# Patient Record
Sex: MALE | Race: WHITE | NOT HISPANIC OR LATINO | Employment: OTHER | URBAN - METROPOLITAN AREA
[De-identification: names, ages, dates, MRNs, and addresses within clinical notes are randomized per-mention and may not be internally consistent; named-entity substitution may affect disease eponyms.]

---

## 2017-01-03 ENCOUNTER — LAB VISIT (OUTPATIENT)
Dept: LAB | Facility: HOSPITAL | Age: 72
End: 2017-01-03
Attending: INTERNAL MEDICINE
Payer: MEDICARE

## 2017-01-03 DIAGNOSIS — N18.4 CRD (CHRONIC RENAL DISEASE), STAGE IV: ICD-10-CM

## 2017-01-03 DIAGNOSIS — N18.4 KIDNEY DISEASE, CHRONIC, STAGE IV (GFR 15-29 ML/MIN): ICD-10-CM

## 2017-01-03 LAB
FERRITIN SERPL-MCNC: 127 NG/ML
HCT VFR BLD AUTO: 28.1 %
HGB BLD-MCNC: 8.9 G/DL
IRON SERPL-MCNC: 35 UG/DL
SATURATED IRON: 19 %
TOTAL IRON BINDING CAPACITY: 188 UG/DL
TRANSFERRIN SERPL-MCNC: 127 MG/DL

## 2017-01-03 PROCEDURE — 85018 HEMOGLOBIN: CPT | Mod: PO

## 2017-01-03 PROCEDURE — 82728 ASSAY OF FERRITIN: CPT

## 2017-01-03 PROCEDURE — 36415 COLL VENOUS BLD VENIPUNCTURE: CPT | Mod: PO

## 2017-01-03 PROCEDURE — 83540 ASSAY OF IRON: CPT

## 2017-01-03 PROCEDURE — 85014 HEMATOCRIT: CPT | Mod: PO

## 2017-01-05 ENCOUNTER — INFUSION (OUTPATIENT)
Dept: INFECTIOUS DISEASES | Facility: HOSPITAL | Age: 72
End: 2017-01-05
Attending: INTERNAL MEDICINE
Payer: MEDICARE

## 2017-01-05 ENCOUNTER — CLINICAL SUPPORT (OUTPATIENT)
Dept: TRANSPLANT | Facility: CLINIC | Age: 72
End: 2017-01-05
Payer: MEDICARE

## 2017-01-05 VITALS — HEART RATE: 58 BPM | DIASTOLIC BLOOD PRESSURE: 62 MMHG | SYSTOLIC BLOOD PRESSURE: 159 MMHG

## 2017-01-05 DIAGNOSIS — N18.4 CRD (CHRONIC RENAL DISEASE), STAGE IV: ICD-10-CM

## 2017-01-05 DIAGNOSIS — N18.4 KIDNEY DISEASE, CHRONIC, STAGE IV (GFR 15-29 ML/MIN): Primary | ICD-10-CM

## 2017-01-05 PROCEDURE — 63600175 PHARM REV CODE 636 W HCPCS: Performed by: INTERNAL MEDICINE

## 2017-01-05 PROCEDURE — 96372 THER/PROPH/DIAG INJ SC/IM: CPT

## 2017-01-05 RX ADMIN — DARBEPOETIN ALFA 150 MCG: 150 INJECTION, SOLUTION INTRAVENOUS; SUBCUTANEOUS at 11:01

## 2017-01-05 NOTE — PROGRESS NOTES
HGB 8.9; HCT 28.1  DISCUSSED HGB LEVEL WITH PT.  DOSE NOT INCREASED PER TREATMENT PROTOCOL FORM DROD-188 BC PT DOSE WAS INCREASED 2 WEEKS AGO.  ARANESP 150MCG ADMINISTERED SUBQ.  NEXT APPT IN 2 WEEKS D/T HGB<9.  PT VERBALIZED UNDERSTANDING AND LEFT IN NAD.

## 2017-01-05 NOTE — PROGRESS NOTES
PRE EDUCATION TEACHING NOTE    Philip Mathis was seen in clinic today.  Handbook on pre-liver transplant information (see outline below) was given to the patient and time was allowed for questions.  Patient's wife, Veronika accompanied him.  Informed consent signed and written information given on selection criteria.    LIVER TRANSPLANT WORK-UP EDUCATION   I. UNDERSTANDING THE TRANSPLANT PROCESS     A. Transplant team      B. MELD score      C. Balancing urgency and outcome     D. Liver Transplant Options         1.  Donor         2. Living Donor--rationale, benefits     E. Transplant Work-up         1. Medical         2. Psychological and Social--lifetime commitment, life changes, personal plan/ goal         3. Financial--fundraising     F.  Completed work-up and Next Steps    G. Wait Time         1.  Can be listed at more than one center         2.  Can transfer wait time     H. The Call       I. Possible donor options         1. DCD         2. Hep B Core and Hep C Positive         3. Increased Risk     J.  Liver Transplant Surgery         1. Length         2. Transfusions, cell saver         3. Surgical risks         4. What to expect after sugery--Central lines, drains, Rodriguez catheter, incision, endotracheal              tube, NG tube, length of stay in ICU/ TSU  II.  HOW TO BEST CARE FOR YOURSELF (Take Five To Thrive)  III. UNDERSTANDING LIFE AFTER TRANSPLANT  A. Medicines after transplant      1. Immunosuppression--infection and rejection  B. Labs   IV. ADULT LIVER SURVIVAL RATES

## 2017-01-06 DIAGNOSIS — Z76.82 ORGAN TRANSPLANT CANDIDATE: Primary | ICD-10-CM

## 2017-01-07 ENCOUNTER — LAB VISIT (OUTPATIENT)
Dept: LAB | Facility: HOSPITAL | Age: 72
End: 2017-01-07
Payer: MEDICARE

## 2017-01-07 DIAGNOSIS — Z94.4 STATUS POST LIVER TRANSPLANT: ICD-10-CM

## 2017-01-07 DIAGNOSIS — Z94.4 STATUS POST LIVER TRANSPLANTATION: ICD-10-CM

## 2017-01-07 LAB
ALBUMIN SERPL BCP-MCNC: 3.8 G/DL
ALP SERPL-CCNC: 53 U/L
ALT SERPL W/O P-5'-P-CCNC: 9 U/L
ANION GAP SERPL CALC-SCNC: 12 MMOL/L
AST SERPL-CCNC: 21 U/L
BILIRUB SERPL-MCNC: 0.4 MG/DL
BUN SERPL-MCNC: 71 MG/DL
CALCIUM SERPL-MCNC: 8.5 MG/DL
CHLORIDE SERPL-SCNC: 109 MMOL/L
CO2 SERPL-SCNC: 21 MMOL/L
CREAT SERPL-MCNC: 3.9 MG/DL
EST. GFR  (AFRICAN AMERICAN): 16.7 ML/MIN/1.73 M^2
EST. GFR  (NON AFRICAN AMERICAN): 14.4 ML/MIN/1.73 M^2
GLUCOSE SERPL-MCNC: 110 MG/DL
POTASSIUM SERPL-SCNC: 5.2 MMOL/L
PROT SERPL-MCNC: 7.7 G/DL
SODIUM SERPL-SCNC: 142 MMOL/L

## 2017-01-07 PROCEDURE — 36415 COLL VENOUS BLD VENIPUNCTURE: CPT | Mod: PO

## 2017-01-07 PROCEDURE — 80053 COMPREHEN METABOLIC PANEL: CPT

## 2017-01-08 RX ORDER — PANTOPRAZOLE SODIUM 40 MG/1
TABLET, DELAYED RELEASE ORAL
Qty: 90 TABLET | Refills: 3 | Status: SHIPPED | OUTPATIENT
Start: 2017-01-08 | End: 2018-01-01 | Stop reason: SDUPTHER

## 2017-01-09 ENCOUNTER — OFFICE VISIT (OUTPATIENT)
Dept: FAMILY MEDICINE | Facility: CLINIC | Age: 72
End: 2017-01-09
Payer: MEDICARE

## 2017-01-09 VITALS
DIASTOLIC BLOOD PRESSURE: 60 MMHG | WEIGHT: 205.25 LBS | SYSTOLIC BLOOD PRESSURE: 158 MMHG | OXYGEN SATURATION: 95 % | BODY MASS INDEX: 26.34 KG/M2 | HEART RATE: 72 BPM | HEIGHT: 74 IN | TEMPERATURE: 98 F

## 2017-01-09 DIAGNOSIS — N18.4 CRD (CHRONIC RENAL DISEASE), STAGE IV: ICD-10-CM

## 2017-01-09 DIAGNOSIS — R11.0 NAUSEA: ICD-10-CM

## 2017-01-09 DIAGNOSIS — I70.0 AORTIC ATHEROSCLEROSIS: ICD-10-CM

## 2017-01-09 DIAGNOSIS — E11.22 TYPE 2 DIABETES MELLITUS WITH STAGE 4 CHRONIC KIDNEY DISEASE, UNSPECIFIED LONG TERM INSULIN USE STATUS: Chronic | ICD-10-CM

## 2017-01-09 DIAGNOSIS — N18.4 TYPE 2 DIABETES MELLITUS WITH STAGE 4 CHRONIC KIDNEY DISEASE, UNSPECIFIED LONG TERM INSULIN USE STATUS: Chronic | ICD-10-CM

## 2017-01-09 DIAGNOSIS — L29.9 ITCHING: ICD-10-CM

## 2017-01-09 DIAGNOSIS — Z79.4 LONG-TERM INSULIN USE: ICD-10-CM

## 2017-01-09 DIAGNOSIS — L50.9 URTICARIA: Primary | ICD-10-CM

## 2017-01-09 DIAGNOSIS — K74.60 HEPATIC CIRRHOSIS, UNSPECIFIED HEPATIC CIRRHOSIS TYPE: ICD-10-CM

## 2017-01-09 DIAGNOSIS — Z86.19 HISTORY OF HEPATITIS C: ICD-10-CM

## 2017-01-09 PROCEDURE — 3044F HG A1C LEVEL LT 7.0%: CPT | Mod: S$GLB,,, | Performed by: NURSE PRACTITIONER

## 2017-01-09 PROCEDURE — 99499 UNLISTED E&M SERVICE: CPT | Mod: S$GLB,,, | Performed by: NURSE PRACTITIONER

## 2017-01-09 PROCEDURE — 2022F DILAT RTA XM EVC RTNOPTHY: CPT | Mod: S$GLB,,, | Performed by: NURSE PRACTITIONER

## 2017-01-09 PROCEDURE — 3066F NEPHROPATHY DOC TX: CPT | Mod: S$GLB,,, | Performed by: NURSE PRACTITIONER

## 2017-01-09 PROCEDURE — 1157F ADVNC CARE PLAN IN RCRD: CPT | Mod: S$GLB,,, | Performed by: NURSE PRACTITIONER

## 2017-01-09 PROCEDURE — 99999 PR PBB SHADOW E&M-EST. PATIENT-LVL III: CPT | Mod: PBBFAC,,, | Performed by: NURSE PRACTITIONER

## 2017-01-09 PROCEDURE — 3078F DIAST BP <80 MM HG: CPT | Mod: S$GLB,,, | Performed by: NURSE PRACTITIONER

## 2017-01-09 PROCEDURE — 3077F SYST BP >= 140 MM HG: CPT | Mod: S$GLB,,, | Performed by: NURSE PRACTITIONER

## 2017-01-09 PROCEDURE — 1159F MED LIST DOCD IN RCRD: CPT | Mod: S$GLB,,, | Performed by: NURSE PRACTITIONER

## 2017-01-09 PROCEDURE — 1160F RVW MEDS BY RX/DR IN RCRD: CPT | Mod: S$GLB,,, | Performed by: NURSE PRACTITIONER

## 2017-01-09 PROCEDURE — 99214 OFFICE O/P EST MOD 30 MIN: CPT | Mod: S$GLB,,, | Performed by: NURSE PRACTITIONER

## 2017-01-09 PROCEDURE — 1126F AMNT PAIN NOTED NONE PRSNT: CPT | Mod: S$GLB,,, | Performed by: NURSE PRACTITIONER

## 2017-01-09 RX ORDER — PERMETHRIN 50 MG/G
CREAM TOPICAL
Refills: 2 | COMMUNITY
Start: 2017-01-06 | End: 2017-01-09

## 2017-01-09 RX ORDER — HYDROXYZINE HYDROCHLORIDE 10 MG/1
TABLET, FILM COATED ORAL
Refills: 2 | COMMUNITY
Start: 2017-01-06 | End: 2017-01-09

## 2017-01-09 RX ORDER — ONDANSETRON 4 MG/1
4 TABLET, ORALLY DISINTEGRATING ORAL EVERY 12 HOURS PRN
Qty: 30 TABLET | Refills: 0 | Status: SHIPPED | OUTPATIENT
Start: 2017-01-09 | End: 2017-01-27 | Stop reason: SDUPTHER

## 2017-01-09 NOTE — MR AVS SNAPSHOT
[facility] [place]  [address]  [place] [address]  Phone: [phone]  Fax: [phone]                  Philip Mathis III   2017 7:00 AM   Office Visit    Description:  Male : 1945   Provider:  Donnie Owens NP   Department:  [facility] Medicine           Reason for Visit     itching on back     Nausea           Diagnoses this Visit        Comments    Urticaria    -  Primary     Itching         Type 2 diabetes mellitus with stage 4 chronic kidney disease, unspecified long term insulin use status         CRD (chronic renal disease), stage IV         History of hepatitis C         Hepatic cirrhosis, unspecified hepatic cirrhosis type         Long-term insulin use         Aortic atherosclerosis         Nausea                To Do List           Future Appointments        Provider Department Dept Phone    2017 4:00 PM MD [facility] - Liver Transplant [phone]    2017 8:00 AM LAB, [facility] [phone]    2017 8:00 AM LAB, [place] [phone]    2017 8:15 AM Faye Larson DPM [facility] - Podiatry [phone]    3/1/2017 11:00 AM VASCULAR, LAB [facility] [phone]      Goals (5 Years of Data)     None      Follow-Up and Disposition     Return in about 3 months (around 2017).       These Medications        Disp Refills Start End    ondansetron (ZOFRAN-ODT) 4 MG TbDL 30 tablet 0 2017     Take 1 tablet (4 mg total) by mouth every 12 (twelve) hours as needed. - Oral    [facility] [id number]  [address] AT [facility] [place] Ph #: [phone]         [facility]     [facility] Care Line -  Assistance  Registered nurses in the [facility] provide clinical advisement, health education, appointment booking, and other advisory services.  Call for this free service at  [phone].             Medications           Message regarding Medications     Verify the changes and/or additions to your medication regime listed below are the same as discussed with your clinician today.  If any of these changes or additions are incorrect, please notify your healthcare provider.        START taking these NEW medications        Refills    ondansetron (ZOFRAN-ODT) 4 MG TbDL 0    Sig: Take 1 tablet (4 mg total) by mouth every 12 (twelve) hours as needed.    Class: Normal    Route: Oral      STOP taking these medications     triamcinolone acetonide 0.1% (KENALOG) 0.1 % ointment     permethrin (ELIMITE) 5 % cream APPLY FROM NECK DOWN AT NIGHT AND REPEAT IN 1 WEEK    hydrOXYzine HCl (ATARAX) 25 MG tablet Take 1 tablet (25 mg total) by mouth 3 (three) times daily as needed for Itching.    hydrOXYzine HCl (ATARAX) 10 MG Tab TK 1 T PO  BID           Verify that the below list of medications is an accurate representation of the medications you are currently taking.  If none reported, the list may be blank. If incorrect, please contact your healthcare provider. Carry this list with you in case of emergency.           Current Medications     allopurinol (ZYLOPRIM) 100 MG tablet TAKE 1 TABLET EVERY DAY    blood sugar diagnostic (ACCU-CHEK JOANN PLUS TEST STRP) Strp 1 strip by Misc.(Non-Drug; Combo Route) route 4 (four) times daily.    blood-glucose meter (ACCU-CHEK JOANN PLUS METER) Misc Use as directed    cetirizine (ZYRTEC) 5 MG tablet Take 1 tablet (5 mg total) by mouth 2 (two) times daily.    ciclopirox (PENLAC) 8 % Soln Apply to affected nails daily x 6-12 mos.  Remove weekly with rubbing alcohol    clonazePAM (KLONOPIN) 0.5 MG tablet Take 1 tablet (0.5 mg total) by mouth every evening.    cloNIDine (CATAPRES) 0.1 MG tablet Take 1 tablet (0.1 mg total) by mouth 3 (three) times daily.    doxazosin (CARDURA) 8 MG Tab Take 1 tablet (8 mg total) by mouth once daily.    ferrous gluconate (FERGON) 324 MG  "tablet pt taking tid    glucagon (human recombinant) inj 1mg/mL kit Inject 1 mL (1 mg total) into the muscle as needed.    hydrALAZINE (APRESOLINE) 100 MG tablet Take 1 tablet (100 mg total) by mouth 3 (three) times daily.    insulin aspart (NOVOLOG) 100 unit/mL InPn pen Inject 6 units with meals plus scale 180-230 +1, 231-280 +2, 281-330 +3, 331-380 +4, >380 +5. Max daily dose 33 units. 90 day supply    insulin glargine (LANTUS SOLOSTAR) 100 unit/mL (3 mL) InPn pen Inject 10 Units into the skin every evening.    insulin needles, disposable, (NOVOFINE 32) 32 x 1/4 " Ndle 1 each by Misc.(Non-Drug; Combo Route) route 3 (three) times daily.    lactulose (CHRONULAC) 20 gram/30 mL Soln Take 30 mLs (20 g total) by mouth 3 (three) times daily.    lancets (ACCU-CHEK SOFTCLIX LANCETS) Misc 1 lancet by Misc.(Non-Drug; Combo Route) route 4 (four) times daily.    LANTUS SOLOSTAR 100 unit/mL (3 mL) InPn pen INJECT 10 UNITS UNDER THE SKIN EVERY EVENING    multivitamin (THERAGRAN) per tablet Take 1 tablet by mouth Daily.    nystatin (MYCOSTATIN) 500,000 unit Tab     oxycodone (OXY-IR) 5 mg Cap Take 1 capsule (5 mg total) by mouth every 4 (four) hours as needed.    oxycodone (ROXICODONE) 5 MG immediate release tablet TK 1 T PO  Q 4 H PRN    oxycodone-acetaminophen (PERCOCET) 5-325 mg per tablet Take 1 tablet by mouth every 4 (four) hours as needed for Pain.    pantoprazole (PROTONIX) 40 MG tablet TAKE 1 TABLET ONE TIME DAILY    rifaximin (XIFAXAN) 550 mg Tab Take 1 tablet (550 mg total) by mouth 2 (two) times daily.    senna (SENNA CONCENTRATE) 8.6 mg tablet Take 1 tablet by mouth once daily.    tacrolimus (PROGRAF) 0.5 MG Cap Take 1 capsule (0.5 mg total) by mouth once daily.    torsemide (DEMADEX) 20 MG Tab Take 2 tablets (40 mg total) by mouth once daily.    urea (CARMOL) 40 % Crea Apply topically once daily.    carvedilol (COREG) 6.25 MG tablet Take 1 tablet (6.25 mg total) by mouth 2 (two) times daily.    ondansetron " "(ZOFRAN-ODT) 4 MG TbDL Take 1 tablet (4 mg total) by mouth every 12 (twelve) hours as needed.           Clinical Reference Information           Vital Signs - Last Recorded  Most recent update: 1/9/2017  7:15 AM by Julisa Fritz MA    BP Pulse Temp Ht Wt SpO2    (!) 158/60 (BP Location: Right arm, Patient Position: Sitting, BP Method: Manual) 72 98 °F (36.7 °C) 6' 2" (1.88 m) 93.1 kg (205 lb 4 oz) 95%    BMI                26.35 kg/m2          Blood Pressure          Most Recent Value    BP  (!)  158/60      Allergies as of 1/9/2017     Corticosteroids (Glucocorticoids)    Hydrocortisone    Neuromuscular Blockers, Steroidal    Ribavirin      Immunizations Administered on Date of Encounter - 1/9/2017     None      Orders Placed During Today's Visit      Normal Orders This Visit    Ambulatory referral to Allergy     Future Labs/Procedures Expected by Expires    Hemoglobin A1c  1/9/2017 1/9/2018    Lipid panel  1/9/2017 4/9/2017      Maintenance Dialysis History     Patient has no recorded history of maintenance dialysis.      Transplant Information        Txp Date Organ Coordinator Care Team     Liver Fabian Colvin RN Referring Physician:  Danay Nath MD          Txp Date Organ Coordinator Care Team     Kidney Fabian Colvin RN Referring Physician:  Luciano Hercules MD   Current Nephrologist:  Luciano Hercules MD          Txp Date Organ Coordinator Care Team    6/10/2001 Liver Gaby Palacios, CHRISSIE Surgeon:  BRITTANY Pizarro MD   Assisting Surgeon:  Cain Bryant MD   Current Hepatologist:  Danay Nath MD         "

## 2017-01-09 NOTE — PROGRESS NOTES
This dictation has been generated using Dragon Dictation some phonetic errors may occur.     Philip was seen today for itching on back and nausea.    Diagnoses and all orders for this visit:    Urticaria  -     Ambulatory referral to Allergy    Itching  -     Ambulatory referral to Allergy    Type 2 diabetes mellitus with stage 4 chronic kidney disease, unspecified long term insulin use status  -     Hemoglobin A1c; Future  -     Lipid panel; Future    CRD (chronic renal disease), stage IV    History of hepatitis C    Hepatic cirrhosis, unspecified hepatic cirrhosis type    Long-term insulin use    Aortic atherosclerosis    Nausea    Other orders  -     ondansetron (ZOFRAN-ODT) 4 MG TbDL; Take 1 tablet (4 mg total) by mouth every 12 (twelve) hours as needed.      Itching and rash.  Has not responded to multiple steroids, Elimite, and antihistamines.  Patient has seen dermatology.  Will get ALLERGIES input.  Diabetes with chronic renal disease.  Update labs as above.  Continue current therapies and insulin.  History of hep C, hepatic cirrhosis continues to follow-up with transplant.  Aortic atherosclerosis stable and controlled continue to monitor.  Control risk factors.  Nausea.  Discussed intermittent use of Zofran as above.  Some appetite changes and possible early satiety.  I've asked him to stop and take breaks and attempt to eat as patient has stated that sometimes he gets busy and just skips meals.  I would like for him to discuss this with transplant at follow-up this week.  We may consider an EGD if symptoms persist.     Return in about 3 months (around 4/9/2017).      ________________________________________________________________  ________________________________________________________________        Chief Complaint   Patient presents with    itching on back    Nausea     every morning     History of present illness  This 71 y.o. presents today for complaint of itching on his back and nausea in the  morning.  Patient complains of itching on his back.  He has received multiple doses of various steroids from myself and dermatology.  Dermatology also tried Elimite without improvement.  He has used Atarax at various doses but notes somnolence at 25 mg.  He states that it made him sleepy but didn't really stop the itching.  He also tried Zyrtec without improvement.  At this point it is time to get the input of ALLERGY specialist.  Also considering may be due to variable ammonia levels which have had minimal elevations in the past.  We discussed that this can cause some itching and also uria levels may contribute.  No current headache symptoms or confusion.  Patient notes nausea.  Symptom is worse in the morning.  He describes a routine of getting up early and waiting a while before eating.  He has some nausea when he gets up every morning.  If he waits a while to eat he is able to eat a little bit later.  He denies any vomiting.  Patient denies any abdominal pain.  No stool changes.  Patient also requests handicap paperwork.  He does have a permanent ID which  in October of this last year.  Given his back pain and chronic medical issues it makes ambulating distances difficult for him.  He does indicate he has to stop and rest.  Review of systems  No fever or chills.  No malaise or body aches.    Patient denies headache.  No confusion or thought changes.  No abdominal pain.  Nausea without vomiting.  No diarrhea.  Patient notes constipation.  Patient denies chest pain or shortness of breath.  Does get short of breath with ambulation long distance.  Patient notes back and leg pain.  Symptoms are exacerbated by walking long distance.  No polyuria or polydipsia.  Patient denies dysuria.    Past medical and social history reviewed.    Past Medical History   Diagnosis Date    Anemia     Back pain     Bishop's esophagus     BPH (benign prostatic hypertrophy)     Chronic kidney disease     Cirrhosis      Diabetes mellitus     Diabetes mellitus, type 2     Elevated PSA     Heart failure     Hepatitis Hepatitis C    Hepatitis C     Hyperlipidemia     Hypertension     Hypertension     Liver transplanted     Peripheral neuropathy     Sleep apnea     Transfusion reaction      Hep C from prev. blood transfusion    Transplanted liver     Trouble in sleeping     Type II or unspecified type diabetes mellitus with renal manifestations, uncontrolled     Type II or unspecified type diabetes mellitus with renal manifestations, uncontrolled        Past Surgical History   Procedure Laterality Date    Liver transplant  2001    Broken nose      Femur surgery      Eye surgery       cataracts    Skin graft       graft from right thigh , applied to the left back and left arm.    Fracture surgery       right femur fracture        Family History   Problem Relation Age of Onset    Cancer Mother      had cancer 40 years ago     Coronary artery disease Father     Hypertension Father     Diabetes Father     Heart disease Father     Colon cancer Neg Hx        Social History     Social History    Marital status:      Spouse name: N/A    Number of children: N/A    Years of education: N/A     Social History Main Topics    Smoking status: Former Smoker     Quit date: 7/30/1998    Smokeless tobacco: Never Used      Comment: pt reports quitting in 1998    Alcohol use No      Comment: pt reports hx of alcholism and reports quit in 1998    Drug use: Yes     Special: Marijuana      Comment: pt reports smoking THC apprxly twice/week    Sexual activity: Yes     Partners: Female     Other Topics Concern    None     Social History Narrative       Current Outpatient Prescriptions   Medication Sig Dispense Refill    allopurinol (ZYLOPRIM) 100 MG tablet TAKE 1 TABLET EVERY DAY 90 tablet 3    blood sugar diagnostic (ACCU-CHEK JOANN PLUS TEST STRP) Strp 1 strip by Misc.(Non-Drug; Combo Route) route 4 (four) times  "daily. 360 each 3    blood-glucose meter (ACCU-CHEK JOANN PLUS METER) Misc Use as directed 1 each 0    cetirizine (ZYRTEC) 5 MG tablet Take 1 tablet (5 mg total) by mouth 2 (two) times daily. 60 tablet 3    ciclopirox (PENLAC) 8 % Soln Apply to affected nails daily x 6-12 mos.  Remove weekly with rubbing alcohol 1 Bottle 5    clonazePAM (KLONOPIN) 0.5 MG tablet Take 1 tablet (0.5 mg total) by mouth every evening. (Patient taking differently: Take 0.5 mg by mouth daily as needed. ) 90 tablet 1    cloNIDine (CATAPRES) 0.1 MG tablet Take 1 tablet (0.1 mg total) by mouth 3 (three) times daily. 270 tablet 3    doxazosin (CARDURA) 8 MG Tab Take 1 tablet (8 mg total) by mouth once daily. 90 tablet 4    ferrous gluconate (FERGON) 324 MG tablet pt taking tid 180 tablet 0    glucagon (human recombinant) inj 1mg/mL kit Inject 1 mL (1 mg total) into the muscle as needed. 1 kit 2    hydrALAZINE (APRESOLINE) 100 MG tablet Take 1 tablet (100 mg total) by mouth 3 (three) times daily. 270 tablet 3    insulin aspart (NOVOLOG) 100 unit/mL InPn pen Inject 6 units with meals plus scale 180-230 +1, 231-280 +2, 281-330 +3, 331-380 +4, >380 +5. Max daily dose 33 units. 90 day supply 3 Box 3    insulin glargine (LANTUS SOLOSTAR) 100 unit/mL (3 mL) InPn pen Inject 10 Units into the skin every evening. 2 Box 6    insulin needles, disposable, (NOVOFINE 32) 32 x 1/4 " Ndle 1 each by Misc.(Non-Drug; Combo Route) route 3 (three) times daily. 100 each 5    lactulose (CHRONULAC) 20 gram/30 mL Soln Take 30 mLs (20 g total) by mouth 3 (three) times daily. 2700 mL 5    lancets (ACCU-CHEK SOFTCLIX LANCETS) Misc 1 lancet by Misc.(Non-Drug; Combo Route) route 4 (four) times daily. 360 each 3    LANTUS SOLOSTAR 100 unit/mL (3 mL) InPn pen INJECT 10 UNITS UNDER THE SKIN EVERY EVENING 30 mL 3    multivitamin (THERAGRAN) per tablet Take 1 tablet by mouth Daily.      nystatin (MYCOSTATIN) 500,000 unit Tab   1    oxycodone (OXY-IR) 5 mg Cap " Take 1 capsule (5 mg total) by mouth every 4 (four) hours as needed. 35 capsule 0    oxycodone (ROXICODONE) 5 MG immediate release tablet TK 1 T PO  Q 4 H PRN  0    oxycodone-acetaminophen (PERCOCET) 5-325 mg per tablet Take 1 tablet by mouth every 4 (four) hours as needed for Pain. 35 tablet 0    pantoprazole (PROTONIX) 40 MG tablet TAKE 1 TABLET ONE TIME DAILY 90 tablet 3    rifaximin (XIFAXAN) 550 mg Tab Take 1 tablet (550 mg total) by mouth 2 (two) times daily. 60 tablet 11    senna (SENNA CONCENTRATE) 8.6 mg tablet Take 1 tablet by mouth once daily.      tacrolimus (PROGRAF) 0.5 MG Cap Take 1 capsule (0.5 mg total) by mouth once daily. 30 capsule 11    torsemide (DEMADEX) 20 MG Tab Take 2 tablets (40 mg total) by mouth once daily. 180 tablet 3    urea (CARMOL) 40 % Crea Apply topically once daily. 85 g 5    carvedilol (COREG) 6.25 MG tablet Take 1 tablet (6.25 mg total) by mouth 2 (two) times daily. 180 tablet 3    ondansetron (ZOFRAN-ODT) 4 MG TbDL Take 1 tablet (4 mg total) by mouth every 12 (twelve) hours as needed. 30 tablet 0     No current facility-administered medications for this visit.        Review of patient's allergies indicates:   Allergen Reactions    Corticosteroids (glucocorticoids) Other (See Comments)     Leg swelling    Hydrocortisone      Leg swelling    Neuromuscular blockers, steroidal      Leg swelling    Ribavirin      Intolerance, arthralgias       Physical examination  Vitals Reviewed  Gen. Well-dressed well-nourished no apparent distress  Skin warm dry and intact.  No rashes noted.  Neck is supple without adenopathy   Chest.  Respirations are even unlabored.  Lungs are clear to auscultation.  Cardiac regular rate and rhythm.  No chest wall adenopathy noted.  Abdomen is soft and not distended.  Bowel sounds are present.  No tenderness during palpation of the abdomen.  No CVA tenderness to percussion.    Neuro. Awake alert oriented x4.  Normal judgment and cognition  noted.  Extremities no clubbing cyanosis or edema noted.     Call or return to clinic prn if these symptoms worsen or fail to improve as anticipated.

## 2017-01-10 ENCOUNTER — LAB VISIT (OUTPATIENT)
Dept: LAB | Facility: HOSPITAL | Age: 72
End: 2017-01-10
Attending: NURSE PRACTITIONER
Payer: MEDICARE

## 2017-01-10 DIAGNOSIS — E11.22 TYPE 2 DIABETES MELLITUS WITH STAGE 4 CHRONIC KIDNEY DISEASE, UNSPECIFIED LONG TERM INSULIN USE STATUS: Chronic | ICD-10-CM

## 2017-01-10 DIAGNOSIS — N18.4 TYPE 2 DIABETES MELLITUS WITH STAGE 4 CHRONIC KIDNEY DISEASE, UNSPECIFIED LONG TERM INSULIN USE STATUS: Chronic | ICD-10-CM

## 2017-01-10 LAB
CHOLEST/HDLC SERPL: 4.5 {RATIO}
HDL/CHOLESTEROL RATIO: 22.4 %
HDLC SERPL-MCNC: 125 MG/DL
HDLC SERPL-MCNC: 28 MG/DL
LDLC SERPL CALC-MCNC: 86 MG/DL
NONHDLC SERPL-MCNC: 97 MG/DL
TRIGL SERPL-MCNC: 55 MG/DL

## 2017-01-10 PROCEDURE — 80061 LIPID PANEL: CPT

## 2017-01-10 PROCEDURE — 36415 COLL VENOUS BLD VENIPUNCTURE: CPT | Mod: PO

## 2017-01-10 PROCEDURE — 83036 HEMOGLOBIN GLYCOSYLATED A1C: CPT

## 2017-01-11 ENCOUNTER — TELEPHONE (OUTPATIENT)
Dept: FAMILY MEDICINE | Facility: CLINIC | Age: 72
End: 2017-01-11

## 2017-01-11 LAB
ESTIMATED AVG GLUCOSE: 111 MG/DL
HBA1C MFR BLD HPLC: 5.5 %

## 2017-01-11 NOTE — TELEPHONE ENCOUNTER
Spoke to patient's wife and scheduled him to see Marni Sheffield on 2/3 at 8am. Mailed appointment reminder.

## 2017-01-12 NOTE — TELEPHONE ENCOUNTER
Call pt..   Labs look great! Sugar controlled. Cholesterol is controlled.   Is the nausea med helping?  Is he stopping and eating during the day?

## 2017-01-13 ENCOUNTER — LAB VISIT (OUTPATIENT)
Dept: LAB | Facility: HOSPITAL | Age: 72
End: 2017-01-13
Attending: INTERNAL MEDICINE
Payer: MEDICARE

## 2017-01-13 DIAGNOSIS — N18.4 CRD (CHRONIC RENAL DISEASE), STAGE IV: ICD-10-CM

## 2017-01-13 DIAGNOSIS — N18.4 KIDNEY DISEASE, CHRONIC, STAGE IV (GFR 15-29 ML/MIN): ICD-10-CM

## 2017-01-13 LAB
HCT VFR BLD AUTO: 30 %
HGB BLD-MCNC: 9.4 G/DL
IRON SERPL-MCNC: 27 UG/DL
SATURATED IRON: 15 %
TOTAL IRON BINDING CAPACITY: 182 UG/DL
TRANSFERRIN SERPL-MCNC: 123 MG/DL

## 2017-01-13 PROCEDURE — 85014 HEMATOCRIT: CPT | Mod: PO

## 2017-01-13 PROCEDURE — 83540 ASSAY OF IRON: CPT

## 2017-01-13 PROCEDURE — 85018 HEMOGLOBIN: CPT | Mod: PO

## 2017-01-13 PROCEDURE — 36415 COLL VENOUS BLD VENIPUNCTURE: CPT | Mod: PO

## 2017-01-16 ENCOUNTER — INFUSION (OUTPATIENT)
Dept: INFECTIOUS DISEASES | Facility: HOSPITAL | Age: 72
End: 2017-01-16
Attending: INTERNAL MEDICINE
Payer: MEDICARE

## 2017-01-16 ENCOUNTER — OFFICE VISIT (OUTPATIENT)
Dept: TRANSPLANT | Facility: CLINIC | Age: 72
End: 2017-01-16
Payer: MEDICARE

## 2017-01-16 VITALS
BODY MASS INDEX: 26.49 KG/M2 | HEIGHT: 74 IN | HEART RATE: 66 BPM | WEIGHT: 206.38 LBS | RESPIRATION RATE: 16 BRPM | SYSTOLIC BLOOD PRESSURE: 143 MMHG | DIASTOLIC BLOOD PRESSURE: 65 MMHG | OXYGEN SATURATION: 96 % | TEMPERATURE: 98 F

## 2017-01-16 VITALS
BODY MASS INDEX: 26.31 KG/M2 | DIASTOLIC BLOOD PRESSURE: 59 MMHG | WEIGHT: 205 LBS | HEIGHT: 74 IN | SYSTOLIC BLOOD PRESSURE: 155 MMHG

## 2017-01-16 DIAGNOSIS — N18.4 KIDNEY DISEASE, CHRONIC, STAGE IV (GFR 15-29 ML/MIN): Primary | ICD-10-CM

## 2017-01-16 DIAGNOSIS — N18.4 CRD (CHRONIC RENAL DISEASE), STAGE IV: ICD-10-CM

## 2017-01-16 DIAGNOSIS — Z94.4 LIVER TRANSPLANTED: Primary | ICD-10-CM

## 2017-01-16 PROCEDURE — 1159F MED LIST DOCD IN RCRD: CPT | Mod: S$GLB,,, | Performed by: INTERNAL MEDICINE

## 2017-01-16 PROCEDURE — 99213 OFFICE O/P EST LOW 20 MIN: CPT | Mod: S$GLB,,, | Performed by: INTERNAL MEDICINE

## 2017-01-16 PROCEDURE — 63600175 PHARM REV CODE 636 W HCPCS: Performed by: INTERNAL MEDICINE

## 2017-01-16 PROCEDURE — 1157F ADVNC CARE PLAN IN RCRD: CPT | Mod: S$GLB,,, | Performed by: INTERNAL MEDICINE

## 2017-01-16 PROCEDURE — 1126F AMNT PAIN NOTED NONE PRSNT: CPT | Mod: S$GLB,,, | Performed by: INTERNAL MEDICINE

## 2017-01-16 PROCEDURE — 3077F SYST BP >= 140 MM HG: CPT | Mod: S$GLB,,, | Performed by: INTERNAL MEDICINE

## 2017-01-16 PROCEDURE — 99499 UNLISTED E&M SERVICE: CPT | Mod: S$GLB,,, | Performed by: INTERNAL MEDICINE

## 2017-01-16 PROCEDURE — 3078F DIAST BP <80 MM HG: CPT | Mod: S$GLB,,, | Performed by: INTERNAL MEDICINE

## 2017-01-16 PROCEDURE — 99999 PR PBB SHADOW E&M-EST. PATIENT-LVL III: CPT | Mod: PBBFAC,,, | Performed by: INTERNAL MEDICINE

## 2017-01-16 PROCEDURE — 96372 THER/PROPH/DIAG INJ SC/IM: CPT

## 2017-01-16 PROCEDURE — 1160F RVW MEDS BY RX/DR IN RCRD: CPT | Mod: S$GLB,,, | Performed by: INTERNAL MEDICINE

## 2017-01-16 RX ADMIN — DARBEPOETIN ALFA 150 MCG: 150 INJECTION, SOLUTION INTRAVENOUS; SUBCUTANEOUS at 02:01

## 2017-01-16 NOTE — PATIENT INSTRUCTIONS
Plan:  1.  Get HCV RNA quant in mid feb 2017 (wk 24 after completing Zepatier).   1.  Continue current dose of prograf 0.5 mg daily, trough has been 1.8-1.9, monitor prograf level.    2.  Gets Aranesp every other week with good response, Hgb up to 9.4.      3.  Labs q 3 months.   4.  Return in 3 months

## 2017-01-16 NOTE — LETTER
January 16, 2017        Luciano Hercules  [address]  [address]  [facility] [address]  Phone: [phone]  Fax: [phone]             [facility]  [address]  [facility] [address]  Phone: [phone]   Patient: Philip Mathis III   MR Number: [id number]   YOB: 1945   Date of Visit: 1/16/2017       Dear Dr. Luciano Hercules    Thank you for referring Philip Mathis to me for evaluation. Attached you will find relevant portions of my assessment and plan of care.    If you have questions, please do not hesitate to call me. I look forward to following Philip Mathis along with you.    Sincerely,    Danay Nath MD    Enclosure    If you would like to receive this communication electronically, please contact [email or web address] or [phone] to request [employer] access.    [employer] is a tool which provides read-only access to select patient information with whom you have a relationship. Its easy to use and provides real time access to review your patients record including encounter summaries, notes, results, and demographic information.    If you feel you have received this communication in error or would no longer like to receive these types of communications, please e-mail [email or web address]

## 2017-01-16 NOTE — PROGRESS NOTES
HGB 9.4; HCT 30.1    DISCUSSED HGB LEVEL WITH PT.  DOSE NOT INCREASED PER TREATMENT PROTOCOL FORM DROD-188 BC PT DOSE WAS INCREASED on 12/20/16. ARANESP 150MCG ADMINISTERED SUBQ.  NEXT APPT IN 4 weeks.  PT VERBALIZED UNDERSTANDING AND LEFT IN NAD. Dr Austin notified.    Gfr 14.4/stage 5
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
## 2017-01-24 DIAGNOSIS — R93.89 ABNORMAL CT SCAN: Primary | ICD-10-CM

## 2017-01-26 ENCOUNTER — LAB VISIT (OUTPATIENT)
Dept: LAB | Facility: HOSPITAL | Age: 72
End: 2017-01-26
Attending: INTERNAL MEDICINE
Payer: MEDICARE

## 2017-01-26 DIAGNOSIS — L30.9 ACUTE DERMATITIS: Primary | ICD-10-CM

## 2017-01-26 DIAGNOSIS — L29.9 UNSPECIFIED PRURITIC DISORDER: ICD-10-CM

## 2017-01-26 LAB
ERYTHROCYTE [SEDIMENTATION RATE] IN BLOOD BY WESTERGREN METHOD: 18 MM/HR
TSH SERPL DL<=0.005 MIU/L-ACNC: 2.26 UIU/ML

## 2017-01-26 PROCEDURE — 36415 COLL VENOUS BLD VENIPUNCTURE: CPT | Mod: PO

## 2017-01-26 PROCEDURE — 84165 PROTEIN E-PHORESIS SERUM: CPT | Mod: 26,,, | Performed by: PATHOLOGY

## 2017-01-26 PROCEDURE — 84165 PROTEIN E-PHORESIS SERUM: CPT

## 2017-01-26 PROCEDURE — 85651 RBC SED RATE NONAUTOMATED: CPT

## 2017-01-26 PROCEDURE — 84443 ASSAY THYROID STIM HORMONE: CPT

## 2017-01-27 LAB
ALBUMIN SERPL ELPH-MCNC: 3.97 G/DL
ALPHA1 GLOB SERPL ELPH-MCNC: 0.36 G/DL
ALPHA2 GLOB SERPL ELPH-MCNC: 0.75 G/DL
B-GLOBULIN SERPL ELPH-MCNC: 0.53 G/DL
GAMMA GLOB SERPL ELPH-MCNC: 1.49 G/DL
PROT SERPL-MCNC: 7.1 G/DL

## 2017-01-27 RX ORDER — ONDANSETRON 4 MG/1
TABLET, ORALLY DISINTEGRATING ORAL
Qty: 30 TABLET | Refills: 0 | Status: SHIPPED | OUTPATIENT
Start: 2017-01-27 | End: 2017-04-13 | Stop reason: SDUPTHER

## 2017-01-30 LAB — PATHOLOGIST INTERPRETATION SPE: NORMAL

## 2017-02-03 ENCOUNTER — TELEPHONE (OUTPATIENT)
Dept: ALLERGY | Facility: CLINIC | Age: 72
End: 2017-02-03

## 2017-02-03 ENCOUNTER — OFFICE VISIT (OUTPATIENT)
Dept: ALLERGY | Facility: CLINIC | Age: 72
End: 2017-02-03
Payer: MEDICARE

## 2017-02-03 VITALS
SYSTOLIC BLOOD PRESSURE: 146 MMHG | HEIGHT: 73 IN | DIASTOLIC BLOOD PRESSURE: 60 MMHG | BODY MASS INDEX: 26.65 KG/M2 | WEIGHT: 201.06 LBS

## 2017-02-03 DIAGNOSIS — N18.4 CRD (CHRONIC RENAL DISEASE), STAGE IV: Chronic | ICD-10-CM

## 2017-02-03 DIAGNOSIS — K74.60 CIRRHOSIS OF LIVER WITHOUT ASCITES, UNSPECIFIED HEPATIC CIRRHOSIS TYPE: Chronic | ICD-10-CM

## 2017-02-03 DIAGNOSIS — D47.2 MGUS (MONOCLONAL GAMMOPATHY OF UNKNOWN SIGNIFICANCE): Chronic | ICD-10-CM

## 2017-02-03 DIAGNOSIS — L50.8 OTHER SPECIFIED URTICARIA: Primary | Chronic | ICD-10-CM

## 2017-02-03 DIAGNOSIS — Z86.19 HISTORY OF HEPATITIS C: ICD-10-CM

## 2017-02-03 DIAGNOSIS — L29.8 OTHER SPECIFIED PRURITIC CONDITIONS: Chronic | ICD-10-CM

## 2017-02-03 PROCEDURE — 99204 OFFICE O/P NEW MOD 45 MIN: CPT | Mod: S$GLB,,, | Performed by: ALLERGY & IMMUNOLOGY

## 2017-02-03 PROCEDURE — 1159F MED LIST DOCD IN RCRD: CPT | Mod: S$GLB,,, | Performed by: ALLERGY & IMMUNOLOGY

## 2017-02-03 PROCEDURE — 3077F SYST BP >= 140 MM HG: CPT | Mod: S$GLB,,, | Performed by: ALLERGY & IMMUNOLOGY

## 2017-02-03 PROCEDURE — 99999 PR PBB SHADOW E&M-EST. PATIENT-LVL II: CPT | Mod: PBBFAC,,, | Performed by: ALLERGY & IMMUNOLOGY

## 2017-02-03 PROCEDURE — 1157F ADVNC CARE PLAN IN RCRD: CPT | Mod: S$GLB,,, | Performed by: ALLERGY & IMMUNOLOGY

## 2017-02-03 PROCEDURE — 3078F DIAST BP <80 MM HG: CPT | Mod: S$GLB,,, | Performed by: ALLERGY & IMMUNOLOGY

## 2017-02-03 PROCEDURE — 1160F RVW MEDS BY RX/DR IN RCRD: CPT | Mod: S$GLB,,, | Performed by: ALLERGY & IMMUNOLOGY

## 2017-02-03 PROCEDURE — 99499 UNLISTED E&M SERVICE: CPT | Mod: S$PBB,TXP,, | Performed by: ALLERGY & IMMUNOLOGY

## 2017-02-03 RX ORDER — DOXEPIN HYDROCHLORIDE 25 MG/1
CAPSULE ORAL
Refills: 2 | COMMUNITY
Start: 2017-01-26 | End: 2017-02-03 | Stop reason: SINTOL

## 2017-02-03 RX ORDER — MONTELUKAST SODIUM 10 MG/1
10 TABLET ORAL NIGHTLY
Qty: 30 TABLET | Refills: 6 | Status: SHIPPED | OUTPATIENT
Start: 2017-02-03 | End: 2017-06-27 | Stop reason: SDUPTHER

## 2017-02-03 RX ORDER — CETIRIZINE HYDROCHLORIDE 10 MG/1
10 TABLET ORAL 2 TIMES DAILY
Qty: 60 TABLET | Refills: 0
Start: 2017-02-03 | End: 2017-06-14

## 2017-02-03 NOTE — TELEPHONE ENCOUNTER
----- Message from Serene Lin sent at 2/3/2017  9:15 AM CST -----  Contact:  office/[phone]  Dr. Santana's office is calling in regards to spoke with the nurse and was asked to get some results for pt,  stated to tell  the she can look up pt's reports in Epic.              Thank you

## 2017-02-03 NOTE — LETTER
February 3, 2017      Donnie Owens, NP  [address]  [place] [address]           [facility]  [address] [place] [place] [address]  Phone: [phone]          Patient: Philip Mathis III   MR Number: [id number]   YOB: 1945   Date of Visit: 2/3/2017       Dear Donnie Owens:    Thank you for referring Philip Mathis to me for evaluation. Attached you will find relevant portions of my assessment and plan of care.    If you have questions, please do not hesitate to call me. I look forward to following Philip Mathis along with you.    Sincerely,    Marni Sheffield MD    Enclosure  CC:  No Recipients    If you would like to receive this communication electronically, please contact [email or web address] or [phone] to request more information on [employer] Link access.    For providers and/or their staff who would like to refer a patient to [facility], please contact us through our one-stop-shop provider referral line, [facility] , at [phone].    If you feel you have received this communication in error or would no longer like to receive these types of communications, please e-mail [email or web address]

## 2017-02-03 NOTE — PATIENT INSTRUCTIONS
1.  Patient is to start Zyrtec 10 mg twice a day.  If his symptoms persist he is then to increase it to 2 tablets twice a day.  If he continues to have symptoms despite this increased dose, he may increase the Zyrtec to 30 mg twice a day.  2.  Patient is also to start Singulair 1 tablet every 24 hours.  3.  More laboratory studies have been ordered to evaluate the etiology of his symptoms.  4.  The patient is to call me if his symptoms persist despite the above medications.  At that time I would consider adding Periactin 1 tablet 2-3 times a day.   5.  Patient is to return in possibly 6 weeks for follow-up.

## 2017-02-03 NOTE — LETTER
February 3, 2017        Donnie Owens, NP  [address]  [place] [address]             [facility]  [address] [facility] [place] [address]  Phone: [phone]   Patient: Philip Mathis III   MR Number: [id number]   YOB: 1945   Date of Visit: 2/3/2017       Dear Donnie Owens:    I saw your patient today for a follow-up visit with respect to the following conditions:    1. Chronic urticaria  CBC auto differential    Comprehensive metabolic panel    THYROID PEROXIDASE ANTIBODY    C1 ESTERASE INHIBITOR PANEL    CH50 COMPLEMENT (TOTAL)    IGE    CU (Chronic Urticaria) Index Panel    ROLO   2. Severe pruritus  CBC auto differential    Comprehensive metabolic panel    THYROID PEROXIDASE ANTIBODY    C1 ESTERASE INHIBITOR PANEL    CH50 COMPLEMENT (TOTAL)    IGE    CU (Chronic Urticaria) Index Panel    ROLO   3. MGUS (monoclonal gammopathy of unknown significance)     4. Cirrhosis of liver without ascites, unspecified hepatic cirrhosis type     5. CRD (chronic renal disease), stage IV     6. History of hepatitis C         I have made the following changes in medications:    Patient Instructions   1.  Patient is to start Zyrtec 10 mg twice a day.  If his symptoms persist he is then to increase it to 2 tablets twice a day.  If he continues to have symptoms despite this increased dose, he may increase the Zyrtec to 30 mg twice a day.  2.  Patient is also to start Singulair 1 tablet every 24 hours.  3.  More laboratory studies have been ordered to evaluate the etiology of his symptoms.  4.  The patient is to call me if his symptoms persist despite the above medications.  At that time I would consider adding Periactin 1 tablet 2-3 times a day.   5.  Patient is to return in possibly 6 weeks for follow-up.      I have requested a follow-up visit in 6 weeks to assess this patient's progress.    I hope you find this information helpful in the care of your patient. If you have questions about  the above, please do not hesitate to contact me.    Sincerely,    Marni Sheffield MD

## 2017-02-03 NOTE — TELEPHONE ENCOUNTER
Call to patient to let him know his lab orders were ready.  Spoke with his wife.  She stated he will come today to have them drawn.

## 2017-02-03 NOTE — PROGRESS NOTES
2 weeks night she had her first baby my daughter's is Referring physician: Donnie Owens    Primary Care Physician: Donnie Owens, NP    Chief Complaint: Consult (itchy skin)    Patient is new to me: Yes  Patient is accompanied: No    Subjective:         HPI    Philip Mathis III is a 71 y.o. male here for evaluation related to chronic urticaria and severe pruritus for the past 2 months.  Patient has experienced daily symptoms despite trying several medications.  Doxepin 25 mg caused severe fatigue; he refuses to take it again.  He has tried Benadryl without benefit.  Patient denies starting any new medicines in the past 6 months.  He saw Dr. Anthony Santana (dermatology) for these symptoms.  Lab ordered by Dr. Santana included a serum protein electrophoresis which demonstrated paraprotein in the gamma region (this is been there for at least 2 years) but negative for monoclonal antibody, a sedimentation rate which was slightly elevated at 18, and a TSH which was normal.    Identified triggers: none    Associated conditions:  Sun sensitivity or rash associated with sun exposure: no  Rashes other than hives: no  Trouble swallowing: no  Trouble talking: no  Trouble breathing: no  Swollen or painful joints: no  Mouth or nasal ulcers: no  Changes in hair or nails: no  Unexplained weight loss: no  Temperature or pressure hives:no  Fatigue or malaise:no    Patient developed hepatitis C infection in 1998 following a blood transfusion.  He received a liver transplant in 2001.  He was finally cured of his hepatitis C approximately 8 months ago.  However he currently needs a second liver transplant as well as a renal transplant related to the sequela of his hepatitis C infection.    12/7/2016 visit Dr. Kapadia: Continued evaluation of paraprotein  Mr. Mathis is a 71-year-old man whom I saw about one month ago for followup of a monoclonal gammopathy of undetermined significance. 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 The current quantitation of this is 0.54 g/dL. In 2003, it was 0.65 g/dL. He has a very slight increase in the kappa/lambda ratio at 3.86. Both his kappa and lambda levels were elevated because of his renal insufficiency. A 24-hour urine protein had no paraproteinsdetected on electrophoresis. A tiny amount of kappa was noted with   immunofixation.    Review of Systems:  Constitutional: Negative for changes in appetite, unintentional weight loss, fever, chills and fatigue.   HENT: Negative for facial pain, nose bleeds, nasal congestion, postnasal drip, throat clearing, sinus pressure, voice change and congestion. Negative for ear discharge, ear pain, facial swelling, sore throat and trouble swallowing.  Eyes: Negative for occular discharge, redness, itching and visual disturbance.  Respiratory: Negative for chest tightness, shortness of breath, wheezing, dyspnea on exertion, sputum production and cough.   Cardiovascular: Negative for chest pain, palpatations and leg swelling.  Gastrointestinal: Negative for abdominal distension, abdominal pain, constipation, diarrhea, nausea,and vomiting.   Genitourinary: Negative for difficulty urinating.   Musculoskeletal: Negative for arthralgias, gait problems, joint swelling, myalgias and back pain.   Neurological: Negative for dizziness, syncope, weakness, light-headedness, and headaches.   Hematological: Negative for adenopathy, does not bruise or bleed easily.  Psychiatric/Behavioral: Negative for agitation, anxiety, behavioral problems, confusion, and insomnia.  Skin: Positive for hives and severe itching.     PMH:  Reviewed with patient and current for this visit    Family History:  Reviewed with patient and current for this visit    Social history:non-smoker    Environmental History:  Reviewed with patient and current for this visit      Objective:        Physical Exam  General:patient is well developed and well nourished, in no acute  distress.  Mental Status:  Alert, oriented and cooperative  Head and Face: normocephalic  Eyes:   Pupils: ERRLA: Scleral conjunctiva: clear; Cornea: clear; Palprebal conjunctiva: normal: Eyelid Skin: normal  Ears:Tympanic membrane Left:normal light reflex; Right: normal light reflex; External canals normal  Nose:  Nares: patent; Mucosa :pink and moist pink; Nasal septum: midline;Turbinates are of normal size bilaterally and do not occlude the nasal airway.  Mouth/Pharynx: tonsils not visualized; posterior pharyngeal wall normal; tongue normal; teeth normal; voice quality normal.  Neck:  Cervical lymph nodes: small, non-tender, freely moveable both anterior and posterior cervical chain; Trachea: midline; Masses: none  Lungs: Air movement is good; respiratory effort is good; no respiratory distress; breath sounds are vesicular in all lung fields; no wheezing; normal expiratory time; no cough.  Heart: regular rate and rhythm with mild respiratory variation; A1 and P2 are normal; no murmurs or gallops.  Abdomen:exam not done  Extremities: no cyanosis, clubbing or edema  Skin:no rashes or lesions present; skin hydrated and supple.         Assessment:       1. Chronic urticaria  CBC auto differential    Comprehensive metabolic panel    THYROID PEROXIDASE ANTIBODY    C1 ESTERASE INHIBITOR PANEL    CH50 COMPLEMENT (TOTAL)    IGE    CU (Chronic Urticaria) Index Panel    ROLO   2. Severe pruritus  CBC auto differential    Comprehensive metabolic panel    THYROID PEROXIDASE ANTIBODY    C1 ESTERASE INHIBITOR PANEL    CH50 COMPLEMENT (TOTAL)    IGE    CU (Chronic Urticaria) Index Panel    ROLO   3. MGUS (monoclonal gammopathy of unknown significance)     4. Cirrhosis of liver without ascites, unspecified hepatic cirrhosis type     5. CRD (chronic renal disease), stage IV     6. History of hepatitis C             Plan:         Return for re-visit: Return in about 6 weeks (around 3/17/2017).    Laboratory: Yes  Including an ROLO,  Complement CH50,  RF, Hepatitis panel, total IgE,  and CU index panel (includes CU index, TSH, anti-peroxidase antibodies, and anti-thyrogloblin antibodies)    Outside medical records requested: No        Patient Instructions   1.  Patient is to start Zyrtec 10 mg twice a day.  If his symptoms persist he is then to increase it to 2 tablets twice a day.  If he continues to have symptoms despite this increased dose, he may increase the Zyrtec to 30 mg twice a day.  2.  Patient is also to start Singulair 1 tablet every 24 hours.  3.  More laboratory studies have been ordered to evaluate the etiology of his symptoms.  4.  The patient is to call me if his symptoms persist despite the above medications.  At that time I would consider adding Periactin 1 tablet 2-3 times a day.   5.  Patient is to return in possibly 6 weeks for follow-up.      50 minutes was spent face-to-face with this patient.  50% of this time was spent on patient education which included reviewing the pathophysiology of urticaria and angioedema as well as the various etiologies this condition.  The role of laboratory studies in identifying the etiology was reviewed. The various treatment strategies and options reviewed. The patient medications were reviewed including the importance of achieving a symptom-free state on these medications.  The role of Xolair was also reviewed. Written handout given. Questions answered.    Letter and copy of this visit sent to referring physician/PCP:          Marni Sheffield MD

## 2017-02-04 ENCOUNTER — LAB VISIT (OUTPATIENT)
Dept: LAB | Facility: HOSPITAL | Age: 72
End: 2017-02-04
Attending: INTERNAL MEDICINE
Payer: MEDICARE

## 2017-02-04 DIAGNOSIS — B18.2 HEP C W/O COMA, CHRONIC: ICD-10-CM

## 2017-02-04 PROCEDURE — 36415 COLL VENOUS BLD VENIPUNCTURE: CPT | Mod: PO

## 2017-02-04 PROCEDURE — 87522 HEPATITIS C REVRS TRNSCRPJ: CPT

## 2017-02-06 ENCOUNTER — EPISODE CHANGES (OUTPATIENT)
Dept: HEPATOLOGY | Facility: CLINIC | Age: 72
End: 2017-02-06

## 2017-02-08 LAB
HCV LOG: <1.08 LOG (10) IU/ML
HCV RNA QUANT PCR: <12 IU/ML
HCV, QUALITATIVE: NOT DETECTED IU/ML

## 2017-02-13 ENCOUNTER — TELEPHONE (OUTPATIENT)
Dept: NEPHROLOGY | Facility: CLINIC | Age: 72
End: 2017-02-13

## 2017-02-15 ENCOUNTER — INFUSION (OUTPATIENT)
Dept: INFECTIOUS DISEASES | Facility: HOSPITAL | Age: 72
End: 2017-02-15
Attending: INTERNAL MEDICINE
Payer: MEDICARE

## 2017-02-15 VITALS
WEIGHT: 201 LBS | SYSTOLIC BLOOD PRESSURE: 140 MMHG | BODY MASS INDEX: 26.64 KG/M2 | DIASTOLIC BLOOD PRESSURE: 57 MMHG | HEIGHT: 73 IN

## 2017-02-15 DIAGNOSIS — N18.5 ANEMIA IN STAGE 5 CHRONIC KIDNEY DISEASE: ICD-10-CM

## 2017-02-15 DIAGNOSIS — D63.1 ANEMIA IN STAGE 5 CHRONIC KIDNEY DISEASE: ICD-10-CM

## 2017-02-15 DIAGNOSIS — N18.9 ANEMIA IN CHRONIC KIDNEY DISEASE(285.21): Primary | ICD-10-CM

## 2017-02-15 DIAGNOSIS — N18.4 CRD (CHRONIC RENAL DISEASE), STAGE IV: ICD-10-CM

## 2017-02-15 DIAGNOSIS — D63.1 ANEMIA IN CHRONIC KIDNEY DISEASE(285.21): Primary | ICD-10-CM

## 2017-02-15 DIAGNOSIS — N18.4 KIDNEY DISEASE, CHRONIC, STAGE IV (GFR 15-29 ML/MIN): ICD-10-CM

## 2017-02-15 PROCEDURE — 96372 THER/PROPH/DIAG INJ SC/IM: CPT

## 2017-02-15 PROCEDURE — 63600175 PHARM REV CODE 636 W HCPCS: Performed by: INTERNAL MEDICINE

## 2017-02-15 RX ADMIN — DARBEPOETIN ALFA 200 MCG: 100 INJECTION, SOLUTION INTRAVENOUS; SUBCUTANEOUS at 08:02

## 2017-02-15 NOTE — PROGRESS NOTES
Hgb 9.0;Hct 28.5    Discussed Hgb with Patient. Dose increased  PER PROTOCOL FORM DROD-188. from Aranesp 150 mcg to Aranesp 200 mcg.  Aranesp 200 mcg given and 4 week appt. Set. .  Dr Nelson notified.and Dr Nath notified    Gfr 14.4/Stage 4

## 2017-02-20 ENCOUNTER — LAB VISIT (OUTPATIENT)
Dept: LAB | Facility: HOSPITAL | Age: 72
End: 2017-02-20
Attending: INTERNAL MEDICINE
Payer: MEDICARE

## 2017-02-20 ENCOUNTER — TELEPHONE (OUTPATIENT)
Dept: TRANSPLANT | Facility: CLINIC | Age: 72
End: 2017-02-20

## 2017-02-20 DIAGNOSIS — Z94.4 LIVER REPLACED BY TRANSPLANT: ICD-10-CM

## 2017-02-20 LAB
ALBUMIN SERPL BCP-MCNC: 3.6 G/DL
ALP SERPL-CCNC: 49 U/L
ALT SERPL W/O P-5'-P-CCNC: 10 U/L
ANION GAP SERPL CALC-SCNC: 13 MMOL/L
AST SERPL-CCNC: 18 U/L
BASOPHILS # BLD AUTO: 0.04 K/UL
BASOPHILS NFR BLD: 0.7 %
BILIRUB SERPL-MCNC: 0.5 MG/DL
BUN SERPL-MCNC: 83 MG/DL
CALCIUM SERPL-MCNC: 8.5 MG/DL
CHLORIDE SERPL-SCNC: 111 MMOL/L
CO2 SERPL-SCNC: 20 MMOL/L
CREAT SERPL-MCNC: 4 MG/DL
DIFFERENTIAL METHOD: ABNORMAL
EOSINOPHIL # BLD AUTO: 0.3 K/UL
EOSINOPHIL NFR BLD: 4.9 %
ERYTHROCYTE [DISTWIDTH] IN BLOOD BY AUTOMATED COUNT: 16.7 %
EST. GFR  (AFRICAN AMERICAN): 16.2 ML/MIN/1.73 M^2
EST. GFR  (NON AFRICAN AMERICAN): 14 ML/MIN/1.73 M^2
GLUCOSE SERPL-MCNC: 42 MG/DL
HCT VFR BLD AUTO: 30.3 %
HGB BLD-MCNC: 9.5 G/DL
INR PPP: 1.2
LYMPHOCYTES # BLD AUTO: 1.2 K/UL
LYMPHOCYTES NFR BLD: 20.2 %
MCH RBC QN AUTO: 27.8 PG
MCHC RBC AUTO-ENTMCNC: 31.4 %
MCV RBC AUTO: 89 FL
MONOCYTES # BLD AUTO: 0.6 K/UL
MONOCYTES NFR BLD: 9.5 %
NEUTROPHILS # BLD AUTO: 3.8 K/UL
NEUTROPHILS NFR BLD: 64.2 %
PLATELET # BLD AUTO: 137 K/UL
PMV BLD AUTO: 11.4 FL
POTASSIUM SERPL-SCNC: 4.6 MMOL/L
PROT SERPL-MCNC: 7.4 G/DL
PROTHROMBIN TIME: 12.5 SEC
RBC # BLD AUTO: 3.42 M/UL
SODIUM SERPL-SCNC: 144 MMOL/L
WBC # BLD AUTO: 5.89 K/UL

## 2017-02-20 PROCEDURE — 80197 ASSAY OF TACROLIMUS: CPT

## 2017-02-20 PROCEDURE — 85610 PROTHROMBIN TIME: CPT

## 2017-02-20 PROCEDURE — 36415 COLL VENOUS BLD VENIPUNCTURE: CPT | Mod: PO

## 2017-02-20 PROCEDURE — 80053 COMPREHEN METABOLIC PANEL: CPT

## 2017-02-20 PROCEDURE — 85025 COMPLETE CBC W/AUTO DIFF WBC: CPT

## 2017-02-20 NOTE — TELEPHONE ENCOUNTER
Received critical lab value call stating pt's blood sugar was 42 on this am's labs. Spoke to patient and wife who states he feels well. Asked pt to retest blood sugar while on the phone and current blood sugar=136. Pt states it was due to fasting labs this am. Informed pt to notify endocrine if experiences any lows. He verbalizes understanding.

## 2017-02-21 LAB — TACROLIMUS BLD-MCNC: 2.1 NG/ML

## 2017-02-22 ENCOUNTER — OFFICE VISIT (OUTPATIENT)
Dept: PODIATRY | Facility: CLINIC | Age: 72
End: 2017-02-22
Payer: MEDICARE

## 2017-02-22 ENCOUNTER — TELEPHONE (OUTPATIENT)
Dept: TRANSPLANT | Facility: CLINIC | Age: 72
End: 2017-02-22

## 2017-02-22 VITALS
HEIGHT: 73 IN | SYSTOLIC BLOOD PRESSURE: 150 MMHG | BODY MASS INDEX: 26.64 KG/M2 | DIASTOLIC BLOOD PRESSURE: 64 MMHG | WEIGHT: 201 LBS

## 2017-02-22 DIAGNOSIS — B35.1 ONYCHOMYCOSIS DUE TO DERMATOPHYTE: ICD-10-CM

## 2017-02-22 DIAGNOSIS — L84 CORN OR CALLUS: ICD-10-CM

## 2017-02-22 DIAGNOSIS — M21.6X2 ROCKER BOTTOM FOOT, ACQUIRED, LEFT: ICD-10-CM

## 2017-02-22 DIAGNOSIS — M20.41 HAMMER TOES OF BOTH FEET: ICD-10-CM

## 2017-02-22 DIAGNOSIS — M20.42 HAMMER TOES OF BOTH FEET: ICD-10-CM

## 2017-02-22 DIAGNOSIS — M20.30 HALLUX MALLEUS, UNSPECIFIED LATERALITY: ICD-10-CM

## 2017-02-22 DIAGNOSIS — E11.49 TYPE II DIABETES MELLITUS WITH NEUROLOGICAL MANIFESTATIONS: Primary | ICD-10-CM

## 2017-02-22 PROCEDURE — 99999 PR PBB SHADOW E&M-EST. PATIENT-LVL II: CPT | Mod: PBBFAC,,, | Performed by: PODIATRIST

## 2017-02-22 PROCEDURE — 3066F NEPHROPATHY DOC TX: CPT | Mod: S$GLB,,, | Performed by: PODIATRIST

## 2017-02-22 PROCEDURE — 1157F ADVNC CARE PLAN IN RCRD: CPT | Mod: S$GLB,,, | Performed by: PODIATRIST

## 2017-02-22 PROCEDURE — 1126F AMNT PAIN NOTED NONE PRSNT: CPT | Mod: S$GLB,,, | Performed by: PODIATRIST

## 2017-02-22 PROCEDURE — 1160F RVW MEDS BY RX/DR IN RCRD: CPT | Mod: S$GLB,,, | Performed by: PODIATRIST

## 2017-02-22 PROCEDURE — 99499 UNLISTED E&M SERVICE: CPT | Mod: S$PBB,,, | Performed by: PODIATRIST

## 2017-02-22 PROCEDURE — 3044F HG A1C LEVEL LT 7.0%: CPT | Mod: S$GLB,,, | Performed by: PODIATRIST

## 2017-02-22 PROCEDURE — 3077F SYST BP >= 140 MM HG: CPT | Mod: S$GLB,,, | Performed by: PODIATRIST

## 2017-02-22 PROCEDURE — 11055 PARING/CUTG B9 HYPRKER LES 1: CPT | Mod: Q9,S$GLB,, | Performed by: PODIATRIST

## 2017-02-22 PROCEDURE — 11721 DEBRIDE NAIL 6 OR MORE: CPT | Mod: 59,Q9,S$GLB, | Performed by: PODIATRIST

## 2017-02-22 PROCEDURE — 3078F DIAST BP <80 MM HG: CPT | Mod: S$GLB,,, | Performed by: PODIATRIST

## 2017-02-22 PROCEDURE — 1159F MED LIST DOCD IN RCRD: CPT | Mod: S$GLB,,, | Performed by: PODIATRIST

## 2017-02-22 PROCEDURE — 2022F DILAT RTA XM EVC RTNOPTHY: CPT | Mod: S$GLB,,, | Performed by: PODIATRIST

## 2017-02-22 PROCEDURE — 99213 OFFICE O/P EST LOW 20 MIN: CPT | Mod: 25,S$GLB,, | Performed by: PODIATRIST

## 2017-02-22 NOTE — MR AVS SNAPSHOT
[facility]  [address] [facility]  [place] [address]  Phone: [phone]                  Philip Mathis III   2017 8:15 AM   Office Visit    Description:  Male : 1945   Provider:  Faye Larson DPM   Department:  [facility]           Reason for Visit     Diabetic Foot Exam     Diabetes Mellitus     Nail Care                To Do List           Future Appointments        Provider Department Dept Phone    3/1/2017 11:00 AM VASCULAR, LAB [facility] [phone]    3/1/2017 11:30 AM Silvio William MD [facility] V[facility] Surgery [phone]    3/14/2017 7:00 AM LAB, [facility] [phone]    3/15/2017 8:30 AM EPO, INJECTION [facility] [phone]    2017 8:30 AM Faye Larson DPM [facility] [phone]      Goals (5 Years of Data)     None      [facility]     [facility] -  Assistance  Registered nurses in the [facility] provide clinical advisement, health education, appointment booking, and other advisory services.  Call for this free service at [phone].             Medications           Message regarding Medications     Verify the changes and/or additions to your medication regime listed below are the same as discussed with your clinician today.  If any of these changes or additions are incorrect, please notify your healthcare provider.             Verify that the below list of medications is an accurate representation of the medications you are currently taking.  If none reported, the list may be blank. If incorrect, please contact your healthcare provider. Carry this list with you in case of emergency.           Current Medications     allopurinol (ZYLOPRIM) 100 MG tablet TAKE 1 TABLET EVERY DAY    blood sugar diagnostic (ACCU-CHEK JOANN PLUS TEST STRP) Strp 1 strip by Misc.(Non-Drug; Combo Route) route 4 (four) times daily.    blood-glucose meter  "(ACCU-CHEK JOANN PLUS METER) Misc Use as directed    cetirizine (ZYRTEC) 10 MG tablet Take 1 tablet (10 mg total) by mouth 2 (two) times daily. If needed may increase to 3 tablets twice daily to control itching    ciclopirox (PENLAC) 8 % Soln Apply to affected nails daily x 6-12 mos.  Remove weekly with rubbing alcohol    clonazePAM (KLONOPIN) 0.5 MG tablet Take 1 tablet (0.5 mg total) by mouth every evening.    cloNIDine (CATAPRES) 0.1 MG tablet Take 1 tablet (0.1 mg total) by mouth 3 (three) times daily.    doxazosin (CARDURA) 8 MG Tab Take 1 tablet (8 mg total) by mouth once daily.    ferrous gluconate (FERGON) 324 MG tablet pt taking tid    glucagon (human recombinant) inj 1mg/mL kit Inject 1 mL (1 mg total) into the muscle as needed.    hydrALAZINE (APRESOLINE) 100 MG tablet Take 1 tablet (100 mg total) by mouth 3 (three) times daily.    insulin aspart (NOVOLOG) 100 unit/mL InPn pen Inject 6 units with meals plus scale 180-230 +1, 231-280 +2, 281-330 +3, 331-380 +4, >380 +5. Max daily dose 33 units. 90 day supply    insulin glargine (LANTUS SOLOSTAR) 100 unit/mL (3 mL) InPn pen Inject 10 Units into the skin every evening.    insulin needles, disposable, (NOVOFINE 32) 32 x 1/4 " Ndle 1 each by Misc.(Non-Drug; Combo Route) route 3 (three) times daily.    lactulose (CHRONULAC) 20 gram/30 mL Soln Take 30 mLs (20 g total) by mouth 3 (three) times daily.    lancets (ACCU-CHEK SOFTCLIX LANCETS) Misc 1 lancet by Misc.(Non-Drug; Combo Route) route 4 (four) times daily.    LANTUS SOLOSTAR 100 unit/mL (3 mL) InPn pen INJECT 10 UNITS UNDER THE SKIN EVERY EVENING    montelukast (SINGULAIR) 10 mg tablet Take 1 tablet (10 mg total) by mouth every evening.    multivitamin (THERAGRAN) per tablet Take 1 tablet by mouth Daily.    nystatin (MYCOSTATIN) 500,000 unit Tab     ondansetron (ZOFRAN-ODT) 4 MG TbDL DISSOLVE 1 TABLET(4 MG) ON THE TONGUE EVERY 12 HOURS AS NEEDED    oxycodone (OXY-IR) 5 mg Cap Take 1 capsule (5 mg total) by " "mouth every 4 (four) hours as needed.    oxycodone (ROXICODONE) 5 MG immediate release tablet TK 1 T PO  Q 4 H PRN    oxycodone-acetaminophen (PERCOCET) 5-325 mg per tablet Take 1 tablet by mouth every 4 (four) hours as needed for Pain.    pantoprazole (PROTONIX) 40 MG tablet TAKE 1 TABLET ONE TIME DAILY    rifaximin (XIFAXAN) 550 mg Tab Take 1 tablet (550 mg total) by mouth 2 (two) times daily.    senna (SENNA CONCENTRATE) 8.6 mg tablet Take 1 tablet by mouth once daily.    tacrolimus (PROGRAF) 0.5 MG Cap Take 1 capsule (0.5 mg total) by mouth once daily.    torsemide (DEMADEX) 20 MG Tab Take 2 tablets (40 mg total) by mouth once daily.    urea (CARMOL) 40 % Crea Apply topically once daily.    carvedilol (COREG) 6.25 MG tablet Take 1 tablet (6.25 mg total) by mouth 2 (two) times daily.           Clinical Reference Information           Your Vitals Were     BP Height Weight BMI       150/64 6' 1" (1.854 m) 91.2 kg (201 lb) 26.52 kg/m2       Blood Pressure          Most Recent Value    BP  (!)  150/64      Allergies as of 2/22/2017     Corticosteroids (Glucocorticoids)    Hydrocortisone    Neuromuscular Blockers, Steroidal    Ribavirin      Immunizations Administered on Date of Encounter - 2/22/2017     None      Maintenance Dialysis History     Patient has no recorded history of maintenance dialysis.      Transplant Information        Txp Date Organ Coordinator Care Team     Liver Fabian Colvin RN Referring Physician:  Danay Nath MD          Txp Date Organ Coordinator Care Team     Kidney Fabian Colvin RN Referring Physician:  Luciano Hercules MD   Current Nephrologist:  Luciano Hercules MD          Txp Date Organ Coordinator Care Team    6/10/2001 Liver Gaby Palacios RN Surgeon:  BRITTANY Pizarro MD   Assisting Surgeon:  Cain Bryant MD   Current Hepatologist:  Danya Nath MD         Language Assistance Services     ATTENTION: Language assistance services are available, free of " charge. Please call [phone].      ATENCIÓN: Si habla español, tiene a adler disposición servicios gratuitos de asistencia lingüística. Llame al [phone].     CHÚ Ý: N?u b?n nói Ti?ng Vi?t, có các d?ch v? h? tr? ngôn ng? mi?n phí dành cho b?n. G?i s? [phone].         [facility] complies with applicable Federal civil rights laws and does not discriminate on the basis of race, color, national origin, age, disability, or sex.

## 2017-02-22 NOTE — PROGRESS NOTES
Subjective:      Patient ID: Philip Mathis III is a 71 y.o. male.    Chief Complaint: Diabetic Foot Exam (bilateral swelling  Pcp Dr. Owens 01/09/2017); Diabetes Mellitus; and Nail Care    Philip is a 71 y.o. male who presents to the clinic upon referral from Dr. Zamzam purvis. provider found  for evaluation and treatment of diabetic feet. Philip has a past medical history of Anemia; Back pain; Bishop's esophagus; BPH (benign prostatic hypertrophy); Chronic kidney disease; Cirrhosis; Diabetes mellitus; Diabetes mellitus, type 2; Elevated PSA; Heart failure; Hepatitis (Hepatitis C); Hepatitis C; Hyperlipidemia; Hypertension; Hypertension; Liver transplanted; Peripheral neuropathy; Sleep apnea; Transfusion reaction; Transplanted liver; Trouble in sleeping; Type II or unspecified type diabetes mellitus with renal manifestations, uncontrolled; and Type II or unspecified type diabetes mellitus with renal manifestations, uncontrolled. Patient relates no major problem with feet. Only complaints today consist of long thick toenails that are difficult for him to trim. Also relates multiple areas of dry, hard skin/callus. Used to see Dr. Chavez in the past. Has flat feet with traumatic Charcot of the left midfoot-mild. No other issues. Has been using Urea to the dry, dark skin with significant improvement. He is happy with the outcome thus far.     PCP: Donnie Owens NP    Date Last Seen by PCP:   Chief Complaint   Patient presents with    Diabetic Foot Exam     bilateral swelling  Pcp Dr. Owens 01/09/2017    Diabetes Mellitus    Nail Care         Current shoe gear: Extra depth shoes with custome accommodative insoles    Hemoglobin A1C   Date Value Ref Range Status   01/10/2017 5.5 4.5 - 6.2 % Final     Comment:     According to ADA guidelines, hemoglobin A1C <7.0% represents  optimal control in non-pregnant diabetic patients.  Different  metrics may apply to specific populations.   Standards of Medical Care in Diabetes -  2016.  For the purpose of screening for the presence of diabetes:  <5.7%     Consistent with the absence of diabetes  5.7-6.4%  Consistent with increasing risk for diabetes   (prediabetes)  >or=6.5%  Consistent with diabetes  Currently no consensus exists for use of hemoglobin A1C  for diagnosis of diabetes for children.     06/22/2016 5.5 4.5 - 6.2 % Final   06/22/2016 5.5 4.5 - 6.2 % Final           Review of Systems   Constitution: Negative for chills and fever.   Cardiovascular: Positive for leg swelling. Negative for chest pain and claudication.   Respiratory: Negative for cough and shortness of breath.    Skin: Positive for dry skin and nail changes.   Musculoskeletal: Positive for arthritis and stiffness.   Gastrointestinal: Negative for nausea and vomiting.   Neurological: Positive for paresthesias. Negative for numbness.   Psychiatric/Behavioral: Negative for altered mental status.           Objective:      Physical Exam   Constitutional: He is oriented to person, place, and time. He appears well-developed and well-nourished.   HENT:   Head: Normocephalic.   Cardiovascular: Intact distal pulses.    Pulses:       Dorsalis pedis pulses are 2+ on the right side, and 2+ on the left side.        Posterior tibial pulses are 1+ on the right side, and 1+ on the left side.   Mild edema noted b/l LEs with varicosities  present. The skin of both feet and ankles is thin, shiny, atrophic and cool to touch.    Pulmonary/Chest: No respiratory distress.   Musculoskeletal:   Gastrocnemius equinus noted to b/l ankles with decreased DF noted on exam. MMT 5/5 in DF/PF/Inv/Ev resistance with no reproduction of pain in any direction. Passive range of motion of ankle and pedal joints is painless. Adequate pedal joint ROM.   Prominent midfoot left plantar aspect at TN joint with palpable bone. Rocker bottom foot type left. Semi-reducible hammertoe contractures noted to toes 2-4 b/l-asymptomatic. HAV, mild, non trackbound noted b/l  with mild medial bony prominence at 1st met head--asymptomatic.      Neurological: He is alert and oriented to person, place, and time. He has normal strength. A sensory deficit is present.   Light touch, proprioception, and sharp/dull sensation are all intact bilaterally. Protective threshold with the Petroleum-Wienstein monofilament is decreased bilaterally. Vibratory sensation diminished b/l distal foot.    Skin: Skin is warm, dry and intact. No erythema.   No open lesions, lacerations or wounds noted. Nails are thickened, elongated, discolored yellow/brown with subungual debris and brittleness to R 1-5 and L 1-5. Interdigital spaces clean, dry and intact b/l. Pedal hair absent. Skin of forefoot is cool to touch with proximal warmth.   Moderate hyperkeratosis noted to plantar medial midfoot at bony prominence, left.   Mild hyperpigmentation to skin of both ankles consistent with hemosiderin deposits.    Psychiatric: He has a normal mood and affect. His behavior is normal. Judgment and thought content normal.   Vitals reviewed.          Assessment:       Encounter Diagnoses   Name Primary?    Type II diabetes mellitus with neurological manifestations Yes    Onychomycosis due to dermatophyte     Corn or callus     Hallux malleus, unspecified laterality     Hammer toes of both feet     Rocker bottom foot, acquired, left          Plan:       Philip was seen today for diabetic foot exam, diabetes mellitus and nail care.    Diagnoses and all orders for this visit:    Type II diabetes mellitus with neurological manifestations    Onychomycosis due to dermatophyte    Corn or callus    Hallux malleus, unspecified laterality    Hammer toes of both feet    Rocker bottom foot, acquired, left    I counseled the patient on his conditions, their implications and medical management.        - Shoe inspection. Diabetic Foot Education. Patient reminded of the importance of good nutrition and blood sugar control to help prevent  podiatric complications of diabetes. Patient instructed on proper foot hygeine. We discussed wearing proper shoe gear, daily foot inspections, never walking without protective shoe gear, never putting sharp instruments to feet, routine podiatric nail visits every 2-3 months.      - With patient's permission, nails were aggressively reduced and debrided x 10 to their soft tissue attachment mechanically and with electric , removing all offending nail and debris. Patient relates relief following the procedure. He will continue to monitor the areas daily, inspect his feet, wear protective shoe gear when ambulatory, moisturizer to maintain skin integrity and follow in this office in approximately 2-3 months, sooner p.r.n.    - The affected area was cleansed with an alcohol prep pad. Next, utilizing a No.15 scalpel, the hyperkeratotic tissues were trimmed from plantar medial midfoot left, down to appropriate level of skin. Care was taken to remove any nucleated core from the center of the lesion. No pinpoint bleeding was encountered. The patient tolerated relief following this procedure. (1 lesion total).     Long discussion with patient regarding appropriate, supportive and comfortable shoes. Recommended DM shoes with adequate arch supports to alleviate abnormal pressure and improve stability of foot while walking. Avoid flat shoes and barefoot walking as these will exacerbate or worsen symptoms. Continue with Diabetic shoes with custom inserts. New Rx provided.     Discussed continuing the use of Tarun's vaporub on affected toenails for up to 1 year for improvement in appearance and fungal infection.     Discussed regular and routine moisturizer to skin of both feet to help improve dry skin. Advised to apply twice daily until resolution of symptoms. Avoid between toes. Continue Urea, can switch to OTC as needed.     F/u 9 weeks, sooner PRN

## 2017-02-22 NOTE — TELEPHONE ENCOUNTER
----- Message from Danay Nath MD sent at 2/22/2017  3:12 AM CST -----  Prograf level is okay.  Continue current dose

## 2017-02-26 ENCOUNTER — DOCUMENTATION ONLY (OUTPATIENT)
Dept: TRANSPLANT | Facility: CLINIC | Age: 72
End: 2017-02-26

## 2017-02-26 ENCOUNTER — TELEPHONE (OUTPATIENT)
Dept: TRANSPLANT | Facility: CLINIC | Age: 72
End: 2017-02-26

## 2017-02-27 ENCOUNTER — DOCUMENTATION ONLY (OUTPATIENT)
Dept: TRANSPLANT | Facility: CLINIC | Age: 72
End: 2017-02-27

## 2017-02-27 NOTE — PROGRESS NOTES
HCV RNA is negative on February 4, 2017, he has achieved SVR 24.  Hepatitis C cured is confirmed.  Please inform patient.

## 2017-02-27 NOTE — TELEPHONE ENCOUNTER
HCV RNA is negative on February 4, 2017.  Patient has achieved SVR 24.  Hepatitis C is cured.  Please inform patient.  And please closed the hep C episode.    Thank you

## 2017-03-01 ENCOUNTER — HOSPITAL ENCOUNTER (OUTPATIENT)
Dept: VASCULAR SURGERY | Facility: CLINIC | Age: 72
Discharge: HOME | End: 2017-03-01
Attending: SURGERY
Payer: MEDICARE

## 2017-03-01 ENCOUNTER — OFFICE VISIT (OUTPATIENT)
Dept: VASCULAR SURGERY | Facility: CLINIC | Age: 72
End: 2017-03-01
Payer: MEDICARE

## 2017-03-01 VITALS
BODY MASS INDEX: 25.83 KG/M2 | HEART RATE: 73 BPM | HEIGHT: 74 IN | SYSTOLIC BLOOD PRESSURE: 168 MMHG | TEMPERATURE: 98 F | DIASTOLIC BLOOD PRESSURE: 68 MMHG | WEIGHT: 201.31 LBS

## 2017-03-01 DIAGNOSIS — N18.6 ESRD (END STAGE RENAL DISEASE): ICD-10-CM

## 2017-03-01 DIAGNOSIS — Z99.2 DEPENDENCE ON HEMODIALYSIS: ICD-10-CM

## 2017-03-01 DIAGNOSIS — N18.4 CRD (CHRONIC RENAL DISEASE), STAGE IV: Primary | ICD-10-CM

## 2017-03-01 PROCEDURE — 99999 PR PBB SHADOW E&M-EST. PATIENT-LVL V: CPT | Mod: PBBFAC,,, | Performed by: SURGERY

## 2017-03-01 PROCEDURE — 1160F RVW MEDS BY RX/DR IN RCRD: CPT | Mod: S$GLB,,, | Performed by: SURGERY

## 2017-03-01 PROCEDURE — 1157F ADVNC CARE PLAN IN RCRD: CPT | Mod: S$GLB,,, | Performed by: SURGERY

## 2017-03-01 PROCEDURE — 1159F MED LIST DOCD IN RCRD: CPT | Mod: S$GLB,,, | Performed by: SURGERY

## 2017-03-01 PROCEDURE — 93990 DOPPLER FLOW TESTING: CPT | Mod: S$GLB,,, | Performed by: SURGERY

## 2017-03-01 PROCEDURE — 1126F AMNT PAIN NOTED NONE PRSNT: CPT | Mod: S$GLB,,, | Performed by: SURGERY

## 2017-03-01 PROCEDURE — 99214 OFFICE O/P EST MOD 30 MIN: CPT | Mod: S$GLB,,, | Performed by: SURGERY

## 2017-03-01 NOTE — PROGRESS NOTES
Philip Mathis III  03/01/2017    HPI:  Patient is a 71 y.o. male with a h/o stage IV CKD, DM I, HTN, s/p liver transplant/EtOH cirrhosis - maintained on Prograf, h/o diastolic heart failure, sleep apnea (not compliant w CPAP as he feels claustrophobic).  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 Pt. Is not yet on dialysis.   He has been followed in the past by Dr Hercules; now by Dr Amezcua.    R-hand dominant    No MI/stroke  Tobacco use: Quit in late 1990s      Renal is Dr Marielos Amezcua    Retired : [employer]    Past Medical History:   Diagnosis Date    Anemia     Back pain     Bishop's esophagus     BPH (benign prostatic hypertrophy)     Chronic kidney disease     Cirrhosis     Diabetes mellitus     Diabetes mellitus, type 2     Elevated PSA     Heart failure     Hepatitis Hepatitis C    Hepatitis C     Hyperlipidemia     Hypertension     Hypertension     Liver transplanted     Peripheral neuropathy     Sleep apnea     Transfusion reaction     Hep C from prev. blood transfusion    Transplanted liver     Trouble in sleeping     Type II or unspecified type diabetes mellitus with renal manifestations, uncontrolled     Type II or unspecified type diabetes mellitus with renal manifestations, uncontrolled      Past Surgical History:   Procedure Laterality Date    broken nose      EYE SURGERY      cataracts    FEMUR SURGERY      FRACTURE SURGERY      right femur fracture     LIVER TRANSPLANT  2001    SKIN GRAFT      graft from right thigh , applied to the left back and left arm.     Family History   Problem Relation Age of Onset    Cancer Mother      had cancer 40 years ago     Coronary artery disease Father     Hypertension Father     Diabetes Father     Heart disease Father     Colon cancer Neg Hx      Social History     Social History    Marital status:      Spouse name: N/A    Number of children: N/A    Years of education: N/A      Occupational History    Not on file.     Social History Main Topics    Smoking status: Former Smoker     Quit date: 7/30/1998    Smokeless tobacco: Never Used      Comment: pt reports quitting in 1998    Alcohol use No      Comment: pt reports hx of alcholism and reports quit in 1998    Drug use: Yes     Special: Marijuana      Comment: pt reports smoking THC apprxly twice/week    Sexual activity: Yes     Partners: Female     Other Topics Concern    Not on file     Social History Narrative     Current Outpatient Prescriptions on File Prior to Visit   Medication Sig    allopurinol (ZYLOPRIM) 100 MG tablet TAKE 1 TABLET EVERY DAY    blood sugar diagnostic (ACCU-CHEK JOANN PLUS TEST STRP) Strp 1 strip by Misc.(Non-Drug; Combo Route) route 4 (four) times daily.    blood-glucose meter (ACCU-CHEK JOANN PLUS METER) Misc Use as directed    cetirizine (ZYRTEC) 10 MG tablet Take 1 tablet (10 mg total) by mouth 2 (two) times daily. If needed may increase to 3 tablets twice daily to control itching    ciclopirox (PENLAC) 8 % Soln Apply to affected nails daily x 6-12 mos.  Remove weekly with rubbing alcohol    clonazePAM (KLONOPIN) 0.5 MG tablet Take 1 tablet (0.5 mg total) by mouth every evening. (Patient taking differently: Take 0.5 mg by mouth daily as needed. )    cloNIDine (CATAPRES) 0.1 MG tablet Take 1 tablet (0.1 mg total) by mouth 3 (three) times daily.    doxazosin (CARDURA) 8 MG Tab Take 1 tablet (8 mg total) by mouth once daily.    ferrous gluconate (FERGON) 324 MG tablet pt taking tid    glucagon (human recombinant) inj 1mg/mL kit Inject 1 mL (1 mg total) into the muscle as needed.    hydrALAZINE (APRESOLINE) 100 MG tablet Take 1 tablet (100 mg total) by mouth 3 (three) times daily.    insulin aspart (NOVOLOG) 100 unit/mL InPn pen Inject 6 units with meals plus scale 180-230 +1, 231-280 +2, 281-330 +3, 331-380 +4, >380 +5. Max daily dose 33 units. 90 day supply    insulin glargine (LANTUS  "SOLOSTAR) 100 unit/mL (3 mL) InPn pen Inject 10 Units into the skin every evening.    insulin needles, disposable, (NOVOFINE 32) 32 x 1/4 " Ndle 1 each by Misc.(Non-Drug; Combo Route) route 3 (three) times daily.    lactulose (CHRONULAC) 20 gram/30 mL Soln Take 30 mLs (20 g total) by mouth 3 (three) times daily.    lancets (ACCU-CHEK SOFTCLIX LANCETS) Misc 1 lancet by Misc.(Non-Drug; Combo Route) route 4 (four) times daily.    LANTUS SOLOSTAR 100 unit/mL (3 mL) InPn pen INJECT 10 UNITS UNDER THE SKIN EVERY EVENING    montelukast (SINGULAIR) 10 mg tablet Take 1 tablet (10 mg total) by mouth every evening.    multivitamin (THERAGRAN) per tablet Take 1 tablet by mouth Daily.    nystatin (MYCOSTATIN) 500,000 unit Tab     ondansetron (ZOFRAN-ODT) 4 MG TbDL DISSOLVE 1 TABLET(4 MG) ON THE TONGUE EVERY 12 HOURS AS NEEDED    oxycodone (OXY-IR) 5 mg Cap Take 1 capsule (5 mg total) by mouth every 4 (four) hours as needed.    oxycodone (ROXICODONE) 5 MG immediate release tablet TK 1 T PO  Q 4 H PRN    oxycodone-acetaminophen (PERCOCET) 5-325 mg per tablet Take 1 tablet by mouth every 4 (four) hours as needed for Pain.    pantoprazole (PROTONIX) 40 MG tablet TAKE 1 TABLET ONE TIME DAILY    rifaximin (XIFAXAN) 550 mg Tab Take 1 tablet (550 mg total) by mouth 2 (two) times daily.    senna (SENNA CONCENTRATE) 8.6 mg tablet Take 1 tablet by mouth once daily.    tacrolimus (PROGRAF) 0.5 MG Cap Take 1 capsule (0.5 mg total) by mouth once daily.    torsemide (DEMADEX) 20 MG Tab Take 2 tablets (40 mg total) by mouth once daily.    urea (CARMOL) 40 % Crea Apply topically once daily.    carvedilol (COREG) 6.25 MG tablet Take 1 tablet (6.25 mg total) by mouth 2 (two) times daily.     No current facility-administered medications on file prior to visit.        REVIEW OF SYSTEMS:  General: negative; ENT: negative; Allergy and Immunology: negative; Hematological and Lymphatic: Negative; Endocrine: negative; Respiratory: no " cough, shortness of breath, or wheezing; Cardiovascular: no chest pain or dyspnea on exertion; Gastrointestinal: no abdominal pain/back, change in bowel habits, or bloody stools; Genito-Urinary: no dysuria, trouble voiding, or hematuria; Musculoskeletal: negative  Neurological: no TIA or stroke symptoms; Psychiatric: no nervousness, anxiety or depression.    PHYSICAL EXAM:       Vitals:    03/01/17 1201   BP: (!) 168/68   Pulse: 73   Temp: 98.2 °F (36.8 °C)            General appearance:  Alert, well-appearing, and in no distress.  Oriented to person, place, and time   Neurological: Normal speech, no focal findings noted; CN II - XII grossly intact           Musculoskeletal: Digits/nail without cyanosis/clubbing.  Normal muscle strength/tone.                 Neck: Supple, no significant adenopathy; thyroid is not enlarged                  No carotid bruit can be auscultated                Chest:  Clear to auscultation, no wheezes, rales or rhonchi, symmetric air entry     No use of accessory muscles             Cardiac: Normal rate and regular rhythm, S1 and S2 normal; PMI non-displaced          Abdomen: Soft, nontender, nondistended, no masses or organomegaly     No rebound tenderness noted; bowel sounds normal     Pulsatile aortic mass is not palpable.     No groin adenopathy      Extremities:   2+ Bilateral  radial pulse     + thrill     Left hand warm, good cap refill         LAB RESULTS:  Lab Results   Component Value Date    K 4.6 02/20/2017    K 5.2 (H) 01/07/2017    K 4.7 11/22/2016    CREATININE 4.0 (H) 02/20/2017    CREATININE 3.8 (H) 01/07/2017    CREATININE 3.9 (H) 01/07/2017     Lab Results   Component Value Date    WBC 5.89 02/20/2017    WBC 4.51 11/22/2016    WBC 4.39 11/01/2016    HCT 30.3 (L) 02/20/2017    HCT 28.5 (L) 02/13/2017    HCT 30.0 (L) 01/13/2017     (L) 02/20/2017     (L) 11/22/2016    PLT 95 (L) 11/01/2016     Lab Results   Component Value Date    HGBA1C 5.5 01/10/2017     HGBA1C 5.5 06/22/2016    HGBA1C 5.5 06/22/2016     IMAGING:  3/1/17: Left arm Brachiobrachial AVF  Flow volume: 947 ml/min  Diameter Prox outflow vein: 5.2mm   Middle outflow vein: 4.3mm   Distal outflow vein: 9.0 mm  Depth: 8.7 mm    Vein mapping reviewed - on chart     CT abd 2015: no AAA    IMP/PLAN:  71 y.o. male with h/o stage IV CKD, DM I, HTN, s/p liver transplant/EtOH cirrhosis - maintained on Prograf, h/o diastolic heart failure, sleep apnea (not compliant w CPAP as he feels claustrophobic) - in need of AV access.    Discussed plan for Left brachiobrachial AVF transposition.  Pt. States that he is a  and this is a very busy time of year so he would not be able to restrict use of the left arm following this procedure.  He would rather wait until June 2017 to have the procedure.  He has been informed to return sooner if he has a decline in kidney function and may need dialysis before June.     F/u June with Quantitative AVF US and plan transposition of left AVF brachibrachial.      MONA Jerome, APRN, FNP-BC  Nurse Practitioner  Vascular and Endovascular Surgery    Next, needs transposition of 1st stage L brachial AVF; he would like to wait till ~June 2017 to re-assess    Silvio William MD FACS  Vascular/Endovascular Surgery

## 2017-03-06 RX ORDER — CARVEDILOL 6.25 MG/1
6.25 TABLET ORAL 2 TIMES DAILY
Qty: 180 TABLET | Refills: 3 | Status: SHIPPED | OUTPATIENT
Start: 2017-03-06 | End: 2017-04-03 | Stop reason: SDUPTHER

## 2017-03-06 RX ORDER — CARVEDILOL 6.25 MG/1
TABLET ORAL
Qty: 180 TABLET | Refills: 3 | OUTPATIENT
Start: 2017-03-06

## 2017-03-06 RX ORDER — HYDRALAZINE HYDROCHLORIDE 100 MG/1
TABLET, FILM COATED ORAL
Qty: 270 TABLET | Refills: 11 | OUTPATIENT
Start: 2017-03-06

## 2017-03-06 RX ORDER — HYDRALAZINE HYDROCHLORIDE 100 MG/1
100 TABLET, FILM COATED ORAL 3 TIMES DAILY
Qty: 270 TABLET | Refills: 3 | Status: SHIPPED | OUTPATIENT
Start: 2017-03-06 | End: 2017-04-03 | Stop reason: SDUPTHER

## 2017-03-14 ENCOUNTER — LAB VISIT (OUTPATIENT)
Dept: LAB | Facility: HOSPITAL | Age: 72
End: 2017-03-14
Attending: INTERNAL MEDICINE
Payer: MEDICARE

## 2017-03-14 DIAGNOSIS — N18.5 ANEMIA IN STAGE 5 CHRONIC KIDNEY DISEASE: ICD-10-CM

## 2017-03-14 DIAGNOSIS — D63.1 ANEMIA IN STAGE 5 CHRONIC KIDNEY DISEASE: ICD-10-CM

## 2017-03-14 DIAGNOSIS — D63.1 ANEMIA IN CHRONIC KIDNEY DISEASE(285.21): ICD-10-CM

## 2017-03-14 DIAGNOSIS — N18.9 ANEMIA IN CHRONIC KIDNEY DISEASE(285.21): ICD-10-CM

## 2017-03-14 LAB
HCT VFR BLD AUTO: 26.4 %
HGB BLD-MCNC: 8.3 G/DL
IRON SERPL-MCNC: 45 UG/DL
SATURATED IRON: 26 %
TOTAL IRON BINDING CAPACITY: 175 UG/DL
TRANSFERRIN SERPL-MCNC: 118 MG/DL

## 2017-03-14 PROCEDURE — 36415 COLL VENOUS BLD VENIPUNCTURE: CPT | Mod: PO

## 2017-03-14 PROCEDURE — 85014 HEMATOCRIT: CPT | Mod: PO

## 2017-03-14 PROCEDURE — 84466 ASSAY OF TRANSFERRIN: CPT

## 2017-03-14 PROCEDURE — 83540 ASSAY OF IRON: CPT

## 2017-03-14 PROCEDURE — 85018 HEMOGLOBIN: CPT | Mod: PO

## 2017-03-16 ENCOUNTER — INFUSION (OUTPATIENT)
Dept: INFECTIOUS DISEASES | Facility: HOSPITAL | Age: 72
End: 2017-03-16
Attending: INTERNAL MEDICINE
Payer: MEDICARE

## 2017-03-16 VITALS
DIASTOLIC BLOOD PRESSURE: 58 MMHG | SYSTOLIC BLOOD PRESSURE: 149 MMHG | WEIGHT: 201 LBS | BODY MASS INDEX: 25.8 KG/M2 | HEIGHT: 74 IN

## 2017-03-16 DIAGNOSIS — N18.9 ANEMIA IN CHRONIC KIDNEY DISEASE(285.21): Primary | ICD-10-CM

## 2017-03-16 DIAGNOSIS — D63.1 ANEMIA IN CHRONIC KIDNEY DISEASE(285.21): Primary | ICD-10-CM

## 2017-03-16 DIAGNOSIS — N18.4 KIDNEY DISEASE, CHRONIC, STAGE IV (GFR 15-29 ML/MIN): ICD-10-CM

## 2017-03-16 DIAGNOSIS — N18.4 CRD (CHRONIC RENAL DISEASE), STAGE IV: ICD-10-CM

## 2017-03-16 DIAGNOSIS — N18.5 ANEMIA IN STAGE 5 CHRONIC KIDNEY DISEASE: ICD-10-CM

## 2017-03-16 DIAGNOSIS — D63.1 ANEMIA IN STAGE 5 CHRONIC KIDNEY DISEASE: ICD-10-CM

## 2017-03-16 PROCEDURE — 96372 THER/PROPH/DIAG INJ SC/IM: CPT

## 2017-03-16 PROCEDURE — 63600175 PHARM REV CODE 636 W HCPCS: Performed by: INTERNAL MEDICINE

## 2017-03-16 RX ADMIN — DARBEPOETIN ALFA 300 MCG: 300 INJECTION, SOLUTION INTRAVENOUS; SUBCUTANEOUS at 09:03

## 2017-03-16 NOTE — PROGRESS NOTES
Hgb 8.3;Hct 26.4    Discussed Hgb with Patient. Dose increased  PER PROTOCOL FORM DROD-188. from Aranesp 200 mcg to Aranesp 300 mcg.  Aranesp 300 mcg given and 2 week appt. Set due to Hgb being under 9 instead of 4 weeks. .  Dr Nelson notified.and Dr Nath notified.    Gfr 14./Stage 5

## 2017-03-17 ENCOUNTER — TELEPHONE (OUTPATIENT)
Dept: TRANSPLANT | Facility: CLINIC | Age: 72
End: 2017-03-17

## 2017-03-17 NOTE — TELEPHONE ENCOUNTER
Called patient to see if he had any signs of bleeding because his H and H was lower.  Had to leave voicemail, advised patient if light headed, black tarry stool, fatigue, or sweaty to go to the ER, if not to call Gaby on Monday with an update.

## 2017-03-17 NOTE — TELEPHONE ENCOUNTER
----- Message from Danay Nath MD sent at 3/15/2017  5:04 AM CDT -----  hgb lower, check if any symptoms of bleeding.

## 2017-03-20 DIAGNOSIS — Z94.4 STATUS POST LIVER TRANSPLANT: ICD-10-CM

## 2017-03-21 RX ORDER — TACROLIMUS 0.5 MG/1
0.5 CAPSULE ORAL DAILY
Qty: 30 CAPSULE | Refills: 11 | Status: SHIPPED | OUTPATIENT
Start: 2017-03-21 | End: 2018-02-15 | Stop reason: SDUPTHER

## 2017-03-22 ENCOUNTER — OFFICE VISIT (OUTPATIENT)
Dept: FAMILY MEDICINE | Facility: CLINIC | Age: 72
End: 2017-03-22
Payer: MEDICARE

## 2017-03-22 ENCOUNTER — LAB VISIT (OUTPATIENT)
Dept: LAB | Facility: HOSPITAL | Age: 72
End: 2017-03-22
Attending: NURSE PRACTITIONER
Payer: MEDICARE

## 2017-03-22 VITALS
HEIGHT: 74 IN | TEMPERATURE: 99 F | SYSTOLIC BLOOD PRESSURE: 140 MMHG | BODY MASS INDEX: 26.08 KG/M2 | OXYGEN SATURATION: 95 % | HEART RATE: 65 BPM | WEIGHT: 203.25 LBS | DIASTOLIC BLOOD PRESSURE: 60 MMHG

## 2017-03-22 DIAGNOSIS — R53.83 FATIGUE, UNSPECIFIED TYPE: Primary | ICD-10-CM

## 2017-03-22 DIAGNOSIS — E53.8 B12 DEFICIENCY: ICD-10-CM

## 2017-03-22 LAB — VIT B12 SERPL-MCNC: 569 PG/ML

## 2017-03-22 PROCEDURE — 99999 PR PBB SHADOW E&M-EST. PATIENT-LVL V: CPT | Mod: PBBFAC,,, | Performed by: NURSE PRACTITIONER

## 2017-03-22 PROCEDURE — 36415 COLL VENOUS BLD VENIPUNCTURE: CPT | Mod: PO

## 2017-03-22 PROCEDURE — 1159F MED LIST DOCD IN RCRD: CPT | Mod: S$GLB,,, | Performed by: NURSE PRACTITIONER

## 2017-03-22 PROCEDURE — 96372 THER/PROPH/DIAG INJ SC/IM: CPT | Mod: S$GLB,,, | Performed by: NURSE PRACTITIONER

## 2017-03-22 PROCEDURE — 99214 OFFICE O/P EST MOD 30 MIN: CPT | Mod: 25,S$GLB,, | Performed by: NURSE PRACTITIONER

## 2017-03-22 PROCEDURE — 82607 VITAMIN B-12: CPT

## 2017-03-22 PROCEDURE — 1160F RVW MEDS BY RX/DR IN RCRD: CPT | Mod: S$GLB,,, | Performed by: NURSE PRACTITIONER

## 2017-03-22 PROCEDURE — 1157F ADVNC CARE PLAN IN RCRD: CPT | Mod: S$GLB,,, | Performed by: NURSE PRACTITIONER

## 2017-03-22 RX ORDER — LACTULOSE 10 G/15ML
10 SOLUTION ORAL; RECTAL DAILY
Refills: 5 | COMMUNITY
Start: 2017-02-21 | End: 2017-05-22 | Stop reason: SDUPTHER

## 2017-03-22 RX ORDER — CYANOCOBALAMIN 1000 UG/ML
1000 INJECTION, SOLUTION INTRAMUSCULAR; SUBCUTANEOUS
Status: COMPLETED | OUTPATIENT
Start: 2017-03-22 | End: 2017-03-22

## 2017-03-22 RX ADMIN — CYANOCOBALAMIN 1000 MCG: 1000 INJECTION, SOLUTION INTRAMUSCULAR; SUBCUTANEOUS at 10:03

## 2017-03-22 NOTE — PROGRESS NOTES
This dictation has been generated using Dragon Dictation some phonetic errors may occur.     Philip was seen today for fatigue.    Diagnoses and all orders for this visit:    Fatigue, unspecified type    B12 deficiency  -     Vitamin B12; Future  -     cyanocobalamin injection 1,000 mcg; Inject 1 mL (1,000 mcg total) into the muscle one time.     fatigue multifactorial.  He follows with transplant, nephrology, and he marked.  Currently on Procrit for anemia.  Also consider renal disease and iron deficiencies.  Also considered hypothyroidism however normal TSH January 25.  B12 injection on lab as above.  Busy time of the year for him.  Discussed small meals throughout the day.  May need more iron supplementation throughout the day.     No Follow-up on file.      ________________________________________________________________  ________________________________________________________________        Chief Complaint   Patient presents with    Fatigue     one of his shots increased     History of present illness  This 71 y.o. presents today for complaint of fatigue.  Patient has a history of multiple chronic medical disease which could be contributing.  He does have known anemia.  He has had iron deficiency.  Currently on Procrit prior H&H noted with recent decrease.  Patient does have heart failure and history of hepatitis C.  He is status post transplant liver.  This is a busy time of the year for him with his plants.  He frequently works 12 hours a day.  Patient notes good sleep.  Review of systems  No fever or chills  No stool changes.  No black stools(No melena).  No blood noted.  No mucus.  Denies shortness of breath.  No dyspnea on exertion.  No unintended weight change    Past medical and social history reviewed.  Reviewed notes from specialty nephrology, transplant, and he hematology oncology.    Past Medical History:   Diagnosis Date    Anemia     Back pain     Bishop's esophagus     BPH (benign prostatic  hypertrophy)     Chronic kidney disease     Cirrhosis     Diabetes mellitus     Diabetes mellitus, type 2     Elevated PSA     Heart failure     Hepatitis Hepatitis C    Hepatitis C     Hyperlipidemia     Hypertension     Hypertension     Liver transplanted     Peripheral neuropathy     Sleep apnea     Transfusion reaction     Hep C from prev. blood transfusion    Transplanted liver     Trouble in sleeping     Type II or unspecified type diabetes mellitus with renal manifestations, uncontrolled     Type II or unspecified type diabetes mellitus with renal manifestations, uncontrolled        Past Surgical History:   Procedure Laterality Date    broken nose      EYE SURGERY      cataracts    FEMUR SURGERY      FRACTURE SURGERY      right femur fracture     LIVER TRANSPLANT  2001    SKIN GRAFT      graft from right thigh , applied to the left back and left arm.       Family History   Problem Relation Age of Onset    Cancer Mother      had cancer 40 years ago     Coronary artery disease Father     Hypertension Father     Diabetes Father     Heart disease Father     Colon cancer Neg Hx        Social History     Social History    Marital status:      Spouse name: N/A    Number of children: N/A    Years of education: N/A     Social History Main Topics    Smoking status: Former Smoker     Quit date: 7/30/1998    Smokeless tobacco: Never Used      Comment: pt reports quitting in 1998    Alcohol use No      Comment: pt reports hx of alcholism and reports quit in 1998    Drug use: Yes     Special: Marijuana      Comment: pt reports smoking THC apprxly twice/week    Sexual activity: Yes     Partners: Female     Other Topics Concern    None     Social History Narrative       Current Outpatient Prescriptions   Medication Sig Dispense Refill    allopurinol (ZYLOPRIM) 100 MG tablet TAKE 1 TABLET EVERY DAY 90 tablet 3    blood sugar diagnostic (ACCU-CHEK JOANN PLUS TEST STRP) Strp 1  "strip by Misc.(Non-Drug; Combo Route) route 4 (four) times daily. 360 each 3    blood-glucose meter (ACCU-CHEK JOANN PLUS METER) Misc Use as directed 1 each 0    carvedilol (COREG) 6.25 MG tablet Take 1 tablet (6.25 mg total) by mouth 2 (two) times daily. 180 tablet 3    cetirizine (ZYRTEC) 10 MG tablet Take 1 tablet (10 mg total) by mouth 2 (two) times daily. If needed may increase to 3 tablets twice daily to control itching 60 tablet 0    ciclopirox (PENLAC) 8 % Soln Apply to affected nails daily x 6-12 mos.  Remove weekly with rubbing alcohol 1 Bottle 5    clonazePAM (KLONOPIN) 0.5 MG tablet Take 1 tablet (0.5 mg total) by mouth every evening. (Patient taking differently: Take 0.5 mg by mouth daily as needed. ) 90 tablet 1    cloNIDine (CATAPRES) 0.1 MG tablet Take 1 tablet (0.1 mg total) by mouth 3 (three) times daily. 270 tablet 3    doxazosin (CARDURA) 8 MG Tab Take 1 tablet (8 mg total) by mouth once daily. 90 tablet 4    ferrous gluconate (FERGON) 324 MG tablet pt taking tid 180 tablet 0    GENERLAC 10 gram/15 mL solution Use as directed 10 g in the mouth or throat once daily.  5    glucagon (human recombinant) inj 1mg/mL kit Inject 1 mL (1 mg total) into the muscle as needed. 1 kit 2    hydrALAZINE (APRESOLINE) 100 MG tablet Take 1 tablet (100 mg total) by mouth 3 (three) times daily. 270 tablet 3    insulin aspart (NOVOLOG) 100 unit/mL InPn pen Inject 6 units with meals plus scale 180-230 +1, 231-280 +2, 281-330 +3, 331-380 +4, >380 +5. Max daily dose 33 units. 90 day supply 3 Box 3    insulin glargine (LANTUS SOLOSTAR) 100 unit/mL (3 mL) InPn pen Inject 10 Units into the skin every evening. 2 Box 6    insulin needles, disposable, (NOVOFINE 32) 32 x 1/4 " Ndle 1 each by Misc.(Non-Drug; Combo Route) route 3 (three) times daily. 100 each 5    lactulose (CHRONULAC) 20 gram/30 mL Soln Take 30 mLs (20 g total) by mouth 3 (three) times daily. 2700 mL 5    lancets (ACCU-CHEK SOFTCLIX LANCETS) Misc " 1 lancet by Misc.(Non-Drug; Combo Route) route 4 (four) times daily. 360 each 3    LANTUS SOLOSTAR 100 unit/mL (3 mL) InPn pen INJECT 10 UNITS UNDER THE SKIN EVERY EVENING 30 mL 3    montelukast (SINGULAIR) 10 mg tablet Take 1 tablet (10 mg total) by mouth every evening. 30 tablet 6    multivitamin (THERAGRAN) per tablet Take 1 tablet by mouth Daily.      nystatin (MYCOSTATIN) 500,000 unit Tab   1    ondansetron (ZOFRAN-ODT) 4 MG TbDL DISSOLVE 1 TABLET(4 MG) ON THE TONGUE EVERY 12 HOURS AS NEEDED 30 tablet 0    oxycodone (OXY-IR) 5 mg Cap Take 1 capsule (5 mg total) by mouth every 4 (four) hours as needed. 35 capsule 0    oxycodone (ROXICODONE) 5 MG immediate release tablet TK 1 T PO  Q 4 H PRN  0    oxycodone-acetaminophen (PERCOCET) 5-325 mg per tablet Take 1 tablet by mouth every 4 (four) hours as needed for Pain. 35 tablet 0    pantoprazole (PROTONIX) 40 MG tablet TAKE 1 TABLET ONE TIME DAILY 90 tablet 3    rifaximin (XIFAXAN) 550 mg Tab Take 1 tablet (550 mg total) by mouth 2 (two) times daily. 60 tablet 11    senna (SENNA CONCENTRATE) 8.6 mg tablet Take 1 tablet by mouth once daily.      tacrolimus (PROGRAF) 0.5 MG Cap Take 1 capsule (0.5 mg total) by mouth once daily. 30 capsule 11    torsemide (DEMADEX) 20 MG Tab Take 2 tablets (40 mg total) by mouth once daily. 180 tablet 3    urea (CARMOL) 40 % Crea Apply topically once daily. 85 g 5     No current facility-administered medications for this visit.        Review of patient's allergies indicates:   Allergen Reactions    Corticosteroids (glucocorticoids) Other (See Comments)     Leg swelling    Hydrocortisone      Leg swelling    Neuromuscular blockers, steroidal      Leg swelling    Ribavirin      Intolerance, arthralgias       Physical examination  Vitals Reviewed  Gen. Well-dressed well-nourished no apparent distress  Skin warm dry and intact.  No rashes noted.  HEENT.  TM intact bilateral with normal light reflex.  No mastoid tenderness  during percussion.  Nares patent bilateral.  Pharynx is unremarkable.  No maxillary or frontal sinus tenderness when percussed.    Neck is supple without adenopathy  Chest.  Respirations are even unlabored.  Lungs are clear to auscultation.  Cardiac regular rate and rhythm.  No chest wall adenopathy noted.  Neuro. Awake alert oriented x4.  Normal judgment and cognition noted.  Extremities no clubbing cyanosis or edema noted.     Call or return to clinic prn if these symptoms worsen or fail to improve as anticipated.

## 2017-03-23 ENCOUNTER — TELEPHONE (OUTPATIENT)
Dept: FAMILY MEDICINE | Facility: CLINIC | Age: 72
End: 2017-03-23

## 2017-03-29 ENCOUNTER — TELEPHONE (OUTPATIENT)
Dept: TRANSPLANT | Facility: CLINIC | Age: 72
End: 2017-03-29

## 2017-03-29 NOTE — TELEPHONE ENCOUNTER
Received critical lab value of 42 glucose, spoke to pt who states he was fasting this am and since his blood sugar is normal at 115. Will notify Dr. Nath.

## 2017-03-30 ENCOUNTER — TELEPHONE (OUTPATIENT)
Dept: ENDOCRINOLOGY | Facility: CLINIC | Age: 72
End: 2017-03-30

## 2017-03-30 NOTE — TELEPHONE ENCOUNTER
----- Message from MILTON Salomon, JAMIE sent at 3/30/2017  3:33 PM CDT -----  Administer 8 units on days of fasting vs 10 units for long acting insulin,  Thanks.  Noted low bg on fasting labs.

## 2017-03-30 NOTE — TELEPHONE ENCOUNTER
Spoke with the pt wife and relay message regarding changes of regimen and pt verbally understand.

## 2017-04-03 DIAGNOSIS — N18.4 CKD (CHRONIC KIDNEY DISEASE), STAGE IV: Primary | ICD-10-CM

## 2017-04-03 RX ORDER — HYDRALAZINE HYDROCHLORIDE 100 MG/1
100 TABLET, FILM COATED ORAL 3 TIMES DAILY
Qty: 270 TABLET | Refills: 3 | Status: SHIPPED | OUTPATIENT
Start: 2017-04-03 | End: 2018-01-01 | Stop reason: SDUPTHER

## 2017-04-03 RX ORDER — CARVEDILOL 6.25 MG/1
6.25 TABLET ORAL 2 TIMES DAILY
Qty: 180 TABLET | Refills: 3 | Status: SHIPPED | OUTPATIENT
Start: 2017-04-03 | End: 2018-01-01

## 2017-04-04 ENCOUNTER — INFUSION (OUTPATIENT)
Dept: INFECTIOUS DISEASES | Facility: HOSPITAL | Age: 72
End: 2017-04-04
Attending: INTERNAL MEDICINE
Payer: MEDICARE

## 2017-04-04 VITALS
WEIGHT: 201 LBS | DIASTOLIC BLOOD PRESSURE: 56 MMHG | HEIGHT: 74 IN | SYSTOLIC BLOOD PRESSURE: 136 MMHG | BODY MASS INDEX: 25.8 KG/M2

## 2017-04-04 DIAGNOSIS — D63.1 ANEMIA IN CHRONIC KIDNEY DISEASE(285.21): ICD-10-CM

## 2017-04-04 DIAGNOSIS — N18.5 ANEMIA IN STAGE 5 CHRONIC KIDNEY DISEASE: Primary | ICD-10-CM

## 2017-04-04 DIAGNOSIS — N18.9 ANEMIA IN CHRONIC KIDNEY DISEASE(285.21): ICD-10-CM

## 2017-04-04 DIAGNOSIS — N18.4 KIDNEY DISEASE, CHRONIC, STAGE IV (GFR 15-29 ML/MIN): ICD-10-CM

## 2017-04-04 DIAGNOSIS — N18.4 CRD (CHRONIC RENAL DISEASE), STAGE IV: ICD-10-CM

## 2017-04-04 DIAGNOSIS — D63.1 ANEMIA IN STAGE 5 CHRONIC KIDNEY DISEASE: Primary | ICD-10-CM

## 2017-04-04 PROCEDURE — 96372 THER/PROPH/DIAG INJ SC/IM: CPT

## 2017-04-04 PROCEDURE — 63600175 PHARM REV CODE 636 W HCPCS: Performed by: INTERNAL MEDICINE

## 2017-04-04 RX ADMIN — DARBEPOETIN ALFA 300 MCG: 300 INJECTION, SOLUTION INTRAVENOUS; SUBCUTANEOUS at 08:04

## 2017-04-04 NOTE — PROGRESS NOTES
Hgb 9.0;Hct 29.3    Discussed Hgb with Patient. Dose didn't increase  PER PROTOCOL FORM DROD-188. Due to being increased on 3/16/17.  Aranesp 300 mcg given and 4 week appt. Set. .  Dr Amezcua notified    Gfr 14.9/Stage 5

## 2017-04-13 RX ORDER — ONDANSETRON 4 MG/1
TABLET, ORALLY DISINTEGRATING ORAL
Qty: 30 TABLET | Refills: 1 | Status: SHIPPED | OUTPATIENT
Start: 2017-04-13 | End: 2017-07-22 | Stop reason: SDUPTHER

## 2017-04-17 ENCOUNTER — OFFICE VISIT (OUTPATIENT)
Dept: FAMILY MEDICINE | Facility: CLINIC | Age: 72
End: 2017-04-17
Payer: MEDICARE

## 2017-04-17 ENCOUNTER — HOSPITAL ENCOUNTER (OUTPATIENT)
Dept: RADIOLOGY | Facility: HOSPITAL | Age: 72
Discharge: HOME | End: 2017-04-17
Attending: NURSE PRACTITIONER
Payer: MEDICARE

## 2017-04-17 VITALS
HEART RATE: 75 BPM | SYSTOLIC BLOOD PRESSURE: 140 MMHG | BODY MASS INDEX: 27.25 KG/M2 | OXYGEN SATURATION: 95 % | TEMPERATURE: 99 F | WEIGHT: 212.31 LBS | HEIGHT: 74 IN | DIASTOLIC BLOOD PRESSURE: 60 MMHG

## 2017-04-17 DIAGNOSIS — R60.0 LOCALIZED EDEMA: ICD-10-CM

## 2017-04-17 DIAGNOSIS — T14.90XA TRAUMA: Primary | ICD-10-CM

## 2017-04-17 DIAGNOSIS — M25.562 ACUTE PAIN OF LEFT KNEE: ICD-10-CM

## 2017-04-17 DIAGNOSIS — R07.81 RIB PAIN ON RIGHT SIDE: ICD-10-CM

## 2017-04-17 DIAGNOSIS — T14.90XA TRAUMA: ICD-10-CM

## 2017-04-17 PROCEDURE — 73560 X-RAY EXAM OF KNEE 1 OR 2: CPT | Mod: 26,LT,NTX, | Performed by: RADIOLOGY

## 2017-04-17 PROCEDURE — 99214 OFFICE O/P EST MOD 30 MIN: CPT | Mod: S$GLB,,, | Performed by: NURSE PRACTITIONER

## 2017-04-17 PROCEDURE — 71100 X-RAY EXAM RIBS UNI 2 VIEWS: CPT | Mod: 26,RT,NTX, | Performed by: RADIOLOGY

## 2017-04-17 PROCEDURE — 71100 X-RAY EXAM RIBS UNI 2 VIEWS: CPT | Mod: TC,PO,RT,TXP

## 2017-04-17 PROCEDURE — 1159F MED LIST DOCD IN RCRD: CPT | Mod: S$GLB,,, | Performed by: NURSE PRACTITIONER

## 2017-04-17 PROCEDURE — 99999 PR PBB SHADOW E&M-EST. PATIENT-LVL V: CPT | Mod: PBBFAC,,, | Performed by: NURSE PRACTITIONER

## 2017-04-17 PROCEDURE — 73560 X-RAY EXAM OF KNEE 1 OR 2: CPT | Mod: TC,PO,LT,TXP

## 2017-04-17 PROCEDURE — 1160F RVW MEDS BY RX/DR IN RCRD: CPT | Mod: S$GLB,,, | Performed by: NURSE PRACTITIONER

## 2017-04-17 PROCEDURE — 1157F ADVNC CARE PLAN IN RCRD: CPT | Mod: S$GLB,,, | Performed by: NURSE PRACTITIONER

## 2017-04-17 RX ORDER — OXYCODONE AND ACETAMINOPHEN 5; 325 MG/1; MG/1
1 TABLET ORAL EVERY 4 HOURS PRN
Qty: 35 TABLET | Refills: 0 | Status: SHIPPED | OUTPATIENT
Start: 2017-04-17 | End: 2017-05-17

## 2017-04-17 NOTE — MR AVS SNAPSHOT
[facility]  [address] [place] [address]  Phone: [phone]  Fax: [phone]                  Philip Mathis III   2017 3:00 PM   Office Visit    Description:  Male : 1945   Provider:  Donnie Owens NP   Department:  [facility]           Reason for Visit     Fall           Diagnoses this Visit        Comments    Trauma    -  Primary     Acute pain of left knee         Rib pain on right side                To Do List           Future Appointments        Provider Department Dept Phone    2017 7:00 AM LAB, [place] [facility] [phone]    2017 8:30 AM EPO, INJECTION [facility] [phone]    5/10/2017 7:00 AM LAB, [place] [facility] [phone]    5/10/2017 7:15 AM LAB, [place] [facility] [phone]    2017 7:30 AM Viviane Carter, APRN, FNP [facility] - Endocrinology [phone]      Goals (5 Years of Data)     None      Follow-Up and Disposition     Return if symptoms worsen or fail to improve.      [facility]     [facility] Nurse Care Line -  Assistance  Unless otherwise directed by your provider, please contact [facility], our nurse care line that is available for  assistance.     Registered nurses in the [facility] provide: appointment scheduling, clinical advisement, health education, and other advisory services.  Call: [phone] (toll free)               Medications           Message regarding Medications     Verify the changes and/or additions to your medication regime listed below are the same as discussed with your clinician today.  If any of these changes or additions are incorrect, please notify your healthcare provider.             Verify that the below list of medications is an accurate representation of the medications you are currently taking.  If none reported, the list may be blank. If incorrect,  "please contact your healthcare provider. Carry this list with you in case of emergency.           Current Medications     allopurinol (ZYLOPRIM) 100 MG tablet TAKE 1 TABLET EVERY DAY    blood sugar diagnostic (ACCU-CHEK JOANN PLUS TEST STRP) Strp 1 strip by Misc.(Non-Drug; Combo Route) route 4 (four) times daily.    blood-glucose meter (ACCU-CHEK JOANN PLUS METER) Misc Use as directed    carvedilol (COREG) 6.25 MG tablet Take 1 tablet (6.25 mg total) by mouth 2 (two) times daily.    cetirizine (ZYRTEC) 10 MG tablet Take 1 tablet (10 mg total) by mouth 2 (two) times daily. If needed may increase to 3 tablets twice daily to control itching    ciclopirox (PENLAC) 8 % Soln Apply to affected nails daily x 6-12 mos.  Remove weekly with rubbing alcohol    clonazePAM (KLONOPIN) 0.5 MG tablet Take 1 tablet (0.5 mg total) by mouth every evening.    cloNIDine (CATAPRES) 0.1 MG tablet Take 1 tablet (0.1 mg total) by mouth 3 (three) times daily.    doxazosin (CARDURA) 8 MG Tab Take 1 tablet (8 mg total) by mouth once daily.    ferrous gluconate (FERGON) 324 MG tablet pt taking tid    GENERLAC 10 gram/15 mL solution Use as directed 10 g in the mouth or throat once daily.    glucagon (human recombinant) inj 1mg/mL kit Inject 1 mL (1 mg total) into the muscle as needed.    hydrALAZINE (APRESOLINE) 100 MG tablet Take 1 tablet (100 mg total) by mouth 3 (three) times daily.    insulin aspart (NOVOLOG) 100 unit/mL InPn pen Inject 6 units with meals plus scale 180-230 +1, 231-280 +2, 281-330 +3, 331-380 +4, >380 +5. Max daily dose 33 units. 90 day supply    insulin glargine (LANTUS SOLOSTAR) 100 unit/mL (3 mL) InPn pen Inject 10 Units into the skin every evening.    insulin needles, disposable, (NOVOFINE 32) 32 x 1/4 " Ndle 1 each by Misc.(Non-Drug; Combo Route) route 3 (three) times daily.    lactulose (CHRONULAC) 20 gram/30 mL Soln Take 30 mLs (20 g total) by mouth 3 (three) times daily.    lancets (ACCU-CHEK SOFTCLIX LANCETS) Misc 1 " "lancet by Misc.(Non-Drug; Combo Route) route 4 (four) times daily.    LANTUS SOLOSTAR 100 unit/mL (3 mL) InPn pen INJECT 10 UNITS UNDER THE SKIN EVERY EVENING    montelukast (SINGULAIR) 10 mg tablet Take 1 tablet (10 mg total) by mouth every evening.    multivitamin (THERAGRAN) per tablet Take 1 tablet by mouth Daily.    nystatin (MYCOSTATIN) 500,000 unit Tab     ondansetron (ZOFRAN-ODT) 4 MG TbDL DISSOLVE 1 TABLET(4 MG) ON THE TONGUE EVERY 12 HOURS AS NEEDED    oxycodone (OXY-IR) 5 mg Cap Take 1 capsule (5 mg total) by mouth every 4 (four) hours as needed.    oxycodone (ROXICODONE) 5 MG immediate release tablet TK 1 T PO  Q 4 H PRN    oxycodone-acetaminophen (PERCOCET) 5-325 mg per tablet Take 1 tablet by mouth every 4 (four) hours as needed for Pain.    pantoprazole (PROTONIX) 40 MG tablet TAKE 1 TABLET ONE TIME DAILY    rifaximin (XIFAXAN) 550 mg Tab Take 1 tablet (550 mg total) by mouth 2 (two) times daily.    senna (SENNA CONCENTRATE) 8.6 mg tablet Take 1 tablet by mouth once daily.    tacrolimus (PROGRAF) 0.5 MG Cap Take 1 capsule (0.5 mg total) by mouth once daily.    torsemide (DEMADEX) 20 MG Tab Take 2 tablets (40 mg total) by mouth once daily.    urea (CARMOL) 40 % Crea Apply topically once daily.           Clinical Reference Information           Your Vitals Were     BP Pulse Temp Height Weight SpO2    140/60 (BP Location: Right arm, Patient Position: Sitting, BP Method: Manual) 75 98.7 °F (37.1 °C) (Oral) 6' 2" (1.88 m) 96.3 kg (212 lb 4.9 oz) 95%    BMI                27.26 kg/m2          Blood Pressure          Most Recent Value    BP  (!)  140/60      Allergies as of 4/17/2017     Corticosteroids (Glucocorticoids)    Hydrocortisone    Neuromuscular Blockers, Steroidal    Ribavirin      Immunizations Administered on Date of Encounter - 4/17/2017     None      Orders Placed During Today's Visit     Future Labs/Procedures Expected by Expires    X-Ray Knee 1 or 2 View Left  4/17/2017 4/17/2018    X-Ray " Ribs 2 View Right  4/17/2017 4/17/2018      Maintenance Dialysis History     Patient has no recorded history of maintenance dialysis.      Transplant Information        Txp Date Organ Coordinator Care Team     Liver Fabian Colvin RN Referring Physician:  Danay Nath MD          Txp Date Organ Coordinator Care Team     Kidney Fabian Colvin RN Referring Physician:  Luciano Hercules MD   Current Nephrologist:  Luciano Hercules MD          Txp Date Organ Coordinator Care Team    6/10/2001 Liver Gaby Palacios RN Surgeon:  BRITTANY Pizarro MD   Assisting Surgeon:  Cain Bryant MD   Current Hepatologist:  Danay Nath MD         Language Assistance Services     ATTENTION: Language assistance services are available, free of charge. Please call [phone].      ATENCIÓN: Si habla español, tiene a adler disposición servicios gratuitos de asistencia lingüística. Llame al [phone].     CHÚ Ý: N?u b?n nói Ti?ng Vi?t, có các d?ch v? h? tr? ngôn ng? mi?n phí dành cho b?n. G?i s? [phone].         [facility] complies with applicable Federal civil rights laws and does not discriminate on the basis of race, color, national origin, age, disability, or sex.

## 2017-04-17 NOTE — PROGRESS NOTES
This dictation has been generated using Dragon Dictation some phonetic errors may occur.     Philip was seen today for fall.    Diagnoses and all orders for this visit:    Trauma  -     X-Ray Knee 1 or 2 View Left; Future  -     X-Ray Ribs 2 View Right; Future  -     US Lower Extremity Veins Left; Future    Acute pain of left knee  -     X-Ray Knee 1 or 2 View Left; Future    Rib pain on right side  -     X-Ray Ribs 2 View Right; Future    Localized edema   -     US Lower Extremity Veins Left; Future    Other orders  -     oxycodone-acetaminophen (PERCOCET) 5-325 mg per tablet; Take 1 tablet by mouth every 4 (four) hours as needed for Pain.      Trauma.  Trip and fall.  Check x-ray as above and rule out knee fracture and rib fracture.  Percocet as above.  Duration of pain may last 6 weeks with contused ribs.  I will review images when available.  Check ultrasound rule out DVT given history of trauma.  D-dimer would be of no value due to liver disease.    Return if symptoms worsen or fail to improve.      ________________________________________________________________  ________________________________________________________________        Chief Complaint   Patient presents with    Fall     flank pain, knee pain     History of present illness  This 71 y.o. presents today for complaint of trip and fall leading to right side pain and left knee pain.  Patient indicates trip and fall while going up steps on Saturday.  He indicates that he missed a step.  He struck his right knee left knee right flank and right side of his face.  Patient notes ongoing left knee pain and right flank pain.  Pain is exacerbated by movement.  He had difficulty sleeping last night due to pain.  Review of systems  No chest pain or shortness of breath      Past Medical History:   Diagnosis Date    Anemia     Back pain     Bishop's esophagus     BPH (benign prostatic hypertrophy)     Chronic kidney disease     Cirrhosis     Diabetes  mellitus     Diabetes mellitus, type 2     Elevated PSA     Heart failure     Hepatitis Hepatitis C    Hepatitis C     Hyperlipidemia     Hypertension     Hypertension     Liver transplanted     Peripheral neuropathy     Sleep apnea     Transfusion reaction     Hep C from prev. blood transfusion    Transplanted liver     Trouble in sleeping     Type II or unspecified type diabetes mellitus with renal manifestations, uncontrolled     Type II or unspecified type diabetes mellitus with renal manifestations, uncontrolled        Past Surgical History:   Procedure Laterality Date    broken nose      EYE SURGERY      cataracts    FEMUR SURGERY      FRACTURE SURGERY      right femur fracture     LIVER TRANSPLANT  2001    SKIN GRAFT      graft from right thigh , applied to the left back and left arm.       Family History   Problem Relation Age of Onset    Cancer Mother      had cancer 40 years ago     Coronary artery disease Father     Hypertension Father     Diabetes Father     Heart disease Father     Colon cancer Neg Hx        Social History     Social History    Marital status:      Spouse name: N/A    Number of children: N/A    Years of education: N/A     Social History Main Topics    Smoking status: Former Smoker     Quit date: 7/30/1998    Smokeless tobacco: Never Used      Comment: pt reports quitting in 1998    Alcohol use No      Comment: pt reports hx of alcholism and reports quit in 1998    Drug use: Yes     Special: Marijuana      Comment: pt reports smoking THC apprxly twice/week    Sexual activity: Yes     Partners: Female     Other Topics Concern    None     Social History Narrative       Current Outpatient Prescriptions   Medication Sig Dispense Refill    allopurinol (ZYLOPRIM) 100 MG tablet TAKE 1 TABLET EVERY DAY 90 tablet 3    blood sugar diagnostic (ACCU-CHEK JOANN PLUS TEST STRP) Strp 1 strip by Misc.(Non-Drug; Combo Route) route 4 (four) times daily. 360  "each 3    blood-glucose meter (ACCU-CHEK JOANN PLUS METER) Misc Use as directed 1 each 0    carvedilol (COREG) 6.25 MG tablet Take 1 tablet (6.25 mg total) by mouth 2 (two) times daily. 180 tablet 3    cetirizine (ZYRTEC) 10 MG tablet Take 1 tablet (10 mg total) by mouth 2 (two) times daily. If needed may increase to 3 tablets twice daily to control itching 60 tablet 0    ciclopirox (PENLAC) 8 % Soln Apply to affected nails daily x 6-12 mos.  Remove weekly with rubbing alcohol 1 Bottle 5    clonazePAM (KLONOPIN) 0.5 MG tablet Take 1 tablet (0.5 mg total) by mouth every evening. (Patient taking differently: Take 0.5 mg by mouth daily as needed. ) 90 tablet 1    cloNIDine (CATAPRES) 0.1 MG tablet Take 1 tablet (0.1 mg total) by mouth 3 (three) times daily. 270 tablet 3    doxazosin (CARDURA) 8 MG Tab Take 1 tablet (8 mg total) by mouth once daily. 90 tablet 4    ferrous gluconate (FERGON) 324 MG tablet pt taking tid 180 tablet 0    GENERLAC 10 gram/15 mL solution Use as directed 10 g in the mouth or throat once daily.  5    glucagon (human recombinant) inj 1mg/mL kit Inject 1 mL (1 mg total) into the muscle as needed. 1 kit 2    hydrALAZINE (APRESOLINE) 100 MG tablet Take 1 tablet (100 mg total) by mouth 3 (three) times daily. 270 tablet 3    insulin aspart (NOVOLOG) 100 unit/mL InPn pen Inject 6 units with meals plus scale 180-230 +1, 231-280 +2, 281-330 +3, 331-380 +4, >380 +5. Max daily dose 33 units. 90 day supply 3 Box 3    insulin glargine (LANTUS SOLOSTAR) 100 unit/mL (3 mL) InPn pen Inject 10 Units into the skin every evening. 2 Box 6    insulin needles, disposable, (NOVOFINE 32) 32 x 1/4 " Ndle 1 each by Misc.(Non-Drug; Combo Route) route 3 (three) times daily. 100 each 5    lactulose (CHRONULAC) 20 gram/30 mL Soln Take 30 mLs (20 g total) by mouth 3 (three) times daily. 2700 mL 5    lancets (ACCU-CHEK SOFTCLIX LANCETS) Misc 1 lancet by Misc.(Non-Drug; Combo Route) route 4 (four) times daily. " 360 each 3    LANTUS SOLOSTAR 100 unit/mL (3 mL) InPn pen INJECT 10 UNITS UNDER THE SKIN EVERY EVENING 30 mL 3    montelukast (SINGULAIR) 10 mg tablet Take 1 tablet (10 mg total) by mouth every evening. 30 tablet 6    multivitamin (THERAGRAN) per tablet Take 1 tablet by mouth Daily.      nystatin (MYCOSTATIN) 500,000 unit Tab   1    ondansetron (ZOFRAN-ODT) 4 MG TbDL DISSOLVE 1 TABLET(4 MG) ON THE TONGUE EVERY 12 HOURS AS NEEDED 30 tablet 1    oxycodone (OXY-IR) 5 mg Cap Take 1 capsule (5 mg total) by mouth every 4 (four) hours as needed. 35 capsule 0    oxycodone-acetaminophen (PERCOCET) 5-325 mg per tablet Take 1 tablet by mouth every 4 (four) hours as needed for Pain. 35 tablet 0    pantoprazole (PROTONIX) 40 MG tablet TAKE 1 TABLET ONE TIME DAILY 90 tablet 3    rifaximin (XIFAXAN) 550 mg Tab Take 1 tablet (550 mg total) by mouth 2 (two) times daily. 60 tablet 11    senna (SENNA CONCENTRATE) 8.6 mg tablet Take 1 tablet by mouth once daily.      tacrolimus (PROGRAF) 0.5 MG Cap Take 1 capsule (0.5 mg total) by mouth once daily. 30 capsule 11    torsemide (DEMADEX) 20 MG Tab Take 2 tablets (40 mg total) by mouth once daily. 180 tablet 3    urea (CARMOL) 40 % Crea Apply topically once daily. 85 g 5     No current facility-administered medications for this visit.        Review of patient's allergies indicates:   Allergen Reactions    Corticosteroids (glucocorticoids) Other (See Comments)     Leg swelling    Hydrocortisone      Leg swelling    Neuromuscular blockers, steroidal      Leg swelling    Ribavirin      Intolerance, arthralgias       Physical examination  Vitals Reviewed  Gen. Well-dressed well-nourished no apparent distress  Skin warm dry and intact.  No rashes noted.  Neck is supple without adenopathy  Chest.  Respirations are even unlabored.  Lungs are clear to auscultation.  Cardiac regular rate and rhythm.  No chest wall adenopathy noted.  Point Tenderness right ribs over the liver.  No  bruising noted.  Various abrasions noted.  Neuro. Awake alert oriented x4.  Normal judgment and cognition noted.  Extremities no clubbing or cyanosis noted.  Edema noted bilateral lower extremities but worse on the left.  Edema does extend to just above the left knee.  Left knee bruised.  Effusion noted.  No localized warmth noted.  Patient walking without assistance.    Call or return to clinic prn if these symptoms worsen or fail to improve as anticipated.

## 2017-04-17 NOTE — PROGRESS NOTES
Best possible views, pt mobility was not very good, so pt could not get knee into position for a lat knee. pt would not change into a gown for images. KAYLI

## 2017-04-18 ENCOUNTER — TELEPHONE (OUTPATIENT)
Dept: FAMILY MEDICINE | Facility: CLINIC | Age: 72
End: 2017-04-18

## 2017-04-18 NOTE — TELEPHONE ENCOUNTER
Call patient  cracked ribs noted on x-ray.  No change in therapies.  Can take up to 6 weeks for the pain resolved.

## 2017-04-24 ENCOUNTER — HOSPITAL ENCOUNTER (OUTPATIENT)
Dept: RADIOLOGY | Facility: HOSPITAL | Age: 72
Discharge: HOME | DRG: 291 | End: 2017-04-24
Attending: NURSE PRACTITIONER
Payer: MEDICARE

## 2017-04-24 DIAGNOSIS — T14.90XA TRAUMA: ICD-10-CM

## 2017-04-24 DIAGNOSIS — R60.0 LOCALIZED EDEMA: ICD-10-CM

## 2017-04-24 PROCEDURE — 93971 EXTREMITY STUDY: CPT | Mod: 26,NTX,, | Performed by: RADIOLOGY

## 2017-04-25 ENCOUNTER — TELEPHONE (OUTPATIENT)
Dept: FAMILY MEDICINE | Facility: CLINIC | Age: 72
End: 2017-04-25

## 2017-04-26 ENCOUNTER — HOSPITAL ENCOUNTER (INPATIENT)
Facility: HOSPITAL | Age: 72
LOS: 3 days | Discharge: HOME | DRG: 291 | End: 2017-04-29
Attending: EMERGENCY MEDICINE | Admitting: HOSPITALIST
Payer: MEDICARE

## 2017-04-26 ENCOUNTER — OFFICE VISIT (OUTPATIENT)
Dept: FAMILY MEDICINE | Facility: CLINIC | Age: 72
DRG: 291 | End: 2017-04-26
Payer: MEDICARE

## 2017-04-26 VITALS
SYSTOLIC BLOOD PRESSURE: 140 MMHG | DIASTOLIC BLOOD PRESSURE: 60 MMHG | OXYGEN SATURATION: 92 % | HEIGHT: 74 IN | HEART RATE: 66 BPM | TEMPERATURE: 99 F | BODY MASS INDEX: 27.64 KG/M2 | WEIGHT: 215.38 LBS

## 2017-04-26 DIAGNOSIS — I50.33 ACUTE ON CHRONIC DIASTOLIC HEART FAILURE: Primary | ICD-10-CM

## 2017-04-26 DIAGNOSIS — K21.9 GASTROESOPHAGEAL REFLUX DISEASE, ESOPHAGITIS PRESENCE NOT SPECIFIED: Chronic | ICD-10-CM

## 2017-04-26 DIAGNOSIS — M79.10 MYALGIA: ICD-10-CM

## 2017-04-26 DIAGNOSIS — I50.9 ACUTE DECOMPENSATED HEART FAILURE: ICD-10-CM

## 2017-04-26 DIAGNOSIS — T22.349A: ICD-10-CM

## 2017-04-26 DIAGNOSIS — G47.33 OSA (OBSTRUCTIVE SLEEP APNEA): Chronic | ICD-10-CM

## 2017-04-26 DIAGNOSIS — N18.4 KIDNEY DISEASE, CHRONIC, STAGE IV (GFR 15-29 ML/MIN): ICD-10-CM

## 2017-04-26 DIAGNOSIS — R74.8 ELEVATED CPK: ICD-10-CM

## 2017-04-26 DIAGNOSIS — E44.0 MODERATE PROTEIN MALNUTRITION: ICD-10-CM

## 2017-04-26 DIAGNOSIS — I50.32 CHRONIC DIASTOLIC HEART FAILURE: Primary | Chronic | ICD-10-CM

## 2017-04-26 DIAGNOSIS — R18.8 OTHER ASCITES: ICD-10-CM

## 2017-04-26 DIAGNOSIS — R09.02 HYPOXEMIA: ICD-10-CM

## 2017-04-26 DIAGNOSIS — Z99.2 DEPENDENCE ON HEMODIALYSIS: ICD-10-CM

## 2017-04-26 DIAGNOSIS — Z86.19 HISTORY OF HEPATITIS C: Chronic | ICD-10-CM

## 2017-04-26 DIAGNOSIS — E87.5 HYPERKALEMIA: ICD-10-CM

## 2017-04-26 DIAGNOSIS — N18.4 CKD (CHRONIC KIDNEY DISEASE), STAGE IV: Chronic | ICD-10-CM

## 2017-04-26 DIAGNOSIS — N18.4 CRD (CHRONIC RENAL DISEASE), STAGE IV: ICD-10-CM

## 2017-04-26 DIAGNOSIS — I10 ESSENTIAL HYPERTENSION: ICD-10-CM

## 2017-04-26 DIAGNOSIS — N18.4 CONTROLLED TYPE 2 DIABETES MELLITUS WITH STAGE 4 CHRONIC KIDNEY DISEASE, UNSPECIFIED LONG TERM INSULIN USE STATUS: Chronic | ICD-10-CM

## 2017-04-26 DIAGNOSIS — L29.8 OTHER SPECIFIED PRURITIC CONDITIONS: Chronic | ICD-10-CM

## 2017-04-26 DIAGNOSIS — Z86.2 HISTORY OF HEMOLYTIC ANEMIA: ICD-10-CM

## 2017-04-26 DIAGNOSIS — E88.09 HYPOALBUMINEMIA: ICD-10-CM

## 2017-04-26 DIAGNOSIS — R60.1 ANASARCA: ICD-10-CM

## 2017-04-26 DIAGNOSIS — Z76.82 KIDNEY TRANSPLANT CANDIDATE: ICD-10-CM

## 2017-04-26 DIAGNOSIS — N25.81 SECONDARY HYPERPARATHYROIDISM: ICD-10-CM

## 2017-04-26 DIAGNOSIS — Z87.19 HISTORY OF BARRETT'S ESOPHAGUS: Chronic | ICD-10-CM

## 2017-04-26 DIAGNOSIS — D84.9 IMMUNOSUPPRESSED STATUS: Chronic | ICD-10-CM

## 2017-04-26 DIAGNOSIS — D69.6 THROMBOCYTOPENIA: ICD-10-CM

## 2017-04-26 DIAGNOSIS — F12.10 CANNABIS ABUSE: Chronic | ICD-10-CM

## 2017-04-26 DIAGNOSIS — E83.39 HYPERPHOSPHATEMIA: ICD-10-CM

## 2017-04-26 DIAGNOSIS — Z01.818 PREOP EXAMINATION: ICD-10-CM

## 2017-04-26 DIAGNOSIS — N18.6 ESRD (END STAGE RENAL DISEASE): ICD-10-CM

## 2017-04-26 DIAGNOSIS — Z94.4 LIVER TRANSPLANTED: Chronic | ICD-10-CM

## 2017-04-26 DIAGNOSIS — E11.610 CHARCOT'S JOINT OF FOOT DUE TO DIABETES: ICD-10-CM

## 2017-04-26 DIAGNOSIS — I10 ESSENTIAL HYPERTENSION: Chronic | ICD-10-CM

## 2017-04-26 DIAGNOSIS — N18.4 TYPE 2 DIABETES MELLITUS WITH STAGE 4 CHRONIC KIDNEY DISEASE, UNSPECIFIED LONG TERM INSULIN USE STATUS: Chronic | ICD-10-CM

## 2017-04-26 DIAGNOSIS — N18.9 ANEMIA OF RENAL DISEASE: Chronic | ICD-10-CM

## 2017-04-26 DIAGNOSIS — I70.0 AORTIC ATHEROSCLEROSIS: ICD-10-CM

## 2017-04-26 DIAGNOSIS — R09.02 HYPOXIA: ICD-10-CM

## 2017-04-26 DIAGNOSIS — Z79.4 LONG-TERM INSULIN USE: ICD-10-CM

## 2017-04-26 DIAGNOSIS — E11.22 TYPE 2 DIABETES MELLITUS WITH STAGE 4 CHRONIC KIDNEY DISEASE, UNSPECIFIED LONG TERM INSULIN USE STATUS: Chronic | ICD-10-CM

## 2017-04-26 DIAGNOSIS — E11.22 CONTROLLED TYPE 2 DIABETES MELLITUS WITH STAGE 4 CHRONIC KIDNEY DISEASE, UNSPECIFIED LONG TERM INSULIN USE STATUS: Chronic | ICD-10-CM

## 2017-04-26 DIAGNOSIS — L50.8 OTHER SPECIFIED URTICARIA: Chronic | ICD-10-CM

## 2017-04-26 DIAGNOSIS — D47.2 MGUS (MONOCLONAL GAMMOPATHY OF UNKNOWN SIGNIFICANCE): ICD-10-CM

## 2017-04-26 DIAGNOSIS — K59.09 CHRONIC CONSTIPATION: ICD-10-CM

## 2017-04-26 DIAGNOSIS — E11.42 DIABETIC POLYNEUROPATHY ASSOCIATED WITH TYPE 2 DIABETES MELLITUS: Chronic | ICD-10-CM

## 2017-04-26 DIAGNOSIS — D63.1 ANEMIA OF RENAL DISEASE: Chronic | ICD-10-CM

## 2017-04-26 DIAGNOSIS — D63.8 ANEMIA OF CHRONIC DISEASE: ICD-10-CM

## 2017-04-26 PROBLEM — M1A.9XX0 CHRONIC GOUT: Chronic | Status: ACTIVE | Noted: 2017-04-26

## 2017-04-26 LAB
ALBUMIN SERPL BCP-MCNC: 3.6 G/DL
ALP SERPL-CCNC: 52 U/L
ALT SERPL W/O P-5'-P-CCNC: 13 U/L
ANION GAP SERPL CALC-SCNC: 11 MMOL/L
AST SERPL-CCNC: 25 U/L
BASOPHILS # BLD AUTO: 0.1 K/UL
BASOPHILS NFR BLD: 1.8 %
BILIRUB SERPL-MCNC: 0.5 MG/DL
BNP SERPL-MCNC: 1982 PG/ML
BUN SERPL-MCNC: 88 MG/DL
CALCIUM SERPL-MCNC: 8.1 MG/DL
CHLORIDE SERPL-SCNC: 109 MMOL/L
CO2 SERPL-SCNC: 22 MMOL/L
CREAT SERPL-MCNC: 4 MG/DL
DIFFERENTIAL METHOD: ABNORMAL
EOSINOPHIL # BLD AUTO: 0.4 K/UL
EOSINOPHIL NFR BLD: 6.2 %
ERYTHROCYTE [DISTWIDTH] IN BLOOD BY AUTOMATED COUNT: 17.4 %
EST. GFR  (AFRICAN AMERICAN): 16 ML/MIN/1.73 M^2
EST. GFR  (NON AFRICAN AMERICAN): 14 ML/MIN/1.73 M^2
GLUCOSE SERPL-MCNC: 144 MG/DL
HCT VFR BLD AUTO: 27 %
HGB BLD-MCNC: 8.4 G/DL
INR PPP: 1.3
LYMPHOCYTES # BLD AUTO: 1.1 K/UL
LYMPHOCYTES NFR BLD: 18.9 %
MCH RBC QN AUTO: 27 PG
MCHC RBC AUTO-ENTMCNC: 31.1 %
MCV RBC AUTO: 87 FL
MONOCYTES # BLD AUTO: 0.7 K/UL
MONOCYTES NFR BLD: 11.6 %
NEUTROPHILS # BLD AUTO: 3.4 K/UL
NEUTROPHILS NFR BLD: 61.3 %
PLATELET # BLD AUTO: 111 K/UL
PMV BLD AUTO: 11.4 FL
POTASSIUM SERPL-SCNC: 5.3 MMOL/L
PROT SERPL-MCNC: 7.2 G/DL
PROTHROMBIN TIME: 13.4 SEC
RBC # BLD AUTO: 3.11 M/UL
SODIUM SERPL-SCNC: 142 MMOL/L
TROPONIN I SERPL DL<=0.01 NG/ML-MCNC: 0.03 NG/ML
WBC # BLD AUTO: 5.61 K/UL

## 2017-04-26 PROCEDURE — 63600175 PHARM REV CODE 636 W HCPCS: Mod: NTX | Performed by: EMERGENCY MEDICINE

## 2017-04-26 PROCEDURE — 93005 ELECTROCARDIOGRAM TRACING: CPT | Mod: NTX

## 2017-04-26 PROCEDURE — 99215 OFFICE O/P EST HI 40 MIN: CPT | Mod: S$GLB,,, | Performed by: NURSE PRACTITIONER

## 2017-04-26 PROCEDURE — 99999 PR PBB SHADOW E&M-EST. PATIENT-LVL V: CPT | Mod: PBBFAC,,, | Performed by: NURSE PRACTITIONER

## 2017-04-26 PROCEDURE — 12000002 HC ACUTE/MED SURGE SEMI-PRIVATE ROOM: Mod: NTX

## 2017-04-26 PROCEDURE — 3066F NEPHROPATHY DOC TX: CPT | Mod: S$GLB,,, | Performed by: NURSE PRACTITIONER

## 2017-04-26 PROCEDURE — 84484 ASSAY OF TROPONIN QUANT: CPT | Mod: 91,NTX

## 2017-04-26 PROCEDURE — 83036 HEMOGLOBIN GLYCOSYLATED A1C: CPT | Mod: NTX

## 2017-04-26 PROCEDURE — 99499 UNLISTED E&M SERVICE: CPT | Mod: S$GLB,,, | Performed by: NURSE PRACTITIONER

## 2017-04-26 PROCEDURE — 1160F RVW MEDS BY RX/DR IN RCRD: CPT | Mod: S$GLB,,, | Performed by: NURSE PRACTITIONER

## 2017-04-26 PROCEDURE — 36415 COLL VENOUS BLD VENIPUNCTURE: CPT | Mod: NTX

## 2017-04-26 PROCEDURE — 85025 COMPLETE CBC W/AUTO DIFF WBC: CPT | Mod: NTX

## 2017-04-26 PROCEDURE — 99285 EMERGENCY DEPT VISIT HI MDM: CPT | Mod: NTX,25

## 2017-04-26 PROCEDURE — 3044F HG A1C LEVEL LT 7.0%: CPT | Mod: S$GLB,,, | Performed by: NURSE PRACTITIONER

## 2017-04-26 PROCEDURE — 85610 PROTHROMBIN TIME: CPT | Mod: NTX

## 2017-04-26 PROCEDURE — 80053 COMPREHEN METABOLIC PANEL: CPT | Mod: NTX

## 2017-04-26 PROCEDURE — 1159F MED LIST DOCD IN RCRD: CPT | Mod: S$GLB,,, | Performed by: NURSE PRACTITIONER

## 2017-04-26 PROCEDURE — 83880 ASSAY OF NATRIURETIC PEPTIDE: CPT | Mod: NTX

## 2017-04-26 PROCEDURE — 25000003 PHARM REV CODE 250: Mod: NTX | Performed by: EMERGENCY MEDICINE

## 2017-04-26 PROCEDURE — 96374 THER/PROPH/DIAG INJ IV PUSH: CPT | Mod: NTX

## 2017-04-26 PROCEDURE — 84484 ASSAY OF TROPONIN QUANT: CPT | Mod: NTX

## 2017-04-26 RX ORDER — DEXTROSE MONOHYDRATE 25 G/50ML
12.5 INJECTION, SOLUTION INTRAVENOUS
Status: DISCONTINUED | OUTPATIENT
Start: 2017-04-26 | End: 2017-04-29 | Stop reason: HOSPADM

## 2017-04-26 RX ORDER — ALLOPURINOL 100 MG/1
100 TABLET ORAL DAILY
Status: DISCONTINUED | OUTPATIENT
Start: 2017-04-27 | End: 2017-04-29 | Stop reason: HOSPADM

## 2017-04-26 RX ORDER — DOXAZOSIN 1 MG/1
8 TABLET ORAL DAILY
Status: DISCONTINUED | OUTPATIENT
Start: 2017-04-27 | End: 2017-04-29 | Stop reason: HOSPADM

## 2017-04-26 RX ORDER — ENOXAPARIN SODIUM 100 MG/ML
30 INJECTION SUBCUTANEOUS EVERY 24 HOURS
Status: DISCONTINUED | OUTPATIENT
Start: 2017-04-26 | End: 2017-04-26

## 2017-04-26 RX ORDER — PANTOPRAZOLE SODIUM 40 MG/1
40 TABLET, DELAYED RELEASE ORAL DAILY
Status: DISCONTINUED | OUTPATIENT
Start: 2017-04-27 | End: 2017-04-29 | Stop reason: HOSPADM

## 2017-04-26 RX ORDER — ACETAMINOPHEN 500 MG
500 TABLET ORAL EVERY 6 HOURS PRN
Status: DISCONTINUED | OUTPATIENT
Start: 2017-04-26 | End: 2017-04-29 | Stop reason: HOSPADM

## 2017-04-26 RX ORDER — GLUCAGON 1 MG
1 KIT INJECTION
Status: DISCONTINUED | OUTPATIENT
Start: 2017-04-26 | End: 2017-04-29 | Stop reason: HOSPADM

## 2017-04-26 RX ORDER — IBUPROFEN 200 MG
16 TABLET ORAL
Status: DISCONTINUED | OUTPATIENT
Start: 2017-04-26 | End: 2017-04-29 | Stop reason: HOSPADM

## 2017-04-26 RX ORDER — HYDRALAZINE HYDROCHLORIDE 25 MG/1
100 TABLET, FILM COATED ORAL 3 TIMES DAILY
Status: DISCONTINUED | OUTPATIENT
Start: 2017-04-26 | End: 2017-04-29 | Stop reason: HOSPADM

## 2017-04-26 RX ORDER — FUROSEMIDE 10 MG/ML
20 INJECTION INTRAMUSCULAR; INTRAVENOUS 2 TIMES DAILY
Status: DISCONTINUED | OUTPATIENT
Start: 2017-04-26 | End: 2017-04-27

## 2017-04-26 RX ORDER — MONTELUKAST SODIUM 10 MG/1
10 TABLET ORAL NIGHTLY
Status: DISCONTINUED | OUTPATIENT
Start: 2017-04-26 | End: 2017-04-26

## 2017-04-26 RX ORDER — OXYCODONE HYDROCHLORIDE 5 MG/1
5 TABLET ORAL EVERY 4 HOURS PRN
Status: DISCONTINUED | OUTPATIENT
Start: 2017-04-26 | End: 2017-04-26

## 2017-04-26 RX ORDER — LABETALOL HYDROCHLORIDE 5 MG/ML
10 INJECTION, SOLUTION INTRAVENOUS EVERY 6 HOURS PRN
Status: DISCONTINUED | OUTPATIENT
Start: 2017-04-26 | End: 2017-04-27

## 2017-04-26 RX ORDER — DEXTROSE MONOHYDRATE 25 G/50ML
25 INJECTION, SOLUTION INTRAVENOUS
Status: DISCONTINUED | OUTPATIENT
Start: 2017-04-26 | End: 2017-04-29 | Stop reason: HOSPADM

## 2017-04-26 RX ORDER — RAMELTEON 8 MG/1
8 TABLET ORAL NIGHTLY PRN
Status: DISCONTINUED | OUTPATIENT
Start: 2017-04-26 | End: 2017-04-29 | Stop reason: HOSPADM

## 2017-04-26 RX ORDER — HEPARIN SODIUM 5000 [USP'U]/ML
5000 INJECTION, SOLUTION INTRAVENOUS; SUBCUTANEOUS EVERY 12 HOURS
Status: DISCONTINUED | OUTPATIENT
Start: 2017-04-27 | End: 2017-04-29 | Stop reason: HOSPADM

## 2017-04-26 RX ORDER — ONDANSETRON 2 MG/ML
8 INJECTION INTRAMUSCULAR; INTRAVENOUS EVERY 8 HOURS PRN
Status: DISCONTINUED | OUTPATIENT
Start: 2017-04-26 | End: 2017-04-29 | Stop reason: HOSPADM

## 2017-04-26 RX ORDER — TACROLIMUS 0.5 MG/1
0.5 CAPSULE ORAL EVERY MORNING
Status: DISCONTINUED | OUTPATIENT
Start: 2017-04-27 | End: 2017-04-29 | Stop reason: HOSPADM

## 2017-04-26 RX ORDER — INSULIN ASPART 100 [IU]/ML
3 INJECTION, SOLUTION INTRAVENOUS; SUBCUTANEOUS
Status: DISCONTINUED | OUTPATIENT
Start: 2017-04-27 | End: 2017-04-29 | Stop reason: HOSPADM

## 2017-04-26 RX ORDER — IBUPROFEN 200 MG
24 TABLET ORAL
Status: DISCONTINUED | OUTPATIENT
Start: 2017-04-26 | End: 2017-04-29 | Stop reason: HOSPADM

## 2017-04-26 RX ORDER — CARVEDILOL 6.25 MG/1
6.25 TABLET ORAL 2 TIMES DAILY
Status: DISCONTINUED | OUTPATIENT
Start: 2017-04-26 | End: 2017-04-29 | Stop reason: HOSPADM

## 2017-04-26 RX ORDER — CLONIDINE HYDROCHLORIDE 0.1 MG/1
0.1 TABLET ORAL 3 TIMES DAILY
Status: DISCONTINUED | OUTPATIENT
Start: 2017-04-26 | End: 2017-04-29 | Stop reason: HOSPADM

## 2017-04-26 RX ORDER — TORSEMIDE 10 MG/1
40 TABLET ORAL DAILY
Status: DISCONTINUED | OUTPATIENT
Start: 2017-04-27 | End: 2017-04-26

## 2017-04-26 RX ORDER — METOCLOPRAMIDE HYDROCHLORIDE 5 MG/ML
5 INJECTION INTRAMUSCULAR; INTRAVENOUS EVERY 6 HOURS PRN
Status: DISCONTINUED | OUTPATIENT
Start: 2017-04-26 | End: 2017-04-29 | Stop reason: HOSPADM

## 2017-04-26 RX ADMIN — ENOXAPARIN SODIUM 30 MG: 100 INJECTION SUBCUTANEOUS at 09:04

## 2017-04-26 RX ADMIN — CLONIDINE HYDROCHLORIDE 0.1 MG: 0.1 TABLET ORAL at 09:04

## 2017-04-26 RX ADMIN — MONTELUKAST SODIUM 10 MG: 10 TABLET, FILM COATED ORAL at 09:04

## 2017-04-26 RX ADMIN — HYDRALAZINE HYDROCHLORIDE 100 MG: 25 TABLET ORAL at 09:04

## 2017-04-26 RX ADMIN — FUROSEMIDE 20 MG: 10 INJECTION, SOLUTION INTRAMUSCULAR; INTRAVENOUS at 06:04

## 2017-04-26 RX ADMIN — CARVEDILOL 6.25 MG: 6.25 TABLET, FILM COATED ORAL at 09:04

## 2017-04-26 NOTE — ED TRIAGE NOTES
Pt seen by primary doctor today for increased leg swelling. Instructed to report to ED for IV lasix. Pt reports history of CHF, kidney failure and liver disease. Denies chest pain. Denies fever.

## 2017-04-26 NOTE — MR AVS SNAPSHOT
[facility]  [address]  [place] [address]  Phone: [phone]  Fax: [phone]                  Philip Mathis III   2017 3:00 PM   Office Visit    Description:  Male : 1945   Provider:  Donnie Owens NP   Department:  [facility]           Reason for Visit     Leg Swelling           Diagnoses this Visit        Comments    Chronic diastolic heart failure    -  Primary     Type 2 diabetes mellitus with stage 4 chronic kidney disease, unspecified long term insulin use status         Essential hypertension         Kidney transplant candidate         Anasarca         Other ascites                To Do List           Future Appointments        Provider Department Dept Phone    2017 7:00 AM LAB, [facility] [phone]    2017 8:30 AM EPO, INJECTION [facility] [phone]    5/10/2017 7:00 AM LAB, [place] [phone]    5/10/2017 7:15 AM LAB, [place] [facility] [phone]    2017 7:30 AM MILTON Salomon, FNP [facility] - Endocrinology [phone]      Goals (5 Years of Data)     None      Follow-Up and Disposition     Return if symptoms worsen or fail to improve.      [facility]     [facility] Care Line - 24/ Assistance  Unless otherwise directed by your provider, please contact [facility], our nurse care line that is available for 24/ assistance.     Registered nurses in the [facility] provide: appointment scheduling, clinical advisement, health education, and other advisory services.  Call: [phone] (toll free)               Medications           Message regarding Medications     Verify the changes and/or additions to your medication regime listed below are the same as discussed with your clinician today.  If any of these changes or additions are incorrect, please notify your healthcare provider.    "          Verify that the below list of medications is an accurate representation of the medications you are currently taking.  If none reported, the list may be blank. If incorrect, please contact your healthcare provider. Carry this list with you in case of emergency.           Current Medications     allopurinol (ZYLOPRIM) 100 MG tablet TAKE 1 TABLET EVERY DAY    blood sugar diagnostic (ACCU-CHEK JOANN PLUS TEST STRP) Strp 1 strip by Misc.(Non-Drug; Combo Route) route 4 (four) times daily.    blood-glucose meter (ACCU-CHEK JOANN PLUS METER) Misc Use as directed    carvedilol (COREG) 6.25 MG tablet Take 1 tablet (6.25 mg total) by mouth 2 (two) times daily.    cetirizine (ZYRTEC) 10 MG tablet Take 1 tablet (10 mg total) by mouth 2 (two) times daily. If needed may increase to 3 tablets twice daily to control itching    ciclopirox (PENLAC) 8 % Soln Apply to affected nails daily x 6-12 mos.  Remove weekly with rubbing alcohol    clonazePAM (KLONOPIN) 0.5 MG tablet Take 1 tablet (0.5 mg total) by mouth every evening.    cloNIDine (CATAPRES) 0.1 MG tablet Take 1 tablet (0.1 mg total) by mouth 3 (three) times daily.    doxazosin (CARDURA) 8 MG Tab Take 1 tablet (8 mg total) by mouth once daily.    ferrous gluconate (FERGON) 324 MG tablet pt taking tid    GENERLAC 10 gram/15 mL solution Use as directed 10 g in the mouth or throat once daily.    glucagon (human recombinant) inj 1mg/mL kit Inject 1 mL (1 mg total) into the muscle as needed.    hydrALAZINE (APRESOLINE) 100 MG tablet Take 1 tablet (100 mg total) by mouth 3 (three) times daily.    insulin aspart (NOVOLOG) 100 unit/mL InPn pen Inject 6 units with meals plus scale 180-230 +1, 231-280 +2, 281-330 +3, 331-380 +4, >380 +5. Max daily dose 33 units. 90 day supply    insulin glargine (LANTUS SOLOSTAR) 100 unit/mL (3 mL) InPn pen Inject 10 Units into the skin every evening.    insulin needles, disposable, (NOVOFINE 32) 32 x 1/4 " Ndle 1 each by Misc.(Non-Drug; Combo " "Route) route 3 (three) times daily.    lactulose (CHRONULAC) 20 gram/30 mL Soln Take 30 mLs (20 g total) by mouth 3 (three) times daily.    lancets (ACCU-CHEK SOFTCLIX LANCETS) Misc 1 lancet by Misc.(Non-Drug; Combo Route) route 4 (four) times daily.    LANTUS SOLOSTAR 100 unit/mL (3 mL) InPn pen INJECT 10 UNITS UNDER THE SKIN EVERY EVENING    montelukast (SINGULAIR) 10 mg tablet Take 1 tablet (10 mg total) by mouth every evening.    multivitamin (THERAGRAN) per tablet Take 1 tablet by mouth Daily.    nystatin (MYCOSTATIN) 500,000 unit Tab     ondansetron (ZOFRAN-ODT) 4 MG TbDL DISSOLVE 1 TABLET(4 MG) ON THE TONGUE EVERY 12 HOURS AS NEEDED    oxycodone (OXY-IR) 5 mg Cap Take 1 capsule (5 mg total) by mouth every 4 (four) hours as needed.    oxycodone-acetaminophen (PERCOCET) 5-325 mg per tablet Take 1 tablet by mouth every 4 (four) hours as needed for Pain.    pantoprazole (PROTONIX) 40 MG tablet TAKE 1 TABLET ONE TIME DAILY    rifaximin (XIFAXAN) 550 mg Tab Take 1 tablet (550 mg total) by mouth 2 (two) times daily.    senna (SENNA CONCENTRATE) 8.6 mg tablet Take 1 tablet by mouth once daily.    tacrolimus (PROGRAF) 0.5 MG Cap Take 1 capsule (0.5 mg total) by mouth once daily.    torsemide (DEMADEX) 20 MG Tab Take 2 tablets (40 mg total) by mouth once daily.    urea (CARMOL) 40 % Crea Apply topically once daily.           Clinical Reference Information           Your Vitals Were     BP Pulse Temp Height Weight SpO2    140/60 (BP Location: Right arm, Patient Position: Sitting, BP Method: Manual) 66 99.1 °F (37.3 °C) (Oral) 6' 2" (1.88 m) 97.7 kg (215 lb 6.2 oz) 92%    BMI                27.65 kg/m2          Blood Pressure          Most Recent Value    BP  (!)  140/60      Allergies as of 4/26/2017     Corticosteroids (Glucocorticoids)    Hydrocortisone    Neuromuscular Blockers, Steroidal    Ribavirin      Immunizations Administered on Date of Encounter - 4/26/2017     None      Maintenance Dialysis History     " Patient has no recorded history of maintenance dialysis.      Transplant Information        Txp Date Organ Coordinator Care Team     Liver Fabian Colvin RN Referring Physician:  Danay Nath MD          Txp Date Organ Coordinator Care Team     Kidney Fabian Colvin RN Referring Physician:  Luciano Hercules MD   Current Nephrologist:  Luciano Hercules MD          Txp Date Organ Coordinator Care Team    6/10/2001 Liver Gaby Palacios, CHRISSIE Surgeon:  BRITTANY Pizarro MD   Assisting Surgeon:  Cain Bryant MD   Current Hepatologist:  Danay Nath MD         Instructions    Go to ER for evaluation and IV lasix.        Language Assistance Services     ATTENTION: Language assistance services are available, free of charge. Please call [phone].      ATENCIÓN: Si habla español, tiene a adler disposición servicios gratuitos de asistencia lingüística. Llame al [phone].     CHÚ Ý: N?u b?n nói Ti?ng Vi?t, có các d?ch v? h? tr? ngôn ng? mi?n phí dành cho b?n. G?i s? [phone].         [facility] complies with applicable Federal civil rights laws and does not discriminate on the basis of race, color, national origin, age, disability, or sex.

## 2017-04-26 NOTE — IP AVS SNAPSHOT
[place] [address] [place] [address]  Phone: [phone]           Patient Discharge Instructions   Our goal is to set you up for success. This packet includes information on your condition, medications, and your home care.  It will help you care for yourself to prevent having to return to the hospital.     Please ask your nurse if you have any questions.      There are many details to remember when preparing to leave the hospital. Here is what you will need to do:    1. Take your medicine. If you are prescribed medications, review your Medication List on the following pages. You may have new medications to  at the pharmacy and others that you'll need to stop taking. Review the instructions for how and when to take your medications. Talk with your doctor or nurses if you are unsure of what to do.     2. Go to your follow-up appointments. Specific follow-up information is listed in the following pages. Your may be contacted by a nurse or clinical provider about future appointments. Be sure we have all of the phone numbers to reach you. Please contact your provider's office if you are unable to make an appointment.     3. Watch for warning signs. Your doctor or nurse will give you detailed warning signs to watch for and when to call for assistance. These instructions may also include educational information about your condition. If you experience any of warning signs to your health, call your doctor.           [facility]  Unless otherwise directed by your provider, please   contact [facility], our nurse care line   that is available for 24/7 assistance.     [phone] (toll-free)     Registered nurses in the [facility]   provide: appointment scheduling, clinical advisement, health education, and other advisory services.                  ** Verify the list of medication(s) below is accurate and up to date. Carry this with you in case of emergency.  If your medications have changed, please notify your healthcare provider.             Medication List      START taking these medications        Additional Info                      hydrOXYzine HCl 25 MG tablet   Commonly known as:  ATARAX   Quantity:  60 tablet   Refills:  0   Dose:  50 mg    Last time this was given:  25 mg on 4/28/2017  1:55 PM   Instructions:  Take 2 tablets (50 mg total) by mouth 3 (three) times daily as needed for Itching.     Begin Date    AM    Noon    PM    Bedtime         CHANGE how you take these medications        Additional Info                      clonazePAM 0.5 MG tablet   Commonly known as:  KLONOPIN   Quantity:  90 tablet   Refills:  1   Dose:  0.5 mg   What changed:    - when to take this  - reasons to take this    Instructions:  Take 1 tablet (0.5 mg total) by mouth every evening.     Begin Date    AM    Noon    PM    Bedtime         CONTINUE taking these medications        Additional Info                      allopurinol 100 MG tablet   Commonly known as:  ZYLOPRIM   Quantity:  90 tablet   Refills:  3    Last time this was given:  100 mg on 4/29/2017  8:57 AM   Instructions:  TAKE 1 TABLET EVERY DAY     Begin Date    AM    Noon    PM    Bedtime       blood sugar diagnostic Strp   Commonly known as:  ACCU-CHEK JOANN PLUS TEST STRP   Quantity:  360 each   Refills:  3   Dose:  1 strip    Instructions:  1 strip by Misc.(Non-Drug; Combo Route) route 4 (four) times daily.     Begin Date    AM    Noon    PM    Bedtime       blood-glucose meter Misc   Commonly known as:  ACCU-CHEK JOANN PLUS METER   Quantity:  1 each   Refills:  0    Instructions:  Use as directed     Begin Date    AM    Noon    PM    Bedtime       carvedilol 6.25 MG tablet   Commonly known as:  COREG   Quantity:  180 tablet   Refills:  3   Dose:  6.25 mg    Last time this was given:  6.25 mg on 4/29/2017  8:52 AM   Instructions:  Take 1 tablet (6.25 mg total) by mouth 2 (two) times daily.     Begin Date    AM    Noon     PM    Bedtime       cetirizine 10 MG tablet   Commonly known as:  ZYRTEC   Quantity:  60 tablet   Refills:  0   Dose:  10 mg    Instructions:  Take 1 tablet (10 mg total) by mouth 2 (two) times daily. If needed may increase to 3 tablets twice daily to control itching     Begin Date    AM    Noon    PM    Bedtime       ciclopirox 8 % Soln   Commonly known as:  PENLAC   Quantity:  1 Bottle   Refills:  5    Instructions:  Apply to affected nails daily x 6-12 mos.  Remove weekly with rubbing alcohol     Begin Date    AM    Noon    PM    Bedtime       cloNIDine 0.1 MG tablet   Commonly known as:  CATAPRES   Quantity:  270 tablet   Refills:  3   Dose:  0.1 mg   Comments:  **Patient requests 90 days supply**    Last time this was given:  0.1 mg on 4/29/2017  5:30 AM   Instructions:  Take 1 tablet (0.1 mg total) by mouth 3 (three) times daily.     Begin Date    AM    Noon    PM    Bedtime       doxazosin 8 MG Tab   Commonly known as:  CARDURA   Quantity:  90 tablet   Refills:  4   Dose:  8 mg    Last time this was given:  8 mg on 4/29/2017  8:53 AM   Instructions:  Take 1 tablet (8 mg total) by mouth once daily.     Begin Date    AM    Noon    PM    Bedtime       ferrous gluconate 324 MG tablet   Commonly known as:  FERGON   Quantity:  180 tablet   Refills:  0    Instructions:  pt taking tid     Begin Date    AM    Noon    PM    Bedtime       glucagon (human recombinant) 1 mg Kit   Commonly known as:  GLUCAGON EMERGENCY KIT (HUMAN)   Quantity:  1 kit   Refills:  2   Dose:  1 mg    Instructions:  Inject 1 mL (1 mg total) into the muscle as needed.     Begin Date    AM    Noon    PM    Bedtime       hydrALAZINE 100 MG tablet   Commonly known as:  APRESOLINE   Quantity:  270 tablet   Refills:  3   Dose:  100 mg   Comments:  **Patient requests 90 days supply**    Last time this was given:  100 mg on 4/29/2017  5:30 AM   Instructions:  Take 1 tablet (100 mg total) by mouth 3 (three) times daily.     Begin Date    AM    Noon     PM    Bedtime       insulin aspart 100 unit/mL Inpn pen   Commonly known as:  NovoLOG   Quantity:  3 Box   Refills:  3    Last time this was given:  3 Units on 4/28/2017  5:45 PM   Instructions:  Inject 6 units with meals plus scale 180-230 +1, 231-280 +2, 281-330 +3, 331-380 +4, >380 +5. Max daily dose 33 units. 90 day supply     Begin Date    AM    Noon    PM    Bedtime       * insulin glargine 100 unit/mL (3 mL) Inpn pen   Commonly known as:  LANTUS SOLOSTAR   Quantity:  2 Box   Refills:  6   Dose:  10 Units    Instructions:  Inject 10 Units into the skin every evening.     Begin Date    AM    Noon    PM    Bedtime       * LANTUS SOLOSTAR 100 unit/mL (3 mL) Inpn pen   Quantity:  30 mL   Refills:  3   Generic drug:  insulin glargine    Instructions:  INJECT 10 UNITS UNDER THE SKIN EVERY EVENING     Begin Date    AM    Noon    PM    Bedtime       * lactulose 20 gram/30 mL Soln   Commonly known as:  CHRONULAC   Quantity:  2700 mL   Refills:  5   Dose:  20 g    Instructions:  Take 30 mLs (20 g total) by mouth 3 (three) times daily.     Begin Date    AM    Noon    PM    Bedtime       * GENERLAC 10 gram/15 mL solution   Refills:  5   Dose:  10 g   Generic drug:  lactulose    Instructions:  Use as directed 10 g in the mouth or throat once daily.     Begin Date    AM    Noon    PM    Bedtime       lancets Misc   Commonly known as:  ACCU-CHEK SOFTCLIX LANCETS   Quantity:  360 each   Refills:  3   Dose:  1 lancet    Instructions:  1 lancet by Misc.(Non-Drug; Combo Route) route 4 (four) times daily.     Begin Date    AM    Noon    PM    Bedtime       montelukast 10 mg tablet   Commonly known as:  SINGULAIR   Quantity:  30 tablet   Refills:  6   Dose:  10 mg    Last time this was given:  10 mg on 4/26/2017  9:24 PM   Instructions:  Take 1 tablet (10 mg total) by mouth every evening.     Begin Date    AM    Noon    PM    Bedtime       multivitamin per tablet   Commonly known as:  THERAGRAN   Refills:  0   Dose:  1 tablet  "   Instructions:  Take 1 tablet by mouth Daily.     Begin Date    AM    Noon    PM    Bedtime       nystatin 500,000 unit Tab   Commonly known as:  MYCOSTATIN   Refills:  1      Begin Date    AM    Noon    PM    Bedtime       ondansetron 4 MG Tbdl   Commonly known as:  ZOFRAN-ODT   Quantity:  30 tablet   Refills:  1    Instructions:  DISSOLVE 1 TABLET(4 MG) ON THE TONGUE EVERY 12 HOURS AS NEEDED     Begin Date    AM    Noon    PM    Bedtime       oxycodone 5 mg Cap   Commonly known as:  OXY-IR   Quantity:  35 capsule   Refills:  0   Dose:  5 mg    Instructions:  Take 1 capsule (5 mg total) by mouth every 4 (four) hours as needed.     Begin Date    AM    Noon    PM    Bedtime       oxycodone-acetaminophen 5-325 mg per tablet   Commonly known as:  PERCOCET   Quantity:  35 tablet   Refills:  0   Dose:  1 tablet    Instructions:  Take 1 tablet by mouth every 4 (four) hours as needed for Pain.     Begin Date    AM    Noon    PM    Bedtime       pantoprazole 40 MG tablet   Commonly known as:  PROTONIX   Quantity:  90 tablet   Refills:  3    Last time this was given:  40 mg on 4/29/2017  8:52 AM   Instructions:  TAKE 1 TABLET ONE TIME DAILY     Begin Date    AM    Noon    PM    Bedtime       pen needle, diabetic 32 gauge x 1/4" Ndle   Commonly known as:  NOVOFINE 32   Quantity:  100 each   Refills:  5   Dose:  1 each    Instructions:  1 each by Misc.(Non-Drug; Combo Route) route 3 (three) times daily.     Begin Date    AM    Noon    PM    Bedtime       rifAXIMin 550 mg Tab   Commonly known as:  XIFAXAN   Quantity:  60 tablet   Refills:  11   Dose:  550 mg    Last time this was given:  550 mg on 4/29/2017  8:53 AM   Instructions:  Take 1 tablet (550 mg total) by mouth 2 (two) times daily.     Begin Date    AM    Noon    PM    Bedtime       senna 8.6 mg tablet   Commonly known as:  SENNA CONCENTRATE   Refills:  0   Dose:  1 tablet    Instructions:  Take 1 tablet by mouth once daily.     Begin Date    AM    Noon    PM    " Bedtime       tacrolimus 0.5 MG Cap   Commonly known as:  PROGRAF   Quantity:  30 capsule   Refills:  11   Dose:  0.5 mg    Last time this was given:  0.5 mg on 4/29/2017  8:53 AM   Instructions:  Take 1 capsule (0.5 mg total) by mouth once daily.     Begin Date    AM    Noon    PM    Bedtime       torsemide 20 MG Tab   Commonly known as:  DEMADEX   Quantity:  180 tablet   Refills:  3   Dose:  40 mg   Comments:  **Patient requests 90 days supply**    Instructions:  Take 2 tablets (40 mg total) by mouth once daily.     Begin Date    AM    Noon    PM    Bedtime       urea 40 % Crea   Commonly known as:  CARMOL   Quantity:  85 g   Refills:  5    Instructions:  Apply topically once daily.     Begin Date    AM    Noon    PM    Bedtime       * Notice:  This list has 4 medication(s) that are the same as other medications prescribed for you. Read the directions carefully, and ask your doctor or other care provider to review them with you.         Where to Get Your Medications      These medications were sent to [facility] [address] [place] [address] AT [address] [place] [address]    Hours:  24-hours Phone:  [phone]     hydrOXYzine HCl 25 MG tablet                  Please bring to all follow up appointments:    1. A copy of your discharge instructions.  2. All medicines you are currently taking in their original bottles.  3. Identification and insurance card.    Please arrive 15 minutes ahead of scheduled appointment time.    Please call 24 hours in advance if you must reschedule your appointment and/or time.        Your Scheduled Appointments     May 01, 2017  7:00 AM CDT   Non-Fasting Lab with LAB, [place]   [facility]    [address] [facility]  [place] [address] [phone]            May 02, 2017  8:30 AM CDT   Injection with EPO, INJECTION   [facility] ([facility])    [address]  [facility] [address]   [phone]            May 05, 2017 10:40 AM CDT   Established Patient Visit with Donnie Owens NP   [facility] ([facility])    [address] [place] [address] [phone]            May 10, 2017  7:00 AM CDT   Non-Fasting Lab with LAB, [facility] ([facility])    [place] [address] [phone]            May 10, 2017  7:15 AM CDT   Fasting Lab with LAB, [facility] ([facility])    [place] [address] [phone]              Follow-up Information     Follow up with Donnie Owens NP On 5/5/2017.    Specialty:  Internal Medicine    Why:  out patient services:10:40 a.m. follow up from the hospital    Contact information:    [place] [address]  [phone]        Referrals     Future Orders    Ambulatory referral to Outpatient Case Management     Questions:    Does the patient have a chronic or uncontrolled disease process?:  Yes    Does the patient have a new diagnosis of a catastrophic or life altering illness/treatment?:  No    Does the patient have any psycho-social issues that may affect their ability to adhere to treatment plan?:  No    Does patient have any behaviors or circumstances that may impede ability to adhere to treatment plan?:  Yes    Is patient at risk for admission/readmission?:  Yes        Discharge Instructions     Future Orders    Activity as tolerated     Diet general     Comments:    1800 and Cardiac diet.    Questions:    Total calories:      Fat restriction, if any:      Protein restriction, if any:      Na restriction, if any:  2gNa    Fluid restriction:      Additional restrictions:          Primary Diagnosis     Your primary diagnosis was:  Heart Failure      Admission Information     Date & Time Provider Department CSN    4/26/2017  4:51 PM Keyla Denis MD [facility] [id number]      Care Providers     Provider Role Specialty Primary office phone    Keyla Denis MD Attending Provider Hospitalist [phone]      Important Medicare Message          Most Recent Value    Important Message from Medicare Regarding Discharge Appeal Rights  Given to patient/caregiver, Explained to patient/caregiver, Signed/date by patient/caregiver yes 04/29/2017 0907      Your Vitals Were     BP Pulse Temp Resp Height Weight    168/72 (BP Location: Right arm, Patient Position: Lying, BP Method: Automatic) 65 98.6 °F (37 °C) (Oral) 18 6' 2" (1.88 m) 91.6 kg (201 lb 15.1 oz)    SpO2 BMI             100% 25.93 kg/m2         Recent Lab Values        4/2/2015 8/26/2015 10/16/2015 12/9/2015 2/11/2016 6/22/2016 1/10/2017 4/26/2017      3:43 AM  8:06 AM 10:00 AM  9:28 AM  7:55 AM  7:30 AM  7:20 AM 11:36 PM    A1C 5.8 6.4 (H) 5.7 5.7 5.9 5.5 5.5 5.6          5.5      Comment for A1C at  7:20 AM on 1/10/2017:  According to ADA guidelines, hemoglobin A1C <7.0% represents  optimal control in non-pregnant diabetic patients.  Different  metrics may apply to specific populations.   Standards of Medical Care in Diabetes - 2016.  For the purpose of screening for the presence of diabetes:  <5.7%     Consistent with the absence of diabetes  5.7-6.4%  Consistent with increasing risk for diabetes   (prediabetes)  >or=6.5%  Consistent with diabetes  Currently no consensus exists for use of hemoglobin A1C  for diagnosis of diabetes for children.      Comment for A1C at 11:36 PM on 4/26/2017:  According to ADA guidelines, hemoglobin A1C <7.0% represents  optimal control in non-pregnant diabetic patients.  Different  metrics may apply to specific populations.   Standards of Medical Care in Diabetes - 2016.  For the purpose of screening for the presence of diabetes:  <5.7%     Consistent with the absence of diabetes  5.7-6.4%  Consistent with increasing risk for diabetes   (prediabetes)  >or=6.5%  Consistent with " diabetes  Currently no consensus exists for use of hemoglobin A1C  for diagnosis of diabetes for children.        Allergies as of 4/29/2017        Reactions    Corticosteroids (Glucocorticoids) Other (See Comments)    Leg swelling    Hydrocortisone     Leg swelling    Neuromuscular Blockers, Steroidal     Leg swelling    Ribavirin     Intolerance, arthralgias      Advance Directives     An advance directive is a document which, in the event you are no longer able to make decisions for yourself, tells your healthcare team what kind of treatment you do or do not want to receive, or who you would like to make those decisions for you.  If you do not currently have an advance directive, [facility] encourages you to create one.  For more information call:  [phone] ([phone]), [phone] ([phone]),  or log on to [email or web address].        Smoking Cessation     If you would like to quit smoking:   You may be eligible for free services if you are a [place] resident and started smoking cigarettes before September 1, 1988.  Call the Smoking Cessation Trust ([facility]) toll free at [phone] or [phone].   Call -877-QUIT-NOW if you do not meet the above criteria.   Contact us via email: [email or web address]   View our website for more information: [email or web address]        Language Assistance Services     ATTENTION: Language assistance services are available, free of charge. Please call [phone].      ATENCIÓN: Si habla español, tiene a adler disposición servicios gratuitos de asistencia lingüística. Llame al [phone].     CHÚ Ý: N?u b?n nói Ti?ng Vi?t, có các d?ch v? h? tr? ngôn ng? mi?n phí dành cho b?n. G?i s? [phone].        Heart Failure Education       Heart Failure: Being Active  You have a condition called heart failure. Being active doesnt mean that you have to wear yourself out. Even a little movement each day helps to strengthen your heart. If you cant get  out to exercise, you can do simple stretching and strengthening exercises at home. These are good ways to keep you well-conditioned and prevent you and your heart from becoming excessively weak.    Ideas to get you started  · Add a little movement to things you do now. Walk to mail letters. Park your car at the far end of the parking lot and walk to the store. Walk up a flight of stairs instead of taking the elevator.  · Choose activities you enjoy. You might walk, swim, or ride an exercise bike. Things like gardening and washing the car count, too. Other possibilities include: washing dishes, walking the dog, walking around the mall, and doing aerobic activities with friends.  · Join a group exercise program at a [facility] or [facility], a senior center, or a community center. Or look into a hospital cardiac rehabilitation program. Ask your doctor if you qualify.  Tips to keep you going  · Get up and get dressed each day. Go to a coffee shop and read a newspaper or go somewhere that you'll be in the presence of other active people. Youll feel more like being active.  · Make a plan. Choose one or more activities that you enjoy and that you can easily do. Then plan to do at least one each day. You might write your plan on a calendar.  · Go with a friend or a group if you like company. This can help you feel supported and stay motivated, too.  · Plan social events that you enjoy. This will keep you mentally engaged as well as physically motivated to do things you find pleasure in.  For your safety  · Talk with your healthcare provider before starting an exercise program.  · Exercise indoors when its too hot or too cold outside, or when the air quality is poor. Try walking at a shopping mall.  · Wear socks and sturdy shoes to maintain your balance and prevent falls.  · Start slowly. Do a few minutes several times a day at first. Increase your time and speed little by little.  · Stop and rest whenever you feel tired or get short  of breath.  · Dont push yourself on days when you dont feel well.  Date Last Reviewed: 3/20/2016  © 0523-4099 [employer]. [address] [place] [address], [place] [address]. All rights reserved. This information is not intended as a substitute for professional medical care. Always follow your healthcare professional's instructions.              Heart Failure: Evaluating Your Heart  You have a condition called heart failure. To evaluate your condition, your doctor will examine you, ask questions, and do some tests. Along with looking for signs of heart failure, the doctor looks for any other health problems that may have led to heart failure. The results of your evaluation will help your doctor form a treatment plan.  Health history and physical exam  Your visit will start with a health history. Tell the doctor about any symptoms youve noticed and about all medicines you take. Then youll have a physical exam. This includes listening to your heartbeat and breathing. Youll also be checked for swelling (edema) in your legs and neck. When you have fluid buildup or fluid in the lungs, it may be called congestive heart failure.  Diagnosing heart failure     During an echocardiogram, sound waves bounce off the heart. These are converted into a picture on the screen.   The following may be done to help your doctor form a diagnosis:  · X-rays show the size and shape of your heart. These pictures can also show fluid in your lungs.  · An electrocardiogram (ECG or EKG) shows the pattern of your heartbeat. Small pads (electrodes) are placed on your chest, arms, and legs. Wires connect the pads to the ECG machine, which records your hearts electrical signals. This can give the doctor information about heart function.  · An echocardiogram uses ultrasound waves to show the structure and movement of your heart muscle. This shows how well the heart pumps. It also shows the thickness of the heart walls, and if the heart is  enlarged. It is one of the most useful, non-invasive tests as it provides information about the heart's general function. This helps your doctor make treatment decisions.  · Lab tests evaluate small amounts of blood or urine for signs of problems. A BNP lab test can help diagnose and evaluate heart failure. BNP stands for B-type natriuretic peptide. The ventricles secrete more BNP when heart failure worsens. Lab tests can also provide information about metabolic dysfunction or heart dysfunction.  Your treatment plan  Based on the results of your evaluation and tests, your doctor will develop a treatment plan. This plan is designed to relieve some of your heart failure symptoms and help make you more comfortable. Your treatment plan may include:  · Medicine to help your heart work better and improve your quality of life  · Changes in what you eat and drink to help prevent fluid from backing up in your body  · Daily monitoring of your weight and heart failure symptoms to see how well your treatment plan is working  · Exercise to help you stay healthy  · Help with quitting smoking  · Emotional and psychological support to help adjust to the changes  · Referrals to other specialists to make sure you are being treated comprehensively  Date Last Reviewed: 3/21/2016  © 6598-7130 [employer]. [address] [place] [address]. All rights reserved. This information is not intended as a substitute for professional medical care. Always follow your healthcare professional's instructions.              Heart Failure: Making Changes to Your Diet  You have a condition called heart failure. When you have heart failure, excess fluid is more likely to build up in your body because your heart isn't working well. This makes the heart work harder to pump blood. Fluid buildup causes symptoms such as shortness of breath and swelling (edema). This is often referred to as congestive heart failure or CHF. Controlling the  amount of salt (sodium) you eat may help stop fluid from building up. Your doctor may also tell you to reduce the amount of fluid you drink.  Reading food labels    Your healthcare provider will tell you how much sodium you can eat each day. Read food labels to keep track. Keep in mind that certain foods are high in salt. These include canned, frozen, and processed foods. Check the amount of sodium in each serving. Watch out for high-sodium ingredients. These include MSG (monosodium glutamate), baking soda, and sodium phosphate.   Eating less salt  Give yourself time to get used to eating less salt. It may take a little while. Here are some tips to help:  · Take the saltshaker off the table. Replace it with salt-free herb mixes and spices.  · Eat fresh or plain frozen vegetables. These have much less salt than canned vegetables.  · Choose low-sodium snacks like sodium-free pretzels, crackers, or air-popped popcorn.  · Dont add salt to your food when youre cooking. Instead, season your foods with pepper, lemon, garlic, or onion.  · When you eat out, ask that your food be cooked without added salt.  · Avoid eating fried foods as these often have a great deal of salt.  If youre told to limit fluids  You may need to limit how much fluid you have to help prevent swelling. This includes anything that is liquid at room temperature, such as ice cream and soup. If your doctor tells you to limit fluid, try these tips:  · Measure drinks in a measuring cup before you drink them. This will help you meet daily goals.  · Chill drinks to make them more refreshing.  · Suck on frozen lemon wedges to quench thirst.  · Only drink when youre thirsty.  · Chew sugarless gum or suck on hard candy to keep your mouth moist.  · Weigh yourself daily to know if your body's fluid content is rising.  My sodium goal  Your healthcare provider may give you a sodium goal to meet each day. This includes sodium found in food as well as salt that  you add. My goal is to eat no more than ___________ mg of sodium per day.     When to call your doctor  Call your doctor right away if you have any symptoms of worsening heart failure. These can include:  · Sudden weight gain  · Increased swelling of your legs or ankles  · Trouble breathing when youre resting or at night  · Increase in the number of pillows you have to sleep on  · Chest pain, pressure, discomfort, or pain in the jaw, neck, or back   Date Last Reviewed: 3/21/2016  © 4153-0581 [employer]. [address] [place] [id number]. All rights reserved. This information is not intended as a substitute for professional medical care. Always follow your healthcare professional's instructions.              Heart Failure: Medicines to Help Your Heart    You have a condition called heart failure (also known as congestive heart failure, or CHF). Your doctor will likely prescribe medicines for heart failure and any underlying health problems you have. Most heart failure patients take one or more types of medicinen. Your healthcare provider will work to find the combination of medicines that works best for you.  Heart failure medicines  Here are the most common heart failure medicines:  · ACE inhibitors lower blood pressure and decrease strain on the heart. This makes it easier for the heart to pump. Angiotensin receptor blockers have similar effects. These are prescribed for some patients instead of ACE inhibitors.  · Beta-blockers relieve stress on the heart. They also improve symptoms. They may also improve the heart's pumping action over time.  · Diuretics (also called water pills) help rid your body of excess water. This can help rid your body of swelling (edema). Having less fluid to pump means your heart doesnt have to work as hard. Some diuretics make your body lose a mineral called potassium. Your doctor will tell you if you need to take supplements or eat more foods high in  potassium.  · Digoxin helps your heart pump with more strength. This helps your heart pump more blood with each beat. So, more oxygen-rich blood travels to the rest of the body.  · Aldosterone antagonists help alter hormones and decrease strain on the heart.  · Hydralazine and nitrates are two separate medicines used together to treat heart failure. They may come in one combination pill. They lower blood pressure and decrease how hard the heart has to pump.  Medicines for related conditions  Controlling other heart problems helps keep heart failure under control, too. Depending on other heart problems you have, medicines may be prescribed to:  · Lower blood pressure (antihypertensives).  · Lower cholesterol levels (statins).  · Prevent blood clots (anticoagulants or aspirin).  · Keep the heartbeat steady (antiarrhythmics).  Date Last Reviewed: 3/5/2016  © 0756-4499 [employer]. [address] [place] [address]. All rights reserved. This information is not intended as a substitute for professional medical care. Always follow your healthcare professional's instructions.              Heart Failure: Procedures That May Help    The heart is a muscle that pumps oxygen-rich blood to all parts of the body. When you have heart failure, the heart is not able to pump as well as it should. Blood and fluid may back up into the lungs (congestive heart failure), and some parts of the body dont get enough oxygen-rich blood to work normally. These problems lead to the symptoms of heart failure.     Certain procedures may help the heart pump better in some cases of heart failure. Some procedures are done to treat health problems that may have caused the heart failure such as coronary artery disease or heart rhythm problems. For more serious heart failure, other options are available.  Treating artery and valve problems  If you have coronary artery disease or valve disease, procedures may be done to improve  blood flow. This helps the heart pump better, which can improve heart failure symptoms. First, your doctor may do a cardiac catheterization to help detect clogged blood vessels or valve damage. During this procedure, a  thin tube (catheter) in inserted into a blood vessel and guided to the heart. There a dye is injected and a special type of X-ray (angiogram) is taken of the blood vessels. Procedures to open a blocked artery or fix damaged valves can also be done using catheterization.  · Angioplasty uses a balloon-tipped instrument at the end of the catheter. The balloon is inflated to widen the narrowed artery. In many cases, a stent is expanded to further support the narrowed artery. A stent is a metal mesh tube.  · Valve surgery repairs or replacement of faulty valves can also be done during catheterization so blood can flow properly through the chambers of the heart.  Bypass surgery is another option to help treat blocked arteries. It uses a healthy blood vessel from elsewhere in the body. The healthy blood vessel is attached above and below the blocked area so that blood can flow around the blocked artery.  Treating heart rhythm problems  A device may be placed in the chest to help a weak heart maintain a healthy, heartbeat so the heart can pump more effectively:  · Pacemaker. A pacemaker is an implanted device that regulates your heartbeat electronically. It monitors your heart's rhythm and generates a painless electric impulse that helps the heart beat in a regular rhythm. A pacemaker is programmed to meet your specific heart rhythm needs.  · Biventricular pacing/cardiac resynchronization therapy. A type of pacemaker that paces both pumping chambers of the heart at the same time to coordinate contractions and to improve the heart's function. Some people with heart failure are candidates for this therapy.  · Implantable cardioverter defibrillator. A device similar to a pacemaker that senses when the heart is  beating too fast and delivers an electrical shock to convert the fast rhythm to a normal rhythm. This can be a life saving device.  In severe cases  In more serious cases of heart failure when other treatments no longer work, other options may include:  · Ventricular assist devices (VADs). These are mechanical devices used to take over the pumping function for one or both of the heart's ventricles, or pumping chambers. A VAD may be necessary when heart failure progresses to the point that medicines and other treatments no longer help. In some cases, a VAD may be used as a bridge to transplant.  · Heart transplant. This is replacing the diseased heart with a healthy one from a donor. This is an option for a few people who are very sick. A heart transplant is very serious and not an option for all patients. Your doctor can tell you more.  Date Last Reviewed: 3/20/2016  © 3238-3285 [employer]. [address] [place] [address]. All rights reserved. This information is not intended as a substitute for professional medical care. Always follow your healthcare professional's instructions.              Heart Failure: Tracking Your Weight  You have a condition called heart failure. When you have heart failure, a sudden weight gain or a steady rise in weight is a warning sign that your body is retaining too much water and salt. This could mean your heart failure is getting worse. If left untreated, it can cause problems for your lungs and result in shortness of breath. Weighing yourself each day is the best way to know if youre retaining water. If your weight goes up quickly, call your doctor. You will be given instructions on how to get rid of the excess water. You will likely need medicines and to avoid salt. This will help your heart work better.  Call your doctor if you gain more than 2 pounds in 1 day, more than 5 pounds in 1 week, or whatever weight gain you were told to report by your doctor.  This is often a sign of worsening heart failure and needs to be evaluated and treated. Your doctor will tell you what to do next.   Tips for weighing yourself    · Weigh yourself at the same time each morning, wearing the same clothes. Weigh yourself after urinating and before eating.  · Use the same scale each day. Make sure the numbers are easy to read. Put the scale on a flat, hard surface -- not on a rug or carpet.  · Do not stop weighing yourself. If you forget one day, weigh again the next morning.  How to use your weight chart  · Keep your weight chart near the scale. Write your weight on the chart as soon as you get off the scale.  · Fill in the month and the start date on the chart. Then write down your weight each day. Your chart will look like this:    · If you miss a day, leave the space blank. Weigh yourself the next day and write your weight in the next space.  · Take your weight chart with you when you go to see your doctor.  Date Last Reviewed: 3/20/2016  © 1115-2144 [employer]. [address] [place] [address]. All rights reserved. This information is not intended as a substitute for professional medical care. Always follow your healthcare professional's instructions.              Heart Failure: Warning Signs of a Flare-Up  You have a condition called heart failure. Once you have heart failure, flare-ups can happen. Below are signs that can mean your heart failure is getting worse. If you notice any of these warning signs, call your healthcare provider.  Swelling    · Your feet, ankles, or lower legs get puffier.  · You notice skin changes on your lower legs.  · Your shoes feel too tight.  · Your clothes are tighter in the waist.  · You have trouble getting rings on or off your fingers.  Shortness of breath  · You have to breathe harder even when youre doing your normal activities or when youre resting.  · You are short of breath walking up stairs or even short  distances.  · You wake up at night short of breath or coughing.  · You need to use more pillows or sit up to sleep.  · You wake up tired or restless.  Other warning signs  · You feel weaker, dizzy, or more tired.  · You have chest pain or changes in your heartbeat.  · You have a cough that wont go away.  · You cant remember things or dont feel like eating.  Tracking your weight  Gaining weight is often the first warning sign that heart failure is getting worse. Gaining even a few pounds can be a sign that your body is retaining excess water and salt. Weighing yourself each day in the morning after you urinate and before you eat, is the best way to know if you're retaining water. Get a scale that is easy to read and make sure you wear the same clothes and use the same scale every time you weigh. Your healthcare provider will show you how to track your weight. Call your doctor if you gain more than 2 pounds in 1 day, 5 pounds in 1 week, or whatever weight gain you were told to report by your doctor. This is often a sign of worsening heart failure and needs to be evaluated and treated before it compromises your breathing. Your doctor will tell you what to do next.    Date Last Reviewed: 3/15/2016  © 2991-4449 [employer]. [address] [place] [address], [place] [address]. All rights reserved. This information is not intended as a substitute for professional medical care. Always follow your healthcare professional's instructions.              Chronic Kindey Disease Education             Diabetes Discharge Instructions                                    [facility] complies with applicable Federal civil rights laws and does not discriminate on the basis of race, color, national origin, age, disability, or sex.

## 2017-04-26 NOTE — ED PROVIDER NOTES
"Encounter Date: 4/26/2017    SCRIBE #1 NOTE: I, Anthony Pascal, am scribing for, and in the presence of, RAHEEM Clifton MD. Other sections scribed: HPI, ROS.       History     Chief Complaint   Patient presents with    Leg Swelling     "He has all this swelling in his legs and he can hardly walk; his stomach is really swollen too"     Review of patient's allergies indicates:   Allergen Reactions    Corticosteroids (glucocorticoids) Other (See Comments)     Leg swelling    Hydrocortisone      Leg swelling    Neuromuscular blockers, steroidal      Leg swelling    Ribavirin      Intolerance, arthralgias     HPI Comments: CC: Leg Swelling  HPI: This 71 y.o. male with DM II, HTN, HLD, cirrhosis, Hep C, CKD and Hx of liver transplant, femur surgery, tobacco use, alcohol abuse presents to the ED from clinic for evaluation of worsening of his chronic leg swelling and increased GRUBER over the past few days. Pt states that he always urinates frequently, but denies any recent abnormal appearance to his urine. Pt reports he had US of legs done Monday, and no clot was discovered. Wife notes pt has been drink a lot of Coca[employer]Cola recently. Pt denies fever, cough, chest pain, abdominal distention.       The history is provided by the patient and the spouse.     Past Medical History:   Diagnosis Date    Anemia     Back pain     Bishop's esophagus     BPH (benign prostatic hypertrophy)     Chronic kidney disease     Cirrhosis     Diabetes mellitus     Diabetes mellitus, type 2     Elevated PSA     Heart failure     Hepatitis Hepatitis C    Hepatitis C     Hyperlipidemia     Hypertension     Hypertension     Liver transplanted     Peripheral neuropathy     Sleep apnea     Transfusion reaction     Hep C from prev. blood transfusion    Transplanted liver     Trouble in sleeping     Type II or unspecified type diabetes mellitus with renal manifestations, uncontrolled     Type II or unspecified type diabetes " mellitus with renal manifestations, uncontrolled      Past Surgical History:   Procedure Laterality Date    broken nose      EYE SURGERY      cataracts    FEMUR SURGERY      FRACTURE SURGERY      right femur fracture     LIVER TRANSPLANT  2001    SKIN GRAFT      graft from right thigh , applied to the left back and left arm.     Family History   Problem Relation Age of Onset    Cancer Mother      had cancer 40 years ago     Coronary artery disease Father     Hypertension Father     Diabetes Father     Heart disease Father     Colon cancer Neg Hx      Social History   Substance Use Topics    Smoking status: Former Smoker     Quit date: 7/30/1998    Smokeless tobacco: Never Used      Comment: pt reports quitting in 1998    Alcohol use No      Comment: pt reports hx of alcholism and reports quit in 1998     Review of Systems   Constitutional: Positive for activity change and unexpected weight change. Negative for chills and fever.   HENT: Negative for sore throat.    Eyes: Negative for pain and visual disturbance.   Respiratory: Positive for shortness of breath (GRUBER). Negative for cough.    Cardiovascular: Positive for leg swelling (bilateral). Negative for chest pain.   Gastrointestinal: Negative for abdominal distention, abdominal pain, nausea and vomiting.   Genitourinary: Positive for frequency. Negative for dysuria and flank pain.   Musculoskeletal: Negative for myalgias.   Skin: Negative for rash and wound.   Allergic/Immunologic: Positive for immunocompromised state (s/p liver transplant).   Neurological: Negative for syncope and headaches.       Physical Exam   Initial Vitals   BP Pulse Resp Temp SpO2   04/26/17 1630 04/26/17 1630 04/26/17 1630 04/26/17 1630 04/26/17 1630   193/84 69 20 98.2 °F (36.8 °C) 90 %     Physical Exam    Vitals reviewed.  Constitutional: He appears well-developed and well-nourished.  Non-toxic appearance. He does not have a sickly appearance. He does not appear ill.    HENT:   Head: Normocephalic and atraumatic.   Eyes: EOM are normal. Pupils are equal, round, and reactive to light.   Neck: Normal range of motion. Neck supple.   Cardiovascular: Normal rate, regular rhythm, normal heart sounds and intact distal pulses.   Pulmonary/Chest: No tachypnea. No respiratory distress. He has wheezes. He has no rhonchi. He has no rales.   Abdominal: Soft. Bowel sounds are normal.   Musculoskeletal: Normal range of motion.   Neurological: He is alert and oriented to person, place, and time.   Skin: Skin is warm and dry.   Psychiatric: He has a normal mood and affect.         ED Course   Procedures  Labs Reviewed   CBC W/ AUTO DIFFERENTIAL - Abnormal; Notable for the following:        Result Value    RBC 3.11 (*)     Hemoglobin 8.4 (*)     Hematocrit 27.0 (*)     MCHC 31.1 (*)     RDW 17.4 (*)     Platelets 111 (*)     All other components within normal limits   COMPREHENSIVE METABOLIC PANEL - Abnormal; Notable for the following:     Potassium 5.3 (*)     CO2 22 (*)     Glucose 144 (*)     BUN, Bld 88 (*)     Creatinine 4.0 (*)     Calcium 8.1 (*)     Alkaline Phosphatase 52 (*)     eGFR if  16 (*)     eGFR if non  14 (*)     All other components within normal limits   TROPONIN I - Abnormal; Notable for the following:     Troponin I 0.032 (*)     All other components within normal limits   B-TYPE NATRIURETIC PEPTIDE - Abnormal; Notable for the following:     BNP 1982 (*)     All other components within normal limits   PROTIME-INR - Abnormal; Notable for the following:     Prothrombin Time 13.4 (*)     INR 1.3 (*)     All other components within normal limits     EKG Readings: (Independently Interpreted)   Initial Reading: No STEMI. Rhythm: Normal Sinus Rhythm. Ectopy: No Ectopy. Conduction: Normal. ST Segments: Normal ST Segments. T Waves: Normal. Clinical Impression: Normal Sinus Rhythm       X-Rays:   Independently Interpreted Readings:   Chest X-Ray:  Increased vascular markings consistent with CHF are present. Right pleural effusion present.     Medical Decision Making:   History:   I obtained history from: someone other than patient and primary care / consultant.       <> Summary of History: Patient being sent to the emergency department for volume overload, hypoxia, and failed outpatient therapy  Old Medical Records: I decided to obtain old medical records.  Initial Assessment:   Acute myocardial infarction, congestive heart failure, pneumonia, pleural effusion, pneumothorax.      Medical decision-making:    The patient received a medical screening exam. If performed, the EKG was independently evaluated by me and is pending final cardiology evaluation.  If performed, all radiographic studies were independently evaluated by me and are pending final radiology evaluation. If labs were ordered, they were reviewed. Vital signs are independently assessed by me.  If performed, the pulse oximetry was independently evaluated by me.  I decided to obtain the patient's past medical record.  If available, I reviewed the patient's past medical record, including most recent labs and radiology reports.    Patient appears to have acute decompensated heart failure.  He has weight gain.  He has pitting lower extremity edema.  He has pulmonary edema on his chest x-ray with a right-sided pleural effusion.  This is most likely volume overloaded status.  There is also likely some hepatorenal issues going on as well.  The patient has CKD, with a creatinine today of 4.  This is around his baseline.  Patient is requiring supplemental oxygen therapy.  In order to avoid hypoxia.  He is requiring admission to the hospital and diuresis at this time.  I discussed the case findings with the hospitalist who agrees with placing the patient.  Inpatient.  He is requested twice a day Lasix therapy for diuresis.  Supplemental oxygen therapy.  No fever or leukocytosis.  Potassium 5.3.  Troponin  elevated.  Will trend.  BNP elevated.  EKG nonischemic without ST segment elevation.  Patient's anemia is chronic.  Most likely related to his CKD.    The results and physical exam findings were reviewed with the patient. Pt agrees with assessment, disposition and treatment plan and has no further questions or complaints at this time.    RAHEEM Clifton M.D. 7:18 PM 4/26/2017           Scribe Attestation:   Scribe #1: I performed the above scribed service and the documentation accurately describes the services I performed. I attest to the accuracy of the note.    Attending Attestation:           Physician Attestation for Scribe:  Physician Attestation Statement for Scribe #1: I, RAHEEM Clifton MD, reviewed documentation, as scribed by Anthony Pascal in my presence, and it is both accurate and complete.                 ED Course     Clinical Impression:   The primary encounter diagnosis was Acute decompensated heart failure. A diagnosis of Hypoxia was also pertinent to this visit.          Alejandro Clifton MD  04/26/17 1918

## 2017-04-26 NOTE — PROGRESS NOTES
This dictation has been generated using Dragon Dictation some phonetic errors may occur.     Philip was seen today for leg swelling.    Diagnoses and all orders for this visit:    Chronic diastolic heart failure    Type 2 diabetes mellitus with stage 4 chronic kidney disease, unspecified long term insulin use status    Essential hypertension    Kidney transplant candidate    Anasarca    Other ascites    Hypoxemia    Acute on chronic diastolic heart failure, anasarca, ascites, and beginnings of hypoxemia.  Refer to emergency department for stat labs and gentle IV Lasix.  I will call emergency department and discuss case. Spoke to Dr. Clifton.     Return if symptoms worsen or fail to improve.  ________________________________________________________________  ________________________________________________________________    Chief Complaint   Patient presents with    Leg Swelling     History of present illness  This 71 y.o. presents today for complaint of swelling more than 9 days.  Patient is failing outpatient therapy.  He has a history of chronic diastolic heart failure, chronic renal insufficiency stage V, and edema.  I saw him recently for similar complaints.  Lower extremity edema is significantly worsened.  There was a history of trauma and ultrasound was done to rule out DVT.  No DVT was noted.  Renal function remains stable but of course severely depressed in stage V.    Review of systems  Denies fever or chills.  Patient notes malaise.  He does note fatigue.    Patient denies shortness of breath or dyspnea on exertion.  He does note lower extremity pain.  No chest pain.  Decreased appetite noted.  Abdominal swelling noted.    Past medical and social history reviewed.      Past Medical History:   Diagnosis Date    Anemia     Back pain     Bishop's esophagus     BPH (benign prostatic hypertrophy)     Chronic kidney disease     Cirrhosis     Diabetes mellitus     Diabetes mellitus, type 2      Elevated PSA     Heart failure     Hepatitis Hepatitis C    Hepatitis C     Hyperlipidemia     Hypertension     Hypertension     Liver transplanted     Peripheral neuropathy     Sleep apnea     Transfusion reaction     Hep C from prev. blood transfusion    Transplanted liver     Trouble in sleeping     Type II or unspecified type diabetes mellitus with renal manifestations, uncontrolled     Type II or unspecified type diabetes mellitus with renal manifestations, uncontrolled        Past Surgical History:   Procedure Laterality Date    broken nose      EYE SURGERY      cataracts    FEMUR SURGERY      FRACTURE SURGERY      right femur fracture     LIVER TRANSPLANT  2001    SKIN GRAFT      graft from right thigh , applied to the left back and left arm.       Family History   Problem Relation Age of Onset    Cancer Mother      had cancer 40 years ago     Coronary artery disease Father     Hypertension Father     Diabetes Father     Heart disease Father     Colon cancer Neg Hx        Social History     Social History    Marital status:      Spouse name: N/A    Number of children: N/A    Years of education: N/A     Social History Main Topics    Smoking status: Former Smoker     Quit date: 7/30/1998    Smokeless tobacco: Never Used      Comment: pt reports quitting in 1998    Alcohol use No      Comment: pt reports hx of alcholism and reports quit in 1998    Drug use: Yes     Special: Marijuana      Comment: pt reports smoking THC apprxly twice/week    Sexual activity: Yes     Partners: Female     Other Topics Concern    None     Social History Narrative       Current Outpatient Prescriptions   Medication Sig Dispense Refill    allopurinol (ZYLOPRIM) 100 MG tablet TAKE 1 TABLET EVERY DAY 90 tablet 3    blood sugar diagnostic (ACCU-CHEK JOANN PLUS TEST STRP) Strp 1 strip by Misc.(Non-Drug; Combo Route) route 4 (four) times daily. 360 each 3    blood-glucose meter (ACCU-CHEK JOANN  "PLUS METER) Misc Use as directed 1 each 0    carvedilol (COREG) 6.25 MG tablet Take 1 tablet (6.25 mg total) by mouth 2 (two) times daily. 180 tablet 3    cetirizine (ZYRTEC) 10 MG tablet Take 1 tablet (10 mg total) by mouth 2 (two) times daily. If needed may increase to 3 tablets twice daily to control itching 60 tablet 0    ciclopirox (PENLAC) 8 % Soln Apply to affected nails daily x 6-12 mos.  Remove weekly with rubbing alcohol 1 Bottle 5    clonazePAM (KLONOPIN) 0.5 MG tablet Take 1 tablet (0.5 mg total) by mouth every evening. (Patient taking differently: Take 0.5 mg by mouth daily as needed. ) 90 tablet 1    cloNIDine (CATAPRES) 0.1 MG tablet Take 1 tablet (0.1 mg total) by mouth 3 (three) times daily. 270 tablet 3    doxazosin (CARDURA) 8 MG Tab Take 1 tablet (8 mg total) by mouth once daily. 90 tablet 4    ferrous gluconate (FERGON) 324 MG tablet pt taking tid 180 tablet 0    GENERLAC 10 gram/15 mL solution Use as directed 10 g in the mouth or throat once daily.  5    glucagon (human recombinant) inj 1mg/mL kit Inject 1 mL (1 mg total) into the muscle as needed. 1 kit 2    hydrALAZINE (APRESOLINE) 100 MG tablet Take 1 tablet (100 mg total) by mouth 3 (three) times daily. 270 tablet 3    insulin aspart (NOVOLOG) 100 unit/mL InPn pen Inject 6 units with meals plus scale 180-230 +1, 231-280 +2, 281-330 +3, 331-380 +4, >380 +5. Max daily dose 33 units. 90 day supply 3 Box 3    insulin glargine (LANTUS SOLOSTAR) 100 unit/mL (3 mL) InPn pen Inject 10 Units into the skin every evening. 2 Box 6    insulin needles, disposable, (NOVOFINE 32) 32 x 1/4 " Ndle 1 each by Misc.(Non-Drug; Combo Route) route 3 (three) times daily. 100 each 5    lactulose (CHRONULAC) 20 gram/30 mL Soln Take 30 mLs (20 g total) by mouth 3 (three) times daily. 2700 mL 5    lancets (ACCU-CHEK SOFTCLIX LANCETS) Misc 1 lancet by Misc.(Non-Drug; Combo Route) route 4 (four) times daily. 360 each 3    LANTUS SOLOSTAR 100 unit/mL (3 " mL) InPn pen INJECT 10 UNITS UNDER THE SKIN EVERY EVENING 30 mL 3    montelukast (SINGULAIR) 10 mg tablet Take 1 tablet (10 mg total) by mouth every evening. 30 tablet 6    multivitamin (THERAGRAN) per tablet Take 1 tablet by mouth Daily.      nystatin (MYCOSTATIN) 500,000 unit Tab   1    ondansetron (ZOFRAN-ODT) 4 MG TbDL DISSOLVE 1 TABLET(4 MG) ON THE TONGUE EVERY 12 HOURS AS NEEDED 30 tablet 1    oxycodone (OXY-IR) 5 mg Cap Take 1 capsule (5 mg total) by mouth every 4 (four) hours as needed. 35 capsule 0    oxycodone-acetaminophen (PERCOCET) 5-325 mg per tablet Take 1 tablet by mouth every 4 (four) hours as needed for Pain. 35 tablet 0    pantoprazole (PROTONIX) 40 MG tablet TAKE 1 TABLET ONE TIME DAILY 90 tablet 3    rifaximin (XIFAXAN) 550 mg Tab Take 1 tablet (550 mg total) by mouth 2 (two) times daily. 60 tablet 11    senna (SENNA CONCENTRATE) 8.6 mg tablet Take 1 tablet by mouth once daily.      tacrolimus (PROGRAF) 0.5 MG Cap Take 1 capsule (0.5 mg total) by mouth once daily. 30 capsule 11    torsemide (DEMADEX) 20 MG Tab Take 2 tablets (40 mg total) by mouth once daily. 180 tablet 3    urea (CARMOL) 40 % Crea Apply topically once daily. 85 g 5     No current facility-administered medications for this visit.        Review of patient's allergies indicates:   Allergen Reactions    Corticosteroids (glucocorticoids) Other (See Comments)     Leg swelling    Hydrocortisone      Leg swelling    Neuromuscular blockers, steroidal      Leg swelling    Ribavirin      Intolerance, arthralgias       Physical examination  Vitals Reviewed  Gen. Well-dressed well-nourished   Skin warm dry and intact.  No rashes noted.  Neck is supple without adenopathy  Chest.  Respirations are even unlabored.  Lungs are clear to auscultation.  Cardiac regular rate and rhythm.  No chest wall adenopathy noted.  Abdomen is distended due to suspected edema.  Bowel sounds are present.      Neuro. Awake alert oriented x4.   Normal judgment and cognition noted.  Extremities  clubbing or cyanosis noted.  Marked edema noted bilateral lower extremities extending to the knees.      Call or return to clinic prn if these symptoms worsen or fail to improve as anticipated.

## 2017-04-27 ENCOUNTER — OUTPATIENT CASE MANAGEMENT (OUTPATIENT)
Dept: ADMINISTRATIVE | Facility: OTHER | Age: 72
End: 2017-04-27

## 2017-04-27 LAB
ALBUMIN SERPL BCP-MCNC: 3.6 G/DL
ALP SERPL-CCNC: 59 U/L
ALT SERPL W/O P-5'-P-CCNC: 14 U/L
ANION GAP SERPL CALC-SCNC: 11 MMOL/L
AST SERPL-CCNC: 30 U/L
BASOPHILS # BLD AUTO: 0.09 K/UL
BASOPHILS NFR BLD: 1.5 %
BILIRUB SERPL-MCNC: 0.5 MG/DL
BUN SERPL-MCNC: 90 MG/DL
CALCIUM SERPL-MCNC: 8.1 MG/DL
CHLORIDE SERPL-SCNC: 108 MMOL/L
CO2 SERPL-SCNC: 24 MMOL/L
CREAT SERPL-MCNC: 3.9 MG/DL
DIFFERENTIAL METHOD: ABNORMAL
EOSINOPHIL # BLD AUTO: 0.4 K/UL
EOSINOPHIL NFR BLD: 6.3 %
ERYTHROCYTE [DISTWIDTH] IN BLOOD BY AUTOMATED COUNT: 17.4 %
EST. GFR  (AFRICAN AMERICAN): 17 ML/MIN/1.73 M^2
EST. GFR  (NON AFRICAN AMERICAN): 14 ML/MIN/1.73 M^2
ESTIMATED AVG GLUCOSE: 114 MG/DL
GLUCOSE SERPL-MCNC: 130 MG/DL
HBA1C MFR BLD HPLC: 5.6 %
HCT VFR BLD AUTO: 27.2 %
HGB BLD-MCNC: 8.5 G/DL
LYMPHOCYTES # BLD AUTO: 0.9 K/UL
LYMPHOCYTES NFR BLD: 14.5 %
MAGNESIUM SERPL-MCNC: 2.2 MG/DL
MCH RBC QN AUTO: 26.9 PG
MCHC RBC AUTO-ENTMCNC: 31.3 %
MCV RBC AUTO: 86 FL
MONOCYTES # BLD AUTO: 0.6 K/UL
MONOCYTES NFR BLD: 10.1 %
NEUTROPHILS # BLD AUTO: 4.1 K/UL
NEUTROPHILS NFR BLD: 66.9 %
PHOSPHATE SERPL-MCNC: 5.1 MG/DL
PLATELET # BLD AUTO: 118 K/UL
PMV BLD AUTO: 11.7 FL
POCT GLUCOSE: 117 MG/DL (ref 70–110)
POCT GLUCOSE: 138 MG/DL (ref 70–110)
POCT GLUCOSE: 141 MG/DL (ref 70–110)
POCT GLUCOSE: 142 MG/DL (ref 70–110)
POTASSIUM SERPL-SCNC: 5.1 MMOL/L
PROT SERPL-MCNC: 7.1 G/DL
RBC # BLD AUTO: 3.16 M/UL
SODIUM SERPL-SCNC: 143 MMOL/L
TROPONIN I SERPL DL<=0.01 NG/ML-MCNC: 0.04 NG/ML
TROPONIN I SERPL DL<=0.01 NG/ML-MCNC: 0.04 NG/ML
WBC # BLD AUTO: 6.06 K/UL

## 2017-04-27 PROCEDURE — 25000003 PHARM REV CODE 250: Mod: NTX | Performed by: EMERGENCY MEDICINE

## 2017-04-27 PROCEDURE — 25000003 PHARM REV CODE 250: Mod: NTX | Performed by: INTERNAL MEDICINE

## 2017-04-27 PROCEDURE — 84100 ASSAY OF PHOSPHORUS: CPT | Mod: NTX

## 2017-04-27 PROCEDURE — 63600175 PHARM REV CODE 636 W HCPCS: Mod: NTX | Performed by: EMERGENCY MEDICINE

## 2017-04-27 PROCEDURE — 83735 ASSAY OF MAGNESIUM: CPT | Mod: NTX

## 2017-04-27 PROCEDURE — 80053 COMPREHEN METABOLIC PANEL: CPT | Mod: NTX

## 2017-04-27 PROCEDURE — 85025 COMPLETE CBC W/AUTO DIFF WBC: CPT | Mod: NTX

## 2017-04-27 PROCEDURE — 12000002 HC ACUTE/MED SURGE SEMI-PRIVATE ROOM: Mod: NTX

## 2017-04-27 PROCEDURE — 63600175 PHARM REV CODE 636 W HCPCS: Mod: NTX | Performed by: INTERNAL MEDICINE

## 2017-04-27 PROCEDURE — 36415 COLL VENOUS BLD VENIPUNCTURE: CPT | Mod: NTX

## 2017-04-27 PROCEDURE — 63600175 PHARM REV CODE 636 W HCPCS: Mod: NTX | Performed by: HOSPITALIST

## 2017-04-27 PROCEDURE — 84484 ASSAY OF TROPONIN QUANT: CPT | Mod: NTX

## 2017-04-27 RX ORDER — LABETALOL HYDROCHLORIDE 5 MG/ML
10 INJECTION, SOLUTION INTRAVENOUS EVERY 4 HOURS PRN
Status: DISCONTINUED | OUTPATIENT
Start: 2017-04-27 | End: 2017-04-29 | Stop reason: HOSPADM

## 2017-04-27 RX ORDER — FUROSEMIDE 10 MG/ML
40 INJECTION INTRAMUSCULAR; INTRAVENOUS 2 TIMES DAILY
Status: DISCONTINUED | OUTPATIENT
Start: 2017-04-27 | End: 2017-04-29

## 2017-04-27 RX ADMIN — FUROSEMIDE 40 MG: 10 INJECTION, SOLUTION INTRAMUSCULAR; INTRAVENOUS at 01:04

## 2017-04-27 RX ADMIN — CLONIDINE HYDROCHLORIDE 0.1 MG: 0.1 TABLET ORAL at 01:04

## 2017-04-27 RX ADMIN — FUROSEMIDE 20 MG: 10 INJECTION, SOLUTION INTRAMUSCULAR; INTRAVENOUS at 09:04

## 2017-04-27 RX ADMIN — ALLOPURINOL 100 MG: 100 TABLET ORAL at 09:04

## 2017-04-27 RX ADMIN — CARVEDILOL 6.25 MG: 6.25 TABLET, FILM COATED ORAL at 09:04

## 2017-04-27 RX ADMIN — INSULIN ASPART 3 UNITS: 100 INJECTION, SOLUTION INTRAVENOUS; SUBCUTANEOUS at 01:04

## 2017-04-27 RX ADMIN — CLONIDINE HYDROCHLORIDE 0.1 MG: 0.1 TABLET ORAL at 09:04

## 2017-04-27 RX ADMIN — RIFAXIMIN 550 MG: 550 TABLET ORAL at 09:04

## 2017-04-27 RX ADMIN — INSULIN ASPART 3 UNITS: 100 INJECTION, SOLUTION INTRAVENOUS; SUBCUTANEOUS at 09:04

## 2017-04-27 RX ADMIN — HEPARIN SODIUM 5000 UNITS: 5000 INJECTION, SOLUTION INTRAVENOUS; SUBCUTANEOUS at 09:04

## 2017-04-27 RX ADMIN — TACROLIMUS 0.5 MG: 0.5 CAPSULE ORAL at 09:04

## 2017-04-27 RX ADMIN — INSULIN ASPART 3 UNITS: 100 INJECTION, SOLUTION INTRAVENOUS; SUBCUTANEOUS at 06:04

## 2017-04-27 RX ADMIN — HYDRALAZINE HYDROCHLORIDE 100 MG: 25 TABLET ORAL at 01:04

## 2017-04-27 RX ADMIN — HYDRALAZINE HYDROCHLORIDE 100 MG: 25 TABLET ORAL at 09:04

## 2017-04-27 RX ADMIN — DOXAZOSIN MESYLATE 8 MG: 1 TABLET ORAL at 09:04

## 2017-04-27 RX ADMIN — HYDRALAZINE HYDROCHLORIDE 100 MG: 25 TABLET ORAL at 06:04

## 2017-04-27 RX ADMIN — CLONIDINE HYDROCHLORIDE 0.1 MG: 0.1 TABLET ORAL at 06:04

## 2017-04-27 RX ADMIN — INSULIN DETEMIR 10 UNITS: 100 INJECTION, SOLUTION SUBCUTANEOUS at 09:04

## 2017-04-27 RX ADMIN — PANTOPRAZOLE SODIUM 40 MG: 40 TABLET, DELAYED RELEASE ORAL at 09:04
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
## 2017-04-27 NOTE — ASSESSMENT & PLAN NOTE
Well-controlled on a home regimen of basal-bolus insulin therapy; will provide basal-bolus insulin along with insulin sliding scale.

## 2017-04-27 NOTE — PLAN OF CARE
Problem: Patient Care Overview  Goal: Plan of Care Review  Outcome: Ongoing (interventions implemented as appropriate)  Pt AAOx4.  Pt free from falls, injury or any trauma throughout shift. Hour rounding to ensure safety and assist with personal care needs.  Patient voids per urinal.   Pt oriented to room, no complaints of pain. IV site cdi; intact saline locked.   Will cont to monitor.

## 2017-04-27 NOTE — ASSESSMENT & PLAN NOTE
Patient does have diastolic dysfunction on 2D-echo in Oct 2016 and appears to be acute failure at this time.  He does have significant peripheral and pulmonary edema for which he will receive intravenous diuresis with furosemide.  Will monitor his intake and output closely.legs are still very swollen,with abdominal distention.

## 2017-04-27 NOTE — SUBJECTIVE & OBJECTIVE
Past Medical History:   Diagnosis Date    Anemia     Back pain     Bishop's esophagus     BPH (benign prostatic hypertrophy)     Chronic kidney disease     Cirrhosis     Diabetes mellitus     Diabetes mellitus, type 2     Elevated PSA     Heart failure     Hepatitis Hepatitis C    Hepatitis C     Hyperlipidemia     Hypertension     Hypertension     Liver transplanted     Peripheral neuropathy     Sleep apnea     Transfusion reaction     Hep C from prev. blood transfusion    Transplanted liver     Trouble in sleeping     Type II or unspecified type diabetes mellitus with renal manifestations, uncontrolled     Type II or unspecified type diabetes mellitus with renal manifestations, uncontrolled        Past Surgical History:   Procedure Laterality Date    broken nose      EYE SURGERY      cataracts    FEMUR SURGERY      FRACTURE SURGERY      right femur fracture     LIVER TRANSPLANT  2001    SKIN GRAFT      graft from right thigh , applied to the left back and left arm.       Review of patient's allergies indicates:   Allergen Reactions    Corticosteroids (glucocorticoids) Other (See Comments)     Leg swelling    Hydrocortisone      Leg swelling    Neuromuscular blockers, steroidal      Leg swelling    Ribavirin      Intolerance, arthralgias       No current facility-administered medications on file prior to encounter.      Current Outpatient Prescriptions on File Prior to Encounter   Medication Sig    allopurinol (ZYLOPRIM) 100 MG tablet TAKE 1 TABLET EVERY DAY    carvedilol (COREG) 6.25 MG tablet Take 1 tablet (6.25 mg total) by mouth 2 (two) times daily.    cetirizine (ZYRTEC) 10 MG tablet Take 1 tablet (10 mg total) by mouth 2 (two) times daily. If needed may increase to 3 tablets twice daily to control itching    clonazePAM (KLONOPIN) 0.5 MG tablet Take 1 tablet (0.5 mg total) by mouth every evening. (Patient taking differently: Take 0.5 mg by mouth daily as needed. )     "cloNIDine (CATAPRES) 0.1 MG tablet Take 1 tablet (0.1 mg total) by mouth 3 (three) times daily.    doxazosin (CARDURA) 8 MG Tab Take 1 tablet (8 mg total) by mouth once daily.    ferrous gluconate (FERGON) 324 MG tablet pt taking tid    hydrALAZINE (APRESOLINE) 100 MG tablet Take 1 tablet (100 mg total) by mouth 3 (three) times daily.    insulin aspart (NOVOLOG) 100 unit/mL InPn pen Inject 6 units with meals plus scale 180-230 +1, 231-280 +2, 281-330 +3, 331-380 +4, >380 +5. Max daily dose 33 units. 90 day supply    insulin glargine (LANTUS SOLOSTAR) 100 unit/mL (3 mL) InPn pen Inject 10 Units into the skin every evening.    lactulose (CHRONULAC) 20 gram/30 mL Soln Take 30 mLs (20 g total) by mouth 3 (three) times daily.    montelukast (SINGULAIR) 10 mg tablet Take 1 tablet (10 mg total) by mouth every evening.    multivitamin (THERAGRAN) per tablet Take 1 tablet by mouth Daily.    oxycodone-acetaminophen (PERCOCET) 5-325 mg per tablet Take 1 tablet by mouth every 4 (four) hours as needed for Pain.    pantoprazole (PROTONIX) 40 MG tablet TAKE 1 TABLET ONE TIME DAILY    rifaximin (XIFAXAN) 550 mg Tab Take 1 tablet (550 mg total) by mouth 2 (two) times daily.    tacrolimus (PROGRAF) 0.5 MG Cap Take 1 capsule (0.5 mg total) by mouth once daily.    blood sugar diagnostic (ACCU-CHEK JOANN PLUS TEST STRP) Strp 1 strip by Misc.(Non-Drug; Combo Route) route 4 (four) times daily.    blood-glucose meter (ACCU-CHEK JOANN PLUS METER) Misc Use as directed    ciclopirox (PENLAC) 8 % Soln Apply to affected nails daily x 6-12 mos.  Remove weekly with rubbing alcohol    GENERLAC 10 gram/15 mL solution Use as directed 10 g in the mouth or throat once daily.    glucagon (human recombinant) inj 1mg/mL kit Inject 1 mL (1 mg total) into the muscle as needed.    insulin needles, disposable, (NOVOFINE 32) 32 x 1/4 " Ndle 1 each by Misc.(Non-Drug; Combo Route) route 3 (three) times daily.    lancets (ACCU-CHEK SOFTCLIX " LANCETS) Misc 1 lancet by Misc.(Non-Drug; Combo Route) route 4 (four) times daily.    LANTUS SOLOSTAR 100 unit/mL (3 mL) InPn pen INJECT 10 UNITS UNDER THE SKIN EVERY EVENING    nystatin (MYCOSTATIN) 500,000 unit Tab     ondansetron (ZOFRAN-ODT) 4 MG TbDL DISSOLVE 1 TABLET(4 MG) ON THE TONGUE EVERY 12 HOURS AS NEEDED    oxycodone (OXY-IR) 5 mg Cap Take 1 capsule (5 mg total) by mouth every 4 (four) hours as needed.    senna (SENNA CONCENTRATE) 8.6 mg tablet Take 1 tablet by mouth once daily.    torsemide (DEMADEX) 20 MG Tab Take 2 tablets (40 mg total) by mouth once daily.    urea (CARMOL) 40 % Crea Apply topically once daily.     Family History     Problem Relation (Age of Onset)    Cancer Mother    Coronary artery disease Father    Diabetes Father    Heart disease Father    Hypertension Father        Social History Main Topics    Smoking status: Former Smoker     Quit date: 7/30/1998    Smokeless tobacco: Never Used      Comment: pt reports quitting in 1998    Alcohol use No      Comment: pt reports hx of alcholism and reports quit in 1998    Drug use: Yes     Special: Marijuana      Comment: pt reports smoking THC apprxly twice/week    Sexual activity: Yes     Partners: Female     Review of Systems   Constitutional: Positive for activity change. Negative for appetite change, chills, diaphoresis, fatigue, fever and unexpected weight change.   HENT: Negative.    Eyes: Negative.    Respiratory: Negative for cough, chest tightness, shortness of breath and wheezing.    Cardiovascular: Positive for leg swelling. Negative for chest pain and palpitations.   Gastrointestinal: Positive for abdominal distention. Negative for abdominal pain, blood in stool, constipation, diarrhea, nausea and vomiting.   Endocrine: Negative.    Genitourinary: Negative for dysuria and hematuria.   Musculoskeletal: Negative.    Neurological: Negative for dizziness, seizures, syncope, weakness and light-headedness.    Psychiatric/Behavioral: Negative.      Objective:     Vital Signs (Most Recent):  Temp: 98.3 °F (36.8 °C) (04/27/17 0816)  Pulse: 69 (04/27/17 0816)  Resp: 18 (04/27/17 0816)  BP: (!) 180/74 (04/27/17 0907)  SpO2: (!) 90 % (04/27/17 0816) Vital Signs (24h Range):  Temp:  [97.5 °F (36.4 °C)-99.1 °F (37.3 °C)] 98.3 °F (36.8 °C)  Pulse:  [64-80] 69  Resp:  [18-20] 18  SpO2:  [90 %-100 %] 90 %  BP: (120-193)/(58-84) 180/74     Weight: 93 kg (205 lb)  Body mass index is 26.32 kg/(m^2).    Physical Exam   Constitutional: He is oriented to person, place, and time. He appears well-developed and well-nourished. No distress.   HENT:   Head: Normocephalic and atraumatic.   Right Ear: External ear normal.   Left Ear: External ear normal.   Nose: Nose normal.   Eyes: Right eye exhibits no discharge. Left eye exhibits no discharge.   Neck: Normal range of motion.   Cardiovascular:   regular rate and rhythm with a Grade IV/VI holosystolic murmur   Pulmonary/Chest:   Normal effort with bibasilar crackles   Abdominal:   Distended without tenderness to palpation, normal bowel sounds   Musculoskeletal: Normal range of motion. He exhibits edema.   2+ pitting edema to the level of the lower abdomen.   Neurological: He is alert and oriented to person, place, and time.   Skin: Skin is warm and dry. He is not diaphoretic. No erythema.   Psychiatric: He has a normal mood and affect. His behavior is normal. Judgment and thought content normal.   Nursing note and vitals reviewed.       Significant Labs: All pertinent labs within the past 24 hours have been reviewed.    Significant Imaging: I have reviewed and interpreted all pertinent imaging results/findings within the past 24 hours.

## 2017-04-27 NOTE — PLAN OF CARE
04/27/17 1452   Discharge Assessment   Assessment Type Discharge Planning Assessment   Confirmed/corrected address and phone number on facesheet? Yes   Assessment information obtained from? Patient   Communicated expected length of stay with patient/caregiver no   If Healthcare Directive is received, is it scanned into Epic? no (comment)   Prior to hospitilization cognitive status: Alert/Oriented;No Deficits   Prior to hospitalization functional status: Independent   Current cognitive status: Alert/Oriented;No Deficits   Arrived From admitted as an inpatient   Lives With spouse   Able to Return to Prior Arrangements yes   Is patient able to care for self after discharge? Yes   Does the patient have family/friends to help with healtcare needs after discharge? yes   How many people do you have in your home that can help with your care after discharge? 1   Who are your caregiver(s) and their phone number(s)? (Spouse, Merlene [phone])   Patient's perception of discharge disposition admitted as an inpatient   Readmission Within The Last 30 Days no previous admission in last 30 days   Patient currently being followed by outpatient case management? No   Patient currently receives home health services? No   Does the patient currently use HME? No   Patient currently receives private duty nursing? No   Patient currently receives any other outside agency services? No   Is it the patient/care giver preference to resume care with the current outside agency? No   Equipment Currently Used at Home none   Do you have any problems affording any of your prescribed medications? No   Is the patient taking medications as prescribed? yes   Do you have any financial concerns preventing you from receiving the healthcare you need? No   Does the patient have transportation to healthcare appointments? Yes   Transportation Available car   Discharge Plan A Home with family   Discharge Plan B Home with family   Patient/Family In Agreement  "With Plan yes   TN completed discharge needs assessment. TN provided and reviewed with patient "Blue My Health Packet" , "Help At Home" and "Discharge Planning Begins on Admission" handouts. TN discussed with patient the things the patient is responsible for to manage patient's  healthcare at home. Patient verbalized understanding & teachback implemented. Patient prefers morning doctor appointments.      [facility] [address] [place] [address] EXP AT [address] [address]  Phone: [phone] Fax: [phone]    [facility] - [place], [place] - [address] [place] [address]  Phone: [phone] Fax: [phone]    [facility] - [facility] [address] [facility]  [address] [facility] [address]  Phone: [phone] Fax: [phone]      "

## 2017-04-27 NOTE — PROGRESS NOTES
[facility] Hospital Medicine  Progress Note    Patient Name: Philip Mathis III  MRN: [id number]  Patient Class: IP- Inpatient   Admission Date: 4/26/2017  Length of Stay: 1 days  Attending Physician: Shanell Ackerman MD  Primary Care Provider: Donnie Owens NP        Subjective:     Principal Problem:Acute on chronic diastolic heart failure    HPI:  Mr. Philip Mathis III is a 71 y.o. male with essential hypertension, type 2 diabetes mellitus (HbA1c 5.5% Jan 2017), CKD Stage V, chronic diastolic heart failure (LVEF 60-65% Oct 2016), KARON, GERD, anemia of chronic disease, cannabis abuse, chronic gout, chronic hepatitis C, and history of liver transplant on chronic immunosuppression who presents to [facility] ED with complaints of lower extremity edema for two months.  He reports that the swelling has progressively worsened to the point that he can barely ambulate.  He also says that his abdomen has become more distended and that his pants no longer fit.  He sleeps in a chair which is not new for him.  He denies any chest pain, palpitations, dyspnea on exertion, orthopnea, PND, fevers, chills, coughing, hemoptysis, nor any lower extremity pain.  He has not had any lower extremity injury.    Hospital Course:  Mr. Philip Mathis III is a 71 y.o. male with essential hypertension, type 2 diabetes mellitus (HbA1c 5.5% Jan 2017), CKD Stage V, chronic diastolic heart failure (LVEF 60-65% Oct 2016), KARON, GERD, anemia of chronic disease, cannabis abuse, chronic gout, chronic hepatitis C, and history of liver transplant on chronic immunosuppression who presents to [facility] ED with complaints of lower extremity edema for two months.  He reports that the swelling has progressively worsened to the point that he can barely ambulate.  He also says that his abdomen has become more distended and that his pants no longer fit.  He sleeps in a chair which is not new for him.  He denies any chest pain, palpitations, dyspnea  on exertion, orthopnea, PND, fevers, chills, coughing, hemoptysis, nor any lower extremity pain.  He has not had any lower extremity injury.he has been stared on IV lasix for A/C diastolic CHF exacerbation,his legs are still very swollen.    Past Medical History:   Diagnosis Date    Anemia     Back pain     Bishop's esophagus     BPH (benign prostatic hypertrophy)     Chronic kidney disease     Cirrhosis     Diabetes mellitus     Diabetes mellitus, type 2     Elevated PSA     Heart failure     Hepatitis Hepatitis C    Hepatitis C     Hyperlipidemia     Hypertension     Hypertension     Liver transplanted     Peripheral neuropathy     Sleep apnea     Transfusion reaction     Hep C from prev. blood transfusion    Transplanted liver     Trouble in sleeping     Type II or unspecified type diabetes mellitus with renal manifestations, uncontrolled     Type II or unspecified type diabetes mellitus with renal manifestations, uncontrolled        Past Surgical History:   Procedure Laterality Date    broken nose      EYE SURGERY      cataracts    FEMUR SURGERY      FRACTURE SURGERY      right femur fracture     LIVER TRANSPLANT  2001    SKIN GRAFT      graft from right thigh , applied to the left back and left arm.       Review of patient's allergies indicates:   Allergen Reactions    Corticosteroids (glucocorticoids) Other (See Comments)     Leg swelling    Hydrocortisone      Leg swelling    Neuromuscular blockers, steroidal      Leg swelling    Ribavirin      Intolerance, arthralgias       No current facility-administered medications on file prior to encounter.      Current Outpatient Prescriptions on File Prior to Encounter   Medication Sig    allopurinol (ZYLOPRIM) 100 MG tablet TAKE 1 TABLET EVERY DAY    carvedilol (COREG) 6.25 MG tablet Take 1 tablet (6.25 mg total) by mouth 2 (two) times daily.    cetirizine (ZYRTEC) 10 MG tablet Take 1 tablet (10 mg total) by mouth 2 (two) times  daily. If needed may increase to 3 tablets twice daily to control itching    clonazePAM (KLONOPIN) 0.5 MG tablet Take 1 tablet (0.5 mg total) by mouth every evening. (Patient taking differently: Take 0.5 mg by mouth daily as needed. )    cloNIDine (CATAPRES) 0.1 MG tablet Take 1 tablet (0.1 mg total) by mouth 3 (three) times daily.    doxazosin (CARDURA) 8 MG Tab Take 1 tablet (8 mg total) by mouth once daily.    ferrous gluconate (FERGON) 324 MG tablet pt taking tid    hydrALAZINE (APRESOLINE) 100 MG tablet Take 1 tablet (100 mg total) by mouth 3 (three) times daily.    insulin aspart (NOVOLOG) 100 unit/mL InPn pen Inject 6 units with meals plus scale 180-230 +1, 231-280 +2, 281-330 +3, 331-380 +4, >380 +5. Max daily dose 33 units. 90 day supply    insulin glargine (LANTUS SOLOSTAR) 100 unit/mL (3 mL) InPn pen Inject 10 Units into the skin every evening.    lactulose (CHRONULAC) 20 gram/30 mL Soln Take 30 mLs (20 g total) by mouth 3 (three) times daily.    montelukast (SINGULAIR) 10 mg tablet Take 1 tablet (10 mg total) by mouth every evening.    multivitamin (THERAGRAN) per tablet Take 1 tablet by mouth Daily.    oxycodone-acetaminophen (PERCOCET) 5-325 mg per tablet Take 1 tablet by mouth every 4 (four) hours as needed for Pain.    pantoprazole (PROTONIX) 40 MG tablet TAKE 1 TABLET ONE TIME DAILY    rifaximin (XIFAXAN) 550 mg Tab Take 1 tablet (550 mg total) by mouth 2 (two) times daily.    tacrolimus (PROGRAF) 0.5 MG Cap Take 1 capsule (0.5 mg total) by mouth once daily.    blood sugar diagnostic (ACCU-CHEK JOANN PLUS TEST STRP) Strp 1 strip by Misc.(Non-Drug; Combo Route) route 4 (four) times daily.    blood-glucose meter (ACCU-CHEK JOANN PLUS METER) Misc Use as directed    ciclopirox (PENLAC) 8 % Soln Apply to affected nails daily x 6-12 mos.  Remove weekly with rubbing alcohol    GENERLAC 10 gram/15 mL solution Use as directed 10 g in the mouth or throat once daily.    glucagon (human  "recombinant) inj 1mg/mL kit Inject 1 mL (1 mg total) into the muscle as needed.    insulin needles, disposable, (NOVOFINE 32) 32 x 1/4 " Ndle 1 each by Misc.(Non-Drug; Combo Route) route 3 (three) times daily.    lancets (ACCU-CHEK SOFTCLIX LANCETS) Misc 1 lancet by Misc.(Non-Drug; Combo Route) route 4 (four) times daily.    LANTUS SOLOSTAR 100 unit/mL (3 mL) InPn pen INJECT 10 UNITS UNDER THE SKIN EVERY EVENING    nystatin (MYCOSTATIN) 500,000 unit Tab     ondansetron (ZOFRAN-ODT) 4 MG TbDL DISSOLVE 1 TABLET(4 MG) ON THE TONGUE EVERY 12 HOURS AS NEEDED    oxycodone (OXY-IR) 5 mg Cap Take 1 capsule (5 mg total) by mouth every 4 (four) hours as needed.    senna (SENNA CONCENTRATE) 8.6 mg tablet Take 1 tablet by mouth once daily.    torsemide (DEMADEX) 20 MG Tab Take 2 tablets (40 mg total) by mouth once daily.    urea (CARMOL) 40 % Crea Apply topically once daily.     Family History     Problem Relation (Age of Onset)    Cancer Mother    Coronary artery disease Father    Diabetes Father    Heart disease Father    Hypertension Father        Social History Main Topics    Smoking status: Former Smoker     Quit date: 7/30/1998    Smokeless tobacco: Never Used      Comment: pt reports quitting in 1998    Alcohol use No      Comment: pt reports hx of alcholism and reports quit in 1998    Drug use: Yes     Special: Marijuana      Comment: pt reports smoking THC apprxly twice/week    Sexual activity: Yes     Partners: Female     Review of Systems   Constitutional: Positive for activity change. Negative for appetite change, chills, diaphoresis, fatigue, fever and unexpected weight change.   HENT: Negative.    Eyes: Negative.    Respiratory: Negative for cough, chest tightness, shortness of breath and wheezing.    Cardiovascular: Positive for leg swelling. Negative for chest pain and palpitations.   Gastrointestinal: Positive for abdominal distention. Negative for abdominal pain, blood in stool, constipation, " diarrhea, nausea and vomiting.   Endocrine: Negative.    Genitourinary: Negative for dysuria and hematuria.   Musculoskeletal: Negative.    Neurological: Negative for dizziness, seizures, syncope, weakness and light-headedness.   Psychiatric/Behavioral: Negative.      Objective:     Vital Signs (Most Recent):  Temp: 98.3 °F (36.8 °C) (04/27/17 0816)  Pulse: 69 (04/27/17 0816)  Resp: 18 (04/27/17 0816)  BP: (!) 180/74 (04/27/17 0907)  SpO2: (!) 90 % (04/27/17 0816) Vital Signs (24h Range):  Temp:  [97.5 °F (36.4 °C)-99.1 °F (37.3 °C)] 98.3 °F (36.8 °C)  Pulse:  [64-80] 69  Resp:  [18-20] 18  SpO2:  [90 %-100 %] 90 %  BP: (120-193)/(58-84) 180/74     Weight: 93 kg (205 lb)  Body mass index is 26.32 kg/(m^2).    Physical Exam   Constitutional: He is oriented to person, place, and time. He appears well-developed and well-nourished. No distress.   HENT:   Head: Normocephalic and atraumatic.   Right Ear: External ear normal.   Left Ear: External ear normal.   Nose: Nose normal.   Eyes: Right eye exhibits no discharge. Left eye exhibits no discharge.   Neck: Normal range of motion.   Cardiovascular:   regular rate and rhythm with a Grade IV/VI holosystolic murmur   Pulmonary/Chest:   Normal effort with bibasilar crackles   Abdominal:   Distended without tenderness to palpation, normal bowel sounds   Musculoskeletal: Normal range of motion. He exhibits edema.   2+ pitting edema to the level of the lower abdomen.   Neurological: He is alert and oriented to person, place, and time.   Skin: Skin is warm and dry. He is not diaphoretic. No erythema.   Psychiatric: He has a normal mood and affect. His behavior is normal. Judgment and thought content normal.   Nursing note and vitals reviewed.       Significant Labs: All pertinent labs within the past 24 hours have been reviewed.    Significant Imaging: I have reviewed and interpreted all pertinent imaging results/findings within the past 24 hours.    Assessment/Plan:      * Acute  on chronic diastolic heart failure  Patient does have diastolic dysfunction on 2D-echo in Oct 2016 and appears to be acute failure at this time.  He does have significant peripheral and pulmonary edema for which he will receive intravenous diuresis with furosemide.  Will monitor his intake and output closely.legs are still very swollen,with abdominal distention.    Essential hypertension  Patient's blood pressure is fairly-controlled; will continue home regimen of carvedilol, clonidine, doxazosin, hydralazine, and torsemide, and provide as-needed labetalol.    Liver transplanted 6/10/2001  Stable; will continue his home regimen of tacrolimus and encourage continued follow-up with his transplant medicine specialist.      Type 2 diabetes mellitus, controlled, with renal complications  Well-controlled on a home regimen of basal-bolus insulin therapy; will provide basal-bolus insulin along with insulin sliding scale.    History of hepatitis C  Stable; there are no acute issues.    CKD (chronic kidney disease), stage IV  His renal function is at his baseline; will continue to monitor his urine output.    Hyperkalemia  Likely due to his renal disease; will recheck his potassium level in the morning.    KARON (obstructive sleep apnea)  He is noncompliant with CPAP; there are no acute issues.    GERD (gastroesophageal reflux disease)  Will continue his home regimen of pantoprazole.    Anemia of renal disease  The patient's H/H is stable and consistent with previous laboratory measurements, and the patient exhibits no signs or symptoms of acute bleeding; there is no indication for transfusion.  Will continue to monitor.    Cannabis abuse  He has been encouraged on cessation.    Chronic gout  Stable; will continue his home regimen of allopurinol.    Immunosuppressed status  As addressed above.    VTE Risk Mitigation         Ordered     heparin (porcine) injection 5,000 Units  Every 12 hours     Route:  Subcutaneous         04/26/17 2252     Medium Risk of VTE  Once      04/26/17 2252          Shanell Ackerman MD  Department of Hospital Medicine   [facility]

## 2017-04-27 NOTE — NURSING
Philip Del, MRN [id number]  has been verified by ERICA Fernandez and MAIRA Jennings to be wearing tele box #[id number]. Rhythm and correct information is visible on the monitor.

## 2017-04-27 NOTE — ASSESSMENT & PLAN NOTE
Stable; will continue his home regimen of tacrolimus and encourage continued follow-up with his transplant medicine specialist.

## 2017-04-27 NOTE — SUBJECTIVE & OBJECTIVE
Past Medical History:   Diagnosis Date    Anemia     Back pain     Bishop's esophagus     BPH (benign prostatic hypertrophy)     Chronic kidney disease     Cirrhosis     Diabetes mellitus     Diabetes mellitus, type 2     Elevated PSA     Heart failure     Hepatitis Hepatitis C    Hepatitis C     Hyperlipidemia     Hypertension     Hypertension     Liver transplanted     Peripheral neuropathy     Sleep apnea     Transfusion reaction     Hep C from prev. blood transfusion    Transplanted liver     Trouble in sleeping     Type II or unspecified type diabetes mellitus with renal manifestations, uncontrolled     Type II or unspecified type diabetes mellitus with renal manifestations, uncontrolled        Past Surgical History:   Procedure Laterality Date    broken nose      EYE SURGERY      cataracts    FEMUR SURGERY      FRACTURE SURGERY      right femur fracture     LIVER TRANSPLANT  2001    SKIN GRAFT      graft from right thigh , applied to the left back and left arm.       Review of patient's allergies indicates:   Allergen Reactions    Corticosteroids (glucocorticoids) Other (See Comments)     Leg swelling    Hydrocortisone      Leg swelling    Neuromuscular blockers, steroidal      Leg swelling    Ribavirin      Intolerance, arthralgias       No current facility-administered medications on file prior to encounter.      Current Outpatient Prescriptions on File Prior to Encounter   Medication Sig    allopurinol (ZYLOPRIM) 100 MG tablet TAKE 1 TABLET EVERY DAY    carvedilol (COREG) 6.25 MG tablet Take 1 tablet (6.25 mg total) by mouth 2 (two) times daily.    cetirizine (ZYRTEC) 10 MG tablet Take 1 tablet (10 mg total) by mouth 2 (two) times daily. If needed may increase to 3 tablets twice daily to control itching    clonazePAM (KLONOPIN) 0.5 MG tablet Take 1 tablet (0.5 mg total) by mouth every evening. (Patient taking differently: Take 0.5 mg by mouth daily as needed. )     "cloNIDine (CATAPRES) 0.1 MG tablet Take 1 tablet (0.1 mg total) by mouth 3 (three) times daily.    doxazosin (CARDURA) 8 MG Tab Take 1 tablet (8 mg total) by mouth once daily.    ferrous gluconate (FERGON) 324 MG tablet pt taking tid    hydrALAZINE (APRESOLINE) 100 MG tablet Take 1 tablet (100 mg total) by mouth 3 (three) times daily.    insulin aspart (NOVOLOG) 100 unit/mL InPn pen Inject 6 units with meals plus scale 180-230 +1, 231-280 +2, 281-330 +3, 331-380 +4, >380 +5. Max daily dose 33 units. 90 day supply    insulin glargine (LANTUS SOLOSTAR) 100 unit/mL (3 mL) InPn pen Inject 10 Units into the skin every evening.    lactulose (CHRONULAC) 20 gram/30 mL Soln Take 30 mLs (20 g total) by mouth 3 (three) times daily.    montelukast (SINGULAIR) 10 mg tablet Take 1 tablet (10 mg total) by mouth every evening.    multivitamin (THERAGRAN) per tablet Take 1 tablet by mouth Daily.    oxycodone-acetaminophen (PERCOCET) 5-325 mg per tablet Take 1 tablet by mouth every 4 (four) hours as needed for Pain.    pantoprazole (PROTONIX) 40 MG tablet TAKE 1 TABLET ONE TIME DAILY    rifaximin (XIFAXAN) 550 mg Tab Take 1 tablet (550 mg total) by mouth 2 (two) times daily.    tacrolimus (PROGRAF) 0.5 MG Cap Take 1 capsule (0.5 mg total) by mouth once daily.    blood sugar diagnostic (ACCU-CHEK JOANN PLUS TEST STRP) Strp 1 strip by Misc.(Non-Drug; Combo Route) route 4 (four) times daily.    blood-glucose meter (ACCU-CHEK JOANN PLUS METER) Misc Use as directed    ciclopirox (PENLAC) 8 % Soln Apply to affected nails daily x 6-12 mos.  Remove weekly with rubbing alcohol    GENERLAC 10 gram/15 mL solution Use as directed 10 g in the mouth or throat once daily.    glucagon (human recombinant) inj 1mg/mL kit Inject 1 mL (1 mg total) into the muscle as needed.    insulin needles, disposable, (NOVOFINE 32) 32 x 1/4 " Ndle 1 each by Misc.(Non-Drug; Combo Route) route 3 (three) times daily.    lancets (ACCU-CHEK SOFTCLIX " LANCETS) Misc 1 lancet by Misc.(Non-Drug; Combo Route) route 4 (four) times daily.    LANTUS SOLOSTAR 100 unit/mL (3 mL) InPn pen INJECT 10 UNITS UNDER THE SKIN EVERY EVENING    nystatin (MYCOSTATIN) 500,000 unit Tab     ondansetron (ZOFRAN-ODT) 4 MG TbDL DISSOLVE 1 TABLET(4 MG) ON THE TONGUE EVERY 12 HOURS AS NEEDED    oxycodone (OXY-IR) 5 mg Cap Take 1 capsule (5 mg total) by mouth every 4 (four) hours as needed.    senna (SENNA CONCENTRATE) 8.6 mg tablet Take 1 tablet by mouth once daily.    torsemide (DEMADEX) 20 MG Tab Take 2 tablets (40 mg total) by mouth once daily.    urea (CARMOL) 40 % Crea Apply topically once daily.     Family History     Problem Relation (Age of Onset)    Cancer Mother    Coronary artery disease Father    Diabetes Father    Heart disease Father    Hypertension Father        Social History Main Topics    Smoking status: Former Smoker     Quit date: 7/30/1998    Smokeless tobacco: Never Used      Comment: pt reports quitting in 1998    Alcohol use No      Comment: pt reports hx of alcholism and reports quit in 1998    Drug use: Yes     Special: Marijuana      Comment: pt reports smoking THC apprxly twice/week    Sexual activity: Yes     Partners: Female     Review of Systems   Constitutional: Positive for activity change. Negative for appetite change, chills, diaphoresis, fatigue, fever and unexpected weight change.   HENT: Negative.    Eyes: Negative.    Respiratory: Negative for cough, chest tightness, shortness of breath and wheezing.    Cardiovascular: Positive for leg swelling. Negative for chest pain and palpitations.   Gastrointestinal: Positive for abdominal distention. Negative for abdominal pain, blood in stool, constipation, diarrhea, nausea and vomiting.   Endocrine: Negative.    Genitourinary: Negative for dysuria and hematuria.   Musculoskeletal: Negative.    Neurological: Negative for dizziness, seizures, syncope, weakness and light-headedness.    Psychiatric/Behavioral: Negative.      Objective:     Vital Signs (Most Recent):  Temp: 97.9 °F (36.6 °C) (04/26/17 2119)  Pulse: 64 (04/26/17 2119)  Resp: 20 (04/26/17 1630)  BP: (!) 157/74 (04/26/17 2119)  SpO2: (!) 94 % (04/26/17 2119) Vital Signs (24h Range):  Temp:  [97.9 °F (36.6 °C)-99.1 °F (37.3 °C)] 97.9 °F (36.6 °C)  Pulse:  [64-70] 64  Resp:  [20] 20  SpO2:  [90 %-100 %] 94 %  BP: (120-193)/(58-84) 157/74     Weight: 93 kg (205 lb)  Body mass index is 26.32 kg/(m^2).    Physical Exam   Constitutional: He is oriented to person, place, and time. He appears well-developed and well-nourished. No distress.   HENT:   Head: Normocephalic and atraumatic.   Right Ear: External ear normal.   Left Ear: External ear normal.   Nose: Nose normal.   Eyes: Right eye exhibits no discharge. Left eye exhibits no discharge.   Neck: Normal range of motion.   Cardiovascular:   regular rate and rhythm with a Grade IV/VI holosystolic murmur   Pulmonary/Chest:   Normal effort with bibasilar crackles   Abdominal:   Distended without tenderness to palpation, normal bowel sounds   Musculoskeletal: Normal range of motion. He exhibits edema.   2+ pitting edema to the level of the lower abdomen.   Neurological: He is alert and oriented to person, place, and time.   Skin: Skin is warm and dry. He is not diaphoretic. No erythema.   Psychiatric: He has a normal mood and affect. His behavior is normal. Judgment and thought content normal.   Nursing note and vitals reviewed.       Significant Labs: All pertinent labs within the past 24 hours have been reviewed.    Significant Imaging: I have reviewed and interpreted all pertinent imaging results/findings within the past 24 hours.

## 2017-04-27 NOTE — ED NOTES
Report received from CHRISSIE Jacob. Pt lying in bed. Urinal at bedside. Complains of no pain. Respirations even and unlabored. Will continue to monitor.

## 2017-04-27 NOTE — ASSESSMENT & PLAN NOTE
Patient's blood pressure is fairly-controlled; will continue home regimen of carvedilol, clonidine, doxazosin, hydralazine, and torsemide, and provide as-needed labetalol.

## 2017-04-27 NOTE — ASSESSMENT & PLAN NOTE
Patient does have diastolic dysfunction on 2D-echo in Oct 2016 and appears to be acute failure at this time.  He does have significant peripheral and pulmonary edema for which he will receive intravenous diuresis with furosemide.  Will monitor his intake and output closely.

## 2017-04-27 NOTE — PROGRESS NOTES
Removed from CHF list because patient says he is about to start HD, has cath placed...Jada Wylie RN, BSN, STN [facility]  4/27/2017

## 2017-04-28 ENCOUNTER — TELEPHONE (OUTPATIENT)
Dept: FAMILY MEDICINE | Facility: CLINIC | Age: 72
End: 2017-04-28

## 2017-04-28 LAB
ANION GAP SERPL CALC-SCNC: 10 MMOL/L
BUN SERPL-MCNC: 91 MG/DL
CALCIUM SERPL-MCNC: 8.1 MG/DL
CHLORIDE SERPL-SCNC: 110 MMOL/L
CO2 SERPL-SCNC: 25 MMOL/L
CREAT SERPL-MCNC: 3.8 MG/DL
EST. GFR  (AFRICAN AMERICAN): 17 ML/MIN/1.73 M^2
EST. GFR  (NON AFRICAN AMERICAN): 15 ML/MIN/1.73 M^2
GLUCOSE SERPL-MCNC: 94 MG/DL
POCT GLUCOSE: 116 MG/DL (ref 70–110)
POCT GLUCOSE: 121 MG/DL (ref 70–110)
POCT GLUCOSE: 124 MG/DL (ref 70–110)
POCT GLUCOSE: 145 MG/DL (ref 70–110)
POCT GLUCOSE: 90 MG/DL (ref 70–110)
POCT GLUCOSE: 96 MG/DL (ref 70–110)
POTASSIUM SERPL-SCNC: 4.4 MMOL/L
SODIUM SERPL-SCNC: 145 MMOL/L

## 2017-04-28 PROCEDURE — 80048 BASIC METABOLIC PNL TOTAL CA: CPT | Mod: NTX

## 2017-04-28 PROCEDURE — 63600175 PHARM REV CODE 636 W HCPCS: Mod: NTX | Performed by: HOSPITALIST

## 2017-04-28 PROCEDURE — 36415 COLL VENOUS BLD VENIPUNCTURE: CPT | Mod: NTX

## 2017-04-28 PROCEDURE — 25000003 PHARM REV CODE 250: Mod: NTX | Performed by: EMERGENCY MEDICINE

## 2017-04-28 PROCEDURE — 25000003 PHARM REV CODE 250: Mod: NTX | Performed by: HOSPITALIST

## 2017-04-28 PROCEDURE — 12000002 HC ACUTE/MED SURGE SEMI-PRIVATE ROOM: Mod: NTX

## 2017-04-28 PROCEDURE — 25000003 PHARM REV CODE 250: Mod: NTX | Performed by: INTERNAL MEDICINE

## 2017-04-28 RX ORDER — HYDROXYZINE HYDROCHLORIDE 25 MG/1
25 TABLET, FILM COATED ORAL 3 TIMES DAILY PRN
Status: DISCONTINUED | OUTPATIENT
Start: 2017-04-28 | End: 2017-04-29 | Stop reason: HOSPADM

## 2017-04-28 RX ORDER — HYDROXYZINE HYDROCHLORIDE 25 MG/1
50 TABLET, FILM COATED ORAL 3 TIMES DAILY PRN
Qty: 60 TABLET | Refills: 0 | Status: SHIPPED | OUTPATIENT
Start: 2017-04-28 | End: 2018-01-16

## 2017-04-28 RX ADMIN — INSULIN DETEMIR 10 UNITS: 100 INJECTION, SOLUTION SUBCUTANEOUS at 09:04

## 2017-04-28 RX ADMIN — HYDROXYZINE HYDROCHLORIDE 25 MG: 25 TABLET, FILM COATED ORAL at 01:04

## 2017-04-28 RX ADMIN — HYDRALAZINE HYDROCHLORIDE 100 MG: 25 TABLET ORAL at 09:04

## 2017-04-28 RX ADMIN — RIFAXIMIN 550 MG: 550 TABLET ORAL at 08:04

## 2017-04-28 RX ADMIN — FUROSEMIDE 40 MG: 10 INJECTION, SOLUTION INTRAMUSCULAR; INTRAVENOUS at 08:04

## 2017-04-28 RX ADMIN — INSULIN ASPART 3 UNITS: 100 INJECTION, SOLUTION INTRAVENOUS; SUBCUTANEOUS at 08:04

## 2017-04-28 RX ADMIN — DOXAZOSIN MESYLATE 8 MG: 1 TABLET ORAL at 08:04

## 2017-04-28 RX ADMIN — INSULIN ASPART 3 UNITS: 100 INJECTION, SOLUTION INTRAVENOUS; SUBCUTANEOUS at 05:04

## 2017-04-28 RX ADMIN — CARVEDILOL 6.25 MG: 6.25 TABLET, FILM COATED ORAL at 08:04

## 2017-04-28 RX ADMIN — TACROLIMUS 0.5 MG: 0.5 CAPSULE ORAL at 08:04

## 2017-04-28 RX ADMIN — CLONIDINE HYDROCHLORIDE 0.1 MG: 0.1 TABLET ORAL at 09:04

## 2017-04-28 RX ADMIN — HYDRALAZINE HYDROCHLORIDE 100 MG: 25 TABLET ORAL at 05:04

## 2017-04-28 RX ADMIN — HYDRALAZINE HYDROCHLORIDE 100 MG: 25 TABLET ORAL at 02:04

## 2017-04-28 RX ADMIN — RIFAXIMIN 550 MG: 550 TABLET ORAL at 09:04

## 2017-04-28 RX ADMIN — ALLOPURINOL 100 MG: 100 TABLET ORAL at 08:04

## 2017-04-28 RX ADMIN — PANTOPRAZOLE SODIUM 40 MG: 40 TABLET, DELAYED RELEASE ORAL at 08:04

## 2017-04-28 RX ADMIN — FUROSEMIDE 40 MG: 10 INJECTION, SOLUTION INTRAMUSCULAR; INTRAVENOUS at 07:04

## 2017-04-28 RX ADMIN — INSULIN ASPART 3 UNITS: 100 INJECTION, SOLUTION INTRAVENOUS; SUBCUTANEOUS at 01:04

## 2017-04-28 RX ADMIN — CLONIDINE HYDROCHLORIDE 0.1 MG: 0.1 TABLET ORAL at 05:04

## 2017-04-28 RX ADMIN — HEPARIN SODIUM 5000 UNITS: 5000 INJECTION, SOLUTION INTRAVENOUS; SUBCUTANEOUS at 08:04

## 2017-04-28 RX ADMIN — CARVEDILOL 6.25 MG: 6.25 TABLET, FILM COATED ORAL at 09:04

## 2017-04-28 RX ADMIN — CLONIDINE HYDROCHLORIDE 0.1 MG: 0.1 TABLET ORAL at 02:04

## 2017-04-28 RX ADMIN — HEPARIN SODIUM 5000 UNITS: 5000 INJECTION, SOLUTION INTRAVENOUS; SUBCUTANEOUS at 09:04

## 2017-04-28 NOTE — TELEPHONE ENCOUNTER
----- Message from Lissa Daniels sent at 4/28/2017  2:48 PM CDT -----  Contact: Wife  Pt states her  is currently in hospital and she would like to discuss treatment. Pt can be reached @ [phone]

## 2017-04-28 NOTE — PLAN OF CARE
Problem: Patient Care Overview  Goal: Plan of Care Review  Outcome: Ongoing (interventions implemented as appropriate)  Pt ambulates well, free from falls and injury, voids, comfort met to allow periods of rest, VS stable.

## 2017-04-28 NOTE — PROGRESS NOTES
[facility] Hospital Medicine  Progress Note    Patient Name: Philip Mathis III  MRN: [id number]  Patient Class: IP- Inpatient   Admission Date: 4/26/2017  Length of Stay: 2 days  Attending Physician: Shanell Ackerman MD  Primary Care Provider: Donnie Owens NP        Subjective:     Principal Problem:Acute on chronic diastolic heart failure    HPI:  Mr. Philip Mtahis III is a 71 y.o. male with essential hypertension, type 2 diabetes mellitus (HbA1c 5.5% Jan 2017), CKD Stage V, chronic diastolic heart failure (LVEF 60-65% Oct 2016), KARON, GERD, anemia of chronic disease, cannabis abuse, chronic gout, chronic hepatitis C, and history of liver transplant on chronic immunosuppression who presents to [facility] ED with complaints of lower extremity edema for two months.  He reports that the swelling has progressively worsened to the point that he can barely ambulate.  He also says that his abdomen has become more distended and that his pants no longer fit.  He sleeps in a chair which is not new for him.  He denies any chest pain, palpitations, dyspnea on exertion, orthopnea, PND, fevers, chills, coughing, hemoptysis, nor any lower extremity pain.  He has not had any lower extremity injury.    Hospital Course:  Mr. Philip Mathis III is a 71 y.o. male with essential hypertension, type 2 diabetes mellitus (HbA1c 5.5% Jan 2017), CKD Stage V, chronic diastolic heart failure (LVEF 60-65% Oct 2016), KARON, GERD, anemia of chronic disease, cannabis abuse, chronic gout, chronic hepatitis C, and history of liver transplant on chronic immunosuppression who presents to [facility] ED with complaints of lower extremity edema for two months.  He reports that the swelling has progressively worsened to the point that he can barely ambulate.  He also says that his abdomen has become more distended and that his pants no longer fit.  He sleeps in a chair which is not new for him.  He denies any chest pain, palpitations, dyspnea  on exertion, orthopnea, PND, fevers, chills, coughing, hemoptysis, nor any lower extremity pain.  He has not had any lower extremity injury.he has been stared on IV lasix for A/C diastolic CHF exacerbation,his legs are still very swollen,but improving.    Past Medical History:   Diagnosis Date    Anemia     Back pain     Bishop's esophagus     BPH (benign prostatic hypertrophy)     Chronic kidney disease     Cirrhosis     Diabetes mellitus     Diabetes mellitus, type 2     Elevated PSA     Heart failure     Hepatitis Hepatitis C    Hepatitis C     Hyperlipidemia     Hypertension     Hypertension     Liver transplanted     Peripheral neuropathy     Sleep apnea     Transfusion reaction     Hep C from prev. blood transfusion    Transplanted liver     Trouble in sleeping     Type II or unspecified type diabetes mellitus with renal manifestations, uncontrolled     Type II or unspecified type diabetes mellitus with renal manifestations, uncontrolled        Past Surgical History:   Procedure Laterality Date    broken nose      EYE SURGERY      cataracts    FEMUR SURGERY      FRACTURE SURGERY      right femur fracture     LIVER TRANSPLANT  2001    SKIN GRAFT      graft from right thigh , applied to the left back and left arm.       Review of patient's allergies indicates:   Allergen Reactions    Corticosteroids (glucocorticoids) Other (See Comments)     Leg swelling    Hydrocortisone      Leg swelling    Neuromuscular blockers, steroidal      Leg swelling    Ribavirin      Intolerance, arthralgias       No current facility-administered medications on file prior to encounter.      Current Outpatient Prescriptions on File Prior to Encounter   Medication Sig    allopurinol (ZYLOPRIM) 100 MG tablet TAKE 1 TABLET EVERY DAY    carvedilol (COREG) 6.25 MG tablet Take 1 tablet (6.25 mg total) by mouth 2 (two) times daily.    cetirizine (ZYRTEC) 10 MG tablet Take 1 tablet (10 mg total) by mouth 2  (two) times daily. If needed may increase to 3 tablets twice daily to control itching    clonazePAM (KLONOPIN) 0.5 MG tablet Take 1 tablet (0.5 mg total) by mouth every evening. (Patient taking differently: Take 0.5 mg by mouth daily as needed. )    cloNIDine (CATAPRES) 0.1 MG tablet Take 1 tablet (0.1 mg total) by mouth 3 (three) times daily.    doxazosin (CARDURA) 8 MG Tab Take 1 tablet (8 mg total) by mouth once daily.    ferrous gluconate (FERGON) 324 MG tablet pt taking tid    hydrALAZINE (APRESOLINE) 100 MG tablet Take 1 tablet (100 mg total) by mouth 3 (three) times daily.    insulin aspart (NOVOLOG) 100 unit/mL InPn pen Inject 6 units with meals plus scale 180-230 +1, 231-280 +2, 281-330 +3, 331-380 +4, >380 +5. Max daily dose 33 units. 90 day supply    insulin glargine (LANTUS SOLOSTAR) 100 unit/mL (3 mL) InPn pen Inject 10 Units into the skin every evening.    lactulose (CHRONULAC) 20 gram/30 mL Soln Take 30 mLs (20 g total) by mouth 3 (three) times daily.    montelukast (SINGULAIR) 10 mg tablet Take 1 tablet (10 mg total) by mouth every evening.    multivitamin (THERAGRAN) per tablet Take 1 tablet by mouth Daily.    oxycodone-acetaminophen (PERCOCET) 5-325 mg per tablet Take 1 tablet by mouth every 4 (four) hours as needed for Pain.    pantoprazole (PROTONIX) 40 MG tablet TAKE 1 TABLET ONE TIME DAILY    rifaximin (XIFAXAN) 550 mg Tab Take 1 tablet (550 mg total) by mouth 2 (two) times daily.    tacrolimus (PROGRAF) 0.5 MG Cap Take 1 capsule (0.5 mg total) by mouth once daily.    blood sugar diagnostic (ACCU-CHEK JOANN PLUS TEST STRP) Strp 1 strip by Misc.(Non-Drug; Combo Route) route 4 (four) times daily.    blood-glucose meter (ACCU-CHEK JOANN PLUS METER) Misc Use as directed    ciclopirox (PENLAC) 8 % Soln Apply to affected nails daily x 6-12 mos.  Remove weekly with rubbing alcohol    GENERLAC 10 gram/15 mL solution Use as directed 10 g in the mouth or throat once daily.    glucagon  "(human recombinant) inj 1mg/mL kit Inject 1 mL (1 mg total) into the muscle as needed.    insulin needles, disposable, (NOVOFINE 32) 32 x 1/4 " Ndle 1 each by Misc.(Non-Drug; Combo Route) route 3 (three) times daily.    lancets (ACCU-CHEK SOFTCLIX LANCETS) Misc 1 lancet by Misc.(Non-Drug; Combo Route) route 4 (four) times daily.    LANTUS SOLOSTAR 100 unit/mL (3 mL) InPn pen INJECT 10 UNITS UNDER THE SKIN EVERY EVENING    nystatin (MYCOSTATIN) 500,000 unit Tab     ondansetron (ZOFRAN-ODT) 4 MG TbDL DISSOLVE 1 TABLET(4 MG) ON THE TONGUE EVERY 12 HOURS AS NEEDED    oxycodone (OXY-IR) 5 mg Cap Take 1 capsule (5 mg total) by mouth every 4 (four) hours as needed.    senna (SENNA CONCENTRATE) 8.6 mg tablet Take 1 tablet by mouth once daily.    torsemide (DEMADEX) 20 MG Tab Take 2 tablets (40 mg total) by mouth once daily.    urea (CARMOL) 40 % Crea Apply topically once daily.     Family History     Problem Relation (Age of Onset)    Cancer Mother    Coronary artery disease Father    Diabetes Father    Heart disease Father    Hypertension Father        Social History Main Topics    Smoking status: Former Smoker     Quit date: 7/30/1998    Smokeless tobacco: Never Used      Comment: pt reports quitting in 1998    Alcohol use No      Comment: pt reports hx of alcholism and reports quit in 1998    Drug use: Yes     Special: Marijuana      Comment: pt reports smoking THC apprxly twice/week    Sexual activity: Yes     Partners: Female     Review of Systems   Constitutional: Positive for activity change. Negative for appetite change, chills, diaphoresis, fatigue, fever and unexpected weight change.   HENT: Negative.    Eyes: Negative.    Respiratory: Negative for cough, chest tightness, shortness of breath and wheezing.    Cardiovascular: Positive for leg swelling. Negative for chest pain and palpitations.   Gastrointestinal: Positive for abdominal distention. Negative for abdominal pain, blood in stool, " constipation, diarrhea, nausea and vomiting.   Endocrine: Negative.    Genitourinary: Negative for dysuria and hematuria.   Musculoskeletal: Negative.    Neurological: Negative for dizziness, seizures, syncope, weakness and light-headedness.   Psychiatric/Behavioral: Negative.      Objective:     Vital Signs (Most Recent):  Temp: 97.9 °F (36.6 °C) (04/28/17 0730)  Pulse: (!) 58 (04/28/17 0758)  Resp: 18 (04/28/17 0730)  BP: (!) 190/86 (04/28/17 0836)  SpO2: 95 % (04/28/17 0730) Vital Signs (24h Range):  Temp:  [97.2 °F (36.2 °C)-98.2 °F (36.8 °C)] 97.9 °F (36.6 °C)  Pulse:  [] 58  Resp:  [18] 18  SpO2:  [93 %-97 %] 95 %  BP: (103-190)/(63-86) 190/86     Weight: 93 kg (205 lb)  Body mass index is 26.32 kg/(m^2).    Physical Exam   Constitutional: He is oriented to person, place, and time. He appears well-developed and well-nourished. No distress.   HENT:   Head: Normocephalic and atraumatic.   Right Ear: External ear normal.   Left Ear: External ear normal.   Nose: Nose normal.   Eyes: Right eye exhibits no discharge. Left eye exhibits no discharge.   Neck: Normal range of motion.   Cardiovascular:   regular rate and rhythm with a Grade IV/VI holosystolic murmur   Pulmonary/Chest:   Normal effort with bibasilar crackles   Abdominal:   Distended without tenderness to palpation, normal bowel sounds   Musculoskeletal: Normal range of motion. He exhibits edema.   2+ pitting edema to the level of the lower abdomen.   Neurological: He is alert and oriented to person, place, and time.   Skin: Skin is warm and dry. He is not diaphoretic. No erythema.   Psychiatric: He has a normal mood and affect. His behavior is normal. Judgment and thought content normal.   Nursing note and vitals reviewed.       Significant Labs: All pertinent labs within the past 24 hours have been reviewed.    Significant Imaging: I have reviewed and interpreted all pertinent imaging results/findings within the past 24 hours.    Assessment/Plan:       * Acute on chronic diastolic heart failure  Patient does have diastolic dysfunction on 2D-echo in Oct 2016 and appears to be acute failure at this time.  He does have significant peripheral and pulmonary edema for which he will receive intravenous diuresis with furosemide.  Will monitor his intake and output closely.legs are still very swollen,with abdominal distention,but improving,    Essential hypertension  Patient's blood pressure is fairly-controlled; will continue home regimen of carvedilol, clonidine, doxazosin, hydralazine, and torsemide, and provide as-needed labetalol.    Liver transplanted 6/10/2001  Stable; will continue his home regimen of tacrolimus and encourage continued follow-up with his transplant medicine specialist.      Type 2 diabetes mellitus, controlled, with renal complications  Well-controlled on a home regimen of basal-bolus insulin therapy; will provide basal-bolus insulin along with insulin sliding scale.    History of hepatitis C  Stable; there are no acute issues.    CKD (chronic kidney disease), stage IV  His renal function is at his baseline; will continue to monitor his urine output.    Hyperkalemia  Likely due to his renal disease; improved.    KARON (obstructive sleep apnea)  He is noncompliant with CPAP; there are no acute issues.    GERD (gastroesophageal reflux disease)  Will continue his home regimen of pantoprazole.    Anemia of renal disease  The patient's H/H is stable and consistent with previous laboratory measurements, and the patient exhibits no signs or symptoms of acute bleeding; there is no indication for transfusion.  Will continue to monitor.    Cannabis abuse  He has been encouraged on cessation.    Chronic gout  Stable; will continue his home regimen of allopurinol.    Immunosuppressed status  As addressed above.    VTE Risk Mitigation         Ordered     heparin (porcine) injection 5,000 Units  Every 12 hours     Route:  Subcutaneous        04/26/17 1166      Medium Risk of VTE  Once      04/26/17 4436          Shanell Ackerman MD  Department of Hospital Medicine   [facility]

## 2017-04-28 NOTE — PROGRESS NOTES
WRITTEN DISCHARGE INFORMATION:     Follow-up Information     Follow up with Donnie Owens NP On 5/5/2017.    Specialty:  Internal Medicine    Why:  out patient services:10:40 a.m. follow up from the hospital    Contact information:    [address] [address]  [phone]                                                                Help at Home  After discharge for assistance [facility] 24/7 assistance  [phone]     Thank you for choosing [facility] for your care.  Please answer any calls you may receive from [facility] we want to continue to support you as you manage your healthcare needs. [facility] is happy to have the opportunity to serve you.   Sincerely, Your [facility] Healthcare Manager is,  Jada Wylie RN [facility] [phone]

## 2017-04-28 NOTE — ASSESSMENT & PLAN NOTE
Patient does have diastolic dysfunction on 2D-echo in Oct 2016 and appears to be acute failure at this time.  He does have significant peripheral and pulmonary edema for which he will receive intravenous diuresis with furosemide.  Will monitor his intake and output closely.legs are still very swollen,with abdominal distention,but improving,

## 2017-04-28 NOTE — NURSING
Telemetry box and monitor # 2150 checked with off going nurse Ms Marquita DICKENS. Waveform noted on monitor. Denies pain. Av shunt to left upper arm with good bruits and thrills

## 2017-04-28 NOTE — TELEPHONE ENCOUNTER
Stated,  is itching really bad. Hospital plans are to treat him with medication (Atarax). He scratches so much until he bleeds. Patient is being discharged tomorrow. Wanted to know if you can send to [facility] the same medication (Atarax) and/or suggest something else for the itching.

## 2017-04-28 NOTE — PLAN OF CARE
Problem: Patient Care Overview  Goal: Plan of Care Review  Outcome: Ongoing (interventions implemented as appropriate)    04/27/17 5040   Coping/Psychosocial   Plan Of Care Reviewed With patient      Patient remained free from falls, trauma, and injuries throughout shift.  No complaints of pain, nausea, or vomiting.  Medications administered as prescribed.  Blood pressure controlled with antihypertensives.  Blood glucose controlled with scheduled insulin.  Patient sat up in chair for most of the shift.  Ambulated to and from bathroom with minimal assistance.   No acute distress noted.

## 2017-04-28 NOTE — SUBJECTIVE & OBJECTIVE
Past Medical History:   Diagnosis Date    Anemia     Back pain     Bishop's esophagus     BPH (benign prostatic hypertrophy)     Chronic kidney disease     Cirrhosis     Diabetes mellitus     Diabetes mellitus, type 2     Elevated PSA     Heart failure     Hepatitis Hepatitis C    Hepatitis C     Hyperlipidemia     Hypertension     Hypertension     Liver transplanted     Peripheral neuropathy     Sleep apnea     Transfusion reaction     Hep C from prev. blood transfusion    Transplanted liver     Trouble in sleeping     Type II or unspecified type diabetes mellitus with renal manifestations, uncontrolled     Type II or unspecified type diabetes mellitus with renal manifestations, uncontrolled        Past Surgical History:   Procedure Laterality Date    broken nose      EYE SURGERY      cataracts    FEMUR SURGERY      FRACTURE SURGERY      right femur fracture     LIVER TRANSPLANT  2001    SKIN GRAFT      graft from right thigh , applied to the left back and left arm.       Review of patient's allergies indicates:   Allergen Reactions    Corticosteroids (glucocorticoids) Other (See Comments)     Leg swelling    Hydrocortisone      Leg swelling    Neuromuscular blockers, steroidal      Leg swelling    Ribavirin      Intolerance, arthralgias       No current facility-administered medications on file prior to encounter.      Current Outpatient Prescriptions on File Prior to Encounter   Medication Sig    allopurinol (ZYLOPRIM) 100 MG tablet TAKE 1 TABLET EVERY DAY    carvedilol (COREG) 6.25 MG tablet Take 1 tablet (6.25 mg total) by mouth 2 (two) times daily.    cetirizine (ZYRTEC) 10 MG tablet Take 1 tablet (10 mg total) by mouth 2 (two) times daily. If needed may increase to 3 tablets twice daily to control itching    clonazePAM (KLONOPIN) 0.5 MG tablet Take 1 tablet (0.5 mg total) by mouth every evening. (Patient taking differently: Take 0.5 mg by mouth daily as needed. )     "cloNIDine (CATAPRES) 0.1 MG tablet Take 1 tablet (0.1 mg total) by mouth 3 (three) times daily.    doxazosin (CARDURA) 8 MG Tab Take 1 tablet (8 mg total) by mouth once daily.    ferrous gluconate (FERGON) 324 MG tablet pt taking tid    hydrALAZINE (APRESOLINE) 100 MG tablet Take 1 tablet (100 mg total) by mouth 3 (three) times daily.    insulin aspart (NOVOLOG) 100 unit/mL InPn pen Inject 6 units with meals plus scale 180-230 +1, 231-280 +2, 281-330 +3, 331-380 +4, >380 +5. Max daily dose 33 units. 90 day supply    insulin glargine (LANTUS SOLOSTAR) 100 unit/mL (3 mL) InPn pen Inject 10 Units into the skin every evening.    lactulose (CHRONULAC) 20 gram/30 mL Soln Take 30 mLs (20 g total) by mouth 3 (three) times daily.    montelukast (SINGULAIR) 10 mg tablet Take 1 tablet (10 mg total) by mouth every evening.    multivitamin (THERAGRAN) per tablet Take 1 tablet by mouth Daily.    oxycodone-acetaminophen (PERCOCET) 5-325 mg per tablet Take 1 tablet by mouth every 4 (four) hours as needed for Pain.    pantoprazole (PROTONIX) 40 MG tablet TAKE 1 TABLET ONE TIME DAILY    rifaximin (XIFAXAN) 550 mg Tab Take 1 tablet (550 mg total) by mouth 2 (two) times daily.    tacrolimus (PROGRAF) 0.5 MG Cap Take 1 capsule (0.5 mg total) by mouth once daily.    blood sugar diagnostic (ACCU-CHEK JOANN PLUS TEST STRP) Strp 1 strip by Misc.(Non-Drug; Combo Route) route 4 (four) times daily.    blood-glucose meter (ACCU-CHEK JOANN PLUS METER) Misc Use as directed    ciclopirox (PENLAC) 8 % Soln Apply to affected nails daily x 6-12 mos.  Remove weekly with rubbing alcohol    GENERLAC 10 gram/15 mL solution Use as directed 10 g in the mouth or throat once daily.    glucagon (human recombinant) inj 1mg/mL kit Inject 1 mL (1 mg total) into the muscle as needed.    insulin needles, disposable, (NOVOFINE 32) 32 x 1/4 " Ndle 1 each by Misc.(Non-Drug; Combo Route) route 3 (three) times daily.    lancets (ACCU-CHEK SOFTCLIX " LANCETS) Misc 1 lancet by Misc.(Non-Drug; Combo Route) route 4 (four) times daily.    LANTUS SOLOSTAR 100 unit/mL (3 mL) InPn pen INJECT 10 UNITS UNDER THE SKIN EVERY EVENING    nystatin (MYCOSTATIN) 500,000 unit Tab     ondansetron (ZOFRAN-ODT) 4 MG TbDL DISSOLVE 1 TABLET(4 MG) ON THE TONGUE EVERY 12 HOURS AS NEEDED    oxycodone (OXY-IR) 5 mg Cap Take 1 capsule (5 mg total) by mouth every 4 (four) hours as needed.    senna (SENNA CONCENTRATE) 8.6 mg tablet Take 1 tablet by mouth once daily.    torsemide (DEMADEX) 20 MG Tab Take 2 tablets (40 mg total) by mouth once daily.    urea (CARMOL) 40 % Crea Apply topically once daily.     Family History     Problem Relation (Age of Onset)    Cancer Mother    Coronary artery disease Father    Diabetes Father    Heart disease Father    Hypertension Father        Social History Main Topics    Smoking status: Former Smoker     Quit date: 7/30/1998    Smokeless tobacco: Never Used      Comment: pt reports quitting in 1998    Alcohol use No      Comment: pt reports hx of alcholism and reports quit in 1998    Drug use: Yes     Special: Marijuana      Comment: pt reports smoking THC apprxly twice/week    Sexual activity: Yes     Partners: Female     Review of Systems   Constitutional: Positive for activity change. Negative for appetite change, chills, diaphoresis, fatigue, fever and unexpected weight change.   HENT: Negative.    Eyes: Negative.    Respiratory: Negative for cough, chest tightness, shortness of breath and wheezing.    Cardiovascular: Positive for leg swelling. Negative for chest pain and palpitations.   Gastrointestinal: Positive for abdominal distention. Negative for abdominal pain, blood in stool, constipation, diarrhea, nausea and vomiting.   Endocrine: Negative.    Genitourinary: Negative for dysuria and hematuria.   Musculoskeletal: Negative.    Neurological: Negative for dizziness, seizures, syncope, weakness and light-headedness.    Psychiatric/Behavioral: Negative.      Objective:     Vital Signs (Most Recent):  Temp: 97.9 °F (36.6 °C) (04/28/17 0730)  Pulse: (!) 58 (04/28/17 0758)  Resp: 18 (04/28/17 0730)  BP: (!) 190/86 (04/28/17 0836)  SpO2: 95 % (04/28/17 0730) Vital Signs (24h Range):  Temp:  [97.2 °F (36.2 °C)-98.2 °F (36.8 °C)] 97.9 °F (36.6 °C)  Pulse:  [] 58  Resp:  [18] 18  SpO2:  [93 %-97 %] 95 %  BP: (103-190)/(63-86) 190/86     Weight: 93 kg (205 lb)  Body mass index is 26.32 kg/(m^2).    Physical Exam   Constitutional: He is oriented to person, place, and time. He appears well-developed and well-nourished. No distress.   HENT:   Head: Normocephalic and atraumatic.   Right Ear: External ear normal.   Left Ear: External ear normal.   Nose: Nose normal.   Eyes: Right eye exhibits no discharge. Left eye exhibits no discharge.   Neck: Normal range of motion.   Cardiovascular:   regular rate and rhythm with a Grade IV/VI holosystolic murmur   Pulmonary/Chest:   Normal effort with bibasilar crackles   Abdominal:   Distended without tenderness to palpation, normal bowel sounds   Musculoskeletal: Normal range of motion. He exhibits edema.   2+ pitting edema to the level of the lower abdomen.   Neurological: He is alert and oriented to person, place, and time.   Skin: Skin is warm and dry. He is not diaphoretic. No erythema.   Psychiatric: He has a normal mood and affect. His behavior is normal. Judgment and thought content normal.   Nursing note and vitals reviewed.       Significant Labs: All pertinent labs within the past 24 hours have been reviewed.    Significant Imaging: I have reviewed and interpreted all pertinent imaging results/findings within the past 24 hours.

## 2017-04-29 VITALS
BODY MASS INDEX: 25.92 KG/M2 | OXYGEN SATURATION: 100 % | TEMPERATURE: 99 F | WEIGHT: 201.94 LBS | RESPIRATION RATE: 18 BRPM | DIASTOLIC BLOOD PRESSURE: 72 MMHG | HEIGHT: 74 IN | HEART RATE: 65 BPM | SYSTOLIC BLOOD PRESSURE: 168 MMHG

## 2017-04-29 PROBLEM — I50.33 ACUTE ON CHRONIC DIASTOLIC HEART FAILURE: Status: RESOLVED | Noted: 2017-04-26 | Resolved: 2017-04-29

## 2017-04-29 LAB
ANION GAP SERPL CALC-SCNC: 9 MMOL/L
BUN SERPL-MCNC: 87 MG/DL
CALCIUM SERPL-MCNC: 8.2 MG/DL
CHLORIDE SERPL-SCNC: 110 MMOL/L
CO2 SERPL-SCNC: 28 MMOL/L
CREAT SERPL-MCNC: 3.7 MG/DL
EST. GFR  (AFRICAN AMERICAN): 18 ML/MIN/1.73 M^2
EST. GFR  (NON AFRICAN AMERICAN): 15 ML/MIN/1.73 M^2
GLUCOSE SERPL-MCNC: 61 MG/DL
POCT GLUCOSE: 105 MG/DL (ref 70–110)
POCT GLUCOSE: 64 MG/DL (ref 70–110)
POTASSIUM SERPL-SCNC: 4.3 MMOL/L
SODIUM SERPL-SCNC: 147 MMOL/L

## 2017-04-29 PROCEDURE — 25000003 PHARM REV CODE 250: Mod: NTX | Performed by: HOSPITALIST

## 2017-04-29 PROCEDURE — 25000003 PHARM REV CODE 250: Mod: NTX | Performed by: EMERGENCY MEDICINE

## 2017-04-29 PROCEDURE — 63600175 PHARM REV CODE 636 W HCPCS: Mod: NTX | Performed by: HOSPITALIST

## 2017-04-29 PROCEDURE — 80048 BASIC METABOLIC PNL TOTAL CA: CPT | Mod: NTX

## 2017-04-29 PROCEDURE — 36415 COLL VENOUS BLD VENIPUNCTURE: CPT | Mod: NTX

## 2017-04-29 PROCEDURE — 25000003 PHARM REV CODE 250: Mod: NTX | Performed by: INTERNAL MEDICINE

## 2017-04-29 RX ADMIN — LABETALOL HYDROCHLORIDE 10 MG: 5 INJECTION, SOLUTION INTRAVENOUS at 11:04

## 2017-04-29 RX ADMIN — RIFAXIMIN 550 MG: 550 TABLET ORAL at 08:04

## 2017-04-29 RX ADMIN — CARVEDILOL 6.25 MG: 6.25 TABLET, FILM COATED ORAL at 08:04

## 2017-04-29 RX ADMIN — CLONIDINE HYDROCHLORIDE 0.1 MG: 0.1 TABLET ORAL at 05:04

## 2017-04-29 RX ADMIN — FUROSEMIDE 40 MG: 10 INJECTION, SOLUTION INTRAMUSCULAR; INTRAVENOUS at 08:04

## 2017-04-29 RX ADMIN — LABETALOL HYDROCHLORIDE 10 MG: 5 INJECTION, SOLUTION INTRAVENOUS at 06:04

## 2017-04-29 RX ADMIN — HYDRALAZINE HYDROCHLORIDE 100 MG: 25 TABLET ORAL at 05:04

## 2017-04-29 RX ADMIN — ALLOPURINOL 100 MG: 100 TABLET ORAL at 08:04

## 2017-04-29 RX ADMIN — DOXAZOSIN MESYLATE 8 MG: 1 TABLET ORAL at 08:04

## 2017-04-29 RX ADMIN — HEPARIN SODIUM 5000 UNITS: 5000 INJECTION, SOLUTION INTRAVENOUS; SUBCUTANEOUS at 08:04

## 2017-04-29 RX ADMIN — PANTOPRAZOLE SODIUM 40 MG: 40 TABLET, DELAYED RELEASE ORAL at 08:04

## 2017-04-29 RX ADMIN — TACROLIMUS 0.5 MG: 0.5 CAPSULE ORAL at 08:04

## 2017-04-29 NOTE — PLAN OF CARE
04/29/17 1159   Final Note   Assessment Type Final Discharge Note   Discharge Disposition Home   Discharge planning education complete? Yes   What phone number can be called within the next 1-3 days to see how you are doing after discharge? (see chart)   Hospital Follow Up  Appt(s) scheduled? Yes   Discharge plans and expectations educations in teach back method with documentation complete? Yes   Offered [facility] Bedside Delivery? n/a   Schedule Hospital follow up within 4-7 days   Discharge/Hospital Encounter Summary to (non[facility]) PCP No   Referral to Outpatient Case Management complete? Yes   Referral to / orders for Home Health Complete? n/a   30 day supply of medicines given at discharge, if documented non-compliance / non-adherence? n/a   Any social issues identified prior to discharge? No   Did you assess the readiness or willingness of the family or caregiver to support self management of care? Yes   Right Care Referral Info   Post Acute Recommendation No Care

## 2017-04-29 NOTE — PLAN OF CARE
Problem: Patient Care Overview  Goal: Plan of Care Review  Outcome: Ongoing (interventions implemented as appropriate)  Resting appropriately this shift. Elevated blood pressure managed with scheduled and PRN medication. Eager for discharge. Remains free from falls or further trauma.

## 2017-04-29 NOTE — PROGRESS NOTES
".CHF "Heart Failure Zones" discussed with patient / family member.  Information placed in blue "My Health Packet".  The  Green is the goal zone noted by no SOB or swelling; Yellow the Warning zone, that has the following signs and symptoms: swelling, more shortness of breath, and tiredness.  (The patient or family member will call MD's office) and Red zone:  Emergency Zone,  signs and symptoms:  Patient is struggling to breathe, is confused and has chest pain.  CALL 911.  TN addressed all questions  And utilized Teach Back.  TN / SW contact information given to pt / family member for further assistance..Jada Wylie RN, BSN, STN [facility]  4/29/2017            "

## 2017-04-29 NOTE — PLAN OF CARE
04/29/17 0907   Medicare Message   Important Message from Medicare regarding Discharge Appeal Rights Given to patient/caregiver;Explained to patient/caregiver;Signed/date by patient/caregiver   Date IMM was signed 04/29/17   Time IMM was signed 0907

## 2017-04-29 NOTE — NURSING
Philip MORELAND Del III, MRN [id number]  has been verified by BRITATNY Dumont and MAIRA Jennings to be wearing tele box # [id number]. Rhythm and correct information is visible on the monitor.

## 2017-04-29 NOTE — PROGRESS NOTES
Patient stated that he was told by MD yesterday that he will be discharged today. Patient states he is leaving today and will sign AMA papers if he is not discharged.

## 2017-04-29 NOTE — DISCHARGE SUMMARY
[facility] Hospital Medicine  Discharge Summary      Patient Name: Philip Mathis III  MRN: [id number]  Admission Date: 4/26/2017  Hospital Length of Stay: 3 days  Discharge Date and Time:  04/29/2017 11:31 AM  Attending Physician: Keyla Denis MD   Discharging Provider: Keyla Denis MD  Primary Care Provider: Donnie Owens NP      HPI:   Mr. Philip Mathis III is a 71 y.o. male with essential hypertension, type 2 diabetes mellitus (HbA1c 5.5% Jan 2017), CKD Stage V, chronic diastolic heart failure (LVEF 60-65% Oct 2016), KARON, GERD, anemia of chronic disease, cannabis abuse, chronic gout, chronic hepatitis C, and history of liver transplant on chronic immunosuppression who presents to [facility] ED with complaints of lower extremity edema for two months.  He reports that the swelling has progressively worsened to the point that he can barely ambulate.  He also says that his abdomen has become more distended and that his pants no longer fit.  He sleeps in a chair which is not new for him.  He denies any chest pain, palpitations, dyspnea on exertion, orthopnea, PND, fevers, chills, coughing, hemoptysis, nor any lower extremity pain.  He has not had any lower extremity injury.    * No surgery found *      Indwelling Lines/Drains at time of discharge:   Lines/Drains/Airways     Drain                 Hemodialysis AV Fistula 11/08/16 1553 Left forearm 171 days          Epidural Line                 Perineural Analgesia/Anesthesia Assessment (using dermatomes) Epidural 11/08/16 1324 171 days              Hospital Course:   Mr. Del MARTE was admitted with acute on chronic CHF exacerbation which induced a volume overload state with peripheral edema. Pt was treated with IV lasix with strict monitoring of I/Os and daily weights. He responded to treatment with loss of over 4 lbs of fluid and he was returned to a euvolemic state. His renal function was closely monitored with creatinine that actually improved by the  time of discharge with a level of 3.7. His hyperkalemia was corrected to normal and was closely monitored throughout hospital stay. Pt was continued on his immunosuppression and he was arranged for close PCP follow up.      Consults: none    Significant Diagnostic Studies: no new studies    Pending Diagnostic Studies:     None        Final Active Diagnoses:    Diagnosis Date Noted POA    Acute decompensated heart failure [I50.9]  Yes    Hyperkalemia [E87.5] 04/26/2017 Yes    KARON (obstructive sleep apnea) [G47.33] 04/26/2017 Yes     Chronic    GERD (gastroesophageal reflux disease) [K21.9] 04/26/2017 Yes     Chronic    Anemia of renal disease [D63.1] 04/26/2017 Yes     Chronic    Cannabis abuse [F12.10] 04/26/2017 Yes     Chronic    Chronic gout [M1A.9XX0] 04/26/2017 Yes     Chronic    Immunosuppressed status [D89.9] 04/26/2017 Yes     Chronic    CKD (chronic kidney disease), stage IV [N18.4] 10/11/2016 Yes     Chronic    History of hepatitis C [Z86.19] 10/06/2016 Yes     Chronic    Type 2 diabetes mellitus, controlled, with renal complications [E11.29] 04/14/2015 Yes     Chronic    Essential hypertension [I10]  Yes     Chronic    Liver transplanted 6/10/2001 [Z94.4]  Not Applicable     Chronic      Problems Resolved During this Admission:    Diagnosis Date Noted Date Resolved POA    PRINCIPAL PROBLEM:  Acute on chronic diastolic heart failure [I50.33] 04/26/2017 04/29/2017 Yes      Discharged Condition: good    Disposition: Home or Self Care    Follow Up:  Follow-up Information     Follow up with Donnie Owens NP On 5/5/2017.    Specialty:  Internal Medicine    Why:  out patient services:10:40 a.m. follow up from the hospital    Contact information:    [address]  [place] [address]  [phone]          Patient Instructions:     Ambulatory referral to Outpatient Case Management   Referral Priority: Routine Referral Type: Consultation   Referral Reason: Specialty Services Required    Number  of Visits Requested: 1      Diet general   Order Comments: 1800 and Cardiac diet.   Order Specific Question Answer Comments   Na restriction, if any: 2gNa      Activity as tolerated       Medications:  Reconciled Home Medications:   Current Discharge Medication List      CONTINUE these medications which have NOT CHANGED    Details   allopurinol (ZYLOPRIM) 100 MG tablet TAKE 1 TABLET EVERY DAY  Qty: 90 tablet, Refills: 3      carvedilol (COREG) 6.25 MG tablet Take 1 tablet (6.25 mg total) by mouth 2 (two) times daily.  Qty: 180 tablet, Refills: 3    Associated Diagnoses: CKD (chronic kidney disease), stage IV      cetirizine (ZYRTEC) 10 MG tablet Take 1 tablet (10 mg total) by mouth 2 (two) times daily. If needed may increase to 3 tablets twice daily to control itching  Qty: 60 tablet, Refills: 0      clonazePAM (KLONOPIN) 0.5 MG tablet Take 1 tablet (0.5 mg total) by mouth every evening.  Qty: 90 tablet, Refills: 1    Associated Diagnoses: Anticonvulsant causing adverse effect in therapeutic use, initial encounter      cloNIDine (CATAPRES) 0.1 MG tablet Take 1 tablet (0.1 mg total) by mouth 3 (three) times daily.  Qty: 270 tablet, Refills: 3    Comments: **Patient requests 90 days supply**      doxazosin (CARDURA) 8 MG Tab Take 1 tablet (8 mg total) by mouth once daily.  Qty: 90 tablet, Refills: 4      ferrous gluconate (FERGON) 324 MG tablet pt taking tid  Qty: 180 tablet, Refills: 0    Associated Diagnoses: Chronic kidney disease, stage IV (severe)      hydrALAZINE (APRESOLINE) 100 MG tablet Take 1 tablet (100 mg total) by mouth 3 (three) times daily.  Qty: 270 tablet, Refills: 3    Comments: **Patient requests 90 days supply**  Associated Diagnoses: CKD (chronic kidney disease), stage IV      insulin aspart (NOVOLOG) 100 unit/mL InPn pen Inject 6 units with meals plus scale 180-230 +1, 231-280 +2, 281-330 +3, 331-380 +4, >380 +5. Max daily dose 33 units. 90 day supply  Qty: 3 Box, Refills: 3      !! insulin  glargine (LANTUS SOLOSTAR) 100 unit/mL (3 mL) InPn pen Inject 10 Units into the skin every evening.  Qty: 2 Box, Refills: 6      lactulose (CHRONULAC) 20 gram/30 mL Soln Take 30 mLs (20 g total) by mouth 3 (three) times daily.  Qty: 2700 mL, Refills: 5      montelukast (SINGULAIR) 10 mg tablet Take 1 tablet (10 mg total) by mouth every evening.  Qty: 30 tablet, Refills: 6      multivitamin (THERAGRAN) per tablet Take 1 tablet by mouth Daily.      oxycodone-acetaminophen (PERCOCET) 5-325 mg per tablet Take 1 tablet by mouth every 4 (four) hours as needed for Pain.  Qty: 35 tablet, Refills: 0      pantoprazole (PROTONIX) 40 MG tablet TAKE 1 TABLET ONE TIME DAILY  Qty: 90 tablet, Refills: 3    Associated Diagnoses: Status post liver transplant      rifaximin (XIFAXAN) 550 mg Tab Take 1 tablet (550 mg total) by mouth 2 (two) times daily.  Qty: 60 tablet, Refills: 11    Associated Diagnoses: Encephalopathy      tacrolimus (PROGRAF) 0.5 MG Cap Take 1 capsule (0.5 mg total) by mouth once daily.  Qty: 30 capsule, Refills: 11    Associated Diagnoses: Status post liver transplant      blood sugar diagnostic (ACCU-CHEK JOANN PLUS TEST STRP) Strp 1 strip by Misc.(Non-Drug; Combo Route) route 4 (four) times daily.  Qty: 360 each, Refills: 3    Associated Diagnoses: Type II diabetes mellitus with renal manifestations, uncontrolled      blood-glucose meter (ACCU-CHEK JOANN PLUS METER) Misc Use as directed  Qty: 1 each, Refills: 0    Associated Diagnoses: Type II diabetes mellitus with renal manifestations, uncontrolled      ciclopirox (PENLAC) 8 % Soln Apply to affected nails daily x 6-12 mos.  Remove weekly with rubbing alcohol  Qty: 1 Bottle, Refills: 5    Associated Diagnoses: Onychomycosis due to dermatophyte      GENERLAC 10 gram/15 mL solution Use as directed 10 g in the mouth or throat once daily.  Refills: 5      glucagon (human recombinant) inj 1mg/mL kit Inject 1 mL (1 mg total) into the muscle as needed.  Qty: 1 kit,  "Refills: 2      hydrOXYzine HCl (ATARAX) 25 MG tablet Take 2 tablets (50 mg total) by mouth 3 (three) times daily as needed for Itching.  Qty: 60 tablet, Refills: 0      insulin needles, disposable, (NOVOFINE 32) 32 x 1/4 " Ndle 1 each by Misc.(Non-Drug; Combo Route) route 3 (three) times daily.  Qty: 100 each, Refills: 5      lancets (ACCU-CHEK SOFTCLIX LANCETS) Misc 1 lancet by Misc.(Non-Drug; Combo Route) route 4 (four) times daily.  Qty: 360 each, Refills: 3    Associated Diagnoses: Type II diabetes mellitus with renal manifestations, uncontrolled      !! LANTUS SOLOSTAR 100 unit/mL (3 mL) InPn pen INJECT 10 UNITS UNDER THE SKIN EVERY EVENING  Qty: 30 mL, Refills: 3      nystatin (MYCOSTATIN) 500,000 unit Tab Refills: 1      ondansetron (ZOFRAN-ODT) 4 MG TbDL DISSOLVE 1 TABLET(4 MG) ON THE TONGUE EVERY 12 HOURS AS NEEDED  Qty: 30 tablet, Refills: 1      oxycodone (OXY-IR) 5 mg Cap Take 1 capsule (5 mg total) by mouth every 4 (four) hours as needed.  Qty: 35 capsule, Refills: 0      senna (SENNA CONCENTRATE) 8.6 mg tablet Take 1 tablet by mouth once daily.      torsemide (DEMADEX) 20 MG Tab Take 2 tablets (40 mg total) by mouth once daily.  Qty: 180 tablet, Refills: 3    Comments: **Patient requests 90 days supply**      urea (CARMOL) 40 % Crea Apply topically once daily.  Qty: 85 g, Refills: 5       !! - Potential duplicate medications found. Please discuss with provider.        Time spent on the discharge of patient: 35 minutes    Keyla Denis MD  Department of Hospital Medicine  [facility]  "

## 2017-04-29 NOTE — NURSING
Patient remained free from falls, trauma, and injuries throughout shift.  No complaints of pain, nausea, or vomiting.  Medications administered as ordered.  Blood glucose controlled no insulin provided during shift.  Patient ambulated in the norris and to and from bathroom; tolerated well.  IV removed; catheter intact.  Discharge instructions, future appointments, and prescription information given to patient.  Educated patient on reasons to return to hospital and provided handouts on chronic kidney disease, diabetes, and heart failure; patient verbalized understanding.  Discharged via wheelchair accompanied by staff.  No acute distress noted.

## 2017-04-29 NOTE — PROGRESS NOTES
TN informed med surg nurse  Pt ready for d/c from [facility]  [facility]..Jada Wylie, RN, BSN, [facility]  4/29/2017

## 2017-05-01 ENCOUNTER — LAB VISIT (OUTPATIENT)
Dept: LAB | Facility: HOSPITAL | Age: 72
End: 2017-05-01
Attending: NURSE PRACTITIONER
Payer: MEDICARE

## 2017-05-01 DIAGNOSIS — N18.9 ANEMIA IN CHRONIC KIDNEY DISEASE(285.21): ICD-10-CM

## 2017-05-01 DIAGNOSIS — D63.1 ANEMIA IN STAGE 5 CHRONIC KIDNEY DISEASE: ICD-10-CM

## 2017-05-01 DIAGNOSIS — N18.5 ANEMIA IN STAGE 5 CHRONIC KIDNEY DISEASE: ICD-10-CM

## 2017-05-01 DIAGNOSIS — D63.1 ANEMIA IN CHRONIC KIDNEY DISEASE(285.21): ICD-10-CM

## 2017-05-01 LAB
FERRITIN SERPL-MCNC: 107 NG/ML
HCT VFR BLD AUTO: 27 %
HGB BLD-MCNC: 8.7 G/DL
IRON SERPL-MCNC: 34 UG/DL
SATURATED IRON: 18 %
TOTAL IRON BINDING CAPACITY: 192 UG/DL
TRANSFERRIN SERPL-MCNC: 130 MG/DL

## 2017-05-01 PROCEDURE — 82728 ASSAY OF FERRITIN: CPT | Mod: TXP

## 2017-05-01 PROCEDURE — 36415 COLL VENOUS BLD VENIPUNCTURE: CPT | Mod: PO,TXP

## 2017-05-01 PROCEDURE — 85018 HEMOGLOBIN: CPT | Mod: TXP

## 2017-05-01 PROCEDURE — 83540 ASSAY OF IRON: CPT | Mod: TXP

## 2017-05-01 PROCEDURE — 85014 HEMATOCRIT: CPT | Mod: TXP

## 2017-05-01 PROCEDURE — 84466 ASSAY OF TRANSFERRIN: CPT | Mod: TXP

## 2017-05-02 ENCOUNTER — OUTPATIENT CASE MANAGEMENT (OUTPATIENT)
Dept: ADMINISTRATIVE | Facility: OTHER | Age: 72
End: 2017-05-02

## 2017-05-02 ENCOUNTER — INFUSION (OUTPATIENT)
Dept: INFECTIOUS DISEASES | Facility: HOSPITAL | Age: 72
End: 2017-05-02
Attending: INTERNAL MEDICINE
Payer: MEDICARE

## 2017-05-02 VITALS — SYSTOLIC BLOOD PRESSURE: 141 MMHG | DIASTOLIC BLOOD PRESSURE: 55 MMHG

## 2017-05-02 DIAGNOSIS — N18.4 CRD (CHRONIC RENAL DISEASE), STAGE IV: ICD-10-CM

## 2017-05-02 DIAGNOSIS — N18.4 KIDNEY DISEASE, CHRONIC, STAGE IV (GFR 15-29 ML/MIN): Primary | ICD-10-CM

## 2017-05-02 PROCEDURE — 96372 THER/PROPH/DIAG INJ SC/IM: CPT | Mod: TXP

## 2017-05-02 PROCEDURE — 63600175 PHARM REV CODE 636 W HCPCS: Mod: TXP | Performed by: INTERNAL MEDICINE

## 2017-05-02 RX ADMIN — DARBEPOETIN ALFA 300 MCG: 300 INJECTION, SOLUTION INTRAVENOUS; SUBCUTANEOUS at 08:05

## 2017-05-02 NOTE — PROGRESS NOTES
Hgb 8.7;Hct 27.0    Discussed Hgb with Patient. Dose didn't increase  PER PROTOCOL FORM DROD-188. Due to being at max dose.  Aranesp 300 mcg given and 2 week appt. Set. due to Hgb under 9 .  Dr Amezcua notified.    Gfr 15/Stage 4

## 2017-05-02 NOTE — PROGRESS NOTES
Attempted to contact pt to complete initial assessment. Spoke to Mrs. Mathis. She states pt is not available and she can take a message. Noted pt has appt at the clinic on Friday 5/5. Offerred to see pt at this appt. Mrs. Mathis states she is going to call to reschedule because pt will not be able to make it. Will plan to see pt at next appt.

## 2017-05-03 ENCOUNTER — OUTPATIENT CASE MANAGEMENT (OUTPATIENT)
Dept: ADMINISTRATIVE | Facility: OTHER | Age: 72
End: 2017-05-03

## 2017-05-08 ENCOUNTER — OUTPATIENT CASE MANAGEMENT (OUTPATIENT)
Dept: ADMINISTRATIVE | Facility: OTHER | Age: 72
End: 2017-05-08

## 2017-05-08 ENCOUNTER — OFFICE VISIT (OUTPATIENT)
Dept: FAMILY MEDICINE | Facility: CLINIC | Age: 72
End: 2017-05-08
Payer: MEDICARE

## 2017-05-08 VITALS
TEMPERATURE: 99 F | HEART RATE: 67 BPM | DIASTOLIC BLOOD PRESSURE: 60 MMHG | SYSTOLIC BLOOD PRESSURE: 140 MMHG | OXYGEN SATURATION: 96 % | BODY MASS INDEX: 26.71 KG/M2 | HEIGHT: 74 IN | WEIGHT: 208.13 LBS

## 2017-05-08 DIAGNOSIS — N18.4 CRD (CHRONIC RENAL DISEASE), STAGE IV: Primary | ICD-10-CM

## 2017-05-08 DIAGNOSIS — E11.22 CONTROLLED TYPE 2 DIABETES MELLITUS WITH STAGE 4 CHRONIC KIDNEY DISEASE, UNSPECIFIED LONG TERM INSULIN USE STATUS: Chronic | ICD-10-CM

## 2017-05-08 DIAGNOSIS — N18.4 CONTROLLED TYPE 2 DIABETES MELLITUS WITH STAGE 4 CHRONIC KIDNEY DISEASE, UNSPECIFIED LONG TERM INSULIN USE STATUS: Chronic | ICD-10-CM

## 2017-05-08 DIAGNOSIS — I50.9 ACUTE DECOMPENSATED HEART FAILURE: ICD-10-CM

## 2017-05-08 PROCEDURE — 99499 UNLISTED E&M SERVICE: CPT | Mod: S$GLB,,, | Performed by: NURSE PRACTITIONER

## 2017-05-08 PROCEDURE — 3044F HG A1C LEVEL LT 7.0%: CPT | Mod: S$GLB,,, | Performed by: NURSE PRACTITIONER

## 2017-05-08 PROCEDURE — 1159F MED LIST DOCD IN RCRD: CPT | Mod: S$GLB,,, | Performed by: NURSE PRACTITIONER

## 2017-05-08 PROCEDURE — 99214 OFFICE O/P EST MOD 30 MIN: CPT | Mod: S$GLB,,, | Performed by: NURSE PRACTITIONER

## 2017-05-08 PROCEDURE — 1160F RVW MEDS BY RX/DR IN RCRD: CPT | Mod: S$GLB,,, | Performed by: NURSE PRACTITIONER

## 2017-05-08 PROCEDURE — 99999 PR PBB SHADOW E&M-EST. PATIENT-LVL V: CPT | Mod: PBBFAC,,, | Performed by: NURSE PRACTITIONER

## 2017-05-08 PROCEDURE — 3066F NEPHROPATHY DOC TX: CPT | Mod: S$GLB,,, | Performed by: NURSE PRACTITIONER

## 2017-05-08 NOTE — MR AVS SNAPSHOT
[facility]  [address]  [place] [address]  Phone: [phone]  Fax: [phone]                  Philip Mathis III   2017 11:00 AM   Office Visit    Description:  Male : 1945   Provider:  Donnie Owens NP   Department:  [facility]           Reason for Visit     Hospital Follow Up     Fall     Dizziness           Diagnoses this Visit        Comments    CRD (chronic renal disease), stage IV    -  Primary     Controlled type 2 diabetes mellitus with stage 4 chronic kidney disease, unspecified long term insulin use status         Acute decompensated heart failure                To Do List           Future Appointments        Provider Department Dept Phone    5/10/2017 7:00 AM LAB, [place] [facility] [phone]    5/10/2017 7:15 AM LAB, [place] [facility] [phone]    2017 7:15 AM LAB, APPOINT[place] [facility] [phone]    2017 8:45 AM EPO, INJECTION [facility] [phone]    2017 7:30 AM MILTON Salomon, FNP [facility] - Endocrinology [phone]      Goals (5 Years of Data)     None      Follow-Up and Disposition     Return in about 1 month (around 2017).      [facility]     [facility] Care Line -  Assistance  Unless otherwise directed by your provider, please contact [facility], our nurse care line that is available for  assistance.     Registered nurses in the [facility] provide: appointment scheduling, clinical advisement, health education, and other advisory services.  Call: [phone] (toll free)               Medications           Message regarding Medications     Verify the changes and/or additions to your medication regime listed below are the same as discussed with your clinician today.  If any of these changes or additions are incorrect, please notify your healthcare provider.              Verify that the below list of medications is an accurate representation of the medications you are currently taking.  If none reported, the list may be blank. If incorrect, please contact your healthcare provider. Carry this list with you in case of emergency.           Current Medications     allopurinol (ZYLOPRIM) 100 MG tablet TAKE 1 TABLET EVERY DAY    blood sugar diagnostic (ACCU-CHEK JOANN PLUS TEST STRP) Strp 1 strip by Misc.(Non-Drug; Combo Route) route 4 (four) times daily.    blood-glucose meter (ACCU-CHEK JOANN PLUS METER) Misc Use as directed    carvedilol (COREG) 6.25 MG tablet Take 1 tablet (6.25 mg total) by mouth 2 (two) times daily.    cetirizine (ZYRTEC) 10 MG tablet Take 1 tablet (10 mg total) by mouth 2 (two) times daily. If needed may increase to 3 tablets twice daily to control itching    ciclopirox (PENLAC) 8 % Soln Apply to affected nails daily x 6-12 mos.  Remove weekly with rubbing alcohol    clonazePAM (KLONOPIN) 0.5 MG tablet Take 1 tablet (0.5 mg total) by mouth every evening.    cloNIDine (CATAPRES) 0.1 MG tablet Take 1 tablet (0.1 mg total) by mouth 3 (three) times daily.    doxazosin (CARDURA) 8 MG Tab Take 1 tablet (8 mg total) by mouth once daily.    ferrous gluconate (FERGON) 324 MG tablet pt taking tid    GENERLAC 10 gram/15 mL solution Use as directed 10 g in the mouth or throat once daily.    glucagon (human recombinant) inj 1mg/mL kit Inject 1 mL (1 mg total) into the muscle as needed.    hydrALAZINE (APRESOLINE) 100 MG tablet Take 1 tablet (100 mg total) by mouth 3 (three) times daily.    hydrOXYzine HCl (ATARAX) 25 MG tablet Take 2 tablets (50 mg total) by mouth 3 (three) times daily as needed for Itching.    insulin aspart (NOVOLOG) 100 unit/mL InPn pen Inject 6 units with meals plus scale 180-230 +1, 231-280 +2, 281-330 +3, 331-380 +4, >380 +5. Max daily dose 33 units. 90 day supply    insulin glargine (LANTUS SOLOSTAR) 100 unit/mL (3 mL) InPn pen Inject 10 Units  "into the skin every evening.    insulin needles, disposable, (NOVOFINE 32) 32 x 1/4 " Ndle 1 each by Misc.(Non-Drug; Combo Route) route 3 (three) times daily.    lactulose (CHRONULAC) 20 gram/30 mL Soln Take 30 mLs (20 g total) by mouth 3 (three) times daily.    lancets (ACCU-CHEK SOFTCLIX LANCETS) Misc 1 lancet by Misc.(Non-Drug; Combo Route) route 4 (four) times daily.    LANTUS SOLOSTAR 100 unit/mL (3 mL) InPn pen INJECT 10 UNITS UNDER THE SKIN EVERY EVENING    montelukast (SINGULAIR) 10 mg tablet Take 1 tablet (10 mg total) by mouth every evening.    multivitamin (THERAGRAN) per tablet Take 1 tablet by mouth Daily.    nystatin (MYCOSTATIN) 500,000 unit Tab     ondansetron (ZOFRAN-ODT) 4 MG TbDL DISSOLVE 1 TABLET(4 MG) ON THE TONGUE EVERY 12 HOURS AS NEEDED    oxycodone (OXY-IR) 5 mg Cap Take 1 capsule (5 mg total) by mouth every 4 (four) hours as needed.    oxycodone-acetaminophen (PERCOCET) 5-325 mg per tablet Take 1 tablet by mouth every 4 (four) hours as needed for Pain.    pantoprazole (PROTONIX) 40 MG tablet TAKE 1 TABLET ONE TIME DAILY    rifaximin (XIFAXAN) 550 mg Tab Take 1 tablet (550 mg total) by mouth 2 (two) times daily.    senna (SENNA CONCENTRATE) 8.6 mg tablet Take 1 tablet by mouth once daily.    tacrolimus (PROGRAF) 0.5 MG Cap Take 1 capsule (0.5 mg total) by mouth once daily.    torsemide (DEMADEX) 20 MG Tab Take 2 tablets (40 mg total) by mouth once daily.    urea (CARMOL) 40 % Crea Apply topically once daily.           Clinical Reference Information           Your Vitals Were     BP Pulse Temp Height Weight SpO2    140/60 (BP Location: Right arm, Patient Position: Sitting, BP Method: Manual) 67 98.9 °F (37.2 °C) (Oral) 6' 2" (1.88 m) 94.4 kg (208 lb 1.8 oz) 96%    BMI                26.72 kg/m2          Blood Pressure          Most Recent Value    BP  (!)  140/60      Allergies as of 5/8/2017     Corticosteroids (Glucocorticoids)    Hydrocortisone    Neuromuscular Blockers, Steroidal    " Ribavirin      Immunizations Administered on Date of Encounter - 5/8/2017     None      Maintenance Dialysis History     Patient has no recorded history of maintenance dialysis.      Transplant Information        Txp Date Organ Coordinator Care Team     Liver Fabian Colvin RN Referring Physician:  Danay Nath MD          Txp Date Organ Coordinator Care Team     Kidney Fabian Colvin RN Referring Physician:  Luciano Hercules MD   Current Nephrologist:  Luciano Hercules MD          Txp Date Organ Coordinator Care Team    6/10/2001 Liver Gaby Palacios RN Surgeon:  BRITTANY Pizarro MD   Assisting Surgeon:  Cain Bryant MD   Current Hepatologist:  Danay Nath MD         Language Assistance Services     ATTENTION: Language assistance services are available, free of charge. Please call [phone].      ATENCIÓN: Si habla español, tiene a adler disposición servicios gratuitos de asistencia lingüística. Llame al [phone].     CHÚ Ý: N?u b?n nói Ti?ng Vi?t, có các d?ch v? h? tr? ngôn ng? mi?n phí dành cho b?n. G?i s? [phone].         [facility] complies with applicable Federal civil rights laws and does not discriminate on the basis of race, color, national origin, age, disability, or sex.

## 2017-05-08 NOTE — PROGRESS NOTES
"Chart reviewed. Met with pt at clinic appt. 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 He states he hit the back of his head and then he remembers waking up. He states no one was with him. He states he drove home. He complaints of body pain and dizziness. He denies N/V. Pt drove self to appt today. Pt gives permission to speak to his wife, Merlene. Call placed to Merlene. No answer. Left message requesting call back. Pt denies need for DME at this time. Pt states he has a reliable scale at home. He states he will start weighing and logging his weights. Will follow up in 1 week.     2:00 pm. Spoke to Merlene. She states she did not come to pt's appt with him today because she was upset with him. She states she wanted to drive him to his appt and errands. Pt refused. She states "he's going to do what he wants". She states she has been trying to get pt to weigh daily. She states pt has frequent falls. Pt has a walker in the home that was given to him. Pt does not have Medicaid. She states she does not recall pt ever applying. She states pt cannot afford co pays and medications she states the bills are piling up. Pt's monthly income is $1080. Will refer pt to RAHEEM Mar LCSW for financial concerns and will refer to pharmacy assistance program. Merlene states she is getting frequent phone calls from Human case management. She states she does not want to be followed by both [facility] and [facility] case management. She states the  is Ms. Marcelino [phone].   "

## 2017-05-08 NOTE — PROGRESS NOTES
This dictation has been generated using Dragon Dictation some phonetic errors may occur.     Philip was seen today for hospital follow up, fall and dizziness.    Diagnoses and all orders for this visit:    CRD (chronic renal disease), stage IV    Controlled type 2 diabetes mellitus with stage 4 chronic kidney disease, unspecified long term insulin use status    Acute decompensated heart failure      Chronic kidney disease and heart failure.  Currently at baseline (creat 3.7) and compensated.  Diabetes controlled.   Discussed avoiding salt.  Needs to follow up with transplant.     Return in about 1 month (around 6/8/2017).      ________________________________________________________________  ________________________________________________________________        Chief Complaint   Patient presents with    Hospital Follow Up    Fall    Dizziness     History of present illness  This 71 y.o. presents today for complaint of recently admitted for CHF and renal failure.  Patient had worsening edema.  He notes 2 inches loss and abdominal girth.  Patient notes that his legs are not as swollen and is less short of breath.  He is ambulating without assistance.  He did leave the hospital early as he had a show with his plans for the weekend.  I spoke to his wife on the phone while he was here today and discussed meds.  We discussed his various meds and the reason that he is taking them.  He will continue with furosemide in place of requested Lasix.  Patient verbalized understanding of reasoning.  He is not on an ACE or ARB due to his renal status.  Needs to follow up with transplant and nephrology. Reviewed their prior notes.  Denies salt intake.  Recent fall.  Patient indicates that he was working about the house and a combination of opening a rolling door stepping over a side and running into a pipe he fell over.  He did hit the back of his head.  Patient thinks he may have lost consciousness.  He denies any lingering  symptoms.  Patient denies any neuro changes.  No thought or cognition problems.  He did feel a little dizzy after the fall but that symptom has resolved.  Denies any nausea or vomiting.  No vision changes.  Denies headache or sensitivity to light.  Ros  No chest pain.  No shortness of breath.      Past Medical History:   Diagnosis Date    Anemia     Back pain     Bishop's esophagus     BPH (benign prostatic hypertrophy)     Chronic kidney disease     Cirrhosis     Diabetes mellitus     Diabetes mellitus, type 2     Elevated PSA     Heart failure     Hepatitis Hepatitis C    Hepatitis C     Hyperlipidemia     Hypertension     Hypertension     Liver transplanted     Peripheral neuropathy     Sleep apnea     Transfusion reaction     Hep C from prev. blood transfusion    Transplanted liver     Trouble in sleeping     Type II or unspecified type diabetes mellitus with renal manifestations, uncontrolled     Type II or unspecified type diabetes mellitus with renal manifestations, uncontrolled        Past Surgical History:   Procedure Laterality Date    broken nose      EYE SURGERY      cataracts    FEMUR SURGERY      FRACTURE SURGERY      right femur fracture     LIVER TRANSPLANT  2001    SKIN GRAFT      graft from right thigh , applied to the left back and left arm.       Family History   Problem Relation Age of Onset    Cancer Mother      had cancer 40 years ago     Coronary artery disease Father     Hypertension Father     Diabetes Father     Heart disease Father     Colon cancer Neg Hx        Social History     Social History    Marital status:      Spouse name: N/A    Number of children: N/A    Years of education: N/A     Social History Main Topics    Smoking status: Former Smoker     Quit date: 7/30/1998    Smokeless tobacco: Never Used      Comment: pt reports quitting in 1998    Alcohol use No      Comment: pt reports hx of alcholism and reports quit in 1998    Drug  use: Yes     Special: Marijuana      Comment: pt reports smoking THC apprxly twice/week    Sexual activity: Yes     Partners: Female     Other Topics Concern    None     Social History Narrative       Current Outpatient Prescriptions   Medication Sig Dispense Refill    allopurinol (ZYLOPRIM) 100 MG tablet TAKE 1 TABLET EVERY DAY 90 tablet 3    blood sugar diagnostic (ACCU-CHEK JOANN PLUS TEST STRP) Strp 1 strip by Misc.(Non-Drug; Combo Route) route 4 (four) times daily. 360 each 3    blood-glucose meter (ACCU-CHEK JOANN PLUS METER) Misc Use as directed 1 each 0    carvedilol (COREG) 6.25 MG tablet Take 1 tablet (6.25 mg total) by mouth 2 (two) times daily. 180 tablet 3    cetirizine (ZYRTEC) 10 MG tablet Take 1 tablet (10 mg total) by mouth 2 (two) times daily. If needed may increase to 3 tablets twice daily to control itching 60 tablet 0    ciclopirox (PENLAC) 8 % Soln Apply to affected nails daily x 6-12 mos.  Remove weekly with rubbing alcohol 1 Bottle 5    clonazePAM (KLONOPIN) 0.5 MG tablet Take 1 tablet (0.5 mg total) by mouth every evening. (Patient taking differently: Take 0.5 mg by mouth daily as needed. ) 90 tablet 1    cloNIDine (CATAPRES) 0.1 MG tablet Take 1 tablet (0.1 mg total) by mouth 3 (three) times daily. 270 tablet 3    doxazosin (CARDURA) 8 MG Tab Take 1 tablet (8 mg total) by mouth once daily. 90 tablet 4    ferrous gluconate (FERGON) 324 MG tablet pt taking tid 180 tablet 0    GENERLAC 10 gram/15 mL solution Use as directed 10 g in the mouth or throat once daily.  5    glucagon (human recombinant) inj 1mg/mL kit Inject 1 mL (1 mg total) into the muscle as needed. 1 kit 2    hydrALAZINE (APRESOLINE) 100 MG tablet Take 1 tablet (100 mg total) by mouth 3 (three) times daily. 270 tablet 3    hydrOXYzine HCl (ATARAX) 25 MG tablet Take 2 tablets (50 mg total) by mouth 3 (three) times daily as needed for Itching. 60 tablet 0    insulin aspart (NOVOLOG) 100 unit/mL InPn pen Inject 6  "units with meals plus scale 180-230 +1, 231-280 +2, 281-330 +3, 331-380 +4, >380 +5. Max daily dose 33 units. 90 day supply 3 Box 3    insulin glargine (LANTUS SOLOSTAR) 100 unit/mL (3 mL) InPn pen Inject 10 Units into the skin every evening. 2 Box 6    insulin needles, disposable, (NOVOFINE 32) 32 x 1/4 " Ndle 1 each by Misc.(Non-Drug; Combo Route) route 3 (three) times daily. 100 each 5    lactulose (CHRONULAC) 20 gram/30 mL Soln Take 30 mLs (20 g total) by mouth 3 (three) times daily. 2700 mL 5    lancets (ACCU-CHEK SOFTCLIX LANCETS) Misc 1 lancet by Misc.(Non-Drug; Combo Route) route 4 (four) times daily. 360 each 3    LANTUS SOLOSTAR 100 unit/mL (3 mL) InPn pen INJECT 10 UNITS UNDER THE SKIN EVERY EVENING 30 mL 3    montelukast (SINGULAIR) 10 mg tablet Take 1 tablet (10 mg total) by mouth every evening. 30 tablet 6    multivitamin (THERAGRAN) per tablet Take 1 tablet by mouth Daily.      nystatin (MYCOSTATIN) 500,000 unit Tab   1    ondansetron (ZOFRAN-ODT) 4 MG TbDL DISSOLVE 1 TABLET(4 MG) ON THE TONGUE EVERY 12 HOURS AS NEEDED 30 tablet 1    oxycodone (OXY-IR) 5 mg Cap Take 1 capsule (5 mg total) by mouth every 4 (four) hours as needed. 35 capsule 0    oxycodone-acetaminophen (PERCOCET) 5-325 mg per tablet Take 1 tablet by mouth every 4 (four) hours as needed for Pain. 35 tablet 0    pantoprazole (PROTONIX) 40 MG tablet TAKE 1 TABLET ONE TIME DAILY 90 tablet 3    rifaximin (XIFAXAN) 550 mg Tab Take 1 tablet (550 mg total) by mouth 2 (two) times daily. 60 tablet 11    senna (SENNA CONCENTRATE) 8.6 mg tablet Take 1 tablet by mouth once daily.      tacrolimus (PROGRAF) 0.5 MG Cap Take 1 capsule (0.5 mg total) by mouth once daily. 30 capsule 11    torsemide (DEMADEX) 20 MG Tab Take 2 tablets (40 mg total) by mouth once daily. 180 tablet 3    urea (CARMOL) 40 % Crea Apply topically once daily. 85 g 5     No current facility-administered medications for this visit.        Review of patient's " allergies indicates:   Allergen Reactions    Corticosteroids (glucocorticoids) Other (See Comments)     Leg swelling    Hydrocortisone      Leg swelling    Neuromuscular blockers, steroidal      Leg swelling    Ribavirin      Intolerance, arthralgias       Physical examination  Vitals Reviewed  Gen. Well-dressed well-nourished no apparent distress  Skin warm dry and intact.  No rashes noted.  Abrasion to back of scalp.    HEENT.  TM intact bilateral with normal light reflex.  No mastoid tenderness during percussion.  Nares patent bilateral.  Pharynx is unremarkable.  No maxillary or frontal sinus tenderness when percussed.    Neck is supple without adenopathy  Chest.  Respirations are even unlabored.  Lungs are clear to auscultation.  Cardiac regular rate and rhythm.  No chest wall adenopathy noted.  Abdomen is soft and not distended.  He is able to buccal his pants today.  Bowel sounds are present.  No tenderness during palpation of the abdomen.      Neuro. Awake alert oriented x4.  Normal judgment and cognition noted.  Still with dystonic speech.    Extremities no clubbing cyanosis or edema noted.     Call or return to clinic prn if these symptoms worsen or fail to improve as anticipated.

## 2017-05-08 NOTE — PATIENT INSTRUCTIONS
Heart Failure: Making Changes to Your Diet  You have a condition called heart failure. When you have heart failure, excess fluid is more likely to build up in your body because your heart isn't working well. This makes the heart work harder to pump blood. Fluid buildup causes symptoms such as shortness of breath and swelling (edema). This is often referred to as congestive heart failure or CHF. Controlling the amount of salt (sodium) you eat may help stop fluid from building up. Your doctor may also tell you to reduce the amount of fluid you drink.  Reading food labels    Your healthcare provider will tell you how much sodium you can eat each day. Read food labels to keep track. Keep in mind that certain foods are high in salt. These include canned, frozen, and processed foods. Check the amount of sodium in each serving. Watch out for high-sodium ingredients. These include MSG (monosodium glutamate), baking soda, and sodium phosphate.   Eating less salt  Give yourself time to get used to eating less salt. It may take a little while. Here are some tips to help:  · Take the saltshaker off the table. Replace it with salt-free herb mixes and spices.  · Eat fresh or plain frozen vegetables. These have much less salt than canned vegetables.  · Choose low-sodium snacks like sodium-free pretzels, crackers, or air-popped popcorn.  · Dont add salt to your food when youre cooking. Instead, season your foods with pepper, lemon, garlic, or onion.  · When you eat out, ask that your food be cooked without added salt.  · Avoid eating fried foods as these often have a great deal of salt.  If youre told to limit fluids  You may need to limit how much fluid you have to help prevent swelling. This includes anything that is liquid at room temperature, such as ice cream and soup. If your doctor tells you to limit fluid, try these tips:  · Measure drinks in a measuring cup before you drink them. This will help you meet daily  goals.  · Chill drinks to make them more refreshing.  · Suck on frozen lemon wedges to quench thirst.  · Only drink when youre thirsty.  · Chew sugarless gum or suck on hard candy to keep your mouth moist.  · Weigh yourself daily to know if your body's fluid content is rising.  My sodium goal  Your healthcare provider may give you a sodium goal to meet each day. This includes sodium found in food as well as salt that you add. My goal is to eat no more than ___________ mg of sodium per day.     When to call your doctor  Call your doctor right away if you have any symptoms of worsening heart failure. These can include:  · Sudden weight gain  · Increased swelling of your legs or ankles  · Trouble breathing when youre resting or at night  · Increase in the number of pillows you have to sleep on  · Chest pain, pressure, discomfort, or pain in the jaw, neck, or back   Date Last Reviewed: 3/21/2016  © 3098-8506 [employer]. [address] [place] [address]. All rights reserved. This information is not intended as a substitute for professional medical care. Always follow your healthcare professional's instructions.        Heart Failure: Tracking Your Weight  You have a condition called heart failure. When you have heart failure, a sudden weight gain or a steady rise in weight is a warning sign that your body is retaining too much water and salt. This could mean your heart failure is getting worse. If left untreated, it can cause problems for your lungs and result in shortness of breath. Weighing yourself each day is the best way to know if youre retaining water. If your weight goes up quickly, call your doctor. You will be given instructions on how to get rid of the excess water. You will likely need medicines and to avoid salt. This will help your heart work better.  Call your doctor if you gain more than 2 pounds in 1 day, more than 5 pounds in 1 week, or whatever weight gain you were told to  report by your doctor. This is often a sign of worsening heart failure and needs to be evaluated and treated. Your doctor will tell you what to do next.   Tips for weighing yourself    · Weigh yourself at the same time each morning, wearing the same clothes. Weigh yourself after urinating and before eating.  · Use the same scale each day. Make sure the numbers are easy to read. Put the scale on a flat, hard surface -- not on a rug or carpet.  · Do not stop weighing yourself. If you forget one day, weigh again the next morning.  How to use your weight chart  · Keep your weight chart near the scale. Write your weight on the chart as soon as you get off the scale.  · Fill in the month and the start date on the chart. Then write down your weight each day. Your chart will look like this:    · If you miss a day, leave the space blank. Weigh yourself the next day and write your weight in the next space.  · Take your weight chart with you when you go to see your doctor.  Date Last Reviewed: 3/20/2016  © 6602-8248 [employer]. [address] [place] [address]. All rights reserved. This information is not intended as a substitute for professional medical care. Always follow your healthcare professional's instructions.        Heart Failure: Warning Signs of a Flare-Up  You have a condition called heart failure. Once you have heart failure, flare-ups can happen. Below are signs that can mean your heart failure is getting worse. If you notice any of these warning signs, call your healthcare provider.  Swelling    · Your feet, ankles, or lower legs get puffier.  · You notice skin changes on your lower legs.  · Your shoes feel too tight.  · Your clothes are tighter in the waist.  · You have trouble getting rings on or off your fingers.  Shortness of breath  · You have to breathe harder even when youre doing your normal activities or when youre resting.  · You are short of breath walking up stairs or even  short distances.  · You wake up at night short of breath or coughing.  · You need to use more pillows or sit up to sleep.  · You wake up tired or restless.  Other warning signs  · You feel weaker, dizzy, or more tired.  · You have chest pain or changes in your heartbeat.  · You have a cough that wont go away.  · You cant remember things or dont feel like eating.  Tracking your weight  Gaining weight is often the first warning sign that heart failure is getting worse. Gaining even a few pounds can be a sign that your body is retaining excess water and salt. Weighing yourself each day in the morning after you urinate and before you eat, is the best way to know if you're retaining water. Get a scale that is easy to read and make sure you wear the same clothes and use the same scale every time you weigh. Your healthcare provider will show you how to track your weight. Call your doctor if you gain more than 2 pounds in 1 day, 5 pounds in 1 week, or whatever weight gain you were told to report by your doctor. This is often a sign of worsening heart failure and needs to be evaluated and treated before it compromises your breathing. Your doctor will tell you what to do next.    Date Last Reviewed: 3/15/2016  © 6809-3028 [employer]. [address] [place] [address], [place] [address]. All rights reserved. This information is not intended as a substitute for professional medical care. Always follow your healthcare professional's instructions.

## 2017-05-09 ENCOUNTER — OUTPATIENT CASE MANAGEMENT (OUTPATIENT)
Dept: ADMINISTRATIVE | Facility: OTHER | Age: 72
End: 2017-05-09

## 2017-05-09 NOTE — PROGRESS NOTES
An [facility] welcome Packet and consent form was created and mailed to the patient on 5-9-17        Thank you,    Danette Morris, SSC

## 2017-05-09 NOTE — LETTER
May 9, 2017    Philip Mathis III  [address] [place] [address]             Outpatient Case Management  [address]  [place] [address] Dear Philip Mathis III:    We understand that receiving many services from different doctors and healthcare providers is overwhelming. There are appointments to make, transportation to arrange, dietary instructions to understand, and new medications to obtain.    This is where [facility] can help.     You are eligible to receive Outpatient Case Management services when you have healthcare needs that require the coordination of many providers, treatments, and services. You also qualify if you need assistance with a new treatment plan.     There is no charge for this support. You may have been referred to this program from your doctor(s), hospital staff member(s), or insurance company but you always have a choice to participate or not participate. To participate, you must give us your permission to be enrolled.     When you are enrolled in the [facility] program, the  who is assigned to you is    Raya Abernathy RN  [phone]    Depending on your needs and wishes, your  may speak with you by phone, visit you at your place of living (for example your home, skilled nursing facility, or rehabilitation facility), or meet you at your doctors office.     Your  will tell you why you have been selected to participate in the program and will complete an assessment of your needs. Then a personalized plan of care will be developed with you and or your caregiver.             Here are examples of the services your [facility]  provides.     Coordinate communication among multiple providers.   Arrange for transportation, doctors visits, durable medical equipment, home care services, and special clinics.    Provide coaching on how to manage your health condition.    Answer  questions about your health condition.   Help you understand your doctors treatment plan.    Provide additional instruction about your health condition, treatments, and medications.    Help you obtain information about your insurance coverage.    Advocate for your individual needs.    Request a Licensed Clinical  (LCSW) to visit you if you need their services. LCSWs help with long term planning (discussing placement options, advanced planning directives), financial planning, and assistance (for example rent, utilities, medication funding).     Your  will coordinate their activities with other outpatient services you are receiving. All services provided by [facility]  are coordinated with and communicated to your primary care physician.    Our goal is to help you manage your health condition(s) safely within your living environment, whether that is your home or a medical facility. We want to help you function at the healthiest and highest level possible.     Sincerely,      Steve Doty MD  Medical Director    Enclosures:    Frequently Asked Questions  Patient Rights and Responsibilities   Reporting a Grievance or Complaint  Consent/Release of Information  Stamped Addressed Envelope                  Frequently Asked Questions about [facility]    What is [facility]?  Outpatient Case management is not Home healthcare services. [facility]  do not provide hands-on care. [facility]  will work with your doctor to arrange for home health services, if needed. Home health services have a limited duration and there are some restrictions as to who can get these services. There is no prescribed limit to the amount of time you receive [facility] services. [facility]  are not agents of your insurance company. However, [facility]  Managers can help you obtain information from your insurance company.     Who are the [facility] ?  [facility]  are Registered Nurses and Social Workers. It is important to remember that you and your  are a team that works together with your primary care physician to create your individualized plan of care. The ultimate goal of your care plan is to help you implement your doctors treatment plan and to help you function at the highest level of health possible.     What are my rights as a patient?  It is important for you to know and understand your rights and responsibilities while receiving services from the [facility] program. We have enclosed a complete description of your rights and responsibilities. You can help to make your care more effective when you understand your right and responsibilities.     What is needed to be enrolled in the program?  You are only enrolled in the [facility] Program when you give us your consent to participate. You will find enclosed a consent form. You are receiving this letter because you or your caregivers have given us a verbal consent to enroll you in [facility] Program. We ask that you sign and return the enclosed written consent in the stamped self-addressed envelope.                           Patient Rights and Responsibilities    We consider you a partner in your care. When you are well informed, participate in treatment decisions and communicate openly with your doctor and other healthcare professionals, you help make your care more effective.     While you are in the Outpatient Case Management Program, your rights include the following:     You have a right to be provided services in a non-discriminatory manner in accordance with the provisions of Title VI of the Civil Rights Act of 1964, Section 504 of the Rehabilitation Act of 1973, the Age  Discrimination Act of 1975, the Americans with Disabilities Act as well as any other applicable Federal and State laws and regulations.     You have the right to a reasonable, timely response to your request or need for care, as well as the right to considerate and respectful care including an environment that preserves dignity and contributes to a positive self-image. You are responsible for being considerate and respectful of our staff.     You have a right to information regarding patient rights, advocacy services and complaint mechanisms, and the right to prompt resolution of any complaint. You or a designee has the right to participate in the resolution of ethical issues surrounding your care. You have a right to file a complaint if you feel that your rights have been infringed, without fear or penalty from [facility] or the federal government. You may file a complaint with the Director of Outpatient Case Management by calling [phone]. At any time, you may lodge a grievance with the [facility] and [facility] by calling [phone], or the Joint Commission on Accreditation of Healthcare Organizations at ([phone].     You, or someone acting on your behalf, have the right to understandable information on your health status, treatment and progress in order to make decisions. You have the right to know the nature, risks and alternatives to treatment. You have the right to be informed, when appropriate, regarding the outcome of the care that has been provided. You have the right to refuse treatment to the extent permitted by law, and the right to be informed of the alternatives and consequences of refusing treatment.     You, in collaboration with your physician, have the right to make decisions regarding care and the right to participate in the development and implementation of the plan of care and effective pain management. You have the right to know the name and professional status of  those responsible for the delivery of your care and treatment.       You have a right, within legal guidelines, to have a guardian, next-of-kin or legal designee exercises your patient rights when you are unable to do so. You have the right for your wishes regarding end-of-life decisions to be addressed by the healthcare team through advance directives. You have the right to personal privacy and confidentiality and to expect confidentiality of all records and communications pertaining to your care.      You have the right to receive communications about your health information confidentially. You have the right to request restrictions on the uses and disclosures of your health information. You have the right to inspect, copy, request amendments and receive an accounting of to whom we have disclosed your health information.     You have the right to be provided with interpretation services if you do not speak English; to alternative communication techniques if you are hearing or vision impaired; and to have any other resources needed on your behalf to ensure effective communication. These services are provided free of charge.     You have a right to personal safety (free from mental, physical, sexual and verbal abuse, neglect and exploitation). You have the right to access protective and advocacy services.     Advance Directives  A Patient Advocate is available to meet with patients to answer questions regarding advance directives.    Living Will  A document that outlines what medical treatment the patient does or does not want in the event the patient becomes unable to make those decisions at the appropriate time.    Durable Medical Power of   A document by which the patient designates an individual to be responsible for making medical decisions in the event the patient becomes unable to do so.    HIPAA Notice of Privacy Practices  Your medical information is governed by federal privacy laws. HIPAA  protects private medical information and how that information is disclosed. If you have a question regarding the HIPAA Notice of Privacy Practices, or if you believe your privacy rights have been violated, you may call our designated hotline at ([phone].            Quality Improvement  Because we consistently strive to improve the care and service provided to our patients, we welcome your feedback. Your comments are an important part of our quality improvement process, as we like to know what we are doing right and which areas are in need of improvement. Our policy is to listen, be responsive and provide you with an appropriate and timely follow-up to your questions or concerns. Our goal is active patient and family involvement in all aspects of the care process.                                                                                  Reporting a Grievance or Complaint    During your time with the [facility] team you may have a grievance or complaint with our services. Your Patients Bill of Rights gives patients, families, and caregivers the right to express concerns and grievances and the right to expect a reasonable and timely response.     Your presentation of your concerns is not viewed negatively. It is an opportunity for us to improve the quality of our care and services we provide to you.     You may report your concerns directly to your , or you can phone in a complaint to:     Director of Outpatient Case Management  [phone]    You may also send a complaint letter to:    Director of Outpatient Case Management Services  [address] [place] [id number]    Tell us the details of your complaint and provide us with a contact phone number so we can contact you to obtain additional information. We will return a call to you within two business days of our receipt of your complaint, and to request additional information as needed. If you choose to  mail a letter, your complaint may take a few days longer to reach us.     All grievances will be addressed as quickly as possible. A grievance or complaint that involves situations or practices which place patients in immediate danger will be addressed as an urgent matter. We will work to resolve all other complaints within seven days of receipt. By that time, you will receive a phone call with either the resolution of your complaint, or a plan for corrective action. A formal written response will be sent to you within 30 days of receipt of your grievance.     If a resolution cannot be completed within 30 days, a letter will be sent to you or your family member with an estimated time for the final response.    Additionally, all patients have the right to file complaints with external agencies, without exception. Complaints/grievances can be addressed to the following agencies:            Patient Safety or Quality of Care Concerns  Office of Quality Monitoring   [facility] [place] [address]  [phone] Toll Free    HIPPA Privacy/Security Concerns  Office for Civil Rights Region IV  [place] Department of Health & Human Services  [address] [place] [address], [address]  [place] [address] [phone] Phone  [phone] TDD  [phone] Toll Free    Medicare/Medicaid Billing Concerns  [place] for Medicare & Medicaid Services  Region [address]  [address] [place] [address], [address]  [place] [address] [phone] Phone  [phone] Toll Free    General Concerns  [place] [facility] ([facility])  ([phone] [phone] Toll Free Complaint Hotline                                                                  Consent Form/Release of Information    By signing--     (1) I agree I have read the Outpatient Case Management information provided to me;     (2) I agree to voluntarily participate in the Outpatient Case Management program;     (3) I understand I must consent to  participation in the Outpatient Case Management program during my first interview with my ;    (4) I consent to the discussion and release of my personal health information to my healthcare team (including my personal physician, my medical home care team, any specialty physician(s), and my [facility] team);     (5) I agree my consent is valid for the length of time I am receiving Outpatient Case Management;    (6) I agree to referrals to community resources which my Case Management team recommends for me. I agree to the release of my personal information and personal health information as necessary to referral sources.    ___________________________________________________________________  Patients Printed Name     ___________________________________________________________________  Patients Signature       Date    If patient is in being cared for, please complete this section:     ___________________________________________________________________  Printed Name of Person Caring For Patient   Relationship To Patient    ___________________________________________________________________   Signature of Person Caring For Patient     Date    PLEASE SIGN AND RETURN IN THE ENCLOSED PRE-ADDRESSED ENVELOPE.

## 2017-05-10 ENCOUNTER — TELEPHONE (OUTPATIENT)
Dept: PHARMACY | Facility: CLINIC | Age: 72
End: 2017-05-10

## 2017-05-10 ENCOUNTER — LAB VISIT (OUTPATIENT)
Dept: LAB | Facility: HOSPITAL | Age: 72
End: 2017-05-10
Attending: NURSE PRACTITIONER
Payer: MEDICARE

## 2017-05-10 DIAGNOSIS — E11.319: ICD-10-CM

## 2017-05-10 PROCEDURE — 83036 HEMOGLOBIN GLYCOSYLATED A1C: CPT | Mod: TXP

## 2017-05-11 ENCOUNTER — TELEPHONE (OUTPATIENT)
Dept: FAMILY MEDICINE | Facility: CLINIC | Age: 72
End: 2017-05-11

## 2017-05-11 ENCOUNTER — OUTPATIENT CASE MANAGEMENT (OUTPATIENT)
Dept: ADMINISTRATIVE | Facility: OTHER | Age: 72
End: 2017-05-11

## 2017-05-11 LAB
ESTIMATED AVG GLUCOSE: 111 MG/DL
HBA1C MFR BLD HPLC: 5.5 %

## 2017-05-11 NOTE — PROGRESS NOTES
Patient was referred by OPCM RN Raya Abernathy for financial assistance support. Attempted to contact patient/spouse to complete OPCM SW Assessment.  Left voice mail message requesting return call. Will try again at a later date.

## 2017-05-11 NOTE — TELEPHONE ENCOUNTER
Mica with [facility] they want patient to have PT but he keeps declining. Please call Mica [phone] please advise

## 2017-05-12 ENCOUNTER — TELEPHONE (OUTPATIENT)
Dept: ENDOCRINOLOGY | Facility: CLINIC | Age: 72
End: 2017-05-12

## 2017-05-12 ENCOUNTER — INFUSION (OUTPATIENT)
Dept: INFECTIOUS DISEASES | Facility: HOSPITAL | Age: 72
End: 2017-05-12
Attending: INTERNAL MEDICINE
Payer: MEDICARE

## 2017-05-12 VITALS
WEIGHT: 201.94 LBS | BODY MASS INDEX: 25.92 KG/M2 | HEIGHT: 74 IN | DIASTOLIC BLOOD PRESSURE: 54 MMHG | SYSTOLIC BLOOD PRESSURE: 125 MMHG

## 2017-05-12 DIAGNOSIS — N18.4 CKD (CHRONIC KIDNEY DISEASE), STAGE IV: Primary | ICD-10-CM

## 2017-05-12 DIAGNOSIS — N18.4 KIDNEY DISEASE, CHRONIC, STAGE IV (GFR 15-29 ML/MIN): Primary | ICD-10-CM

## 2017-05-12 DIAGNOSIS — N18.4 CRD (CHRONIC RENAL DISEASE), STAGE IV: ICD-10-CM

## 2017-05-12 PROCEDURE — 63600175 PHARM REV CODE 636 W HCPCS: Mod: TXP | Performed by: INTERNAL MEDICINE

## 2017-05-12 PROCEDURE — 96372 THER/PROPH/DIAG INJ SC/IM: CPT | Mod: NTX

## 2017-05-12 RX ADMIN — DARBEPOETIN ALFA 300 MCG: 300 INJECTION, SOLUTION INTRAVENOUS; SUBCUTANEOUS at 08:05

## 2017-05-12 NOTE — PROGRESS NOTES
Hgb 9.0;Hct 29.4    Discussed Hgb with Patient. Dose didn't increase  PER PROTOCOL FORM DROD-188. Due to being at max dose.  Aranesp 300 mcg given and 4 week appt. Set. .  Dr Amezcua notified    Gfr 15/Stage 4

## 2017-05-12 NOTE — TELEPHONE ENCOUNTER
----- Message from Viviane Carter, [place], FNP sent at 5/12/2017  4:13 PM CDT -----  Accommodate couple, switch Mr. Mathis to 330p slot, wife is at 3p next week for JENNIFER Carter.  Thanks,  Viviane  [phone] can be open for someone else.

## 2017-05-15 ENCOUNTER — TELEPHONE (OUTPATIENT)
Dept: FAMILY MEDICINE | Facility: CLINIC | Age: 72
End: 2017-05-15

## 2017-05-15 ENCOUNTER — OUTPATIENT CASE MANAGEMENT (OUTPATIENT)
Dept: ADMINISTRATIVE | Facility: OTHER | Age: 72
End: 2017-05-15

## 2017-05-15 DIAGNOSIS — R29.898 WEAKNESS OF BOTH LOWER EXTREMITIES: ICD-10-CM

## 2017-05-15 DIAGNOSIS — R26.81 GAIT INSTABILITY: Primary | ICD-10-CM
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
## 2017-05-15 NOTE — TELEPHONE ENCOUNTER
Mr. Mathis is requesting outpatient PT if you agree. Message from Outpatient case management Raya Abernathy RN

## 2017-05-15 NOTE — PROGRESS NOTES
"Follow up with pt. Pt gave phone to Mrs. Mathis. Mrs. Mathis states she had a visit from Mica Marcelino with [facility]. She states "she kept calling so I just let her come". She states, "I would rather him work with y'all since you're at the clinic". She states pt is interested in outpatient PT. Will send message to Tyrese requesting order. She states she did not get the message from pharmacy assistance. Provided her with the phone number. She states she will call. Will follow up in 1 week.   "

## 2017-05-16 RX ORDER — CLONIDINE HYDROCHLORIDE 0.1 MG/1
0.1 TABLET ORAL 3 TIMES DAILY
Qty: 270 TABLET | Refills: 3 | Status: SHIPPED | OUTPATIENT
Start: 2017-05-16 | End: 2017-05-24 | Stop reason: SDUPTHER

## 2017-05-16 RX ORDER — CLONIDINE HYDROCHLORIDE 0.1 MG/1
TABLET ORAL
Qty: 270 TABLET | Refills: 3 | OUTPATIENT
Start: 2017-05-16

## 2017-05-16 RX ORDER — MINOXIDIL 2.5 MG/1
TABLET ORAL
Qty: 360 TABLET | Refills: 3 | OUTPATIENT
Start: 2017-05-16

## 2017-05-17 ENCOUNTER — OFFICE VISIT (OUTPATIENT)
Dept: ENDOCRINOLOGY | Facility: CLINIC | Age: 72
End: 2017-05-17
Payer: MEDICARE

## 2017-05-17 VITALS
DIASTOLIC BLOOD PRESSURE: 52 MMHG | RESPIRATION RATE: 18 BRPM | WEIGHT: 207.88 LBS | HEART RATE: 57 BPM | HEIGHT: 74 IN | SYSTOLIC BLOOD PRESSURE: 130 MMHG | BODY MASS INDEX: 26.68 KG/M2

## 2017-05-17 DIAGNOSIS — I10 ESSENTIAL HYPERTENSION: Chronic | ICD-10-CM

## 2017-05-17 DIAGNOSIS — E11.319 DIABETIC RETINOPATHY ASSOCIATED WITH TYPE 2 DIABETES MELLITUS, MACULAR EDEMA PRESENCE UNSPECIFIED, UNSPECIFIED LATERALITY, UNSPECIFIED RETINOPATHY SEVERITY: ICD-10-CM

## 2017-05-17 DIAGNOSIS — N18.4 KIDNEY DISEASE, CHRONIC, STAGE IV (GFR 15-29 ML/MIN): ICD-10-CM

## 2017-05-17 DIAGNOSIS — D84.9 IMMUNOSUPPRESSED STATUS: Chronic | ICD-10-CM

## 2017-05-17 DIAGNOSIS — E16.2 HYPOGLYCEMIA: ICD-10-CM

## 2017-05-17 DIAGNOSIS — E44.0 MODERATE PROTEIN MALNUTRITION: ICD-10-CM

## 2017-05-17 DIAGNOSIS — Z79.4 LONG-TERM INSULIN USE: ICD-10-CM

## 2017-05-17 DIAGNOSIS — N18.4 CKD (CHRONIC KIDNEY DISEASE), STAGE IV: Chronic | ICD-10-CM

## 2017-05-17 DIAGNOSIS — Z94.4 LIVER TRANSPLANTED: Chronic | ICD-10-CM

## 2017-05-17 DIAGNOSIS — E11.610 CHARCOT'S JOINT OF FOOT DUE TO DIABETES: ICD-10-CM

## 2017-05-17 PROCEDURE — 3066F NEPHROPATHY DOC TX: CPT | Mod: NTX,S$GLB,, | Performed by: NURSE PRACTITIONER

## 2017-05-17 PROCEDURE — 1159F MED LIST DOCD IN RCRD: CPT | Mod: NTX,S$GLB,, | Performed by: NURSE PRACTITIONER

## 2017-05-17 PROCEDURE — 1160F RVW MEDS BY RX/DR IN RCRD: CPT | Mod: NTX,S$GLB,, | Performed by: NURSE PRACTITIONER

## 2017-05-17 PROCEDURE — 3044F HG A1C LEVEL LT 7.0%: CPT | Mod: NTX,S$GLB,, | Performed by: NURSE PRACTITIONER

## 2017-05-17 PROCEDURE — 1125F AMNT PAIN NOTED PAIN PRSNT: CPT | Mod: NTX,S$GLB,, | Performed by: NURSE PRACTITIONER

## 2017-05-17 PROCEDURE — 99499 UNLISTED E&M SERVICE: CPT | Mod: NTX,S$GLB,, | Performed by: NURSE PRACTITIONER

## 2017-05-17 PROCEDURE — 99999 PR PBB SHADOW E&M-EST. PATIENT-LVL V: CPT | Mod: PBBFAC,TXP,, | Performed by: NURSE PRACTITIONER

## 2017-05-17 PROCEDURE — 99214 OFFICE O/P EST MOD 30 MIN: CPT | Mod: NTX,S$GLB,, | Performed by: NURSE PRACTITIONER

## 2017-05-17 RX ORDER — INSULIN GLARGINE 100 [IU]/ML
8 INJECTION, SOLUTION SUBCUTANEOUS NIGHTLY
Qty: 2 BOX | Refills: 6
Start: 2017-05-17

## 2017-05-17 RX ORDER — INSULIN ASPART 100 [IU]/ML
INJECTION, SOLUTION INTRAVENOUS; SUBCUTANEOUS
Qty: 3 BOX | Refills: 3
Start: 2017-05-17

## 2017-05-17 NOTE — MR AVS SNAPSHOT
[facility] - Endocrinology  [address]  [facility] [address]  Phone: [phone]                  Philip Mathis III   2017 7:30 AM   Office Visit    Description:  Male : 1945   Provider:  MILTON Salomon, AMALIAP   Department:  [facility] Endocrinology           Reason for Visit     Diabetes Mellitus           Diagnoses this Visit        Comments    Uncontrolled type 2 diabetes mellitus with stage 4 chronic kidney disease, with long-term current use of insulin    -  Primary     Diabetic retinopathy associated with type 2 diabetes mellitus, macular edema presence unspecified, unspecified laterality, unspecified retinopathy severity         Long-term insulin use         Hypoglycemia         CKD (chronic kidney disease), stage IV         Kidney disease, chronic, stage IV (GFR 15-29 ml/min)         Liver transplanted         Immunosuppressed status         Essential hypertension         Charcot's joint of foot due to diabetes         Moderate protein malnutrition                To Do List           Future Appointments        Provider Department Dept Phone    2017 8:00 AM LAB, [place] [facility] [phone]    2017 8:15 AM SPECIMEN, [place] [facility] [phone]    2017 8:30 AM Faye Larson DPM [facility] - Podiatry [phone]    2017 8:00 AM Marielos Amezcua MD [facility] - Nephrology [phone]    2017 10:15 AM [facility] 11 ALL [facility] [phone]      Goals (5 Years of Data)              5/15/17    Patient/caregiver will have an action plan in place to manage and prevent complications of  CHF  prior to discharge from [facility].   On track    Notes - Note created  2017 12:28 PM by Raya Abernathy RN    Overall Time to Completion  2 months from 2017    Short Term Goals  Patient/caregiver will measure and record the weight 1 times per day for 1 week.  Interventions   · Recognize and provide  educational material (TALAT).  · Assess for availability of working scale in home setting.   Status  · Partially met    Patient/caregiver will notify doctor if patient gains more than 3 pounds in one day or 5 pounds in one week within 2 weeks.  Interventions   · Recognize and provide educational material (KRAMES).   Status  · Partially met    Patient/caregiver will verbalize 3 signs and symptoms of Congestive Heart Failure within 2 weeks.   Interventions   · Recognize and provide educational material (KRAMES).   Status  · Partially met         Clinical Reference Documents Added to Patient Instructions       Document    HEART FAILURE: MAKING CHANGES TO YOUR DIET (ENGLISH)    HEART FAILURE: TRACKING YOUR WEIGHT (ENGLISH)    HEART FAILURE: WARNING SIGNS OF A FLARE-UP (ENGLISH)            Patient/caregiver will have an action plan in place to manage and prevent complications of falls prior to discharge from [facility].   On track    Notes - Note edited  5/15/2017  3:23 PM by Raya Abernathy RN    Overall Time to Completion  2 months from 05/08/2017    Short Term Goals  Patient/caregiver will put in place 3 keeping room free of clutter, making sure rooms are well lit and removing throw rugs measures to decrease the risk of patient falls within 3 weeks.  Interventions   · Recognize and provide educational material (TALAT).  · Facilitate to needed DME.   · Request referral to outpatient PT   Status  · Deferred   · Partially Met         Clinical Reference Documents Added to Patient Instructions       Document    HEART FAILURE: MAKING CHANGES TO YOUR DIET (ENGLISH)    HEART FAILURE: TRACKING YOUR WEIGHT (ENGLISH)    HEART FAILURE: WARNING SIGNS OF A FLARE-UP (ENGLISH)            Patient/caregiver will have knowledge of resources available in order to obtain the services that are needed prior to discharge from [facility].       Notes - Note created  5/8/2017  2:21 PM by Raya Abernathy RN    Overall Time to Completion  2 months from  05/08/2017    Short Term Goals  Patient/caregiver will notify RN CCM if patient does not hear from [facility]  within 1 week.  Interventions   · Refer to Outpatient Case Management Social Worker.   Status  · Met  Overall Time to Completion  2 months from 05/08/2017    Short Term Goals  Patient/caregiver will notify RN CCM if patient does not hear from Pharmacy Assistance Program within 1 week.  Interventions   · [facility].   Status  · Met         Clinical Reference Documents Added to Patient Instructions       Document    HEART FAILURE: MAKING CHANGES TO YOUR DIET (ENGLISH)    HEART FAILURE: TRACKING YOUR WEIGHT (ENGLISH)    HEART FAILURE: WARNING SIGNS OF A FLARE-UP (ENGLISH)             Clinical Reference Documents Added to Patient Instructions       Document    HEART FAILURE: MAKING CHANGES TO YOUR DIET (ENGLISH)    HEART FAILURE: TRACKING YOUR WEIGHT (ENGLISH)    HEART FAILURE: WARNING SIGNS OF A FLARE-UP (ENGLISH)              Follow-Up and Disposition     Return in about 6 months (around 11/17/2017).    Follow-up and Disposition History       These Medications        Disp Refills Start End    insulin glargine (LANTUS SOLOSTAR) 100 unit/mL (3 mL) InPn pen 2 Box 6 5/17/2017     Inject 8 Units into the skin every evening. - Subcutaneous    Pharmacy: [facility] [id number] [facility] [address] [facility] [address] [facility] Ph #: [phone]       insulin aspart (NOVOLOG) 100 unit/mL InPn pen 3 Box 3 5/17/2017     Inject 6 units w/ breakfast, 4 units w/ lunch and dinner plus scale 180-230 +1, 231-280 +2, 281-330 +3, 331-380 +4, >380 +5. 90 day supply    Pharmacy: [id number] - [place], [place] - [address] [facility] [address] [facility] Ph #: [phone]         [facility]     [facility] Care [facility] - 24/7 Assistance  Unless otherwise directed by your provider, please contact [facility], our nurse care line that is  available for 24/7 assistance.     Registered nurses in the [facility] provide: appointment scheduling, clinical advisement, health education, and other advisory services.  Call: [phone] (toll free)               Medications           Message regarding Medications     Verify the changes and/or additions to your medication regime listed below are the same as discussed with your clinician today.  If any of these changes or additions are incorrect, please notify your healthcare provider.        CHANGE how you are taking these medications     Start Taking Instead of    insulin glargine (LANTUS SOLOSTAR) 100 unit/mL (3 mL) InPn pen insulin glargine (LANTUS SOLOSTAR) 100 unit/mL (3 mL) InPn pen    Dosage:  Inject 8 Units into the skin every evening. Dosage:  Inject 10 Units into the skin every evening.    Reason for Change:  Reorder     insulin aspart (NOVOLOG) 100 unit/mL InPn pen insulin aspart (NOVOLOG) 100 unit/mL InPn pen    Dosage:  Inject 6 units w/ breakfast, 4 units w/ lunch and dinner plus scale 180-230 +1, 231-280 +2, 281-330 +3, 331-380 +4, >380 +5. 90 day supply Dosage:  Inject 6 units with meals plus scale 180-230 +1, 231-280 +2, 281-330 +3, 331-380 +4, >380 +5. Max daily dose 33 units. 90 day supply    Reason for Change:  Reorder       STOP taking these medications     oxycodone (OXY-IR) 5 mg Cap Take 1 capsule (5 mg total) by mouth every 4 (four) hours as needed.    oxycodone-acetaminophen (PERCOCET) 5-325 mg per tablet Take 1 tablet by mouth every 4 (four) hours as needed for Pain.           Verify that the below list of medications is an accurate representation of the medications you are currently taking.  If none reported, the list may be blank. If incorrect, please contact your healthcare provider. Carry this list with you in case of emergency.           Current Medications     allopurinol (ZYLOPRIM) 100 MG tablet TAKE 1 TABLET EVERY DAY    blood sugar diagnostic (ACCU-CHEK JOANN  "PLUS TEST STRP) Strp 1 strip by Misc.(Non-Drug; Combo Route) route 4 (four) times daily.    blood-glucose meter (ACCU-CHEK JOANN PLUS METER) Misc Use as directed    carvedilol (COREG) 6.25 MG tablet Take 1 tablet (6.25 mg total) by mouth 2 (two) times daily.    cetirizine (ZYRTEC) 10 MG tablet Take 1 tablet (10 mg total) by mouth 2 (two) times daily. If needed may increase to 3 tablets twice daily to control itching    ciclopirox (PENLAC) 8 % Soln Apply to affected nails daily x 6-12 mos.  Remove weekly with rubbing alcohol    clonazePAM (KLONOPIN) 0.5 MG tablet Take 1 tablet (0.5 mg total) by mouth every evening.    cloNIDine (CATAPRES) 0.1 MG tablet Take 1 tablet (0.1 mg total) by mouth 3 (three) times daily.    doxazosin (CARDURA) 8 MG Tab Take 1 tablet (8 mg total) by mouth once daily.    ferrous gluconate (FERGON) 324 MG tablet pt taking tid    GENERLAC 10 gram/15 mL solution Use as directed 10 g in the mouth or throat once daily.    glucagon (human recombinant) inj 1mg/mL kit Inject 1 mL (1 mg total) into the muscle as needed.    hydrALAZINE (APRESOLINE) 100 MG tablet Take 1 tablet (100 mg total) by mouth 3 (three) times daily.    hydrOXYzine HCl (ATARAX) 25 MG tablet Take 2 tablets (50 mg total) by mouth 3 (three) times daily as needed for Itching.    insulin aspart (NOVOLOG) 100 unit/mL InPn pen Inject 6 units w/ breakfast, 4 units w/ lunch and dinner plus scale 180-230 +1, 231-280 +2, 281-330 +3, 331-380 +4, >380 +5. 90 day supply    insulin glargine (LANTUS SOLOSTAR) 100 unit/mL (3 mL) InPn pen Inject 8 Units into the skin every evening.    insulin needles, disposable, (NOVOFINE 32) 32 x 1/4 " Ndle 1 each by Misc.(Non-Drug; Combo Route) route 3 (three) times daily.    lactulose (CHRONULAC) 20 gram/30 mL Soln Take 30 mLs (20 g total) by mouth 3 (three) times daily.    lancets (ACCU-CHEK SOFTCLIX LANCETS) Misc 1 lancet by Misc.(Non-Drug; Combo Route) route 4 (four) times daily.    montelukast (SINGULAIR) 10 " "mg tablet Take 1 tablet (10 mg total) by mouth every evening.    multivitamin (THERAGRAN) per tablet Take 1 tablet by mouth Daily.    nystatin (MYCOSTATIN) 500,000 unit Tab     ondansetron (ZOFRAN-ODT) 4 MG TbDL DISSOLVE 1 TABLET(4 MG) ON THE TONGUE EVERY 12 HOURS AS NEEDED    pantoprazole (PROTONIX) 40 MG tablet TAKE 1 TABLET ONE TIME DAILY    rifaximin (XIFAXAN) 550 mg Tab Take 1 tablet (550 mg total) by mouth 2 (two) times daily.    senna (SENNA CONCENTRATE) 8.6 mg tablet Take 1 tablet by mouth once daily.    tacrolimus (PROGRAF) 0.5 MG Cap Take 1 capsule (0.5 mg total) by mouth once daily.    torsemide (DEMADEX) 20 MG Tab Take 2 tablets (40 mg total) by mouth once daily.    urea (CARMOL) 40 % Crea Apply topically once daily.           Clinical Reference Information           Your Vitals Were     BP Pulse Resp Height Weight BMI    130/52 (BP Location: Right arm, Patient Position: Sitting, BP Method: Manual) 57 18 6' 2" (1.88 m) 94.3 kg (207 lb 14.3 oz) 26.69 kg/m2      Blood Pressure          Most Recent Value    BP  (!)  130/52      Allergies as of 5/17/2017     Corticosteroids (Glucocorticoids)    Hydrocortisone    Neuromuscular Blockers, Steroidal    Ribavirin      Immunizations Administered on Date of Encounter - 5/17/2017     None      Orders Placed During Today's Visit     Future Labs/Procedures Expected by Expires    Hemoglobin A1c  5/17/2017 7/16/2018      Maintenance Dialysis History     Patient has no recorded history of maintenance dialysis.      Transplant Information        Tx Date Organ Coordinator Care Team     Liver Fabian Colvin RN Referring Physician:  Danay Nath MD          Tx Date Organ Coordinator Care Team     Kidney Fabian Colvin RN Referring Physician:  Luciano Hercules MD   Current Nephrologist:  Luciano Hercules MD          [facility] Date Organ Coordinator Care Team    6/10/2001 Liver Gaby Palacios RN Surgeon:  BRITTANY Pizarro MD   Assisting Surgeon:  Cain JIMÉNEZ" MD Manny   Current Hepatologist:  Danay Nath MD         Instructions    novolog 6 units w/ breakfast, 4 units w/ lunch and dinner  lantus 8 units at bedtime   Check blood glucose 4 times a day    Correct low blood sugar w/ 4-6 oz of coke, glucose tabs, 4-6 oz of apple juice, etc. 15 gram rule, recheck in 15 minutes.      Snacks can be an important part of a balanced, healthy meal plan. They allow you to eat more frequently, feeling full and satisfied throughout the day. Also, they allow you to spread carbohydrates evenly, which may stabilize blood sugars.  Plus, snacks are enjoyable!     The amount of carbohydrate needed at snacks varies. Generally, about 15-30 grams of carbohydrate per snack is recommended.  Below you will find some tasty treats.       0-5 gm carb   Crystal Light   Vitamin Water Zero   Herbal tea, unsweetened   2 tsp peanut butter on celery   1./2 cup sugar-free jell-o   1 sugar-free popsicle   ¼ cup blueberries   8oz Blue Corazon unsweetened almond milk   5 baby carrots & celery sticks, cucumbers, bell peppers dipped in ¼ cup salsa, 2Tbsp light ranch dressing or 2Tbsp plain Greek yogurt   10 Goldfish crackers   ½ oz low-fat cheese or string cheese   1 closed handful of nuts, unsalted   1 Tbsp of sunflower seeds, unsalted   1 cup Smart Pop popcorn   1 whole grain brown rice cake        15 gm carb   1 small piece of fruit or ½ banana or 1/2 cup lite canned fruit   3 monique cracker squares   3 cups Smart Pop popcorn, top spray butter, Mcnamara lite salt or cinnamon and Truvia   5 Vanilla Wafers   ½ cup low fat, no added sugar ice cream or frozen yogurt (Blue bell, Blue Bunny, Weight Watchers, Skinny Cow)   ½ turkey, ham, or chicken sandwich   ½ c fruit with ½ c Cottage cheese   4-6 unsalted wheat crackers with 1 oz low fat cheese or 1 tbsp peanut butter    30-45 goldfish crackers (depending on flavor)    7-8 Rastafarian mini brown rice cakes (caramel, apple cinnamon,  chocolate)    12 Shinto mini brown rice cakes (cheddar, bbq, ranch)    1/3 cup hummus dip with raw veg   1/2 whole wheat rafa, 1Tbsp hummus   Mini Pizza (1/2 whole wheat English muffin, low-fat  cheese, tomato sauce)   100 calorie snack pack (Oreo, Chips Ahoy, Ritz Mix, Baked Cheetos)   4-6 oz. light or Greek Style yogurt (Chobani, Yoplait, Okios, Stoneyfield)   ½ cup sugar-free pudding     6 in. wheat tortilla or rafa oven toasted chips (topped with spray butter flavoring, cinnamon, Truvia OR spray butter, garlic powder, chili powder)    18 BBQ Popchips (available at Target, Whole Foods, Fresh Market)                   Diabetes Support Group Meetings    Date Topics   February 9 Health Promotion/Nutrition   March 9 Taking Care of Your Kidneys   April 13 Taking Care of Your Feet   May 11 Ease Your Mind with Diabetes   June 8 Hurricane and Emergency Preparedness   July 13 Family & Caregiver Support for Diabetes   *August 17  Taking Care of Your Eyes   September 14 Devices & Technology   October 12 Recipes & Treats   November 9 Getting Pumped Up for Diabetes   December 14 Year-End Close Out            Meetings are held in the [facility] (A) of the [facility] located at [address] [place] [address]. Please call [phone] for additional information.    Free service, offered every 2nd Thursday of every month, except in August! Family members and/or friends are welcome as well!  Support group is for patients with type 1 or type 2 diabetes.    From 3:30p to 4:30p             Language Assistance Services     ATTENTION: Language assistance services are available, free of charge. Please call [phone].      ATENCIÓN: Si habla español, tiene a adler disposición servicios gratuitos de asistencia lingüística. Llame al [phone].     BÁRBARA Ý: N?u b?n nói Ti?ng Vi?t, có các d?ch v? h? tr? ngôn ng? mi?n phí dành cho b?n. G?i s? [phone].         [facility]  - Endocrin[facility] complies with applicable Federal civil rights laws and does not discriminate on the basis of race, color, national origin, age, disability, or sex.
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
## 2017-05-17 NOTE — PATIENT INSTRUCTIONS
novolog 6 units w/ breakfast, 4 units w/ lunch and dinner  lantus 8 units at bedtime   Check blood glucose 4 times a day    Correct low blood sugar w/ 4-6 oz of coke, glucose tabs, 4-6 oz of apple juice, etc. 15 gram rule, recheck in 15 minutes.      Snacks can be an important part of a balanced, healthy meal plan. They allow you to eat more frequently, feeling full and satisfied throughout the day. Also, they allow you to spread carbohydrates evenly, which may stabilize blood sugars.  Plus, snacks are enjoyable!     The amount of carbohydrate needed at snacks varies. Generally, about 15-30 grams of carbohydrate per snack is recommended.  Below you will find some tasty treats.       0-5 gm carb   Crystal Light   Vitamin Water Zero   Herbal tea, unsweetened   2 tsp peanut butter on celery   1./2 cup sugar-free jell-o   1 sugar-free popsicle   ¼ cup blueberries   8oz Blue Corazon unsweetened almond milk   5 baby carrots & celery sticks, cucumbers, bell peppers dipped in ¼ cup salsa, 2Tbsp light ranch dressing or 2Tbsp plain Greek yogurt   10 Goldfish crackers   ½ oz low-fat cheese or string cheese   1 closed handful of nuts, unsalted   1 Tbsp of sunflower seeds, unsalted   1 cup Smart Pop popcorn   1 whole grain brown rice cake        15 gm carb   1 small piece of fruit or ½ banana or 1/2 cup lite canned fruit   3 monique cracker squares   3 cups Smart Pop popcorn, top spray butter, Mcnamara lite salt or cinnamon and Truvia   5 Vanilla Wafers   ½ cup low fat, no added sugar ice cream or frozen yogurt (Blue bell, Blue Bunny, Weight Watchers, Skinny Cow)   ½ turkey, ham, or chicken sandwich   ½ c fruit with ½ c Cottage cheese   4-6 unsalted wheat crackers with 1 oz low fat cheese or 1 tbsp peanut butter    30-45 goldfish crackers (depending on flavor)    7-8 Yarsanism mini brown rice cakes (caramel, apple cinnamon, chocolate)    12 Yarsanism mini brown rice cakes (cheddar, bbq, ranch)    1/3 cup hummus  dip with raw veg   1/2 whole wheat rafa, 1Tbsp hummus   Mini Pizza (1/2 whole wheat English muffin, low-fat  cheese, tomato sauce)   100 calorie snack pack (Oreo, Chips Ahoy, Ritz Mix, Baked Cheetos)   4-6 oz. light or Greek Style yogurt (Chobani, Yoplait, Okios, [place]oneyfield)   ½ cup sugar-free pudding     6 in. wheat tortilla or rafa oven toasted chips (topped with spray butter flavoring, cinnamon, Truvia OR spray butter, garlic powder, chili powder)    18 BBQ Popchips (available at [employer], [employer] Foods, Fresh Market)                   Diabetes Support Group Meetings    Date Topics   February 9 Health Promotion/Nutrition   March 9 Taking Care of Your Kidneys   April 13 Taking Care of Your Feet   May 11 Ease Your Mind with Diabetes   June 8 Hurricane and Emergency Preparedness   July 13 Family & Caregiver Support for Diabetes   *August 17  Taking Care of Your Eyes   September 14 Devices & Technology   October 12 Recipes & Treats   November 9 Getting Pumped Up for Diabetes   December 14 Year-End Close Out            Meetings are held in the [facility] (A) of the [facility] located at [address] [place] [address]. Please call [phone] for additional information.    Free service, offered every 2nd Thursday of every month, except in August! Family members and/or friends are welcome as well!  Support group is for patients with type 1 or type 2 diabetes.    From 3:30p to 4:30p

## 2017-05-22 ENCOUNTER — OUTPATIENT CASE MANAGEMENT (OUTPATIENT)
Dept: ADMINISTRATIVE | Facility: OTHER | Age: 72
End: 2017-05-22

## 2017-05-22 ENCOUNTER — LAB VISIT (OUTPATIENT)
Dept: LAB | Facility: HOSPITAL | Age: 72
End: 2017-05-22
Attending: INTERNAL MEDICINE
Payer: MEDICARE

## 2017-05-22 ENCOUNTER — TELEPHONE (OUTPATIENT)
Dept: FAMILY MEDICINE | Facility: CLINIC | Age: 72
End: 2017-05-22

## 2017-05-22 ENCOUNTER — OFFICE VISIT (OUTPATIENT)
Dept: PODIATRY | Facility: CLINIC | Age: 72
End: 2017-05-22
Payer: MEDICARE

## 2017-05-22 VITALS
DIASTOLIC BLOOD PRESSURE: 60 MMHG | BODY MASS INDEX: 26.56 KG/M2 | HEIGHT: 74 IN | SYSTOLIC BLOOD PRESSURE: 140 MMHG | WEIGHT: 207 LBS

## 2017-05-22 DIAGNOSIS — N18.4 CKD (CHRONIC KIDNEY DISEASE), STAGE IV: ICD-10-CM

## 2017-05-22 DIAGNOSIS — E11.610 CHARCOT'S JOINT OF FOOT DUE TO DIABETES: ICD-10-CM

## 2017-05-22 DIAGNOSIS — E11.49 TYPE II DIABETES MELLITUS WITH NEUROLOGICAL MANIFESTATIONS: Primary | ICD-10-CM

## 2017-05-22 DIAGNOSIS — M20.41 HAMMER TOES OF BOTH FEET: ICD-10-CM

## 2017-05-22 DIAGNOSIS — N18.4 CKD (CHRONIC KIDNEY DISEASE) STAGE 4, GFR 15-29 ML/MIN: ICD-10-CM

## 2017-05-22 DIAGNOSIS — M20.42 HAMMER TOES OF BOTH FEET: ICD-10-CM

## 2017-05-22 DIAGNOSIS — Z85.05 PERSONAL HISTORY OF LIVER CANCER: ICD-10-CM

## 2017-05-22 DIAGNOSIS — R20.2 PARESTHESIAS: ICD-10-CM

## 2017-05-22 DIAGNOSIS — Z94.4 LIVER REPLACED BY TRANSPLANT: ICD-10-CM

## 2017-05-22 DIAGNOSIS — M89.8X7 EXOSTOSIS OF BONE OF FOOT: ICD-10-CM

## 2017-05-22 DIAGNOSIS — L84 CORN OR CALLUS: ICD-10-CM

## 2017-05-22 DIAGNOSIS — B35.1 ONYCHOMYCOSIS DUE TO DERMATOPHYTE: ICD-10-CM

## 2017-05-22 LAB
25(OH)D3+25(OH)D2 SERPL-MCNC: 27 NG/ML
AFP SERPL-MCNC: 3.7 NG/ML
ALBUMIN SERPL BCP-MCNC: 3.5 G/DL
ALBUMIN SERPL BCP-MCNC: 3.5 G/DL
ALP SERPL-CCNC: 56 U/L
ALT SERPL W/O P-5'-P-CCNC: 9 U/L
ANION GAP SERPL CALC-SCNC: 12 MMOL/L
ANION GAP SERPL CALC-SCNC: 12 MMOL/L
AST SERPL-CCNC: 19 U/L
BASOPHILS # BLD AUTO: 0.05 K/UL
BASOPHILS # BLD AUTO: 0.05 K/UL
BASOPHILS NFR BLD: 0.8 %
BASOPHILS NFR BLD: 0.8 %
BILIRUB SERPL-MCNC: 0.5 MG/DL
BUN SERPL-MCNC: 67 MG/DL
BUN SERPL-MCNC: 67 MG/DL
CALCIUM SERPL-MCNC: 8.2 MG/DL
CALCIUM SERPL-MCNC: 8.2 MG/DL
CHLORIDE SERPL-SCNC: 110 MMOL/L
CHLORIDE SERPL-SCNC: 110 MMOL/L
CO2 SERPL-SCNC: 21 MMOL/L
CO2 SERPL-SCNC: 21 MMOL/L
CREAT SERPL-MCNC: 4.2 MG/DL
CREAT SERPL-MCNC: 4.2 MG/DL
DIFFERENTIAL METHOD: ABNORMAL
DIFFERENTIAL METHOD: ABNORMAL
EOSINOPHIL # BLD AUTO: 0.5 K/UL
EOSINOPHIL # BLD AUTO: 0.5 K/UL
EOSINOPHIL NFR BLD: 7.7 %
EOSINOPHIL NFR BLD: 7.7 %
ERYTHROCYTE [DISTWIDTH] IN BLOOD BY AUTOMATED COUNT: 18.4 %
ERYTHROCYTE [DISTWIDTH] IN BLOOD BY AUTOMATED COUNT: 18.4 %
EST. GFR  (AFRICAN AMERICAN): 15.3 ML/MIN/1.73 M^2
EST. GFR  (AFRICAN AMERICAN): 15.3 ML/MIN/1.73 M^2
EST. GFR  (NON AFRICAN AMERICAN): 13.2 ML/MIN/1.73 M^2
EST. GFR  (NON AFRICAN AMERICAN): 13.2 ML/MIN/1.73 M^2
GLUCOSE SERPL-MCNC: 48 MG/DL
GLUCOSE SERPL-MCNC: 48 MG/DL
HCT VFR BLD AUTO: 32.3 %
HCT VFR BLD AUTO: 32.3 %
HGB BLD-MCNC: 9.8 G/DL
HGB BLD-MCNC: 9.8 G/DL
INR PPP: 1.2
LYMPHOCYTES # BLD AUTO: 1.1 K/UL
LYMPHOCYTES # BLD AUTO: 1.1 K/UL
LYMPHOCYTES NFR BLD: 18.6 %
LYMPHOCYTES NFR BLD: 18.6 %
MCH RBC QN AUTO: 26.6 PG
MCH RBC QN AUTO: 26.6 PG
MCHC RBC AUTO-ENTMCNC: 30.3 %
MCHC RBC AUTO-ENTMCNC: 30.3 %
MCV RBC AUTO: 88 FL
MCV RBC AUTO: 88 FL
MONOCYTES # BLD AUTO: 0.4 K/UL
MONOCYTES # BLD AUTO: 0.4 K/UL
MONOCYTES NFR BLD: 7.3 %
MONOCYTES NFR BLD: 7.3 %
NEUTROPHILS # BLD AUTO: 3.9 K/UL
NEUTROPHILS # BLD AUTO: 3.9 K/UL
NEUTROPHILS NFR BLD: 65.6 %
NEUTROPHILS NFR BLD: 65.6 %
PHOSPHATE SERPL-MCNC: 4.5 MG/DL
PLATELET # BLD AUTO: 127 K/UL
PLATELET # BLD AUTO: 127 K/UL
PLATELET BLD QL SMEAR: ABNORMAL
PLATELET BLD QL SMEAR: ABNORMAL
PMV BLD AUTO: 11 FL
PMV BLD AUTO: 11 FL
POTASSIUM SERPL-SCNC: 5 MMOL/L
POTASSIUM SERPL-SCNC: 5 MMOL/L
PROT SERPL-MCNC: 7.3 G/DL
PROTHROMBIN TIME: 12.6 SEC
PTH-INTACT SERPL-MCNC: 231 PG/ML
RBC # BLD AUTO: 3.69 M/UL
RBC # BLD AUTO: 3.69 M/UL
SODIUM SERPL-SCNC: 143 MMOL/L
SODIUM SERPL-SCNC: 143 MMOL/L
WBC # BLD AUTO: 6.01 K/UL
WBC # BLD AUTO: 6.01 K/UL

## 2017-05-22 PROCEDURE — 36415 COLL VENOUS BLD VENIPUNCTURE: CPT | Mod: PO,TXP

## 2017-05-22 PROCEDURE — 80197 ASSAY OF TACROLIMUS: CPT | Mod: TXP

## 2017-05-22 PROCEDURE — 80053 COMPREHEN METABOLIC PANEL: CPT | Mod: TXP

## 2017-05-22 PROCEDURE — 99499 UNLISTED E&M SERVICE: CPT | Mod: S$GLB,,, | Performed by: PODIATRIST

## 2017-05-22 PROCEDURE — 11055 PARING/CUTG B9 HYPRKER LES 1: CPT | Mod: Q9,S$GLB,, | Performed by: PODIATRIST

## 2017-05-22 PROCEDURE — 82105 ALPHA-FETOPROTEIN SERUM: CPT | Mod: TXP

## 2017-05-22 PROCEDURE — 83970 ASSAY OF PARATHORMONE: CPT | Mod: TXP

## 2017-05-22 PROCEDURE — 85610 PROTHROMBIN TIME: CPT | Mod: TXP

## 2017-05-22 PROCEDURE — 84100 ASSAY OF PHOSPHORUS: CPT | Mod: NTX

## 2017-05-22 PROCEDURE — 85025 COMPLETE CBC W/AUTO DIFF WBC: CPT | Mod: TXP

## 2017-05-22 PROCEDURE — 82306 VITAMIN D 25 HYDROXY: CPT | Mod: TXP

## 2017-05-22 PROCEDURE — 11721 DEBRIDE NAIL 6 OR MORE: CPT | Mod: 59,Q9,S$GLB, | Performed by: PODIATRIST

## 2017-05-22 PROCEDURE — 99999 PR PBB SHADOW E&M-EST. PATIENT-LVL III: CPT | Mod: PBBFAC,,, | Performed by: PODIATRIST

## 2017-05-22 NOTE — PROGRESS NOTES
Noted pt is scheduled for 1st PT appt on 6/1. Pt at clinic today for podiatry appt and labs. Will follow up in 2 weeks.

## 2017-05-22 NOTE — PROGRESS NOTES
Subjective:      Patient ID: Philip Mathis III is a 71 y.o. male.    Chief Complaint: Diabetes Mellitus; Diabetic Foot Exam; and Nail Care    Philip is a 71 y.o. male who presents to the clinic for evaluation and treatment of diabetic feet. Philip has a past medical history of Anemia; Back pain; Bishop's esophagus; BPH (benign prostatic hypertrophy); Chronic kidney disease; Cirrhosis; Diabetes mellitus; Diabetes mellitus, type 2; Elevated PSA; Heart failure; Hepatitis (Hepatitis C); Hepatitis C; Hyperlipidemia; Hypertension; Hypertension; Liver transplanted; Peripheral neuropathy; Sleep apnea; Transfusion reaction; Transplanted liver; Trouble in sleeping; Type II or unspecified type diabetes mellitus with renal manifestations, uncontrolled; and Type II or unspecified type diabetes mellitus with renal manifestations, uncontrolled. Patient relates no major problem with feet. Only complaints today consist of long thick toenails that are difficult for him to trim. Also relates multiple areas of dry, hard skin/callus. Used to see Dr. hCavez in the past. Has flat feet with traumatic Charcot of the left midfoot-mild. No other issues. Continues to use Urea to the dry, dark skin with continued improvement.     PCP: Donnie Owens NP    Date Last Seen by PCP:   Chief Complaint   Patient presents with    Diabetes Mellitus    Diabetic Foot Exam    Nail Care         Current shoe gear: Extra depth shoes with custome accommodative insoles    Hemoglobin A1C   Date Value Ref Range Status   05/10/2017 5.5 4.5 - 6.2 % Final     Comment:     According to ADA guidelines, hemoglobin A1C <7.0% represents  optimal control in non-pregnant diabetic patients.  Different  metrics may apply to specific populations.   Standards of Medical Care in Diabetes - 2016.  For the purpose of screening for the presence of diabetes:  <5.7%     Consistent with the absence of diabetes  5.7-6.4%  Consistent with increasing risk for diabetes    (prediabetes)  >or=6.5%  Consistent with diabetes  Currently no consensus exists for use of hemoglobin A1C  for diagnosis of diabetes for children.     04/26/2017 5.6 4.5 - 6.2 % Final     Comment:     According to ADA guidelines, hemoglobin A1C <7.0% represents  optimal control in non-pregnant diabetic patients.  Different  metrics may apply to specific populations.   Standards of Medical Care in Diabetes - 2016.  For the purpose of screening for the presence of diabetes:  <5.7%     Consistent with the absence of diabetes  5.7-6.4%  Consistent with increasing risk for diabetes   (prediabetes)  >or=6.5%  Consistent with diabetes  Currently no consensus exists for use of hemoglobin A1C  for diagnosis of diabetes for children.     01/10/2017 5.5 4.5 - 6.2 % Final     Comment:     According to ADA guidelines, hemoglobin A1C <7.0% represents  optimal control in non-pregnant diabetic patients.  Different  metrics may apply to specific populations.   Standards of Medical Care in Diabetes - 2016.  For the purpose of screening for the presence of diabetes:  <5.7%     Consistent with the absence of diabetes  5.7-6.4%  Consistent with increasing risk for diabetes   (prediabetes)  >or=6.5%  Consistent with diabetes  Currently no consensus exists for use of hemoglobin A1C  for diagnosis of diabetes for children.             Review of Systems   Constitution: Negative for chills and fever.   Cardiovascular: Positive for leg swelling. Negative for chest pain and claudication.   Respiratory: Negative for cough and shortness of breath.    Skin: Positive for dry skin and nail changes.   Musculoskeletal: Positive for arthritis and stiffness.   Gastrointestinal: Negative for nausea and vomiting.   Neurological: Positive for paresthesias. Negative for numbness.   Psychiatric/Behavioral: Negative for altered mental status.           Objective:      Physical Exam   Constitutional: He is oriented to person, place, and time. He appears  well-developed and well-nourished.   HENT:   Head: Normocephalic.   Cardiovascular: Intact distal pulses.    Pulses:       Dorsalis pedis pulses are 2+ on the right side, and 2+ on the left side.        Posterior tibial pulses are 1+ on the right side, and 1+ on the left side.   Mild edema noted b/l LEs with varicosities  present. The skin of both feet and ankles is thin, shiny, atrophic and cool to touch.    Pulmonary/Chest: No respiratory distress.   Musculoskeletal:   Gastrocnemius equinus noted to b/l ankles with decreased DF noted on exam. MMT 5/5 in DF/PF/Inv/Ev resistance with no reproduction of pain in any direction. Passive range of motion of ankle and pedal joints is painless. Adequate pedal joint ROM.   Prominent midfoot left plantar aspect at TN joint with palpable bone. Rocker bottom foot type left. Semi-reducible hammertoe contractures noted to toes 2-4 b/l-asymptomatic. HAV, mild, non trackbound noted b/l with mild medial bony prominence at 1st met head--asymptomatic.      Neurological: He is alert and oriented to person, place, and time. He has normal strength. A sensory deficit is present.   Light touch, proprioception, and sharp/dull sensation are all intact bilaterally. Protective threshold with the Freeman-Wienstein monofilament is decreased bilaterally. Vibratory sensation diminished b/l distal foot.    Skin: Skin is warm, dry and intact. No erythema.   No open lesions, lacerations or wounds noted. Nails are thickened, elongated, discolored yellow/brown with subungual debris and brittleness to R 1-5 and L 1-5. Interdigital spaces clean, dry and intact b/l. Pedal hair absent. Skin of forefoot is cool to touch with proximal warmth.   Moderate hyperkeratosis noted to plantar medial midfoot at bony prominence, left.   Mild hyperpigmentation to skin of both ankles consistent with hemosiderin deposits.    Psychiatric: He has a normal mood and affect. His behavior is normal. Judgment and thought content  normal.   Vitals reviewed.          Assessment:       Encounter Diagnoses   Name Primary?    Type II diabetes mellitus with neurological manifestations Yes    Charcot's joint of foot due to diabetes - Left Foot     Hammer toes of both feet     Paresthesias     Corn or callus     Exostosis of bone of foot - Left Foot     Onychomycosis due to dermatophyte          Plan:       Philip was seen today for diabetes mellitus, diabetic foot exam and nail care.    Diagnoses and all orders for this visit:    Type II diabetes mellitus with neurological manifestations  -     DIABETIC SHOES FOR HOME USE    Charcot's joint of foot due to diabetes - Left Foot  -     DIABETIC SHOES FOR HOME USE    Hammer toes of both feet  -     DIABETIC SHOES FOR HOME USE    Paresthesias  -     DIABETIC SHOES FOR HOME USE    Corn or callus  -     DIABETIC SHOES FOR HOME USE    Exostosis of bone of foot - Left Foot  -     DIABETIC SHOES FOR HOME USE    Onychomycosis due to dermatophyte      I counseled the patient on his conditions, their implications and medical management.        - Shoe inspection. Diabetic Foot Education. Patient reminded of the importance of good nutrition and blood sugar control to help prevent podiatric complications of diabetes. Patient instructed on proper foot hygeine. We discussed wearing proper shoe gear, daily foot inspections, never walking without protective shoe gear, never putting sharp instruments to feet, routine podiatric nail visits every 2-3 months.      - With patient's permission, nails were aggressively reduced and debrided x 10 to their soft tissue attachment mechanically and with electric , removing all offending nail and debris. Patient relates relief following the procedure. He will continue to monitor the areas daily, inspect his feet, wear protective shoe gear when ambulatory, moisturizer to maintain skin integrity and follow in this office in approximately 2-3 months, sooner p.r.n.    -  The affected area was cleansed with an alcohol prep pad. Next, utilizing a No.15 scalpel, the hyperkeratotic tissues were trimmed from plantar medial midfoot left, down to appropriate level of skin. Care was taken to remove any nucleated core from the center of the lesion. No pinpoint bleeding was encountered. The patient tolerated relief following this procedure. (1 lesion total).     Long discussion with patient regarding appropriate, supportive and comfortable shoes. Recommended DM shoes with adequate arch supports to alleviate abnormal pressure and improve stability of foot while walking. Avoid flat shoes and barefoot walking as these will exacerbate or worsen symptoms. Continue with Diabetic shoes with custom inserts. New Rx provided today (lost last one).    Discussed continuing the use of Tarun's vaporub on affected toenails for up to 1 year for improvement in appearance and fungal infection.     Discussed continued use of regular and routine moisturizer to skin of both feet to help improve dry skin. Advised to apply twice daily until resolution of symptoms. Avoid between toes. Continue Urea, can switch to OTC as needed.     F/u 9 weeks, sooner PRN

## 2017-05-23 DIAGNOSIS — N18.6 ESRD (END STAGE RENAL DISEASE) ON DIALYSIS: Primary | ICD-10-CM

## 2017-05-23 DIAGNOSIS — Z99.2 ESRD (END STAGE RENAL DISEASE) ON DIALYSIS: Primary | ICD-10-CM

## 2017-05-23 LAB — TACROLIMUS BLD-MCNC: <1.5 NG/ML

## 2017-05-23 RX ORDER — LACTULOSE 10 G/15ML
SOLUTION ORAL; RECTAL
Qty: 2700 ML | Refills: 0 | Status: ON HOLD | OUTPATIENT
Start: 2017-05-23 | End: 2017-07-15

## 2017-05-24 ENCOUNTER — OFFICE VISIT (OUTPATIENT)
Dept: NEPHROLOGY | Facility: CLINIC | Age: 72
End: 2017-05-24
Payer: MEDICARE

## 2017-05-24 ENCOUNTER — TELEPHONE (OUTPATIENT)
Dept: PHARMACY | Facility: CLINIC | Age: 72
End: 2017-05-24

## 2017-05-24 VITALS
OXYGEN SATURATION: 95 % | HEIGHT: 74 IN | WEIGHT: 204.81 LBS | BODY MASS INDEX: 26.28 KG/M2 | DIASTOLIC BLOOD PRESSURE: 70 MMHG | HEART RATE: 66 BPM | SYSTOLIC BLOOD PRESSURE: 150 MMHG

## 2017-05-24 DIAGNOSIS — R60.9 EDEMA, UNSPECIFIED TYPE: ICD-10-CM

## 2017-05-24 DIAGNOSIS — Z94.4 LIVER TRANSPLANTED: Chronic | ICD-10-CM

## 2017-05-24 DIAGNOSIS — E83.39 HYPERPHOSPHATEMIA: ICD-10-CM

## 2017-05-24 DIAGNOSIS — I12.9 HYPERTENSIVE CKD (CHRONIC KIDNEY DISEASE): ICD-10-CM

## 2017-05-24 DIAGNOSIS — D63.8 ANEMIA OF CHRONIC DISEASE: ICD-10-CM

## 2017-05-24 DIAGNOSIS — Z86.19 HISTORY OF HEPATITIS C: Chronic | ICD-10-CM

## 2017-05-24 DIAGNOSIS — N18.5 CKD (CHRONIC KIDNEY DISEASE), STAGE V: Primary | ICD-10-CM

## 2017-05-24 DIAGNOSIS — R80.9 PROTEINURIA, UNSPECIFIED TYPE: ICD-10-CM

## 2017-05-24 DIAGNOSIS — E87.20 METABOLIC ACIDOSIS: ICD-10-CM

## 2017-05-24 DIAGNOSIS — N25.81 SECONDARY HYPERPARATHYROIDISM: ICD-10-CM

## 2017-05-24 DIAGNOSIS — E11.21 DIABETIC NEPHROPATHY ASSOCIATED WITH TYPE 2 DIABETES MELLITUS: ICD-10-CM

## 2017-05-24 PROCEDURE — 1159F MED LIST DOCD IN RCRD: CPT | Mod: S$GLB,,, | Performed by: INTERNAL MEDICINE

## 2017-05-24 PROCEDURE — 1160F RVW MEDS BY RX/DR IN RCRD: CPT | Mod: S$GLB,,, | Performed by: INTERNAL MEDICINE

## 2017-05-24 PROCEDURE — 99499 UNLISTED E&M SERVICE: CPT | Mod: S$GLB,,, | Performed by: INTERNAL MEDICINE

## 2017-05-24 PROCEDURE — 3066F NEPHROPATHY DOC TX: CPT | Mod: S$GLB,,, | Performed by: INTERNAL MEDICINE

## 2017-05-24 PROCEDURE — 99999 PR PBB SHADOW E&M-EST. PATIENT-LVL IV: CPT | Mod: PBBFAC,,, | Performed by: INTERNAL MEDICINE

## 2017-05-24 PROCEDURE — 1125F AMNT PAIN NOTED PAIN PRSNT: CPT | Mod: S$GLB,,, | Performed by: INTERNAL MEDICINE

## 2017-05-24 PROCEDURE — 99215 OFFICE O/P EST HI 40 MIN: CPT | Mod: S$GLB,,, | Performed by: INTERNAL MEDICINE

## 2017-05-24 PROCEDURE — 3044F HG A1C LEVEL LT 7.0%: CPT | Mod: S$GLB,,, | Performed by: INTERNAL MEDICINE

## 2017-05-24 RX ORDER — TORSEMIDE 20 MG/1
60 TABLET ORAL DAILY
Qty: 180 TABLET | Refills: 3 | Status: SHIPPED | OUTPATIENT
Start: 2017-05-24 | End: 2017-05-25 | Stop reason: SDUPTHER

## 2017-05-24 RX ORDER — CLONIDINE HYDROCHLORIDE 0.1 MG/1
0.1 TABLET ORAL 3 TIMES DAILY
Qty: 270 TABLET | Refills: 3 | Status: SHIPPED | OUTPATIENT
Start: 2017-05-24 | End: 2017-05-25 | Stop reason: SDUPTHER

## 2017-05-24 NOTE — TELEPHONE ENCOUNTER
Patient approved for a co-pay card for 1 box of Humalog Kwikpen.  Also, submitted application to [employer] pending approval.

## 2017-05-24 NOTE — PROGRESS NOTES
Patient ID: Philip Mathis III is a 71 y.o. White male who presents for follow-up evaluation of CKD.        HPI      Mr. Mathis is here for follow up on CKD and hypertension. He has been a pt of Dr. Hercules, last seen by her on 6/24/16 and then was followed by NP. I had seen him in 1/16 during urgent follow up for his uncontrolled hypertension. After that he went back to Dr. Hercules's care. He established chronic kidney issues care with me on 10/11/16. I saw him in the clinic on 12/8/16 at the last visit.     He reports he was hospitalized for fluid overload. He has been on torsemide for diuresis. He still has significant edema of legs. He admits he has poor appetite and does not feel like eating much. He is making urine. But overall his kidney function has declined to stage V now. He has K of around 5 on recent labs. He is aware of his gradually declining renal function.    Given his worsened labs from 5/22/17, I had communication with Dr. William who is planning to schedule transposition of his access. He had advised this to the patient at the time of his last vascular surgery visit in 3/17. 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 I discussed this with patient today, explained need for close follow up with vascular surgery given his declining renal function and to make sure that he has function access very soon. He expressed understanding and is now planning to see vascular surgery sooner than June. He is also being referred to [facility]s class for discussion and counseling about home dialysis. He expressed his interest. He had a few crying spells during this visit and but he expressed that he will follow the recommendations for dialysis in near future.     Onset of his CKD is in 2004. He has s/p liver transplant, maintained on Prograf, has diastolic heart failure, hypertension, diabetes, hep C, encephalopathy, anemia, sleep apnea. He  admits he has not been compliant with using CPAP as he feels claustrophobic.       Labs from 5/22/17 noted for Hb 9.8, Na 143, K 5, bicarbonate 21, BUN 67, creatinine 4.2, eGFR 13.2, Ca 8.2, phos 4.5, albumin 3.5, vit D 27, recent . Urine studies show urine PC ratio of 3.8. US kidneys from 9/16 show preserved kidney size, changes of CKD and bilateral simple cysts.        Review of Systems   Constitutional: Positive for appetite change, activity change and fatigue. Negative for fever, chills.   HENT: Negative for hearing loss and rhinorrhea.   Eyes: Negative for pain and discharge.   Respiratory: Negative for cough, shortness of breath and wheezing.   Cardiovascular: Positive for leg swelling. Negative for chest pain.   Gastrointestinal: Negative for vomiting, abdominal pain and diarrhea. Has poor appetite.   Endocrine: Negative for polydipsia and polyuria.   Genitourinary: Negative for dysuria, frequency, hematuria, flank pain and decreased urine volume.   Musculoskeletal: Negative for myalgias and back pain.   Neurological: Negative for syncope and speech difficulty.   Psychiatric/Behavioral: Negative for behavioral problems.       Objective:      Physical Exam   Constitutional: He is oriented to person, place, and time. Chronically ill appearing.  HENT:   Mouth/Throat: Oropharynx is clear and moist. No oropharyngeal exudate.   Eyes: Right eye exhibits no discharge. Left eye exhibits no discharge. No scleral icterus.   Neck: Normal range of motion. Neck supple.    Cardiovascular: Normal rate, regular rhythm and normal heart sounds. No rub.   Pulmonary/Chest: Effort normal and breath sounds normal. No respiratory distress. He has no wheezes. He has no rales.   Abdominal: Soft. Bowel sounds are normal. He exhibits no distension. There is no tenderness. There is no guarding.   Musculoskeletal: He exhibits edema.   Lymphadenopathy:   He has no cervical adenopathy.   Neurological: He is alert and oriented to  person, place, and time.   Skin: Skin is warm and dry. He is not diaphoretic. No erythema.   Psychiatric: He has a normal mood and affect. His behavior is normal.   Vitals reviewed.        Assessment:      1. CKD V  2. Edema  3. Diabetic CKD V   4. Anemia multifactorial   5. S/p liver transplant, chronic immunosuppression  6. Proteinuria  7. Secondary hyperparathyroidism  8. Hyperphosphatemia   9. Metabolic acidosis  10. H/o hep C  11. Hypertensive CKD V     Plan:      He has CKD V at this point. He has elevated BUN and several other metabolic abnormalities. He has low appetite, worsening of LE edema. I had a detailed d/w patient. In light of his declining kidney function to stage V and slowly building symptoms, he needs to start dialysis in near future. He expressed understanding. I advised him to see vascular surgery sooner to have further access intervention to make it ready for use.    He is being referred to [facility] educator as he is interested in doing home dialysis. 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      His symptoms overall could be a reflection of worsening renal function, severe anemia, decompensated liver failure. We had gume detailed discussion about that. While he is getting evaluated for combined liver/ kidney transplant he does need preparation for dialysis as the timeline for his future combined transplant is unclear to me at present.      Hyperphosphatemia, secondary hyperparathyroidism, re-refer him to nutritionist for low phos, low salt, low K diet, if phos still remains high inspite of dietary control then will have to add phos binders. His corrected Ca is borderline, starting vit D analogue may worsen this level. I advised him to stop multivitamin due to K, phos, Ca content.     Adding sodium bicarbonate can be challenging as it can worsen his edema.    Continue current antihypertensive regimen, would not start ACE-I or ARB for now given  advanced CKD and recent episodes of hyperkalemia. Pt advised to follow BP daily at home.     Plan  - increase diuretic regimen to torsemide 60 mg from current 40 mg daily  - reassess symptoms in 2 weeks  - RTC  2 weeks with repeat labs prior  - referral for home dialysis training and education  - low phos, low K diet  - stop oral iron  - continue anemia clinic infusion of VAHE, iron  - low salt diet  - close follow up with vascular surgery for second stage procedure  - looking at starting dialysis in near future     RTC 2 weeks for closer follow up.  Total time spent was 35 minutes with >50% time spent in face to face evaluation, counseling about possible etiology, work up, monitoring, natural course of illness, recommendations and serial labs and implications and explanation of CKD staging and uremic symptoms and signs.

## 2017-05-25 RX ORDER — CLONIDINE HYDROCHLORIDE 0.1 MG/1
0.1 TABLET ORAL 3 TIMES DAILY
Qty: 270 TABLET | Refills: 6 | Status: SHIPPED | OUTPATIENT
Start: 2017-05-25 | End: 2018-02-19

## 2017-05-25 RX ORDER — TORSEMIDE 20 MG/1
60 TABLET ORAL DAILY
Qty: 180 TABLET | Refills: 3 | Status: SHIPPED | OUTPATIENT
Start: 2017-05-25 | End: 2017-11-06

## 2017-05-25 RX ORDER — MINOXIDIL 2.5 MG/1
5 TABLET ORAL 2 TIMES DAILY
Qty: 360 TABLET | Refills: 6 | Status: SHIPPED | OUTPATIENT
Start: 2017-05-25 | End: 2018-01-01 | Stop reason: SDUPTHER

## 2017-05-26 ENCOUNTER — TELEPHONE (OUTPATIENT)
Dept: TRANSPLANT | Facility: CLINIC | Age: 72
End: 2017-05-26

## 2017-05-26 NOTE — TELEPHONE ENCOUNTER
----- Message from Danay Nath MD sent at 5/26/2017  5:53 AM CDT -----  Results stable. Advise to hydrate if no swelling in feet.  Pl get another lab and AFP in a month because it has increased a tiny bit.

## 2017-05-29 ENCOUNTER — OUTPATIENT CASE MANAGEMENT (OUTPATIENT)
Dept: ADMINISTRATIVE | Facility: OTHER | Age: 72
End: 2017-05-29

## 2017-05-31 ENCOUNTER — HOSPITAL ENCOUNTER (OUTPATIENT)
Dept: VASCULAR SURGERY | Facility: CLINIC | Age: 72
Discharge: HOME | End: 2017-05-31
Attending: SURGERY
Payer: MEDICARE

## 2017-05-31 ENCOUNTER — OFFICE VISIT (OUTPATIENT)
Dept: VASCULAR SURGERY | Facility: CLINIC | Age: 72
End: 2017-05-31
Attending: SURGERY
Payer: MEDICARE

## 2017-05-31 VITALS — WEIGHT: 203.38 LBS | TEMPERATURE: 98 F | BODY MASS INDEX: 26.1 KG/M2 | HEIGHT: 74 IN

## 2017-05-31 DIAGNOSIS — N18.4 CRD (CHRONIC RENAL DISEASE), STAGE IV: ICD-10-CM

## 2017-05-31 DIAGNOSIS — N18.5 CKD (CHRONIC KIDNEY DISEASE), STAGE V: Primary | ICD-10-CM

## 2017-05-31 DIAGNOSIS — N18.6 ESRD (END STAGE RENAL DISEASE) ON DIALYSIS: Primary | ICD-10-CM

## 2017-05-31 DIAGNOSIS — Z99.2 ESRD (END STAGE RENAL DISEASE) ON DIALYSIS: Primary | ICD-10-CM

## 2017-05-31 PROCEDURE — 99214 OFFICE O/P EST MOD 30 MIN: CPT | Mod: NTX,S$GLB,, | Performed by: SURGERY

## 2017-05-31 PROCEDURE — 1126F AMNT PAIN NOTED NONE PRSNT: CPT | Mod: NTX,S$GLB,, | Performed by: SURGERY

## 2017-05-31 PROCEDURE — 99999 PR PBB SHADOW E&M-EST. PATIENT-LVL II: CPT | Mod: PBBFAC,TXP,, | Performed by: SURGERY

## 2017-05-31 PROCEDURE — 1159F MED LIST DOCD IN RCRD: CPT | Mod: NTX,S$GLB,, | Performed by: SURGERY

## 2017-05-31 PROCEDURE — 93990 DOPPLER FLOW TESTING: CPT | Mod: NTX,S$GLB,, | Performed by: SURGERY

## 2017-05-31 RX ORDER — SODIUM CHLORIDE 9 MG/ML
INJECTION, SOLUTION INTRAVENOUS CONTINUOUS
Status: CANCELLED | OUTPATIENT
Start: 2017-05-31
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
## 2017-06-01 ENCOUNTER — HOSPITAL ENCOUNTER (OUTPATIENT)
Dept: RADIOLOGY | Facility: HOSPITAL | Age: 72
Discharge: HOME | End: 2017-06-01
Attending: INTERNAL MEDICINE
Payer: MEDICARE

## 2017-06-01 ENCOUNTER — OUTPATIENT CASE MANAGEMENT (OUTPATIENT)
Dept: ADMINISTRATIVE | Facility: OTHER | Age: 72
End: 2017-06-01

## 2017-06-01 ENCOUNTER — CLINICAL SUPPORT (OUTPATIENT)
Dept: REHABILITATION | Facility: HOSPITAL | Age: 72
End: 2017-06-01
Attending: NURSE PRACTITIONER
Payer: MEDICARE

## 2017-06-01 DIAGNOSIS — Z94.4 STATUS POST LIVER TRANSPLANT: ICD-10-CM

## 2017-06-01 DIAGNOSIS — R26.2 DIFFICULTY WALKING: ICD-10-CM

## 2017-06-01 DIAGNOSIS — M25.60 JOINT STIFFNESS: ICD-10-CM

## 2017-06-01 DIAGNOSIS — R26.81 GAIT INSTABILITY: ICD-10-CM
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
## 2017-06-01 PROCEDURE — 93975 VASCULAR STUDY: CPT | Mod: TC,TXP

## 2017-06-01 PROCEDURE — G8978 MOBILITY CURRENT STATUS: HCPCS | Mod: CL,PN

## 2017-06-01 PROCEDURE — G8979 MOBILITY GOAL STATUS: HCPCS | Mod: CK,PN

## 2017-06-01 PROCEDURE — 97162 PT EVAL MOD COMPLEX 30 MIN: CPT | Mod: PN

## 2017-06-01 PROCEDURE — 97110 THERAPEUTIC EXERCISES: CPT | Mod: PN

## 2017-06-01 PROCEDURE — 93975 VASCULAR STUDY: CPT | Mod: 26,NTX,, | Performed by: RADIOLOGY

## 2017-06-01 PROCEDURE — 76705 ECHO EXAM OF ABDOMEN: CPT | Mod: 26,59,NTX, | Performed by: RADIOLOGY

## 2017-06-01 NOTE — PROGRESS NOTES
TIME RECORD    Date: 06/01/2017    Start Time:  3:00  Stop Time:  4:00      Total Timed Minutes:  60 minutes      OUTPATIENT PHYSICAL THERAPY   PATIENT EVALUATION  Onset Date:  chronic  Primary Diagnosis:   1. Difficulty walking     2. Joint stiffness     3. Falls, sequela       Treatment Diagnosis: see above  Past Medical History:   Diagnosis Date    Anemia     Back pain     Bishop's esophagus     BPH (benign prostatic hypertrophy)     Chronic kidney disease     Cirrhosis     Diabetes mellitus     Diabetes mellitus, type 2     Elevated PSA     Heart failure     Hepatitis Hepatitis C    Hepatitis C     Hyperlipidemia     Hypertension     Hypertension     Liver transplanted     Peripheral neuropathy     Sleep apnea     Transfusion reaction     Hep C from prev. blood transfusion    Transplanted liver     Trouble in sleeping     Type II or unspecified type diabetes mellitus with renal manifestations, uncontrolled     Type II or unspecified type diabetes mellitus with renal manifestations, uncontrolled      Precautions: see above  Prior Therapy: 2 years ago:   Medications: Philip Mathis III has a current medication list which includes the following prescription(s): allopurinol, blood sugar diagnostic, blood-glucose meter, carvedilol, cetirizine, ciclopirox, clonazepam, clonidine, doxazosin, generlac, glucagon (human recombinant), hydralazine, hydroxyzine hcl, insulin aspart, insulin glargine, pen needle, diabetic, lactulose, lancets, minoxidil, montelukast, nystatin, ondansetron, pantoprazole, rifaximin, senna, tacrolimus, torsemide, and urea.    Prior Level of Function: Independent  Social History: works in [employer]  Place of Residence (Steps/Adaptations): [facility], lives with spouse    Subjective     Philip Mathis III is a 72 year old male presenting with c/o bilateral lowe extremity weakness and imbalance. He reports a history of 2 falls a month ago due to tripping and loss of balance.  He  reports having stage 5 kidney and liver disease and states he has plans of a liver and kidney transplant and will be beginning dialysis.. He states his wife has to help dress him in the mornings.  His goal is to become more mobile and improve his balance.    Pain:  Location: ribs, back    Activities Which Increase Pain:  Lifting, bending, difficulty going up stairs, prolonged standing, walking, ADL's  Activities Which Decrease Pain: avoiding aggravating activities  Pain Scale: 3/10 at best 5/10 now  8/10 at worst    Unsteadiness Ratin/10    Objective     Observation: pt presents ambulating without an AD. Decreased stance on left LE. Bilateral trunk sway. Decreased step length and velocity.       Range of Motion/Strength:     AROM: Bilateral LE: Grossly WFL  MMT:  Right LE: 3+/5   Left LE: 3+/5  Abdominal Strength: 3+/5    Transfers: Independent: sit to stand with single UE assist  Walking distance:  2 blocks  (goal: 1/4 mile)    Special Tests:     TUG Test:  DGI:   Rhomberg: mild sway  SLS: unable to perform    Treatment:   Pt received therapeutic exercises to develop strength, endurance, ROM, flexibility, posture and core stabilization for 15 minutes including:  -standing marching 2 x 10  -modified single leg balance on 6 in block with HT   -double leg balance on airex with HT  -sit to stand: forward COG 2 x 5    Pt was instructed in and given a home exercise program consisting of the above activities.     Assessment     Pt presents with signs and symptoms consistent with referring diagnosis. Evaluation has determined a decrease in functional status and subjective and objective deficits that can be addressed by physical therapy intervention. Pt demonstrates pain limiting functional activities. Decreased flexibility and strength limiting normal movement patterns. Decreased segmental motion. Decreased postural strength and awareness. Positive special testing. Decreased participation in functional and  recreational activities. Subjective and objective measures are addressed by goals in the plan of care.  Patient/family are involved in the development of these goals. Patient/family are educated about current injury and treatment. Pt demonstrates no additional cultural, spiritual or educational need and currently has no barriers to learning.      Pt responded well to treatment today. Pt is a good candidate for skilled physical therapy intervention and has a good prognosis and is motivated to return to functional and  recreational activities.    Rehab Potential: good     History  Co-morbidities and personal factors that may impact the plan of care Examination  Body Structures and Functions, activity limitations and participation restrictions that may impact the plan of care Clinical Presentation   Decision Making/ Complexity Score   Co-morbidities:   Anemia   Back pain   Bishop's esophagus   BPH (benign prostatic hypertrophy)   Chronic kidney disease   Cirrhosis   Diabetes mellitus   Diabetes mellitus, type 2   Elevated PSA   Heart failure   Hepatitis   Hepatitis C   Hyperlipidemia      Hypertension   Liver transplanted   Peripheral neuropathy   Sleep apnea   Transfusion reaction   Hep C from prev. blood transfusion   Transplanted liver   Trouble in sleeping   Type II or unspecified type diabetes mellitus with renal manifestations, uncontrolled                 Personal Factors:    Body Regions:  Bilateral LE's    Body Systems:  musculoskeletal          Activity limitations: bending, squatting, prolonged standing, walking, lifting legs, stairs      Participation Restrictions:  Working in [employer], going to [employer],          evolving moderate         Short Term Goals (4 Weeks):     1.Pt to increase strength by a 1/2 grade of muscles test to allow for improvement in functional activities such as performing chores.  2.Pt to improve range of motion by 25% to allow for improved functional mobility to allow for  improvement in IADLs.   3.Pt to report compliance with HEP and demonstrate proper exercise technique to PT to show competence with self management of condition.  4.Decrease pain by 25% during functional activities.  5 improve TUG time to 11 seconds    Long Term Goals (12 Weeks):     1. Increase ROM to allow improved joint biomechanics during functional activities.   2.Increase trunk and lower extremity strength to within normal limits during functional activities.   3. Independent with home exercise program.   4. Full return to functional activities with manageable complaints.  5. Patient to demonstrate improved posture and body mechanics.  6. Decrease pain by 75% during functional activities.  7. Improve TUG time to 9 seconds    CMS Impairment/Limitation/Restriction for FOTO Lower Leg (w/o Knee) Survey  Status Limitation G-Code CMS Severity Modifier  Intake 28% 72% Current Status CL - At least 60 percent but less than 80 percent  Predicted 44% 56% Goal Status+ CK - At least 40 percent but less than 60 percent    Plan     Certification Period: 6/1/17 to 9/1/17    Recommended Treatment Plan: 2-3 times per week for 12 weeks with treatments to consist of:  Neuromuscular and postural re-education,  training, therapeutic exercise, therapeutic activities,balance training, manual therapy, soft tissue mobilization, ROM exercises, Cardiovascular, Postural stabilization, manual traction, spinal mobilization, moist heat, cryotherapy, electrical stimulation, ultrasound, home exercise education and planning.      Therapist: Tyrese Zamorano, PT

## 2017-06-01 NOTE — PLAN OF CARE
OUTPATIENT PHYSICAL THERAPY   PATIENT EVALUATION  Onset Date:  chronic  Primary Diagnosis:   1. Difficulty walking     2. Joint stiffness     3. Falls, sequela       Treatment Diagnosis: see above  Past Medical History:   Diagnosis Date    Anemia     Back pain     Bishop's esophagus     BPH (benign prostatic hypertrophy)     Chronic kidney disease     Cirrhosis     Diabetes mellitus     Diabetes mellitus, type 2     Elevated PSA     Heart failure     Hepatitis Hepatitis C    Hepatitis C     Hyperlipidemia     Hypertension     Hypertension     Liver transplanted     Peripheral neuropathy     Sleep apnea     Transfusion reaction     Hep C from prev. blood transfusion    Transplanted liver     Trouble in sleeping     Type II or unspecified type diabetes mellitus with renal manifestations, uncontrolled     Type II or unspecified type diabetes mellitus with renal manifestations, uncontrolled      Precautions: see above  Prior Therapy: 2 years ago:   Medications: Philip Mathis III has a current medication list which includes the following prescription(s): allopurinol, blood sugar diagnostic, blood-glucose meter, carvedilol, cetirizine, ciclopirox, clonazepam, clonidine, doxazosin, generlac, glucagon (human recombinant), hydralazine, hydroxyzine hcl, insulin aspart, insulin glargine, pen needle, diabetic, lactulose, lancets, minoxidil, montelukast, nystatin, ondansetron, pantoprazole, rifaximin, senna, tacrolimus, torsemide, and urea.    Prior Level of Function: Independent  Social History: works in [employer]  Place of Residence (Steps/Adaptations): [facility], lives with spouse    Subjective     Philip Mathis III is a 72 year old male presenting with c/o bilateral lowe extremity weakness and imbalance. He reports a history of 2 falls a month ago due to tripping and loss of balance.  He reports having stage 5 kidney and liver disease and states he has plans of a liver and kidney transplant and will be  beginning dialysis.. He states his wife has to help dress him in the mornings.  His goal is to become more mobile and improve his balance.    Pain:  Location: ribs, back    Activities Which Increase Pain:  Lifting, bending, difficulty going up stairs, prolonged standing, walking, ADL's  Activities Which Decrease Pain: avoiding aggravating activities  Pain Scale: 3/10 at best 5/10 now  8/10 at worst    Unsteadiness Ratin/10    Objective     Observation: pt presents ambulating without an AD. Decreased stance on left LE. Bilateral trunk sway. Decreased step length and velocity.       Range of Motion/Strength:     AROM: Bilateral LE: Grossly WFL  MMT:  Right LE: 3+/5   Left LE: 3+/5  Abdominal Strength: 3+/5    Transfers: Independent: sit to stand with single UE assist  Walking distance:  2 blocks  (goal: 1/4 mile)    Special Tests:     TUG Test:  DGI:   Rhomberg: mild sway  SLS: unable to perform    Treatment:   Pt received therapeutic exercises to develop strength, endurance, ROM, flexibility, posture and core stabilization for 15 minutes including:  -standing marching 2 x 10  -modified single leg balance on 6 in block with HT   -double leg balance on airex with HT  -sit to stand: forward COG 2 x 5    Pt was instructed in and given a home exercise program consisting of the above activities.     Assessment     Pt presents with signs and symptoms consistent with referring diagnosis. Evaluation has determined a decrease in functional status and subjective and objective deficits that can be addressed by physical therapy intervention. Pt demonstrates pain limiting functional activities. Decreased flexibility and strength limiting normal movement patterns. Decreased segmental motion. Decreased postural strength and awareness. Positive special testing. Decreased participation in functional and recreational activities. Subjective and objective measures are addressed by goals in the plan of care.  Patient/family are  involved in the development of these goals. Patient/family are educated about current injury and treatment. Pt demonstrates no additional cultural, spiritual or educational need and currently has no barriers to learning.      Pt responded well to treatment today. Pt is a good candidate for skilled physical therapy intervention and has a good prognosis and is motivated to return to functional and  recreational activities.    Rehab Potential: good     History  Co-morbidities and personal factors that may impact the plan of care Examination  Body Structures and Functions, activity limitations and participation restrictions that may impact the plan of care Clinical Presentation   Decision Making/ Complexity Score   Co-morbidities:   Anemia   Back pain   Bishop's esophagus   BPH (benign prostatic hypertrophy)   Chronic kidney disease   Cirrhosis   Diabetes mellitus   Diabetes mellitus, type 2   Elevated PSA   Heart failure   Hepatitis   Hepatitis C   Hyperlipidemia      Hypertension   Liver transplanted   Peripheral neuropathy   Sleep apnea   Transfusion reaction   Hep C from prev. blood transfusion   Transplanted liver   Trouble in sleeping   Type II or unspecified type diabetes mellitus with renal manifestations, uncontrolled                 Personal Factors:    Body Regions:  Bilateral LE's    Body Systems:  musculoskeletal          Activity limitations: bending, squatting, prolonged standing, walking, lifting legs, stairs      Participation Restrictions:  Working in [employer], going to [employer],          evolving moderate         Short Term Goals (4 Weeks):     1.Pt to increase strength by a 1/2 grade of muscles test to allow for improvement in functional activities such as performing chores.  2.Pt to improve range of motion by 25% to allow for improved functional mobility to allow for improvement in IADLs.   3.Pt to report compliance with HEP and demonstrate proper exercise technique to PT to show competence  with self management of condition.  4.Decrease pain by 25% during functional activities.  5 improve TUG time to 11 seconds    Long Term Goals (12 Weeks):     1. Increase ROM to allow improved joint biomechanics during functional activities.   2.Increase trunk and lower extremity strength to within normal limits during functional activities.   3. Independent with home exercise program.   4. Full return to functional activities with manageable complaints.  5. Patient to demonstrate improved posture and body mechanics.  6. Decrease pain by 75% during functional activities.  7. Improve TUG time to 9 seconds    CMS Impairment/Limitation/Restriction for FOTO Lower Leg (w/o Knee) Survey  Status Limitation G-Code CMS Severity Modifier  Intake 28% 72% Current Status CL - At least 60 percent but less than 80 percent  Predicted 44% 56% Goal Status+ CK - At least 40 percent but less than 60 percent    Plan     Certification Period: 6/1/17 to 9/1/17    Recommended Treatment Plan: 2-3 times per week for 12 weeks with treatments to consist of:  Neuromuscular and postural re-education,  training, therapeutic exercise, therapeutic activities,balance training, manual therapy, soft tissue mobilization, ROM exercises, Cardiovascular, Postural stabilization, manual traction, spinal mobilization, moist heat, cryotherapy, electrical stimulation, ultrasound, home exercise education and planning.      Therapist: Tyrese Zamorano, PT
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
## 2017-06-02 ENCOUNTER — TELEPHONE (OUTPATIENT)
Dept: TRANSPLANT | Facility: CLINIC | Age: 72
End: 2017-06-02

## 2017-06-02 DIAGNOSIS — Z94.4 LIVER REPLACED BY TRANSPLANT: Primary | ICD-10-CM

## 2017-06-02 NOTE — ADDENDUM NOTE
Encounter addended by: Aurora Mar LCSW on: 6/2/2017 10:59 AM<BR>    Actions taken: Sign clinical note

## 2017-06-02 NOTE — TELEPHONE ENCOUNTER
----- Message from Danay Nath MD sent at 6/1/2017 10:54 PM CDT -----  Liver lesion stable. Repeat U/S every 3 months x 2

## 2017-06-02 NOTE — PROGRESS NOTES
Patient referred by OPCM RN Raya Abernathy for psychosocial Support.  Chart reviewed.  [facility] completed with patient's spouse, Merlene.  Merlene reports that she and patient live in a home they own in [place].  She said patient's medical condition has steadily declined.  Merlene reports that she also has medical problems.  She said patient needs assistance with medical co-pays.  She is working on application with [facility].  Their joint income is approximately $1,752/monthly.  Malaika De Dios is working with patient/spouse on PAP.   She says she would like to register herself and patient with [facility]. She reports that the COA is delivering meals; she declined other services at this time. Merlene also reports caregiver stress and feeling overwhelmed at times.  Supportive counseling provided.  Education on caregiver stress provided.  Information provided and mailed on the following programs/services:  Medicaid; [employer] services; [facility] registry information and telephone number; Community Financial Assistance Programs; [facility] Credit/Debt Counseling Service; [employer] Crisis Hotline; Support Groups (Senior Resource Guide); Names of Organizations for Specific Diseases (Senior Resource Guide; Tiffany literature - Stress, Caregiver Coping Tips.  LCSW will continue to follow.

## 2017-06-05 ENCOUNTER — INFUSION (OUTPATIENT)
Dept: INFECTIOUS DISEASES | Facility: HOSPITAL | Age: 72
End: 2017-06-05
Attending: INTERNAL MEDICINE
Payer: MEDICARE

## 2017-06-05 ENCOUNTER — TELEPHONE (OUTPATIENT)
Dept: TRANSPLANT | Facility: CLINIC | Age: 72
End: 2017-06-05

## 2017-06-05 ENCOUNTER — OUTPATIENT CASE MANAGEMENT (OUTPATIENT)
Dept: ADMINISTRATIVE | Facility: OTHER | Age: 72
End: 2017-06-05

## 2017-06-05 ENCOUNTER — OFFICE VISIT (OUTPATIENT)
Dept: TRANSPLANT | Facility: CLINIC | Age: 72
End: 2017-06-05
Payer: MEDICARE

## 2017-06-05 VITALS
RESPIRATION RATE: 18 BRPM | TEMPERATURE: 99 F | HEART RATE: 66 BPM | BODY MASS INDEX: 25.72 KG/M2 | SYSTOLIC BLOOD PRESSURE: 173 MMHG | DIASTOLIC BLOOD PRESSURE: 71 MMHG | HEIGHT: 74 IN | WEIGHT: 200.38 LBS | OXYGEN SATURATION: 96 %

## 2017-06-05 VITALS — SYSTOLIC BLOOD PRESSURE: 161 MMHG | DIASTOLIC BLOOD PRESSURE: 61 MMHG

## 2017-06-05 DIAGNOSIS — N18.4 CKD (CHRONIC KIDNEY DISEASE), STAGE IV: Primary | Chronic | ICD-10-CM

## 2017-06-05 DIAGNOSIS — D84.9 IMMUNOSUPPRESSED STATUS: Chronic | ICD-10-CM

## 2017-06-05 DIAGNOSIS — N18.4 KIDNEY DISEASE, CHRONIC, STAGE IV (GFR 15-29 ML/MIN): Primary | ICD-10-CM

## 2017-06-05 DIAGNOSIS — N18.4 CRD (CHRONIC RENAL DISEASE), STAGE IV: ICD-10-CM

## 2017-06-05 DIAGNOSIS — Z01.818 PRE-TRANSPLANT EVALUATION FOR CHRONIC LIVER DISEASE: ICD-10-CM

## 2017-06-05 PROCEDURE — 1126F AMNT PAIN NOTED NONE PRSNT: CPT | Mod: S$GLB,TXP,, | Performed by: INTERNAL MEDICINE

## 2017-06-05 PROCEDURE — 63600175 PHARM REV CODE 636 W HCPCS: Mod: TXP | Performed by: INTERNAL MEDICINE

## 2017-06-05 PROCEDURE — 1159F MED LIST DOCD IN RCRD: CPT | Mod: S$GLB,TXP,, | Performed by: INTERNAL MEDICINE

## 2017-06-05 PROCEDURE — 99999 PR PBB SHADOW E&M-EST. PATIENT-LVL III: CPT | Mod: PBBFAC,TXP,, | Performed by: INTERNAL MEDICINE

## 2017-06-05 PROCEDURE — 99499 UNLISTED E&M SERVICE: CPT | Mod: S$GLB,TXP,, | Performed by: INTERNAL MEDICINE

## 2017-06-05 PROCEDURE — 96372 THER/PROPH/DIAG INJ SC/IM: CPT | Mod: TXP

## 2017-06-05 PROCEDURE — 99213 OFFICE O/P EST LOW 20 MIN: CPT | Mod: S$GLB,TXP,, | Performed by: INTERNAL MEDICINE

## 2017-06-05 RX ADMIN — DARBEPOETIN ALFA 300 MCG: 300 INJECTION, SOLUTION INTRAVENOUS; SUBCUTANEOUS at 10:06

## 2017-06-05 NOTE — TELEPHONE ENCOUNTER
----- Message from Danay Nath MD sent at 6/5/2017  9:53 AM CDT -----  Plan:  1.  Continue current dose of prograf 0.5 mg daily, trough has been <1.5 to 2.1, monitor prograf level.    2.  Gets Aranesp every other week with good response, Hgb up to 9.8.      3.  MELD labs and prograf level q 1 month.   4.  Return in 3 months.

## 2017-06-05 NOTE — PATIENT INSTRUCTIONS
Plan:  1.  Continue current dose of prograf 0.5 mg daily, trough has been <1.5 to 2.1, monitor prograf level.    2.  Gets Aranesp every other week with good response, Hgb up to 9.8.      3.  MELD labs and prograf level q 1 month.   4.  Return in 3 months.

## 2017-06-05 NOTE — PROGRESS NOTES
Hgb 9.8;Hct 31.4    Discussed Hgb with Patient. Dose didn't increase  PER PROTOCOL FORM DROD-188. Due to being at max dose.  Aranesp 300 mcg given and 4 week appt. Set. .  Dr Amezcua notified    Gfr 13.2/Stage 5
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
## 2017-06-05 NOTE — PROGRESS NOTES
Attempted to contact pt for follow up. No answer. Left message. Noted pt has multiple appts this morning. Pt has follow up with Dr. Amezcua on Wed. Will attempt to follow up with pt again on Friday.

## 2017-06-05 NOTE — LETTER
June 5, 2017        Luciano Hercules  [address]  [address]  [facility] [address]  Phone: [phone]  Fax: [phone]             [facility]  [address]  [facility] [address]  Phone: [phone]   Patient: Philip Mathis III   MR Number: [id number]   YOB: 1945   Date of Visit: 6/5/2017       Dear Dr. Luciano Hercules    Thank you for referring Philip Mathis to me for evaluation. Attached you will find relevant portions of my assessment and plan of care.    If you have questions, please do not hesitate to call me. I look forward to following Philip Mathis along with you.    Sincerely,    Danay Nath MD    Enclosure    If you would like to receive this communication electronically, please contact [email or web address] or [phone] to request [employer] access.    [employer] is a tool which provides read-only access to select patient information with whom you have a relationship. Its easy to use and provides real time access to review your patients record including encounter summaries, notes, results, and demographic information.    If you feel you have received this communication in error or would no longer like to receive these types of communications, please e-mail [email or web address]

## 2017-06-07 ENCOUNTER — OFFICE VISIT (OUTPATIENT)
Dept: NEPHROLOGY | Facility: CLINIC | Age: 72
End: 2017-06-07
Payer: MEDICARE

## 2017-06-07 VITALS
BODY MASS INDEX: 26.23 KG/M2 | HEART RATE: 64 BPM | SYSTOLIC BLOOD PRESSURE: 160 MMHG | OXYGEN SATURATION: 96 % | WEIGHT: 204.38 LBS | HEIGHT: 74 IN | DIASTOLIC BLOOD PRESSURE: 70 MMHG

## 2017-06-07 DIAGNOSIS — I12.9 HYPERTENSIVE CKD (CHRONIC KIDNEY DISEASE): ICD-10-CM

## 2017-06-07 DIAGNOSIS — N25.81 SECONDARY HYPERPARATHYROIDISM: ICD-10-CM

## 2017-06-07 DIAGNOSIS — I10 ESSENTIAL HYPERTENSION: Chronic | ICD-10-CM

## 2017-06-07 DIAGNOSIS — N18.5 CKD (CHRONIC KIDNEY DISEASE), STAGE V: Primary | ICD-10-CM

## 2017-06-07 DIAGNOSIS — E87.20 METABOLIC ACIDOSIS: ICD-10-CM

## 2017-06-07 PROCEDURE — 99499 UNLISTED E&M SERVICE: CPT | Mod: S$GLB,,, | Performed by: INTERNAL MEDICINE

## 2017-06-07 PROCEDURE — 1159F MED LIST DOCD IN RCRD: CPT | Mod: S$GLB,,, | Performed by: INTERNAL MEDICINE

## 2017-06-07 PROCEDURE — 99214 OFFICE O/P EST MOD 30 MIN: CPT | Mod: S$GLB,,, | Performed by: INTERNAL MEDICINE

## 2017-06-07 PROCEDURE — 1126F AMNT PAIN NOTED NONE PRSNT: CPT | Mod: S$GLB,,, | Performed by: INTERNAL MEDICINE

## 2017-06-07 PROCEDURE — 99999 PR PBB SHADOW E&M-EST. PATIENT-LVL III: CPT | Mod: PBBFAC,,, | Performed by: INTERNAL MEDICINE

## 2017-06-07 NOTE — PROGRESS NOTES
Patient ID: Philip Mathis III is a 71 y.o. White male who presents for follow-up evaluation of CKD.        HPI      Mr. Mathis is here for follow up on CKD and hypertension. He has been a pt of Dr. Hercules, last seen by her on 6/24/16 and then was followed by NP. I had seen him in 1/16 during urgent follow up for his uncontrolled hypertension. After that he went back to Dr. Hercules's care. He established chronic kidney issues care with me on 10/11/16. I saw him in the clinic on 5/24/17 at the last visit.     Last visit he was noted to have worsening fluid retention, his diuretic regimen was increased. He admits he has poor appetite and does not feel like eating much. He is making urine. But overall his kidney function has declined to stage V now. He has K of around 5 on recent labs. He is aware of his gradually declining renal function.    Given his worsened labs from 5/22/17, I had communication with Dr. William who is planning to schedule transposition of his access. He had advised this to the patient at the time of his last vascular surgery visit in 3/17. 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 I discussed this with patient last visit, explained need for close follow up with vascular surgery given his declining renal function and to make sure that he has functional access very soon.     He is schedule to undergo further procedure on his access next week. He had recent visit with [facility] educator. He expressed today he will comply with his medical treatment and will participate in training for home hemo once cleared by vascular surgery.      Onset of his CKD is in 2004. He has s/p liver transplant, maintained on Prograf, has diastolic heart failure, hypertension, diabetes, hep C, encephalopathy, anemia, sleep apnea. He admits he has not been compliant with using CPAP as he feels claustrophobic.       Labs from 5/22/17 noted for Hb 9.8,  Na 143, K 5, bicarbonate 21, BUN 67, creatinine 4.2, eGFR 13.2, Ca 8.2, phos 4.5, albumin 3.5, vit D 27, recent . Urine studies show urine PC ratio of 3.8. US kidneys from 9/16 show preserved kidney size, changes of CKD and bilateral simple cysts.        Review of Systems   Constitutional: Positive for appetite change, activity change and fatigue. Negative for fever, chills.   HENT: Negative for hearing loss and rhinorrhea.   Eyes: Negative for pain and discharge.   Respiratory: Negative for cough, shortness of breath and wheezing.   Cardiovascular: Positive for leg swelling. Negative for chest pain.   Gastrointestinal: Negative for vomiting, abdominal pain and diarrhea. Has poor appetite.   Endocrine: Negative for polydipsia and polyuria.   Genitourinary: Negative for dysuria, frequency, hematuria, flank pain and decreased urine volume.   Musculoskeletal: Negative for myalgias and back pain.   Neurological: Negative for syncope and speech difficulty.   Psychiatric/Behavioral: Negative for behavioral problems.       Objective:      Physical Exam   Constitutional: He is oriented to person, place, and time. Chronically ill appearing.  HENT:   Mouth/Throat: Oropharynx is clear and moist. No oropharyngeal exudate.   Eyes: Right eye exhibits no discharge. Left eye exhibits no discharge. No scleral icterus.   Neck: Normal range of motion. Neck supple.    Cardiovascular: Normal rate, regular rhythm and normal heart sounds. No rub.   Pulmonary/Chest: Effort normal and breath sounds normal. No respiratory distress. He has no wheezes. He has no rales.   Abdominal: Soft. Bowel sounds are normal. He exhibits no distension. There is no tenderness. There is no guarding.   Musculoskeletal: He exhibits edema.   Lymphadenopathy:   He has no cervical adenopathy.   Neurological: He is alert and oriented to person, place, and time.   Skin: Skin is warm and dry. He is not diaphoretic. No erythema.   Psychiatric: He has a normal  mood and affect. His behavior is normal.   Vitals reviewed.        Assessment:      1. CKD V  2. Edema  3. Diabetic CKD V   4. Anemia multifactorial   5. S/p liver transplant, chronic immunosuppression  6. Proteinuria  7. Secondary hyperparathyroidism  8. Hyperphosphatemia   9. Metabolic acidosis  10. H/o hep C  11. Hypertensive CKD V     Plan:      He has CKD V at this point. He has elevated BUN and several other metabolic abnormalities. He has low appetite, worsening of LE edema. I had a detailed d/w patient. In light of his declining kidney function to stage V and slowly building symptoms, he needs to start dialysis in near future. He expressed understanding. He will have follow up with vascular surgery next week for procedure and once we receive ok from vascular surgery, will start his home hemo training.     His symptoms overall could be a reflection of worsening renal function, severe anemia, decompensated liver failure. While he is getting evaluated for combined liver/ kidney transplant he does need preparation for dialysis as the timeline for his future combined transplant is unclear to me at present. He informed me he will be placed on the list soon.    Hyperphosphatemia, secondary hyperparathyroidism, re-refer him to nutritionist for low phos, low salt, low K diet, if phos still remains high inspite of dietary control then will have to add phos binders. His corrected Ca is borderline, starting vit D analogue may worsen this level. I advised him to stop multivitamin due to K, phos, Ca content.     Adding sodium bicarbonate can be challenging as it can worsen his edema.    Continue current antihypertensive regimen, would not start ACE-I or ARB for now given advanced CKD and recent episodes of hyperkalemia. Pt advised to follow BP daily at home.     Plan  - continue current diuretic regimen  - repeat labs next week  - referral for home dialysis training and education  - low phos, low K diet  - stop oral  iron  - continue anemia clinic infusion of VAHE, iron  - low salt diet  - close follow up with vascular surgery for second stage procedure  - looking at starting dialysis in near future     RTC 2 weeks for closer follow up.

## 2017-06-09 ENCOUNTER — OUTPATIENT CASE MANAGEMENT (OUTPATIENT)
Dept: ADMINISTRATIVE | Facility: OTHER | Age: 72
End: 2017-06-09

## 2017-06-09 NOTE — PROGRESS NOTES
Call placed to pt. 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 Call placed to Mrs. Mathis. Discussed nephro appt. She states she is hoping pt does not have to do dialysis. She states someone called to set up dialysis teaching and he has not returned the call. She states she thinks pt is in denial. She states pt received education materials on renal diet and hasn't read them yet. 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 Will continue to follow.

## 2017-06-14 ENCOUNTER — CLINICAL SUPPORT (OUTPATIENT)
Dept: REHABILITATION | Facility: HOSPITAL | Age: 72
End: 2017-06-14
Attending: NURSE PRACTITIONER
Payer: MEDICARE

## 2017-06-14 ENCOUNTER — TELEPHONE (OUTPATIENT)
Dept: VASCULAR SURGERY | Facility: CLINIC | Age: 72
End: 2017-06-14

## 2017-06-14 DIAGNOSIS — M25.60 JOINT STIFFNESS: ICD-10-CM
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
## 2017-06-14 DIAGNOSIS — R26.81 GAIT INSTABILITY: ICD-10-CM

## 2017-06-14 DIAGNOSIS — R26.2 DIFFICULTY WALKING: Primary | ICD-10-CM

## 2017-06-14 PROCEDURE — 97110 THERAPEUTIC EXERCISES: CPT | Mod: PN

## 2017-06-14 NOTE — PROGRESS NOTES
Name: Philip Mathis III  Clinic Number: [id number]  Date of Treatment: 06/14/2017   Diagnosis:   Encounter Diagnoses   Name Primary?    Difficulty walking Yes    Joint stiffness     Falls, sequela     Gait instability        Time in: 7:30  Time Out: 8:10    Total Treatment Time: 40 minutes        Subjective:    Philip reports he was quite sore after initial visit. Reports compliance with home exercises. .  Patient reports their pain to be 1/10 on a 0-10 scale with 0 being no pain and 10 being the worst pain imaginable. States he would like a shortened session today due to other engagements.     Objective    Treatment:   Pt received therapeutic exercises to develop strength, endurance, ROM, flexibility, posture and core stabilization for 40 minutes including:  -Nu Step x 8 min  -standing marching 2 x 10  -modified single leg balance on 6 in block with HT   -double leg balance on airex with HT  -sit to stand: forward COG 2 x 5  -gastroc stretch on tiltboard 30 sec x 4  -heel raises 2 x 10  -step-ups 2 x 10  -hip abduction 1 x 10    Pt was instructed in and given a home exercise program consisting of the above activities.     Assessment     Requires multiple rest breaks during prescribed therex.  Mild c/o low back pain with standing activities.  Pt demonstrates a good understanding of the education provided and a good return demonstration of activities. Pt  Requires skilled supervision to complete and progress home program.    Medical necessity is demonstrated by the following IMPAIRMENTS:  -pain   -decreased range of motion/flexibility   -decreased muscle strength   -impaired function    -decreased ADL ability  -decreased recreational ability     Patient is making good progress towards established goals.      Short Term Goals (4 Weeks):     1.Pt to increase strength by a 1/2 grade of muscles test to allow for improvement in functional activities such as performing chores.  2.Pt to improve range of motion by 25% to  allow for improved functional mobility to allow for improvement in IADLs.   3.Pt to report compliance with HEP and demonstrate proper exercise technique to PT to show competence with self management of condition.  4.Decrease pain by 25% during functional activities.  5 improve TUG time to 11 seconds    Long Term Goals (12 Weeks):     1. Increase ROM to allow improved joint biomechanics during functional activities.   2.Increase trunk and lower extremity strength to within normal limits during functional activities.   3. Independent with home exercise program.   4. Full return to functional activities with manageable complaints.  5. Patient to demonstrate improved posture and body mechanics.  6. Decrease pain by 75% during functional activities.  7. Improve TUG time to 9 seconds    CMS Impairment/Limitation/Restriction for FOTO Lower Leg (w/o Knee) Survey  Status Limitation G-Code CMS Severity Modifier  Intake 28% 72% Current Status CL - At least 60 percent but less than 80 percent  Predicted 44% 56% Goal Status+ CK - At least 40 percent but less than 60 percent    Plan     Continue with established Plan of Care towards PT goals.     Certification Period: 6/1/17 to 9/1/17    Recommended Treatment Plan: 2-3 times per week for 12 weeks with treatments to consist of:  Neuromuscular and postural re-education,  training, therapeutic exercise, therapeutic activities,balance training, manual therapy, soft tissue mobilization, ROM exercises, Cardiovascular, Postural stabilization, manual traction, spinal mobilization, moist heat, cryotherapy, electrical stimulation, ultrasound, home exercise education and planning.      Therapist: Tyrese Zamorano, PT

## 2017-06-14 NOTE — PRE-PROCEDURE INSTRUCTIONS
Preop instructions: NPO after midnight or 8 hours prior to procedure time, shower instructions, directions, leave all valuables at home, medication instructions for PM prior & am of procedure explained. Patient stated an understanding.    Patient denies any side effects or issues with anesthesia or sedation.    Patient does not know arrival time. Explained that this information comes from the surgeons office and if they have not heard from them by 3pm to call office. Patient stated an understanding.    AM blood sugars 120-140

## 2017-06-15 ENCOUNTER — ANESTHESIA EVENT (OUTPATIENT)
Dept: SURGERY | Facility: HOSPITAL | Age: 72
End: 2017-06-15
Payer: MEDICARE

## 2017-06-15 ENCOUNTER — HOSPITAL ENCOUNTER (OUTPATIENT)
Facility: HOSPITAL | Age: 72
Discharge: HOME | End: 2017-06-15
Attending: SURGERY | Admitting: SURGERY
Payer: MEDICARE

## 2017-06-15 ENCOUNTER — ANESTHESIA (OUTPATIENT)
Dept: SURGERY | Facility: HOSPITAL | Age: 72
End: 2017-06-15
Payer: MEDICARE

## 2017-06-15 ENCOUNTER — OUTPATIENT CASE MANAGEMENT (OUTPATIENT)
Dept: ADMINISTRATIVE | Facility: OTHER | Age: 72
End: 2017-06-15

## 2017-06-15 ENCOUNTER — SURGERY (OUTPATIENT)
Age: 72
End: 2017-06-15

## 2017-06-15 DIAGNOSIS — N18.5 CKD (CHRONIC KIDNEY DISEASE), STAGE V: Primary | ICD-10-CM

## 2017-06-15 LAB — POCT GLUCOSE: 198 MG/DL (ref 70–110)

## 2017-06-15 PROCEDURE — 63600175 PHARM REV CODE 636 W HCPCS: Mod: NTX | Performed by: SURGERY

## 2017-06-15 PROCEDURE — 63600175 PHARM REV CODE 636 W HCPCS: Mod: TXP | Performed by: NURSE ANESTHETIST, CERTIFIED REGISTERED

## 2017-06-15 PROCEDURE — 82962 GLUCOSE BLOOD TEST: CPT | Mod: TXP | Performed by: SURGERY

## 2017-06-15 PROCEDURE — 25000003 PHARM REV CODE 250: Mod: TXP | Performed by: STUDENT IN AN ORGANIZED HEALTH CARE EDUCATION/TRAINING PROGRAM

## 2017-06-15 PROCEDURE — D9220A PRA ANESTHESIA: Mod: ANES,NTX,, | Performed by: ANESTHESIOLOGY

## 2017-06-15 PROCEDURE — 27201423 OPTIME MED/SURG SUP & DEVICES STERILE SUPPLY: Mod: TXP | Performed by: SURGERY

## 2017-06-15 PROCEDURE — 76942 ECHO GUIDE FOR BIOPSY: CPT | Mod: NTX | Performed by: ANESTHESIOLOGY

## 2017-06-15 PROCEDURE — 37000008 HC ANESTHESIA 1ST 15 MINUTES: Mod: TXP | Performed by: SURGERY

## 2017-06-15 PROCEDURE — 71000045 HC DOSC ROUTINE RECOVERY EA ADD'L HR: Mod: TXP | Performed by: SURGERY

## 2017-06-15 PROCEDURE — 37000009 HC ANESTHESIA EA ADD 15 MINS: Mod: NTX | Performed by: SURGERY

## 2017-06-15 PROCEDURE — 36000707: Mod: TXP | Performed by: SURGERY

## 2017-06-15 PROCEDURE — 37607 LIG/BANDING ANGIOACS AV FSTL: CPT | Mod: 51,NTX,, | Performed by: SURGERY

## 2017-06-15 PROCEDURE — 27200750 HC INSULATED NEEDLE/ STIMUPLEX: Mod: TXP | Performed by: ANESTHESIOLOGY

## 2017-06-15 PROCEDURE — 25000003 PHARM REV CODE 250: Mod: TXP | Performed by: NURSE ANESTHETIST, CERTIFIED REGISTERED

## 2017-06-15 PROCEDURE — 64415 NJX AA&/STRD BRCH PLXS IMG: CPT | Mod: 59,LT,NTX, | Performed by: ANESTHESIOLOGY

## 2017-06-15 PROCEDURE — 76942 ECHO GUIDE FOR BIOPSY: CPT | Mod: TXP | Performed by: ANESTHESIOLOGY

## 2017-06-15 PROCEDURE — D9220A PRA ANESTHESIA: Mod: CRNA,NTX,, | Performed by: NURSE ANESTHETIST, CERTIFIED REGISTERED

## 2017-06-15 PROCEDURE — 71000044 HC DOSC ROUTINE RECOVERY FIRST HOUR: Mod: TXP | Performed by: SURGERY

## 2017-06-15 PROCEDURE — 25000003 PHARM REV CODE 250: Mod: TXP | Performed by: SURGERY

## 2017-06-15 PROCEDURE — 36821 AV FUSION DIRECT ANY SITE: CPT | Mod: NTX,,, | Performed by: SURGERY

## 2017-06-15 PROCEDURE — 36000706: Mod: NTX | Performed by: SURGERY

## 2017-06-15 RX ORDER — SODIUM CHLORIDE 9 MG/ML
INJECTION, SOLUTION INTRAVENOUS CONTINUOUS
Status: DISCONTINUED | OUTPATIENT
Start: 2017-06-15 | End: 2017-06-15 | Stop reason: HOSPADM

## 2017-06-15 RX ORDER — HEPARIN SODIUM 1000 [USP'U]/ML
INJECTION, SOLUTION INTRAVENOUS; SUBCUTANEOUS
Status: DISCONTINUED | OUTPATIENT
Start: 2017-06-15 | End: 2017-06-15 | Stop reason: HOSPADM

## 2017-06-15 RX ORDER — LIDOCAINE HCL/PF 100 MG/5ML
SYRINGE (ML) INTRAVENOUS
Status: DISCONTINUED | OUTPATIENT
Start: 2017-06-15 | End: 2017-06-15

## 2017-06-15 RX ORDER — SODIUM CHLORIDE 0.9 % (FLUSH) 0.9 %
3 SYRINGE (ML) INJECTION
Status: DISCONTINUED | OUTPATIENT
Start: 2017-06-15 | End: 2017-06-15 | Stop reason: HOSPADM

## 2017-06-15 RX ORDER — HYDRALAZINE HYDROCHLORIDE 20 MG/ML
5 INJECTION INTRAMUSCULAR; INTRAVENOUS
Status: DISCONTINUED | OUTPATIENT
Start: 2017-06-15 | End: 2017-06-15 | Stop reason: HOSPADM

## 2017-06-15 RX ORDER — FENTANYL CITRATE 50 UG/ML
25 INJECTION, SOLUTION INTRAMUSCULAR; INTRAVENOUS EVERY 5 MIN PRN
Status: DISCONTINUED | OUTPATIENT
Start: 2017-06-15 | End: 2017-06-15 | Stop reason: HOSPADM

## 2017-06-15 RX ORDER — SODIUM CHLORIDE 0.9 % (FLUSH) 0.9 %
3 SYRINGE (ML) INJECTION EVERY 8 HOURS
Status: DISCONTINUED | OUTPATIENT
Start: 2017-06-15 | End: 2017-06-15 | Stop reason: HOSPADM

## 2017-06-15 RX ORDER — PROPOFOL 10 MG/ML
VIAL (ML) INTRAVENOUS
Status: DISCONTINUED | OUTPATIENT
Start: 2017-06-15 | End: 2017-06-15

## 2017-06-15 RX ORDER — BACITRACIN 50000 [IU]/1
INJECTION, POWDER, FOR SOLUTION INTRAMUSCULAR
Status: DISCONTINUED | OUTPATIENT
Start: 2017-06-15 | End: 2017-06-15 | Stop reason: HOSPADM

## 2017-06-15 RX ORDER — HYDROCODONE BITARTRATE AND ACETAMINOPHEN 5; 325 MG/1; MG/1
1 TABLET ORAL EVERY 4 HOURS PRN
Status: DISCONTINUED | OUTPATIENT
Start: 2017-06-15 | End: 2017-06-15 | Stop reason: HOSPADM

## 2017-06-15 RX ORDER — HYDROCODONE BITARTRATE AND ACETAMINOPHEN 5; 325 MG/1; MG/1
1 TABLET ORAL EVERY 6 HOURS PRN
Qty: 35 TABLET | Refills: 0 | Status: SHIPPED | OUTPATIENT
Start: 2017-06-15 | End: 2018-01-16 | Stop reason: ALTCHOICE

## 2017-06-15 RX ORDER — MIDAZOLAM HYDROCHLORIDE 1 MG/ML
INJECTION, SOLUTION INTRAMUSCULAR; INTRAVENOUS
Status: DISCONTINUED | OUTPATIENT
Start: 2017-06-15 | End: 2017-06-15

## 2017-06-15 RX ORDER — HYDRALAZINE HYDROCHLORIDE 20 MG/ML
INJECTION INTRAMUSCULAR; INTRAVENOUS
Status: DISCONTINUED | OUTPATIENT
Start: 2017-06-15 | End: 2017-06-15

## 2017-06-15 RX ORDER — PROPOFOL 10 MG/ML
VIAL (ML) INTRAVENOUS CONTINUOUS PRN
Status: DISCONTINUED | OUTPATIENT
Start: 2017-06-15 | End: 2017-06-15

## 2017-06-15 RX ORDER — PAPAVERINE HYDROCHLORIDE 30 MG/ML
INJECTION INTRAMUSCULAR; INTRAVENOUS
Status: DISCONTINUED | OUTPATIENT
Start: 2017-06-15 | End: 2017-06-15 | Stop reason: HOSPADM

## 2017-06-15 RX ADMIN — HYDRALAZINE HYDROCHLORIDE 5 MG: 20 INJECTION INTRAMUSCULAR; INTRAVENOUS at 07:06

## 2017-06-15 RX ADMIN — PROPOFOL 50 MCG/KG/MIN: 10 INJECTION, EMULSION INTRAVENOUS at 07:06

## 2017-06-15 RX ADMIN — Medication 2 G: at 07:06

## 2017-06-15 RX ADMIN — LIDOCAINE HYDROCHLORIDE 40 MG: 20 INJECTION, SOLUTION INTRAVENOUS at 07:06

## 2017-06-15 RX ADMIN — BACITRACIN 50000 UNITS: 50000 INJECTION, POWDER, LYOPHILIZED, FOR SOLUTION INTRAMUSCULAR at 10:06

## 2017-06-15 RX ADMIN — PROPOFOL 50 MG: 10 INJECTION, EMULSION INTRAVENOUS at 07:06

## 2017-06-15 RX ADMIN — MIDAZOLAM HYDROCHLORIDE 2 MG: 1 INJECTION, SOLUTION INTRAMUSCULAR; INTRAVENOUS at 06:06

## 2017-06-15 RX ADMIN — PAPAVERINE HYDROCHLORIDE 0.4 MG: 30 INJECTION, SOLUTION INTRAVENOUS at 08:06

## 2017-06-15 RX ADMIN — SODIUM CHLORIDE: 0.9 INJECTION, SOLUTION INTRAVENOUS at 06:06

## 2017-06-15 RX ADMIN — HEPARIN SODIUM 10000 UNITS: 1000 INJECTION, SOLUTION INTRAVENOUS; SUBCUTANEOUS at 08:06

## 2017-06-15 NOTE — BRIEF OP NOTE
Brief Operative Note  06/15/2017    Indications: Patient is 72 y.o. with chronic renal insufficiency and need for long-term dialysis access.    Pre-operative Diagnosis:   Stage V chronic kidney disease, on dialysis in need of dialysis access    Post-operative Diagnosis: Same    Anesthesia: Regional arm block    Operation:   1) Creation of L arm basilic vein transposition (brachial artery-to-basilic vein AVF)  2) Ligation of L brachial AVF (which was 1st stage creation previously)    Surgeon: ARVIND William MD FACS    Assistant: AAMIR Vaughan DO    Findings:  Strong palpable thrill in L BVT AVF; length is > 25 cm  The previously created L brachial artery/vein AVF was not as large in the distal half of the proximal arm so this was ligated and not transposed  2+ L Radial pulse    EBL: minimal    Antibiotic: 1g iv Vancomycin      Specimens     None        I attest to being present for the procedure and performing the case.  Silvio William MD FACS  Discharge Note  SUMMARY    Admit Date: 6/15/2017    Attending Physician: Silvio William MD FACS    Discharge Physician: Silvio William MD FACS    Discharge Date: 06/15/2017    Final Diagnosis: ESRD (end stage renal disease) on dialysis [N18.6, Z99.2]    Outcome of Treatment: Successful L BVT AVF creation    Disposition: Home or self-care    Patient Instructions:   Current Discharge Medication List      CONTINUE these medications which have NOT CHANGED    Details   allopurinol (ZYLOPRIM) 100 MG tablet TAKE 1 TABLET EVERY DAY  Qty: 90 tablet, Refills: 3      carvedilol (COREG) 6.25 MG tablet Take 1 tablet (6.25 mg total) by mouth 2 (two) times daily.  Qty: 180 tablet, Refills: 3    Associated Diagnoses: CKD (chronic kidney disease), stage IV      cloNIDine (CATAPRES) 0.1 MG tablet Take 1 tablet (0.1 mg total) by mouth 3 (three) times daily.  Qty: 270 tablet, Refills: 6    Comments: **Patient requests 90 days supply**      doxazosin (CARDURA) 8 MG Tab Take 1 tablet (8 mg total) by mouth once  daily.  Qty: 90 tablet, Refills: 4      GENERLAC 10 gram/15 mL solution 30 ML BY MOUTH THREE TIMES DAILY  Qty: 2700 mL, Refills: 0      hydrALAZINE (APRESOLINE) 100 MG tablet Take 1 tablet (100 mg total) by mouth 3 (three) times daily.  Qty: 270 tablet, Refills: 3    Comments: **Patient requests 90 days supply**  Associated Diagnoses: CKD (chronic kidney disease), stage IV      hydrOXYzine HCl (ATARAX) 25 MG tablet Take 2 tablets (50 mg total) by mouth 3 (three) times daily as needed for Itching.  Qty: 60 tablet, Refills: 0      insulin aspart (NOVOLOG) 100 unit/mL InPn pen Inject 6 units w/ breakfast, 4 units w/ lunch and dinner plus scale 180-230 +1, 231-280 +2, 281-330 +3, 331-380 +4, >380 +5. 90 day supply  Qty: 3 Box, Refills: 3      insulin glargine (LANTUS SOLOSTAR) 100 unit/mL (3 mL) InPn pen Inject 8 Units into the skin every evening.  Qty: 2 Box, Refills: 6      minoxidil (LONITEN) 2.5 MG tablet Take 2 tablets (5 mg total) by mouth 2 (two) times daily.  Qty: 360 tablet, Refills: 6      montelukast (SINGULAIR) 10 mg tablet Take 1 tablet (10 mg total) by mouth every evening.  Qty: 30 tablet, Refills: 6      ondansetron (ZOFRAN-ODT) 4 MG TbDL DISSOLVE 1 TABLET(4 MG) ON THE TONGUE EVERY 12 HOURS AS NEEDED  Qty: 30 tablet, Refills: 1      pantoprazole (PROTONIX) 40 MG tablet TAKE 1 TABLET ONE TIME DAILY  Qty: 90 tablet, Refills: 3    Associated Diagnoses: Status post liver transplant      rifaximin (XIFAXAN) 550 mg Tab Take 1 tablet (550 mg total) by mouth 2 (two) times daily.  Qty: 60 tablet, Refills: 11    Associated Diagnoses: Encephalopathy      tacrolimus (PROGRAF) 0.5 MG Cap Take 1 capsule (0.5 mg total) by mouth once daily.  Qty: 30 capsule, Refills: 11    Associated Diagnoses: Status post liver transplant      torsemide (DEMADEX) 20 MG Tab Take 3 tablets (60 mg total) by mouth once daily.  Qty: 180 tablet, Refills: 3    Comments: **Patient requests 90 days supply**      blood sugar diagnostic (ACCU-CHEK  "JOANN PLUS TEST STRP) Strp 1 strip by Misc.(Non-Drug; Combo Route) route 4 (four) times daily.  Qty: 360 each, Refills: 3    Associated Diagnoses: Type II diabetes mellitus with renal manifestations, uncontrolled      blood-glucose meter (ACCU-CHEK JOANN PLUS METER) Misc Use as directed  Qty: 1 each, Refills: 0    Associated Diagnoses: Type II diabetes mellitus with renal manifestations, uncontrolled      ciclopirox (PENLAC) 8 % Soln Apply to affected nails daily x 6-12 mos.  Remove weekly with rubbing alcohol  Qty: 1 Bottle, Refills: 5    Associated Diagnoses: Onychomycosis due to dermatophyte      clonazePAM (KLONOPIN) 0.5 MG tablet Take 1 tablet (0.5 mg total) by mouth every evening.  Qty: 90 tablet, Refills: 1    Associated Diagnoses: Anticonvulsant causing adverse effect in therapeutic use, initial encounter      glucagon (human recombinant) inj 1mg/mL kit Inject 1 mL (1 mg total) into the muscle as needed.  Qty: 1 kit, Refills: 2      insulin needles, disposable, (NOVOFINE 32) 32 x 1/4 " Ndle 1 each by Misc.(Non-Drug; Combo Route) route 3 (three) times daily.  Qty: 100 each, Refills: 5      lancets (ACCU-CHEK SOFTCLIX LANCETS) Misc 1 lancet by Misc.(Non-Drug; Combo Route) route 4 (four) times daily.  Qty: 360 each, Refills: 3    Associated Diagnoses: Type II diabetes mellitus with renal manifestations, uncontrolled      urea (CARMOL) 40 % Crea Apply topically once daily.  Qty: 85 g, Refills: 5             Diet:  Resume pre-operative diet    Activity:  Ad thai    Follow-up:  Follow-up in clinic with Dr William within 2-3 weeks; please call clinic nurse at [phone] "

## 2017-06-15 NOTE — ANESTHESIA POSTPROCEDURE EVALUATION
"Anesthesia Post Evaluation    Patient: Philip Mathis III    Procedure(s) Performed: Procedure(s) (LRB):  1) Creation of L arm basilic vein transposition (brachial artery-to-basilic vein AVF)  2) Ligation of L brachial AVF (which was 1st stage creation previously)    (Left)    Final Anesthesia Type: regional  Patient location during evaluation: PACU  Patient participation: Yes- Able to Participate  Level of consciousness: awake and alert  Post-procedure vital signs: reviewed and stable  Pain management: adequate  Airway patency: patent  PONV status at discharge: No PONV  Anesthetic complications: no      Cardiovascular status: blood pressure returned to baseline  Respiratory status: unassisted, spontaneous ventilation and room air  Hydration status: euvolemic  Follow-up not needed.        Visit Vitals  BP (!) 148/58   Pulse (!) 52   Temp 36.8 °C (98.3 °F) (Skin)   Resp 19   Ht 6' 2" (1.88 m)   Wt 90.7 kg (200 lb)   SpO2 (!) 93%   BMI 25.68 kg/m²       Pain/Tom Score: Pain Assessment Performed: Yes (6/15/2017 11:30 AM)  Presence of Pain: denies (6/15/2017 11:30 AM)  Tom Score: 10 (6/15/2017 11:30 AM)      "

## 2017-06-15 NOTE — INTERVAL H&P NOTE
The patient has been examined and the H&P has been reviewed:    I concur with the findings and no changes have occurred since H&P was written.    Anesthesia/Surgery risks, benefits and alternative options discussed and understood by patient/family.    Review of patient's allergies indicates:   Allergen Reactions    Corticosteroids (glucocorticoids) Other (See Comments)     Leg swelling    Hydrocortisone      Leg swelling    Neuromuscular blockers, steroidal      Leg swelling    Ribavirin      Intolerance, arthralgias           Active Hospital Problems    Diagnosis  POA    CKD (chronic kidney disease), stage V [N18.5]  Yes      Resolved Hospital Problems    Diagnosis Date Resolved POA   No resolved problems to display.

## 2017-06-15 NOTE — TRANSFER OF CARE
"Anesthesia Transfer of Care Note    Patient: Philip Mathis III    Procedure(s) Performed: Procedure(s) (LRB):  1) Creation of L arm basilic vein transposition (brachial artery-to-basilic vein AVF)  (Left)    Patient location: [facility]    Anesthesia Type: general and regional    Transport from OR: Transported from OR on 6-10 L/min O2 by face mask with adequate spontaneous ventilation    Post pain: adequate analgesia    Post assessment: no apparent anesthetic complications and tolerated procedure well    Post vital signs: stable    Level of consciousness: responds to stimulation and sedated    Nausea/Vomiting: no nausea/vomiting    Complications: none    Transfer of care protocol was followed      Last vitals:   Visit Vitals  BP (!) 129/44 (BP Location: Right arm, Patient Position: Lying, BP Method: Automatic)   Pulse 64   Temp 36.8 °C (98.2 °F) (Oral)   Resp 16   Ht 6' 2" (1.88 m)   Wt 90.7 kg (200 lb)   SpO2 (!) 93%   BMI 25.68 kg/m²     "
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
## 2017-06-15 NOTE — ANESTHESIA PROCEDURE NOTES
Supraclavicular Single Shot Block    Patient location during procedure: pre-op   Block not for primary anesthetic.  Reason for block: at surgeon's request and post-op pain management   Post-op Pain Location: L arm pain  Start time: 6/15/2017 7:11 AM  Timeout: 6/15/2017 7:10 AM   End time: 6/15/2017 7:20 AM  Staffing  Anesthesiologist: TIM SALMON  Resident/CRNA: SHANIQUE HESS  Performed: resident/CRNA   Preanesthetic Checklist  Completed: patient identified, site marked, surgical consent, pre-op evaluation, timeout performed, IV checked, risks and benefits discussed and monitors and equipment checked  Peripheral Block  Patient position: supine  Prep: ChloraPrep  Patient monitoring: heart rate, cardiac monitor, continuous pulse ox, continuous capnometry and frequent blood pressure checks  Block type: supraclavicular and intercostobrachial  Laterality: left  Injection technique: single shot  Needle  Needle type: Stimuplex   Needle gauge: 22 G  Needle length: 2 in  Needle localization: anatomical landmarks and ultrasound guidance   -ultrasound image captured on disc.  Assessment  Injection assessment: negative aspiration, negative parasthesia and local visualized surrounding nerve  Paresthesia pain: none  Heart rate change: no  Slow fractionated injection: yes  Medications:  Bolus administered: 30 mL of 0.2 mepivacaine  Epinephrine added: 3.75 mcg/mL (1/300,000)  Additional Notes  VSS.  [facility] RN monitoring vitals throughout procedure.  Patient tolerated procedure well.

## 2017-06-15 NOTE — OP NOTE
06/15/2017    Indications: Patient is 72 y.o. with chronic renal insufficiency and need for long-term dialysis access.    Pre-operative Diagnosis:   Stage V chronic kidney disease, on dialysis in need of dialysis access    Post-operative Diagnosis: Same    Anesthesia: Regional arm block    Operation:   1) Creation of L arm basilic vein transposition (brachial artery-to-basilic vein AVF)  2) Ligation of L brachial AVF (which was 1st stage creation previously)    Surgeon: ARVIND William MD FACS    Assistant: AAMIR Vaughan DO    Findings:  Strong palpable thrill in L BVT AVF; length is > 25 cm  The previously created L brachial artery/vein AVF was not as large in the distal half of the proximal arm so this was ligated and not transposed  2+ L Radial pulse    EBL: minimal    Antibiotic: 1g iv Vancomycin    After an informed consent was obtained the patient was taken to the operating room and placed in the supine position.   The L arm was prepped and draped in a sterile fashion. A skin incision was made just above the AC fossa and dissection carried down to the brachial artery.   Using ultrasound, we noticed that the previously created L brachial artery/vein AVF was not as large in the distal half of the proximal arm so this was ligated and not transposed; ligation was done in 2 layers with 4-O prolene and O-silk suture ties.  We then extended the incision medially along the L basilic vein into the L brachial vein and dissected ~25 cm of vein for transposition; all side branches were ligated with 2-O and 3-O silk suture ties.  The L basilic vein, it was brought into an end-to-side anastomosis between L basilic vein and the L brachial artery with 6-O prolene.   Hemostasis was secured.  Strong thrill was noted  then closed in two layers, subcutaneous tissue with staples.

## 2017-06-15 NOTE — DISCHARGE INSTRUCTIONS
ACTIVITY LEVEL:  If you received sedation or an anesthetic, you may feel sleepy for several hours. Rest until you are more awake.  Gradually resume light activity. Avoid tight garments. No heavy lifting (nothing heavier than a gallon of  milk). Remember do not let your affected arm be used for blood pressure measurements or for blood drawing.  You should check with your doctor about when you may return to work.  DIET:  At home, continue with liquids, and if there is no nausea, you may eat a soft diet. Gradually resume your  regular diet.  WHEN TO CALL THE DOCTOR:   If there are marked changes in the thrill or no thrill at all.   Any obvious bleeding.   Redness or swelling around the incision.   Fever over 101°F (38.4°C).   Drainage (pus) from the wound.   Persistent pain not relieved by the pain medication.   Numbness or tingling in your hand that does not go away.      Procedural Sedation (Adult)  You have been given medicine by vein to make you sleep during your surgery. This may have included both a pain medicine and sleeping medicine. Most of the effects have worn off. But you may still have some drowsiness for the next 6 to 8 hours.  Home care  Follow these guidelines when you get home:  · For the next 8 hours, you should be watched by a responsible adult. This person should make sure your condition is not getting worse.  · Don't take any medicine by mouth for pain or for sleep during the next 4 hours. These might react with the medicines you were given in the hospital. This could cause a much stronger response than usual.  · Don't drink any alcohol for the next 24 hours.  · Don't drive, operate dangerous machinery, or make important business or personal decisions during the next 24 hours.  Follow-up care  Follow up with your healthcare provider if you are not alert and back to your usual level of activity within 12 hours.  When to seek medical advice  Call your healthcare provider right away if any of  these occur:  · Drowsiness gets worse  · Weakness or dizziness gets worse  · Repeated vomiting  · You cannot be awakened   Date Last Reviewed: 10/18/2016  © 5304-7167 [employer]. [address] [place] [address], [place] [address]. All rights reserved. This information is not intended as a substitute for professional medical care. Always follow your healthcare professional's instructions.

## 2017-06-15 NOTE — ANESTHESIA RELEASE NOTE
Anesthesia Release from PACU Note    Patient: Philip Mathis III    Procedure(s) Performed: Procedure(s) (LRB):  1) Creation of L arm basilic vein transposition (brachial artery-to-basilic vein AVF)  2) Ligation of L brachial AVF (which was 1st stage creation previously)    (Left)    Anesthesia type: Regional     Post pain: Adequate analgesia    Post assessment: no apparent anesthetic complications, tolerated procedure well and no evidence of recall    Last Vitals:   Vitals:    06/15/17 1315   BP: (!) 148/58   Pulse: (!) 52   Resp: 19   Temp:    SpO2: (!) 93%       Post vital signs: stable    Level of consciousness: awake and alert     Nausea/Vomiting: no nausea/no vomiting    Complications: none    Airway Patency: patent    Respiratory: unassisted, spontaneous ventilation    Cardiovascular: stable and blood pressure at baseline    Hydration: euvolemic

## 2017-06-15 NOTE — H&P (VIEW-ONLY)
Philip Mathis III  05/31/2017    HPI:  Patient is a 72 y.o. male with a h/o stage IV CKD, DM I, HTN, s/p liver transplant/EtOH cirrhosis - maintained on Prograf, h/o diastolic heart failure, sleep apnea (not compliant w CPAP as he feels claustrophobic).  who is here today for f/u   Pt. Is not yet on dialysis.   R-hand dominant    S/p  11/8/16: L 1st stage L brachial-basilic AVF    No MI/stroke  Tobacco use: Quit in late 1990s      Renal is Dr Marielos Amezcua    Retired : [employer];  he is a      Past Medical History:   Diagnosis Date    Anemia     Back pain     Bishop's esophagus     BPH (benign prostatic hypertrophy)     Chronic kidney disease     Cirrhosis     Diabetes mellitus     Diabetes mellitus, type 2     Elevated PSA     Heart failure     Hepatitis Hepatitis C    Hepatitis C     Hyperlipidemia     Hypertension     Hypertension     Liver transplanted     Peripheral neuropathy     Sleep apnea     Transfusion reaction     Hep C from prev. blood transfusion    Transplanted liver     Trouble in sleeping     Type II or unspecified type diabetes mellitus with renal manifestations, uncontrolled     Type II or unspecified type diabetes mellitus with renal manifestations, uncontrolled      Past Surgical History:   Procedure Laterality Date    broken nose      EYE SURGERY      cataracts    FEMUR SURGERY      FRACTURE SURGERY      right femur fracture     LIVER TRANSPLANT  2001    SKIN GRAFT      graft from right thigh , applied to the left back and left arm.     Family History   Problem Relation Age of Onset    Cancer Mother      had cancer 40 years ago     Coronary artery disease Father     Hypertension Father     Diabetes Father     Heart disease Father     Colon cancer Neg Hx      Social History     Social History    Marital status:      Spouse name: N/A    Number of children: N/A    Years of education: N/A     Occupational History    Not on file.     Social  History Main Topics    Smoking status: Former Smoker     Quit date: 7/30/1998    Smokeless tobacco: Never Used      Comment: pt reports quitting in 1998    Alcohol use No      Comment: pt reports hx of alcholism and reports quit in 1998    Drug use:      Types: Marijuana      Comment: pt reports smoking THC apprxly twice/week    Sexual activity: Yes     Partners: Female     Other Topics Concern    Not on file     Social History Narrative    No narrative on file     Current Outpatient Prescriptions on File Prior to Visit   Medication Sig    allopurinol (ZYLOPRIM) 100 MG tablet TAKE 1 TABLET EVERY DAY    blood sugar diagnostic (ACCU-CHEK JOANN PLUS TEST STRP) Strp 1 strip by Misc.(Non-Drug; Combo Route) route 4 (four) times daily.    blood-glucose meter (ACCU-CHEK JOANN PLUS METER) Misc Use as directed    carvedilol (COREG) 6.25 MG tablet Take 1 tablet (6.25 mg total) by mouth 2 (two) times daily.    cetirizine (ZYRTEC) 10 MG tablet Take 1 tablet (10 mg total) by mouth 2 (two) times daily. If needed may increase to 3 tablets twice daily to control itching    ciclopirox (PENLAC) 8 % Soln Apply to affected nails daily x 6-12 mos.  Remove weekly with rubbing alcohol    clonazePAM (KLONOPIN) 0.5 MG tablet Take 1 tablet (0.5 mg total) by mouth every evening. (Patient taking differently: Take 0.5 mg by mouth daily as needed. )    cloNIDine (CATAPRES) 0.1 MG tablet Take 1 tablet (0.1 mg total) by mouth 3 (three) times daily.    doxazosin (CARDURA) 8 MG Tab Take 1 tablet (8 mg total) by mouth once daily.    GENERLAC 10 gram/15 mL solution 30 ML BY MOUTH THREE TIMES DAILY    glucagon (human recombinant) inj 1mg/mL kit Inject 1 mL (1 mg total) into the muscle as needed.    hydrALAZINE (APRESOLINE) 100 MG tablet Take 1 tablet (100 mg total) by mouth 3 (three) times daily.    hydrOXYzine HCl (ATARAX) 25 MG tablet Take 2 tablets (50 mg total) by mouth 3 (three) times daily as needed for Itching.    insulin  "aspart (NOVOLOG) 100 unit/mL InPn pen Inject 6 units w/ breakfast, 4 units w/ lunch and dinner plus scale 180-230 +1, 231-280 +2, 281-330 +3, 331-380 +4, >380 +5. 90 day supply    insulin glargine (LANTUS SOLOSTAR) 100 unit/mL (3 mL) InPn pen Inject 8 Units into the skin every evening.    insulin needles, disposable, (NOVOFINE 32) 32 x 1/4 " Ndle 1 each by Misc.(Non-Drug; Combo Route) route 3 (three) times daily.    lactulose (CHRONULAC) 20 gram/30 mL Soln Take 30 mLs (20 g total) by mouth 3 (three) times daily.    lancets (ACCU-CHEK SOFTCLIX LANCETS) Misc 1 lancet by Misc.(Non-Drug; Combo Route) route 4 (four) times daily.    minoxidil (LONITEN) 2.5 MG tablet Take 2 tablets (5 mg total) by mouth 2 (two) times daily.    montelukast (SINGULAIR) 10 mg tablet Take 1 tablet (10 mg total) by mouth every evening.    nystatin (MYCOSTATIN) 500,000 unit Tab     ondansetron (ZOFRAN-ODT) 4 MG TbDL DISSOLVE 1 TABLET(4 MG) ON THE TONGUE EVERY 12 HOURS AS NEEDED    pantoprazole (PROTONIX) 40 MG tablet TAKE 1 TABLET ONE TIME DAILY    rifaximin (XIFAXAN) 550 mg Tab Take 1 tablet (550 mg total) by mouth 2 (two) times daily.    senna (SENNA CONCENTRATE) 8.6 mg tablet Take 1 tablet by mouth once daily.    tacrolimus (PROGRAF) 0.5 MG Cap Take 1 capsule (0.5 mg total) by mouth once daily.    torsemide (DEMADEX) 20 MG Tab Take 3 tablets (60 mg total) by mouth once daily.    urea (CARMOL) 40 % Crea Apply topically once daily.     No current facility-administered medications on file prior to visit.        REVIEW OF SYSTEMS:  General: negative; ENT: negative; Allergy and Immunology: negative; Hematological and Lymphatic: Negative; Endocrine: negative; Respiratory: no cough, shortness of breath, or wheezing; Cardiovascular: no chest pain or dyspnea on exertion; Gastrointestinal: no abdominal pain/back, change in bowel habits, or bloody stools; Genito-Urinary: no dysuria, trouble voiding, or hematuria; Musculoskeletal: " negative  Neurological: no TIA or stroke symptoms; Psychiatric: no nervousness, anxiety or depression.    PHYSICAL EXAM:       Vitals:    05/31/17 0838   Temp: 98.1 °F (36.7 °C)            General appearance:  Alert, well-appearing, and in no distress.  Oriented to person, place, and time   Neurological: Normal speech, no focal findings noted; CN II - XII grossly intact           Musculoskeletal: Digits/nail without cyanosis/clubbing.  Normal muscle strength/tone.                 Neck: Supple, no significant adenopathy; thyroid is not enlarged                  No carotid bruit can be auscultated                Chest:  Clear to auscultation, no wheezes, rales or rhonchi, symmetric air entry     No use of accessory muscles             Cardiac: Normal rate and regular rhythm, S1 and S2 normal; PMI non-displaced          Abdomen: Soft, nontender, nondistended, no masses or organomegaly     No rebound tenderness noted; bowel sounds normal     Pulsatile aortic mass is not palpable.     No groin adenopathy      Extremities:   2+ Bilateral  radial pulse     + thrill in L medial arm     Does have a previous burn in L upper medial arm w STSG (~2015)     Left hand warm, good cap refill         LAB RESULTS:  Lab Results   Component Value Date    K 5.0 05/22/2017    K 5.0 05/22/2017    K 4.3 04/29/2017    CREATININE 4.2 (H) 05/22/2017    CREATININE 4.2 (H) 05/22/2017    CREATININE 3.7 (H) 04/29/2017     Lab Results   Component Value Date    WBC 6.01 05/22/2017    WBC 6.01 05/22/2017    WBC 6.06 04/27/2017    HCT 32.3 (L) 05/22/2017    HCT 32.3 (L) 05/22/2017    HCT 29.4 (L) 05/12/2017     (L) 05/22/2017     (L) 05/22/2017     (L) 04/27/2017     Lab Results   Component Value Date    HGBA1C 5.5 05/10/2017    HGBA1C 5.6 04/26/2017    HGBA1C 5.5 01/10/2017     IMAGING:  AVFR us:  Flow volume: 881 ml/min  Diameters: 51 - 7 mm  Depth: 9.3, 6.5, 17.2     CT abd 2015: no AAA    IMP/PLAN:  72 y.o. male with h/o stage  V CKD, DM I, HTN, s/p liver transplant/EtOH cirrhosis - maintained on Prograf, h/o diastolic heart failure, sleep apnea (not compliant w CPAP as he feels claustrophobic) s/p 1st stage L basilic AVF    Discussed plan for Left brachiobrachial AVF transposition.  Pt. States thaand this is a very busy time of year so he would not be able to restrict use of the left arm following this procedure.  He would rather wait until June 2017 to have the procedure.  He has been informed to return sooner if he has a decline in kidney function and may need dialysis before June.     On 60 qd lasix  Next, needs transposition of 1st stage L brachial AVF; 6/8/17  Hopefully AVF will then be ready in ~3-4 weeks  If needs HD sooner, will need permacath for a short time    Silvio William MD FACS  Vascular/Endovascular Surgery

## 2017-06-15 NOTE — PROGRESS NOTES
Patient currently inpatient and followed by IP team.  OPCM SWer will monitor status to review case and follow-up after discharge as appropriate.

## 2017-06-15 NOTE — ADDENDUM NOTE
Addendum  created 06/15/17 1542 by Chucho Siddiqi MD    Anesthesia Intra Blocks edited, LDA updated via procedure documentation, Sign clinical note

## 2017-06-15 NOTE — PLAN OF CARE
Patient and spouse state they are ready to be discharged. Instructions given to patient and family. Both verbalize understanding. Patient tolerating po liquids with no difficulty. Patient denies pain. Anesthesia consent and surgical consent in chart upon patient's discharge from [facility].

## 2017-06-16 VITALS
HEIGHT: 74 IN | SYSTOLIC BLOOD PRESSURE: 148 MMHG | WEIGHT: 200 LBS | HEART RATE: 52 BPM | TEMPERATURE: 98 F | OXYGEN SATURATION: 93 % | RESPIRATION RATE: 19 BRPM | BODY MASS INDEX: 25.67 KG/M2 | DIASTOLIC BLOOD PRESSURE: 58 MMHG

## 2017-06-20 ENCOUNTER — OUTPATIENT CASE MANAGEMENT (OUTPATIENT)
Dept: ADMINISTRATIVE | Facility: OTHER | Age: 72
End: 2017-06-20

## 2017-06-20 NOTE — PROGRESS NOTES
Chart reviewed.  Attempt to contact patient/spouse for follow-up.  Left voice message requesting return call.  Will try again at a later date.

## 2017-06-23 ENCOUNTER — OUTPATIENT CASE MANAGEMENT (OUTPATIENT)
Dept: ADMINISTRATIVE | Facility: OTHER | Age: 72
End: 2017-06-23

## 2017-06-23 NOTE — PROGRESS NOTES
"Spoke with Mrs. Mathis. She states that pt is doing "fine". Pt had fistula revision last week. Pt has not started HD yet. He plans to do home dialysis. She states someone is coming to the house next week to discuss. She does not think pt is weighing daily and is not logging weights. She will discuss this with pt.     Interventions performed:     Review CHF education   Review Renal education    Plan:     Follow up on dialysis  Continue renal education  Follow up on daily weights and log  "

## 2017-06-26 ENCOUNTER — OFFICE VISIT (OUTPATIENT)
Dept: FAMILY MEDICINE | Facility: CLINIC | Age: 72
End: 2017-06-26
Payer: MEDICARE

## 2017-06-26 ENCOUNTER — TELEPHONE (OUTPATIENT)
Dept: NEPHROLOGY | Facility: CLINIC | Age: 72
End: 2017-06-26

## 2017-06-26 ENCOUNTER — TELEPHONE (OUTPATIENT)
Dept: TRANSPLANT | Facility: CLINIC | Age: 72
End: 2017-06-26

## 2017-06-26 VITALS
BODY MASS INDEX: 26.23 KG/M2 | SYSTOLIC BLOOD PRESSURE: 160 MMHG | WEIGHT: 204.38 LBS | TEMPERATURE: 98 F | HEIGHT: 74 IN | OXYGEN SATURATION: 97 % | HEART RATE: 61 BPM | DIASTOLIC BLOOD PRESSURE: 70 MMHG

## 2017-06-26 DIAGNOSIS — E11.22 TYPE 2 DIABETES MELLITUS WITH STAGE 5 CHRONIC KIDNEY DISEASE NOT ON CHRONIC DIALYSIS, WITH LONG-TERM CURRENT USE OF INSULIN: Chronic | ICD-10-CM

## 2017-06-26 DIAGNOSIS — N18.5 CONTROLLED TYPE 2 DIABETES MELLITUS WITH STAGE 5 CHRONIC KIDNEY DISEASE NOT ON CHRONIC DIALYSIS, WITH LONG-TERM CURRENT USE OF INSULIN: Chronic | ICD-10-CM

## 2017-06-26 DIAGNOSIS — R60.9 EDEMA, UNSPECIFIED TYPE: ICD-10-CM

## 2017-06-26 DIAGNOSIS — R80.9 PROTEINURIA, UNSPECIFIED TYPE: ICD-10-CM

## 2017-06-26 DIAGNOSIS — R26.81 GAIT INSTABILITY: ICD-10-CM

## 2017-06-26 DIAGNOSIS — E11.610 CHARCOT'S JOINT OF FOOT DUE TO DIABETES: ICD-10-CM

## 2017-06-26 DIAGNOSIS — Z94.4 LIVER TRANSPLANTED: Chronic | ICD-10-CM

## 2017-06-26 DIAGNOSIS — Z00.00 ENCOUNTER FOR PREVENTIVE HEALTH EXAMINATION: Primary | ICD-10-CM

## 2017-06-26 DIAGNOSIS — E11.22 CONTROLLED TYPE 2 DIABETES MELLITUS WITH STAGE 5 CHRONIC KIDNEY DISEASE NOT ON CHRONIC DIALYSIS, WITH LONG-TERM CURRENT USE OF INSULIN: Chronic | ICD-10-CM

## 2017-06-26 DIAGNOSIS — E87.5 HYPERKALEMIA: Primary | ICD-10-CM

## 2017-06-26 DIAGNOSIS — N25.81 SECONDARY HYPERPARATHYROIDISM: ICD-10-CM

## 2017-06-26 DIAGNOSIS — R26.2 DIFFICULTY WALKING: ICD-10-CM

## 2017-06-26 DIAGNOSIS — N18.5 TYPE 2 DIABETES MELLITUS WITH STAGE 5 CHRONIC KIDNEY DISEASE NOT ON CHRONIC DIALYSIS, WITH LONG-TERM CURRENT USE OF INSULIN: Chronic | ICD-10-CM

## 2017-06-26 DIAGNOSIS — E11.42 DIABETIC POLYNEUROPATHY ASSOCIATED WITH TYPE 2 DIABETES MELLITUS: Chronic | ICD-10-CM

## 2017-06-26 DIAGNOSIS — I70.0 AORTIC ATHEROSCLEROSIS: ICD-10-CM

## 2017-06-26 DIAGNOSIS — E11.319 DIABETIC RETINOPATHY ASSOCIATED WITH TYPE 2 DIABETES MELLITUS, MACULAR EDEMA PRESENCE UNSPECIFIED, UNSPECIFIED LATERALITY, UNSPECIFIED RETINOPATHY SEVERITY: ICD-10-CM
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
## 2017-06-26 DIAGNOSIS — E11.21 DIABETIC NEPHROPATHY ASSOCIATED WITH TYPE 2 DIABETES MELLITUS: ICD-10-CM

## 2017-06-26 DIAGNOSIS — N18.5 CKD (CHRONIC KIDNEY DISEASE), STAGE V: ICD-10-CM

## 2017-06-26 DIAGNOSIS — Z99.2 DEPENDENCE ON HEMODIALYSIS: ICD-10-CM

## 2017-06-26 DIAGNOSIS — Z79.4 CONTROLLED TYPE 2 DIABETES MELLITUS WITH STAGE 5 CHRONIC KIDNEY DISEASE NOT ON CHRONIC DIALYSIS, WITH LONG-TERM CURRENT USE OF INSULIN: Chronic | ICD-10-CM

## 2017-06-26 DIAGNOSIS — Z79.4 TYPE 2 DIABETES MELLITUS WITH STAGE 5 CHRONIC KIDNEY DISEASE NOT ON CHRONIC DIALYSIS, WITH LONG-TERM CURRENT USE OF INSULIN: Chronic | ICD-10-CM

## 2017-06-26 DIAGNOSIS — E44.0 MODERATE PROTEIN MALNUTRITION: ICD-10-CM

## 2017-06-26 DIAGNOSIS — I10 ESSENTIAL HYPERTENSION: Chronic | ICD-10-CM

## 2017-06-26 DIAGNOSIS — I50.9 ACUTE DECOMPENSATED HEART FAILURE: ICD-10-CM

## 2017-06-26 DIAGNOSIS — Z79.4 LONG-TERM INSULIN USE: ICD-10-CM

## 2017-06-26 DIAGNOSIS — N18.6 ESRD (END STAGE RENAL DISEASE): ICD-10-CM

## 2017-06-26 DIAGNOSIS — D69.6 THROMBOCYTOPENIA: ICD-10-CM

## 2017-06-26 DIAGNOSIS — K74.60 CIRRHOSIS OF LIVER WITHOUT ASCITES, UNSPECIFIED HEPATIC CIRRHOSIS TYPE: ICD-10-CM

## 2017-06-26 PROCEDURE — G0439 PPPS, SUBSEQ VISIT: HCPCS | Mod: S$GLB,,, | Performed by: NURSE PRACTITIONER

## 2017-06-26 PROCEDURE — 99499 UNLISTED E&M SERVICE: CPT | Mod: S$GLB,,, | Performed by: NURSE PRACTITIONER

## 2017-06-26 PROCEDURE — 99999 PR PBB SHADOW E&M-EST. PATIENT-LVL V: CPT | Mod: PBBFAC,,, | Performed by: NURSE PRACTITIONER

## 2017-06-26 NOTE — PATIENT INSTRUCTIONS
Counseling and Referral of Other Preventative  (Italic type indicates deductible and co-insurance are waived)    Patient Name: Philip Mathis  Today's Date: 6/26/2017      SERVICE LIMITATIONS RECOMMENDATION    Vaccines    · Pneumococcal (once after 65)    · Influenza (annually)    · Hepatitis B (if medium/high risk)    · Prevnar 13      Hepatitis B medium/high risk factors:       - End-stage renal disease       - Hemophiliacs who received Factor VII or         IX concentrates       - Clients of institutions for the mentally             retarded       - Persons who live in the same house as          a HepB carrier       - Homosexual men       - Illicit injectable drug abusers     Pneumococcal: discussed with patient     Influenza: Done, repeat in one year     Hepatitis B: patient followed by [facility]      Prevnar 13: discussed with patient    Prostate cancer screening (annually to age 75)     Prostate specific antigen (PSA) Shared decision making with Provider. Sometimes a co-pay may be required if the patient decides to have this test. The USPSTF no longer recommends prostate cancer screening routinely in medicine: PSA Sept 2016    Colorectal cancer screening (to age 75)    · Fecal occult blood test (annual)  · Flexible sigmoidoscopy (5y)  · Screening colonoscopy (10y)  · Barium enema   Last done 5/2015, recommend to repeat every 10  years    Diabetes self-management training (no USPSTF recommendations)  Requires referral by treating physician for patient with diabetes or renal disease. 10 hours of initial DSMT sessions of no less than 30 minutes each in a continuous 12-month period. 2 hours of follow-up DSMT in subsequent years.  followed by [facility]     Glaucoma screening (no USPSTF recommendation)  Diabetes mellitus, family history   , age 50 or over    American, age 65 or over  discussed with patient      Medical nutrition therapy for diabetes or renal  disease (no recommended schedule)  Requires referral by treating physician for patient with diabetes or renal disease or kidney transplant within the past 3 years.  Can be provided in same year as diabetes self-management training (DSMT), and CMS recommends medical nutrition therapy take place after DSMT. Up to 3 hours for initial year and 2 hours in subsequent years.  followed by [facility]     Cardiovascular screening blood tests (every 5 years)  · Fasting lipid panel  Order as a panel if possible  Last done 5/2017, recommend to repeat every 1  years    Diabetes screening tests (at least every 3 years, Medicare covers annually or at 6-month intervals for prediabetic patients)  · Fasting blood sugar (FBS) or glucose tolerance test (GTT)  Patient must be diagnosed with one of the following:       - Hypertension       - Dyslipidemia       - Obesity (BMI 30kg/m2)       - Previous elevated impaired FBS or GTT       ... or any two of the following:       - Overweight (BMI 25 but <30)       - Family history of diabetes       - Age 65 or older       - History of gestational diabetes or birth of baby weighing more than 9 pounds  Last done 5/2017, recommend to repeat every 6  months    Abdominal aortic aneurysm screening (once)  · Sonogram   Limited to patients who meet one of the following criteria:       - Men who are 65-75 years old and have smoked more than 100 cigarette in their lifetime       - Anyone with a family history of abdominal aortic aneurysm       - Anyone recommended for screening by the USPSTF  Ordered Sept 2016    HIV screening (annually for increased risk patients)  · HIV-1 and HIV-2 by EIA, or DOLLY, rapid antibody test or oral mucosa transudate  Patients must be at increased risk for HIV infection per USPSTF guidelines or pregnant. Tests covered annually for patient at increased risk or as requested by the patient. Pregnant patients may receive up to 3 tests during  pregnancy.  HIV screening completed September 2016    Smoking cessation counseling (up to 8 sessions per year)  Patients must be asymptomatic of tobacco-related conditions to receive as a preventative service.  Non-smoker    Subsequent annual wellness visit  At least 12 months since last AWV  Return in one year     The following information is provided to all patients.  This information is to help you find resources for any of the problems found today that may be affecting your health:                Living healthy guide: www.[facility].[place]      Understanding Diabetes: www.diabetes.org      Eating healthy: www.cdc.gov/healthyweight      CDC home safety checklist: www.cdc.gov/steadi/patient.html      Agency on Aging: www.goea.[place].[facility]      Alcoholics anonymous (AA): www.aa.org      Physical Activity: www.lamine.nih.gov/gk8wdaf      Tobacco use: www.quitwithusla.org

## 2017-06-26 NOTE — PROGRESS NOTES
"Phliip Mathis presented for a  Medicare AWV and comprehensive Health Risk Assessment today. The following components were reviewed and updated:    · Medical history  · Family History  · Social history  · Allergies and Current Medications  · Health Risk Assessment  · Health Maintenance  · Care Team     ** See Completed Assessments for Annual Wellness Visit within the encounter summary.**       The following assessments were completed:  · Living Situation  · CAGE  · Depression Screening  · Timed Get Up and Go  · Whisper Test  · Cognitive Function Screening  · Nutrition Screening  · ADL Screening  · PAQ Screening    Vitals:    06/26/17 1009   BP: (!) 160/70   Pulse: 61   Temp: 97.9 °F (36.6 °C)   TempSrc: Oral   SpO2: 97%   Weight: 92.7 kg (204 lb 5.9 oz)   Height: 6' 2" (1.88 m)     Body mass index is 26.24 kg/m².  Physical Exam   Constitutional: He is oriented to person, place, and time.   Cardiovascular: Normal rate, regular rhythm and normal heart sounds.    Pulmonary/Chest: Effort normal and breath sounds normal.   Abdominal: Bowel sounds are normal.   Musculoskeletal: Normal range of motion. Edema: bilateral pretibial +1 edema         Arms:  Neurological: He is alert and oriented to person, place, and time.   Skin: Skin is warm and dry.   Psychiatric: He has a normal mood and affect. His behavior is normal. Thought content normal.   Vitals reviewed.        Diagnoses and health risks identified today and associated recommendations/orders:    1. Encounter for preventive health examination  Education provided about preventive health examinations and procedures; addressed and discussed patient's health concerns. Additionally, reviewed medical record for risk factors and documented the results during this encounter.    2. CKD (chronic kidney disease), stage V  Stable, followed by [facility] nephrology dept, we discussed symptoms; continue as advised.     3. Diabetic nephropathy associated with type 2 diabetes mellitus  Stable, " followed by [facility] nephrology, endocrinology depts, we discussed symptoms; continue as advised.     4. Dependence on hemodialysis  Stable, followed by [facility] nephrology, vascular, and peripheral vascular depts, we discussed symptoms; continue as advised.     5. ESRD (end stage renal disease)  Stable, followed by [facility] nephrology, vascular, and peripheral vascular depts, we discussed symptoms; continue as advised.     6. Aortic atherosclerosis  Stable, asymptomatic; monitor.     7. Long-term insulin use  Education provided about diabetes, management of blood glucose with diet and activities, monitoring for worsening effects of diabetes.  Reviewed most recent Ha1c, complications associated with uncontrolled diabetes.     8. Secondary hyperparathyroidism  Stable, followed by [facility] nephrology, endocrinology depts; continue as advised.     9. Diabetic polyneuropathy associated with type 2 diabetes mellitus  Education provided about diabetes, management of blood glucose with diet and activities, monitoring for worsening effects of diabetes.  Reviewed most recent Ha1c, complications associated with uncontrolled diabetes.     10. Controlled type 2 diabetes mellitus with stage 5 chronic kidney disease not on chronic dialysis, with long-term current use of insulin  Education provided about diabetes, management of blood glucose with diet and activities, monitoring for worsening effects of diabetes.  Reviewed most recent Ha1c, complications associated with uncontrolled diabetes.     11. Liver transplanted 6/10/2001  Stable, followed by [facility] hepatology and infectious disease depts, denies worsening symptoms; continue as advised.     12. Diabetic retinopathy associated with type 2 diabetes mellitus, macular edema presence unspecified, unspecified laterality, unspecified retinopathy severity  Education provided about diabetes, management of blood glucose with diet and activities, monitoring for worsening effects of diabetes.  Reviewed most  recent Ha1c, complications associated with uncontrolled diabetes.   - Diabetic Eye Screening Photo; Future    13. Type 2 diabetes mellitus with stage 5 chronic kidney disease not on chronic dialysis, with long-term current use of insulin  Education provided about diabetes, management of blood glucose with diet and activities, monitoring for worsening effects of diabetes.  Reviewed most recent Ha1c, complications associated with uncontrolled diabetes.     14. Acute decompensated heart failure  Stable, followed by [facility] cardiology, denies worsening symptoms; continue as advised.     15. Thrombocytopenia  Stable, followed by [facility] hematology/oncology depts, denies worsening symptoms; continue as advised.     16. Cirrhosis of liver without ascites, unspecified hepatic cirrhosis type  Stable, followed by [facility] hepatology and infectious disease depts, denies worsening symptoms; continue as advised.     17. Moderate protein malnutrition  Stable, asymptomatic; monitor.     18. Charcot's joint of foot due to diabetes  Stable, followed by [facility] podiatry and outpatient rehabilition, denies worsening symptoms; continue as advised.     19. Proteinuria, unspecified type  Stable, followed by [facility] endocrinology and nephrology depts, denies worsening symptoms; continue as advised.     20. Gait instability  Stable, followed by outpatient rehabilitation.  Educated about performing seated exercises at home. Patient declined using assistive device.      21. Falls, sequela  Stable, followed by outpatient rehabilitation.  Educated about performing seated exercises at home. Patient declined using assistive device.    22. Difficulty walking  Stable, followed by outpatient rehabilitation.  Educated about performing seated exercises at home. Patient declined using assistive device.    23. Edema, unspecified type  Stable, advised to keep lower extremities elevated when resting.  We discussed various reasons for edema.     24. Essential  hypertension  Presently not at goal, we reviewed medications, diet, activities. Monitor.     Reviewed health maintenance with patient, educated about recommended examinations, procedures (labs & images), and immunizations.     Provided Philip with a 5-10 year written screening schedule and personal prevention plan. Recommendations were developed using the USPSTF age appropriate recommendations. Education, counseling, and referrals were provided as needed. After Visit Summary printed and given to patient which includes a list of additional screenings\tests needed.    No Follow-up on file.    Varun Bonilla Jr, NP

## 2017-06-26 NOTE — TELEPHONE ENCOUNTER
----- Message from Danay Nath MD sent at 6/26/2017  3:53 PM CDT -----  He needs to take kayexalate every other day 15 gm.

## 2017-06-26 NOTE — TELEPHONE ENCOUNTER
Reviewed labs with pt's wife. We discussed the concern of elevated K level. We reviewed foods that are high in K and low in K. She states that he drinks a bottle of OJ a day. Also we reviewed if he was taking the lactulose as ordered. She stated he is only taking it at night. Re-emphasized that he should be having 3 bms a day and that it is ordered 3 times a day. She states she understands. Pt will repeat labs on Wednesday to eval the K level. Reviewed conversation with Dr. Nath.

## 2017-06-27 ENCOUNTER — TELEPHONE (OUTPATIENT)
Dept: TRANSPLANT | Facility: CLINIC | Age: 72
End: 2017-06-27

## 2017-06-27 ENCOUNTER — TELEPHONE (OUTPATIENT)
Dept: NEPHROLOGY | Facility: CLINIC | Age: 72
End: 2017-06-27

## 2017-06-27 ENCOUNTER — DOCUMENTATION ONLY (OUTPATIENT)
Dept: REHABILITATION | Facility: HOSPITAL | Age: 72
End: 2017-06-27

## 2017-06-27 ENCOUNTER — OUTPATIENT CASE MANAGEMENT (OUTPATIENT)
Dept: ADMINISTRATIVE | Facility: OTHER | Age: 72
End: 2017-06-27

## 2017-06-27 DIAGNOSIS — Z94.4 LIVER TRANSPLANTED: Primary | ICD-10-CM

## 2017-06-27 PROBLEM — M25.60 JOINT STIFFNESS: Status: RESOLVED | Noted: 2017-06-01 | Resolved: 2017-06-27

## 2017-06-27 PROBLEM — R26.81 GAIT INSTABILITY: Status: RESOLVED | Noted: 2017-06-01 | Resolved: 2017-06-27

## 2017-06-27 PROBLEM — R26.2 DIFFICULTY WALKING: Status: RESOLVED | Noted: 2017-06-01 | Resolved: 2017-06-27
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
## 2017-06-27 NOTE — PROGRESS NOTES
Pt phoned the clinic 6/26/2017 to self d/c from therapy due to other medical concerns. Pt will be discharged without updated findings due to rational stated.

## 2017-06-27 NOTE — PROGRESS NOTES
Chart reviewed.  Telephonic encounter with patient's spouse, Merlene.  Merlene said she has not had time to review the resource information mailed to her.  She said she has been so busy getting things settled/arranged after patient's hospital stay.  Merlene continues to feel overwhelmed and has her own medical needs to attend.  She said she has not yet tried to get caregiving assistance for respite.  COA services discussed.  Merlene said she will consider it but requested that resources be reviewed in two week after things settle down.     PLAN:  Follow-up with Merlene in two weeks to review resources and implement services as appropriate.

## 2017-06-28 ENCOUNTER — TELEPHONE (OUTPATIENT)
Dept: TRANSPLANT | Facility: CLINIC | Age: 72
End: 2017-06-28

## 2017-06-28 NOTE — TELEPHONE ENCOUNTER
----- Message from Danay Nath MD sent at 6/28/2017  3:49 PM CDT -----  Please inform patient results are OK. Continue routine labs.

## 2017-06-28 NOTE — TELEPHONE ENCOUNTER
----- Message from Danay Nath MD sent at 6/28/2017  3:32 PM CDT -----  Please inform patient results are OK. Continue routine labs.

## 2017-06-28 NOTE — TELEPHONE ENCOUNTER
----- Message from Danay Nath MD sent at 6/27/2017  4:27 PM CDT -----  Please inform patient results are OK. Continue routine labs.

## 2017-06-28 NOTE — LETTER
June 28, 2017    Philip Mathis  [address] [place] [address]          Dear Philip Mathis:  MRN: [id number]    Your lab results were stable.  There are no medicine changes.  Please have your labs drawn again on 9/25/17.  Please remember to avoid foods that are high in potassium, and to follow closely with your Nephrologist.    If you cannot have your labs drawn on the scheduled date, it is your responsibility to call the transplant department to reschedule.  To reschedule or make an appointment, please as to speak to or leave a message for my assistant, Estefany Yang, at [phone].  When leaving a message for Celia Allison, or myself, we ask that you leave a brief message regarding your request.    Sincerely,      Michell Rowe, RN, BSN  Your Liver Transplant Coordinator    [facility]  [address] [place] [id number]  [phone]

## 2017-06-28 NOTE — TELEPHONE ENCOUNTER
Labs reviewed with pt's wife Merlene. We discussed that it was important for pt to avoid food and beverages that are high in K. She states that she understands. Next labs will be due 9/25/17. Pt to follow closely with his nephrologist. Merlene stated that she understood.

## 2017-06-29 ENCOUNTER — INFUSION (OUTPATIENT)
Dept: INFECTIOUS DISEASES | Facility: HOSPITAL | Age: 72
End: 2017-06-29
Attending: INTERNAL MEDICINE
Payer: MEDICARE

## 2017-06-29 VITALS — SYSTOLIC BLOOD PRESSURE: 137 MMHG | DIASTOLIC BLOOD PRESSURE: 59 MMHG

## 2017-06-29 DIAGNOSIS — N18.6 ESRD (END STAGE RENAL DISEASE): ICD-10-CM

## 2017-06-29 DIAGNOSIS — D63.1 ANEMIA OF RENAL DISEASE: Primary | ICD-10-CM

## 2017-06-29 DIAGNOSIS — N18.9 ANEMIA OF RENAL DISEASE: Primary | ICD-10-CM

## 2017-06-29 PROCEDURE — 96372 THER/PROPH/DIAG INJ SC/IM: CPT | Mod: TXP

## 2017-06-29 PROCEDURE — 63600175 PHARM REV CODE 636 W HCPCS: Mod: TXP | Performed by: INTERNAL MEDICINE

## 2017-06-29 RX ADMIN — DARBEPOETIN ALFA 300 MCG: 300 INJECTION, SOLUTION INTRAVENOUS; SUBCUTANEOUS at 09:06

## 2017-06-30 ENCOUNTER — TELEPHONE (OUTPATIENT)
Dept: TRANSPLANT | Facility: CLINIC | Age: 72
End: 2017-06-30

## 2017-06-30 RX ORDER — MONTELUKAST SODIUM 10 MG/1
10 TABLET ORAL NIGHTLY
Qty: 90 TABLET | Refills: 3 | Status: SHIPPED | OUTPATIENT
Start: 2017-06-30 | End: 2018-01-01

## 2017-06-30 NOTE — TELEPHONE ENCOUNTER
----- Message from Danay Nath MD sent at 6/29/2017  2:58 PM CDT -----  Please inform Mr. Mathis:  Liver cancer marker is within normal limits.  Other labs show anemia, stable liver and kidney function and normal clotting function.  No changes need to be made.

## 2017-07-06 ENCOUNTER — OFFICE VISIT (OUTPATIENT)
Dept: VASCULAR SURGERY | Facility: CLINIC | Age: 72
End: 2017-07-06
Payer: MEDICARE

## 2017-07-06 ENCOUNTER — HOSPITAL ENCOUNTER (OUTPATIENT)
Dept: VASCULAR SURGERY | Facility: CLINIC | Age: 72
Discharge: HOME | End: 2017-07-06
Attending: SURGERY
Payer: MEDICARE

## 2017-07-06 ENCOUNTER — OFFICE VISIT (OUTPATIENT)
Dept: NEPHROLOGY | Facility: CLINIC | Age: 72
End: 2017-07-06
Payer: MEDICARE

## 2017-07-06 VITALS
WEIGHT: 199.06 LBS | HEART RATE: 64 BPM | HEIGHT: 74 IN | BODY MASS INDEX: 25.55 KG/M2 | OXYGEN SATURATION: 95 % | SYSTOLIC BLOOD PRESSURE: 148 MMHG | DIASTOLIC BLOOD PRESSURE: 70 MMHG

## 2017-07-06 VITALS
HEIGHT: 74 IN | TEMPERATURE: 98 F | HEART RATE: 59 BPM | BODY MASS INDEX: 25.59 KG/M2 | WEIGHT: 199.38 LBS | SYSTOLIC BLOOD PRESSURE: 176 MMHG | DIASTOLIC BLOOD PRESSURE: 78 MMHG

## 2017-07-06 DIAGNOSIS — E87.20 METABOLIC ACIDOSIS: ICD-10-CM

## 2017-07-06 DIAGNOSIS — Z86.19 HISTORY OF HEPATITIS C: Chronic | ICD-10-CM

## 2017-07-06 DIAGNOSIS — R80.9 PROTEINURIA, UNSPECIFIED TYPE: ICD-10-CM

## 2017-07-06 DIAGNOSIS — N25.81 SECONDARY HYPERPARATHYROIDISM: ICD-10-CM

## 2017-07-06 DIAGNOSIS — I10 ESSENTIAL HYPERTENSION: Chronic | ICD-10-CM

## 2017-07-06 DIAGNOSIS — E11.21 DIABETIC NEPHROPATHY ASSOCIATED WITH TYPE 2 DIABETES MELLITUS: ICD-10-CM

## 2017-07-06 DIAGNOSIS — N18.6 ESRD (END STAGE RENAL DISEASE): Primary | ICD-10-CM

## 2017-07-06 DIAGNOSIS — I12.9 HYPERTENSIVE CKD (CHRONIC KIDNEY DISEASE): ICD-10-CM

## 2017-07-06 DIAGNOSIS — T82.9XXA COMPLICATIONS DUE TO RENAL DIALYSIS DEVICE, IMPLANT, AND GRAFT: ICD-10-CM

## 2017-07-06 DIAGNOSIS — E83.39 HYPERPHOSPHATEMIA: ICD-10-CM

## 2017-07-06 DIAGNOSIS — N18.5 CKD (CHRONIC KIDNEY DISEASE), STAGE V: Primary | ICD-10-CM

## 2017-07-06 DIAGNOSIS — D84.9 IMMUNOSUPPRESSED STATUS: Chronic | ICD-10-CM

## 2017-07-06 DIAGNOSIS — N18.5 CKD (CHRONIC KIDNEY DISEASE), STAGE V: ICD-10-CM

## 2017-07-06 PROCEDURE — 1125F AMNT PAIN NOTED PAIN PRSNT: CPT | Mod: S$GLB,,, | Performed by: INTERNAL MEDICINE

## 2017-07-06 PROCEDURE — 99999 PR PBB SHADOW E&M-EST. PATIENT-LVL V: CPT | Mod: PBBFAC,TXP,, | Performed by: SURGERY

## 2017-07-06 PROCEDURE — 93990 DOPPLER FLOW TESTING: CPT | Mod: NTX,S$GLB,, | Performed by: SURGERY

## 2017-07-06 PROCEDURE — 99215 OFFICE O/P EST HI 40 MIN: CPT | Mod: S$GLB,,, | Performed by: INTERNAL MEDICINE

## 2017-07-06 PROCEDURE — 99499 UNLISTED E&M SERVICE: CPT | Mod: S$GLB,,, | Performed by: INTERNAL MEDICINE

## 2017-07-06 PROCEDURE — 99499 UNLISTED E&M SERVICE: CPT | Mod: S$GLB,TXP,, | Performed by: SURGERY

## 2017-07-06 PROCEDURE — 99999 PR PBB SHADOW E&M-EST. PATIENT-LVL III: CPT | Mod: PBBFAC,,, | Performed by: INTERNAL MEDICINE

## 2017-07-06 PROCEDURE — 3044F HG A1C LEVEL LT 7.0%: CPT | Mod: S$GLB,,, | Performed by: INTERNAL MEDICINE

## 2017-07-06 PROCEDURE — 99024 POSTOP FOLLOW-UP VISIT: CPT | Mod: NTX,S$GLB,, | Performed by: SURGERY

## 2017-07-06 PROCEDURE — 3066F NEPHROPATHY DOC TX: CPT | Mod: S$GLB,,, | Performed by: INTERNAL MEDICINE

## 2017-07-06 PROCEDURE — 1159F MED LIST DOCD IN RCRD: CPT | Mod: S$GLB,,, | Performed by: INTERNAL MEDICINE
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
## 2017-07-06 NOTE — PROGRESS NOTES
Patient ID: Philip Mathis III is a 72 y.o. White male who presents for follow-up evaluation of CKD.        HPI      Mr. Mathis is here for follow up on CKD and hypertension. He has been a pt of Dr. Hercules, last seen by her on 6/24/16 and then was followed by NP. I had seen him in 1/16 during urgent follow up for his uncontrolled hypertension. After that he went back to Dr. Hercules's care. He established chronic kidney issues care with me on 10/11/16. I saw him in the clinic on 6/7/17 at the last visit.     He had left arm basilic vein transposition and ligation of left brachial AVF on 6/15/17 by Dr. William. He appears to have a thrill. He is advised to follow up with vascular in 4 weeks which will be next week. However today pt reports he has been having worsening of symptoms like no energy, lethargy, increased sleepiness, poor appetite. He reports he has less swelling on ankles since torsemide dose was increased.    Given his worsened labs from 5/22/17, I had communication with Dr. William at the time of last visit who was planning to schedule transposition of his access. He had advised this to the patient at the time of his vascular surgery visit in 3/17. 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 I discussed this with patient last visit, explained need for close follow up with vascular surgery given his declining renal function and to make sure that he has functional access very soon.     Pt had a visit to home dialysis clinic. He has expressed interest in doing home hemo. He did have home inspection done recently and has received ok from home hemo team. He will start home dialysis training very soon once vascular access is ready for use per vascular surgery.    Onset of his CKD is in 2004. He has s/p liver transplant, maintained on Prograf, has diastolic heart failure, hypertension, diabetes, hep C, encephalopathy, anemia, sleep  apnea. He admits he has not been compliant with using CPAP as he feels claustrophobic.       Labs from 6/28/17 noted for Hb 8.7, Na 143, K 5.2, bicarbonate 20, BUN 85, creatinine 4, eGFR 13.9, Ca 8.2, phos 4.9, albumin 3.4, . Prior Urine studies show urine PC ratio of 3.8. US kidneys from 9/16 show preserved kidney size, changes of CKD and bilateral simple cysts.        Review of Systems   Constitutional: Positive for appetite change, activity change and fatigue. Negative for fever, chills.   HENT: Negative for hearing loss and rhinorrhea.   Eyes: Negative for pain and discharge.   Respiratory: Negative for cough, shortness of breath and wheezing.   Cardiovascular: Positive for leg swelling. Negative for chest pain.   Gastrointestinal: Negative for vomiting, abdominal pain and diarrhea. Has poor appetite.   Endocrine: Negative for polydipsia and polyuria.   Genitourinary: Negative for dysuria, frequency, hematuria, flank pain and decreased urine volume.   Musculoskeletal: Negative for myalgias and back pain.   Neurological: Negative for syncope and speech difficulty.   Psychiatric/Behavioral: Negative for behavioral problems.       Objective:      Physical Exam   Constitutional: He is oriented to person, place, and time. Chronically ill appearing.  HENT:   Mouth/Throat: Oropharynx is clear and moist. No oropharyngeal exudate.   Eyes: Right eye exhibits no discharge. Left eye exhibits no discharge. No scleral icterus.   Neck: Normal range of motion. Neck supple.    Cardiovascular: Normal rate, regular rhythm and normal heart sounds. No rub.   Pulmonary/Chest: Effort normal and breath sounds normal. No respiratory distress. He has no wheezes. He has no rales.   Abdominal: Soft. Bowel sounds are normal. He exhibits no distension. There is no tenderness. There is no guarding.   Musculoskeletal: He exhibits edema.   Lymphadenopathy:   He has no cervical adenopathy.   Neurological: He is alert and oriented to  person, place, and time.   Skin: Skin is warm and dry. He is not diaphoretic. No erythema.   Psychiatric: He has a normal mood and affect. His behavior is normal.   Vitals reviewed.        Assessment:      1. CKD V  2. Edema  3. Diabetic CKD V   4. Anemia multifactorial   5. S/p liver transplant, chronic immunosuppression  6. Proteinuria  7. Secondary hyperparathyroidism  8. Hyperphosphatemia   9. Metabolic acidosis  10. H/o hep C  11. Hypertensive CKD V     Plan:      He has CKD V, most recent symptoms concerning for uremic syndrome. He has elevated BUN and several other metabolic abnormalities. He has low appetite, worsening of LE edema. I had a detailed d/w patient. In light of his declining kidney function to stage V and slowly building symptoms, he needs to start dialysis very soon. He expressed understanding.     From the clinic he was sent to Dr. William's office for urgent follow up on his left arm AVF to confirm maturity and plan was made to start home hemodialysis training as early as from next week after obtaining ok from vascular surgery to use his AVF. However, Dr. William felt that his access is not yet ready. So we had a detailed discussion that pt will get a tunneled CVC placed very soon by Dr. William (pt has court appointment early next week so pt prefers to do it the following day of that), pt will get admitted to hospital to initiate dialysis and temporarily will transition to in center HD via tunneled CVC. Once his left arm access matures he will transition to home dialysis clinic.     Hyperphosphatemia, secondary hyperparathyroidism, re-refer him to nutritionist for low phos, low salt, low K diet, if phos still remains high inspite of dietary control then will have to add phos binders. His corrected Ca is borderline, starting vit D analogue may worsen this level. I advised him to stop multivitamin due to K, phos, Ca content.     Adding sodium bicarbonate can be challenging as it can worsen his edema.  Anticipate improvement in this once he starts HD.    Continue current antihypertensive regimen, would not start ACE-I or ARB for now given advanced CKD and recent episodes of hyperkalemia. Pt advised to follow BP daily at home.     Plan  - continue current diuretic regimen  - repeat labs next week  - tunneled CVC placement followed by hospital admission and initiation of hemodialysis by mid next week  - low phos, low K diet (interim since last visit one of the labs drawn came with K of 5.6 which did improve to 5.2, pt had refused to take kayexalate)  - continue anemia clinic infusion of VAHE, iron until he starts HD  - low salt diet  - pt and his wife cautioned to bring him to ER in case of any worsening of his symptoms. They expressed understanding.    Hospital admission after tunneled CVC placement next week.

## 2017-07-06 NOTE — Clinical Note
Dr. Nath,  Just wanted to keep you informed. Will start HD from next week, initially he will get tunneled catheter, once AVF matures he will transition to home hemodialysis training.  Thanks, Marielos

## 2017-07-07 DIAGNOSIS — N18.6 ESRD (END STAGE RENAL DISEASE): Primary | ICD-10-CM

## 2017-07-10 ENCOUNTER — OUTPATIENT CASE MANAGEMENT (OUTPATIENT)
Dept: ADMINISTRATIVE | Facility: OTHER | Age: 72
End: 2017-07-10

## 2017-07-10 ENCOUNTER — LAB VISIT (OUTPATIENT)
Dept: LAB | Facility: HOSPITAL | Age: 72
End: 2017-07-10
Payer: MEDICARE

## 2017-07-10 DIAGNOSIS — N18.9 ANEMIA OF RENAL DISEASE: ICD-10-CM

## 2017-07-10 DIAGNOSIS — D63.1 ANEMIA OF RENAL DISEASE: ICD-10-CM

## 2017-07-10 DIAGNOSIS — N18.6 ESRD (END STAGE RENAL DISEASE): ICD-10-CM

## 2017-07-10 LAB
HCT VFR BLD AUTO: 28 %
HGB BLD-MCNC: 8.7 G/DL
IRON SERPL-MCNC: 26 UG/DL
SATURATED IRON: 13 %
TOTAL IRON BINDING CAPACITY: 207 UG/DL
TRANSFERRIN SERPL-MCNC: 140 MG/DL

## 2017-07-10 PROCEDURE — 85014 HEMATOCRIT: CPT | Mod: PO,TXP

## 2017-07-10 PROCEDURE — 85018 HEMOGLOBIN: CPT | Mod: PO,TXP

## 2017-07-10 PROCEDURE — 83540 ASSAY OF IRON: CPT | Mod: TXP

## 2017-07-10 PROCEDURE — 36415 COLL VENOUS BLD VENIPUNCTURE: CPT | Mod: PO,TXP

## 2017-07-12 ENCOUNTER — INFUSION (OUTPATIENT)
Dept: INFECTIOUS DISEASES | Facility: HOSPITAL | Age: 72
End: 2017-07-12
Attending: INTERNAL MEDICINE
Payer: MEDICARE

## 2017-07-12 ENCOUNTER — TELEPHONE (OUTPATIENT)
Dept: VASCULAR SURGERY | Facility: CLINIC | Age: 72
End: 2017-07-12

## 2017-07-12 VITALS
HEIGHT: 74 IN | SYSTOLIC BLOOD PRESSURE: 135 MMHG | DIASTOLIC BLOOD PRESSURE: 55 MMHG | WEIGHT: 199.38 LBS | BODY MASS INDEX: 25.59 KG/M2

## 2017-07-12 DIAGNOSIS — N18.9 ANEMIA OF RENAL DISEASE: Primary | ICD-10-CM

## 2017-07-12 DIAGNOSIS — D63.1 ANEMIA OF RENAL DISEASE: Primary | ICD-10-CM

## 2017-07-12 DIAGNOSIS — N18.6 ESRD (END STAGE RENAL DISEASE): ICD-10-CM

## 2017-07-12 PROCEDURE — 96372 THER/PROPH/DIAG INJ SC/IM: CPT | Mod: TXP

## 2017-07-12 PROCEDURE — 63600175 PHARM REV CODE 636 W HCPCS: Mod: TXP | Performed by: INTERNAL MEDICINE

## 2017-07-12 RX ADMIN — DARBEPOETIN ALFA 300 MCG: 300 INJECTION, SOLUTION INTRAVENOUS; SUBCUTANEOUS at 11:07

## 2017-07-12 NOTE — PRE-PROCEDURE INSTRUCTIONS
Preop instructions: NPO after midnight or 8 hours prior to procedure time, shower instructions, directions, leave all valuables at home, medication instructions for PM prior & am of procedure explained. Patient stated an understanding.    Patient denies any side effects or issues with anesthesia or sedation.    Patient does not know arrival time. Explained that this information comes from the surgeons office and if they have not heard from them by 3pm to call office. Patient stated an understanding.    AM blood sugars 130-140

## 2017-07-12 NOTE — PROGRESS NOTES
Hgb 8.7;Hct 28.0    Discussed Hgb with Patient. Dose didn't increase  PER PROTOCOL FORM DROD-188. Due to being at max dose.  Aranesp 300 mcg given and 2 week appt. Set. due to Hgb under 9 .  Dr Amezcua notified.    Gfr 13.9/Stage 4

## 2017-07-13 ENCOUNTER — HOSPITAL ENCOUNTER (INPATIENT)
Facility: HOSPITAL | Age: 72
LOS: 1 days | Discharge: HOME | DRG: 291 | End: 2017-07-15
Attending: SURGERY | Admitting: SURGERY
Payer: MEDICARE

## 2017-07-13 ENCOUNTER — ANESTHESIA EVENT (OUTPATIENT)
Dept: SURGERY | Facility: HOSPITAL | Age: 72
DRG: 291 | End: 2017-07-13
Payer: MEDICARE

## 2017-07-13 ENCOUNTER — ANESTHESIA (OUTPATIENT)
Dept: SURGERY | Facility: HOSPITAL | Age: 72
DRG: 291 | End: 2017-07-13
Payer: MEDICARE

## 2017-07-13 DIAGNOSIS — N18.6 ESRD (END STAGE RENAL DISEASE): ICD-10-CM

## 2017-07-13 DIAGNOSIS — K21.9 GASTROESOPHAGEAL REFLUX DISEASE, ESOPHAGITIS PRESENCE NOT SPECIFIED: Chronic | ICD-10-CM

## 2017-07-13 DIAGNOSIS — N18.6 ESRD (END STAGE RENAL DISEASE) ON DIALYSIS: ICD-10-CM

## 2017-07-13 DIAGNOSIS — Z99.2 ESRD (END STAGE RENAL DISEASE) ON DIALYSIS: ICD-10-CM

## 2017-07-13 DIAGNOSIS — N18.5 CKD (CHRONIC KIDNEY DISEASE), STAGE V: Primary | ICD-10-CM

## 2017-07-13 LAB
ALBUMIN SERPL BCP-MCNC: 3.3 G/DL
ANION GAP SERPL CALC-SCNC: 12 MMOL/L
BUN SERPL-MCNC: 72 MG/DL
CALCIUM SERPL-MCNC: 8 MG/DL
CHLORIDE SERPL-SCNC: 108 MMOL/L
CO2 SERPL-SCNC: 21 MMOL/L
CREAT SERPL-MCNC: 4 MG/DL
EST. GFR  (AFRICAN AMERICAN): 16.1 ML/MIN/1.73 M^2
EST. GFR  (NON AFRICAN AMERICAN): 13.9 ML/MIN/1.73 M^2
FERRITIN SERPL-MCNC: 56 NG/ML
GLUCOSE SERPL-MCNC: 111 MG/DL
IRON SERPL-MCNC: 26 UG/DL
MAGNESIUM SERPL-MCNC: 1.7 MG/DL
PHOSPHATE SERPL-MCNC: 5.2 MG/DL
PHOSPHATE SERPL-MCNC: 5.2 MG/DL
POCT GLUCOSE: 114 MG/DL (ref 70–110)
POCT GLUCOSE: 122 MG/DL (ref 70–110)
POCT GLUCOSE: 125 MG/DL (ref 70–110)
POCT GLUCOSE: 142 MG/DL (ref 70–110)
POCT GLUCOSE: 97 MG/DL (ref 70–110)
POTASSIUM SERPL-SCNC: 4.8 MMOL/L
SATURATED IRON: 13 %
SODIUM SERPL-SCNC: 141 MMOL/L
TOTAL IRON BINDING CAPACITY: 195 UG/DL
TRANSFERRIN SERPL-MCNC: 132 MG/DL

## 2017-07-13 PROCEDURE — 80069 RENAL FUNCTION PANEL: CPT | Mod: NTX

## 2017-07-13 PROCEDURE — 25000003 PHARM REV CODE 250: Mod: TXP | Performed by: SURGERY

## 2017-07-13 PROCEDURE — 36415 COLL VENOUS BLD VENIPUNCTURE: CPT | Mod: NTX

## 2017-07-13 PROCEDURE — C1894 INTRO/SHEATH, NON-LASER: HCPCS | Mod: NTX | Performed by: SURGERY

## 2017-07-13 PROCEDURE — 83540 ASSAY OF IRON: CPT | Mod: NTX

## 2017-07-13 PROCEDURE — G0378 HOSPITAL OBSERVATION PER HR: HCPCS | Mod: NTX

## 2017-07-13 PROCEDURE — 63600175 PHARM REV CODE 636 W HCPCS: Mod: NTX | Performed by: NURSE ANESTHETIST, CERTIFIED REGISTERED

## 2017-07-13 PROCEDURE — 71000045 HC DOSC ROUTINE RECOVERY EA ADD'L HR: Mod: NTX | Performed by: SURGERY

## 2017-07-13 PROCEDURE — 37000008 HC ANESTHESIA 1ST 15 MINUTES: Mod: NTX | Performed by: SURGERY

## 2017-07-13 PROCEDURE — 82728 ASSAY OF FERRITIN: CPT | Mod: NTX

## 2017-07-13 PROCEDURE — 25000003 PHARM REV CODE 250: Mod: NTX | Performed by: INTERNAL MEDICINE

## 2017-07-13 PROCEDURE — 36000706: Mod: NTX | Performed by: SURGERY

## 2017-07-13 PROCEDURE — 02HV33Z INSERTION OF INFUSION DEVICE INTO SUPERIOR VENA CAVA, PERCUTANEOUS APPROACH: ICD-10-PCS | Performed by: SURGERY

## 2017-07-13 PROCEDURE — 36558 INSERT TUNNELED CV CATH: CPT | Mod: 79,NTX,, | Performed by: SURGERY

## 2017-07-13 PROCEDURE — 71000016 HC POSTOP RECOV ADDL HR: Mod: NTX | Performed by: SURGERY

## 2017-07-13 PROCEDURE — 83735 ASSAY OF MAGNESIUM: CPT | Mod: NTX

## 2017-07-13 PROCEDURE — 63600175 PHARM REV CODE 636 W HCPCS: Mod: NTX | Performed by: STUDENT IN AN ORGANIZED HEALTH CARE EDUCATION/TRAINING PROGRAM

## 2017-07-13 PROCEDURE — 25000003 PHARM REV CODE 250: Mod: NTX | Performed by: NURSE ANESTHETIST, CERTIFIED REGISTERED

## 2017-07-13 PROCEDURE — 71000015 HC POSTOP RECOV 1ST HR: Mod: NTX | Performed by: SURGERY

## 2017-07-13 PROCEDURE — D9220A PRA ANESTHESIA: Mod: ANES,NTX,, | Performed by: ANESTHESIOLOGY

## 2017-07-13 PROCEDURE — 36000707: Mod: NTX | Performed by: SURGERY

## 2017-07-13 PROCEDURE — 99214 OFFICE O/P EST MOD 30 MIN: CPT | Mod: NTX,,, | Performed by: INTERNAL MEDICINE

## 2017-07-13 PROCEDURE — 37000009 HC ANESTHESIA EA ADD 15 MINS: Mod: NTX | Performed by: SURGERY

## 2017-07-13 PROCEDURE — 77001 FLUOROGUIDE FOR VEIN DEVICE: CPT | Mod: 26,NTX,, | Performed by: SURGERY

## 2017-07-13 PROCEDURE — 82962 GLUCOSE BLOOD TEST: CPT | Mod: NTX | Performed by: SURGERY

## 2017-07-13 PROCEDURE — D9220A PRA ANESTHESIA: Mod: CRNA,NTX,, | Performed by: NURSE ANESTHETIST, CERTIFIED REGISTERED

## 2017-07-13 PROCEDURE — 90935 HEMODIALYSIS ONE EVALUATION: CPT | Mod: NTX

## 2017-07-13 PROCEDURE — 25000003 PHARM REV CODE 250: Mod: NTX | Performed by: STUDENT IN AN ORGANIZED HEALTH CARE EDUCATION/TRAINING PROGRAM

## 2017-07-13 PROCEDURE — 71000044 HC DOSC ROUTINE RECOVERY FIRST HOUR: Mod: NTX | Performed by: SURGERY

## 2017-07-13 PROCEDURE — C1750 CATH, HEMODIALYSIS,LONG-TERM: HCPCS | Mod: NTX | Performed by: SURGERY

## 2017-07-13 DEVICE — CATH HEMOSPLIT 14.5FR 19CM: Type: IMPLANTABLE DEVICE | Site: CHEST | Status: FUNCTIONAL

## 2017-07-13 RX ORDER — TACROLIMUS 0.5 MG/1
0.5 CAPSULE ORAL EVERY MORNING
Status: DISCONTINUED | OUTPATIENT
Start: 2017-07-13 | End: 2017-07-15 | Stop reason: HOSPADM

## 2017-07-13 RX ORDER — CARVEDILOL 6.25 MG/1
6.25 TABLET ORAL 2 TIMES DAILY
Status: DISCONTINUED | OUTPATIENT
Start: 2017-07-13 | End: 2017-07-15 | Stop reason: HOSPADM

## 2017-07-13 RX ORDER — HYDROCODONE BITARTRATE AND ACETAMINOPHEN 5; 325 MG/1; MG/1
1 TABLET ORAL EVERY 4 HOURS PRN
Status: DISCONTINUED | OUTPATIENT
Start: 2017-07-13 | End: 2017-07-15 | Stop reason: HOSPADM

## 2017-07-13 RX ORDER — IBUPROFEN 200 MG
16 TABLET ORAL
Status: DISCONTINUED | OUTPATIENT
Start: 2017-07-13 | End: 2017-07-15 | Stop reason: HOSPADM

## 2017-07-13 RX ORDER — HYDRALAZINE HYDROCHLORIDE 50 MG/1
100 TABLET, FILM COATED ORAL 3 TIMES DAILY
Status: DISCONTINUED | OUTPATIENT
Start: 2017-07-13 | End: 2017-07-15 | Stop reason: HOSPADM

## 2017-07-13 RX ORDER — GLUCAGON 1 MG
1 KIT INJECTION
Status: DISCONTINUED | OUTPATIENT
Start: 2017-07-13 | End: 2017-07-15 | Stop reason: HOSPADM

## 2017-07-13 RX ORDER — SODIUM CHLORIDE 0.9 % (FLUSH) 0.9 %
3 SYRINGE (ML) INJECTION
Status: DISCONTINUED | OUTPATIENT
Start: 2017-07-13 | End: 2017-07-15 | Stop reason: HOSPADM

## 2017-07-13 RX ORDER — DOXAZOSIN 8 MG/1
8 TABLET ORAL NIGHTLY
Status: DISCONTINUED | OUTPATIENT
Start: 2017-07-13 | End: 2017-07-15 | Stop reason: HOSPADM

## 2017-07-13 RX ORDER — SODIUM CHLORIDE 9 MG/ML
INJECTION, SOLUTION INTRAVENOUS ONCE
Status: DISCONTINUED | OUTPATIENT
Start: 2017-07-13 | End: 2017-07-15 | Stop reason: HOSPADM

## 2017-07-13 RX ORDER — PROPOFOL 10 MG/ML
VIAL (ML) INTRAVENOUS
Status: DISCONTINUED | OUTPATIENT
Start: 2017-07-13 | End: 2017-07-13

## 2017-07-13 RX ORDER — HEPARIN SODIUM 1000 [USP'U]/ML
1000 INJECTION, SOLUTION INTRAVENOUS; SUBCUTANEOUS
Status: DISCONTINUED | OUTPATIENT
Start: 2017-07-13 | End: 2017-07-15 | Stop reason: HOSPADM

## 2017-07-13 RX ORDER — HEPARIN SODIUM 1000 [USP'U]/ML
INJECTION, SOLUTION INTRAVENOUS; SUBCUTANEOUS
Status: DISCONTINUED | OUTPATIENT
Start: 2017-07-13 | End: 2017-07-13 | Stop reason: HOSPADM

## 2017-07-13 RX ORDER — CLONIDINE HYDROCHLORIDE 0.1 MG/1
0.1 TABLET ORAL 3 TIMES DAILY
Status: DISCONTINUED | OUTPATIENT
Start: 2017-07-13 | End: 2017-07-15 | Stop reason: HOSPADM

## 2017-07-13 RX ORDER — PANTOPRAZOLE SODIUM 40 MG/1
40 TABLET, DELAYED RELEASE ORAL DAILY
Status: DISCONTINUED | OUTPATIENT
Start: 2017-07-13 | End: 2017-07-15 | Stop reason: HOSPADM

## 2017-07-13 RX ORDER — KETAMINE HYDROCHLORIDE 100 MG/ML
INJECTION, SOLUTION INTRAMUSCULAR; INTRAVENOUS
Status: DISCONTINUED | OUTPATIENT
Start: 2017-07-13 | End: 2017-07-13

## 2017-07-13 RX ORDER — LABETALOL HCL 20 MG/4 ML
10 SYRINGE (ML) INTRAVENOUS EVERY 6 HOURS PRN
Status: DISCONTINUED | OUTPATIENT
Start: 2017-07-13 | End: 2017-07-13

## 2017-07-13 RX ORDER — IBUPROFEN 200 MG
24 TABLET ORAL
Status: DISCONTINUED | OUTPATIENT
Start: 2017-07-13 | End: 2017-07-15 | Stop reason: HOSPADM

## 2017-07-13 RX ORDER — SODIUM CHLORIDE 9 MG/ML
INJECTION, SOLUTION INTRAVENOUS CONTINUOUS
Status: DISCONTINUED | OUTPATIENT
Start: 2017-07-13 | End: 2017-07-13

## 2017-07-13 RX ORDER — TORSEMIDE 20 MG/1
60 TABLET ORAL DAILY
Status: DISCONTINUED | OUTPATIENT
Start: 2017-07-14 | End: 2017-07-15 | Stop reason: HOSPADM

## 2017-07-13 RX ORDER — FENTANYL CITRATE 50 UG/ML
INJECTION, SOLUTION INTRAMUSCULAR; INTRAVENOUS
Status: DISCONTINUED | OUTPATIENT
Start: 2017-07-13 | End: 2017-07-13

## 2017-07-13 RX ORDER — LABETALOL HYDROCHLORIDE 5 MG/ML
10 INJECTION, SOLUTION INTRAVENOUS EVERY 6 HOURS PRN
Status: DISCONTINUED | OUTPATIENT
Start: 2017-07-13 | End: 2017-07-15 | Stop reason: HOSPADM

## 2017-07-13 RX ORDER — SODIUM CHLORIDE 9 MG/ML
INJECTION, SOLUTION INTRAVENOUS CONTINUOUS PRN
Status: DISCONTINUED | OUTPATIENT
Start: 2017-07-13 | End: 2017-07-13

## 2017-07-13 RX ORDER — INSULIN ASPART 100 [IU]/ML
1-10 INJECTION, SOLUTION INTRAVENOUS; SUBCUTANEOUS
Status: DISCONTINUED | OUTPATIENT
Start: 2017-07-13 | End: 2017-07-14

## 2017-07-13 RX ORDER — MIDAZOLAM HYDROCHLORIDE 1 MG/ML
INJECTION, SOLUTION INTRAMUSCULAR; INTRAVENOUS
Status: DISCONTINUED | OUTPATIENT
Start: 2017-07-13 | End: 2017-07-13

## 2017-07-13 RX ORDER — PROPOFOL 10 MG/ML
VIAL (ML) INTRAVENOUS CONTINUOUS PRN
Status: DISCONTINUED | OUTPATIENT
Start: 2017-07-13 | End: 2017-07-13

## 2017-07-13 RX ORDER — ONDANSETRON 2 MG/ML
4 INJECTION INTRAMUSCULAR; INTRAVENOUS EVERY 8 HOURS PRN
Status: DISCONTINUED | OUTPATIENT
Start: 2017-07-13 | End: 2017-07-15 | Stop reason: HOSPADM

## 2017-07-13 RX ORDER — SODIUM CHLORIDE 9 MG/ML
INJECTION, SOLUTION INTRAVENOUS ONCE
Status: COMPLETED | OUTPATIENT
Start: 2017-07-13 | End: 2017-07-13

## 2017-07-13 RX ORDER — LIDOCAINE HYDROCHLORIDE 10 MG/ML
1 INJECTION, SOLUTION EPIDURAL; INFILTRATION; INTRACAUDAL; PERINEURAL ONCE
Status: DISCONTINUED | OUTPATIENT
Start: 2017-07-13 | End: 2017-07-13 | Stop reason: HOSPADM

## 2017-07-13 RX ORDER — LIDOCAINE HCL/PF 100 MG/5ML
SYRINGE (ML) INTRAVENOUS
Status: DISCONTINUED | OUTPATIENT
Start: 2017-07-13 | End: 2017-07-13

## 2017-07-13 RX ORDER — LIDOCAINE HYDROCHLORIDE 10 MG/ML
INJECTION, SOLUTION EPIDURAL; INFILTRATION; INTRACAUDAL; PERINEURAL
Status: DISCONTINUED | OUTPATIENT
Start: 2017-07-13 | End: 2017-07-13 | Stop reason: HOSPADM

## 2017-07-13 RX ADMIN — ONDANSETRON 4 MG: 2 INJECTION INTRAMUSCULAR; INTRAVENOUS at 04:07

## 2017-07-13 RX ADMIN — KETAMINE HYDROCHLORIDE 25 MG: 100 INJECTION, SOLUTION, CONCENTRATE INTRAMUSCULAR; INTRAVENOUS at 07:07

## 2017-07-13 RX ADMIN — PANTOPRAZOLE SODIUM 40 MG: 40 TABLET, DELAYED RELEASE ORAL at 03:07

## 2017-07-13 RX ADMIN — MIDAZOLAM HYDROCHLORIDE 2 MG: 1 INJECTION, SOLUTION INTRAMUSCULAR; INTRAVENOUS at 06:07

## 2017-07-13 RX ADMIN — CARVEDILOL 6.25 MG: 6.25 TABLET, FILM COATED ORAL at 09:07

## 2017-07-13 RX ADMIN — HYDRALAZINE HYDROCHLORIDE 100 MG: 50 TABLET ORAL at 09:07

## 2017-07-13 RX ADMIN — FENTANYL CITRATE 25 MCG: 50 INJECTION, SOLUTION INTRAMUSCULAR; INTRAVENOUS at 07:07

## 2017-07-13 RX ADMIN — Medication 2 G: at 07:07

## 2017-07-13 RX ADMIN — PROPOFOL 30 MG: 10 INJECTION, EMULSION INTRAVENOUS at 07:07

## 2017-07-13 RX ADMIN — HEPARIN SODIUM 1000 UNITS: 1000 INJECTION, SOLUTION INTRAVENOUS; SUBCUTANEOUS at 04:07

## 2017-07-13 RX ADMIN — DOXAZOSIN MESYLATE 8 MG: 8 TABLET ORAL at 09:07

## 2017-07-13 RX ADMIN — RIFAXIMIN 550 MG: 550 TABLET ORAL at 09:07

## 2017-07-13 RX ADMIN — PROPOFOL 50 MCG/KG/MIN: 10 INJECTION, EMULSION INTRAVENOUS at 07:07

## 2017-07-13 RX ADMIN — SODIUM CHLORIDE 200 ML: 0.9 INJECTION, SOLUTION INTRAVENOUS at 04:07

## 2017-07-13 RX ADMIN — SODIUM CHLORIDE: 0.9 INJECTION, SOLUTION INTRAVENOUS at 06:07

## 2017-07-13 RX ADMIN — CLONIDINE HYDROCHLORIDE 0.1 MG: 0.1 TABLET ORAL at 09:07

## 2017-07-13 RX ADMIN — LIDOCAINE HYDROCHLORIDE 50 MG: 20 INJECTION, SOLUTION INTRAVENOUS at 07:07

## 2017-07-13 NOTE — ASSESSMENT & PLAN NOTE
Patient is s/p permcath placement earlier today and has been admitted for initiation of dialysis, he tolerated procedure of perm cath, there is some bleeding around the site, bit probably not anything unusual, he has been cleared by surgery to use the catheter.    Plan/Recommendions  1) will place orders for initiation of dialysis  2) follow daily labs while start dialysis  3) need to verify he has an outpatient dialysis chair set up  4) will discuss with staff, but likely will need three session to initiate dialysis, not certain whether all three have to be in-house
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
## 2017-07-13 NOTE — ANESTHESIA RELEASE NOTE
"Anesthesia Release from PACU Note    Patient: Philip Mathis III    Procedure(s) Performed: Procedure(s) (LRB):  INSERTION-CATHETER-PERM-A-CATH (Right)    Anesthesia type: general    Post pain: Adequate analgesia    Post assessment: no apparent anesthetic complications    Last Vitals:   Visit Vitals  BP (!) 145/64   Pulse 64   Temp 36.9 °C (98.4 °F) (Temporal)   Resp 16   Ht 6' 2" (1.88 m)   Wt 88.5 kg (195 lb)   SpO2 96%   BMI 25.04 kg/m²       Post vital signs: stable    Level of consciousness: awake    Nausea/Vomiting: no nausea/no vomiting    Complications: none    Airway Patency: patent    Respiratory: unassisted    Cardiovascular: stable and blood pressure at baseline    Hydration: euvolemic  "

## 2017-07-13 NOTE — OP NOTE
Date: 07/13/2017  Pre-operative diagnosis:  Stage V chronic kidney disease on dialysis, in need of dialysis access  Post-operative diagnosis: Same  Attending: Silvio William M.D.   Procedure: R IJV 19cm Bard Hemosplit Perm-A-Cath placement under u/s- and fluorscopic-guidance.   Anesthesia: Local MAC  EBL: Minimal.   Complications: None.   Procedure in detail: After informed consent was obtained, the patient was brought to the operating suite and placed in the supine position and prepped and draped in the usual sterile fashion. After undergoing adequate sedation, the ultrasound was used to assess the R internal jugular vein, which appeared to be a good target for placement of the Perm-A-Cath.  Ultrasound-guidance documented vessel patency; this was then cannulated under ultrasound guidance with a micropuncture needle, clearly visualizing the tip of the needle in the vessel.  After using a 0.018 inch guidewire thru the micropuncture sheath, a 0.035 inch was then threaded through into the SVC/RA under fluoroscopic-guidance. After confirming this with fluoro, the needle was removed. A tunnel was then made with the 19 [id number] with the cuff under the subcutaneous tissue and the catheter tunneled. At this time, the dilator sheath was then placed over the wire, removing the wire and dilator. The catheter was then placed through the sheath with ease and the sheath subsequently removed. This had been done under fluoroscopic guidance, and one final picture confirmed good placement of the Perm-A-Cath in good position. At this time, a sterile dressing was placed over this.  3-O vicryl suture and steri-strips were used for closure of the access site; permacath was secured with 3-O nylon sutures. The patient was then transferred to Recovery in stable and satisfactory condition.

## 2017-07-13 NOTE — CONSULTS
[facility]  Nephrology  Consult Note    Patient Name: Philip Mathis III  MRN: [id number]  Admission Date: 7/13/2017  Hospital Length of Stay: 0 days  Attending Provider: Silvio William MD   Primary Care Physician: Donnie Owens NP  Principal Problem:<principal problem not specified>    Inpatient consult to Nephrology  Consult performed by: KEVIN MCCRAY  Consult ordered by: NIEVES JOHN  Reason for consult: CKD        Subjective:     HPI: 71 yo WM CKD (stage V), followed by Dr Amezcua, was anticipated to start dialysis several months ago and fistula placed, but needed a subsequent procedure to move closer to the surface, he then became increasingly weak, with nausea and generalized malaise and perm cath placement was decided on to be able to start dialysis earlier and while fistula continued to mature.  Patient today has had fistula placement and tolerated procedure, he is admitted for initiation of dialysis.    He is also a liver transplant patient. He was last seen in Nephrology clinic in June his most recent labs notable for Cr 4.9, BUN 85, Bicarb 20, K 5.2; Hgb 8.7, iron sat 13, ferritin 107, he has in the past received both  epo and iron infusion.    I see him in his bed after placement of permcath, he is Aox3 and feeling nauseated, this is a recurrent thing as his renal function has worsened, he usually takes PRN zofran, he is not complaining of SOB, orthopnea or PND.    Past Medical History:   Diagnosis Date    Anemia     Back pain     Bishop's esophagus     BPH (benign prostatic hypertrophy)     Chronic kidney disease     Cirrhosis     Diabetes mellitus     Diabetes mellitus, type 2     Diabetes with neurologic complications     Elevated PSA     Heart failure     Hemolytic anemia     Hepatitis Hepatitis C    Hepatitis C     Hyperlipidemia     Hypertension     Hypertension     Immune disorder     Liver transplanted     Peripheral neuropathy      Sleep apnea     Transfusion reaction     Hep C from prev. blood transfusion    Transplanted liver     Trouble in sleeping     Type 2 diabetes mellitus with ophthalmic manifestations     Type 2 diabetes with peripheral circulatory disorder, controlled     Type II or unspecified type diabetes mellitus with renal manifestations, uncontrolled     Type II or unspecified type diabetes mellitus with renal manifestations, uncontrolled        Past Surgical History:   Procedure Laterality Date    broken nose      EYE SURGERY      cataracts    FEMUR SURGERY      FRACTURE SURGERY      right femur fracture     LIVER TRANSPLANT  2001    PORTACATH PLACEMENT Left     SKIN GRAFT      graft from right thigh , applied to the left back and left arm.       Review of patient's allergies indicates:   Allergen Reactions    Corticosteroids (glucocorticoids) Other (See Comments)     Leg swelling    Hydrocortisone      Leg swelling    Neuromuscular blockers, steroidal      Leg swelling    Ribavirin      Intolerance, arthralgias     Current Facility-Administered Medications   Medication Frequency    0.9%  NaCl infusion Continuous    0.9%  NaCl infusion Once    carvedilol tablet 6.25 mg BID    cloNIDine tablet 0.1 mg TID    dextrose 50% injection 12.5 g PRN    dextrose 50% injection 25 g PRN    doxazosin tablet 8 mg QHS    glucagon (human recombinant) injection 1 mg PRN    glucose chewable tablet 16 g PRN    glucose chewable tablet 24 g PRN    hydrALAZINE tablet 100 mg TID    hydrocodone-acetaminophen 5-325mg per tablet 1 tablet Q4H PRN    insulin aspart pen 1-10 Units QID (AC + HS) PRN    labetalol 20 mg/4 mL (5 mg/mL) IV syring Q6H PRN    pantoprazole EC tablet 40 mg Daily    sodium chloride 0.9% flush 3 mL PRN    tacrolimus capsule 0.5 mg Daily     Family History     Problem Relation (Age of Onset)    Cancer Mother, Sister    Coronary artery disease Father    Diabetes Father    Heart disease Father     Hypertension Father        Social History Main Topics    Smoking status: Former Smoker     Quit date: 7/30/1998    Smokeless tobacco: Never Used      Comment: pt reports quitting in 1998    Alcohol use No      Comment: pt reports hx of alcholism and reports quit in 1998    Drug use: No    Sexual activity: Yes     Partners: Female     Review of Systems   Constitutional: Positive for activity change, appetite change and fatigue.   Respiratory: Negative for shortness of breath and wheezing.    Gastrointestinal: Positive for abdominal distention. Negative for abdominal pain.   Neurological: Negative for dizziness.   Psychiatric/Behavioral: Negative for agitation and behavioral problems.     Objective:     Vital Signs (Most Recent):  Temp: 98.4 °F (36.9 °C) (07/13/17 0755)  Pulse: 62 (07/13/17 1140)  Resp: 16 (07/13/17 1140)  BP: (!) 144/64 (07/13/17 1140)  SpO2: 95 % (07/13/17 1140)  O2 Device (Oxygen Therapy): room air (07/13/17 1140) Vital Signs (24h Range):  Temp:  [98.2 °F (36.8 °C)-98.4 °F (36.9 °C)] 98.4 °F (36.9 °C)  Pulse:  [58-70] 62  Resp:  [16] 16  SpO2:  [94 %-99 %] 95 %  BP: (123-145)/(51-64) 144/64     Weight: 88.5 kg (195 lb) (07/13/17 0546)  Body mass index is 25.04 kg/m².  Body surface area is 2.15 meters squared.    No intake/output data recorded.    Physical Exam   Constitutional: He is oriented to person, place, and time. He appears well-developed.   HENT:   Head: Normocephalic and atraumatic.   Eyes: No scleral icterus.   Neck: JVD (to just above the angle of the jaw) present.   Cardiovascular: Normal rate and regular rhythm.  Exam reveals no friction rub.    Pulmonary/Chest: Effort normal and breath sounds normal.   Abdominal: Soft. There is tenderness.   Musculoskeletal: Normal range of motion. He exhibits edema. He exhibits no tenderness.   Neurological: He is alert and oriented to person, place, and time.   Skin: Skin is warm.   Psychiatric: He has a normal mood and affect. His behavior is  normal.       Significant Labs:  CBC:   Recent Labs  Lab 07/10/17  0706   HGB 8.7*   HCT 28.0*     CMP: No results for input(s): GLU, CALCIUM, ALBUMIN, PROT, NA, K, CO2, CL, BUN, CREATININE, ALKPHOS, ALT, AST, BILITOT in the last 168 hours.  All labs within the past 24 hours have been reviewed.        Assessment/Plan:     CKD (chronic kidney disease), stage V    Patient is s/p permcath placement earlier today and has been admitted for initiation of dialysis, he tolerated procedure of perm cath, there is some bleeding around the site, bit probably not anything unusual, he has been cleared by surgery to use the catheter.    Plan/Recommendions  1) will place orders for initiation of dialysis  2) follow daily labs while start dialysis  3) need to verify he has an outpatient dialysis chair set up  4) will discuss with staff, but likely will need three session to initiate dialysis, not certain whether all three have to be in-house        Anemia of renal disease    Hgb < 100 and iron stores note repleted as per most recent labs, will repeat iron studies and follow serial labs, likely will need iron stores repleted, also will verify when most recent epo injection was and consider restartin        Essential hypertension    currenly controlled, continue home meds            Thank you for your consult. I will follow-up with patient. Please contact us if you have any additional questions.    Kyler De La Cruz MD  Nephrology  [facility]

## 2017-07-13 NOTE — PROGRESS NOTES
Per Dr Guillermo Bustos, patient vitals Q4 hours on floor and placing sliding scale to be placed for patient. [place] updated on patient
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
## 2017-07-13 NOTE — NURSING TRANSFER
Nursing Transfer Note      7/13/2017     Transfer To: [address]    Transfer via stretcher    Transfer with cardiac monitoring    Transported by pct    Medicines sent: na    Chart send with patient: Yes    Notified: spouse    Patient reassessed at: 1140 7/13/17    Upon arrival to floor: bed in low position    Report called to Asia DICKENS

## 2017-07-13 NOTE — ANESTHESIA POSTPROCEDURE EVALUATION
"Anesthesia Post Evaluation    Patient: Philip Mathis III    Procedure(s) Performed: Procedure(s) (LRB):  INSERTION-CATHETER-PERM-A-CATH (Right)    Final Anesthesia Type: general  Patient location during evaluation: PACU  Patient participation: Yes- Able to Participate  Level of consciousness: awake and alert  Post-procedure vital signs: reviewed and stable  Pain management: adequate  Airway patency: patent  PONV status at discharge: No PONV  Anesthetic complications: no      Cardiovascular status: blood pressure returned to baseline  Respiratory status: unassisted  Hydration status: euvolemic  Follow-up not needed.        Visit Vitals  BP (!) 127/54   Pulse 63   Temp 36.9 °C (98.4 °F) (Temporal)   Resp 16   Ht 6' 2" (1.88 m)   Wt 88.5 kg (195 lb)   SpO2 97%   BMI 25.04 kg/m²       Pain/Tom Score: Pain Assessment Performed: Yes (7/13/2017  7:55 AM)  Presence of Pain: denies (7/13/2017  7:55 AM)  Tom Score: 10 (7/13/2017  8:09 AM)      "

## 2017-07-13 NOTE — NURSING TRANSFER
Nursing Transfer Note      7/13/2017     Transfer from [address] to dialysis     Transfer via stretcher     Transfer with tele     Transported by staff    Medicines sent: none    Chart send with patient: no    Notified: wife     Patient reassessed at: 7/13/17 at 1517

## 2017-07-13 NOTE — PLAN OF CARE
Problem: Patient Care Overview  Goal: Plan of Care Review  Outcome: Ongoing (interventions implemented as appropriate)  2 hr hd completed, net fluid [id number], target goal achieved, pt tolerated well. Report given to Dejah DICKENS.

## 2017-07-13 NOTE — PROGRESS NOTES
2 hr (ARF)  HD initiated via RT IJ Tunneled catheter without difficulty, ports flush and aspirate well.

## 2017-07-13 NOTE — SUBJECTIVE & OBJECTIVE
Past Medical History:   Diagnosis Date    Anemia     Back pain     Bishop's esophagus     BPH (benign prostatic hypertrophy)     Chronic kidney disease     Cirrhosis     Diabetes mellitus     Diabetes mellitus, type 2     Diabetes with neurologic complications     Elevated PSA     Heart failure     Hemolytic anemia     Hepatitis Hepatitis C    Hepatitis C     Hyperlipidemia     Hypertension     Hypertension     Immune disorder     Liver transplanted     Peripheral neuropathy     Sleep apnea     Transfusion reaction     Hep C from prev. blood transfusion    Transplanted liver     Trouble in sleeping     Type 2 diabetes mellitus with ophthalmic manifestations     Type 2 diabetes with peripheral circulatory disorder, controlled     Type II or unspecified type diabetes mellitus with renal manifestations, uncontrolled     Type II or unspecified type diabetes mellitus with renal manifestations, uncontrolled        Past Surgical History:   Procedure Laterality Date    broken nose      EYE SURGERY      cataracts    FEMUR SURGERY      FRACTURE SURGERY      right femur fracture     LIVER TRANSPLANT  2001    PORTACATH PLACEMENT Left     SKIN GRAFT      graft from right thigh , applied to the left back and left arm.       Review of patient's allergies indicates:   Allergen Reactions    Corticosteroids (glucocorticoids) Other (See Comments)     Leg swelling    Hydrocortisone      Leg swelling    Neuromuscular blockers, steroidal      Leg swelling    Ribavirin      Intolerance, arthralgias     Current Facility-Administered Medications   Medication Frequency    0.9%  NaCl infusion Continuous    0.9%  NaCl infusion Once    carvedilol tablet 6.25 mg BID    cloNIDine tablet 0.1 mg TID    dextrose 50% injection 12.5 g PRN    dextrose 50% injection 25 g PRN    doxazosin tablet 8 mg QHS    glucagon (human recombinant) injection 1 mg PRN    glucose chewable tablet 16 g PRN    glucose  chewable tablet 24 g PRN    hydrALAZINE tablet 100 mg TID    hydrocodone-acetaminophen 5-325mg per tablet 1 tablet Q4H PRN    insulin aspart pen 1-10 Units QID (AC + HS) PRN    labetalol 20 mg/4 mL (5 mg/mL) IV syring Q6H PRN    pantoprazole EC tablet 40 mg Daily    sodium chloride 0.9% flush 3 mL PRN    tacrolimus capsule 0.5 mg Daily     Family History     Problem Relation (Age of Onset)    Cancer Mother, Sister    Coronary artery disease Father    Diabetes Father    Heart disease Father    Hypertension Father        Social History Main Topics    Smoking status: Former Smoker     Quit date: 7/30/1998    Smokeless tobacco: Never Used      Comment: pt reports quitting in 1998    Alcohol use No      Comment: pt reports hx of alcholism and reports quit in 1998    Drug use: No    Sexual activity: Yes     Partners: Female     Review of Systems   Constitutional: Positive for activity change, appetite change and fatigue.   Respiratory: Negative for shortness of breath and wheezing.    Gastrointestinal: Positive for abdominal distention. Negative for abdominal pain.   Neurological: Negative for dizziness.   Psychiatric/Behavioral: Negative for agitation and behavioral problems.     Objective:     Vital Signs (Most Recent):  Temp: 98.4 °F (36.9 °C) (07/13/17 0755)  Pulse: 62 (07/13/17 1140)  Resp: 16 (07/13/17 1140)  BP: (!) 144/64 (07/13/17 1140)  SpO2: 95 % (07/13/17 1140)  O2 Device (Oxygen Therapy): room air (07/13/17 1140) Vital Signs (24h Range):  Temp:  [98.2 °F (36.8 °C)-98.4 °F (36.9 °C)] 98.4 °F (36.9 °C)  Pulse:  [58-70] 62  Resp:  [16] 16  SpO2:  [94 %-99 %] 95 %  BP: (123-145)/(51-64) 144/64     Weight: 88.5 kg (195 lb) (07/13/17 0546)  Body mass index is 25.04 kg/m².  Body surface area is 2.15 meters squared.    No intake/output data recorded.    Physical Exam   Constitutional: He is oriented to person, place, and time. He appears well-developed.   HENT:   Head: Normocephalic and atraumatic.    Eyes: No scleral icterus.   Neck: JVD (to just above the angle of the jaw) present.   Cardiovascular: Normal rate and regular rhythm.  Exam reveals no friction rub.    Pulmonary/Chest: Effort normal and breath sounds normal.   Abdominal: Soft. There is tenderness.   Musculoskeletal: Normal range of motion. He exhibits edema. He exhibits no tenderness.   Neurological: He is alert and oriented to person, place, and time.   Skin: Skin is warm.   Psychiatric: He has a normal mood and affect. His behavior is normal.       Significant Labs:  CBC:   Recent Labs  Lab 07/10/17  0706   HGB 8.7*   HCT 28.0*     CMP: No results for input(s): GLU, CALCIUM, ALBUMIN, PROT, NA, K, CO2, CL, BUN, CREATININE, ALKPHOS, ALT, AST, BILITOT in the last 168 hours.  All labs within the past 24 hours have been reviewed.

## 2017-07-13 NOTE — BRIEF OP NOTE
Brief Operative Note  Date: 07/13/2017  Pre-operative diagnosis:  Stage V chronic kidney disease on dialysis, in need of dialysis access  Post-operative diagnosis: Same  Attending: Silvio William M.D.   Procedure: R IJV 19cm Bard Hemosplit Perm-A-Cath placement under u/s- and fluorscopic-guidance.   Anesthesia: Local MAC  EBL: Minimal.   Complications: None.       Specimens     None        I attest to being present for the procedure and performing the case.  Silvio William MD FACS  Discharge Note  SUMMARY    Admit Date: 7/13/2017    Attending Physician: Silvio William MD FACS    Discharge Physician: Silvio William MD FACS    Discharge Date: 07/13/2017    Final Diagnosis: ESRD (end stage renal disease) [N18.6]    Outcome of Treatment: Successful placement of permacath    Disposition: Home or self-care    Patient Instructions:   Current Discharge Medication List      CONTINUE these medications which have NOT CHANGED    Details   allopurinol (ZYLOPRIM) 100 MG tablet TAKE 1 TABLET EVERY DAY  Qty: 90 tablet, Refills: 3      blood-glucose meter (ACCU-CHEK JOANN PLUS METER) Misc Use as directed  Qty: 1 each, Refills: 0    Associated Diagnoses: Type II diabetes mellitus with renal manifestations, uncontrolled      carvedilol (COREG) 6.25 MG tablet Take 1 tablet (6.25 mg total) by mouth 2 (two) times daily.  Qty: 180 tablet, Refills: 3    Associated Diagnoses: CKD (chronic kidney disease), stage IV      clonazePAM (KLONOPIN) 0.5 MG tablet Take 1 tablet (0.5 mg total) by mouth every evening.  Qty: 90 tablet, Refills: 1    Associated Diagnoses: Anticonvulsant causing adverse effect in therapeutic use, initial encounter      cloNIDine (CATAPRES) 0.1 MG tablet Take 1 tablet (0.1 mg total) by mouth 3 (three) times daily.  Qty: 270 tablet, Refills: 6    Comments: **Patient requests 90 days supply**      doxazosin (CARDURA) 8 MG Tab Take 1 tablet (8 mg total) by mouth once daily.  Qty: 90 tablet, Refills: 4      GENERLAC 10 gram/15 mL  solution 30 ML BY MOUTH THREE TIMES DAILY  Qty: 2700 mL, Refills: 0      hydrALAZINE (APRESOLINE) 100 MG tablet Take 1 tablet (100 mg total) by mouth 3 (three) times daily.  Qty: 270 tablet, Refills: 3    Comments: **Patient requests 90 days supply**  Associated Diagnoses: CKD (chronic kidney disease), stage IV      hydrOXYzine HCl (ATARAX) 25 MG tablet Take 2 tablets (50 mg total) by mouth 3 (three) times daily as needed for Itching.  Qty: 60 tablet, Refills: 0      insulin aspart (NOVOLOG) 100 unit/mL InPn pen Inject 6 units w/ breakfast, 4 units w/ lunch and dinner plus scale 180-230 +1, 231-280 +2, 281-330 +3, 331-380 +4, >380 +5. 90 day supply  Qty: 3 Box, Refills: 3      insulin glargine (LANTUS SOLOSTAR) 100 unit/mL (3 mL) InPn pen Inject 8 Units into the skin every evening.  Qty: 2 Box, Refills: 6      minoxidil (LONITEN) 2.5 MG tablet Take 2 tablets (5 mg total) by mouth 2 (two) times daily.  Qty: 360 tablet, Refills: 6      montelukast (SINGULAIR) 10 mg tablet Take 1 tablet (10 mg total) by mouth every evening.  Qty: 90 tablet, Refills: 3      ondansetron (ZOFRAN-ODT) 4 MG TbDL DISSOLVE 1 TABLET(4 MG) ON THE TONGUE EVERY 12 HOURS AS NEEDED  Qty: 30 tablet, Refills: 1      pantoprazole (PROTONIX) 40 MG tablet TAKE 1 TABLET ONE TIME DAILY  Qty: 90 tablet, Refills: 3    Associated Diagnoses: Status post liver transplant      rifaximin (XIFAXAN) 550 mg Tab Take 1 tablet (550 mg total) by mouth 2 (two) times daily.  Qty: 60 tablet, Refills: 11    Associated Diagnoses: Encephalopathy      tacrolimus (PROGRAF) 0.5 MG Cap Take 1 capsule (0.5 mg total) by mouth once daily.  Qty: 30 capsule, Refills: 11    Associated Diagnoses: Status post liver transplant      torsemide (DEMADEX) 20 MG Tab Take 3 tablets (60 mg total) by mouth once daily.  Qty: 180 tablet, Refills: 3    Comments: **Patient requests 90 days supply**      blood sugar diagnostic (ACCU-CHEK JOANN PLUS TEST STRP) Strp 1 strip by Misc.(Non-Drug; Combo  "Route) route 4 (four) times daily.  Qty: 360 each, Refills: 3    Associated Diagnoses: Type II diabetes mellitus with renal manifestations, uncontrolled      ciclopirox (PENLAC) 8 % Soln Apply to affected nails daily x 6-12 mos.  Remove weekly with rubbing alcohol  Qty: 1 Bottle, Refills: 5    Associated Diagnoses: Onychomycosis due to dermatophyte      glucagon (human recombinant) inj 1mg/mL kit Inject 1 mL (1 mg total) into the muscle as needed.  Qty: 1 kit, Refills: 2      hydrocodone-acetaminophen 5-325mg (NORCO) 5-325 mg per tablet Take 1 tablet by mouth every 6 (six) hours as needed for Pain.  Qty: 35 tablet, Refills: 0      insulin needles, disposable, (NOVOFINE 32) 32 x 1/4 " Ndle 1 each by Misc.(Non-Drug; Combo Route) route 3 (three) times daily.  Qty: 100 each, Refills: 5      lancets (ACCU-CHEK SOFTCLIX LANCETS) Misc 1 lancet by Misc.(Non-Drug; Combo Route) route 4 (four) times daily.  Qty: 360 each, Refills: 3    Associated Diagnoses: Type II diabetes mellitus with renal manifestations, uncontrolled      urea (CARMOL) 40 % Crea Apply topically once daily.  Qty: 85 g, Refills: 5             Diet:  Resume pre-operative diet    Activity:  Ad thai    Follow-up:  Follow-up in clinic with Dr William within 2-3 weeks; please call clinic nurse at     "

## 2017-07-13 NOTE — INTERVAL H&P NOTE
The patient has been examined and the H&P has been reviewed:    I concur with the findings and no changes have occurred since H&P was written.    Anesthesia/Surgery risks, benefits and alternative options discussed and understood by patient/family.    Review of patient's allergies indicates:   Allergen Reactions    Corticosteroids (glucocorticoids) Other (See Comments)     Leg swelling    Hydrocortisone      Leg swelling    Neuromuscular blockers, steroidal      Leg swelling    Ribavirin      Intolerance, arthralgias           There are no hospital problems to display for this patient.

## 2017-07-13 NOTE — HPI
73 yo WM CKD (stage V), followed by Dr Amezcua, was anticipated to start dialysis several months ago and fistula placed, but needed a subsequent procedure to move closer to the surface, he then became increasingly weak, with nausea and generalized malaise and perm cath placement was decided on to be able to start dialysis earlier and while fistula continued to mature.  Patient today has had fistula placement and tolerated procedure, he is admitted for initiation of dialysis.    He is also a liver transplant patient. He was last seen in Nephrology clinic in June his most recent labs notable for Cr 4.9, BUN 85, Bicarb 20, K 5.2; Hgb 8.7, iron sat 13, ferritin 107, he has in the past received both  epo and iron infusion.    I see him in his bed after placement of permcath, he is Aox3 and feeling nauseated, this is a recurrent thing as his renal function has worsened, he usually takes PRN zofran, he is not complaining of SOB, orthopnea or PND.

## 2017-07-13 NOTE — PROGRESS NOTES
Pt bp 188/81 heart rate 64. Pt asymptomatic. Port a cath site bleeding. Pressure applied.  Md at bedside. No new orders. Will cont to monitor

## 2017-07-13 NOTE — TRANSFER OF CARE
"Anesthesia Transfer of Care Note    Patient: Philip Mathis III    Procedure(s) Performed: Procedure(s) (LRB):  INSERTION-CATHETER-PERM-A-CATH (Right)    Patient location: PACU    Anesthesia Type: general    Transport from OR: Transported from OR on 6-10 L/min O2 by face mask with adequate spontaneous ventilation    Post pain: adequate analgesia    Post assessment: no apparent anesthetic complications    Post vital signs: stable    Level of consciousness: sedated and responds to stimulation    Nausea/Vomiting: no nausea/vomiting    Complications: none    Transfer of care protocol was followed      Last vitals:   Visit Vitals  BP (!) 142/51 (BP Location: Right arm, Patient Position: Lying, BP Method: Automatic)   Pulse (!) 59   Temp 36.8 °C (98.2 °F) (Oral)   Resp 16   Ht 6' 2" (1.88 m)   Wt 88.5 kg (195 lb)   SpO2 96%   BMI 25.04 kg/m²     "

## 2017-07-13 NOTE — DISCHARGE INSTRUCTIONS
Remove dressing in 48 hours or nearest HD treatment    Its important to care for your central venous access device properly. If you dont, it may become infected. Then it cant be used. The tube (catheter) will have to be removed and a new one placed in another vein. Your nurse will show you how to care for your access to help it last.    Follow these tips  · Wash your hands often.  · Try not to touch the catheter.  · Don't let anything (such as clothing) rub or pull on the catheter.  · Don't get your catheter wet.  Watching for problems  Call your healthcare provider right away if you have any of these problems:  · You see a break in the tubing.   · The skin around your access bleeds, oozes, or becomes red or sore.  · You have a fever of 100.4°F (38.0°C) or higher.  · The stitches (sutures) become loose or the catheter falls out.  · An arm becomes swollen.

## 2017-07-13 NOTE — ASSESSMENT & PLAN NOTE
Hgb < 100 and iron stores note repleted as per most recent labs, will repeat iron studies and follow serial labs, likely will need iron stores repleted, also will verify when most recent epo injection was and consider restartin

## 2017-07-14 ENCOUNTER — OUTPATIENT CASE MANAGEMENT (OUTPATIENT)
Dept: ADMINISTRATIVE | Facility: OTHER | Age: 72
End: 2017-07-14

## 2017-07-14 PROBLEM — N18.6 ANEMIA IN ESRD (END-STAGE RENAL DISEASE): Status: ACTIVE | Noted: 2017-04-26

## 2017-07-14 LAB
ANION GAP SERPL CALC-SCNC: 8 MMOL/L
ANISOCYTOSIS BLD QL SMEAR: SLIGHT
BASOPHILS # BLD AUTO: 0.03 K/UL
BASOPHILS NFR BLD: 0.6 %
BUN SERPL-MCNC: 53 MG/DL
CALCIUM SERPL-MCNC: 8 MG/DL
CHLORIDE SERPL-SCNC: 109 MMOL/L
CO2 SERPL-SCNC: 24 MMOL/L
CREAT SERPL-MCNC: 3.3 MG/DL
DIFFERENTIAL METHOD: ABNORMAL
EOSINOPHIL # BLD AUTO: 0.3 K/UL
EOSINOPHIL NFR BLD: 6 %
ERYTHROCYTE [DISTWIDTH] IN BLOOD BY AUTOMATED COUNT: 18.2 %
EST. GFR  (AFRICAN AMERICAN): 20.3 ML/MIN/1.73 M^2
EST. GFR  (NON AFRICAN AMERICAN): 17.6 ML/MIN/1.73 M^2
GLUCOSE SERPL-MCNC: 127 MG/DL
HCT VFR BLD AUTO: 28.7 %
HGB BLD-MCNC: 9.1 G/DL
LYMPHOCYTES # BLD AUTO: 0.9 K/UL
LYMPHOCYTES NFR BLD: 17.6 %
MCH RBC QN AUTO: 26.5 PG
MCHC RBC AUTO-ENTMCNC: 31.7 %
MCV RBC AUTO: 83 FL
MONOCYTES # BLD AUTO: 0.5 K/UL
MONOCYTES NFR BLD: 10.1 %
NEUTROPHILS # BLD AUTO: 3.5 K/UL
NEUTROPHILS NFR BLD: 65.7 %
OVALOCYTES BLD QL SMEAR: ABNORMAL
PLATELET # BLD AUTO: 103 K/UL
PLATELET BLD QL SMEAR: ABNORMAL
PMV BLD AUTO: 10.1 FL
POCT GLUCOSE: 128 MG/DL (ref 70–110)
POCT GLUCOSE: 130 MG/DL (ref 70–110)
POCT GLUCOSE: 148 MG/DL (ref 70–110)
POCT GLUCOSE: 170 MG/DL (ref 70–110)
POCT GLUCOSE: 181 MG/DL (ref 70–110)
POIKILOCYTOSIS BLD QL SMEAR: SLIGHT
POTASSIUM SERPL-SCNC: 4.6 MMOL/L
RBC # BLD AUTO: 3.44 M/UL
SODIUM SERPL-SCNC: 141 MMOL/L
WBC # BLD AUTO: 5.33 K/UL

## 2017-07-14 PROCEDURE — 25000003 PHARM REV CODE 250: Mod: NTX | Performed by: INTERNAL MEDICINE

## 2017-07-14 PROCEDURE — 85025 COMPLETE CBC W/AUTO DIFF WBC: CPT | Mod: NTX

## 2017-07-14 PROCEDURE — 90935 HEMODIALYSIS ONE EVALUATION: CPT | Mod: NTX

## 2017-07-14 PROCEDURE — 63600175 PHARM REV CODE 636 W HCPCS: Mod: NTX | Performed by: HOSPITALIST

## 2017-07-14 PROCEDURE — 99232 SBSQ HOSP IP/OBS MODERATE 35: CPT | Mod: NTX,,, | Performed by: HOSPITALIST

## 2017-07-14 PROCEDURE — 11000001 HC ACUTE MED/SURG PRIVATE ROOM: Mod: NTX

## 2017-07-14 PROCEDURE — 25000003 PHARM REV CODE 250: Mod: NTX | Performed by: HOSPITALIST

## 2017-07-14 PROCEDURE — 90935 HEMODIALYSIS ONE EVALUATION: CPT | Mod: NTX,,, | Performed by: INTERNAL MEDICINE

## 2017-07-14 PROCEDURE — 80048 BASIC METABOLIC PNL TOTAL CA: CPT | Mod: NTX

## 2017-07-14 PROCEDURE — 87340 HEPATITIS B SURFACE AG IA: CPT | Mod: NTX

## 2017-07-14 PROCEDURE — 86706 HEP B SURFACE ANTIBODY: CPT | Mod: NTX

## 2017-07-14 PROCEDURE — 25000003 PHARM REV CODE 250: Mod: NTX | Performed by: STUDENT IN AN ORGANIZED HEALTH CARE EDUCATION/TRAINING PROGRAM

## 2017-07-14 PROCEDURE — 36415 COLL VENOUS BLD VENIPUNCTURE: CPT | Mod: NTX

## 2017-07-14 PROCEDURE — 86580 TB INTRADERMAL TEST: CPT | Mod: NTX | Performed by: HOSPITALIST

## 2017-07-14 PROCEDURE — 86709 HEPATITIS A IGM ANTIBODY: CPT | Mod: NTX

## 2017-07-14 PROCEDURE — 86704 HEP B CORE ANTIBODY TOTAL: CPT | Mod: NTX

## 2017-07-14 PROCEDURE — 80100016 HC MAINTENANCE HEMODIALYSIS: Mod: NTX

## 2017-07-14 RX ORDER — ALLOPURINOL 100 MG/1
100 TABLET ORAL NIGHTLY
Status: DISCONTINUED | OUTPATIENT
Start: 2017-07-14 | End: 2017-07-15 | Stop reason: HOSPADM

## 2017-07-14 RX ORDER — MINOXIDIL 2.5 MG/1
5 TABLET ORAL 2 TIMES DAILY
Status: DISCONTINUED | OUTPATIENT
Start: 2017-07-14 | End: 2017-07-15 | Stop reason: HOSPADM

## 2017-07-14 RX ORDER — ONDANSETRON 4 MG/1
4 TABLET, ORALLY DISINTEGRATING ORAL EVERY 6 HOURS PRN
Status: DISCONTINUED | OUTPATIENT
Start: 2017-07-14 | End: 2017-07-15 | Stop reason: HOSPADM

## 2017-07-14 RX ORDER — SODIUM CHLORIDE 9 MG/ML
INJECTION, SOLUTION INTRAVENOUS ONCE
Status: COMPLETED | OUTPATIENT
Start: 2017-07-14 | End: 2017-07-14

## 2017-07-14 RX ORDER — LACTULOSE 10 G/15ML
20 SOLUTION ORAL NIGHTLY
Status: DISCONTINUED | OUTPATIENT
Start: 2017-07-14 | End: 2017-07-15 | Stop reason: HOSPADM

## 2017-07-14 RX ORDER — INSULIN ASPART 100 [IU]/ML
0-5 INJECTION, SOLUTION INTRAVENOUS; SUBCUTANEOUS
Status: DISCONTINUED | OUTPATIENT
Start: 2017-07-14 | End: 2017-07-15 | Stop reason: HOSPADM

## 2017-07-14 RX ORDER — SODIUM CHLORIDE 9 MG/ML
INJECTION, SOLUTION INTRAVENOUS
Status: CANCELLED | OUTPATIENT
Start: 2017-07-14

## 2017-07-14 RX ORDER — MONTELUKAST SODIUM 10 MG/1
10 TABLET ORAL NIGHTLY
Status: DISCONTINUED | OUTPATIENT
Start: 2017-07-14 | End: 2017-07-15 | Stop reason: HOSPADM

## 2017-07-14 RX ADMIN — CARVEDILOL 6.25 MG: 6.25 TABLET, FILM COATED ORAL at 08:07

## 2017-07-14 RX ADMIN — ONDANSETRON 4 MG: 4 TABLET, ORALLY DISINTEGRATING ORAL at 08:07

## 2017-07-14 RX ADMIN — HYDRALAZINE HYDROCHLORIDE 100 MG: 50 TABLET ORAL at 05:07

## 2017-07-14 RX ADMIN — HEPARIN SODIUM 1000 UNITS: 1000 INJECTION, SOLUTION INTRAVENOUS; SUBCUTANEOUS at 11:07

## 2017-07-14 RX ADMIN — PANTOPRAZOLE SODIUM 40 MG: 40 TABLET, DELAYED RELEASE ORAL at 08:07

## 2017-07-14 RX ADMIN — LACTULOSE 20 G: 20 SOLUTION ORAL at 09:07

## 2017-07-14 RX ADMIN — RIFAXIMIN 550 MG: 550 TABLET ORAL at 08:07

## 2017-07-14 RX ADMIN — CARVEDILOL 6.25 MG: 6.25 TABLET, FILM COATED ORAL at 09:07

## 2017-07-14 RX ADMIN — MONTELUKAST SODIUM 10 MG: 10 TABLET, FILM COATED ORAL at 09:07

## 2017-07-14 RX ADMIN — CLONIDINE HYDROCHLORIDE 0.1 MG: 0.1 TABLET ORAL at 09:07

## 2017-07-14 RX ADMIN — CLONIDINE HYDROCHLORIDE 0.1 MG: 0.1 TABLET ORAL at 05:07

## 2017-07-14 RX ADMIN — Medication 5 UNITS: at 09:07

## 2017-07-14 RX ADMIN — SODIUM CHLORIDE: 0.9 INJECTION, SOLUTION INTRAVENOUS at 09:07

## 2017-07-14 RX ADMIN — TORSEMIDE 60 MG: 20 TABLET ORAL at 08:07

## 2017-07-14 RX ADMIN — RIFAXIMIN 550 MG: 550 TABLET ORAL at 09:07

## 2017-07-14 RX ADMIN — TACROLIMUS 0.5 MG: 0.5 CAPSULE, GELATIN COATED ORAL at 08:07

## 2017-07-14 RX ADMIN — DOXAZOSIN MESYLATE 8 MG: 8 TABLET ORAL at 09:07

## 2017-07-14 RX ADMIN — HYDRALAZINE HYDROCHLORIDE 100 MG: 50 TABLET ORAL at 01:07

## 2017-07-14 RX ADMIN — HYDRALAZINE HYDROCHLORIDE 100 MG: 50 TABLET ORAL at 09:07

## 2017-07-14 RX ADMIN — ALLOPURINOL 100 MG: 100 TABLET ORAL at 09:07

## 2017-07-14 RX ADMIN — CLONIDINE HYDROCHLORIDE 0.1 MG: 0.1 TABLET ORAL at 01:07

## 2017-07-14 RX ADMIN — MINOXIDIL 5 MG: 2.5 TABLET ORAL at 09:07

## 2017-07-14 NOTE — PROGRESS NOTES
I personally saw and examined the patient on hemodialysis, tolerating treatment, see hemodyalisis flowsheet. I also reviewed the chart and current medication. The dialysis bath was adjusted.   This is his second treatment today.  The patient has an AV fistula in his LUE with good thrill, will discuss the time of use with Dr. William.

## 2017-07-14 NOTE — PLAN OF CARE
12:01 PM. SW went to see pt in pts room. Pt mango in dialysis. SW left list for [facility] facilities as well as [facility] facilities for pt to receive Dialysis at upon discharge. SW to follow up with pt of choice of facility, will then begin referral process.      ULYSSES Sloan, NEYDA  [id number]

## 2017-07-14 NOTE — ASSESSMENT & PLAN NOTE
Patient is s/p permcath placement earlier today and has been admitted for initiation of dialysis, he tolerated procedure of perm cath, there is some bleeding around the site, bit probably not anything unusual, he has been cleared by surgery to use the catheter.    S/p one session of dialysis yesterday and tolerated well, bleeding around catheter site seems to have stopped    Plan/Recommendions  1) Second session of dialysis today, follow daily labs, third session tomorrow  2) please consult social work to ensure patient has a dialysis chair on discharge, if there are no complications tomorrow and after his third session he will be ready for discharge

## 2017-07-14 NOTE — PROGRESS NOTES
[facility]  Vascular Surgery  Progress Note    Patient Name: Philip Mathis III  MRN: [id number]  Admission Date: 7/13/2017  Primary Care Provider: Donnie Owens NP    Subjective:     Interval History: NAEON. No more bleeidng from permacath. Tolerated dialysis without any issues yesterday. No complaints this morning. VSS.     Post-Op Info:  Procedure(s) (LRB):  INSERTION-CATHETER-PERM-A-CATH (Right)   1 Day Post-Op       Medications:  Continuous Infusions:   Scheduled Meds:   sodium chloride 0.9%   Intravenous Once    sodium chloride 0.9%   Intravenous Once    carvedilol  6.25 mg Oral BID    cloNIDine  0.1 mg Oral TID    doxazosin  8 mg Oral QHS    hydrALAZINE  100 mg Oral TID    pantoprazole  40 mg Oral Daily    rifAXIMin  550 mg Oral BID    tacrolimus  0.5 mg Oral Daily    torsemide  60 mg Oral Daily     PRN Meds:dextrose 50%, dextrose 50%, glucagon (human recombinant), glucose, glucose, heparin (porcine), hydrocodone-acetaminophen 5-325mg, insulin aspart, labetalol, ondansetron, ondansetron, sodium chloride 0.9%     Objective:     Vital Signs (Most Recent):  Temp: 97.6 °F (36.4 °C) (07/14/17 0900)  Pulse: 70 (07/14/17 1030)  Resp: 18 (07/14/17 1030)  BP: (!) 150/66 (07/14/17 1030)  SpO2: (!) 93 % (07/14/17 0735) Vital Signs (24h Range):  Temp:  [97.6 °F (36.4 °C)-98.9 °F (37.2 °C)] 97.6 °F (36.4 °C)  Pulse:  [60-78] 70  Resp:  [16-18] 18  SpO2:  [92 %-95 %] 93 %  BP: (122-188)/(44-81) 150/66       Date 07/14/17 0700 - 07/15/17 0659   Shift 7950-8243 2411-7453 5109-5771 24 Hour Total   I  N  T  A  K  E   Shift Total  (mL/kg)       O  U  T  P  U  T   Urine  (mL/kg/hr) 150   150    Shift Total  (mL/kg) 150  (1.7)   150  (1.7)   Weight (kg) 88.5 88.5 88.5 88.5       Physical Exam   WD. WN. NAD  R permacath in place. No bleeding.   Unlabored breathing.  Previous abdominal incision well healed. ND  Ext: LUE with good thrill. 2+ radial pulse bilaterally.     Significant Labs:  CBC:   Recent  Labs  Lab 07/14/17  0453   WBC 5.33   RBC 3.44*   HGB 9.1*   HCT 28.7*   *   MCV 83   MCH 26.5*   MCHC 31.7*     CMP:   Recent Labs  Lab 07/13/17  1435 07/14/17  0453   * 127*   CALCIUM 8.0* 8.0*   ALBUMIN 3.3*  --     141   K 4.8 4.6   CO2 21* 24    109   BUN 72* 53*   CREATININE 4.0* 3.3*       Significant Diagnostics:  CXR reviewed: Vascular catheter has been placed on the right with its tip in superior vena cava.  Heart size is enlarged and there may be mild pulmonary vascular congestion present.    Assessment/Plan:     CKD (chronic kidney disease), stage V    72 y.o. male with a h/o stage IV CKD, DM I, HTN, s/p liver transplant/EtOH cirrhosis - maintained on Prograf, h/o diastolic heart failure, sleep apnea  S/p  11/8/16: L 1st stage L brachial-basilic AVF   6/15/17: L BVT AVF (brachial AVF was ligated)  Now S/p R permacath placement, POD1  - tolerated 1st dialysis treatment yesterday. Planned for another this AM  - no more bleeding.   - tolerating a diet, no nausea/vomiting  - VSS. Afebrile   - would recommend hepatology consult as patient is s/p liver transplant and currently in the work up for combined liver/kidney transplant  - will follow up with Dr. William in 3-4 weeks with VAS US hemodialysis (quantatative) of LUE to plan out basilic vein transposition (2nd stage)   - will sign off, please call with questions             Allan Head MD  Vascular Surgery  [facility]

## 2017-07-14 NOTE — SUBJECTIVE & OBJECTIVE
Medications:  Continuous Infusions:   Scheduled Meds:   sodium chloride 0.9%   Intravenous Once    sodium chloride 0.9%   Intravenous Once    carvedilol  6.25 mg Oral BID    cloNIDine  0.1 mg Oral TID    doxazosin  8 mg Oral QHS    hydrALAZINE  100 mg Oral TID    pantoprazole  40 mg Oral Daily    rifAXIMin  550 mg Oral BID    tacrolimus  0.5 mg Oral Daily    torsemide  60 mg Oral Daily     PRN Meds:dextrose 50%, dextrose 50%, glucagon (human recombinant), glucose, glucose, heparin (porcine), hydrocodone-acetaminophen 5-325mg, insulin aspart, labetalol, ondansetron, ondansetron, sodium chloride 0.9%     Objective:     Vital Signs (Most Recent):  Temp: 97.6 °F (36.4 °C) (07/14/17 0900)  Pulse: 70 (07/14/17 1030)  Resp: 18 (07/14/17 1030)  BP: (!) 150/66 (07/14/17 1030)  SpO2: (!) 93 % (07/14/17 0735) Vital Signs (24h Range):  Temp:  [97.6 °F (36.4 °C)-98.9 °F (37.2 °C)] 97.6 °F (36.4 °C)  Pulse:  [60-78] 70  Resp:  [16-18] 18  SpO2:  [92 %-95 %] 93 %  BP: (122-188)/(44-81) 150/66       Date 07/14/17 0700 - 07/15/17 0659   Shift 4522-9941 9703-2956 7658-2162 24 Hour Total   I  N  T  A  K  E   Shift Total  (mL/kg)       O  U  T  P  U  T   Urine  (mL/kg/hr) 150   150    Shift Total  (mL/kg) 150  (1.7)   150  (1.7)   Weight (kg) 88.5 88.5 88.5 88.5       Physical Exam   WD. WN. NAD  R permacath in place. No bleeding.   Unlabored breathing.  Previous abdominal incision well healed. ND  Ext: LUE with good thrill. 2+ radial pulse bilaterally.     Significant Labs:  CBC:   Recent Labs  Lab 07/14/17  0453   WBC 5.33   RBC 3.44*   HGB 9.1*   HCT 28.7*   *   MCV 83   MCH 26.5*   MCHC 31.7*     CMP:   Recent Labs  Lab 07/13/17  1435 07/14/17  0453   * 127*   CALCIUM 8.0* 8.0*   ALBUMIN 3.3*  --     141   K 4.8 4.6   CO2 21* 24    109   BUN 72* 53*   CREATININE 4.0* 3.3*       Significant Diagnostics:  CXR reviewed: Vascular catheter has been placed on the right with its tip in superior vena  cava.  Heart size is enlarged and there may be mild pulmonary vascular congestion present.

## 2017-07-14 NOTE — PLAN OF CARE
Problem: Patient Care Overview  Goal: Plan of Care Review  Outcome: Ongoing (interventions implemented as appropriate)  Pt is progressing with plan of care. Frequent rounds made to assess pain and safety. Bed locked and at lowest position. Side rails up x 2. Call light within reach. Will continue to monitor.

## 2017-07-14 NOTE — PLAN OF CARE
07/14/17 1631   Discharge Assessment   Assessment Type Discharge Planning Assessment   Confirmed/corrected address and phone number on facesheet? Yes   Assessment information obtained from? Patient   Expected Length of Stay (days) 3   Prior to hospitilization cognitive status: Alert/Oriented   Prior to hospitalization functional status: Independent   Current cognitive status: Alert/Oriented   Current Functional Status: Independent   Arrived From admitted as an inpatient  (arrived from home)   Lives With spouse   Able to Return to Prior Arrangements yes   Is patient able to care for self after discharge? Yes   Does the patient have family/friends to help with healtcare needs after discharge? yes   How many people do you have in your home that can help with your care after discharge? 1   Who are your caregiver(s) and their phone number(s)? spouse, Merlene, [phone], cell, [phone]   Patient's perception of discharge disposition home or selfcare   Readmission Within The Last 30 Days no previous admission in last 30 days   If YES, was patient admitted for the same reason? No   Patient currently being followed by outpatient case management? No   Patient currently receives home health services? No   Does the patient currently use HME? No   Patient currently receives private duty nursing? N/A   Patient currently receives any other outside agency services? No   Is it the patient/care giver preference to resume care with the current outside agency? No   Equipment Currently Used at Home none   Do you have any problems affording any of your prescribed medications? No   Is the patient taking medications as prescribed? yes   Do you have any financial concerns preventing you from receiving the healthcare you need? No   Does the patient have transportation to healthcare appointments? Yes   Transportation Available family or friend will provide   On Dialysis? (pt states that he will be just starting dialysis.  He has chosen  [facility], for outpatient dialysis.   He will be receiving his 3rd dialysis tomorrow. )   Does the patient receive services at the [facility]mad[facility] Clinic? No   Are there any open cases? No   Discharge Plan A Home   Discharge Plan B Home with family   Patient/Family In Agreement With Plan yes     Payor: HU[employer] MANAGED MEDICARE / Plan: [employer] HMO SNP / Product Type: Medicare Advantage /   Extended Emergency Contact Information  Primary Emergency Contact: Luis Mathis  Address: [address] [place] [address]           [place] [address] [place]  Mobile Phone: [phone]  Relation: Spouse    [facility] - [place], [address] AT [facility] & [address]  [facility] [address]  Phone: [phone] Fax: [phone]    [facility] [address] [address]  Phone: [phone] Fax: [phone]    [facility] - [address]  [address] [facility] [address]  Phone: [phone] Fax: [phone]    Future Appointments  Date Time Provider Department Center   7/15/2017 7:00 AM DIALYSIS, RENÉ MORALES [facility] DLYS [facility]   7/20/2017 6:15 AM 3PREP, RENÉ MORALES [facility] SSCU [facility]   7/21/2017 7:15 AM LAB, [facility]   7/24/2017 8:45 AM EPO, INJECTION [facility] ID INF [facility]   8/14/2017 10:30 AM [facility] [place]   9/11/2017 10:15 AM [facility] US 11 ALL [facility] ULSOUND [facility]   9/18/2017 10:30 AM Danay Nath MD [facility] LIVERTX René Owens, SABRINA  [address] / [place] [address]

## 2017-07-14 NOTE — ASSESSMENT & PLAN NOTE
S/p R permacath placement, POD1  - tolerated 1st dialysis treatment yesterday. Planned for another this AM  - no more bleeding.   - tolerating a diet, no nausea/vomiting  - VSS. Afebrile   - would recommend hepatology consult as patient is s/p liver transplant and currently in the work up for combined liver/kidney transplant  - will follow up with Dr. William in 3-4 weeks with VAS US hemodialysis of [place] to plan out basilic vein transposition

## 2017-07-14 NOTE — PLAN OF CARE
Problem: Hemodialysis (Adult)  Goal: Signs and Symptoms of Listed Potential Problems Will be Absent, Minimized or Managed (Hemodialysis)  Signs and symptoms of listed potential problems will be absent, minimized or managed by discharge/transition of care (reference Hemodialysis (Adult) CPG).   Outcome: Ongoing (interventions implemented as appropriate)  HD treatment complete. Duration of treatment 2.5 hours and 500cc removed. Treatment was tolerated well and no complications with access right chest wall catheter. Catheter flushed with Ns and locked with heparin. Capped and tapped.

## 2017-07-14 NOTE — PLAN OF CARE
Problem: Patient Care Overview  Goal: Plan of Care Review  Outcome: Ongoing (interventions implemented as appropriate)  Patient remains free from falls and injuries through out shift. Patient AAO and VSS. BG mananged through meals and insulin. BP managed with medication. Plan of care reviewed with patient. Patient verbalizes understanding of plan.  Will continue to monitor.

## 2017-07-14 NOTE — PROGRESS NOTES
2nd HD treatment started. No complications with access to right chest wall catheter. Lines secured and telemetry in place. No complaints of discomfort at this time.

## 2017-07-14 NOTE — PROGRESS NOTES
Chart reviewed.  Patient currently inpatient and being followed by [facility].  All resources/referrals are in place.  Case closed. Patient to be re-referred to [facility] if appropriate.  OP[facility] RN notified of case closure via Epic mail.

## 2017-07-14 NOTE — NURSING TRANSFER
Nursing Transfer Note      7/14/2017     Transfer from [address] to dialysis      Transfer via wheelchair     Transfer with tele    Transported by staff    Medicines sent: none    Chart send with patient: no    Notified: none    Patient reassessed at: 7/14/17 at 0815

## 2017-07-14 NOTE — HPI
Patient is a 72 y.o. male with a h/o stage IV CKD, DM I, HTN, s/p liver transplant/EtOH cirrhosis - maintained on Prograf, h/o diastolic heart failure, sleep apnea (not compliant w CPAP as he feels claustrophobic) who is here today for permacath placement

## 2017-07-14 NOTE — SUBJECTIVE & OBJECTIVE
Interval History: feeling good this morning, seen dialysis, second session he is tolerating well, bleeding around the catheter that was present immediately following placement seems to have resolved    Review of patient's allergies indicates:   Allergen Reactions    Corticosteroids (glucocorticoids) Other (See Comments)     Leg swelling    Hydrocortisone      Leg swelling    Neuromuscular blockers, steroidal      Leg swelling    Ribavirin      Intolerance, arthralgias     Current Facility-Administered Medications   Medication Frequency    0.9%  NaCl infusion Once    0.9%  NaCl infusion Once    carvedilol tablet 6.25 mg BID    cloNIDine tablet 0.1 mg TID    dextrose 50% injection 12.5 g PRN    dextrose 50% injection 25 g PRN    doxazosin tablet 8 mg QHS    glucagon (human recombinant) injection 1 mg PRN    glucose chewable tablet 16 g PRN    glucose chewable tablet 24 g PRN    heparin (porcine) injection 1,000 Units PRN    hydrALAZINE tablet 100 mg TID    hydrocodone-acetaminophen 5-325mg per tablet 1 tablet Q4H PRN    insulin aspart pen 1-10 Units QID (AC + HS) PRN    labetalol injection 10 mg Q6H PRN    ondansetron disintegrating tablet 4 mg Q6H PRN    ondansetron injection 4 mg Q8H PRN    pantoprazole EC tablet 40 mg Daily    rifAXIMin tablet 550 mg BID    sodium chloride 0.9% flush 3 mL PRN    tacrolimus capsule 0.5 mg Daily    torsemide tablet 60 mg Daily       Objective:     Vital Signs (Most Recent):  Temp: 97.6 °F (36.4 °C) (07/14/17 0900)  Pulse: 64 (07/14/17 1015)  Resp: 18 (07/14/17 1015)  BP: (!) 133/55 (07/14/17 1015)  SpO2: (!) 93 % (07/14/17 0735)  O2 Device (Oxygen Therapy): room air (07/14/17 0735) Vital Signs (24h Range):  Temp:  [97.6 °F (36.4 °C)-98.9 °F (37.2 °C)] 97.6 °F (36.4 °C)  Pulse:  [58-78] 64  Resp:  [16-18] 18  SpO2:  [92 %-95 %] 93 %  BP: (122-188)/(44-81) 133/55     Weight: 88.5 kg (195 lb) (07/13/17 0546)  Body mass index is 25.04 kg/m².  Body surface area  is 2.15 meters squared.    I/O last 3 completed shifts:  In: 600 [I.V.:100; Other:500]  Out: 1400 [Urine:400; Other:1000]    Physical Exam   Constitutional: He is oriented to person, place, and time. He appears well-developed.   HENT:   Head: Normocephalic and atraumatic.   Pulmonary/Chest: Effort normal.   Neurological: He is alert and oriented to person, place, and time.   Skin: Skin is warm.   Psychiatric: He has a normal mood and affect. His behavior is normal.       Significant Labs:  CBC:   Recent Labs  Lab 07/14/17  0453   WBC 5.33   RBC 3.44*   HGB 9.1*   HCT 28.7*   *   MCV 83   MCH 26.5*   MCHC 31.7*     CMP:   Recent Labs  Lab 07/13/17  1435 07/14/17  0453   * 127*   CALCIUM 8.0* 8.0*   ALBUMIN 3.3*  --     141   K 4.8 4.6   CO2 21* 24    109   BUN 72* 53*   CREATININE 4.0* 3.3*     All labs within the past 24 hours have been reviewed.

## 2017-07-14 NOTE — PROGRESS NOTES
[facility]  Nephrology  Progress Note    Patient Name: Philip Mathis III  MRN: [id number]  Admission Date: 7/13/2017  Hospital Length of Stay: 0 days  Attending Provider: Asia Galindo MD   Primary Care Physician: Donnie Owens NP  Principal Problem:<principal problem not specified>    Subjective:     HPI: 71 yo WM CKD (stage V), followed by Dr Amezcua, was anticipated to start dialysis several months ago and fistula placed, but needed a subsequent procedure to move closer to the surface, he then became increasingly weak, with nausea and generalized malaise and perm cath placement was decided on to be able to start dialysis earlier and while fistula continued to mature.  Patient today has had fistula placement and tolerated procedure, he is admitted for initiation of dialysis.    He is also a liver transplant patient. He was last seen in Nephrology clinic in June his most recent labs notable for Cr 4.9, BUN 85, Bicarb 20, K 5.2; Hgb 8.7, iron sat 13, ferritin 107, he has in the past received both  epo and iron infusion.    I see him in his bed after placement of permcath, he is Aox3 and feeling nauseated, this is a recurrent thing as his renal function has worsened, he usually takes PRN zofran, he is not complaining of SOB, orthopnea or PND.    Interval History: feeling good this morning, seen dialysis, second session he is tolerating well, bleeding around the catheter that was present immediately following placement seems to have resolved    Review of patient's allergies indicates:   Allergen Reactions    Corticosteroids (glucocorticoids) Other (See Comments)     Leg swelling    Hydrocortisone      Leg swelling    Neuromuscular blockers, steroidal      Leg swelling    Ribavirin      Intolerance, arthralgias     Current Facility-Administered Medications   Medication Frequency    0.9%  NaCl infusion Once    0.9%  NaCl infusion Once    carvedilol tablet 6.25 mg BID    cloNIDine tablet 0.1  mg TID    dextrose 50% injection 12.5 g PRN    dextrose 50% injection 25 g PRN    doxazosin tablet 8 mg QHS    glucagon (human recombinant) injection 1 mg PRN    glucose chewable tablet 16 g PRN    glucose chewable tablet 24 g PRN    heparin (porcine) injection 1,000 Units PRN    hydrALAZINE tablet 100 mg TID    hydrocodone-acetaminophen 5-325mg per tablet 1 tablet Q4H PRN    insulin aspart pen 1-10 Units QID (AC + HS) PRN    labetalol injection 10 mg Q6H PRN    ondansetron disintegrating tablet 4 mg Q6H PRN    ondansetron injection 4 mg Q8H PRN    pantoprazole EC tablet 40 mg Daily    rifAXIMin tablet 550 mg BID    sodium chloride 0.9% flush 3 mL PRN    tacrolimus capsule 0.5 mg Daily    torsemide tablet 60 mg Daily       Objective:     Vital Signs (Most Recent):  Temp: 97.6 °F (36.4 °C) (07/14/17 0900)  Pulse: 64 (07/14/17 1015)  Resp: 18 (07/14/17 1015)  BP: (!) 133/55 (07/14/17 1015)  SpO2: (!) 93 % (07/14/17 0735)  O2 Device (Oxygen Therapy): room air (07/14/17 0735) Vital Signs (24h Range):  Temp:  [97.6 °F (36.4 °C)-98.9 °F (37.2 °C)] 97.6 °F (36.4 °C)  Pulse:  [58-78] 64  Resp:  [16-18] 18  SpO2:  [92 %-95 %] 93 %  BP: (122-188)/(44-81) 133/55     Weight: 88.5 kg (195 lb) (07/13/17 0546)  Body mass index is 25.04 kg/m².  Body surface area is 2.15 meters squared.    I/O last 3 completed shifts:  In: 600 [I.V.:100; Other:500]  Out: 1400 [Urine:400; Other:1000]    Physical Exam   Constitutional: He is oriented to person, place, and time. He appears well-developed.   HENT:   Head: Normocephalic and atraumatic.   Pulmonary/Chest: Effort normal.   Neurological: He is alert and oriented to person, place, and time.   Skin: Skin is warm.   Psychiatric: He has a normal mood and affect. His behavior is normal.       Significant Labs:  CBC:   Recent Labs  Lab 07/14/17  0453   WBC 5.33   RBC 3.44*   HGB 9.1*   HCT 28.7*   *   MCV 83   MCH 26.5*   MCHC 31.7*     CMP:   Recent Labs  Lab  07/13/17  1435 07/14/17  0453   * 127*   CALCIUM 8.0* 8.0*   ALBUMIN 3.3*  --     141   K 4.8 4.6   CO2 21* 24    109   BUN 72* 53*   CREATININE 4.0* 3.3*     All labs within the past 24 hours have been reviewed.       Assessment/Plan:     CKD (chronic kidney disease), stage V    Patient is s/p permcath placement earlier today and has been admitted for initiation of dialysis, he tolerated procedure of perm cath, there is some bleeding around the site, bit probably not anything unusual, he has been cleared by surgery to use the catheter.    S/p one session of dialysis yesterday and tolerated well, bleeding around catheter site seems to have stopped    Plan/Recommendions  1) Second session of dialysis today, follow daily labs, third session tomorrow  2) please consult social work to ensure patient has a dialysis chair on discharge, if there are no complications tomorrow and after his third session he will be ready for discharge            Thank you for your consult. I will follow-up with patient. Please contact us if you have any additional questions.    Kyler De La Cruz MD  Nephrology  [facility]

## 2017-07-15 VITALS
HEART RATE: 63 BPM | BODY MASS INDEX: 25.03 KG/M2 | TEMPERATURE: 99 F | HEIGHT: 74 IN | OXYGEN SATURATION: 96 % | WEIGHT: 195 LBS | DIASTOLIC BLOOD PRESSURE: 77 MMHG | SYSTOLIC BLOOD PRESSURE: 175 MMHG | RESPIRATION RATE: 18 BRPM

## 2017-07-15 LAB
ALBUMIN SERPL BCP-MCNC: 3 G/DL
ALP SERPL-CCNC: 55 U/L
ALT SERPL W/O P-5'-P-CCNC: <5 U/L
ANION GAP SERPL CALC-SCNC: 8 MMOL/L
AST SERPL-CCNC: 12 U/L
BASOPHILS # BLD AUTO: 0.04 K/UL
BASOPHILS NFR BLD: 0.8 %
BILIRUB SERPL-MCNC: 0.4 MG/DL
BUN SERPL-MCNC: 38 MG/DL
CALCIUM SERPL-MCNC: 7.9 MG/DL
CHLORIDE SERPL-SCNC: 109 MMOL/L
CO2 SERPL-SCNC: 25 MMOL/L
CREAT SERPL-MCNC: 2.9 MG/DL
DIFFERENTIAL METHOD: ABNORMAL
EOSINOPHIL # BLD AUTO: 0.4 K/UL
EOSINOPHIL NFR BLD: 7 %
ERYTHROCYTE [DISTWIDTH] IN BLOOD BY AUTOMATED COUNT: 18.2 %
EST. GFR  (AFRICAN AMERICAN): 23.7 ML/MIN/1.73 M^2
EST. GFR  (NON AFRICAN AMERICAN): 20.5 ML/MIN/1.73 M^2
ESTIMATED AVG GLUCOSE: 105 MG/DL
GLUCOSE SERPL-MCNC: 126 MG/DL
HBA1C MFR BLD HPLC: 5.3 %
HCT VFR BLD AUTO: 27.7 %
HGB BLD-MCNC: 8.6 G/DL
INR PPP: 1.3
LYMPHOCYTES # BLD AUTO: 1.3 K/UL
LYMPHOCYTES NFR BLD: 23.6 %
MAGNESIUM SERPL-MCNC: 1.8 MG/DL
MCH RBC QN AUTO: 26.1 PG
MCHC RBC AUTO-ENTMCNC: 31 %
MCV RBC AUTO: 84 FL
MONOCYTES # BLD AUTO: 0.7 K/UL
MONOCYTES NFR BLD: 13.2 %
NEUTROPHILS # BLD AUTO: 2.9 K/UL
NEUTROPHILS NFR BLD: 55.2 %
PHOSPHATE SERPL-MCNC: 3.8 MG/DL
PLATELET # BLD AUTO: 81 K/UL
PLATELET BLD QL SMEAR: ABNORMAL
PMV BLD AUTO: 10.3 FL
POCT GLUCOSE: 168 MG/DL (ref 70–110)
POTASSIUM SERPL-SCNC: 4.1 MMOL/L
PROT SERPL-MCNC: 6.6 G/DL
PROTHROMBIN TIME: 13.5 SEC
RBC # BLD AUTO: 3.29 M/UL
SODIUM SERPL-SCNC: 142 MMOL/L
TACROLIMUS BLD-MCNC: <1.5 NG/ML
WBC # BLD AUTO: 5.29 K/UL

## 2017-07-15 PROCEDURE — 90935 HEMODIALYSIS ONE EVALUATION: CPT | Mod: NTX

## 2017-07-15 PROCEDURE — 84100 ASSAY OF PHOSPHORUS: CPT | Mod: NTX

## 2017-07-15 PROCEDURE — 25000003 PHARM REV CODE 250: Mod: NTX | Performed by: STUDENT IN AN ORGANIZED HEALTH CARE EDUCATION/TRAINING PROGRAM

## 2017-07-15 PROCEDURE — 85025 COMPLETE CBC W/AUTO DIFF WBC: CPT | Mod: NTX

## 2017-07-15 PROCEDURE — 25000003 PHARM REV CODE 250: Mod: NTX | Performed by: INTERNAL MEDICINE

## 2017-07-15 PROCEDURE — 25000003 PHARM REV CODE 250: Mod: NTX | Performed by: HOSPITALIST

## 2017-07-15 PROCEDURE — 85610 PROTHROMBIN TIME: CPT | Mod: NTX

## 2017-07-15 PROCEDURE — 83735 ASSAY OF MAGNESIUM: CPT | Mod: NTX

## 2017-07-15 PROCEDURE — 83036 HEMOGLOBIN GLYCOSYLATED A1C: CPT | Mod: NTX

## 2017-07-15 PROCEDURE — 80197 ASSAY OF TACROLIMUS: CPT | Mod: NTX

## 2017-07-15 PROCEDURE — 99239 HOSP IP/OBS DSCHRG MGMT >30: CPT | Mod: NTX,,, | Performed by: HOSPITALIST

## 2017-07-15 PROCEDURE — 80053 COMPREHEN METABOLIC PANEL: CPT | Mod: NTX

## 2017-07-15 PROCEDURE — 36415 COLL VENOUS BLD VENIPUNCTURE: CPT | Mod: NTX

## 2017-07-15 RX ORDER — SODIUM CHLORIDE 9 MG/ML
INJECTION, SOLUTION INTRAVENOUS ONCE
Status: COMPLETED | OUTPATIENT
Start: 2017-07-15 | End: 2017-07-15

## 2017-07-15 RX ORDER — LACTULOSE 10 G/15ML
SOLUTION ORAL; RECTAL
Qty: 2700 ML | Refills: 0
Start: 2017-07-15 | End: 2017-11-06 | Stop reason: SDUPTHER

## 2017-07-15 RX ADMIN — HYDRALAZINE HYDROCHLORIDE 100 MG: 50 TABLET ORAL at 02:07

## 2017-07-15 RX ADMIN — MINOXIDIL 5 MG: 2.5 TABLET ORAL at 12:07

## 2017-07-15 RX ADMIN — SODIUM CHLORIDE: 0.9 INJECTION, SOLUTION INTRAVENOUS at 08:07

## 2017-07-15 RX ADMIN — HYDRALAZINE HYDROCHLORIDE 100 MG: 50 TABLET ORAL at 05:07

## 2017-07-15 RX ADMIN — CLONIDINE HYDROCHLORIDE 0.1 MG: 0.1 TABLET ORAL at 02:07

## 2017-07-15 RX ADMIN — TACROLIMUS 0.5 MG: 0.5 CAPSULE, GELATIN COATED ORAL at 12:07

## 2017-07-15 RX ADMIN — HEPARIN SODIUM 1000 UNITS: 1000 INJECTION, SOLUTION INTRAVENOUS; SUBCUTANEOUS at 12:07

## 2017-07-15 RX ADMIN — PANTOPRAZOLE SODIUM 40 MG: 40 TABLET, DELAYED RELEASE ORAL at 12:07

## 2017-07-15 RX ADMIN — TORSEMIDE 60 MG: 20 TABLET ORAL at 12:07

## 2017-07-15 RX ADMIN — CLONIDINE HYDROCHLORIDE 0.1 MG: 0.1 TABLET ORAL at 05:07

## 2017-07-15 RX ADMIN — CARVEDILOL 6.25 MG: 6.25 TABLET, FILM COATED ORAL at 12:07

## 2017-07-15 RX ADMIN — RIFAXIMIN 550 MG: 550 TABLET ORAL at 12:07

## 2017-07-15 NOTE — ASSESSMENT & PLAN NOTE
S/p permcath placement yesterday  Underwent HD x2 so far  SW working on getting dialysis chair  nephro following

## 2017-07-15 NOTE — SUBJECTIVE & OBJECTIVE
Interval History: No AEON. Pt transferred to hospital medicine service to begin initiation of HD. Had permcath placed today    Review of Systems   Constitutional: Negative for chills and fever.   HENT: Negative.    Eyes: Negative.    Respiratory: Negative for chest tightness and shortness of breath.    Cardiovascular: Negative for chest pain and leg swelling.   Gastrointestinal: Negative.  Negative for abdominal pain, constipation and diarrhea.   Endocrine: Negative.    Skin: Negative for color change and pallor.   Neurological: Positive for weakness. Negative for dizziness.   Psychiatric/Behavioral: Negative.  Negative for agitation and behavioral problems.     Objective:     Vital Signs (Most Recent):  Temp: 98.1 °F (36.7 °C) (07/14/17 1946)  Pulse: 72 (07/14/17 1946)  Resp: 17 (07/14/17 1946)  BP: (!) 155/70 (07/14/17 1946)  SpO2: (!) 94 % (07/14/17 1946) Vital Signs (24h Range):  Temp:  [97.6 °F (36.4 °C)-99.2 °F (37.3 °C)] 98.1 °F (36.7 °C)  Pulse:  [60-79] 72  Resp:  [16-18] 17  SpO2:  [92 %-94 %] 94 %  BP: (122-155)/(44-70) 155/70     Weight: 88.5 kg (195 lb)  Body mass index is 25.04 kg/m².    Intake/Output Summary (Last 24 hours) at 07/14/17 2227  Last data filed at 07/14/17 1456   Gross per 24 hour   Intake             1000 ml   Output             1550 ml   Net             -550 ml      Physical Exam   Constitutional: He is oriented to person, place, and time. He appears well-developed. No distress.   HENT:   Head: Normocephalic and atraumatic.   Eyes: Conjunctivae are normal. Right eye exhibits no discharge. Left eye exhibits no discharge. No scleral icterus.   Neck: Normal range of motion. Neck supple.   Cardiovascular: Normal rate and regular rhythm.  Exam reveals no friction rub.    Perm cath in place   Pulmonary/Chest: Effort normal and breath sounds normal.   Abdominal: Soft. Bowel sounds are normal. He exhibits distension. There is no tenderness.   Musculoskeletal: Normal range of motion. He exhibits  no edema.   Neurological: He is alert and oriented to person, place, and time.   Skin: Skin is warm and dry.   Psychiatric: He has a normal mood and affect. His behavior is normal.   Nursing note and vitals reviewed.      Significant Labs:   CBC:   Recent Labs  Lab 07/14/17  0453   WBC 5.33   HGB 9.1*   HCT 28.7*   *     CMP:   Recent Labs  Lab 07/13/17  1435 07/14/17  0453    141   K 4.8 4.6    109   CO2 21* 24   * 127*   BUN 72* 53*   CREATININE 4.0* 3.3*   CALCIUM 8.0* 8.0*   ALBUMIN 3.3*  --    ANIONGAP 12 8   EGFRNONAA 13.9* 17.6*     All pertinent labs within the past 24 hours have been reviewed.    Significant Imaging: I have reviewed all pertinent imaging results/findings within the past 24 hours.

## 2017-07-15 NOTE — PLAN OF CARE
Problem: Patient Care Overview  Goal: Plan of Care Review  Outcome: Outcome(s) achieved Date Met: 07/15/17  Pt resting in bed, up ad thai. Tolerating diet. Awaiting d/c. Patient states adequate pain control with current pain med reg. States understanding of plan of care. Denies needs at present. Safety and fall precautions maintained. Will monitor.

## 2017-07-15 NOTE — PROGRESS NOTES
Patient arrived VIA stretcher and was transferred to bed without complication.  CVC was then prepped and accessed per protocol.  Push/pull test performed and met nor resistance.  Maintenance HD was then initiated.

## 2017-07-15 NOTE — NURSING
Pt d/c in stable condition. Tunnel cath intact. IV removed, tip intact. Pt stated he did not need lactulose Rx. Instructions given, states understanding.

## 2017-07-15 NOTE — PLAN OF CARE
Problem: Patient Care Overview  Goal: Plan of Care Review  Outcome: Ongoing (interventions implemented as appropriate)  Hemodialysis is complete.  Blood was returned.  CVC dialysis catheter to RIJ was flushed, capped and Locked with 1000 U/ml Heparin.  HD time = 180 min.  UF = 500 ml.

## 2017-07-15 NOTE — PLAN OF CARE
Problem: Patient Care Overview  Goal: Plan of Care Review  Outcome: Ongoing (interventions implemented as appropriate)  POC reviewed with patient. Pt AAOx4, VSS, Afebrile, NSR on tele, SpO2>92% on rm air. Pt denies pain. R chest incision CDI, L arm incision CDI. Right IJ CDI. L AV fistula + Thrill, + Bruit. Pt tolerating a renal diet with no complaints of nausea/vomiting. Pt urinating small amounts per toilet. Oliguric/dialysis patient. Blood glucose checked ACHS, insulin given per orders. TB skin test placed on right forearm - to be read 7/16 @ 2200. Pt up ad thai, remains free from falls and injuries, will continue to monitor.

## 2017-07-15 NOTE — PROGRESS NOTES
[facility] Hospital Medicine  Progress Note    Patient Name: Philip Mathis III  MRN: [id number]  Patient Class: IP- Inpatient   Admission Date: 7/13/2017  Length of Stay: 0 days  Attending Physician: Asia Galindo MD  Primary Care Provider: Donnie Owens NP    [facility] Medicine Team: [facility] Asia Galindo MD    Subjective:     Principal Problem:ESRD (end stage renal disease) on dialysis    HPI:  72 y.o. male with a h/o stage IV CKD, DM I, HTN, s/p liver transplant/EtOH cirrhosis - maintained on Prograf, h/o diastolic heart failure, sleep apnea (not compliant w CPAP as he feels claustrophobic) admitted for permcath placement to initiate dialysis.     Hospital Course:  No notes on file    Interval History: No AEON. Pt transferred to hospital medicine service to begin initiation of HD. Had permcath placed yesterday    Review of Systems   Constitutional: Negative for chills and fever.   HENT: Negative.    Eyes: Negative.    Respiratory: Negative for chest tightness and shortness of breath.    Cardiovascular: Negative for chest pain and leg swelling.   Gastrointestinal: Negative.  Negative for abdominal pain, constipation and diarrhea.   Endocrine: Negative.    Skin: Negative for color change and pallor.   Neurological: Positive for weakness. Negative for dizziness.   Psychiatric/Behavioral: Negative.  Negative for agitation and behavioral problems.     Objective:     Vital Signs (Most Recent):  Temp: 98.1 °F (36.7 °C) (07/14/17 1946)  Pulse: 72 (07/14/17 1946)  Resp: 17 (07/14/17 1946)  BP: (!) 155/70 (07/14/17 1946)  SpO2: (!) 94 % (07/14/17 1946) Vital Signs (24h Range):  Temp:  [97.6 °F (36.4 °C)-99.2 °F (37.3 °C)] 98.1 °F (36.7 °C)  Pulse:  [60-79] 72  Resp:  [16-18] 17  SpO2:  [92 %-94 %] 94 %  BP: (122-155)/(44-70) 155/70     Weight: 88.5 kg (195 lb)  Body mass index is 25.04 kg/m².    Intake/Output Summary (Last 24 hours) at 07/14/17 1921  Last data filed at 07/14/17 9300   Gross  per 24 hour   Intake             1000 ml   Output             1550 ml   Net             -550 ml      Physical Exam   Constitutional: He is oriented to person, place, and time. He appears well-developed. No distress.   HENT:   Head: Normocephalic and atraumatic.   Eyes: Conjunctivae are normal. Right eye exhibits no discharge. Left eye exhibits no discharge. No scleral icterus.   Neck: Normal range of motion. Neck supple.   Cardiovascular: Normal rate and regular rhythm.  Exam reveals no friction rub.    Perm cath in place   Pulmonary/Chest: Effort normal and breath sounds normal.   Abdominal: Soft. Bowel sounds are normal. He exhibits distension. There is no tenderness.   Musculoskeletal: Normal range of motion. He exhibits no edema.   Neurological: He is alert and oriented to person, place, and time.   Skin: Skin is warm and dry.   Psychiatric: He has a normal mood and affect. His behavior is normal.   Nursing note and vitals reviewed.      Significant Labs:   CBC:   Recent Labs  Lab 07/14/17  0453   WBC 5.33   HGB 9.1*   HCT 28.7*   *     CMP:   Recent Labs  Lab 07/13/17  1435 07/14/17  0453    141   K 4.8 4.6    109   CO2 21* 24   * 127*   BUN 72* 53*   CREATININE 4.0* 3.3*   CALCIUM 8.0* 8.0*   ALBUMIN 3.3*  --    ANIONGAP 12 8   EGFRNONAA 13.9* 17.6*     All pertinent labs within the past 24 hours have been reviewed.    Significant Imaging: I have reviewed all pertinent imaging results/findings within the past 24 hours.    Assessment/Plan:      Anemia in ESRD (end-stage renal disease)    H/h stable   Monitor           GERD (gastroesophageal reflux disease)    PPI          Type 2 diabetes mellitus, controlled, with renal complications    Sliding scale insulin prn  a1c 5.5        Liver transplanted 6/10/2001    Continue home prograf  F/u level        Essential hypertension    Home meds           Thrombocytopenia    Monitor           * ESRD (end stage renal disease) on dialysis    S/p  permcath placement yesterday  Underwent HD x2 so far  SW working on getting dialysis chair  nephro following             VTE Risk Mitigation         Ordered     Medium Risk of VTE  Once      07/14/17 1345     Place TAMARA hose  Until discontinued      07/14/17 1220          Asia Galindo MD  Department of Hospital Medicine   [facility]

## 2017-07-15 NOTE — PLAN OF CARE
8:09 AM. After learning that [facility] is does not have pt on their list for next week as pt had insisted everything had previously been taken care of, SW began initial HD referral. Faxed [place] [facility] [phone] [phone]. Referral process must go through [facility] then to the facility's medical director for final approval.     Pt is to receive dialysis at [facility] p) [phone] (requesting a MWF schedule) while receiving teachings for home dialysis. Once classes and teachings complete for home dialysis pt will discontinue [facility] dialysis and resume with home dialysis.    Pt does not have a confirmed chair at [facility]. Hopeful for final approval by Monday 7/17/17 however there is no guarantee.     SW will provide hand off to the covering SW next week.      ULYSSES Sloan, NEYDA  [phone]

## 2017-07-15 NOTE — HPI
72 y.o. male with a h/o stage IV CKD, DM I, HTN, s/p liver transplant/EtOH cirrhosis - maintained on Prograf, h/o diastolic heart failure, sleep apnea (not compliant w CPAP as he feels claustrophobic) admitted for permcath placement to initiate dialysis.

## 2017-07-17 ENCOUNTER — OUTPATIENT CASE MANAGEMENT (OUTPATIENT)
Dept: ADMINISTRATIVE | Facility: OTHER | Age: 72
End: 2017-07-17

## 2017-07-17 LAB
HAV IGM SERPL QL IA: NEGATIVE
HBV CORE AB SERPL QL IA: POSITIVE
HBV SURFACE AB SER-ACNC: POSITIVE M[IU]/ML
HBV SURFACE AG SERPL QL IA: NEGATIVE

## 2017-07-17 NOTE — LETTER
July 17, 2017    Philip MORELAND Del III  [address] [place] [address]             [facility]  [address] [address] Dear  Del and Mrs. Mathis:     I have enclosed the education materials we discussed during our recent phone conversation.      If you have any questions or concerns, please don't hesitate to call. I can be reached at [phone].    Sincerely,        Raya Abernathy RN

## 2017-07-17 NOTE — PATIENT INSTRUCTIONS
Hemodialysis    The job of the kidneys is to remove waste and extra water from the body. The kidneys also balance levels of minerals in the body, called electrolytes. Kidneys are essential for the health of the body. People with severe kidney disease are not able to perform these functions. This may be due to a temporary or a permanent kidney condition. Hemodialysis is a treatment that takes over the essential functions of the kidney until you recover from kidney disease, or get a kidney transplant. If you have end stage renal disease and are not eligible for a transplant, you will need to have hemodialysis for the rest of your life. Peritoneal dialysis is another type of dialysis but is not covered in this article.  Hemodialysis requires access to a strong blood flow. There are 3 ways to do this.   · Dialysis catheter. This is a plastic tube put into a large blood vessel, usually for temporary access. If there is no other option, it may be used permanently.  · AV fistula. Through a minor surgical procedure, an artery in your arm is joined directly to a vein. This creates an arteriovenous fistula or AV fistula. The pressure of the arterial blood flow slowly expands the size of the attached vein. It may take up to 4 to 6 weeks for the fistula to enlarge enough to be ready for dialysis. The AV fistula is the access of choice. This is because there are typically fewer problems and side effects. It also lasts longer than the other options.  · AV graft. This is an artificial implant that joins an artery and vein in people with small veins. It can also be used when an AV fistula did not work. It can be used for dialysis a few weeks after the surgery.  A connecting tube carries blood from the access point in the body to the dialysis machine where special filters called dialyzers filter and process the blood. The blood is then returned to the body through a separate tube and needle. This takes 3 to 4 hours and is usually  done 3 times a week. Home dialysis is an option for some patients.  Hemodialysis is lifesaving for people with kidney failure, but there are possible side effects. These include infection, low blood pressure, bleeding, electrolyte imbalance, anemia, and heart disease.  Home care  The following guidelines will help you care for yourself at home:  · Take any medicines as directed.  · Follow the special diet for kidney failure that your healthcare provider gave you.  · Dont wear any clothing or jewelry that could put pressure on the access site.  · Dont lie on the access site while sleeping.  · If the access is in your arm, have blood pressure readings and blood samples taken from the other arm.  · Check the access site after dialysis for swelling, bleeding, or signs of infection.  · If you have an AV fistula or graft, check the site regularly to be sure you can always feel the vibration (called the thrill) of blood flowing from artery to vein. Dont put lotions or other products on the access site.  · If you have an external dialysis catheter, avoid physical activities that could pull on the catheter.  Follow-up care  Follow up with your healthcare provider, or as advised.  Call 911  Call 911 if any of these occur:  · External catheter starts bleeding or opens to air  · Severe weakness, dizziness, fainting, drowsiness, or confusion  · Chest pain or shortness of breath  When to seek medical advice  Call your healthcare provider right away if any of these occur:  · Color of the blood in the external tubing changes from bright red to dark red  · You dont feel the thrill (vibration) in an AV fistula or graft  · Nausea or vomiting  · Unexpected weight gain or swelling in the legs, ankles, or around the eyes  · Decreased or absent urine output if you previously made urine  · Fever of 100.4ºF (38ºC) or higher, or as directed by your healthcare provider  Date Last Reviewed: 10/1/2016  © 3796-5268 [employer],  [employer]. [address] [place] [address]. All rights reserved. This information is not intended as a substitute for professional medical care. Always follow your healthcare professional's instructions.        Hemodialysis    Hemodialysis is a type of treatment for kidney failure (also called end-stage kidney disease or ESRD). It uses a machine that holds a filter called a dialyzer. As blood flows through the dialyzer, waste is removed and fluid and chemicals are balanced. Hemodialysis treatments are usually done at a special dialysis center. In some cases, treatments may be done at home. As the kidney failure is getting worse, your doctor may advise you to have an access placed by a surgeon into one of your arms ahead of time. This access may take several weeks to mature before it can be used for hemodialysis.  How hemodialysis is done  Two needles are inserted into a blood vessel (called an arteriovenous fistula or AV fistula) or arteriovenous graft (or AV graft), usually in your arm. Each needle is attached to a tube. One tube carries your blood into the dialyzer, where it is cleaned. Clean blood returns to your body through a second tube and needle. If this treatment has to be done as an emergency, a plastic tube (caltheter) is inserted into a large vein, typically in the neck or groin. This catheter helps carry blood to and from the dialysis machine.  Your experience  · Hemodialysis usually takes about 3 to 5 hours. It is usually done 3 times a week.  · Youll have a regular schedule for your hemodialysis. Many centers have evening and weekend hours as well as weekday hours to help you continue working.  · A trained nurse or technician connects you to the dialysis machine. He or she watches for problems and makes sure you are comfortable.  · During treatment, only a small amount of blood (about 1 cup) is out of your body at any one time.  · During or after your first few treatments, you may have a  headache, muscle cramps, or feel nauseated. These should decrease as your body gets used to the treatments.  · Some people are able to learn to use a dialysis at home.  Problems to watch for  Call your nurse or dialysis technician if you have any of these symptoms during or after treatment:  · Chest pain  · Bleeding from the needle site  · Shortness of breath  · Fever or chills  · Headache or lightheadedness  · Nausea or vomiting  · Itching  · Muscle cramps  · Pain, warm or redness at your access site  · Inability to feel yoor fistulaur blood flow and pulse at your AV graft   Date Last Reviewed: 12/1/2014  © 4353-8692 [employer]. [address] [place] [address], [place] [address]. All rights reserved. This information is not intended as a substitute for professional medical care. Always follow your healthcare professional's instructions.        Caring for Your Hemodialysis Access  A problem such as an infection or a blood clot may make the access unusable. Typically, this happens more often with an arteriovenous graft than with an arteriovenous fistula. If this happens, youll need a new access. To help your access last, you will need to follow certain guidelines.  Watching for problems  Call your doctor or nurse right away if you:  · Cant feel your thrill.  · Have pain or numbness in your hand or arm.  · Have bleeding, redness, bluish discoloration, or warmth around your access.  · Notice your access suddenly bulging out more than usual (a slight bulge is normal).  · Have a fever of 100.4°F (38°C) or higher, or as directed by your doctor   Follow these and any other guidelines youre given  · Dont wear tight clothes or jewelry around your access.  · Dont let anyone take your blood pressure on or draw blood from the arm with the access. Also, dont let anyone put IV lines into it.  · Protect your access from being hit or cut.  · Wash your hands often and keep the area around the access clean.  · Do not  carry anything heavy or do anything that would put pressure on the access.  Feeling for your thrill    If you put your fingers over your access, you should feel the blood rushing through it. This is called a thrill, and it feels like a vibration. Feel for the thrill as often as you're told, usually once or twice a day. If you can't feel it, tell your health care provider right away. Blood may not be flowing through your access the way it should.  Important numbers  Write the names and numbers of your health care providers below. That way you will know how to get in touch with them.  Doctor:  Name ___________________ Phone ___________________  Surgeon:  Name ___________________ Phone ___________________  Dialysis Center:  Name ___________________ Phone ___________________   Date Last Reviewed: 12/2/2014  © 8231-3806 [employer]. [address] [place] [address], [place] [address]. All rights reserved. This information is not intended as a substitute for professional medical care. Always follow your healthcare professional's instructions.

## 2017-07-17 NOTE — PROGRESS NOTES
Spoke with Mrs. Mathis. Pt was admitted to have dialysis catheter inserted and start HD. She states they have an appt tomorrow at 10:45 at [facility]. She is not sure if pt will be scheduled MWF or TTHSAt. She states pt is feeling much better since he's been dialyzed. Pt is going for a fistulagram on Thursday.     Interventions performed:  Education on HD  Encourage compliance with dialysis schedule\      Plan: '  Follow up on education  Plan to discharge.

## 2017-07-18 ENCOUNTER — DOCUMENTATION ONLY (OUTPATIENT)
Dept: TRANSPLANT | Facility: CLINIC | Age: 72
End: 2017-07-18

## 2017-07-19 ENCOUNTER — COMMITTEE REVIEW (OUTPATIENT)
Dept: TRANSPLANT | Facility: CLINIC | Age: 72
End: 2017-07-19

## 2017-07-19 ENCOUNTER — TELEPHONE (OUTPATIENT)
Dept: TRANSPLANT | Facility: CLINIC | Age: 72
End: 2017-07-19

## 2017-07-19 ENCOUNTER — TELEPHONE (OUTPATIENT)
Dept: FAMILY MEDICINE | Facility: CLINIC | Age: 72
End: 2017-07-19

## 2017-07-19 NOTE — TELEPHONE ENCOUNTER
I called Mr. Mathis, to let him know he was denied retransplant for liver and new kidney transplant due to high surgical risk.  I reassured him that since the hepatitis C was treated, in the absence of virus the liver will recover some and scarring will likely diminish slowly.  At this time his liver is working quite well.  He does need dialysis, which will also make him feel better.    Please schedule fibroscan with the hepatology clinic in February 2018 which will be a year following SVR 24 posttreatment.

## 2017-07-19 NOTE — TELEPHONE ENCOUNTER
Called patient to inform him that his case was presented in the liver selection com meeting today.  Spoke to his wife.  She reports that patient is at dialysis at this time.  Informed her that patient was declined for combined liver/ kidney transplant and will not be listed since patient deemed high risk surgically d/t age, previous transplanted, and h/o CKD.  She voiced understanding.  She agrees to call back if patient requests to be seen in clinic by Dr. Nath sooner than previously scheduled appt in Sept, to discuss decision and plan in detail.  Contact information provided.

## 2017-07-19 NOTE — COMMITTEE REVIEW
Philip Mathis's case presented to selection committee.  Patient has been declined due to being a surgical high risk.  I was present and agree with the committee's conclusions.

## 2017-07-19 NOTE — TELEPHONE ENCOUNTER
----- Message from Lissa Daniels sent at 7/19/2017 12:52 PM CDT -----  Contact: [facility]  Called regarding referral for pt. [facility] is stating they never received referral. [facility] can be reached @ [phone] ext [phone].

## 2017-07-20 ENCOUNTER — HOSPITAL ENCOUNTER (OUTPATIENT)
Facility: HOSPITAL | Age: 72
Discharge: HOME | End: 2017-07-20
Attending: SURGERY | Admitting: SURGERY
Payer: MEDICARE

## 2017-07-20 VITALS
DIASTOLIC BLOOD PRESSURE: 82 MMHG | RESPIRATION RATE: 18 BRPM | WEIGHT: 195 LBS | HEIGHT: 74 IN | TEMPERATURE: 100 F | OXYGEN SATURATION: 94 % | BODY MASS INDEX: 25.03 KG/M2 | HEART RATE: 90 BPM | SYSTOLIC BLOOD PRESSURE: 177 MMHG

## 2017-07-20 DIAGNOSIS — N18.6 ESRD (END STAGE RENAL DISEASE): ICD-10-CM

## 2017-07-20 LAB
POCT GLUCOSE: 129 MG/DL (ref 70–110)
POCT GLUCOSE: 148 MG/DL (ref 70–110)

## 2017-07-20 PROCEDURE — 99152 MOD SED SAME PHYS/QHP 5/>YRS: CPT | Mod: NTX,,, | Performed by: SURGERY

## 2017-07-20 PROCEDURE — 36902 INTRO CATH DIALYSIS CIRCUIT: CPT | Mod: 78,NTX,, | Performed by: SURGERY

## 2017-07-20 RX ORDER — NAPROXEN SODIUM 220 MG/1
81 TABLET, FILM COATED ORAL DAILY
Qty: 90 TABLET | Refills: 3 | COMMUNITY
Start: 2017-07-20 | End: 2018-01-01

## 2017-07-20 RX ORDER — LIDOCAINE HYDROCHLORIDE 10 MG/ML
1 INJECTION, SOLUTION EPIDURAL; INFILTRATION; INTRACAUDAL; PERINEURAL ONCE
Status: DISCONTINUED | OUTPATIENT
Start: 2017-07-20 | End: 2017-07-20 | Stop reason: HOSPADM

## 2017-07-20 RX ORDER — ASPIRIN 81 MG/1
81 TABLET ORAL DAILY
Status: DISCONTINUED | OUTPATIENT
Start: 2017-07-20 | End: 2017-07-20 | Stop reason: HOSPADM

## 2017-07-20 RX ORDER — HYDROCODONE BITARTRATE AND ACETAMINOPHEN 5; 325 MG/1; MG/1
1 TABLET ORAL EVERY 4 HOURS PRN
Status: DISCONTINUED | OUTPATIENT
Start: 2017-07-20 | End: 2017-07-20 | Stop reason: HOSPADM

## 2017-07-20 NOTE — NURSING
Patient ambulated on unit with wife at side.  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  resp even and unlabored.  Breath sounds clear.  fistulogram noted to left upper arm.  Positive thrill and bruit.  CBG - 148 this am.  Will continue to monitor.

## 2017-07-20 NOTE — NURSING
Pt arrived on unit via stretcher.  Ambulated to chair without difficulty.  AAOx3.  Resp even and unlabored.  Denies pain or SOB.  Gauze dressing noted to left upper arm fistula c/d/i.  postive thrill and bruit.  CBG = 149 on arrival.  VSS.  Given sandwich and fluids.  Will continue to monitor.

## 2017-07-20 NOTE — INTERVAL H&P NOTE
The patient has been examined and the H&P has been reviewed:    I concur with the findings and changes have been noted since the H&P was written: had permacath placed; has been on HD since.    Anesthesia/Surgery risks, benefits and alternative options discussed and understood by patient/family.      Review of patient's allergies indicates:   Allergen Reactions    Corticosteroids (glucocorticoids) Other (See Comments)     Leg swelling    Hydrocortisone      Leg swelling    Neuromuscular blockers, steroidal      Leg swelling    Ribavirin      Intolerance, arthralgias         There are no hospital problems to display for this patient.

## 2017-07-20 NOTE — NURSING
VSS.  Pt ate sandwich and tolerated fluids.  Wife at side.  Denies pain.  Dressing to left upper arm remains c/d/i.  Iv d/c'd with catheter tip intact.  trf off unit to home via wheelchair.
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
## 2017-07-20 NOTE — OP NOTE
Date: 07/20/2017  Surgeon(s) and Role:   * Silvio William MD  Pre-op Diagnosis:   T82.858A: Stenosis of vascular prosthetic devices, implants and grafts, initial encounter    Post-op Diagnosis: Same   Procedure(s):   1) L BVT AVF fistulogram   2) PTA outflow stenosis with a 7x40 mm [place] and 8x40mm [place] balloon  3) Conscious sedation  Anesthesia: Local MAC   Findings/Key Components:   Successful treatment of > 90% outflow stenosis  Strong palpable thrill   Start ASA 81 po qd  May start using L BVT AVF next week  EBL: Minimal  PROCEDURE IN DETAIL:After an informed consent was obtained the patient was taken to the interventional suite and placed in the supine position.  The patient was brought to the Cath Lab, placed in supine. Arm was prepped and draped in the standard surgical fashion. Under ultrasound guidance, the proximal aspect of the L BVT AVF was accessed with a micropuncture needle; ultrasound confirmed vessel patency, followed by placement of 4/3-[place] micropuncture dilator. Through this, an 0.035-inch wire was placed in the short 6-[place] sheath. Through this, an 0.035-inch Glidewire was placed through the high-grade stenosis, which was demonstrated by the angiogram.  The ultrasound demonstrated vessel patency. A high-grade stenosis in venous outflow was found, which was crossed with a hyrophilic 0.035-in glidewire. This was treated with  PTA outflow stenosis with a 7x40 mm [place] and 8x40mm [place] balloon high-pressure, noncompliant balloon. Resolution of the stenosis was noted. Strong thrill could be felt. The sheath was removed, 3-0 nylon U-stitch was placed with good hemostasis.    *MODERATE SEDATION IN CATH LAB   Silvio William MD FACS was present for moderate sedation  Dr. William monitored the patients cardio respiratory functions during the moderate sedation   See nurses notes for Intra-service start and end times and for the log of the name of the RN who administered the medicines for the  moderate sedation, including their doseage and route.    Time of sedation:  45mins.      Silvio William MD FACS  Vascular/Endovascular Surgery

## 2017-07-20 NOTE — BRIEF OP NOTE
Brief Operative Note  Date: 07/20/2017  Surgeon(s) and Role:   * Silvio William MD  Pre-op Diagnosis:   T82.858A: Stenosis of vascular prosthetic devices, implants and grafts, initial encounter    Post-op Diagnosis: Same   Procedure(s):   1) L BVT AVF fistulogram   2) PTA outflow stenosis with a 7x40 mm [place] and 8x40mm [place] balloon  3) Conscious sedation  Anesthesia: Local MAC   Findings/Key Components:   Successful treatment of > 90% outflow stenosis  Strong palpable thrill   May start using L BVT AVF next week  EBL: Minimal  I attest to being present for the procedure and performing the case.  Silvio William MD FACS  Discharge Note  SUMMARY    Admit Date: 7/20/2017    Attending Physician: Silvio William MD [facility]    Discharge Physician: Silvio William MD [facility]    Discharge Date: 07/20/2017    Final Diagnosis: ESRD (end stage renal disease) [N18.6]    Outcome of Treatment: Successful PTA    Disposition: Home or self-care    Patient Instructions:   Current Discharge Medication List      CONTINUE these medications which have NOT CHANGED    Details   allopurinol (ZYLOPRIM) 100 MG tablet TAKE 1 TABLET EVERY DAY  Qty: 90 tablet, Refills: 3      blood sugar diagnostic (ACCU-CHEK JOANN PLUS TEST STRP) Strp 1 strip by Misc.(Non-Drug; Combo Route) route 4 (four) times daily.  Qty: 360 each, Refills: 3    Associated Diagnoses: Type II diabetes mellitus with renal manifestations, uncontrolled      blood-glucose meter (ACCU-CHEK JOANN PLUS METER) Misc Use as directed  Qty: 1 each, Refills: 0    Associated Diagnoses: Type II diabetes mellitus with renal manifestations, uncontrolled      carvedilol (COREG) 6.25 MG tablet Take 1 tablet (6.25 mg total) by mouth 2 (two) times daily.  Qty: 180 tablet, Refills: 3    Associated Diagnoses: CKD (chronic kidney disease), stage IV      ciclopirox (PENLAC) 8 % Soln Apply to affected nails daily x 6-12 mos.  Remove weekly with rubbing alcohol  Qty: 1 Bottle, Refills: 5    Associated  "Diagnoses: Onychomycosis due to dermatophyte      clonazePAM (KLONOPIN) 0.5 MG tablet Take 1 tablet (0.5 mg total) by mouth every evening.  Qty: 90 tablet, Refills: 1    Associated Diagnoses: Anticonvulsant causing adverse effect in therapeutic use, initial encounter      cloNIDine (CATAPRES) 0.1 MG tablet Take 1 tablet (0.1 mg total) by mouth 3 (three) times daily.  Qty: 270 tablet, Refills: 6    Comments: **Patient requests 90 days supply**      doxazosin (CARDURA) 8 MG Tab Take 1 tablet (8 mg total) by mouth once daily.  Qty: 90 tablet, Refills: 4      hydrALAZINE (APRESOLINE) 100 MG tablet Take 1 tablet (100 mg total) by mouth 3 (three) times daily.  Qty: 270 tablet, Refills: 3    Comments: **Patient requests 90 days supply**  Associated Diagnoses: CKD (chronic kidney disease), stage IV      hydrOXYzine HCl (ATARAX) 25 MG tablet Take 2 tablets (50 mg total) by mouth 3 (three) times daily as needed for Itching.  Qty: 60 tablet, Refills: 0      insulin aspart (NOVOLOG) 100 unit/mL InPn pen Inject 6 units w/ breakfast, 4 units w/ lunch and dinner plus scale 180-230 +1, 231-280 +2, 281-330 +3, 331-380 +4, >380 +5. 90 day supply  Qty: 3 Box, Refills: 3      insulin glargine (LANTUS SOLOSTAR) 100 unit/mL (3 mL) InPn pen Inject 8 Units into the skin every evening.  Qty: 2 Box, Refills: 6      insulin needles, disposable, (NOVOFINE 32) 32 x 1/4 " Ndle 1 each by Misc.(Non-Drug; Combo Route) route 3 (three) times daily.  Qty: 100 each, Refills: 5      lactulose (GENERLAC) 10 gram/15 mL solution 30 ML BY MOUTH NIGHTLY  Qty: 2700 mL, Refills: 0      lancets (ACCU-CHEK SOFTCLIX LANCETS) Misc 1 lancet by Misc.(Non-Drug; Combo Route) route 4 (four) times daily.  Qty: 360 each, Refills: 3    Associated Diagnoses: Type II diabetes mellitus with renal manifestations, uncontrolled      minoxidil (LONITEN) 2.5 MG tablet Take 2 tablets (5 mg total) by mouth 2 (two) times daily.  Qty: 360 tablet, Refills: 6      montelukast " (SINGULAIR) 10 mg tablet Take 1 tablet (10 mg total) by mouth every evening.  Qty: 90 tablet, Refills: 3      ondansetron (ZOFRAN-ODT) 4 MG TbDL DISSOLVE 1 TABLET(4 MG) ON THE TONGUE EVERY 12 HOURS AS NEEDED  Qty: 30 tablet, Refills: 1      pantoprazole (PROTONIX) 40 MG tablet TAKE 1 TABLET ONE TIME DAILY  Qty: 90 tablet, Refills: 3    Associated Diagnoses: Status post liver transplant      rifaximin (XIFAXAN) 550 mg Tab Take 1 tablet (550 mg total) by mouth 2 (two) times daily.  Qty: 60 tablet, Refills: 11    Associated Diagnoses: Encephalopathy      tacrolimus (PROGRAF) 0.5 MG Cap Take 1 capsule (0.5 mg total) by mouth once daily.  Qty: 30 capsule, Refills: 11    Associated Diagnoses: Status post liver transplant      torsemide (DEMADEX) 20 MG Tab Take 3 tablets (60 mg total) by mouth once daily.  Qty: 180 tablet, Refills: 3    Comments: **Patient requests 90 days supply**      urea (CARMOL) 40 % Crea Apply topically once daily.  Qty: 85 g, Refills: 5      glucagon (human recombinant) inj 1mg/mL kit Inject 1 mL (1 mg total) into the muscle as needed.  Qty: 1 kit, Refills: 2      hydrocodone-acetaminophen 5-325mg (NORCO) 5-325 mg per tablet Take 1 tablet by mouth every 6 (six) hours as needed for Pain.  Qty: 35 tablet, Refills: 0             Diet:  Resume pre-operative diet    Activity:  Ad thai    Follow-up:  Follow-up in clinic with Dr William within 1-2 weeks; please call clinic nurse at

## 2017-07-21 ENCOUNTER — HOSPITAL ENCOUNTER (OUTPATIENT)
Dept: VASCULAR SURGERY | Facility: CLINIC | Age: 72
Discharge: HOME | End: 2017-07-21
Payer: MEDICARE

## 2017-07-21 ENCOUNTER — CLINICAL SUPPORT (OUTPATIENT)
Dept: VASCULAR SURGERY | Facility: CLINIC | Age: 72
End: 2017-07-21
Payer: MEDICARE

## 2017-07-21 VITALS
SYSTOLIC BLOOD PRESSURE: 127 MMHG | DIASTOLIC BLOOD PRESSURE: 52 MMHG | WEIGHT: 189 LBS | HEART RATE: 70 BPM | BODY MASS INDEX: 24.26 KG/M2 | HEIGHT: 74 IN | TEMPERATURE: 99 F

## 2017-07-21 DIAGNOSIS — N18.6 ESRD ON DIALYSIS: ICD-10-CM

## 2017-07-21 DIAGNOSIS — Z99.2 ESRD ON DIALYSIS: ICD-10-CM

## 2017-07-21 DIAGNOSIS — Z94.4 LIVER TRANSPLANTED: Primary | ICD-10-CM

## 2017-07-21 DIAGNOSIS — N18.6 ESRD (END STAGE RENAL DISEASE): ICD-10-CM

## 2017-07-21 PROCEDURE — 99999 PR PBB SHADOW E&M-EST. PATIENT-LVL V: CPT | Mod: PBBFAC,TXP,,

## 2017-07-21 PROCEDURE — 93990 DOPPLER FLOW TESTING: CPT | Mod: NTX,S$GLB,, | Performed by: SURGERY

## 2017-07-21 NOTE — PATIENT INSTRUCTIONS
AAO X3. VSS, Suture X1 removed from LUE Access site without difficulty. Patient tolerated well. No s/s of distress noted. Patient scheduled to return on August 23 rd to see Dr Silvio William for Perma Cath Removal. Instructed to call office if he have any problems dialyzing. Patient verbalized understanding. GSL/LPN      Electronically signed by Keyla Khan LPN at 7/21/2017

## 2017-07-23 RX ORDER — ONDANSETRON 4 MG/1
TABLET, ORALLY DISINTEGRATING ORAL
Qty: 30 TABLET | Refills: 0 | Status: SHIPPED | OUTPATIENT
Start: 2017-07-23 | End: 2017-10-02 | Stop reason: SDUPTHER

## 2017-07-24 ENCOUNTER — TELEPHONE (OUTPATIENT)
Dept: NEPHROLOGY | Facility: CLINIC | Age: 72
End: 2017-07-24

## 2017-07-26 ENCOUNTER — TELEPHONE (OUTPATIENT)
Dept: PHARMACY | Facility: CLINIC | Age: 72
End: 2017-07-26

## 2017-07-26 NOTE — TELEPHONE ENCOUNTER
Informed patient he was approved for LIS and will pay no more than $3.30 for generic and $8.45 for brand medication.

## 2017-07-28 DIAGNOSIS — D64.9 ANEMIA, UNSPECIFIED TYPE: ICD-10-CM

## 2017-07-28 DIAGNOSIS — Z94.4 LIVER TRANSPLANTED: Primary | ICD-10-CM

## 2017-07-28 RX ORDER — INSULIN GLARGINE 100 [IU]/ML
INJECTION, SOLUTION SUBCUTANEOUS
Qty: 30 ML | Refills: 3 | Status: SHIPPED | OUTPATIENT
Start: 2017-07-28 | End: 2017-11-06 | Stop reason: SDUPTHER

## 2017-07-28 RX ORDER — FERROUS SULFATE 325(65) MG
325 TABLET ORAL 2 TIMES DAILY
Qty: 60 TABLET | Refills: 11 | Status: SHIPPED | OUTPATIENT
Start: 2017-07-28 | End: 2017-07-28 | Stop reason: ALTCHOICE

## 2017-07-28 NOTE — TELEPHONE ENCOUNTER
----- Message from Danay Nath MD sent at 7/22/2017  6:10 PM CDT -----  Send script.  thx  ----- Message -----  From: Michell Rowe RN  Sent: 7/21/2017   4:48 PM  To: MD Dr. Portillo Perdomo, he has started on dialysis. WIll the dialysis doctor manage this or should I go ahead and prescribe? Just want to clarify.    Thanks,  CHRISSIE Galarza  Post Liver  ----- Message -----  From: Danay Nath MD  Sent: 7/21/2017   4:33 PM  To: [facility]Liver Transplant Clinical    Iron deficient.  Pl advise taking iron tabs 325 mg BID

## 2017-07-31 ENCOUNTER — OUTPATIENT CASE MANAGEMENT (OUTPATIENT)
Dept: ADMINISTRATIVE | Facility: OTHER | Age: 72
End: 2017-07-31

## 2017-07-31 NOTE — ASSESSMENT & PLAN NOTE
S/p permcath placement yesterday  Underwent HD x3  nephro following   Spoke w/ pt's nephrologist Dr Amezcua who has arranged outpatient HD next week as pt had stated.   D/c home today

## 2017-07-31 NOTE — DISCHARGE SUMMARY
[facility] Hospital Medicine  Discharge Summary      Patient Name: Philip Mathis III  MRN: [id number]  Admission Date: 7/13/2017  Hospital Length of Stay: 1 days  Discharge Date and Time: 7/15/2017  3:06 PM  Attending Physician: No att. providers found   Discharging Provider: Asia Galindo MD  Primary Care Provider: Donnie Owens NP  [facility] Medicine Team: [facility] HOSP MED G Asia Galindo MD    HPI:   72 y.o. male with a h/o stage IV CKD, DM I, HTN, s/p liver transplant/EtOH cirrhosis - maintained on Prograf, h/o diastolic heart failure, sleep apnea (not compliant w CPAP as he feels claustrophobic) admitted for permcath placement to initiate dialysis.     Procedure(s) (LRB):  INSERTION-CATHETER-PERM-A-CATH (Right)      Indwelling Lines/Drains at time of discharge:   Lines/Drains/Airways     Central Venous Catheter Line                 Tunneled Central Line Insertion/Assessment - Double Lumen  07/13/17 right internal jugular 18 days          Drain                 Hemodialysis AV Fistula 11/08/16 1553 Left forearm 264 days          Epidural Line                 Perineural Analgesia/Anesthesia Assessment (using dermatomes) Epidural 06/15/17 0711 46 days              Hospital Course:   During hospital stay pt tolerated 3 runs of HD w/ nephrology. Spoke w/ pt's nephrologist Dr. Amezcua as to arrangement for outpatient dialysis. This was arranged prior to pt's discharge. D/cd home in stable condition. Will initiate dialysis next week starting Tuesday.      Consults:   Consults         Status Ordering Provider     Inpatient consult to Nephrology  Once     Provider:  (Not yet assigned)    Completed NIEVES JOHN        Review of Systems   Constitutional: Negative for chills and fever.   HENT: Negative.    Eyes: Negative.    Respiratory: Negative for chest tightness and shortness of breath.    Cardiovascular: Negative for chest pain and leg swelling.   Gastrointestinal: Negative.  Negative  for abdominal pain, constipation and diarrhea.   Endocrine: Negative.    Skin: Negative for color change and pallor.   Neurological:   Negative for weakness or dizziness.   Psychiatric/Behavioral: Negative.  Negative for agitation and behavioral problems.      Objective:      Vitals:    07/15/17 1100 07/15/17 1120 07/15/17 1130 07/15/17 1317   BP: (!) 151/64 (!) 154/70 (!) 163/78 (!) 175/77   BP Location:  Right arm Right arm    Patient Position:  Lying Lying    BP Method:  Automatic Automatic    Pulse: 65 69 70 63   Resp:  18 18 18   Temp:    99.3 °F (37.4 °C)   TempSrc:       SpO2:    96%   Weight:       Height:           Physical Exam   Constitutional: He is oriented to person, place, and time. He appears well-developed. No distress.   HENT:   Head: Normocephalic and atraumatic.   Eyes: Conjunctivae are normal. Right eye exhibits no discharge. Left eye exhibits no discharge. No scleral icterus.   Neck: Normal range of motion. Neck supple.   Cardiovascular: Normal rate and regular rhythm.  Exam reveals no friction rub.    Perm cath in place   Pulmonary/Chest: Effort normal and breath sounds normal.   Abdominal: Soft. Bowel sounds are normal. He exhibits distension. There is no tenderness.   Musculoskeletal: Normal range of motion. He exhibits no edema.   Neurological: He is alert and oriented to person, place, and time.   Skin: Skin is warm and dry.   Psychiatric: He has a normal mood and affect. His behavior is normal.   Nursing note and vitals reviewed.    Significant Diagnostic Studies: Labs: All labs within the past 24 hours have been reviewed and imaging      Pending Diagnostic Studies:     Procedure Component Value Units Date/Time    Hemoglobin A1c if not done in past 6 weeks [[id number]] Collected:  07/13/17 1219    Order Status:  Sent Lab Status:  In process Updated:  07/13/17 1220    Specimen:  Blood from Blood     Magnesium [[id number]] Collected:  07/14/17 0453    Order Status:  Sent Lab Status:  In  process Updated:  07/14/17 0453    Specimen:  Blood from Blood     Phosphorus [[id number]] Collected:  07/14/17 0453    Order Status:  Sent Lab Status:  In process Updated:  07/14/17 0453    Specimen:  Blood from Blood         Final Active Diagnoses:    Diagnosis Date Noted POA    PRINCIPAL PROBLEM:  ESRD on dialysis [N18.6, Z99.2]  Yes    CKD (chronic kidney disease), stage V [N18.5] 05/24/2017 Yes    Anemia in ESRD (end-stage renal disease) [N18.6, D63.1] 04/26/2017 Yes    GERD (gastroesophageal reflux disease) [K21.9] 04/26/2017 Yes     Chronic    Type 2 diabetes mellitus, controlled, with renal complications [E11.29] 04/14/2015 Yes     Chronic    Essential hypertension [I10]  Yes     Chronic    Liver transplanted 6/10/2001 [Z94.4]  Not Applicable     Chronic    Thrombocytopenia [D69.6] 08/25/2014 Yes      Problems Resolved During this Admission:    Diagnosis Date Noted Date Resolved POA      Anemia in ESRD (end-stage renal disease)    H/h stable   Monitor           GERD (gastroesophageal reflux disease)    PPI          Type 2 diabetes mellitus, controlled, with renal complications    Sliding scale insulin prn  a1c 5.5        Liver transplanted 6/10/2001    Continue home prograf          Essential hypertension    Home meds           Thrombocytopenia    Monitor           * ESRD on dialysis    S/p permcath placement yesterday  Underwent HD x3  nephro following   Spoke w/ pt's nephrologist Dr Amezcua who has arranged outpatient HD next week as pt had stated.   D/c home today            Discharged Condition: stable    Disposition: Home or Self Care    Follow Up:  Follow-up Information     Silvio William MD On 7/20/2017.    Specialty:  Vascular Surgery  Contact information:  [address]  [facility] [address]  [phone]             Donnie Owens NP. Schedule an appointment as soon as possible for a visit in 1 week.    Specialty:  Internal Medicine  Contact information:  [address] [place]  [place] [address]  [phone]             [facility] - [facility].    Why:  For dialysis. Starting Tuesday 7/18/17 @ 10:45 AM.   Contact information:  [address] [place] [id number] [phone]               Patient Instructions:     VAS US Hemodialysis Access   Standing Status: Future  Standing Exp. Date: 01/14/18   Order Comments: quantitative study     Diet general     Diet general     Diet renal     Activity as tolerated     Call MD for:  temperature >100.4     Call MD for:  difficulty breathing, headache or visual disturbances     Call MD for:  persistent dizziness or light-headedness     Remove dressing in 48 hours   Scheduling Instructions: You can remove neck dressing on Saturday; leave dressing covering permacath in place; will have to be changed under sterile conditions in HD. Can take sponge baths; do not soak catheter.     Activity as tolerated     REASON FOR NOT PRESCRIBING ANTIPLATELET MEDICATION AT DISCHARGE   Order Specific Question Answer Comments   Reason for not Prescribing: Medical Contraindication      REASON FOR NOT PRESCRIBING STATIN MEDICATION AT DISCHARGE       Medications:  Reconciled Home Medications:   Discharge Medication List as of 7/15/2017  2:16 PM      CONTINUE these medications which have CHANGED    Details   lactulose (GENERLAC) 10 gram/15 mL solution 30 ML BY MOUTH NIGHTLY, No Print         CONTINUE these medications which have NOT CHANGED    Details   allopurinol (ZYLOPRIM) 100 MG tablet TAKE 1 TABLET EVERY DAY, Normal      blood sugar diagnostic (ACCU-CHEK JOANN PLUS TEST STRP) Strp 1 strip by Misc.(Non-Drug; Combo Route) route 4 (four) times daily., Starting 1/11/2016, Until Discontinued, Normal      blood-glucose meter (ACCU-CHEK JOANN PLUS METER) Misc Use as directed, Normal      carvedilol (COREG) 6.25 MG tablet Take 1 tablet (6.25 mg total) by mouth 2 (two) times daily., Starting 4/3/2017, Until Tue 4/3/18, Normal      ciclopirox (PENLAC) 8 % Soln Apply to  "affected nails daily x 6-12 mos.  Remove weekly with rubbing alcohol, Normal      clonazePAM (KLONOPIN) 0.5 MG tablet Take 1 tablet (0.5 mg total) by mouth every evening., Starting 10/13/2014, Until Discontinued, Print      cloNIDine (CATAPRES) 0.1 MG tablet Take 1 tablet (0.1 mg total) by mouth 3 (three) times daily., Starting Thu 5/25/2017, Normal      doxazosin (CARDURA) 8 MG Tab Take 1 tablet (8 mg total) by mouth once daily., Starting 10/11/2016, Until Wed 10/11/17, Normal      glucagon (human recombinant) inj 1mg/mL kit Inject 1 mL (1 mg total) into the muscle as needed., Starting 3/17/2016, Until Discontinued, Normal      hydrALAZINE (APRESOLINE) 100 MG tablet Take 1 tablet (100 mg total) by mouth 3 (three) times daily., Starting 4/3/2017, Until Discontinued, Normal      hydrocodone-acetaminophen 5-325mg (NORCO) 5-325 mg per tablet Take 1 tablet by mouth every 6 (six) hours as needed for Pain., Starting Thu 6/15/2017, Print      hydrOXYzine HCl (ATARAX) 25 MG tablet Take 2 tablets (50 mg total) by mouth 3 (three) times daily as needed for Itching., Starting 4/28/2017, Until Discontinued, Normal      insulin aspart (NOVOLOG) 100 unit/mL InPn pen Inject 6 units w/ breakfast, 4 units w/ lunch and dinner plus scale 180-230 +1, 231-280 +2, 281-330 +3, 331-380 +4, >380 +5. 90 day supply, No Print      insulin glargine (LANTUS SOLOSTAR) 100 unit/mL (3 mL) InPn pen Inject 8 Units into the skin every evening., Starting Wed 5/17/2017, No Print      insulin needles, disposable, (NOVOFINE 32) 32 x 1/4 " Ndle 1 each by Misc.(Non-Drug; Combo Route) route 3 (three) times daily., Starting 1/6/2015, Until Discontinued, Normal      lancets (ACCU-CHEK SOFTCLIX LANCETS) Misc 1 lancet by Misc.(Non-Drug; Combo Route) route 4 (four) times daily., Starting 1/11/2016, Until Discontinued, Normal      minoxidil (LONITEN) 2.5 MG tablet Take 2 tablets (5 mg total) by mouth 2 (two) times daily., Starting Thu 5/25/2017, Until Fri " 5/25/2018, Normal      montelukast (SINGULAIR) 10 mg tablet Take 1 tablet (10 mg total) by mouth every evening., Starting Fri 6/30/2017, Until Sat 6/30/2018, Normal      pantoprazole (PROTONIX) 40 MG tablet TAKE 1 TABLET ONE TIME DAILY, Normal      rifaximin (XIFAXAN) 550 mg Tab Take 1 tablet (550 mg total) by mouth 2 (two) times daily., Starting 11/2/2015, Until Discontinued, Normal      tacrolimus (PROGRAF) 0.5 MG Cap Take 1 capsule (0.5 mg total) by mouth once daily., Starting 3/21/2017, Until Discontinued, Normal      torsemide (DEMADEX) 20 MG Tab Take 3 tablets (60 mg total) by mouth once daily., Starting Thu 5/25/2017, Normal      urea (CARMOL) 40 % Crea Apply topically once daily., Starting 11/16/2016, Until Discontinued, Normal      ondansetron (ZOFRAN-ODT) 4 MG TbDL DISSOLVE 1 TABLET(4 MG) ON THE TONGUE EVERY 12 HOURS AS NEEDED, Normal           Time spent on the discharge of patient: 40 minutes    Asia Galindo MD  Department of Hospital Medicine  [facility]

## 2017-07-31 NOTE — HOSPITAL COURSE
During hospital stay pt tolerated 3 runs of HD w/ nephrology. Spoke w/ pt's nephrologist Dr. Amezcua as to arrangement for outpatient dialysis. This was arranged prior to pt's discharge. D/cd home in stable condition. Will initiate dialysis next week starting Tuesday.

## 2017-08-01 ENCOUNTER — OUTPATIENT CASE MANAGEMENT (OUTPATIENT)
Dept: ADMINISTRATIVE | Facility: OTHER | Age: 72
End: 2017-08-01

## 2017-08-01 NOTE — PROGRESS NOTES
Follow up with pt. Pt started HD T,Th,S. Pt states he is tolerating well and feeling much better since dialysis started. Pt states he is following the Renal diet and states he has not questions at this time. Pt states he will go to the classes to learn more about home dialysis. Encouraged pt to continue compliance. Provided contact number for any future needs. Case closed at this time.

## 2017-08-07 RX ORDER — HYDROXYZINE HYDROCHLORIDE 25 MG/1
TABLET, FILM COATED ORAL
Qty: 90 TABLET | Refills: 0 | Status: SHIPPED | OUTPATIENT
Start: 2017-08-07 | End: 2017-09-05 | Stop reason: SDUPTHER

## 2017-08-14 ENCOUNTER — OFFICE VISIT (OUTPATIENT)
Dept: FAMILY MEDICINE | Facility: CLINIC | Age: 72
End: 2017-08-14
Payer: MEDICARE

## 2017-08-14 ENCOUNTER — OFFICE VISIT (OUTPATIENT)
Dept: OPTOMETRY | Facility: CLINIC | Age: 72
End: 2017-08-14
Payer: MEDICARE

## 2017-08-14 VITALS
DIASTOLIC BLOOD PRESSURE: 50 MMHG | BODY MASS INDEX: 23.8 KG/M2 | WEIGHT: 185.44 LBS | OXYGEN SATURATION: 94 % | HEIGHT: 74 IN | HEART RATE: 65 BPM | TEMPERATURE: 98 F | SYSTOLIC BLOOD PRESSURE: 128 MMHG

## 2017-08-14 DIAGNOSIS — M62.81 MUSCLE WEAKNESS: ICD-10-CM

## 2017-08-14 DIAGNOSIS — Z96.1 PSEUDOPHAKIA, BOTH EYES: ICD-10-CM

## 2017-08-14 DIAGNOSIS — E11.22 CONTROLLED TYPE 2 DIABETES MELLITUS WITH CHRONIC KIDNEY DISEASE ON CHRONIC DIALYSIS, WITH LONG-TERM CURRENT USE OF INSULIN: Chronic | ICD-10-CM

## 2017-08-14 DIAGNOSIS — E11.22 TYPE 2 DIABETES MELLITUS WITH STAGE 5 CHRONIC KIDNEY DISEASE NOT ON CHRONIC DIALYSIS, WITH LONG-TERM CURRENT USE OF INSULIN: Chronic | ICD-10-CM

## 2017-08-14 DIAGNOSIS — N18.6 CONTROLLED TYPE 2 DIABETES MELLITUS WITH CHRONIC KIDNEY DISEASE ON CHRONIC DIALYSIS, WITH LONG-TERM CURRENT USE OF INSULIN: Chronic | ICD-10-CM

## 2017-08-14 DIAGNOSIS — N18.5 TYPE 2 DIABETES MELLITUS WITH STAGE 5 CHRONIC KIDNEY DISEASE NOT ON CHRONIC DIALYSIS, WITH LONG-TERM CURRENT USE OF INSULIN: Chronic | ICD-10-CM

## 2017-08-14 DIAGNOSIS — Z79.4 CONTROLLED TYPE 2 DIABETES MELLITUS WITH CHRONIC KIDNEY DISEASE ON CHRONIC DIALYSIS, WITH LONG-TERM CURRENT USE OF INSULIN: Chronic | ICD-10-CM

## 2017-08-14 DIAGNOSIS — Z99.2 CONTROLLED TYPE 2 DIABETES MELLITUS WITH CHRONIC KIDNEY DISEASE ON CHRONIC DIALYSIS, WITH LONG-TERM CURRENT USE OF INSULIN: Chronic | ICD-10-CM

## 2017-08-14 DIAGNOSIS — E11.9 TYPE 2 DIABETES MELLITUS WITHOUT RETINOPATHY: Primary | ICD-10-CM

## 2017-08-14 DIAGNOSIS — Z79.4 TYPE 2 DIABETES MELLITUS WITH STAGE 5 CHRONIC KIDNEY DISEASE NOT ON CHRONIC DIALYSIS, WITH LONG-TERM CURRENT USE OF INSULIN: Chronic | ICD-10-CM

## 2017-08-14 DIAGNOSIS — H52.7 REFRACTIVE ERROR: ICD-10-CM

## 2017-08-14 DIAGNOSIS — E11.42 DIABETIC POLYNEUROPATHY ASSOCIATED WITH TYPE 2 DIABETES MELLITUS: Primary | Chronic | ICD-10-CM

## 2017-08-14 DIAGNOSIS — M21.6X2 MIDFOOT COLLAPSE, LEFT: ICD-10-CM

## 2017-08-14 DIAGNOSIS — E11.610 CHARCOT'S JOINT OF FOOT DUE TO DIABETES: ICD-10-CM

## 2017-08-14 PROCEDURE — 92015 DETERMINE REFRACTIVE STATE: CPT | Mod: S$GLB,TXP,, | Performed by: OPTOMETRIST

## 2017-08-14 PROCEDURE — 99999 PR PBB SHADOW E&M-EST. PATIENT-LVL II: CPT | Mod: PBBFAC,TXP,, | Performed by: OPTOMETRIST

## 2017-08-14 PROCEDURE — 99999 PR PBB SHADOW E&M-EST. PATIENT-LVL III: CPT | Mod: PBBFAC,,, | Performed by: NURSE PRACTITIONER

## 2017-08-14 PROCEDURE — 99215 OFFICE O/P EST HI 40 MIN: CPT | Mod: S$GLB,,, | Performed by: NURSE PRACTITIONER

## 2017-08-14 PROCEDURE — 1159F MED LIST DOCD IN RCRD: CPT | Mod: S$GLB,,, | Performed by: NURSE PRACTITIONER

## 2017-08-14 PROCEDURE — 3008F BODY MASS INDEX DOCD: CPT | Mod: S$GLB,,, | Performed by: NURSE PRACTITIONER

## 2017-08-14 PROCEDURE — 3066F NEPHROPATHY DOC TX: CPT | Mod: S$GLB,,, | Performed by: NURSE PRACTITIONER

## 2017-08-14 PROCEDURE — 99499 UNLISTED E&M SERVICE: CPT | Mod: S$PBB,TXP,, | Performed by: OPTOMETRIST

## 2017-08-14 PROCEDURE — 92004 COMPRE OPH EXAM NEW PT 1/>: CPT | Mod: S$GLB,TXP,, | Performed by: OPTOMETRIST

## 2017-08-14 PROCEDURE — 3044F HG A1C LEVEL LT 7.0%: CPT | Mod: S$GLB,,, | Performed by: NURSE PRACTITIONER

## 2017-08-14 PROCEDURE — 99499 UNLISTED E&M SERVICE: CPT | Mod: S$GLB,,, | Performed by: NURSE PRACTITIONER

## 2017-08-14 NOTE — PROGRESS NOTES
Subjective:       Patient ID: Philip Mathis III is a 72 y.o. male      Chief Complaint   Patient presents with    Diabetic Eye Exam     IDDM 2; A1c = 5.3 (7/15/17), -150s     History of Present Illness   Dls: 1 yr     Pt here for diabetic eye exam.   Pt c/o blurry vision at night ou. Pt wears pal's. Pt states no tearing no   itching no burning no pain no ha's no floaters.    Eye meds:  otc gtts ou qhs    Hemoglobin A1C       Date                     Value               Ref Range           Status                07/15/2017               5.3                 4.0 - 5.6 %         Final                  05/10/2017               5.5                 4.5 - 6.2 %         Final                  04/26/2017               5.6                 4.5 - 6.2 %         Final              ----------     Assessment/Plan:     1. Type 2 diabetes mellitus without retinopathy  No diabetic retinopathy. Educated patient on importance of good blood sugar control, compliance with meds, and follow up care with PCP. Return in 1 year for dilated eye exam, sooner PRN.    2. Pseudophakia, both eyes  Well centered.    3. Refractive error  Educated patient on refractive error and discussed lens options. Dispensed updated spectacle Rx. Educated about adaptation period to new specs.    Eyeglass Final Rx     Eyeglass Final Rx       Sphere Cylinder Axis Add    Right -1.00 +1.00 010 +2.50    Left -1.25 Sphere  +2.50    Type:  PAL    Expiration Date:  8/15/2018                Return in about 1 year (around 8/14/2018) for Diabetic Eye Exam.

## 2017-08-14 NOTE — PROGRESS NOTES
This dictation has been generated using Dragon Dictation some phonetic errors may occur.     Philip was seen today for diabetic shoes.    Diagnoses and all orders for this visit:    Diabetic polyneuropathy associated with type 2 diabetes mellitus    Type 2 diabetes mellitus with stage 5 chronic kidney disease not on chronic dialysis, with long-term current use of insulin    Controlled type 2 diabetes mellitus with chronic kidney disease on chronic dialysis, with long-term current use of insulin    Charcot's joint of foot due to diabetes    Midfoot collapse, left    Muscle weakness      Diabetic polyneuropathy with stage V chronic renal disease. Due for reassessment of A1C at end of year.   End-stage renal disease due to diabetes. Reviewed last labs. Renal to follow now.   Diabetic polyneuropathy, chore choroid foot, mid foot collapse, and muscle weakness.  Patient needs diabetic shoes per DPM assessment and documentation.  I will have Dr. Castillo co-sign and we will refax orders.  Discussed today.   In excessive of 25 minutes spent with patient with greater than 50% of time dedicated to education on symptoms, diagnosis, treatments, and coordination of care.     No Follow-up on file.      ________________________________________________________________  ________________________________________________________________        Chief Complaint   Patient presents with    diabetic shoes     History of present illness  This 72 y.o. presents today for complaint of diabetic neuropathy, diabetes with stage V renal disease currently on dialysis for end-stage renal disease, and foot problems.  Patient saw podiatry.  They documented the appropriate orthopedic abnormalities of the foot and decreased sensation.  I will have collaborating physician cosign orders.  We discussed end-stage renal disease.  He is interested in home dialysis but concerned about the challenges and stress on his wife.  I think she should understand the  appropriate risk and benefit of home dialysis.  Patient states intent at this time to continue with dialysis at current location on [address] by main [place].  We certainly can revisit this issue in the future.  His wife is known to me.  ROS:   CONST: weight stable.  Fatigue  EYES: no vision change.  ENT: No sore throat, headache, or earache.  CV: no chest pain w/ exertion.  RESP: No shortness of breath.  GI: no nausea, vomiting, diarrhea. No dysphagia. Appetite good.   : no urinary issues.  MUSCULOSKELETAL: no new myalgias or arthralgias.  SKIN: no new changes.  NEURO: no focal deficits. Denies headache.  PSYCH: no new issues.  ENDOCRINE: no polyuria.  HEME: no lymph nodes.  ALLERGY: no general pruritis.    Reviewed histories.    Past Medical History:   Diagnosis Date    Anemia     Back pain     Bishop's esophagus     BPH (benign prostatic hypertrophy)     Chronic kidney disease     Cirrhosis     Diabetes mellitus     Diabetes mellitus, type 2     Diabetes with neurologic complications     Elevated PSA     Heart failure     Hemolytic anemia     Hepatitis Hepatitis C    Hepatitis C     Hyperlipidemia     Hypertension     Hypertension     Immune disorder     Liver transplanted     Peripheral neuropathy     Sleep apnea     Transfusion reaction     Hep C from prev. blood transfusion    Transplanted liver     Trouble in sleeping     Type 2 diabetes mellitus with ophthalmic manifestations     Type 2 diabetes with peripheral circulatory disorder, controlled     Type II or unspecified type diabetes mellitus with renal manifestations, uncontrolled     Type II or unspecified type diabetes mellitus with renal manifestations, uncontrolled        Past Surgical History:   Procedure Laterality Date    broken nose      EYE SURGERY      cataracts    FEMUR SURGERY      FRACTURE SURGERY      right femur fracture     LIVER TRANSPLANT  2001    PORTACATH PLACEMENT Left     SKIN GRAFT      graft  from right thigh , applied to the left back and left arm.       Family History   Problem Relation Age of Onset    Cancer Mother      cancer 55 years ago    Coronary artery disease Father     Hypertension Father     Diabetes Father     Heart disease Father     Cancer Sister      breast CA    Colon cancer Neg Hx        Social History     Social History    Marital status:      Spouse name: N/A    Number of children: N/A    Years of education: N/A     Social History Main Topics    Smoking status: Former Smoker     Quit date: 7/30/1998    Smokeless tobacco: Never Used      Comment: pt reports quitting in 1998    Alcohol use No      Comment: pt reports hx of alcholism and reports quit in 1998    Drug use: No    Sexual activity: Yes     Partners: Female     Other Topics Concern    None     Social History Narrative    None       Current Outpatient Prescriptions   Medication Sig Dispense Refill    allopurinol (ZYLOPRIM) 100 MG tablet TAKE 1 TABLET EVERY DAY (Patient taking differently: TAKE 1 TABLET EVERY night) 90 tablet 3    aspirin 81 MG Chew Take 1 tablet (81 mg total) by mouth once daily. 90 tablet 3    blood sugar diagnostic (ACCU-CHEK JOANN PLUS TEST STRP) Strp 1 strip by Misc.(Non-Drug; Combo Route) route 4 (four) times daily. 360 each 3    blood-glucose meter (ACCU-CHEK JOANN PLUS METER) Misc Use as directed 1 each 0    carvedilol (COREG) 6.25 MG tablet Take 1 tablet (6.25 mg total) by mouth 2 (two) times daily. 180 tablet 3    ciclopirox (PENLAC) 8 % Soln Apply to affected nails daily x 6-12 mos.  Remove weekly with rubbing alcohol 1 Bottle 5    clonazePAM (KLONOPIN) 0.5 MG tablet Take 1 tablet (0.5 mg total) by mouth every evening. (Patient taking differently: Take 0.5 mg by mouth daily as needed. ) 90 tablet 1    cloNIDine (CATAPRES) 0.1 MG tablet Take 1 tablet (0.1 mg total) by mouth 3 (three) times daily. 270 tablet 6    doxazosin (CARDURA) 8 MG Tab Take 1 tablet (8 mg total) by  "mouth once daily. (Patient taking differently: Take 8 mg by mouth every evening. ) 90 tablet 4    glucagon (human recombinant) inj 1mg/mL kit Inject 1 mL (1 mg total) into the muscle as needed. 1 kit 2    hydrALAZINE (APRESOLINE) 100 MG tablet Take 1 tablet (100 mg total) by mouth 3 (three) times daily. 270 tablet 3    hydrocodone-acetaminophen 5-325mg (NORCO) 5-325 mg per tablet Take 1 tablet by mouth every 6 (six) hours as needed for Pain. 35 tablet 0    hydrOXYzine HCl (ATARAX) 25 MG tablet Take 2 tablets (50 mg total) by mouth 3 (three) times daily as needed for Itching. 60 tablet 0    hydrOXYzine HCl (ATARAX) 25 MG tablet TAKE 1 TABLET(25 MG) BY MOUTH THREE TIMES DAILY AS NEEDED FOR ITCHING 90 tablet 0    insulin aspart (NOVOLOG) 100 unit/mL InPn pen Inject 6 units w/ breakfast, 4 units w/ lunch and dinner plus scale 180-230 +1, 231-280 +2, 281-330 +3, 331-380 +4, >380 +5. 90 day supply 3 Box 3    insulin glargine (LANTUS SOLOSTAR) 100 unit/mL (3 mL) InPn pen Inject 8 Units into the skin every evening. 2 Box 6    insulin glargine (LANTUS SOLOSTAR) 100 unit/mL (3 mL) InPn pen Inject 8-10 units nightly. 30 mL 3    insulin needles, disposable, (NOVOFINE 32) 32 x 1/4 " Ndle 1 each by Misc.(Non-Drug; Combo Route) route 3 (three) times daily. 100 each 5    lactulose (GENERLAC) 10 gram/15 mL solution 30 ML BY MOUTH NIGHTLY 2700 mL 0    lancets (ACCU-CHEK SOFTCLIX LANCETS) Misc 1 lancet by Misc.(Non-Drug; Combo Route) route 4 (four) times daily. 360 each 3    minoxidil (LONITEN) 2.5 MG tablet Take 2 tablets (5 mg total) by mouth 2 (two) times daily. 360 tablet 6    montelukast (SINGULAIR) 10 mg tablet Take 1 tablet (10 mg total) by mouth every evening. 90 tablet 3    ondansetron (ZOFRAN-ODT) 4 MG TbDL DISSOLVE 1 TABLET(4 MG) ON THE TONGUE EVERY 12 HOURS AS NEEDED 30 tablet 0    pantoprazole (PROTONIX) 40 MG tablet TAKE 1 TABLET ONE TIME DAILY 90 tablet 3    rifaximin (XIFAXAN) 550 mg Tab Take 1 tablet " (550 mg total) by mouth 2 (two) times daily. 60 tablet 11    tacrolimus (PROGRAF) 0.5 MG Cap Take 1 capsule (0.5 mg total) by mouth once daily. 30 capsule 11    torsemide (DEMADEX) 20 MG Tab Take 3 tablets (60 mg total) by mouth once daily. 180 tablet 3    urea (CARMOL) 40 % Crea Apply topically once daily. 85 g 5     No current facility-administered medications for this visit.        Review of patient's allergies indicates:   Allergen Reactions    Corticosteroids (glucocorticoids) Other (See Comments)     Leg swelling    Hydrocortisone      Leg swelling    Neuromuscular blockers, steroidal      Leg swelling    Ribavirin      Intolerance, arthralgias       Physical examination  Vitals Reviewed  Gen. Well-dressed well-nourished no apparent distress  Skin warm dry and intact.  No rashes noted.  Neck is supple without adenopathy  Chest.  Respirations are even unlabored.  Lungs are clear to auscultation.  Cardiac regular rate and rhythm.  No chest wall adenopathy noted.  Neuro. Awake alert oriented x4.  Normal judgment and cognition noted.  Extremities no clubbing cyanosis or edema noted.  Edema improved.    Call or return to clinic prn if these symptoms worsen or fail to improve as anticipated.

## 2017-08-17 ENCOUNTER — OUTPATIENT CASE MANAGEMENT (OUTPATIENT)
Dept: ADMINISTRATIVE | Facility: OTHER | Age: 72
End: 2017-08-17

## 2017-08-17 NOTE — PROGRESS NOTES
Please note this patient was mailed an Outpatient Case Management Patient Satisfaction Discharge Survey on 8/17/2017.    Please contact [facility] at ext. [phone] with any questions.    Thank you,  Karol Falcon, SSC

## 2017-08-23 ENCOUNTER — OFFICE VISIT (OUTPATIENT)
Dept: VASCULAR SURGERY | Facility: CLINIC | Age: 72
End: 2017-08-23
Payer: MEDICARE

## 2017-08-23 ENCOUNTER — HOSPITAL ENCOUNTER (OUTPATIENT)
Dept: VASCULAR SURGERY | Facility: CLINIC | Age: 72
Discharge: HOME | End: 2017-08-23
Attending: STUDENT IN AN ORGANIZED HEALTH CARE EDUCATION/TRAINING PROGRAM
Payer: MEDICARE

## 2017-08-23 VITALS
WEIGHT: 187 LBS | HEART RATE: 59 BPM | HEIGHT: 74 IN | TEMPERATURE: 97 F | BODY MASS INDEX: 24 KG/M2 | DIASTOLIC BLOOD PRESSURE: 57 MMHG | SYSTOLIC BLOOD PRESSURE: 141 MMHG

## 2017-08-23 DIAGNOSIS — Z99.2 ESRD ON DIALYSIS: ICD-10-CM

## 2017-08-23 DIAGNOSIS — N18.6 ESRD ON DIALYSIS: ICD-10-CM

## 2017-08-23 DIAGNOSIS — N18.5 CKD (CHRONIC KIDNEY DISEASE), STAGE V: ICD-10-CM

## 2017-08-23 PROCEDURE — 1126F AMNT PAIN NOTED NONE PRSNT: CPT | Mod: NTX,,, | Performed by: SURGERY

## 2017-08-23 PROCEDURE — 93990 DOPPLER FLOW TESTING: CPT | Mod: NTX,S$GLB,, | Performed by: SURGERY

## 2017-08-23 PROCEDURE — 99214 OFFICE O/P EST MOD 30 MIN: CPT | Mod: S$PBB,NTX,, | Performed by: SURGERY

## 2017-08-23 PROCEDURE — 99499 UNLISTED E&M SERVICE: CPT | Mod: S$PBB,TXP,, | Performed by: SURGERY

## 2017-08-23 PROCEDURE — 1159F MED LIST DOCD IN RCRD: CPT | Mod: NTX,,, | Performed by: SURGERY

## 2017-08-23 PROCEDURE — 99999 PR PBB SHADOW E&M-EST. PATIENT-LVL III: CPT | Mod: PBBFAC,TXP,, | Performed by: SURGERY

## 2017-08-23 PROCEDURE — 3008F BODY MASS INDEX DOCD: CPT | Mod: NTX,,, | Performed by: SURGERY

## 2017-08-23 PROCEDURE — 36589 REMOVAL TUNNELED CV CATH: CPT | Mod: 58,S$PBB,NTX, | Performed by: SURGERY

## 2017-08-23 RX ORDER — SODIUM CHLORIDE 9 MG/ML
INJECTION, SOLUTION INTRAVENOUS CONTINUOUS
Status: CANCELLED | OUTPATIENT
Start: 2017-08-23

## 2017-08-23 NOTE — PROGRESS NOTES
Philip Mathis III  08/23/2017    HPI:  Patient is a 72 y.o. male with a h/o stage IV CKD, DM I, HTN, s/p liver transplant/EtOH cirrhosis - maintained on Prograf, h/o diastolic heart failure, sleep apnea (not compliant w CPAP as he feels claustrophobic).  who is here today for f/u   He is tolerating HD well - does not some prolonged bleeding    R-hand dominant    S/p  11/8/16: L 1st stage L brachial-basilic AVF   6/8/17: L BVT AVF (brachial AVF was ligated)  7/20/17: PTA outflow 7x40, 8x40 mm Crozier    No MI/stroke  Tobacco use: Quit in late 1990s      Renal is Dr Marielos Amezcua / Adrian Nelson    Retired : [employer];  he is a      Past Medical History:   Diagnosis Date    Anemia     Arthritis     Back pain     Bishop's esophagus     BPH (benign prostatic hypertrophy)     Chronic kidney disease     Cirrhosis     Diabetes mellitus     Diabetes mellitus, type 2     Diabetes with neurologic complications     Diabetic retinopathy     Elevated PSA     Heart failure     Hemolytic anemia     Hepatitis Hepatitis C    Hepatitis C     Hyperlipidemia     Hypertension     Hypertension     Immune disorder     Liver transplanted     Peripheral neuropathy     Sleep apnea     Transfusion reaction     Hep C from prev. blood transfusion    Transplanted liver     Trouble in sleeping     Type 2 diabetes mellitus with ophthalmic manifestations     Type 2 diabetes with peripheral circulatory disorder, controlled     Type II or unspecified type diabetes mellitus with renal manifestations, uncontrolled     Type II or unspecified type diabetes mellitus with renal manifestations, uncontrolled      Past Surgical History:   Procedure Laterality Date    broken nose      CATARACT EXTRACTION Bilateral     EYE SURGERY      cataracts    FEMUR SURGERY      FRACTURE SURGERY      right femur fracture     LIVER TRANSPLANT  2001    PORTACATH PLACEMENT Left     SKIN GRAFT      graft from right thigh ,  applied to the left back and left arm.     Family History   Problem Relation Age of Onset    Cancer Mother      cancer 55 years ago    Coronary artery disease Father     Hypertension Father     Diabetes Father     Heart disease Father     Cancer Sister      breast CA    Colon cancer Neg Hx      Social History     Social History    Marital status:      Spouse name: N/A    Number of children: N/A    Years of education: N/A     Occupational History    Not on file.     Social History Main Topics    Smoking status: Former Smoker     Quit date: 7/30/1998    Smokeless tobacco: Never Used      Comment: pt reports quitting in 1998    Alcohol use No      Comment: pt reports hx of alcholism and reports quit in 1998    Drug use: No    Sexual activity: Yes     Partners: Female     Other Topics Concern    Not on file     Social History Narrative    No narrative on file     Current Outpatient Prescriptions on File Prior to Visit   Medication Sig    allopurinol (ZYLOPRIM) 100 MG tablet TAKE 1 TABLET EVERY DAY (Patient taking differently: TAKE 1 TABLET EVERY night)    aspirin 81 MG Chew Take 1 tablet (81 mg total) by mouth once daily.    blood sugar diagnostic (ACCU-CHEK JOANN PLUS TEST STRP) Strp 1 strip by Misc.(Non-Drug; Combo Route) route 4 (four) times daily.    blood-glucose meter (ACCU-CHEK JOANN PLUS METER) Misc Use as directed    carvedilol (COREG) 6.25 MG tablet Take 1 tablet (6.25 mg total) by mouth 2 (two) times daily.    ciclopirox (PENLAC) 8 % Soln Apply to affected nails daily x 6-12 mos.  Remove weekly with rubbing alcohol    clonazePAM (KLONOPIN) 0.5 MG tablet Take 1 tablet (0.5 mg total) by mouth every evening. (Patient taking differently: Take 0.5 mg by mouth daily as needed. )    cloNIDine (CATAPRES) 0.1 MG tablet Take 1 tablet (0.1 mg total) by mouth 3 (three) times daily.    doxazosin (CARDURA) 8 MG Tab Take 1 tablet (8 mg total) by mouth once daily. (Patient taking  "differently: Take 8 mg by mouth every evening. )    glucagon (human recombinant) inj 1mg/mL kit Inject 1 mL (1 mg total) into the muscle as needed.    hydrALAZINE (APRESOLINE) 100 MG tablet Take 1 tablet (100 mg total) by mouth 3 (three) times daily.    hydrocodone-acetaminophen 5-325mg (NORCO) 5-325 mg per tablet Take 1 tablet by mouth every 6 (six) hours as needed for Pain.    hydrOXYzine HCl (ATARAX) 25 MG tablet Take 2 tablets (50 mg total) by mouth 3 (three) times daily as needed for Itching.    hydrOXYzine HCl (ATARAX) 25 MG tablet TAKE 1 TABLET(25 MG) BY MOUTH THREE TIMES DAILY AS NEEDED FOR ITCHING    insulin aspart (NOVOLOG) 100 unit/mL InPn pen Inject 6 units w/ breakfast, 4 units w/ lunch and dinner plus scale 180-230 +1, 231-280 +2, 281-330 +3, 331-380 +4, >380 +5. 90 day supply    insulin glargine (LANTUS SOLOSTAR) 100 unit/mL (3 mL) InPn pen Inject 8 Units into the skin every evening.    insulin glargine (LANTUS SOLOSTAR) 100 unit/mL (3 mL) InPn pen Inject 8-10 units nightly.    insulin needles, disposable, (NOVOFINE 32) 32 x 1/4 " Ndle 1 each by Misc.(Non-Drug; Combo Route) route 3 (three) times daily.    lactulose (GENERLAC) 10 gram/15 mL solution 30 ML BY MOUTH NIGHTLY    lancets (ACCU-CHEK SOFTCLIX LANCETS) Misc 1 lancet by Misc.(Non-Drug; Combo Route) route 4 (four) times daily.    minoxidil (LONITEN) 2.5 MG tablet Take 2 tablets (5 mg total) by mouth 2 (two) times daily.    montelukast (SINGULAIR) 10 mg tablet Take 1 tablet (10 mg total) by mouth every evening.    ondansetron (ZOFRAN-ODT) 4 MG TbDL DISSOLVE 1 TABLET(4 MG) ON THE TONGUE EVERY 12 HOURS AS NEEDED    pantoprazole (PROTONIX) 40 MG tablet TAKE 1 TABLET ONE TIME DAILY    rifaximin (XIFAXAN) 550 mg Tab Take 1 tablet (550 mg total) by mouth 2 (two) times daily.    tacrolimus (PROGRAF) 0.5 MG Cap Take 1 capsule (0.5 mg total) by mouth once daily.    torsemide (DEMADEX) 20 MG Tab Take 3 tablets (60 mg total) by mouth once " daily.    urea (CARMOL) 40 % Crea Apply topically once daily.     No current facility-administered medications on file prior to visit.        REVIEW OF SYSTEMS:  General: negative; ENT: negative; Allergy and Immunology: negative; Hematological and Lymphatic: Negative; Endocrine: negative; Respiratory: no cough, shortness of breath, or wheezing; Cardiovascular: no chest pain or dyspnea on exertion; Gastrointestinal: no abdominal pain/back, change in bowel habits, or bloody stools; Genito-Urinary: no dysuria, trouble voiding, or hematuria; Musculoskeletal: negative  Neurological: no TIA or stroke symptoms; Psychiatric: no nervousness, anxiety or depression.    PHYSICAL EXAM:       Vitals:    08/23/17 0926   BP: (!) 141/57   Pulse: (!) 59   Temp: 97.3 °F (36.3 °C)            General appearance:  Alert, well-appearing, and in no distress.  Oriented to person, place, and time   Neurological: Normal speech, no focal findings noted; CN II - XII grossly intact           Musculoskeletal: Digits/nail without cyanosis/clubbing.  Normal muscle strength/tone.                 Neck: Supple, no significant adenopathy; thyroid is not enlarged                  No carotid bruit can be auscultated                Chest:  Clear to auscultation, no wheezes, rales or rhonchi, symmetric air entry     No use of accessory muscles             Cardiac: Normal rate and regular rhythm, S1 and S2 normal; PMI non-displaced          Abdomen: Soft, nontender, nondistended, no masses or organomegaly     No rebound tenderness noted; bowel sounds normal     Pulsatile aortic mass is not palpable.     No groin adenopathy      Extremities:   Fair thrill in L BVT AVF; some mild pulsatility in mid-portion     2+ L radial pulse          LAB RESULTS:  Lab Results   Component Value Date    K 4.1 07/15/2017    K 4.6 07/14/2017    K 4.8 07/13/2017    CREATININE 2.9 (H) 07/15/2017    CREATININE 3.3 (H) 07/14/2017    CREATININE 4.0 (H) 07/13/2017     Lab Results    Component Value Date    WBC 5.29 07/15/2017    WBC 5.33 07/14/2017    WBC 5.70 06/28/2017    HCT 27.7 (L) 07/15/2017    HCT 28.7 (L) 07/14/2017    HCT 28.0 (L) 07/10/2017    PLT 81 (L) 07/15/2017     (L) 07/14/2017    PLT 90 (L) 06/28/2017     Lab Results   Component Value Date    HGBA1C 5.3 07/15/2017    HGBA1C 5.5 05/10/2017    HGBA1C 5.6 04/26/2017     IMAGING:  AVF us: flow vol 1039 ml/min  ?outflow lesion - tapers / narrows in this area  Previous: + outflow stenosis : PSVs 989 cm/s  Diam 5.3 - 6.7 (outflow 3.6) mm  Depth 8.3, 3.4 mm    CT abd 2015: no AAA    IMP/PLAN:  72 y.o. male with h/o stage V CKD, DM I, HTN, s/p liver transplant/EtOH cirrhosis - maintained on Prograf, h/o diastolic heart failure, sleep apnea (not compliant w CPAP as he feels claustrophobic) s/p L BVT AVF  S/p L BVT AVF 6/8/17  D/C'd  permacath in clinic today  Plan for L BVT AVF PTA outflow to address further any outflow lesions as he is having some prolonged bleeding  Cont ASA 81 po qd    Silvio William MD FACS  Vascular/Endovascular Surgery    Tunneled Central Venous Catheter Removal    Procedure:  The patient was prepped and draped in sterile fashion. 2% Lidocaine with Epinephrine was used for local anesthetic. A blunt hemostat was used to dissect the exit sheath and fibrin sheath from the catheter cuff. After the cuff was freed, the catheter was pulled and pressure was applied to the venotomy. Once hemostasis was achieved, a pressure dressing was applied.

## 2017-08-31 ENCOUNTER — TELEPHONE (OUTPATIENT)
Dept: VASCULAR SURGERY | Facility: CLINIC | Age: 72
End: 2017-08-31

## 2017-09-01 ENCOUNTER — HOSPITAL ENCOUNTER (OUTPATIENT)
Facility: HOSPITAL | Age: 72
Discharge: HOME | End: 2017-09-01
Attending: SURGERY | Admitting: SURGERY
Payer: MEDICARE

## 2017-09-01 VITALS
BODY MASS INDEX: 23.74 KG/M2 | DIASTOLIC BLOOD PRESSURE: 70 MMHG | RESPIRATION RATE: 16 BRPM | OXYGEN SATURATION: 92 % | TEMPERATURE: 98 F | WEIGHT: 185 LBS | HEIGHT: 74 IN | HEART RATE: 68 BPM | SYSTOLIC BLOOD PRESSURE: 155 MMHG

## 2017-09-01 DIAGNOSIS — N18.6 ESRD ON DIALYSIS: ICD-10-CM

## 2017-09-01 DIAGNOSIS — Z99.2 ESRD ON DIALYSIS: ICD-10-CM

## 2017-09-01 LAB — POCT GLUCOSE: 163 MG/DL (ref 70–110)

## 2017-09-01 PROCEDURE — 99152 MOD SED SAME PHYS/QHP 5/>YRS: CPT | Mod: NTX,,, | Performed by: SURGERY

## 2017-09-01 PROCEDURE — C1725 CATH, TRANSLUMIN NON-LASER: HCPCS | Mod: TXP

## 2017-09-01 PROCEDURE — 63600175 PHARM REV CODE 636 W HCPCS: Mod: TXP

## 2017-09-01 PROCEDURE — C1769 GUIDE WIRE: HCPCS | Mod: TXP

## 2017-09-01 PROCEDURE — 25000003 PHARM REV CODE 250: Mod: TXP

## 2017-09-01 PROCEDURE — 36902 INTRO CATH DIALYSIS CIRCUIT: CPT | Mod: 78,NTX,, | Performed by: SURGERY

## 2017-09-01 PROCEDURE — 82962 GLUCOSE BLOOD TEST: CPT | Mod: TXP

## 2017-09-01 RX ORDER — FUROSEMIDE 80 MG/1
80 TABLET ORAL 2 TIMES DAILY
COMMUNITY
End: 2018-01-01 | Stop reason: SDUPTHER

## 2017-09-01 RX ORDER — SODIUM CHLORIDE 9 MG/ML
INJECTION, SOLUTION INTRAVENOUS CONTINUOUS
Status: DISCONTINUED | OUTPATIENT
Start: 2017-09-01 | End: 2017-09-01 | Stop reason: HOSPADM

## 2017-09-01 RX ORDER — HYDROCODONE BITARTRATE AND ACETAMINOPHEN 5; 325 MG/1; MG/1
1 TABLET ORAL EVERY 4 HOURS PRN
Status: DISCONTINUED | OUTPATIENT
Start: 2017-09-01 | End: 2017-09-01 | Stop reason: HOSPADM

## 2017-09-01 NOTE — INTERVAL H&P NOTE
The patient has been examined and the H&P has been reviewed:    I concur with the findings and no changes have occurred since H&P was written.    Anesthesia/Surgery risks, benefits and alternative options discussed and understood by patient/family.          Active Hospital Problems    Diagnosis  POA    ESRD on dialysis [N18.6, Z99.2]  Not Applicable      Resolved Hospital Problems    Diagnosis Date Resolved POA   No resolved problems to display.

## 2017-09-01 NOTE — BRIEF OP NOTE
Brief Operative Note        Specimens     None        I attest to being present for the procedure and performing the case.  Silvio William MD [facility]  Discharge Note  SUMMARY    Admit Date: 9/1/2017    Attending Physician: Silvio William MD [facility]    Discharge Physician: Silvio William MD FACS    Discharge Date: 09/01/2017    Final Diagnosis: Stenosis due to any device, implant or graft, initial encounter [T85.756U]    Outcome of Treatment: Successful PTA    Disposition: Home or self-care    Patient Instructions:   Current Discharge Medication List      CONTINUE these medications which have NOT CHANGED    Details   allopurinol (ZYLOPRIM) 100 MG tablet TAKE 1 TABLET EVERY DAY  Qty: 90 tablet, Refills: 3      aspirin 81 MG Chew Take 1 tablet (81 mg total) by mouth once daily.  Qty: 90 tablet, Refills: 3      blood sugar diagnostic (ACCU-CHEK JOANN PLUS TEST STRP) Strp 1 strip by Misc.(Non-Drug; Combo Route) route 4 (four) times daily.  Qty: 360 each, Refills: 3    Associated Diagnoses: Type II diabetes mellitus with renal manifestations, uncontrolled      blood-glucose meter (ACCU-CHEK JOANN PLUS METER) Misc Use as directed  Qty: 1 each, Refills: 0    Associated Diagnoses: Type II diabetes mellitus with renal manifestations, uncontrolled      carvedilol (COREG) 6.25 MG tablet Take 1 tablet (6.25 mg total) by mouth 2 (two) times daily.  Qty: 180 tablet, Refills: 3    Associated Diagnoses: CKD (chronic kidney disease), stage IV      clonazePAM (KLONOPIN) 0.5 MG tablet Take 1 tablet (0.5 mg total) by mouth every evening.  Qty: 90 tablet, Refills: 1    Associated Diagnoses: Anticonvulsant causing adverse effect in therapeutic use, initial encounter      cloNIDine (CATAPRES) 0.1 MG tablet Take 1 tablet (0.1 mg total) by mouth 3 (three) times daily.  Qty: 270 tablet, Refills: 6    Comments: **Patient requests 90 days supply**      doxazosin (CARDURA) 8 MG Tab Take 1 tablet (8 mg total) by mouth once daily.  Qty: 90 tablet, Refills:  "4      furosemide (LASIX) 80 MG tablet Take 80 mg by mouth 2 (two) times daily.      hydrALAZINE (APRESOLINE) 100 MG tablet Take 1 tablet (100 mg total) by mouth 3 (three) times daily.  Qty: 270 tablet, Refills: 3    Comments: **Patient requests 90 days supply**  Associated Diagnoses: CKD (chronic kidney disease), stage IV      hydrocodone-acetaminophen 5-325mg (NORCO) 5-325 mg per tablet Take 1 tablet by mouth every 6 (six) hours as needed for Pain.  Qty: 35 tablet, Refills: 0      !! hydrOXYzine HCl (ATARAX) 25 MG tablet Take 2 tablets (50 mg total) by mouth 3 (three) times daily as needed for Itching.  Qty: 60 tablet, Refills: 0      !! hydrOXYzine HCl (ATARAX) 25 MG tablet TAKE 1 TABLET(25 MG) BY MOUTH THREE TIMES DAILY AS NEEDED FOR ITCHING  Qty: 90 tablet, Refills: 0      insulin aspart (NOVOLOG) 100 unit/mL InPn pen Inject 6 units w/ breakfast, 4 units w/ lunch and dinner plus scale 180-230 +1, 231-280 +2, 281-330 +3, 331-380 +4, >380 +5. 90 day supply  Qty: 3 Box, Refills: 3      !! insulin glargine (LANTUS SOLOSTAR) 100 unit/mL (3 mL) InPn pen Inject 8 Units into the skin every evening.  Qty: 2 Box, Refills: 6      !! insulin glargine (LANTUS SOLOSTAR) 100 unit/mL (3 mL) InPn pen Inject 8-10 units nightly.  Qty: 30 mL, Refills: 3      insulin needles, disposable, (NOVOFINE 32) 32 x 1/4 " Ndle 1 each by Misc.(Non-Drug; Combo Route) route 3 (three) times daily.  Qty: 100 each, Refills: 5      lactulose (GENERLAC) 10 gram/15 mL solution 30 ML BY MOUTH NIGHTLY  Qty: 2700 mL, Refills: 0      lancets (ACCU-CHEK SOFTCLIX LANCETS) Misc 1 lancet by Misc.(Non-Drug; Combo Route) route 4 (four) times daily.  Qty: 360 each, Refills: 3    Associated Diagnoses: Type II diabetes mellitus with renal manifestations, uncontrolled      minoxidil (LONITEN) 2.5 MG tablet Take 2 tablets (5 mg total) by mouth 2 (two) times daily.  Qty: 360 tablet, Refills: 6      montelukast (SINGULAIR) 10 mg tablet Take 1 tablet (10 mg total) by " mouth every evening.  Qty: 90 tablet, Refills: 3      ondansetron (ZOFRAN-ODT) 4 MG TbDL DISSOLVE 1 TABLET(4 MG) ON THE TONGUE EVERY 12 HOURS AS NEEDED  Qty: 30 tablet, Refills: 0      pantoprazole (PROTONIX) 40 MG tablet TAKE 1 TABLET ONE TIME DAILY  Qty: 90 tablet, Refills: 3    Associated Diagnoses: Status post liver transplant      rifaximin (XIFAXAN) 550 mg Tab Take 1 tablet (550 mg total) by mouth 2 (two) times daily.  Qty: 60 tablet, Refills: 11    Associated Diagnoses: Encephalopathy      tacrolimus (PROGRAF) 0.5 MG Cap Take 1 capsule (0.5 mg total) by mouth once daily.  Qty: 30 capsule, Refills: 11    Associated Diagnoses: Status post liver transplant      ciclopirox (PENLAC) 8 % Soln Apply to affected nails daily x 6-12 mos.  Remove weekly with rubbing alcohol  Qty: 1 Bottle, Refills: 5    Associated Diagnoses: Onychomycosis due to dermatophyte      glucagon (human recombinant) inj 1mg/mL kit Inject 1 mL (1 mg total) into the muscle as needed.  Qty: 1 kit, Refills: 2      torsemide (DEMADEX) 20 MG Tab Take 3 tablets (60 mg total) by mouth once daily.  Qty: 180 tablet, Refills: 3    Comments: **Patient requests 90 days supply**      urea (CARMOL) 40 % Crea Apply topically once daily.  Qty: 85 g, Refills: 5       !! - Potential duplicate medications found. Please discuss with provider.          Diet:  Resume pre-operative diet    Activity:  Ad thai    Follow-up:  Follow-up in clinic with Dr William within 1 week; please call clinic nurse at   ]

## 2017-09-01 NOTE — NURSING
Pt on unit via stretcher.  Ambulated from stretcher to chair without difficulty.    Awake and alert.  Denies pain or SOB.  Resp even and unlabored.  Gauze dressing to left upper arm covered with tegaderm clean dry and intact.  Positive thrill and bruit to left upper arm dressing.  Given juice and sandwich.  Will continue to monitor. Vanco 1g infusing via pump at a rate of 167/hr

## 2017-09-01 NOTE — OP NOTE
Date: 09/01/2017  Surgeon(s) and Role:   * Silvio William MD  Pre-op Diagnosis:   T82.858A: Stenosis of vascular prosthetic devices, implants and grafts, initial encounter  Post-op Diagnosis: Same   Procedure(s):   1) L BVT AVF fistulogram   2) PTA outflow stenosis with a 7x20mm [place] balloon  3) Conscious sedation  Anesthesia: Local MAC   Findings/Key Components:   Successful treatment of > 90% stenosis  Strong palpable thrill   EBL: Minimal  PROCEDURE IN DETAIL:After an informed consent was obtained the patient was taken to the interventional suite and placed in the supine position.  The patient was brought to the Cath Lab, placed in supine. Arm was prepped and draped in the standard surgical fashion. Under ultrasound guidance, the proximal aspect of the L BVT AVF was accessed with a micropuncture needle; ultrasound confirmed vessel patency, followed by placement of 4/3-[place] micropuncture dilator. Through this, an 0.035-inch wire was placed in the short 6-[place] sheath. Through this, an 0.035-inch Glidewire was placed through the high-grade stenosis, which was demonstrated by the angiogram.  The ultrasound demonstrated vessel patency. A high-grade stenosis in venous outflow was found, which was crossed with a hyrophilic 0.035-in glidewire. This was treated with 7x20 mm high-pressure, noncompliant balloon. Resolution of the stenosis was noted. Strong thrill could be felt. The sheath was removed, 3-0 nylon U-stitch was placed with good hemostasis.    *MODERATE SEDATION IN CATH LAB   Silvio William MD, FACS was present for moderate sedation  Dr. William monitored the patients cardio respiratory functions during the moderate sedation   See nurses notes for Intra-service start and end times and for the log of the name of the RN who administered the medicines for the moderate sedation, including their doseage and route.    Time of sedation:  45mins.      Silvio William MD FACS  Vascular/Endovascular Surgery

## 2017-09-01 NOTE — H&P (VIEW-ONLY)
Philip Mathis III  08/23/2017    HPI:  Patient is a 72 y.o. male with a h/o stage IV CKD, DM I, HTN, s/p liver transplant/EtOH cirrhosis - maintained on Prograf, h/o diastolic heart failure, sleep apnea (not compliant w CPAP as he feels claustrophobic).  who is here today for f/u   He is tolerating HD well - does not some prolonged bleeding    R-hand dominant    S/p  11/8/16: L 1st stage L brachial-basilic AVF   6/8/17: L BVT AVF (brachial AVF was ligated)  7/20/17: PTA outflow 7x40, 8x40 mm [place]    No MI/stroke  Tobacco use: Quit in late 1990s      Renal is Dr Marielos Amezcua / Adrian Nelson    Retired : [employer];  he is a      Past Medical History:   Diagnosis Date    Anemia     Arthritis     Back pain     Bishop's esophagus     BPH (benign prostatic hypertrophy)     Chronic kidney disease     Cirrhosis     Diabetes mellitus     Diabetes mellitus, type 2     Diabetes with neurologic complications     Diabetic retinopathy     Elevated PSA     Heart failure     Hemolytic anemia     Hepatitis Hepatitis C    Hepatitis C     Hyperlipidemia     Hypertension     Hypertension     Immune disorder     Liver transplanted     Peripheral neuropathy     Sleep apnea     Transfusion reaction     Hep C from prev. blood transfusion    Transplanted liver     Trouble in sleeping     Type 2 diabetes mellitus with ophthalmic manifestations     Type 2 diabetes with peripheral circulatory disorder, controlled     Type II or unspecified type diabetes mellitus with renal manifestations, uncontrolled     Type II or unspecified type diabetes mellitus with renal manifestations, uncontrolled      Past Surgical History:   Procedure Laterality Date    broken nose      CATARACT EXTRACTION Bilateral     EYE SURGERY      cataracts    FEMUR SURGERY      FRACTURE SURGERY      right femur fracture     LIVER TRANSPLANT  2001    PORTACATH PLACEMENT Left     SKIN GRAFT      graft from right thigh ,  applied to the left back and left arm.     Family History   Problem Relation Age of Onset    Cancer Mother      cancer 55 years ago    Coronary artery disease Father     Hypertension Father     Diabetes Father     Heart disease Father     Cancer Sister      breast CA    Colon cancer Neg Hx      Social History     Social History    Marital status:      Spouse name: N/A    Number of children: N/A    Years of education: N/A     Occupational History    Not on file.     Social History Main Topics    Smoking status: Former Smoker     Quit date: 7/30/1998    Smokeless tobacco: Never Used      Comment: pt reports quitting in 1998    Alcohol use No      Comment: pt reports hx of alcholism and reports quit in 1998    Drug use: No    Sexual activity: Yes     Partners: Female     Other Topics Concern    Not on file     Social History Narrative    No narrative on file     Current Outpatient Prescriptions on File Prior to Visit   Medication Sig    allopurinol (ZYLOPRIM) 100 MG tablet TAKE 1 TABLET EVERY DAY (Patient taking differently: TAKE 1 TABLET EVERY night)    aspirin 81 MG Chew Take 1 tablet (81 mg total) by mouth once daily.    blood sugar diagnostic (ACCU-CHEK JOANN PLUS TEST STRP) Strp 1 strip by Misc.(Non-Drug; Combo Route) route 4 (four) times daily.    blood-glucose meter (ACCU-CHEK JOANN PLUS METER) Misc Use as directed    carvedilol (COREG) 6.25 MG tablet Take 1 tablet (6.25 mg total) by mouth 2 (two) times daily.    ciclopirox (PENLAC) 8 % Soln Apply to affected nails daily x 6-12 mos.  Remove weekly with rubbing alcohol    clonazePAM (KLONOPIN) 0.5 MG tablet Take 1 tablet (0.5 mg total) by mouth every evening. (Patient taking differently: Take 0.5 mg by mouth daily as needed. )    cloNIDine (CATAPRES) 0.1 MG tablet Take 1 tablet (0.1 mg total) by mouth 3 (three) times daily.    doxazosin (CARDURA) 8 MG Tab Take 1 tablet (8 mg total) by mouth once daily. (Patient taking  "differently: Take 8 mg by mouth every evening. )    glucagon (human recombinant) inj 1mg/mL kit Inject 1 mL (1 mg total) into the muscle as needed.    hydrALAZINE (APRESOLINE) 100 MG tablet Take 1 tablet (100 mg total) by mouth 3 (three) times daily.    hydrocodone-acetaminophen 5-325mg (NORCO) 5-325 mg per tablet Take 1 tablet by mouth every 6 (six) hours as needed for Pain.    hydrOXYzine HCl (ATARAX) 25 MG tablet Take 2 tablets (50 mg total) by mouth 3 (three) times daily as needed for Itching.    hydrOXYzine HCl (ATARAX) 25 MG tablet TAKE 1 TABLET(25 MG) BY MOUTH THREE TIMES DAILY AS NEEDED FOR ITCHING    insulin aspart (NOVOLOG) 100 unit/mL InPn pen Inject 6 units w/ breakfast, 4 units w/ lunch and dinner plus scale 180-230 +1, 231-280 +2, 281-330 +3, 331-380 +4, >380 +5. 90 day supply    insulin glargine (LANTUS SOLOSTAR) 100 unit/mL (3 mL) InPn pen Inject 8 Units into the skin every evening.    insulin glargine (LANTUS SOLOSTAR) 100 unit/mL (3 mL) InPn pen Inject 8-10 units nightly.    insulin needles, disposable, (NOVOFINE 32) 32 x 1/4 " Ndle 1 each by Misc.(Non-Drug; Combo Route) route 3 (three) times daily.    lactulose (GENERLAC) 10 gram/15 mL solution 30 ML BY MOUTH NIGHTLY    lancets (ACCU-CHEK SOFTCLIX LANCETS) Misc 1 lancet by Misc.(Non-Drug; Combo Route) route 4 (four) times daily.    minoxidil (LONITEN) 2.5 MG tablet Take 2 tablets (5 mg total) by mouth 2 (two) times daily.    montelukast (SINGULAIR) 10 mg tablet Take 1 tablet (10 mg total) by mouth every evening.    ondansetron (ZOFRAN-ODT) 4 MG TbDL DISSOLVE 1 TABLET(4 MG) ON THE TONGUE EVERY 12 HOURS AS NEEDED    pantoprazole (PROTONIX) 40 MG tablet TAKE 1 TABLET ONE TIME DAILY    rifaximin (XIFAXAN) 550 mg Tab Take 1 tablet (550 mg total) by mouth 2 (two) times daily.    tacrolimus (PROGRAF) 0.5 MG Cap Take 1 capsule (0.5 mg total) by mouth once daily.    torsemide (DEMADEX) 20 MG Tab Take 3 tablets (60 mg total) by mouth once " daily.    urea (CARMOL) 40 % Crea Apply topically once daily.     No current facility-administered medications on file prior to visit.        REVIEW OF SYSTEMS:  General: negative; ENT: negative; Allergy and Immunology: negative; Hematological and Lymphatic: Negative; Endocrine: negative; Respiratory: no cough, shortness of breath, or wheezing; Cardiovascular: no chest pain or dyspnea on exertion; Gastrointestinal: no abdominal pain/back, change in bowel habits, or bloody stools; Genito-Urinary: no dysuria, trouble voiding, or hematuria; Musculoskeletal: negative  Neurological: no TIA or stroke symptoms; Psychiatric: no nervousness, anxiety or depression.    PHYSICAL EXAM:       Vitals:    08/23/17 0926   BP: (!) 141/57   Pulse: (!) 59   Temp: 97.3 °F (36.3 °C)            General appearance:  Alert, well-appearing, and in no distress.  Oriented to person, place, and time   Neurological: Normal speech, no focal findings noted; CN II - XII grossly intact           Musculoskeletal: Digits/nail without cyanosis/clubbing.  Normal muscle strength/tone.                 Neck: Supple, no significant adenopathy; thyroid is not enlarged                  No carotid bruit can be auscultated                Chest:  Clear to auscultation, no wheezes, rales or rhonchi, symmetric air entry     No use of accessory muscles             Cardiac: Normal rate and regular rhythm, S1 and S2 normal; PMI non-displaced          Abdomen: Soft, nontender, nondistended, no masses or organomegaly     No rebound tenderness noted; bowel sounds normal     Pulsatile aortic mass is not palpable.     No groin adenopathy      Extremities:   Fair thrill in L BVT AVF; some mild pulsatility in mid-portion     2+ L radial pulse          LAB RESULTS:  Lab Results   Component Value Date    K 4.1 07/15/2017    K 4.6 07/14/2017    K 4.8 07/13/2017    CREATININE 2.9 (H) 07/15/2017    CREATININE 3.3 (H) 07/14/2017    CREATININE 4.0 (H) 07/13/2017     Lab Results    Component Value Date    WBC 5.29 07/15/2017    WBC 5.33 07/14/2017    WBC 5.70 06/28/2017    HCT 27.7 (L) 07/15/2017    HCT 28.7 (L) 07/14/2017    HCT 28.0 (L) 07/10/2017    PLT 81 (L) 07/15/2017     (L) 07/14/2017    PLT 90 (L) 06/28/2017     Lab Results   Component Value Date    HGBA1C 5.3 07/15/2017    HGBA1C 5.5 05/10/2017    HGBA1C 5.6 04/26/2017     IMAGING:  AVF us: flow vol 1039 ml/min  ?outflow lesion - tapers / narrows in this area  Previous: + outflow stenosis : PSVs 989 cm/s  Diam 5.3 - 6.7 (outflow 3.6) mm  Depth 8.3, 3.4 mm    CT abd 2015: no AAA    IMP/PLAN:  72 y.o. male with h/o stage V CKD, DM I, HTN, s/p liver transplant/EtOH cirrhosis - maintained on Prograf, h/o diastolic heart failure, sleep apnea (not compliant w CPAP as he feels claustrophobic) s/p L BVT AVF  S/p L BVT AVF 6/8/17  D/C'd  permacath in clinic today  Plan for L BVT AVF PTA outflow to address further any outflow lesions as he is having some prolonged bleeding  Cont ASA 81 po qd    Silvio William MD FACS  Vascular/Endovascular Surgery    Tunneled Central Venous Catheter Removal    Procedure:  The patient was prepped and draped in sterile fashion. 2% Lidocaine with Epinephrine was used for local anesthetic. A blunt hemostat was used to dissect the exit sheath and fibrin sheath from the catheter cuff. After the cuff was freed, the catheter was pulled and pressure was applied to the venotomy. Once hemostasis was achieved, a pressure dressing was applied.

## 2017-09-01 NOTE — NURSING
Iv d/c'd with catheter tip intact.  Awake alert and oriented.  Denies pain.  Dressing to left upper arm remains clean dry and intact.  Positive thrill and bruit.  VSS.  Pt off unit for d/c home via wheelchair.  Verbalized understanding of discharge instructions.

## 2017-09-05 RX ORDER — HYDROXYZINE HYDROCHLORIDE 25 MG/1
TABLET, FILM COATED ORAL
Qty: 90 TABLET | Refills: 0 | Status: SHIPPED | OUTPATIENT
Start: 2017-09-05 | End: 2017-10-29 | Stop reason: SDUPTHER

## 2017-09-06 ENCOUNTER — HOSPITAL ENCOUNTER (OUTPATIENT)
Dept: VASCULAR SURGERY | Facility: CLINIC | Age: 72
Discharge: HOME | End: 2017-09-06
Attending: SURGERY
Payer: MEDICARE

## 2017-09-06 ENCOUNTER — OFFICE VISIT (OUTPATIENT)
Dept: VASCULAR SURGERY | Facility: CLINIC | Age: 72
End: 2017-09-06
Payer: MEDICARE

## 2017-09-06 VITALS
SYSTOLIC BLOOD PRESSURE: 182 MMHG | WEIGHT: 186 LBS | DIASTOLIC BLOOD PRESSURE: 57 MMHG | HEART RATE: 62 BPM | TEMPERATURE: 97 F | HEIGHT: 74 IN | BODY MASS INDEX: 23.87 KG/M2

## 2017-09-06 DIAGNOSIS — N18.6 CONTROLLED TYPE 2 DIABETES MELLITUS WITH CHRONIC KIDNEY DISEASE ON CHRONIC DIALYSIS, WITH LONG-TERM CURRENT USE OF INSULIN: Primary | Chronic | ICD-10-CM

## 2017-09-06 DIAGNOSIS — E11.22 CONTROLLED TYPE 2 DIABETES MELLITUS WITH CHRONIC KIDNEY DISEASE ON CHRONIC DIALYSIS, WITH LONG-TERM CURRENT USE OF INSULIN: Primary | Chronic | ICD-10-CM

## 2017-09-06 DIAGNOSIS — Z99.2 ESRD ON DIALYSIS: ICD-10-CM

## 2017-09-06 DIAGNOSIS — Z99.2 CONTROLLED TYPE 2 DIABETES MELLITUS WITH CHRONIC KIDNEY DISEASE ON CHRONIC DIALYSIS, WITH LONG-TERM CURRENT USE OF INSULIN: Primary | Chronic | ICD-10-CM

## 2017-09-06 DIAGNOSIS — N18.6 ESRD ON DIALYSIS: ICD-10-CM

## 2017-09-06 DIAGNOSIS — Z79.4 CONTROLLED TYPE 2 DIABETES MELLITUS WITH CHRONIC KIDNEY DISEASE ON CHRONIC DIALYSIS, WITH LONG-TERM CURRENT USE OF INSULIN: Primary | Chronic | ICD-10-CM

## 2017-09-06 PROCEDURE — 93990 DOPPLER FLOW TESTING: CPT | Mod: NTX,S$GLB,, | Performed by: SURGERY

## 2017-09-06 PROCEDURE — 99499 UNLISTED E&M SERVICE: CPT | Mod: S$PBB,,, | Performed by: SURGERY

## 2017-09-06 PROCEDURE — 3066F NEPHROPATHY DOC TX: CPT | Mod: NTX,S$GLB,, | Performed by: SURGERY

## 2017-09-06 PROCEDURE — 99999 PR PBB SHADOW E&M-EST. PATIENT-LVL III: CPT | Mod: PBBFAC,TXP,, | Performed by: SURGERY

## 2017-09-06 PROCEDURE — 3008F BODY MASS INDEX DOCD: CPT | Mod: NTX,S$GLB,, | Performed by: SURGERY

## 2017-09-06 PROCEDURE — 1159F MED LIST DOCD IN RCRD: CPT | Mod: NTX,S$GLB,, | Performed by: SURGERY

## 2017-09-06 PROCEDURE — 3044F HG A1C LEVEL LT 7.0%: CPT | Mod: NTX,S$GLB,, | Performed by: SURGERY

## 2017-09-06 PROCEDURE — 99024 POSTOP FOLLOW-UP VISIT: CPT | Mod: NTX,S$GLB,, | Performed by: SURGERY

## 2017-09-06 PROCEDURE — 1126F AMNT PAIN NOTED NONE PRSNT: CPT | Mod: NTX,S$GLB,, | Performed by: SURGERY

## 2017-09-06 NOTE — PROGRESS NOTES
Philip Mathis III  09/06/2017    HPI:  Patient is a 72 y.o. male with a h/o stage IV CKD, DM I, HTN, s/p liver transplant/EtOH cirrhosis - maintained on Prograf, h/o diastolic heart failure, sleep apnea (not compliant w CPAP as he feels claustrophobic).  who is here today for f/u   He is tolerating HD well - no issues now since recent outflow PTA    R-hand dominant    S/p  11/8/16: L 1st stage L brachial-basilic AVF   6/8/17: L BVT AVF (brachial AVF was ligated)  7/20/17, 9/1/17: PTA outflow 7x20; 7x40, 8x40 mm [place]    No MI/stroke  Tobacco use: Quit in late 1990s      Renal is Dr Marielos Amezcua / Adrian Nelson    Retired : [employer];  he is a      Past Medical History:   Diagnosis Date    Anemia     Arthritis     Back pain     Bishop's esophagus     BPH (benign prostatic hypertrophy)     Chronic kidney disease     Cirrhosis     Diabetes mellitus     Diabetes mellitus, type 2     Diabetes with neurologic complications     Diabetic retinopathy     Elevated PSA     Heart failure     Hemolytic anemia     Hepatitis Hepatitis C    Hepatitis C     Hyperlipidemia     Hypertension     Hypertension     Immune disorder     Liver transplanted     Peripheral neuropathy     Sleep apnea     Transfusion reaction     Hep C from prev. blood transfusion    Transplanted liver     Trouble in sleeping     Type 2 diabetes mellitus with ophthalmic manifestations     Type 2 diabetes with peripheral circulatory disorder, controlled     Type II or unspecified type diabetes mellitus with renal manifestations, uncontrolled     Type II or unspecified type diabetes mellitus with renal manifestations, uncontrolled      Past Surgical History:   Procedure Laterality Date    broken nose      CATARACT EXTRACTION Bilateral     EYE SURGERY      cataracts    FEMUR SURGERY      FRACTURE SURGERY      right femur fracture     LIVER TRANSPLANT  2001    PORTACATH PLACEMENT Left     SKIN GRAFT      graft  from right thigh , applied to the left back and left arm.     Family History   Problem Relation Age of Onset    Cancer Mother      cancer 55 years ago    Coronary artery disease Father     Hypertension Father     Diabetes Father     Heart disease Father     Cancer Sister      breast CA    Colon cancer Neg Hx      Social History     Social History    Marital status:      Spouse name: N/A    Number of children: N/A    Years of education: N/A     Occupational History    Not on file.     Social History Main Topics    Smoking status: Former Smoker     Quit date: 7/30/1998    Smokeless tobacco: Never Used      Comment: pt reports quitting in 1998    Alcohol use No      Comment: pt reports hx of alcholism and reports quit in 1998    Drug use: No    Sexual activity: Yes     Partners: Female     Other Topics Concern    Not on file     Social History Narrative    No narrative on file     Current Outpatient Prescriptions on File Prior to Visit   Medication Sig    allopurinol (ZYLOPRIM) 100 MG tablet TAKE 1 TABLET EVERY DAY (Patient taking differently: TAKE 1 TABLET EVERY night)    aspirin 81 MG Chew Take 1 tablet (81 mg total) by mouth once daily.    blood sugar diagnostic (ACCU-CHEK JOANN PLUS TEST STRP) Strp 1 strip by Misc.(Non-Drug; Combo Route) route 4 (four) times daily.    blood-glucose meter (ACCU-CHEK JOANN PLUS METER) Misc Use as directed    carvedilol (COREG) 6.25 MG tablet Take 1 tablet (6.25 mg total) by mouth 2 (two) times daily.    ciclopirox (PENLAC) 8 % Soln Apply to affected nails daily x 6-12 mos.  Remove weekly with rubbing alcohol    clonazePAM (KLONOPIN) 0.5 MG tablet Take 1 tablet (0.5 mg total) by mouth every evening. (Patient taking differently: Take 0.5 mg by mouth daily as needed. )    cloNIDine (CATAPRES) 0.1 MG tablet Take 1 tablet (0.1 mg total) by mouth 3 (three) times daily.    doxazosin (CARDURA) 8 MG Tab Take 1 tablet (8 mg total) by mouth once daily. (Patient  "taking differently: Take 8 mg by mouth every evening. )    furosemide (LASIX) 80 MG tablet Take 80 mg by mouth 2 (two) times daily.    glucagon (human recombinant) inj 1mg/mL kit Inject 1 mL (1 mg total) into the muscle as needed.    hydrALAZINE (APRESOLINE) 100 MG tablet Take 1 tablet (100 mg total) by mouth 3 (three) times daily.    hydrocodone-acetaminophen 5-325mg (NORCO) 5-325 mg per tablet Take 1 tablet by mouth every 6 (six) hours as needed for Pain.    hydrOXYzine HCl (ATARAX) 25 MG tablet Take 2 tablets (50 mg total) by mouth 3 (three) times daily as needed for Itching.    hydrOXYzine HCl (ATARAX) 25 MG tablet TAKE 1 TABLET(25 MG) BY MOUTH THREE TIMES DAILY AS NEEDED FOR ITCHING    insulin aspart (NOVOLOG) 100 unit/mL InPn pen Inject 6 units w/ breakfast, 4 units w/ lunch and dinner plus scale 180-230 +1, 231-280 +2, 281-330 +3, 331-380 +4, >380 +5. 90 day supply    insulin glargine (LANTUS SOLOSTAR) 100 unit/mL (3 mL) InPn pen Inject 8 Units into the skin every evening.    insulin glargine (LANTUS SOLOSTAR) 100 unit/mL (3 mL) InPn pen Inject 8-10 units nightly.    insulin needles, disposable, (NOVOFINE 32) 32 x 1/4 " Ndle 1 each by Misc.(Non-Drug; Combo Route) route 3 (three) times daily.    lactulose (GENERLAC) 10 gram/15 mL solution 30 ML BY MOUTH NIGHTLY    lancets (ACCU-CHEK SOFTCLIX LANCETS) Misc 1 lancet by Misc.(Non-Drug; Combo Route) route 4 (four) times daily.    minoxidil (LONITEN) 2.5 MG tablet Take 2 tablets (5 mg total) by mouth 2 (two) times daily.    montelukast (SINGULAIR) 10 mg tablet Take 1 tablet (10 mg total) by mouth every evening.    ondansetron (ZOFRAN-ODT) 4 MG TbDL DISSOLVE 1 TABLET(4 MG) ON THE TONGUE EVERY 12 HOURS AS NEEDED    pantoprazole (PROTONIX) 40 MG tablet TAKE 1 TABLET ONE TIME DAILY    rifaximin (XIFAXAN) 550 mg Tab Take 1 tablet (550 mg total) by mouth 2 (two) times daily.    tacrolimus (PROGRAF) 0.5 MG Cap Take 1 capsule (0.5 mg total) by mouth once " daily.    torsemide (DEMADEX) 20 MG Tab Take 3 tablets (60 mg total) by mouth once daily.    urea (CARMOL) 40 % Crea Apply topically once daily.     No current facility-administered medications on file prior to visit.        REVIEW OF SYSTEMS:  General: negative; ENT: negative; Allergy and Immunology: negative; Hematological and Lymphatic: Negative; Endocrine: negative; Respiratory: no cough, shortness of breath, or wheezing; Cardiovascular: no chest pain or dyspnea on exertion; Gastrointestinal: no abdominal pain/back, change in bowel habits, or bloody stools; Genito-Urinary: no dysuria, trouble voiding, or hematuria; Musculoskeletal: negative  Neurological: no TIA or stroke symptoms; Psychiatric: no nervousness, anxiety or depression.    PHYSICAL EXAM:       Vitals:    09/06/17 1037   BP: (!) 182/57   Pulse: 62   Temp: 97.3 °F (36.3 °C)       General appearance:  Alert, well-appearing, and in no distress.  Oriented to person, place, and time   Neurological: Normal speech, no focal findings noted; CN II - XII grossly intact           Musculoskeletal: Digits/nail without cyanosis/clubbing.  Normal muscle strength/tone.                 Neck: Supple, no significant adenopathy; thyroid is not enlarged                  No carotid bruit can be auscultated                Chest:  Clear to auscultation, no wheezes, rales or rhonchi, symmetric air entry     No use of accessory muscles             Cardiac: Normal rate and regular rhythm, S1 and S2 normal; PMI non-displaced          Abdomen: Soft, nontender, nondistended, no masses or organomegaly     No rebound tenderness noted; bowel sounds normal     Pulsatile aortic mass is not palpable.     No groin adenopathy      Extremities:   Strong thrill in L BVT AVF; no pulsatility     2+ L radial pulse        LAB RESULTS:  Lab Results   Component Value Date    K 4.1 07/15/2017    K 4.6 07/14/2017    K 4.8 07/13/2017    CREATININE 2.9 (H) 07/15/2017    CREATININE 3.3 (H)  07/14/2017    CREATININE 4.0 (H) 07/13/2017     Lab Results   Component Value Date    WBC 5.29 07/15/2017    WBC 5.33 07/14/2017    WBC 5.70 06/28/2017    HCT 27.7 (L) 07/15/2017    HCT 28.7 (L) 07/14/2017    HCT 28.0 (L) 07/10/2017    PLT 81 (L) 07/15/2017     (L) 07/14/2017    PLT 90 (L) 06/28/2017     Lab Results   Component Value Date    HGBA1C 5.3 07/15/2017    HGBA1C 5.5 05/10/2017    HGBA1C 5.6 04/26/2017     IMAGING:  AVF us: flow vol 1717 ml/min (previous 1039 ml/min)  No stenosis    Previous:  outflow lesion - tapers / narrows in this area: PSVs 989 cm/s  Diam 5.3 - 6.7 (outflow 3.6) mm  Depth 8.3, 3.4 mm    CT abd 2015: no AAA    IMP/PLAN:  72 y.o. male with h/o stage V CKD, DM I, HTN, s/p liver transplant/EtOH cirrhosis - maintained on Prograf, h/o diastolic heart failure, sleep apnea (not compliant w CPAP as he feels claustrophobic) s/p L BVT AVF  S/p L BVT AVF 6/8/17  Needs surveillance for any ouflow lesion recurrence - doing well now; mattie HD well  Cont ASA 81 po qd  Follow-up with me in 3 months for PE and u/s surveillance; sooner should issues arise    Silvio William MD FACS  Vascular/Endovascular Surgery

## 2017-09-11 ENCOUNTER — HOSPITAL ENCOUNTER (OUTPATIENT)
Dept: RADIOLOGY | Facility: HOSPITAL | Age: 72
Discharge: HOME | End: 2017-09-11
Attending: INTERNAL MEDICINE
Payer: MEDICARE

## 2017-09-11 DIAGNOSIS — Z94.4 LIVER REPLACED BY TRANSPLANT: ICD-10-CM

## 2017-09-11 PROCEDURE — 93975 VASCULAR STUDY: CPT | Mod: TC,TXP

## 2017-09-11 PROCEDURE — 76705 ECHO EXAM OF ABDOMEN: CPT | Mod: 26,59,NTX, | Performed by: RADIOLOGY

## 2017-09-11 PROCEDURE — 93975 VASCULAR STUDY: CPT | Mod: 26,NTX,, | Performed by: RADIOLOGY

## 2017-09-12 ENCOUNTER — TELEPHONE (OUTPATIENT)
Dept: TRANSPLANT | Facility: CLINIC | Age: 72
End: 2017-09-12

## 2017-09-12 NOTE — TELEPHONE ENCOUNTER
----- Message from Danay Nath MD sent at 9/11/2017  3:43 PM CDT -----  Pl inform pt:  US abd showed right sided fluid in the chest, small amount of fluid in the abdomen, unchanged clot in the vein to the liver, one stable lesion in the liver, most likely benign.  Will continue to monitor for changes in the liver lesion.  Dialysis may need to be done more aggressively to remove more fluid out of the chest.

## 2017-09-14 RX ORDER — LACTULOSE 10 G/15ML
SOLUTION ORAL; RECTAL
Qty: 2700 ML | Refills: 0 | Status: ON HOLD | OUTPATIENT
Start: 2017-09-14 | End: 2017-10-17 | Stop reason: SDUPTHER

## 2017-09-18 ENCOUNTER — TELEPHONE (OUTPATIENT)
Dept: TRANSPLANT | Facility: CLINIC | Age: 72
End: 2017-09-18

## 2017-09-18 ENCOUNTER — OFFICE VISIT (OUTPATIENT)
Dept: TRANSPLANT | Facility: CLINIC | Age: 72
End: 2017-09-18
Payer: MEDICARE

## 2017-09-18 VITALS
DIASTOLIC BLOOD PRESSURE: 58 MMHG | TEMPERATURE: 99 F | HEART RATE: 69 BPM | RESPIRATION RATE: 17 BRPM | HEIGHT: 74 IN | OXYGEN SATURATION: 94 % | SYSTOLIC BLOOD PRESSURE: 126 MMHG | WEIGHT: 185.63 LBS | BODY MASS INDEX: 23.82 KG/M2

## 2017-09-18 DIAGNOSIS — B18.2 HEP C W/O COMA, CHRONIC: Primary | ICD-10-CM

## 2017-09-18 DIAGNOSIS — K74.69 OTHER CIRRHOSIS OF LIVER: ICD-10-CM

## 2017-09-18 PROCEDURE — 1159F MED LIST DOCD IN RCRD: CPT | Mod: S$GLB,TXP,, | Performed by: INTERNAL MEDICINE

## 2017-09-18 PROCEDURE — 3008F BODY MASS INDEX DOCD: CPT | Mod: S$GLB,TXP,, | Performed by: INTERNAL MEDICINE

## 2017-09-18 PROCEDURE — 99214 OFFICE O/P EST MOD 30 MIN: CPT | Mod: S$GLB,TXP,, | Performed by: INTERNAL MEDICINE

## 2017-09-18 PROCEDURE — 99499 UNLISTED E&M SERVICE: CPT | Mod: S$PBB,,, | Performed by: INTERNAL MEDICINE

## 2017-09-18 PROCEDURE — 1126F AMNT PAIN NOTED NONE PRSNT: CPT | Mod: S$GLB,TXP,, | Performed by: INTERNAL MEDICINE

## 2017-09-18 PROCEDURE — 99999 PR PBB SHADOW E&M-EST. PATIENT-LVL III: CPT | Mod: PBBFAC,TXP,, | Performed by: INTERNAL MEDICINE
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
## 2017-09-18 NOTE — Clinical Note
Plan: 1.  Continue current dose of prograf 0.5 mg daily, trough has been <1.5 to 2.1, monitor prograf level.   2.  Gets Aranesp every other week with good response, Hgb up to 9.8.     3.  MELD labs and prograf level q 1 month.  4.  Return in 3 months.  5.  Please give appt with Dr. Amezcua to see if any adjustments need to be made to meds, whether Aranesp/procrit should be given, as patient persistently weak, fatigued.   6.  Elastography (fibroscan) in February 2018 to reassess fibrosis post-cure of hep C.  Pl get approval in January 2018.

## 2017-09-18 NOTE — LETTER
September 18, 2017        Luciano Hercules  [address]  [address]  [facility] [address]  Phone: [phone]  Fax: [phone]             [facility]  [address]  [facility] [address]  Phone: [phone]   Patient: Philip Mathis III   MR Number: [id number]   YOB: 1945   Date of Visit: 9/18/2017       Dear Dr. Luciano Hercules    Thank you for referring Philip Mathis to me for evaluation. Attached you will find relevant portions of my assessment and plan of care.    If you have questions, please do not hesitate to call me. I look forward to following Philip Mathis along with you.    Sincerely,    Danay Nath MD    Enclosure    If you would like to receive this communication electronically, please contact [email or web address] or [phone] to request [employer] access.    [employer] is a tool which provides read-only access to select patient information with whom you have a relationship. Its easy to use and provides real time access to review your patients record including encounter summaries, notes, results, and demographic information.    If you feel you have received this communication in error or would no longer like to receive these types of communications, please e-mail [email or web address]

## 2017-09-19 DIAGNOSIS — Z94.4 LIVER TRANSPLANTED: Primary | ICD-10-CM

## 2017-09-19 DIAGNOSIS — N18.9 CHRONIC KIDNEY DISEASE, UNSPECIFIED CKD STAGE: ICD-10-CM

## 2017-09-19 DIAGNOSIS — K74.00 FIBROSIS OF LIVER: ICD-10-CM

## 2017-09-21 ENCOUNTER — TELEPHONE (OUTPATIENT)
Dept: TRANSPLANT | Facility: CLINIC | Age: 72
End: 2017-09-21

## 2017-09-21 DIAGNOSIS — N18.6 ESRD (END STAGE RENAL DISEASE) ON DIALYSIS: Primary | ICD-10-CM

## 2017-09-21 DIAGNOSIS — Z99.2 ESRD (END STAGE RENAL DISEASE) ON DIALYSIS: Primary | ICD-10-CM

## 2017-09-21 NOTE — TELEPHONE ENCOUNTER
"----- Message from Jessy Petersen RN sent at 9/21/2017  3:39 PM CDT -----  Contact: wife      ----- Message -----  From: Fabian Colvin RN  Sent: 9/21/2017   3:21 PM  To: Michell Cabrera RN, #    Recieved phone call from patient's wife.  She reports that during dialysis today, patient had "issues" w/ his line.  She reports that they were able to complete therapy.  She states that he has had a few episodes of bleeding (from the dialysis site)---clotting prolonged.  She states that they were told to call hepatology to make an appt STAT.    She would like a call back regarding this.  Please schedule accordingly.      ThanksJanelle"

## 2017-09-22 ENCOUNTER — TELEPHONE (OUTPATIENT)
Dept: TRANSPLANT | Facility: CLINIC | Age: 72
End: 2017-09-22

## 2017-09-22 ENCOUNTER — HOSPITAL ENCOUNTER (OUTPATIENT)
Dept: VASCULAR SURGERY | Facility: CLINIC | Age: 72
Discharge: HOME | End: 2017-09-22
Attending: SURGERY
Payer: MEDICARE

## 2017-09-22 DIAGNOSIS — Z01.818 PRE-OP EVALUATION: Primary | ICD-10-CM

## 2017-09-22 DIAGNOSIS — T82.898A ARTERIOVENOUS FISTULA OCCLUSION, INITIAL ENCOUNTER: Primary | ICD-10-CM

## 2017-09-22 DIAGNOSIS — Z99.2 ESRD (END STAGE RENAL DISEASE) ON DIALYSIS: ICD-10-CM

## 2017-09-22 DIAGNOSIS — N18.6 ESRD (END STAGE RENAL DISEASE) ON DIALYSIS: ICD-10-CM

## 2017-09-22 PROCEDURE — 93990 DOPPLER FLOW TESTING: CPT | Mod: NTX,S$GLB,, | Performed by: SURGERY

## 2017-09-22 NOTE — TELEPHONE ENCOUNTER
"----- Message from Fabian Colvin RN sent at 9/22/2017  9:55 AM CDT -----  Contact: wife  Soralana, sent this to the wrong MichellIdalia  Michell Rowe please see below.    Janelle Colvin RN  Pre-liver Transplant Coordinator  ----- Message -----  From: Michell Cabrera RN  Sent: 9/22/2017   9:03 AM  To: Fabian Colvin RN    I don't know who this is  ----- Message -----  From: Fabian Colvin RN  Sent: 9/21/2017   3:21 PM  To: Michell Cabrera RN, #    Recieved phone call from patient's wife.  She reports that during dialysis today, patient had "issues" w/ his line.  She reports that they were able to complete therapy.  She states that he has had a few episodes of bleeding (from the dialysis site)---clotting prolonged.  She states that they were told to call hepatology to make an appt STAT.    She would like a call back regarding this.  Please schedule accordingly.      Thanks,  Janelle      "

## 2017-09-25 ENCOUNTER — HOSPITAL ENCOUNTER (OUTPATIENT)
Facility: HOSPITAL | Age: 72
Discharge: HOME | End: 2017-09-25
Attending: SURGERY | Admitting: SURGERY
Payer: MEDICARE

## 2017-09-25 VITALS
WEIGHT: 185 LBS | BODY MASS INDEX: 23.74 KG/M2 | DIASTOLIC BLOOD PRESSURE: 60 MMHG | HEIGHT: 74 IN | TEMPERATURE: 97 F | SYSTOLIC BLOOD PRESSURE: 126 MMHG | HEART RATE: 62 BPM | OXYGEN SATURATION: 95 %

## 2017-09-25 DIAGNOSIS — N18.6 ESRD (END STAGE RENAL DISEASE) ON DIALYSIS: Primary | ICD-10-CM

## 2017-09-25 DIAGNOSIS — Z99.2 ESRD (END STAGE RENAL DISEASE) ON DIALYSIS: Primary | ICD-10-CM

## 2017-09-25 DIAGNOSIS — T82.898A OTHER SPECIFIED COMPLICATION OF VASCULAR PROSTHETIC DEVICES, IMPLANTS AND GRAFTS, INITIAL ENCOUNTER: ICD-10-CM

## 2017-09-25 LAB
PERIPHERAL STENT: YES
POCT GLUCOSE: 102 MG/DL (ref 70–110)

## 2017-09-25 PROCEDURE — 82962 GLUCOSE BLOOD TEST: CPT | Mod: TXP

## 2017-09-25 PROCEDURE — 25000003 PHARM REV CODE 250: Mod: TXP

## 2017-09-25 PROCEDURE — C1874 STENT, COATED/COV W/DEL SYS: HCPCS | Mod: TXP

## 2017-09-25 PROCEDURE — 63600175 PHARM REV CODE 636 W HCPCS: Mod: TXP

## 2017-09-25 PROCEDURE — C1894 INTRO/SHEATH, NON-LASER: HCPCS | Mod: TXP

## 2017-09-25 PROCEDURE — 99152 MOD SED SAME PHYS/QHP 5/>YRS: CPT | Mod: TXP

## 2017-09-25 PROCEDURE — 99152 MOD SED SAME PHYS/QHP 5/>YRS: CPT | Mod: NTX,,, | Performed by: SURGERY

## 2017-09-25 PROCEDURE — 36903 INTRO CATH DIALYSIS CIRCUIT: CPT | Mod: NTX,,, | Performed by: SURGERY

## 2017-09-25 NOTE — NURSING
Ambulated on unit with friend at side.  Verbalized understanding of procedure.  Fistula noted to left arm.  Positive thrill and bruit.  VSS.  Will continue to monitor.

## 2017-09-25 NOTE — NURSING
trf off unit for discharge home via wheelchair and transporter.  AAOx3.  VSS.  Dressing to left forearm remains clean dry and intact.

## 2017-09-25 NOTE — INTERVAL H&P NOTE
The patient has been examined and the H&P has been reviewed:    I concur with the findings and changes have been noted since the H&P was written: Patient with AVF dysfunction and U/S showing increased velocities.    Anesthesia/Surgery risks, benefits and alternative options discussed and understood by patient/family.        Active Hospital Problems    Diagnosis  POA    ESRD (end stage renal disease) on dialysis [N18.6, Z99.2]  Not Applicable      Resolved Hospital Problems    Diagnosis Date Resolved POA   No resolved problems to display.

## 2017-09-25 NOTE — NURSING
Received via stretcher from procedure.  Report received from mariela DICKENS.  Awake alert and oriented.  Denies pain or SOB.  VSS.  Gauze covered in tegaderm to left forearm clean dry and intact.  Positive thrill and bruit to left forearm dressing.  Given fluids and sandwich upon arrival.  Will continue to monitor.

## 2017-09-25 NOTE — H&P (VIEW-ONLY)
Philip Mathis III  09/06/2017    HPI:  Patient is a 72 y.o. male with a h/o stage IV CKD, DM I, HTN, s/p liver transplant/EtOH cirrhosis - maintained on Prograf, h/o diastolic heart failure, sleep apnea (not compliant w CPAP as he feels claustrophobic).  who is here today for f/u   He is tolerating HD well - no issues now since recent outflow PTA    R-hand dominant    S/p  11/8/16: L 1st stage L brachial-basilic AVF   6/8/17: L BVT AVF (brachial AVF was ligated)  7/20/17, 9/1/17: PTA outflow 7x20; 7x40, 8x40 mm [place]    No MI/stroke  Tobacco use: Quit in late 1990s      Renal is Dr Marielos Amezcua / [facility] Nelson    Retired : [employer];  he is a      Past Medical History:   Diagnosis Date    Anemia     Arthritis     Back pain     Bishop's esophagus     BPH (benign prostatic hypertrophy)     Chronic kidney disease     Cirrhosis     Diabetes mellitus     Diabetes mellitus, type 2     Diabetes with neurologic complications     Diabetic retinopathy     Elevated PSA     Heart failure     Hemolytic anemia     Hepatitis Hepatitis C    Hepatitis C     Hyperlipidemia     Hypertension     Hypertension     Immune disorder     Liver transplanted     Peripheral neuropathy     Sleep apnea     Transfusion reaction     Hep C from prev. blood transfusion    Transplanted liver     Trouble in sleeping     Type 2 diabetes mellitus with ophthalmic manifestations     Type 2 diabetes with peripheral circulatory disorder, controlled     Type II or unspecified type diabetes mellitus with renal manifestations, uncontrolled     Type II or unspecified type diabetes mellitus with renal manifestations, uncontrolled      Past Surgical History:   Procedure Laterality Date    broken nose      CATARACT EXTRACTION Bilateral     EYE SURGERY      cataracts    FEMUR SURGERY      FRACTURE SURGERY      right femur fracture     LIVER TRANSPLANT  2001    PORTACATH PLACEMENT Left     SKIN GRAFT      graft  from right thigh , applied to the left back and left arm.     Family History   Problem Relation Age of Onset    Cancer Mother      cancer 55 years ago    Coronary artery disease Father     Hypertension Father     Diabetes Father     Heart disease Father     Cancer Sister      breast CA    Colon cancer Neg Hx      Social History     Social History    Marital status:      Spouse name: N/A    Number of children: N/A    Years of education: N/A     Occupational History    Not on file.     Social History Main Topics    Smoking status: Former Smoker     Quit date: 7/30/1998    Smokeless tobacco: Never Used      Comment: pt reports quitting in 1998    Alcohol use No      Comment: pt reports hx of alcholism and reports quit in 1998    Drug use: No    Sexual activity: Yes     Partners: Female     Other Topics Concern    Not on file     Social History Narrative    No narrative on file     Current Outpatient Prescriptions on File Prior to Visit   Medication Sig    allopurinol (ZYLOPRIM) 100 MG tablet TAKE 1 TABLET EVERY DAY (Patient taking differently: TAKE 1 TABLET EVERY night)    aspirin 81 MG Chew Take 1 tablet (81 mg total) by mouth once daily.    blood sugar diagnostic (ACCU-CHEK JOANN PLUS TEST STRP) Strp 1 strip by Misc.(Non-Drug; Combo Route) route 4 (four) times daily.    blood-glucose meter (ACCU-CHEK JAONN PLUS METER) Misc Use as directed    carvedilol (COREG) 6.25 MG tablet Take 1 tablet (6.25 mg total) by mouth 2 (two) times daily.    ciclopirox (PENLAC) 8 % Soln Apply to affected nails daily x 6-12 mos.  Remove weekly with rubbing alcohol    clonazePAM (KLONOPIN) 0.5 MG tablet Take 1 tablet (0.5 mg total) by mouth every evening. (Patient taking differently: Take 0.5 mg by mouth daily as needed. )    cloNIDine (CATAPRES) 0.1 MG tablet Take 1 tablet (0.1 mg total) by mouth 3 (three) times daily.    doxazosin (CARDURA) 8 MG Tab Take 1 tablet (8 mg total) by mouth once daily. (Patient  "taking differently: Take 8 mg by mouth every evening. )    furosemide (LASIX) 80 MG tablet Take 80 mg by mouth 2 (two) times daily.    glucagon (human recombinant) inj 1mg/mL kit Inject 1 mL (1 mg total) into the muscle as needed.    hydrALAZINE (APRESOLINE) 100 MG tablet Take 1 tablet (100 mg total) by mouth 3 (three) times daily.    hydrocodone-acetaminophen 5-325mg (NORCO) 5-325 mg per tablet Take 1 tablet by mouth every 6 (six) hours as needed for Pain.    hydrOXYzine HCl (ATARAX) 25 MG tablet Take 2 tablets (50 mg total) by mouth 3 (three) times daily as needed for Itching.    hydrOXYzine HCl (ATARAX) 25 MG tablet TAKE 1 TABLET(25 MG) BY MOUTH THREE TIMES DAILY AS NEEDED FOR ITCHING    insulin aspart (NOVOLOG) 100 unit/mL InPn pen Inject 6 units w/ breakfast, 4 units w/ lunch and dinner plus scale 180-230 +1, 231-280 +2, 281-330 +3, 331-380 +4, >380 +5. 90 day supply    insulin glargine (LANTUS SOLOSTAR) 100 unit/mL (3 mL) InPn pen Inject 8 Units into the skin every evening.    insulin glargine (LANTUS SOLOSTAR) 100 unit/mL (3 mL) InPn pen Inject 8-10 units nightly.    insulin needles, disposable, (NOVOFINE 32) 32 x 1/4 " Ndle 1 each by Misc.(Non-Drug; Combo Route) route 3 (three) times daily.    lactulose (GENERLAC) 10 gram/15 mL solution 30 ML BY MOUTH NIGHTLY    lancets (ACCU-CHEK SOFTCLIX LANCETS) Misc 1 lancet by Misc.(Non-Drug; Combo Route) route 4 (four) times daily.    minoxidil (LONITEN) 2.5 MG tablet Take 2 tablets (5 mg total) by mouth 2 (two) times daily.    montelukast (SINGULAIR) 10 mg tablet Take 1 tablet (10 mg total) by mouth every evening.    ondansetron (ZOFRAN-ODT) 4 MG TbDL DISSOLVE 1 TABLET(4 MG) ON THE TONGUE EVERY 12 HOURS AS NEEDED    pantoprazole (PROTONIX) 40 MG tablet TAKE 1 TABLET ONE TIME DAILY    rifaximin (XIFAXAN) 550 mg Tab Take 1 tablet (550 mg total) by mouth 2 (two) times daily.    tacrolimus (PROGRAF) 0.5 MG Cap Take 1 capsule (0.5 mg total) by mouth once " daily.    torsemide (DEMADEX) 20 MG Tab Take 3 tablets (60 mg total) by mouth once daily.    urea (CARMOL) 40 % Crea Apply topically once daily.     No current facility-administered medications on file prior to visit.        REVIEW OF SYSTEMS:  General: negative; ENT: negative; Allergy and Immunology: negative; Hematological and Lymphatic: Negative; Endocrine: negative; Respiratory: no cough, shortness of breath, or wheezing; Cardiovascular: no chest pain or dyspnea on exertion; Gastrointestinal: no abdominal pain/back, change in bowel habits, or bloody stools; Genito-Urinary: no dysuria, trouble voiding, or hematuria; Musculoskeletal: negative  Neurological: no TIA or stroke symptoms; Psychiatric: no nervousness, anxiety or depression.    PHYSICAL EXAM:       Vitals:    09/06/17 1037   BP: (!) 182/57   Pulse: 62   Temp: 97.3 °F (36.3 °C)       General appearance:  Alert, well-appearing, and in no distress.  Oriented to person, place, and time   Neurological: Normal speech, no focal findings noted; CN II - XII grossly intact           Musculoskeletal: Digits/nail without cyanosis/clubbing.  Normal muscle strength/tone.                 Neck: Supple, no significant adenopathy; thyroid is not enlarged                  No carotid bruit can be auscultated                Chest:  Clear to auscultation, no wheezes, rales or rhonchi, symmetric air entry     No use of accessory muscles             Cardiac: Normal rate and regular rhythm, S1 and S2 normal; PMI non-displaced          Abdomen: Soft, nontender, nondistended, no masses or organomegaly     No rebound tenderness noted; bowel sounds normal     Pulsatile aortic mass is not palpable.     No groin adenopathy      Extremities:   Strong thrill in L BVT AVF; no pulsatility     2+ L radial pulse        LAB RESULTS:  Lab Results   Component Value Date    K 4.1 07/15/2017    K 4.6 07/14/2017    K 4.8 07/13/2017    CREATININE 2.9 (H) 07/15/2017    CREATININE 3.3 (H)  07/14/2017    CREATININE 4.0 (H) 07/13/2017     Lab Results   Component Value Date    WBC 5.29 07/15/2017    WBC 5.33 07/14/2017    WBC 5.70 06/28/2017    HCT 27.7 (L) 07/15/2017    HCT 28.7 (L) 07/14/2017    HCT 28.0 (L) 07/10/2017    PLT 81 (L) 07/15/2017     (L) 07/14/2017    PLT 90 (L) 06/28/2017     Lab Results   Component Value Date    HGBA1C 5.3 07/15/2017    HGBA1C 5.5 05/10/2017    HGBA1C 5.6 04/26/2017     IMAGING:  AVF us: flow vol 1717 ml/min (previous 1039 ml/min)  No stenosis    Previous:  outflow lesion - tapers / narrows in this area: PSVs 989 cm/s  Diam 5.3 - 6.7 (outflow 3.6) mm  Depth 8.3, 3.4 mm    CT abd 2015: no AAA    IMP/PLAN:  72 y.o. male with h/o stage V CKD, DM I, HTN, s/p liver transplant/EtOH cirrhosis - maintained on Prograf, h/o diastolic heart failure, sleep apnea (not compliant w CPAP as he feels claustrophobic) s/p L BVT AVF  S/p L BVT AVF 6/8/17  Needs surveillance for any ouflow lesion recurrence - doing well now; mattie HD well  Cont ASA 81 po qd  Follow-up with me in 3 months for PE and u/s surveillance; sooner should issues arise    Silvio William MD FACS  Vascular/Endovascular Surgery

## 2017-09-25 NOTE — PLAN OF CARE
Problem: Patient Care Overview  Goal: Plan of Care Review  Outcome: Ongoing (interventions implemented as appropriate)  Pt transported via stretcher from cath lab.  Vss.  l fa fistula remains cdi.  0 bleed, 0 hematom.  Post op orders and assessment initiated.  Pt in no acute distress and verbalizes no complaints. Will continue to monitor.

## 2017-09-25 NOTE — OP NOTE
Date: 09/25/2017  Surgeon(s) and Role:   * Silvio William MD  B Dalila LOCKE - Assistant    Pre-op Diagnosis:   T82.858A: Stenosis of vascular prosthetic devices, implants and grafts, initial encounter  Post-op Diagnosis: Same   Procedure(s):   1) L BVT AVF fistulogram   2) Stent graft placement in  outflow stenosis with a 8x50mm FLAIR; post-dilated w 7x40mm [place] balloon in main body and 9x20 mm [place] balloon in proximal outflow segment of the stent  3) Conscious sedation  Anesthesia: Local MAC   Findings/Key Components:   Successful treatment of > 90% stenosis  Strong palpable thrill   EBL: Minimal  PROCEDURE IN DETAIL:After an informed consent was obtained the patient was taken to the interventional suite and placed in the supine position.  The patient was brought to the Cath Lab, placed in supine. Arm was prepped and draped in the standard surgical fashion. Under ultrasound guidance, the proximal aspect of the L BVT AVF was accessed with a micropuncture needle; ultrasound confirmed vessel patency, followed by placement of 4/3-Tajik micropuncture dilator. Through this, an 0.035-inch wire was placed in the short 6-Tajik sheath. Through this, an 0.035-inch Glidewire was placed through the high-grade stenosis, which was demonstrated by the angiogram.  The ultrasound demonstrated vessel patency. A high-grade stenosis in venous outflow was found, which was crossed with a hyrophilic 0.035-in glidewire. Heparin 5000 u was given.  This was treated first w pre-dilatation using a 4x20mm high-pressure, noncompliant balloon; access switched to 9fr sheath and a Stent graft placement in  outflow stenosis with a 8x50mm FLAIR; post-dilated w 7x40mm [place] balloon in main body and 9x20 mm [place] balloon in proximal outflow segment of the stent. Resolution of the stenosis was noted. Strong thrill could be felt. The sheath was removed, 3-0 nylon U-stitch was placed with good hemostasis.    *MODERATE SEDATION IN CATH LAB    Silvio William MD, FACS was present for moderate sedation  Dr. William monitored the patients cardio respiratory functions during the moderate sedation   See nurses notes for Intra-service start and end times and for the log of the name of the RN who administered the medicines for the moderate sedation, including their doseage and route.    Time of sedation:  45mins.      Silvio William MD FACS  Vascular/Endovascular Surgery

## 2017-09-27 ENCOUNTER — HOSPITAL ENCOUNTER (OUTPATIENT)
Dept: VASCULAR SURGERY | Facility: CLINIC | Age: 72
Discharge: HOME | End: 2017-09-27
Attending: GENERAL PRACTICE
Payer: MEDICARE

## 2017-09-27 ENCOUNTER — OFFICE VISIT (OUTPATIENT)
Dept: VASCULAR SURGERY | Facility: CLINIC | Age: 72
End: 2017-09-27
Payer: MEDICARE

## 2017-09-27 VITALS
TEMPERATURE: 98 F | SYSTOLIC BLOOD PRESSURE: 136 MMHG | HEIGHT: 74 IN | HEART RATE: 68 BPM | DIASTOLIC BLOOD PRESSURE: 63 MMHG | WEIGHT: 189 LBS | BODY MASS INDEX: 24.26 KG/M2

## 2017-09-27 DIAGNOSIS — I50.33 ACUTE ON CHRONIC DIASTOLIC HEART FAILURE: Primary | ICD-10-CM

## 2017-09-27 DIAGNOSIS — N18.6 ESRD (END STAGE RENAL DISEASE) ON DIALYSIS: ICD-10-CM

## 2017-09-27 DIAGNOSIS — Z99.2 ESRD (END STAGE RENAL DISEASE) ON DIALYSIS: ICD-10-CM

## 2017-09-27 PROCEDURE — 93990 DOPPLER FLOW TESTING: CPT | Mod: S$GLB,TXP,, | Performed by: SURGERY

## 2017-09-27 PROCEDURE — 99214 OFFICE O/P EST MOD 30 MIN: CPT | Mod: S$GLB,TXP,, | Performed by: SURGERY

## 2017-09-27 PROCEDURE — 99499 UNLISTED E&M SERVICE: CPT | Mod: S$GLB,,, | Performed by: SURGERY

## 2017-09-27 PROCEDURE — 1159F MED LIST DOCD IN RCRD: CPT | Mod: S$GLB,TXP,, | Performed by: SURGERY

## 2017-09-27 PROCEDURE — 1126F AMNT PAIN NOTED NONE PRSNT: CPT | Mod: S$GLB,TXP,, | Performed by: SURGERY

## 2017-09-27 PROCEDURE — 3008F BODY MASS INDEX DOCD: CPT | Mod: S$GLB,TXP,, | Performed by: SURGERY

## 2017-09-27 PROCEDURE — 99999 PR PBB SHADOW E&M-EST. PATIENT-LVL III: CPT | Mod: PBBFAC,TXP,, | Performed by: SURGERY

## 2017-09-27 NOTE — PROGRESS NOTES
Philip Mathis III  09/27/2017    HPI:  Patient is a 72 y.o. male with a h/o stage IV CKD, DM I, HTN, s/p liver transplant/EtOH cirrhosis - maintained on Prograf, h/o diastolic heart failure, sleep apnea (not compliant w CPAP as he feels claustrophobic).  who is here today for f/u   He is tolerating HD well - no issues now since recent outflow PTAS    R-hand dominant    S/p  11/8/16: L 1st stage L brachial-basilic AVF  6/8/17: L BVT AVF (brachial AVF was ligated)  7/20/17, 9/1/17: PTA outflow 7x20; 7x40, 8x40 mm [place]  9/25/17: Stent graft placement FLAIR 8x50, post-dilated w 7mm balloon in main body of graft and 9x20 in the 'flaired portion' into the L brachial vein    No MI/stroke  Tobacco use: Quit in late 1990s      Renal is Dr Marielos Amezcua / Adrian Nelson    Retired : [employer];  he is a      Past Medical History:   Diagnosis Date    Anemia     Arthritis     Back pain     Bishop's esophagus     BPH (benign prostatic hypertrophy)     Chronic kidney disease     Cirrhosis     Diabetes mellitus     Diabetes mellitus, type 2     Diabetes with neurologic complications     Diabetic retinopathy     Elevated PSA     Heart failure     Hemolytic anemia     Hepatitis Hepatitis C    Hepatitis C     Hyperlipidemia     Hypertension     Hypertension     Immune disorder     Liver transplanted     Peripheral neuropathy     Sleep apnea     Transfusion reaction     Hep C from prev. blood transfusion    Transplanted liver     Trouble in sleeping     Type 2 diabetes mellitus with ophthalmic manifestations     Type 2 diabetes with peripheral circulatory disorder, controlled     Type II or unspecified type diabetes mellitus with renal manifestations, uncontrolled     Type II or unspecified type diabetes mellitus with renal manifestations, uncontrolled      Past Surgical History:   Procedure Laterality Date    broken nose      CATARACT EXTRACTION Bilateral     EYE SURGERY      cataracts     FEMUR SURGERY      FRACTURE SURGERY      right femur fracture     LIVER TRANSPLANT  2001    PORTACATH PLACEMENT Left     SKIN GRAFT      graft from right thigh , applied to the left back and left arm.     Family History   Problem Relation Age of Onset    Cancer Mother      cancer 55 years ago    Coronary artery disease Father     Hypertension Father     Diabetes Father     Heart disease Father     Cancer Sister      breast CA    Colon cancer Neg Hx      Social History     Social History    Marital status:      Spouse name: N/A    Number of children: N/A    Years of education: N/A     Occupational History    Not on file.     Social History Main Topics    Smoking status: Former Smoker     Quit date: 7/30/1998    Smokeless tobacco: Never Used      Comment: pt reports quitting in 1998    Alcohol use No      Comment: pt reports hx of alcholism and reports quit in 1998    Drug use: No    Sexual activity: Yes     Partners: Female     Other Topics Concern    Not on file     Social History Narrative    No narrative on file     Current Outpatient Prescriptions on File Prior to Visit   Medication Sig    allopurinol (ZYLOPRIM) 100 MG tablet TAKE 1 TABLET EVERY DAY (Patient taking differently: TAKE 1 TABLET EVERY night)    aspirin 81 MG Chew Take 1 tablet (81 mg total) by mouth once daily.    blood sugar diagnostic (ACCU-CHEK JOANN PLUS TEST STRP) Strp 1 strip by Misc.(Non-Drug; Combo Route) route 4 (four) times daily.    blood-glucose meter (ACCU-CHEK JOANN PLUS METER) Misc Use as directed    carvedilol (COREG) 6.25 MG tablet Take 1 tablet (6.25 mg total) by mouth 2 (two) times daily.    ciclopirox (PENLAC) 8 % Soln Apply to affected nails daily x 6-12 mos.  Remove weekly with rubbing alcohol    clonazePAM (KLONOPIN) 0.5 MG tablet Take 1 tablet (0.5 mg total) by mouth every evening. (Patient taking differently: Take 0.5 mg by mouth daily as needed. )    cloNIDine (CATAPRES) 0.1 MG tablet  "Take 1 tablet (0.1 mg total) by mouth 3 (three) times daily.    doxazosin (CARDURA) 8 MG Tab Take 1 tablet (8 mg total) by mouth once daily. (Patient taking differently: Take 8 mg by mouth every evening. )    furosemide (LASIX) 80 MG tablet Take 80 mg by mouth 2 (two) times daily.    GENERLAC 10 gram/15 mL solution TAKE 30 ML 'S BY MOUTH THREE TIMES DAILY    glucagon (human recombinant) inj 1mg/mL kit Inject 1 mL (1 mg total) into the muscle as needed.    hydrALAZINE (APRESOLINE) 100 MG tablet Take 1 tablet (100 mg total) by mouth 3 (three) times daily.    hydrocodone-acetaminophen 5-325mg (NORCO) 5-325 mg per tablet Take 1 tablet by mouth every 6 (six) hours as needed for Pain.    hydrOXYzine HCl (ATARAX) 25 MG tablet Take 2 tablets (50 mg total) by mouth 3 (three) times daily as needed for Itching.    hydrOXYzine HCl (ATARAX) 25 MG tablet TAKE 1 TABLET(25 MG) BY MOUTH THREE TIMES DAILY AS NEEDED FOR ITCHING    insulin aspart (NOVOLOG) 100 unit/mL InPn pen Inject 6 units w/ breakfast, 4 units w/ lunch and dinner plus scale 180-230 +1, 231-280 +2, 281-330 +3, 331-380 +4, >380 +5. 90 day supply    insulin glargine (LANTUS SOLOSTAR) 100 unit/mL (3 mL) InPn pen Inject 8 Units into the skin every evening.    insulin glargine (LANTUS SOLOSTAR) 100 unit/mL (3 mL) InPn pen Inject 8-10 units nightly.    insulin needles, disposable, (NOVOFINE 32) 32 x 1/4 " Ndle 1 each by Misc.(Non-Drug; Combo Route) route 3 (three) times daily.    lactulose (GENERLAC) 10 gram/15 mL solution 30 ML BY MOUTH NIGHTLY    lancets (ACCU-CHEK SOFTCLIX LANCETS) Misc 1 lancet by Misc.(Non-Drug; Combo Route) route 4 (four) times daily.    minoxidil (LONITEN) 2.5 MG tablet Take 2 tablets (5 mg total) by mouth 2 (two) times daily.    montelukast (SINGULAIR) 10 mg tablet Take 1 tablet (10 mg total) by mouth every evening.    ondansetron (ZOFRAN-ODT) 4 MG TbDL DISSOLVE 1 TABLET(4 MG) ON THE TONGUE EVERY 12 HOURS AS NEEDED    pantoprazole " (PROTONIX) 40 MG tablet TAKE 1 TABLET ONE TIME DAILY    rifaximin (XIFAXAN) 550 mg Tab Take 1 tablet (550 mg total) by mouth 2 (two) times daily.    tacrolimus (PROGRAF) 0.5 MG Cap Take 1 capsule (0.5 mg total) by mouth once daily.    torsemide (DEMADEX) 20 MG Tab Take 3 tablets (60 mg total) by mouth once daily.    urea (CARMOL) 40 % Crea Apply topically once daily.     No current facility-administered medications on file prior to visit.        REVIEW OF SYSTEMS:  General: negative; ENT: negative; Allergy and Immunology: negative; Hematological and Lymphatic: Negative; Endocrine: negative; Respiratory: no cough, shortness of breath, or wheezing; Cardiovascular: no chest pain or dyspnea on exertion; Gastrointestinal: no abdominal pain/back, change in bowel habits, or bloody stools; Genito-Urinary: no dysuria, trouble voiding, or hematuria; Musculoskeletal: negative  Neurological: no TIA or stroke symptoms; Psychiatric: no nervousness, anxiety or depression.    PHYSICAL EXAM:       Vitals:    09/27/17 1129   BP: 136/63   Pulse: 68   Temp: 98.3 °F (36.8 °C)       General appearance:  Alert, well-appearing, and in no distress.  Oriented to person, place, and time   Neurological: Normal speech, no focal findings noted; CN II - XII grossly intact           Musculoskeletal: Digits/nail without cyanosis/clubbing.  Normal muscle strength/tone.                 Neck: Supple, no significant adenopathy; thyroid is not enlarged                  No carotid bruit can be auscultated                Chest:  Clear to auscultation, no wheezes, rales or rhonchi, symmetric air entry     No use of accessory muscles             Cardiac: Normal rate and regular rhythm, S1 and S2 normal; PMI non-displaced          Abdomen: Soft, nontender, nondistended, no masses or organomegaly     No rebound tenderness noted; bowel sounds normal     Pulsatile aortic mass is not palpable.     No groin adenopathy      Extremities:   Good thrill in L BVT  AVF; no pulsatility     2+ L radial pulse        LAB RESULTS:  Lab Results   Component Value Date    K 4.7 09/22/2017    K 4.1 07/15/2017    K 4.6 07/14/2017    CREATININE 3.9 (H) 09/22/2017    CREATININE 2.9 (H) 07/15/2017    CREATININE 3.3 (H) 07/14/2017     Lab Results   Component Value Date    WBC 5.59 09/22/2017    WBC 5.29 07/15/2017    WBC 5.33 07/14/2017    HCT 34.9 (L) 09/22/2017    HCT 27.7 (L) 07/15/2017    HCT 28.7 (L) 07/14/2017     (L) 09/22/2017    PLT 81 (L) 07/15/2017     (L) 07/14/2017     Lab Results   Component Value Date    HGBA1C 5.3 07/15/2017    HGBA1C 5.5 05/10/2017    HGBA1C 5.6 04/26/2017     IMAGING:  AVF us: flow vol 872 ml/min (previous 1717 ml/min)   Evidence of a proximal irregularity  No stenosis    Previous:  outflow lesion - tapers / narrows in this area: PSVs 989 cm/s  Diam 5.3 - 6.7 (outflow 3.6) mm  Depth 8.3, 3.4 mm    CT abd 2015: no AAA    IMP/PLAN:  72 y.o. male with h/o stage V CKD, DM I, HTN, s/p liver transplant/EtOH cirrhosis - maintained on Prograf, h/o diastolic heart failure, sleep apnea (not compliant w CPAP as he feels claustrophobic) s/p L BVT AVF  S/p L BVT AVF 6/8/17  And now Rx on 9/25/17: Stent graft placement FLAIR 8x50, post-dilated w 7mm balloon in main body of graft and 9x20 in the 'flaired portion' into the L brachial vein    The proximal irregularity may be from the 9fr sheath needed to deliver the 9mm FLAIR stent.  If becomes an issue in future, may need trans-radial approach  Needs surveillance for any ouflow lesion recurrence - doing well now; mattie HD well  Cont ASA 81 po qd  Follow-up with me in 3 months for PE and u/s surveillance; sooner should issues arise    Silvio William MD FACS  Vascular/Endovascular Surgery

## 2017-09-28 DIAGNOSIS — E83.39 HYPERPHOSPHATEMIA: Primary | ICD-10-CM

## 2017-09-28 RX ORDER — SEVELAMER CARBONATE 800 MG/1
800 TABLET, FILM COATED ORAL
Qty: 90 TABLET | Refills: 11 | Status: SHIPPED | OUTPATIENT
Start: 2017-09-28 | End: 2019-01-01

## 2017-10-02 RX ORDER — ONDANSETRON 4 MG/1
TABLET, ORALLY DISINTEGRATING ORAL
Qty: 30 TABLET | Refills: 0 | Status: SHIPPED | OUTPATIENT
Start: 2017-10-02 | End: 2017-10-23 | Stop reason: SDUPTHER

## 2017-10-03 RX ORDER — ALLOPURINOL 100 MG/1
TABLET ORAL
Qty: 90 TABLET | Refills: 3 | Status: SHIPPED | OUTPATIENT
Start: 2017-10-03 | End: 2018-01-01 | Stop reason: SDUPTHER

## 2017-10-04 RX ORDER — AMMONIUM LACTATE 12 G/100G
LOTION TOPICAL
Qty: 225 G | Refills: 6 | Status: SHIPPED | OUTPATIENT
Start: 2017-10-04

## 2017-10-13 ENCOUNTER — TELEPHONE (OUTPATIENT)
Dept: TRANSPLANT | Facility: CLINIC | Age: 72
End: 2017-10-13

## 2017-10-13 NOTE — TELEPHONE ENCOUNTER
Received a message that pt's wife called. Coord called back but phone rang, and it never went to VM it just disconnected. Will continue to reach pt's wife.

## 2017-10-13 NOTE — TELEPHONE ENCOUNTER
Called and left a message for pt and wife to let them know that pt is due for labs on 10/16. If they cannot go that day they should call schedulers to reschedule.

## 2017-10-14 ENCOUNTER — HOSPITAL ENCOUNTER (INPATIENT)
Facility: HOSPITAL | Age: 72
LOS: 6 days | Discharge: HOME | DRG: 871 | End: 2017-10-20
Attending: EMERGENCY MEDICINE | Admitting: HOSPITALIST
Payer: MEDICARE

## 2017-10-14 ENCOUNTER — TELEPHONE (OUTPATIENT)
Dept: TRANSPLANT | Facility: CLINIC | Age: 72
End: 2017-10-14

## 2017-10-14 DIAGNOSIS — E11.29 TYPE 2 DIABETES MELLITUS, CONTROLLED, WITH RENAL COMPLICATIONS: Chronic | ICD-10-CM

## 2017-10-14 DIAGNOSIS — R78.81 BACTEREMIA: Primary | ICD-10-CM

## 2017-10-14 DIAGNOSIS — I50.43 ACUTE ON CHRONIC COMBINED SYSTOLIC AND DIASTOLIC HEART FAILURE: ICD-10-CM

## 2017-10-14 DIAGNOSIS — K72.91 HEPATIC COMA/ENCEPHALOPATHY: ICD-10-CM

## 2017-10-14 DIAGNOSIS — A41.9 SEPTICEMIA: ICD-10-CM

## 2017-10-14 DIAGNOSIS — R78.81 STREPTOCOCCAL BACTEREMIA: ICD-10-CM

## 2017-10-14 DIAGNOSIS — Z94.4 LIVER TRANSPLANTED: Chronic | ICD-10-CM

## 2017-10-14 DIAGNOSIS — M1A.9XX0 CHRONIC GOUT: Chronic | ICD-10-CM

## 2017-10-14 DIAGNOSIS — R50.9 FEVER: ICD-10-CM

## 2017-10-14 DIAGNOSIS — I33.0 INFECTIVE ENDOCARDITIS: ICD-10-CM

## 2017-10-14 DIAGNOSIS — B95.5 STREPTOCOCCAL BACTEREMIA: ICD-10-CM

## 2017-10-14 DIAGNOSIS — I10 ESSENTIAL HYPERTENSION: Chronic | ICD-10-CM

## 2017-10-14 DIAGNOSIS — Z99.2 ESRD (END STAGE RENAL DISEASE) ON DIALYSIS: ICD-10-CM

## 2017-10-14 DIAGNOSIS — M25.561 ACUTE PAIN OF RIGHT KNEE: ICD-10-CM

## 2017-10-14 DIAGNOSIS — R50.9 FEVER, UNSPECIFIED FEVER CAUSE: ICD-10-CM

## 2017-10-14 DIAGNOSIS — N18.6 ESRD (END STAGE RENAL DISEASE) ON DIALYSIS: ICD-10-CM

## 2017-10-14 DIAGNOSIS — D84.9 IMMUNOCOMPROMISED STATE: ICD-10-CM

## 2017-10-14 LAB
ALBUMIN SERPL BCP-MCNC: 3.6 G/DL
ALP SERPL-CCNC: 87 U/L
ALT SERPL W/O P-5'-P-CCNC: 13 U/L
ANION GAP SERPL CALC-SCNC: 14 MMOL/L
AST SERPL-CCNC: 20 U/L
BACTERIA #/AREA URNS HPF: NORMAL /HPF
BASOPHILS # BLD AUTO: 0.02 K/UL
BASOPHILS NFR BLD: 0.2 %
BILIRUB SERPL-MCNC: 0.8 MG/DL
BILIRUB UR QL STRIP: NEGATIVE
BUN SERPL-MCNC: 17 MG/DL
CALCIUM SERPL-MCNC: 9.2 MG/DL
CHLORIDE SERPL-SCNC: 97 MMOL/L
CLARITY UR: CLEAR
CO2 SERPL-SCNC: 30 MMOL/L
COLOR UR: ABNORMAL
CREAT SERPL-MCNC: 2.5 MG/DL
DIFFERENTIAL METHOD: ABNORMAL
EOSINOPHIL # BLD AUTO: 0.1 K/UL
EOSINOPHIL NFR BLD: 0.9 %
ERYTHROCYTE [DISTWIDTH] IN BLOOD BY AUTOMATED COUNT: 16.9 %
EST. GFR  (AFRICAN AMERICAN): 28 ML/MIN/1.73 M^2
EST. GFR  (NON AFRICAN AMERICAN): 25 ML/MIN/1.73 M^2
GLUCOSE SERPL-MCNC: 175 MG/DL
GLUCOSE UR QL STRIP: NEGATIVE
HCT VFR BLD AUTO: 34.7 %
HGB BLD-MCNC: 11.6 G/DL
HGB UR QL STRIP: NEGATIVE
HYALINE CASTS #/AREA URNS LPF: 1 /LPF
KETONES UR QL STRIP: NEGATIVE
LACTATE SERPL-SCNC: 1.1 MMOL/L
LEUKOCYTE ESTERASE UR QL STRIP: NEGATIVE
LYMPHOCYTES # BLD AUTO: 0.8 K/UL
LYMPHOCYTES NFR BLD: 7.2 %
MAGNESIUM SERPL-MCNC: 1.8 MG/DL
MCH RBC QN AUTO: 28.6 PG
MCHC RBC AUTO-ENTMCNC: 33.4 G/DL
MCV RBC AUTO: 86 FL
MICROSCOPIC COMMENT: NORMAL
MONOCYTES # BLD AUTO: 1.1 K/UL
MONOCYTES NFR BLD: 10.6 %
NEUTROPHILS # BLD AUTO: 8.7 K/UL
NEUTROPHILS NFR BLD: 80.6 %
NITRITE UR QL STRIP: NEGATIVE
PH UR STRIP: 6 [PH] (ref 5–8)
PLATELET # BLD AUTO: 75 K/UL
PMV BLD AUTO: 11.5 FL
POCT GLUCOSE: 172 MG/DL (ref 70–110)
POCT GLUCOSE: 206 MG/DL (ref 70–110)
POTASSIUM SERPL-SCNC: 3.9 MMOL/L
PROT SERPL-MCNC: 8.5 G/DL
PROT UR QL STRIP: ABNORMAL
RBC # BLD AUTO: 4.06 M/UL
RBC #/AREA URNS HPF: 2 /HPF (ref 0–4)
SODIUM SERPL-SCNC: 141 MMOL/L
SP GR UR STRIP: 1.01 (ref 1–1.03)
URN SPEC COLLECT METH UR: ABNORMAL
UROBILINOGEN UR STRIP-ACNC: NEGATIVE EU/DL
WBC # BLD AUTO: 10.76 K/UL
WBC #/AREA URNS HPF: 1 /HPF (ref 0–5)

## 2017-10-14 PROCEDURE — 12000002 HC ACUTE/MED SURGE SEMI-PRIVATE ROOM: Mod: NTX

## 2017-10-14 PROCEDURE — 25000003 PHARM REV CODE 250: Mod: NTX | Performed by: EMERGENCY MEDICINE

## 2017-10-14 PROCEDURE — 83036 HEMOGLOBIN GLYCOSYLATED A1C: CPT | Mod: NTX

## 2017-10-14 PROCEDURE — 80053 COMPREHEN METABOLIC PANEL: CPT | Mod: NTX

## 2017-10-14 PROCEDURE — 87086 URINE CULTURE/COLONY COUNT: CPT | Mod: NTX

## 2017-10-14 PROCEDURE — 81000 URINALYSIS NONAUTO W/SCOPE: CPT | Mod: NTX

## 2017-10-14 PROCEDURE — 83735 ASSAY OF MAGNESIUM: CPT | Mod: NTX

## 2017-10-14 PROCEDURE — 83605 ASSAY OF LACTIC ACID: CPT | Mod: NTX

## 2017-10-14 PROCEDURE — 63600175 PHARM REV CODE 636 W HCPCS: Mod: NTX | Performed by: NURSE PRACTITIONER

## 2017-10-14 PROCEDURE — 63600175 PHARM REV CODE 636 W HCPCS: Mod: NTX | Performed by: EMERGENCY MEDICINE

## 2017-10-14 PROCEDURE — 87147 CULTURE TYPE IMMUNOLOGIC: CPT | Mod: NTX

## 2017-10-14 PROCEDURE — 93005 ELECTROCARDIOGRAM TRACING: CPT | Mod: NTX

## 2017-10-14 PROCEDURE — 87186 SC STD MICRODIL/AGAR DIL: CPT | Mod: NTX

## 2017-10-14 PROCEDURE — 85025 COMPLETE CBC W/AUTO DIFF WBC: CPT | Mod: NTX

## 2017-10-14 PROCEDURE — 99285 EMERGENCY DEPT VISIT HI MDM: CPT | Mod: 25

## 2017-10-14 PROCEDURE — 82962 GLUCOSE BLOOD TEST: CPT

## 2017-10-14 PROCEDURE — 80197 ASSAY OF TACROLIMUS: CPT | Mod: NTX

## 2017-10-14 PROCEDURE — 96368 THER/DIAG CONCURRENT INF: CPT

## 2017-10-14 PROCEDURE — 96365 THER/PROPH/DIAG IV INF INIT: CPT

## 2017-10-14 PROCEDURE — 87040 BLOOD CULTURE FOR BACTERIA: CPT | Mod: 59,NTX

## 2017-10-14 PROCEDURE — 36415 COLL VENOUS BLD VENIPUNCTURE: CPT | Mod: NTX

## 2017-10-14 PROCEDURE — 93010 ELECTROCARDIOGRAM REPORT: CPT | Mod: ,,, | Performed by: INTERNAL MEDICINE

## 2017-10-14 PROCEDURE — 96367 TX/PROPH/DG ADDL SEQ IV INF: CPT

## 2017-10-14 PROCEDURE — 96366 THER/PROPH/DIAG IV INF ADDON: CPT

## 2017-10-14 RX ORDER — FUROSEMIDE 40 MG/1
80 TABLET ORAL 2 TIMES DAILY
Status: DISCONTINUED | OUTPATIENT
Start: 2017-10-14 | End: 2017-10-15

## 2017-10-14 RX ORDER — MONTELUKAST SODIUM 10 MG/1
10 TABLET ORAL NIGHTLY
Status: DISCONTINUED | OUTPATIENT
Start: 2017-10-14 | End: 2017-10-20 | Stop reason: HOSPADM

## 2017-10-14 RX ORDER — ALLOPURINOL 100 MG/1
100 TABLET ORAL DAILY
Status: DISCONTINUED | OUTPATIENT
Start: 2017-10-15 | End: 2017-10-20 | Stop reason: HOSPADM

## 2017-10-14 RX ORDER — CLONAZEPAM 0.5 MG/1
0.5 TABLET ORAL NIGHTLY
Status: DISCONTINUED | OUTPATIENT
Start: 2017-10-14 | End: 2017-10-20 | Stop reason: HOSPADM

## 2017-10-14 RX ORDER — INSULIN ASPART 100 [IU]/ML
1-10 INJECTION, SOLUTION INTRAVENOUS; SUBCUTANEOUS
Status: DISCONTINUED | OUTPATIENT
Start: 2017-10-14 | End: 2017-10-14

## 2017-10-14 RX ORDER — IBUPROFEN 200 MG
16 TABLET ORAL
Status: DISCONTINUED | OUTPATIENT
Start: 2017-10-14 | End: 2017-10-14

## 2017-10-14 RX ORDER — ONDANSETRON 4 MG/1
4 TABLET, ORALLY DISINTEGRATING ORAL EVERY 6 HOURS PRN
Status: DISCONTINUED | OUTPATIENT
Start: 2017-10-14 | End: 2017-10-20 | Stop reason: HOSPADM

## 2017-10-14 RX ORDER — SEVELAMER CARBONATE 800 MG/1
800 TABLET, FILM COATED ORAL
Status: DISCONTINUED | OUTPATIENT
Start: 2017-10-15 | End: 2017-10-20 | Stop reason: HOSPADM

## 2017-10-14 RX ORDER — INSULIN ASPART 100 [IU]/ML
1-10 INJECTION, SOLUTION INTRAVENOUS; SUBCUTANEOUS
Status: DISCONTINUED | OUTPATIENT
Start: 2017-10-14 | End: 2017-10-20 | Stop reason: HOSPADM

## 2017-10-14 RX ORDER — CLONIDINE HYDROCHLORIDE 0.1 MG/1
0.1 TABLET ORAL 3 TIMES DAILY
Status: DISCONTINUED | OUTPATIENT
Start: 2017-10-14 | End: 2017-10-20 | Stop reason: HOSPADM

## 2017-10-14 RX ORDER — ACETAMINOPHEN 500 MG
1000 TABLET ORAL
Status: COMPLETED | OUTPATIENT
Start: 2017-10-14 | End: 2017-10-14

## 2017-10-14 RX ORDER — GLUCAGON 1 MG
1 KIT INJECTION
Status: DISCONTINUED | OUTPATIENT
Start: 2017-10-14 | End: 2017-10-14

## 2017-10-14 RX ORDER — HEPARIN SODIUM 5000 [USP'U]/ML
5000 INJECTION, SOLUTION INTRAVENOUS; SUBCUTANEOUS EVERY 8 HOURS
Status: DISCONTINUED | OUTPATIENT
Start: 2017-10-14 | End: 2017-10-20 | Stop reason: HOSPADM

## 2017-10-14 RX ORDER — CARVEDILOL 6.25 MG/1
6.25 TABLET ORAL 2 TIMES DAILY
Status: DISCONTINUED | OUTPATIENT
Start: 2017-10-14 | End: 2017-10-20 | Stop reason: HOSPADM

## 2017-10-14 RX ORDER — HYDROCODONE BITARTRATE AND ACETAMINOPHEN 5; 325 MG/1; MG/1
1 TABLET ORAL EVERY 6 HOURS PRN
Status: DISCONTINUED | OUTPATIENT
Start: 2017-10-14 | End: 2017-10-14

## 2017-10-14 RX ORDER — PANTOPRAZOLE SODIUM 40 MG/1
40 TABLET, DELAYED RELEASE ORAL DAILY
Status: DISCONTINUED | OUTPATIENT
Start: 2017-10-15 | End: 2017-10-20 | Stop reason: HOSPADM

## 2017-10-14 RX ORDER — GLUCAGON 1 MG
1 KIT INJECTION
Status: DISCONTINUED | OUTPATIENT
Start: 2017-10-14 | End: 2017-10-20 | Stop reason: HOSPADM

## 2017-10-14 RX ORDER — IBUPROFEN 200 MG
24 TABLET ORAL
Status: DISCONTINUED | OUTPATIENT
Start: 2017-10-14 | End: 2017-10-20 | Stop reason: HOSPADM

## 2017-10-14 RX ORDER — OXYCODONE AND ACETAMINOPHEN 5; 325 MG/1; MG/1
2 TABLET ORAL EVERY 6 HOURS PRN
Status: DISCONTINUED | OUTPATIENT
Start: 2017-10-14 | End: 2017-10-20 | Stop reason: HOSPADM

## 2017-10-14 RX ORDER — IBUPROFEN 200 MG
16 TABLET ORAL
Status: DISCONTINUED | OUTPATIENT
Start: 2017-10-14 | End: 2017-10-20 | Stop reason: HOSPADM

## 2017-10-14 RX ORDER — NAPROXEN SODIUM 220 MG/1
81 TABLET, FILM COATED ORAL DAILY
Status: DISCONTINUED | OUTPATIENT
Start: 2017-10-15 | End: 2017-10-20 | Stop reason: HOSPADM

## 2017-10-14 RX ORDER — ACETAMINOPHEN 325 MG/1
650 TABLET ORAL EVERY 6 HOURS PRN
Status: DISCONTINUED | OUTPATIENT
Start: 2017-10-14 | End: 2017-10-20 | Stop reason: HOSPADM

## 2017-10-14 RX ORDER — IBUPROFEN 200 MG
24 TABLET ORAL
Status: DISCONTINUED | OUTPATIENT
Start: 2017-10-14 | End: 2017-10-14

## 2017-10-14 RX ORDER — CIPROFLOXACIN 2 MG/ML
400 INJECTION, SOLUTION INTRAVENOUS
Status: COMPLETED | OUTPATIENT
Start: 2017-10-14 | End: 2017-10-14

## 2017-10-14 RX ORDER — TACROLIMUS 0.5 MG/1
0.5 CAPSULE ORAL EVERY MORNING
Status: DISCONTINUED | OUTPATIENT
Start: 2017-10-15 | End: 2017-10-20 | Stop reason: HOSPADM

## 2017-10-14 RX ORDER — CIPROFLOXACIN 2 MG/ML
400 INJECTION, SOLUTION INTRAVENOUS
Status: DISCONTINUED | OUTPATIENT
Start: 2017-10-16 | End: 2017-10-15

## 2017-10-14 RX ORDER — MINOXIDIL 2.5 MG/1
5 TABLET ORAL 2 TIMES DAILY
Status: DISCONTINUED | OUTPATIENT
Start: 2017-10-14 | End: 2017-10-17

## 2017-10-14 RX ORDER — HYDRALAZINE HYDROCHLORIDE 25 MG/1
100 TABLET, FILM COATED ORAL 3 TIMES DAILY
Status: DISCONTINUED | OUTPATIENT
Start: 2017-10-14 | End: 2017-10-20 | Stop reason: HOSPADM

## 2017-10-14 RX ORDER — DOXAZOSIN 4 MG/1
8 TABLET ORAL DAILY
Status: DISCONTINUED | OUTPATIENT
Start: 2017-10-15 | End: 2017-10-20 | Stop reason: HOSPADM

## 2017-10-14 RX ADMIN — ACETAMINOPHEN 1000 MG: 500 TABLET ORAL at 01:10

## 2017-10-14 RX ADMIN — FUROSEMIDE 80 MG: 40 TABLET ORAL at 10:10

## 2017-10-14 RX ADMIN — MONTELUKAST SODIUM 10 MG: 10 TABLET, FILM COATED ORAL at 10:10

## 2017-10-14 RX ADMIN — HYDRALAZINE HYDROCHLORIDE 100 MG: 25 TABLET ORAL at 10:10

## 2017-10-14 RX ADMIN — CLONAZEPAM 0.5 MG: 0.5 TABLET ORAL at 10:10

## 2017-10-14 RX ADMIN — HEPARIN SODIUM 5000 UNITS: 5000 INJECTION, SOLUTION INTRAVENOUS; SUBCUTANEOUS at 10:10

## 2017-10-14 RX ADMIN — CIPROFLOXACIN 400 MG: 2 INJECTION, SOLUTION INTRAVENOUS at 02:10

## 2017-10-14 RX ADMIN — PIPERACILLIN SODIUM AND TAZOBACTAM SODIUM 4.5 G: 4; .5 INJECTION, POWDER, LYOPHILIZED, FOR SOLUTION INTRAVENOUS at 02:10

## 2017-10-14 RX ADMIN — INSULIN DETEMIR 20 UNITS: 100 INJECTION, SOLUTION SUBCUTANEOUS at 10:10

## 2017-10-14 RX ADMIN — MINOXIDIL 5 MG: 2.5 TABLET ORAL at 10:10

## 2017-10-14 RX ADMIN — CARVEDILOL 6.25 MG: 6.25 TABLET, FILM COATED ORAL at 10:10

## 2017-10-14 RX ADMIN — OXYCODONE AND ACETAMINOPHEN 2 TABLET: 5; 325 TABLET ORAL at 06:10

## 2017-10-14 RX ADMIN — VANCOMYCIN HYDROCHLORIDE 250 MG: 500 INJECTION, POWDER, LYOPHILIZED, FOR SOLUTION INTRAVENOUS at 03:10

## 2017-10-14 RX ADMIN — CLONIDINE HYDROCHLORIDE 0.1 MG: 0.1 TABLET ORAL at 10:10

## 2017-10-14 NOTE — ED TRIAGE NOTES
Pt to ED with c/o extremity weakness and R leg pain. Upon arrival pt unable to ambulate. Pt extremely weak. Pt rectal temp of 105. Pt able to answer questions however unable to say full sentences. Pt was at dialysis when leg pain began which was estimated around 8 am. Pt has a hx of liver transplant 2001. Pt placed on cardiac monitor.

## 2017-10-14 NOTE — ED PROVIDER NOTES
Encounter Date: 10/14/2017    SCRIBE #1 NOTE: I, Nicole Levin, am scribing for, and in the presence of,  Kyler Melton MD. I have scribed the following portions of the note - Other sections scribed: HPI and ROS.       History     Chief Complaint   Patient presents with    Extremity Weakness     Pt weak and complaining of severe right leg pain following dialysis. Temp 101.     CC: Extremity Pain, Chills, and Fever    HPI: This 72 y.o. Male with PMHx of DM type II, transplanted liver, elevated PSA, hepatitis C, Bishop's esophagus, peripheral neuropathy, HTN, HLD, BPH, anemia, cirrhosis, chronic kidney disease, heart failure, sleep apnea, back pain, immune disorder, arthritis, and diabetic retinopathy presents to the ED c/o severe right lower extremity pain that began following dialysis shortly PTA. Pt's relative states pt was screaming due to pain. She also adds pt usually feels ill before and after dialysis, but it was never this bad. Pt also c/o chills and fever (rectal tmax: 105 F) that began yesterday. Pt states he began feeling unwell yesterday when he felt hot. Pt completed his dialysis treatment today. Pt denies ear pain, sore throat, eye pain, cough, SOB, chest pain, abdominal pain, N/V/D, dysuria, rash, and headaches.      The history is provided by the patient and a relative. No  was used.     Review of patient's allergies indicates:   Allergen Reactions    Corticosteroids (glucocorticoids) Other (See Comments)     Leg swelling    Hydrocortisone      Leg swelling    Neuromuscular blockers, steroidal      Leg swelling    Ribavirin      Intolerance, arthralgias     Past Medical History:   Diagnosis Date    Anemia     Arthritis     Back pain     Bishop's esophagus     BPH (benign prostatic hypertrophy)     Chronic kidney disease     Cirrhosis     Diabetes mellitus     Diabetes mellitus, type 2     Diabetes with neurologic complications     Diabetic retinopathy      Elevated PSA     Heart failure     Hemolytic anemia     Hepatitis Hepatitis C    Hepatitis C     Hyperlipidemia     Hypertension     Hypertension     Immune disorder     Liver transplanted     Peripheral neuropathy     Sleep apnea     Transfusion reaction     Hep C from prev. blood transfusion    Transplanted liver     Trouble in sleeping     Type 2 diabetes mellitus with ophthalmic manifestations     Type 2 diabetes with peripheral circulatory disorder, controlled     Type II or unspecified type diabetes mellitus with renal manifestations, uncontrolled(250.42)     Type II or unspecified type diabetes mellitus with renal manifestations, uncontrolled(250.42)      Past Surgical History:   Procedure Laterality Date    broken nose      CATARACT EXTRACTION Bilateral     EYE SURGERY      cataracts    FEMUR SURGERY      FRACTURE SURGERY      right femur fracture     LIVER TRANSPLANT  2001    PORTACATH PLACEMENT Left     SKIN GRAFT      graft from right thigh , applied to the left back and left arm.     Family History   Problem Relation Age of Onset    Cancer Mother      cancer 55 years ago    Coronary artery disease Father     Hypertension Father     Diabetes Father     Heart disease Father     Cancer Sister      breast CA    Colon cancer Neg Hx      Social History   Substance Use Topics    Smoking status: Former Smoker     Quit date: 7/30/1998    Smokeless tobacco: Never Used      Comment: pt reports quitting in 1998    Alcohol use No      Comment: pt reports hx of alcholism and reports quit in 1998     Review of Systems   Constitutional: Positive for chills and fever (rectal tmax: 105 F). Negative for diaphoresis.   HENT: Negative for ear pain.    Eyes: Negative for pain.   Respiratory: Negative for cough and shortness of breath.    Cardiovascular: Negative for chest pain.   Gastrointestinal: Negative for abdominal pain, diarrhea, nausea and vomiting.   Genitourinary: Negative for  dysuria.   Musculoskeletal: Positive for myalgias ( severe right lower extremity pain). Negative for back pain.   Skin: Negative for rash.   Neurological: Negative for headaches.       Physical Exam     Initial Vitals [10/14/17 1331]   BP Pulse Resp Temp SpO2   (!) 141/64 103 18 (!) 101 °F (38.3 °C) 97 %      MAP       89.67         Physical Exam  The patient was examined specifically for the following:   General:No significant distress, Good color, Warm and dry. Head and neck:Scalp atraumatic, Neck supple. Neurological:Appropriate conversation, Gross motor deficits. Eyes:Conjugate gaze, Clear corneas. ENT: No epistaxis. Cardiac: Regular rate and rhythm, Grossly normal heart tones. Pulmonary: Wheezing, Rales. Gastrointestinal: Abdominal tenderness, Abdominal distention. Musculoskeletal: Extremity deformity, Apparent pain with range of motion of the joints. Skin: Rash.   The findings on examination were normal except for the following: The patient's rectal temperature is 105°.  The patient is alert and conversational.  He is tachycardic.  His blood pressures 141/64.  The patient is warm to touch.  The abdomen is soft.  The lungs are clear and free wheezing rales of the rhonchi.  Extremities are nontender.  There is no pale range of motion of any joints.  The patient has no obvious rash.  He has brisk pulses in the lower extremities bilaterally.  ED Course   Critical Care  Date/Time: 10/27/2017 11:26 AM  Performed by: KEVIN FOSTER  Authorized by: SAW MAYERS   Direct patient critical care time: 22 minutes  Additional history critical care time: 11 minutes  Ordering / reviewing critical care time: 9 minutes  Documentation critical care time: 14 minutes  Consulting other physicians critical care time: 3 minutes  Consult with family critical care time: 3 minutes  Total critical care time (exclusive of procedural time) : 62 minutes  Critical care time was exclusive of separately billable procedures and  treating other patients and teaching time.  Critical care was necessary to treat or prevent imminent or life-threatening deterioration of the following conditions: metabolic crisis and sepsis.  Critical care was time spent personally by me on the following activities: discussions with primary provider, discussions with consultants, evaluation of patient's response to treatment, examination of patient, obtaining history from patient or surrogate, ordering and performing treatments and interventions, ordering and review of laboratory studies, ordering and review of radiographic studies, pulse oximetry, re-evaluation of patient's condition and review of old charts.        Labs Reviewed   URINALYSIS - Abnormal; Notable for the following:        Result Value    Protein, UA 2+ (*)     All other components within normal limits   COMPREHENSIVE METABOLIC PANEL - Abnormal; Notable for the following:     CO2 30 (*)     Glucose 175 (*)     Creatinine 2.5 (*)     Total Protein 8.5 (*)     eGFR if  28 (*)     eGFR if non  25 (*)     All other components within normal limits   CBC W/ AUTO DIFFERENTIAL - Abnormal; Notable for the following:     RBC 4.06 (*)     Hemoglobin 11.6 (*)     Hematocrit 34.7 (*)     RDW 16.9 (*)     Platelets 75 (*)     Gran # 8.7 (*)     Lymph # 0.8 (*)     Mono # 1.1 (*)     Gran% 80.6 (*)     Lymph% 7.2 (*)     All other components within normal limits   POCT GLUCOSE - Abnormal; Notable for the following:     POCT Glucose 172 (*)     All other components within normal limits   LACTIC ACID, PLASMA   MAGNESIUM   URINALYSIS MICROSCOPIC     EKG Readings: (Independently Interpreted)   Patient's electrocardiogram reveals a sinus tachycardia with a first-degree AV block.  There is poor R-wave progression across precordium.  There are nonspecific ST segment changes or T-wave changes.  There is no evidence of acute myocardial infarction or malignant arrhythmia.       X-Rays:    Independently Interpreted Readings:   Other Readings:  Chest x-ray fails to reveal pneumothorax pneumonia pleural effusion.    Medical decision making: Given the above, this patient presents to emergency room with a temperature 105°.  The patient is a liver failure transplant patient who is on tacrolimus.  The patient was treated with Tylenol in the emergency room his temperature fell to 101°.  The patient improved markedly.  Repeat examination reveals knee pain on the right.  There is no joint effusion erythema or ecchymosis.  The pain is anterior and infrapatellar.  There is no posterior knee tenderness the patient has brisk pulses in the feet bilaterally.  I doubt lower extremity vascular insufficiency.  I could not find a focus of infection.  The patient made urine his urine was clean.  He will be admitted to telemetry for sepsis.  He did have a normal white count and a normal lactate.  I discussed this case with Dr. Bishop, hepatology who is comfortable with the patient staying at the [facility].  I presented the case to , except him on her service.  We collaborated on the orders.              Scribe Attestation:   Scribe #1: I performed the above scribed service and the documentation accurately describes the services I performed. I attest to the accuracy of the note.    Attending Attestation:           Physician Attestation for Scribe:  Physician Attestation Statement for Scribe #1: I, Kyler Melton MD, reviewed documentation, as scribed by Nicole Levin in my presence, and it is both accurate and complete.                 ED Course      Clinical Impression:   The primary encounter diagnosis was Fever, unspecified fever cause. Diagnoses of Immunocompromised state, Acute pain of right knee, Bacteremia, Infective endocarditis, and Fever were also pertinent to this visit.                           Kyler Melton MD  10/17/17 2114       Kyler Melton MD  10/27/17 112

## 2017-10-14 NOTE — ED NOTES
MD Melton notified that pt noted to be 85% on RA, placed on 3 liters o2 via NC with increase in o2 sats being 93%. MD verbalized understanding

## 2017-10-14 NOTE — TELEPHONE ENCOUNTER
Admitted to [facility] with a fever of 105.  Some knee pain but no obvious source of sepsis.    - will be treated with antibiotics and they will let us know if there is a need to transfer here.  - hemodynamically stable.

## 2017-10-14 NOTE — ED NOTES
Spoke with Purnima at [facility] [phone] regarding dose of Vanc which wife had reported pt received. Pt received 1 gm vanc by Dr. De Los Santos.

## 2017-10-15 LAB
AMMONIA PLAS-SCNC: 45 UMOL/L
ANION GAP SERPL CALC-SCNC: 10 MMOL/L
BASOPHILS # BLD AUTO: 0.03 K/UL
BASOPHILS NFR BLD: 0.4 %
BUN SERPL-MCNC: 29 MG/DL
CALCIUM SERPL-MCNC: 8.3 MG/DL
CHLORIDE SERPL-SCNC: 96 MMOL/L
CO2 SERPL-SCNC: 33 MMOL/L
CREAT SERPL-MCNC: 3.5 MG/DL
DIFFERENTIAL METHOD: ABNORMAL
EOSINOPHIL # BLD AUTO: 0.1 K/UL
EOSINOPHIL NFR BLD: 1 %
ERYTHROCYTE [DISTWIDTH] IN BLOOD BY AUTOMATED COUNT: 16.4 %
EST. GFR  (AFRICAN AMERICAN): 19 ML/MIN/1.73 M^2
EST. GFR  (NON AFRICAN AMERICAN): 16 ML/MIN/1.73 M^2
ESTIMATED AVG GLUCOSE: 117 MG/DL
GLUCOSE SERPL-MCNC: 149 MG/DL
HBA1C MFR BLD HPLC: 5.7 %
HCT VFR BLD AUTO: 30.7 %
HGB BLD-MCNC: 10.2 G/DL
LYMPHOCYTES # BLD AUTO: 0.8 K/UL
LYMPHOCYTES NFR BLD: 9.4 %
MCH RBC QN AUTO: 28.6 PG
MCHC RBC AUTO-ENTMCNC: 33.2 G/DL
MCV RBC AUTO: 86 FL
MONOCYTES # BLD AUTO: 1 K/UL
MONOCYTES NFR BLD: 12.4 %
NEUTROPHILS # BLD AUTO: 6.1 K/UL
NEUTROPHILS NFR BLD: 76.5 %
PLATELET # BLD AUTO: 67 K/UL
PMV BLD AUTO: ABNORMAL FL
POCT GLUCOSE: 142 MG/DL (ref 70–110)
POCT GLUCOSE: 155 MG/DL (ref 70–110)
POCT GLUCOSE: 92 MG/DL (ref 70–110)
POCT GLUCOSE: 94 MG/DL (ref 70–110)
POTASSIUM SERPL-SCNC: 4.1 MMOL/L
RBC # BLD AUTO: 3.57 M/UL
SODIUM SERPL-SCNC: 139 MMOL/L
URATE SERPL-MCNC: 2.7 MG/DL
WBC # BLD AUTO: 8 K/UL

## 2017-10-15 PROCEDURE — 12000002 HC ACUTE/MED SURGE SEMI-PRIVATE ROOM: Mod: NTX

## 2017-10-15 PROCEDURE — 85025 COMPLETE CBC W/AUTO DIFF WBC: CPT | Mod: NTX

## 2017-10-15 PROCEDURE — 84550 ASSAY OF BLOOD/URIC ACID: CPT | Mod: NTX

## 2017-10-15 PROCEDURE — 63600175 PHARM REV CODE 636 W HCPCS: Mod: NTX | Performed by: INTERNAL MEDICINE

## 2017-10-15 PROCEDURE — 36415 COLL VENOUS BLD VENIPUNCTURE: CPT | Mod: NTX

## 2017-10-15 PROCEDURE — 25000003 PHARM REV CODE 250: Mod: NTX | Performed by: EMERGENCY MEDICINE

## 2017-10-15 PROCEDURE — 82140 ASSAY OF AMMONIA: CPT | Mod: NTX

## 2017-10-15 PROCEDURE — 63600175 PHARM REV CODE 636 W HCPCS: Mod: NTX | Performed by: EMERGENCY MEDICINE

## 2017-10-15 PROCEDURE — 80048 BASIC METABOLIC PNL TOTAL CA: CPT | Mod: NTX

## 2017-10-15 PROCEDURE — 25000003 PHARM REV CODE 250: Mod: NTX | Performed by: HOSPITALIST

## 2017-10-15 PROCEDURE — 25000003 PHARM REV CODE 250: Mod: NTX | Performed by: INTERNAL MEDICINE

## 2017-10-15 RX ORDER — HYDROXYZINE HYDROCHLORIDE 25 MG/1
50 TABLET, FILM COATED ORAL 3 TIMES DAILY PRN
Status: DISCONTINUED | OUTPATIENT
Start: 2017-10-15 | End: 2017-10-20 | Stop reason: HOSPADM

## 2017-10-15 RX ORDER — COLCHICINE 0.6 MG/1
0.6 TABLET, FILM COATED ORAL DAILY
Status: DISCONTINUED | OUTPATIENT
Start: 2017-10-16 | End: 2017-10-20 | Stop reason: HOSPADM

## 2017-10-15 RX ADMIN — CEFTRIAXONE 1 G: 1 INJECTION, SOLUTION INTRAVENOUS at 04:10

## 2017-10-15 RX ADMIN — PANTOPRAZOLE SODIUM 40 MG: 40 TABLET, DELAYED RELEASE ORAL at 09:10

## 2017-10-15 RX ADMIN — MONTELUKAST SODIUM 10 MG: 10 TABLET, FILM COATED ORAL at 09:10

## 2017-10-15 RX ADMIN — INSULIN DETEMIR 20 UNITS: 100 INJECTION, SOLUTION SUBCUTANEOUS at 10:10

## 2017-10-15 RX ADMIN — FUROSEMIDE 80 MG: 40 TABLET ORAL at 09:10

## 2017-10-15 RX ADMIN — HYDRALAZINE HYDROCHLORIDE 100 MG: 25 TABLET ORAL at 09:10

## 2017-10-15 RX ADMIN — CLONAZEPAM 0.5 MG: 0.5 TABLET ORAL at 09:10

## 2017-10-15 RX ADMIN — HEPARIN SODIUM 5000 UNITS: 5000 INJECTION, SOLUTION INTRAVENOUS; SUBCUTANEOUS at 05:10

## 2017-10-15 RX ADMIN — HYDROXYZINE HYDROCHLORIDE 50 MG: 25 TABLET, FILM COATED ORAL at 04:10

## 2017-10-15 RX ADMIN — CLONIDINE HYDROCHLORIDE 0.1 MG: 0.1 TABLET ORAL at 09:10

## 2017-10-15 RX ADMIN — HEPARIN SODIUM 5000 UNITS: 5000 INJECTION, SOLUTION INTRAVENOUS; SUBCUTANEOUS at 10:10

## 2017-10-15 RX ADMIN — ALLOPURINOL 100 MG: 100 TABLET ORAL at 09:10

## 2017-10-15 RX ADMIN — OXYCODONE AND ACETAMINOPHEN 2 TABLET: 5; 325 TABLET ORAL at 12:10

## 2017-10-15 RX ADMIN — OXYCODONE AND ACETAMINOPHEN 2 TABLET: 5; 325 TABLET ORAL at 05:10

## 2017-10-15 RX ADMIN — HEPARIN SODIUM 5000 UNITS: 5000 INJECTION, SOLUTION INTRAVENOUS; SUBCUTANEOUS at 04:10

## 2017-10-15 RX ADMIN — DOXAZOSIN 8 MG: 4 TABLET ORAL at 09:10

## 2017-10-15 RX ADMIN — ASPIRIN 81 MG 81 MG: 81 TABLET ORAL at 09:10

## 2017-10-15 RX ADMIN — CLONIDINE HYDROCHLORIDE 0.1 MG: 0.1 TABLET ORAL at 05:10

## 2017-10-15 RX ADMIN — HYDRALAZINE HYDROCHLORIDE 100 MG: 25 TABLET ORAL at 05:10

## 2017-10-15 RX ADMIN — MINOXIDIL 5 MG: 2.5 TABLET ORAL at 10:10

## 2017-10-15 RX ADMIN — CARVEDILOL 6.25 MG: 6.25 TABLET, FILM COATED ORAL at 09:10

## 2017-10-15 RX ADMIN — TACROLIMUS 0.5 MG: 0.5 CAPSULE ORAL at 09:10

## 2017-10-15 RX ADMIN — MINOXIDIL 5 MG: 2.5 TABLET ORAL at 09:10

## 2017-10-15 RX ADMIN — CARVEDILOL 6.25 MG: 6.25 TABLET, FILM COATED ORAL at 10:10

## 2017-10-15 RX ADMIN — PIPERACILLIN SODIUM AND TAZOBACTAM SODIUM 4.5 G: 4; .5 INJECTION, POWDER, FOR SOLUTION INTRAVENOUS at 01:10

## 2017-10-15 NOTE — PLAN OF CARE
10/15/17 0917   Discharge Assessment   Assessment Type Discharge Planning Assessment   Assessment information obtained from? Patient   Communicated expected length of stay with patient/caregiver yes   Prior to hospitilization cognitive status: Alert/Oriented   Prior to hospitalization functional status: Independent   Current cognitive status: Alert/Oriented   Current Functional Status: Independent   Lives With spouse   Able to Return to Prior Arrangements yes   Is patient able to care for self after discharge? Yes   Patient's perception of discharge disposition home or selfcare   Readmission Within The Last 30 Days no previous admission in last 30 days   Patient currently being followed by outpatient case management? No   Patient currently receives home health services? No   Patient currently receives any other outside agency services? No   Equipment Currently Used at Home none   Do you have any problems affording any of your prescribed medications? No   Is the patient taking medications as prescribed? yes   Does the patient have transportation home? Yes   Transportation Available family or friend will provide   Does the patient receive services at the [facility]? Yes   Discharge Plan A Home with family   Discharge Plan B Home with family   Patient/Family In Agreement With Plan yes   Does the patient currently use HME? No     PCP: Donnie Owens NP

## 2017-10-15 NOTE — HOSPITAL COURSE
Pt admitted with fever and blood cultures growing Strep species. Pt has pain in right knee with limited range of motion. Ultrasound to rule out DVT pending. Nephrology consulted (HD on TTS). ID consulted. On immunosupressants for liver transplant 2001. Treated hep c.     Blood culture growing GroupB strep, On rocephin, repeat culture negative to date, ECHO without evidence of vegetations, ?moderate pericardial effusion without tamponade, ortho consulted for Rleg pain and decreased ROM.    10/17: ortho aspirated right knee, culture negative.   10/18: continue ABx, lactulose and rifaximin, gout therapy   10/19: right knee bleeding s/p steroid injection and aspiration, no crystals on fluid, cultures negative, plan for vancomycin with HD x 4weeks for strep bacteremia.   was consulted to arrange Vancomycin for. The rest of the hospital course was unremarkable and the patient was requesting discharge. Activity as tolerated. Diet- low NA. ADA 1800 danny diet. Follow up with PCP in one week
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
## 2017-10-15 NOTE — ASSESSMENT & PLAN NOTE
HX states hep C treatment knocked out his new liver; continue prograf; hepatic enzymes WNL  Prograf level

## 2017-10-15 NOTE — ASSESSMENT & PLAN NOTE
AEB 2D echo 5/2016 LVEF 65% + DD + PA 47. Kidney disease contributory  Continue home diuretis, patient is ogliuric  Consult to Nephrology  Repeat 2D echo  Continue ASA/BB/monoxidil/demadex/lasix/hydralazine/minoxidil

## 2017-10-15 NOTE — NURSING
Report given to CHRISSIE Arce. Patient not in room, in ultrasound for scheduled procedure. 12 hour chart check completed

## 2017-10-15 NOTE — ASSESSMENT & PLAN NOTE
Fever at presentation 105F improved at the time of interview.  Strep bacteremia    Supportive care for temperature    Antibiotics narrowed to Rocephin  H/o c.diff

## 2017-10-15 NOTE — CONSULTS
72 y male with hx of cirrhosis secondary to hep c and etoh sp liver tx and now esrd admitted after developing fever while on HD. Had a rectal temp of 105!!!    Renal consult requested for hd    Very sleepy, nad. Having a tough time with hd, having low bp and long recup time. Not eating well, sleeps on his recliner.     Recently sevelamer was added    Co R knee pain, denies cns ent cp gi gu sx. Has some dry scaly lesions over both of his feet.    Denies CNS ENT CP GI  SX  Past Medical History:   Diagnosis Date    Anemia     Arthritis     Back pain     Bishop's esophagus     BPH (benign prostatic hypertrophy)     Chronic kidney disease     Cirrhosis     Diabetes mellitus     Diabetes mellitus, type 2     Diabetes with neurologic complications     Diabetic retinopathy     Elevated PSA     Heart failure     Hemolytic anemia     Hepatitis Hepatitis C    Hepatitis C     Hyperlipidemia     Hypertension     Hypertension     Immune disorder     Liver transplanted     Peripheral neuropathy     Sleep apnea     Transfusion reaction     Hep C from prev. blood transfusion    Transplanted liver     Trouble in sleeping     Type 2 diabetes mellitus with ophthalmic manifestations     Type 2 diabetes with peripheral circulatory disorder, controlled     Type II or unspecified type diabetes mellitus with renal manifestations, uncontrolled(250.42)     Type II or unspecified type diabetes mellitus with renal manifestations, uncontrolled(250.42)      Review of patient's allergies indicates:   Allergen Reactions    Corticosteroids (glucocorticoids) Other (See Comments)     Leg swelling    Hydrocortisone      Leg swelling    Neuromuscular blockers, steroidal      Leg swelling    Ribavirin      Intolerance, arthralgias     Social History     Social History    Marital status:      Spouse name: N/A    Number of children: N/A    Years of education: N/A     Occupational History    Not on file.      Social History Main Topics    Smoking status: Former Smoker     Quit date: 7/30/1998    Smokeless tobacco: Never Used      Comment: pt reports quitting in 1998    Alcohol use No      Comment: pt reports hx of alcholism and reports quit in 1998    Drug use: No    Sexual activity: Yes     Partners: Female     Other Topics Concern    Not on file     Social History Narrative    No narrative on file       Family History   Problem Relation Age of Onset    Cancer Mother      cancer 55 years ago    Coronary artery disease Father     Hypertension Father     Diabetes Father     Heart disease Father     Cancer Sister      breast CA    Colon cancer Neg Hx        Current Facility-Administered Medications   Medication    acetaminophen tablet 650 mg    allopurinol tablet 100 mg    aspirin chewable tablet 81 mg    carvedilol tablet 6.25 mg    cefTRIAXone (ROCEPHIN) 1 g in dextrose 5 % 50 mL IVPB    clonazePAM tablet 0.5 mg    cloNIDine tablet 0.1 mg    dextrose 50% injection 12.5 g    dextrose 50% injection 25 g    doxazosin tablet 8 mg    furosemide tablet 80 mg    glucagon (human recombinant) injection 1 mg    glucose chewable tablet 16 g    glucose chewable tablet 24 g    heparin (porcine) injection 5,000 Units    hydrALAZINE tablet 100 mg    hydrOXYzine HCl tablet 50 mg    insulin aspart pen 1-10 Units    insulin detemir pen 20 Units    minoxidil tablet 5 mg    montelukast tablet 10 mg    ondansetron disintegrating tablet 4 mg    oxycodone-acetaminophen 5-325 mg per tablet 2 tablet    pantoprazole EC tablet 40 mg    sevelamer carbonate tablet 800 mg    tacrolimus capsule 0.5 mg    vancomycin (VANCOCIN) 1,250 mg in dextrose 5 % 250 mL IVPB       LABS    Recent Results (from the past 24 hour(s))   POCT glucose    Collection Time: 10/14/17 10:12 PM   Result Value Ref Range    POCT Glucose 206 (H) 70 - 110 mg/dL   Basic metabolic panel    Collection Time: 10/15/17  4:45 AM   Result Value  Ref Range    Sodium 139 136 - 145 mmol/L    Potassium 4.1 3.5 - 5.1 mmol/L    Chloride 96 95 - 110 mmol/L    CO2 33 (H) 23 - 29 mmol/L    Glucose 149 (H) 70 - 110 mg/dL    BUN, Bld 29 (H) 8 - 23 mg/dL    Creatinine 3.5 (H) 0.5 - 1.4 mg/dL    Calcium 8.3 (L) 8.7 - 10.5 mg/dL    Anion Gap 10 8 - 16 mmol/L    eGFR if African American 19 (A) >60 mL/min/1.73 m^2    eGFR if non African American 16 (A) >60 mL/min/1.73 m^2   CBC auto differential    Collection Time: 10/15/17  4:45 AM   Result Value Ref Range    WBC 8.00 3.90 - 12.70 K/uL    RBC 3.57 (L) 4.60 - 6.20 M/uL    Hemoglobin 10.2 (L) 14.0 - 18.0 g/dL    Hematocrit 30.7 (L) 40.0 - 54.0 %    MCV 86 82 - 98 fL    MCH 28.6 27.0 - 31.0 pg    MCHC 33.2 32.0 - 36.0 g/dL    RDW 16.4 (H) 11.5 - 14.5 %    Platelets 67 (L) 150 - 350 K/uL    MPV SEE COMMENT 9.2 - 12.9 fL    Gran # 6.1 1.8 - 7.7 K/uL    Lymph # 0.8 (L) 1.0 - 4.8 K/uL    Mono # 1.0 0.3 - 1.0 K/uL    Eos # 0.1 0.0 - 0.5 K/uL    Baso # 0.03 0.00 - 0.20 K/uL    Gran% 76.5 (H) 38.0 - 73.0 %    Lymph% 9.4 (L) 18.0 - 48.0 %    Mono% 12.4 4.0 - 15.0 %    Eosinophil% 1.0 0.0 - 8.0 %    Basophil% 0.4 0.0 - 1.9 %    Differential Method Automated    Uric acid    Collection Time: 10/15/17  4:45 AM   Result Value Ref Range    Uric Acid 2.7 (L) 3.4 - 7.0 mg/dL   POCT glucose    Collection Time: 10/15/17  8:05 AM   Result Value Ref Range    POCT Glucose 92 70 - 110 mg/dL   POCT glucose    Collection Time: 10/15/17 11:56 AM   Result Value Ref Range    POCT Glucose 94 70 - 110 mg/dL   POCT glucose    Collection Time: 10/15/17  4:25 PM   Result Value Ref Range    POCT Glucose 155 (H) 70 - 110 mg/dL   ]    I/O last 3 completed shifts:  In: 680 [P.O.:280; IV Piggyback:400]  Out: -     Vitals:    10/15/17 0703 10/15/17 0715 10/15/17 1158 10/15/17 1627   BP: (!) 112/56  (!) 109/53 (!) 91/51   Pulse: 60 61 60 63   Resp: 18  20 18   Temp: 98.3 °F (36.8 °C)  97.1 °F (36.2 °C) 96.9 °F (36.1 °C)   TempSrc:       SpO2: 95%  (!) 93% (!) 87%    Weight:       Height:           No Jvd, Thyromegaly or Lymphadenopathy  Lungs: Fairly clear anteriorly and laterally  Cor: RRR no G or rubs  Abd: Soft benign good bowel sounds non tender  Ext: No E C C    Skin over both feet with dry scaly ls but no redness and no drainage.  L arm avf with good P-B-T and no redness or drainage.    A)  ESLD sp [place] tx 2001  ESRD on chronic HD [place] [facility] [place] [facility]  Last surgical eval was an arteriogram by Dr William last month  Fever with STREPTOCOCCUS AGALACTIAE (GROUP B) sepsis  DM  Wide anion gap  Rising bicarb ( in esrd this is usually from vomiting, ngt suction or bicarb bath)  Anemia and thrombocytopenia  HTN  Elevated total protein ?? Immunofix Interpretation:  IGG KAPPA SPECIFIC MONOLCONAL BAND IN MID-GAMMA.  Interpreted by Neelima Olmstead, PhD. DABCC 7/30/12  MGUS  DJD  R Knee Pain  GOUT  Hep c  Neuropathy  KARON (does not use his bipap)  Pulmonary htn  Small pericardial effusion (10/6/17)  Chronic constipation  2nd hypeprth  Barretts esophagus    P)    Blood cs and broad spectrum antibiotx to cover skin/bowel/bladder pathogens  Renal diet  Adjust HD  Check ammonia  Would consider recheck echo   Consider septic joint  Daily weight and I/O

## 2017-10-15 NOTE — ASSESSMENT & PLAN NOTE
Fever at presentation 105F improved at the time of interview. CXR suspicious for congestion, Urinalysis unimpressive; L arm graft intact: BC & urine cultures taken  Continue dual ABX therapy for now  Supportive care for temperature

## 2017-10-15 NOTE — CONSULTS
Consult Note  Infectious Disease    Consult Requested By: Josiane Giordano MD    Reason for Consult: group b strep sepsis    SUBJECTIVE:     History of Present Illness:  Patient is a 72 y.o. male presents with fever of 105. He c/o excruciating pain rt knee. He is now with a group b strep in his blood. He has hyperkeratotic skin across both feet and cannot move rt leg because he has so much pain and discomfort.he denies trauma. He is on prograf for a OLT. He is also on hd and dialyses via left arm avf.the fistula is not painful.    Past Medical History:   Diagnosis Date    Anemia     Arthritis     Back pain     Bishop's esophagus     BPH (benign prostatic hypertrophy)     Chronic kidney disease     Cirrhosis     Diabetes mellitus     Diabetes mellitus, type 2     Diabetes with neurologic complications     Diabetic retinopathy     Elevated PSA     Heart failure     Hemolytic anemia     Hepatitis Hepatitis C    Hepatitis C     Hyperlipidemia     Hypertension     Hypertension     Immune disorder     Liver transplanted     Peripheral neuropathy     Sleep apnea     Transfusion reaction     Hep C from prev. blood transfusion    Transplanted liver     Trouble in sleeping     Type 2 diabetes mellitus with ophthalmic manifestations     Type 2 diabetes with peripheral circulatory disorder, controlled     Type II or unspecified type diabetes mellitus with renal manifestations, uncontrolled(250.42)     Type II or unspecified type diabetes mellitus with renal manifestations, uncontrolled(250.42)      Past Surgical History:   Procedure Laterality Date    broken nose      CATARACT EXTRACTION Bilateral     EYE SURGERY      cataracts    FEMUR SURGERY      FRACTURE SURGERY      right femur fracture     LIVER TRANSPLANT  2001    PORTACATH PLACEMENT Left     SKIN GRAFT      graft from right thigh , applied to the left back and left arm.     Family History   Problem Relation Age of Onset    Cancer  Mother      cancer 55 years ago    Coronary artery disease Father     Hypertension Father     Diabetes Father     Heart disease Father     Cancer Sister      breast CA    Colon cancer Neg Hx      Social History   Substance Use Topics    Smoking status: Former Smoker     Quit date: 7/30/1998    Smokeless tobacco: Never Used      Comment: pt reports quitting in 1998    Alcohol use No      Comment: pt reports hx of alcholism and reports quit in 1998       Review of patient's allergies indicates:   Allergen Reactions    Corticosteroids (glucocorticoids) Other (See Comments)     Leg swelling    Hydrocortisone      Leg swelling    Neuromuscular blockers, steroidal      Leg swelling    Ribavirin      Intolerance, arthralgias        Antibiotics     Start     Stop Route Frequency Ordered    10/16/17 1430  ciprofloxacin (CIPRO)400mg/200ml D5W IVPB 400 mg      -- IV Every 24 hours (non-standard times) 10/14/17 1930    10/15/17 0230  piperacillin-tazobactam 4.5 g in dextrose 5 % 100 mL IVPB (ready to mix system)      -- IV Every 12 hours (non-standard times) 10/14/17 1724    10/14/17 1823  vancomycin (VANCOCIN) 1,250 mg in dextrose 5 % 250 mL IVPB      -- IV Daily PRN 10/14/17 1724          Review of Systems:  Constitutional: positive for chills, fatigue and fevers  Eyes: no visual changes  ENT: no nasal congestion or sore throat  Respiratory: no cough or shortness of breath  Cardiovascular: no chest pain or palpitations  Gastrointestinal: no nausea or vomiting, no abdominal pain or change in bowel habits  Genitourinary: no hematuria or dysuria  Integument/Breast: no rash or pruritis  Musculoskeletal: positive for rt knee and leg painful  Neurological: no seizures or tremors    OBJECTIVE:     Vital Signs (Most Recent)  Temp: 97.1 °F (36.2 °C) (10/15/17 1158)  Pulse: 60 (10/15/17 1158)  Resp: 20 (10/15/17 1158)  BP: (!) 109/53 (10/15/17 1158)  SpO2: (!) 93 % (10/15/17 1158)    Temperature Range Min/Max (Last  24H):  Temp:  [97.1 °F (36.2 °C)-105 °F (40.6 °C)]     Physical Exam:  General: appears older than stated age, cachectic  HENT: Head:normocephalic, atraumatic. Ears:not examined. Nose: Nares normal. Septum midline. Mucosa normal. No drainage or sinus tenderness., no discharge. Throat: lips, mucosa, and tongue normal; teeth and gums normal and no throat erythema.  Eyes: conjunctivae/corneas clear. PERRL.   Neck: supple, symmetrical, trachea midline, no JVD and thyroid not enlarged, symmetric, no tenderness/mass/nodules  Lungs:  clear to auscultation bilaterally and normal respiratory effort  Cardiovascular: Heart: regular rate and rhythm, S1, S2 normal, no murmur, click, rub or gallop. Chest Wall: no tenderness. Extremities: rt leg is mildly swollen with erythema across rt lateral calf. knee is warm but not red. Pulses: 2+ and symmetric.  Abdomen/Rectal: Abdomen: soft, non-tender non-distented; bowel sounds normal; no masses,  no organomegaly. Rectal: not examined  Skin: as above. lt arm avf looks good  Musculoskeletal:no clubbing, cyanosis    Laboratory:  CBC    Recent Labs  Lab 10/15/17  0445   WBC 8.00   RBC 3.57*   HGB 10.2*   HCT 30.7*   PLT 67*     BMP    Recent Labs  Lab 10/15/17  0445   CO2 33*   BUN 29*   CREATININE 3.5*   CALCIUM 8.3*       Recent Labs  Lab 10/14/17  1440   COLORU Isabelle   SPECGRAV 1.015   PHUR 6.0   PROTEINUA 2+*   BACTERIA None   NITRITE Negative   LEUKOCYTESUR Negative   UROBILINOGEN Negative   HYALINECASTS 1     Microbiology Results (last 7 days)     Procedure Component Value Units Date/Time    Blood culture [[id number]] Collected:  10/14/17 1420    Order Status:  Completed Specimen:  Blood from Peripheral, Hand, Right Updated:  10/15/17 1143     Blood Culture, Routine Gram stain aer bottle: Gram positive cocci in pairs AND  Gram positive      Blood Culture, Routine cocci in chains resembling Strep      Blood Culture, Routine Results called to and read back by: RICK SOSA CHARGE [facility]EAST   10/15/2017       Blood Culture, Routine 05:00     Blood Culture, Routine --     STREPTOCOCCUS AGALACTIAE (GROUP B)  Beta-hemolytic streptococci are routinely susceptible to   penicillins,cephalosporins and carbapenems.      Blood culture [[id number]] Collected:  10/14/17 1405    Order Status:  Completed Specimen:  Blood from Peripheral, Hand, Right Updated:  10/15/17 1142     Blood Culture, Routine Gram stain aer bottle: Gram positive cocci in pairs AND  Gram positive      Blood Culture, Routine cocci in chains resembling Strep      Blood Culture, Routine Positive results previously called 10/15/2017  05:01     Blood Culture, Routine --     STREPTOCOCCUS AGALACTIAE (GROUP B)  Susceptibility pending  Beta-hemolytic streptococci are routinely susceptible to   penicillins,cephalosporins and carbapenems.      Urine culture [[id number]] Collected:  10/14/17 1440    Order Status:  Completed Specimen:  Urine from Urine, Catheterized Updated:  10/15/17 0917     Urine Culture, Routine No growth to date    Clostridium difficile EIA [[id number]]     Order Status:  No result Specimen:  Stool from Stool           Diagnostic Results:  Labs: Reviewed  X-Ray: Reviewed    ASSESSMENT/PLAN:     Active Hospital Problems    Diagnosis  POA    *Fever [R50.9]  Yes    Acute on chronic combined systolic and diastolic heart failure [I50.43]  Yes    ESRD (end stage renal disease) on dialysis [N18.6, Z99.2]  Not Applicable    Type 2 diabetes mellitus, controlled, with renal complications [E11.29]  Yes     Chronic    Essential hypertension [I10]  Yes     Chronic    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       1. immunosupressed host with group b strep sepsis  Rt leg cellulitis with concern for septic arthritis rt knee  Us to ensure no dvt, tte  Iv rocephin  If does not settle aspirate rt knee  2. OLT and esrd  3. H/o c  diff colitis with fecal transplant  Narrow coverage

## 2017-10-15 NOTE — SUBJECTIVE & OBJECTIVE
Past Medical History:   Diagnosis Date    Anemia     Arthritis     Back pain     Bishop's esophagus     BPH (benign prostatic hypertrophy)     Chronic kidney disease     Cirrhosis     Diabetes mellitus     Diabetes mellitus, type 2     Diabetes with neurologic complications     Diabetic retinopathy     Elevated PSA     Heart failure     Hemolytic anemia     Hepatitis Hepatitis C    Hepatitis C     Hyperlipidemia     Hypertension     Hypertension     Immune disorder     Liver transplanted     Peripheral neuropathy     Sleep apnea     Transfusion reaction     Hep C from prev. blood transfusion    Transplanted liver     Trouble in sleeping     Type 2 diabetes mellitus with ophthalmic manifestations     Type 2 diabetes with peripheral circulatory disorder, controlled     Type II or unspecified type diabetes mellitus with renal manifestations, uncontrolled(250.42)     Type II or unspecified type diabetes mellitus with renal manifestations, uncontrolled(250.42)        Past Surgical History:   Procedure Laterality Date    broken nose      CATARACT EXTRACTION Bilateral     EYE SURGERY      cataracts    FEMUR SURGERY      FRACTURE SURGERY      right femur fracture     LIVER TRANSPLANT  2001    PORTACATH PLACEMENT Left     SKIN GRAFT      graft from right thigh , applied to the left back and left arm.       Review of patient's allergies indicates:   Allergen Reactions    Corticosteroids (glucocorticoids) Other (See Comments)     Leg swelling    Hydrocortisone      Leg swelling    Neuromuscular blockers, steroidal      Leg swelling    Ribavirin      Intolerance, arthralgias       No current facility-administered medications on file prior to encounter.      Current Outpatient Prescriptions on File Prior to Encounter   Medication Sig    allopurinol (ZYLOPRIM) 100 MG tablet TAKE 1 TABLET EVERY DAY    aspirin 81 MG Chew Take 1 tablet (81 mg total) by mouth once daily.    carvedilol  (COREG) 6.25 MG tablet Take 1 tablet (6.25 mg total) by mouth 2 (two) times daily.    doxazosin (CARDURA) 8 MG Tab Take 1 tablet (8 mg total) by mouth once daily. (Patient taking differently: Take 8 mg by mouth every evening. )    furosemide (LASIX) 80 MG tablet Take 80 mg by mouth 2 (two) times daily.    GENERLAC 10 gram/15 mL solution TAKE 30 ML 'S BY MOUTH THREE TIMES DAILY    hydrALAZINE (APRESOLINE) 100 MG tablet Take 1 tablet (100 mg total) by mouth 3 (three) times daily.    hydrOXYzine HCl (ATARAX) 25 MG tablet TAKE 1 TABLET(25 MG) BY MOUTH THREE TIMES DAILY AS NEEDED FOR ITCHING    insulin aspart (NOVOLOG) 100 unit/mL InPn pen Inject 6 units w/ breakfast, 4 units w/ lunch and dinner plus scale 180-230 +1, 231-280 +2, 281-330 +3, 331-380 +4, >380 +5. 90 day supply    insulin glargine (LANTUS SOLOSTAR) 100 unit/mL (3 mL) InPn pen Inject 8 Units into the skin every evening.    minoxidil (LONITEN) 2.5 MG tablet Take 2 tablets (5 mg total) by mouth 2 (two) times daily.    montelukast (SINGULAIR) 10 mg tablet Take 1 tablet (10 mg total) by mouth every evening.    ondansetron (ZOFRAN-ODT) 4 MG TbDL DISSOLVE 1 TABLET(4 MG) ON THE TONGUE EVERY 12 HOURS AS NEEDED    pantoprazole (PROTONIX) 40 MG tablet TAKE 1 TABLET ONE TIME DAILY    rifaximin (XIFAXAN) 550 mg Tab Take 1 tablet (550 mg total) by mouth 2 (two) times daily.    sevelamer carbonate (RENVELA) 800 mg Tab Take 1 tablet (800 mg total) by mouth 3 (three) times daily with meals.    tacrolimus (PROGRAF) 0.5 MG Cap Take 1 capsule (0.5 mg total) by mouth once daily.    ammonium lactate (LAC-HYDRIN) 12 % lotion Apply topically as needed for Dry Skin.    blood sugar diagnostic (ACCU-CHEK JOANN PLUS TEST STRP) Strp 1 strip by Misc.(Non-Drug; Combo Route) route 4 (four) times daily.    blood-glucose meter (ACCU-CHEK JOANN PLUS METER) Misc Use as directed    ciclopirox (PENLAC) 8 % Soln Apply to affected nails daily x 6-12 mos.  Remove weekly with  "rubbing alcohol    clonazePAM (KLONOPIN) 0.5 MG tablet Take 1 tablet (0.5 mg total) by mouth every evening. (Patient taking differently: Take 0.5 mg by mouth daily as needed. )    cloNIDine (CATAPRES) 0.1 MG tablet Take 1 tablet (0.1 mg total) by mouth 3 (three) times daily.    glucagon (human recombinant) inj 1mg/mL kit Inject 1 mL (1 mg total) into the muscle as needed.    hydrocodone-acetaminophen 5-325mg (NORCO) 5-325 mg per tablet Take 1 tablet by mouth every 6 (six) hours as needed for Pain.    hydrOXYzine HCl (ATARAX) 25 MG tablet Take 2 tablets (50 mg total) by mouth 3 (three) times daily as needed for Itching.    insulin glargine (LANTUS SOLOSTAR) 100 unit/mL (3 mL) InPn pen Inject 8-10 units nightly.    insulin needles, disposable, (NOVOFINE 32) 32 x 1/4 " Ndle 1 each by Misc.(Non-Drug; Combo Route) route 3 (three) times daily.    lactulose (GENERLAC) 10 gram/15 mL solution 30 ML BY MOUTH NIGHTLY    lancets (ACCU-CHEK SOFTCLIX LANCETS) Misc 1 lancet by Misc.(Non-Drug; Combo Route) route 4 (four) times daily.    torsemide (DEMADEX) 20 MG Tab Take 3 tablets (60 mg total) by mouth once daily.    urea (CARMOL) 40 % Crea Apply topically once daily.     Family History     Problem Relation (Age of Onset)    Cancer Mother, Sister    Coronary artery disease Father    Diabetes Father    Heart disease Father    Hypertension Father        Social History Main Topics    Smoking status: Former Smoker     Quit date: 7/30/1998    Smokeless tobacco: Never Used      Comment: pt reports quitting in 1998    Alcohol use No      Comment: pt reports hx of alcholism and reports quit in 1998    Drug use: No    Sexual activity: Yes     Partners: Female     Review of Systems   Constitutional: Positive for chills and fatigue. Negative for appetite change, diaphoresis and fever.   HENT: Negative for congestion, hearing loss, sore throat, tinnitus and trouble swallowing.    Eyes: Negative for photophobia, discharge, " itching and visual disturbance.   Respiratory: Negative for apnea, cough, wheezing and stridor.    Cardiovascular: Negative for chest pain, palpitations and leg swelling.   Gastrointestinal: Negative for abdominal distention, abdominal pain, blood in stool, constipation, diarrhea and nausea.   Endocrine: Negative for polydipsia, polyphagia and polyuria.   Genitourinary: Negative for difficulty urinating, dysuria, flank pain and frequency.   Musculoskeletal: Positive for joint swelling. Negative for arthralgias and neck stiffness.   Skin: Negative for color change, rash and wound.   Neurological: Negative for dizziness, tremors, seizures, light-headedness, numbness and headaches.   Hematological: Negative for adenopathy.   Psychiatric/Behavioral: Negative for hallucinations and self-injury.     Objective:     Vital Signs (Most Recent):  Temp:  (Dr. Melton made aware, blanket removed from pt) (10/14/17 1824)  Pulse: 76 (10/14/17 1857)  Resp: 18 (10/14/17 1857)  BP: (!) 128/59 (10/14/17 1857)  SpO2: 96 % (10/14/17 1857) Vital Signs (24h Range):  Temp:  [99 °F (37.2 °C)-105 °F (40.6 °C)] 99 °F (37.2 °C)  Pulse:  [] 76  Resp:  [18-22] 18  SpO2:  [91 %-97 %] 96 %  BP: (120-141)/(58-64) 128/59     Weight: 85.7 kg (189 lb)  Body mass index is 24.94 kg/m².    Physical Exam   Constitutional: He appears well-developed and well-nourished. He is cooperative.   HENT:   Head: Normocephalic and atraumatic.   Eyes: Conjunctivae and lids are normal.   Neck: Full passive range of motion without pain. Neck supple. No JVD present. No edema present. No thyroid mass present.   Cardiovascular: S1 normal, S2 normal and intact distal pulses.    No murmur heard.  Pulmonary/Chest: Effort normal.   Abdominal: Soft. Bowel sounds are normal. He exhibits no distension and no abdominal bruit. There is no splenomegaly or hepatomegaly. There is no tenderness. There is no CVA tenderness.   Musculoskeletal: Normal range of motion.    Lymphadenopathy:     He has no cervical adenopathy.     He has no axillary adenopathy.   Neurological: He is alert. He has normal reflexes. He displays no tremor. He displays no seizure activity.   Skin: Skin is warm, dry and intact.   Psychiatric: He has a normal mood and affect. His speech is normal. Thought content normal. Cognition and memory are normal.        Significant Labs:   A1C:   Recent Labs  Lab 04/26/17  2336 05/10/17  0710 07/15/17  0501   HGBA1C 5.6 5.5 5.3     Bilirubin:   Recent Labs  Lab 10/14/17  1346   BILITOT 0.8     BMP:   Recent Labs  Lab 10/14/17  1346   *      K 3.9   CL 97   CO2 30*   BUN 17   CREATININE 2.5*   CALCIUM 9.2   MG 1.8     CBC:   Recent Labs  Lab 10/14/17  1346   WBC 10.76   HGB 11.6*   HCT 34.7*   PLT 75*     Cardiac Markers: No results for input(s): CKMB, MYOGLOBIN, BNP, TROPISTAT in the last 48 hours.  Coagulation: No results for input(s): INR, APTT in the last 48 hours.    Invalid input(s): PT  Lactic Acid:   Recent Labs  Lab 10/14/17  1420   LACTATE 1.1     Lipase: No results for input(s): LIPASE in the last 48 hours.  Lipid Panel: No results for input(s): CHOL, HDL, LDLCALC, TRIG, CHOLHDL in the last 48 hours.  Magnesium:   Recent Labs  Lab 10/14/17  1346   MG 1.8     Troponin: No results for input(s): TROPONINI in the last 48 hours.  TSH: No results for input(s): TSH in the last 4320 hours.  Urine Culture: No results for input(s): LABURIN in the last 48 hours.  Urine Studies:   Recent Labs  Lab 10/14/17  1440   COLORU Isabelle   APPEARANCEUA Clear   PHUR 6.0   SPECGRAV 1.015   PROTEINUA 2+*   GLUCUA Negative   KETONESU Negative   BILIRUBINUA Negative   OCCULTUA Negative   NITRITE Negative   UROBILINOGEN Negative   LEUKOCYTESUR Negative   RBCUA 2   WBCUA 1   BACTERIA None   HYALINECASTS 1       Significant Imaging:   Imaging Results          X-Ray Chest AP Portable (Final result)  Result time 10/14/17 15:29:26    Final result by Anthony Avila MD  (10/14/17 15:29:26)                 Impression:       Findings suggestive of mild vascular congestion.          Electronically signed by: BRO UNDERWOOD MD  Date:     10/14/17  Time:    15:29              Narrative:    [id number]  Accession # [id number]      Study:  XR CHEST AP PORTABLE    Indication: chest pain.    Comparison: Chest radiograph from 07/13/2017.    Findings:     XR CHEST AP PORTABLE.    Stable cardiomegaly.  Calcification of the aortic arch.  Mild prominence of the pulmonary vasculature with prominent interstitial lung markings which may be seen with mild vascular congestion, unchanged.  No pleural effusion.  No pneumothorax.  Degenerative changes of the thoracic spine.
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
## 2017-10-15 NOTE — NURSING
Report received from CHRISSIE Donnelly. Patient resting comfortably, complains of pain in right lower leg 6/10, no acute distress noted. Plan of care reviewed with patient. Instructed patient to call for assistance before ambulating, side rails up x3, bed alarm set, call light in reach, non skid socks in use. Patient verbalized understanding of instructions. Will continue to monitor.

## 2017-10-15 NOTE — PLAN OF CARE
Problem: Diabetes, Type 2 (Adult)  Goal: Signs and Symptoms of Listed Potential Problems Will be Absent, Minimized or Managed (Diabetes, Type 2)  Signs and symptoms of listed potential problems will be absent, minimized or managed by discharge/transition of care (reference Diabetes, Type 2 (Adult) CPG).   Outcome: Ongoing (interventions implemented as appropriate)   10/15/17 0326   Diabetes, Type 2   Problems Assessed (Type 2 Diabetes) hyperglycemia   Problems Present (Type 2 Diabetes) hyperglycemia       Problem: Fall Risk (Adult)  Goal: Absence of Falls  Patient will demonstrate the desired outcomes by discharge/transition of care.   Outcome: Ongoing (interventions implemented as appropriate)   10/15/17 0326   Fall Risk (Adult)   Absence of Falls making progress toward outcome       Problem: Pressure Ulcer Risk (Dion Scale) (Adult,Obstetrics,Pediatric)  Goal: Skin Integrity  Patient will demonstrate the desired outcomes by discharge/transition of care.   Outcome: Ongoing (interventions implemented as appropriate)   10/15/17 0326   Pressure Ulcer Risk (Dion Scale) (Adult,Obstetrics,Pediatric)   Skin Integrity making progress toward outcome       Problem: Patient Care Overview  Goal: Plan of Care Review  Outcome: Ongoing (interventions implemented as appropriate)   10/15/17 0326   Coping/Psychosocial   Plan Of Care Reviewed With patient

## 2017-10-15 NOTE — PLAN OF CARE
Problem: Diabetes, Type 2 (Adult)  Intervention: Optimize Glycemic Control   10/14/17 1937   Nutrition Interventions   Glycemic Management blood glucose monitoring       Goal: Signs and Symptoms of Listed Potential Problems Will be Absent, Minimized or Managed (Diabetes, Type 2)  Signs and symptoms of listed potential problems will be absent, minimized or managed by discharge/transition of care (reference Diabetes, Type 2 (Adult) CPG).   Outcome: Ongoing (interventions implemented as appropriate)   10/15/17 0326   Diabetes, Type 2   Problems Assessed (Type 2 Diabetes) hyperglycemia   Problems Present (Type 2 Diabetes) hyperglycemia       Problem: Fall Risk (Adult)  Intervention: Review Medications/Identify Contributors to Fall Risk   10/15/17 1810   Safety Interventions   Medication Review/Management medications reviewed     Intervention: Patient Rounds   10/15/17 1700   Safety Interventions   Patient Rounds bed in low position;bed wheels locked;call light in reach;clutter free environment maintained;placement of personal items at bedside;ID band on;toileting offered;visualized patient     Intervention: Safety Promotion/Fall Prevention   10/15/17 1700   Safety Interventions   Safety Promotion/Fall Prevention bed alarm set;commode/urinal/bedpan at bedside;Fall Risk reviewed with patient/family;lighting adjusted;medications reviewed;nonskid shoes/socks when out of bed;room near unit station;side rails raised x 2       Goal: Identify Related Risk Factors and Signs and Symptoms  Related risk factors and signs and symptoms are identified upon initiation of Human Response Clinical Practice Guideline (CPG)   Outcome: Ongoing (interventions implemented as appropriate)   10/15/17 1810   Fall Risk   Related Risk Factors (Fall Risk) age-related changes;sleep pattern alteration;environment unfamiliar   Signs and Symptoms (Fall Risk) presence of risk factors     Goal: Absence of Falls  Patient will demonstrate the desired  outcomes by discharge/transition of care.   Outcome: Ongoing (interventions implemented as appropriate)   10/15/17 0326   Fall Risk (Adult)   Absence of Falls making progress toward outcome

## 2017-10-15 NOTE — ASSESSMENT & PLAN NOTE
While hospitalized will use combined insulin therapy with basal and prandial insulin coverage, POCT glucose checks, hypoglycemic protocol and correction scale - HgA1c
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
## 2017-10-16 ENCOUNTER — TELEPHONE (OUTPATIENT)
Dept: FAMILY MEDICINE | Facility: CLINIC | Age: 72
End: 2017-10-16

## 2017-10-16 LAB
ANION GAP SERPL CALC-SCNC: 12 MMOL/L
BACTERIA BLD CULT: NORMAL
BACTERIA UR CULT: NO GROWTH
BASOPHILS # BLD AUTO: 0.04 K/UL
BASOPHILS NFR BLD: 0.6 %
BUN SERPL-MCNC: 44 MG/DL
CALCIUM SERPL-MCNC: 8.4 MG/DL
CHLORIDE SERPL-SCNC: 95 MMOL/L
CO2 SERPL-SCNC: 32 MMOL/L
CREAT SERPL-MCNC: 4.7 MG/DL
DIASTOLIC DYSFUNCTION: YES
DIFFERENTIAL METHOD: ABNORMAL
EOSINOPHIL # BLD AUTO: 0.3 K/UL
EOSINOPHIL NFR BLD: 4.8 %
ERYTHROCYTE [DISTWIDTH] IN BLOOD BY AUTOMATED COUNT: 16.2 %
EST. GFR  (AFRICAN AMERICAN): 13 ML/MIN/1.73 M^2
EST. GFR  (NON AFRICAN AMERICAN): 11 ML/MIN/1.73 M^2
ESTIMATED PA SYSTOLIC PRESSURE: 54.69
GLOBAL PERICARDIAL EFFUSION: ABNORMAL
GLUCOSE SERPL-MCNC: 85 MG/DL
HCT VFR BLD AUTO: 30.6 %
HGB BLD-MCNC: 10.2 G/DL
LYMPHOCYTES # BLD AUTO: 1.2 K/UL
LYMPHOCYTES NFR BLD: 17 %
MCH RBC QN AUTO: 28.3 PG
MCHC RBC AUTO-ENTMCNC: 33.3 G/DL
MCV RBC AUTO: 85 FL
MITRAL VALVE MOBILITY: NORMAL
MONOCYTES # BLD AUTO: 0.8 K/UL
MONOCYTES NFR BLD: 10.8 %
NEUTROPHILS # BLD AUTO: 4.6 K/UL
NEUTROPHILS NFR BLD: 66.8 %
PLATELET # BLD AUTO: 75 K/UL
PMV BLD AUTO: 10.7 FL
POCT GLUCOSE: 123 MG/DL (ref 70–110)
POCT GLUCOSE: 136 MG/DL (ref 70–110)
POCT GLUCOSE: 148 MG/DL (ref 70–110)
POCT GLUCOSE: 77 MG/DL (ref 70–110)
POTASSIUM SERPL-SCNC: 4.1 MMOL/L
RBC # BLD AUTO: 3.61 M/UL
RETIRED EF AND QEF - SEE NOTES: 65 (ref 55–65)
SODIUM SERPL-SCNC: 139 MMOL/L
TACROLIMUS BLD-MCNC: <1.5 NG/ML
TRICUSPID VALVE REGURGITATION: ABNORMAL
WBC # BLD AUTO: 6.94 K/UL

## 2017-10-16 PROCEDURE — 87070 CULTURE OTHR SPECIMN AEROBIC: CPT | Mod: NTX

## 2017-10-16 PROCEDURE — 63600175 PHARM REV CODE 636 W HCPCS: Mod: NTX | Performed by: INTERNAL MEDICINE

## 2017-10-16 PROCEDURE — 89051 BODY FLUID CELL COUNT: CPT | Mod: NTX

## 2017-10-16 PROCEDURE — 87116 MYCOBACTERIA CULTURE: CPT | Mod: NTX

## 2017-10-16 PROCEDURE — 93306 TTE W/DOPPLER COMPLETE: CPT | Mod: NTX

## 2017-10-16 PROCEDURE — 0S9C3ZX DRAINAGE OF RIGHT KNEE JOINT, PERCUTANEOUS APPROACH, DIAGNOSTIC: ICD-10-PCS | Performed by: ORTHOPAEDIC SURGERY

## 2017-10-16 PROCEDURE — 63600175 PHARM REV CODE 636 W HCPCS: Mod: NTX | Performed by: EMERGENCY MEDICINE

## 2017-10-16 PROCEDURE — 36415 COLL VENOUS BLD VENIPUNCTURE: CPT | Mod: NTX

## 2017-10-16 PROCEDURE — 93306 TTE W/DOPPLER COMPLETE: CPT | Mod: 26,NTX,, | Performed by: INTERNAL MEDICINE

## 2017-10-16 PROCEDURE — 87075 CULTR BACTERIA EXCEPT BLOOD: CPT | Mod: NTX

## 2017-10-16 PROCEDURE — 87040 BLOOD CULTURE FOR BACTERIA: CPT | Mod: 59,NTX

## 2017-10-16 PROCEDURE — 80048 BASIC METABOLIC PNL TOTAL CA: CPT | Mod: NTX

## 2017-10-16 PROCEDURE — 25000003 PHARM REV CODE 250: Mod: NTX | Performed by: INTERNAL MEDICINE

## 2017-10-16 PROCEDURE — 25000003 PHARM REV CODE 250: Mod: NTX | Performed by: EMERGENCY MEDICINE

## 2017-10-16 PROCEDURE — 89060 EXAM SYNOVIAL FLUID CRYSTALS: CPT | Mod: NTX

## 2017-10-16 PROCEDURE — 85025 COMPLETE CBC W/AUTO DIFF WBC: CPT | Mod: NTX

## 2017-10-16 PROCEDURE — 80100016 HC MAINTENANCE HEMODIALYSIS: Mod: NTX

## 2017-10-16 PROCEDURE — 12000002 HC ACUTE/MED SURGE SEMI-PRIVATE ROOM: Mod: NTX

## 2017-10-16 PROCEDURE — 25000003 PHARM REV CODE 250: Mod: NTX | Performed by: HOSPITALIST

## 2017-10-16 PROCEDURE — 87205 SMEAR GRAM STAIN: CPT | Mod: NTX

## 2017-10-16 RX ORDER — LACTULOSE 10 G/15ML
30 SOLUTION ORAL 2 TIMES DAILY
Status: DISCONTINUED | OUTPATIENT
Start: 2017-10-16 | End: 2017-10-20 | Stop reason: HOSPADM

## 2017-10-16 RX ADMIN — DOXAZOSIN 8 MG: 4 TABLET ORAL at 08:10

## 2017-10-16 RX ADMIN — CLONIDINE HYDROCHLORIDE 0.1 MG: 0.1 TABLET ORAL at 09:10

## 2017-10-16 RX ADMIN — HEPARIN SODIUM 5000 UNITS: 5000 INJECTION, SOLUTION INTRAVENOUS; SUBCUTANEOUS at 02:10

## 2017-10-16 RX ADMIN — CARVEDILOL 6.25 MG: 6.25 TABLET, FILM COATED ORAL at 09:10

## 2017-10-16 RX ADMIN — COLCHICINE 0.6 MG: 0.6 TABLET, FILM COATED ORAL at 08:10

## 2017-10-16 RX ADMIN — OXYCODONE AND ACETAMINOPHEN 2 TABLET: 5; 325 TABLET ORAL at 10:10

## 2017-10-16 RX ADMIN — SEVELAMER CARBONATE 800 MG: 800 TABLET, FILM COATED ORAL at 11:10

## 2017-10-16 RX ADMIN — CEFTRIAXONE 1 G: 1 INJECTION, SOLUTION INTRAVENOUS at 02:10

## 2017-10-16 RX ADMIN — RIFAXIMIN 200 MG: 200 TABLET ORAL at 09:10

## 2017-10-16 RX ADMIN — MINOXIDIL 5 MG: 2.5 TABLET ORAL at 08:10

## 2017-10-16 RX ADMIN — ASPIRIN 81 MG 81 MG: 81 TABLET ORAL at 08:10

## 2017-10-16 RX ADMIN — CARVEDILOL 6.25 MG: 6.25 TABLET, FILM COATED ORAL at 08:10

## 2017-10-16 RX ADMIN — SEVELAMER CARBONATE 800 MG: 800 TABLET, FILM COATED ORAL at 08:10

## 2017-10-16 RX ADMIN — PANTOPRAZOLE SODIUM 40 MG: 40 TABLET, DELAYED RELEASE ORAL at 08:10

## 2017-10-16 RX ADMIN — MINOXIDIL 5 MG: 2.5 TABLET ORAL at 09:10

## 2017-10-16 RX ADMIN — HYDRALAZINE HYDROCHLORIDE 100 MG: 25 TABLET ORAL at 09:10

## 2017-10-16 RX ADMIN — HYDRALAZINE HYDROCHLORIDE 100 MG: 25 TABLET ORAL at 06:10

## 2017-10-16 RX ADMIN — OXYCODONE AND ACETAMINOPHEN 2 TABLET: 5; 325 TABLET ORAL at 11:10

## 2017-10-16 RX ADMIN — CLONIDINE HYDROCHLORIDE 0.1 MG: 0.1 TABLET ORAL at 06:10

## 2017-10-16 RX ADMIN — HEPARIN SODIUM 5000 UNITS: 5000 INJECTION, SOLUTION INTRAVENOUS; SUBCUTANEOUS at 09:10

## 2017-10-16 RX ADMIN — CLONAZEPAM 0.5 MG: 0.5 TABLET ORAL at 09:10

## 2017-10-16 RX ADMIN — LACTULOSE 30 G: 10 SOLUTION ORAL at 09:10

## 2017-10-16 RX ADMIN — INSULIN DETEMIR 20 UNITS: 100 INJECTION, SOLUTION SUBCUTANEOUS at 09:10

## 2017-10-16 RX ADMIN — MONTELUKAST SODIUM 10 MG: 10 TABLET, FILM COATED ORAL at 09:10

## 2017-10-16 RX ADMIN — HEPARIN SODIUM 5000 UNITS: 5000 INJECTION, SOLUTION INTRAVENOUS; SUBCUTANEOUS at 06:10

## 2017-10-16 RX ADMIN — ALLOPURINOL 100 MG: 100 TABLET ORAL at 08:10

## 2017-10-16 RX ADMIN — TACROLIMUS 0.5 MG: 0.5 CAPSULE ORAL at 08:10

## 2017-10-16 RX ADMIN — ERYTHROPOIETIN 10000 UNITS: 10000 INJECTION, SOLUTION INTRAVENOUS; SUBCUTANEOUS at 05:10

## 2017-10-16 NOTE — SUBJECTIVE & OBJECTIVE
Interval History: pt sleepy, not getting lactulose, now resumed, 8/10 Right LE pain    Review of Systems   Constitutional: Positive for chills and fatigue. Negative for appetite change, diaphoresis and fever.   HENT: Negative for congestion, hearing loss, sore throat, tinnitus and trouble swallowing.    Eyes: Negative for photophobia, discharge, itching and visual disturbance.   Respiratory: Negative for apnea, cough, wheezing and stridor.    Cardiovascular: Negative for chest pain, palpitations and leg swelling.   Gastrointestinal: Negative for abdominal distention, abdominal pain, blood in stool, constipation, diarrhea and nausea.   Endocrine: Negative for polydipsia, polyphagia and polyuria.   Genitourinary: Negative for difficulty urinating, dysuria, flank pain and frequency.   Musculoskeletal: Positive for joint swelling. Negative for arthralgias and neck stiffness.   Skin: Negative for color change, rash and wound.   Neurological: Negative for dizziness, tremors, seizures, light-headedness, numbness and headaches.   Hematological: Negative for adenopathy.   Psychiatric/Behavioral: Negative for hallucinations and self-injury.     Objective:     Vital Signs (Most Recent):  Temp: 97.4 °F (36.3 °C) (10/16/17 1351)  Pulse: 64 (10/16/17 1351)  Resp: 14 (10/16/17 1351)  BP: (!) 116/57 (10/16/17 1351)  SpO2: (!) 93 % (10/16/17 1351) Vital Signs (24h Range):  Temp:  [96.9 °F (36.1 °C)-98.4 °F (36.9 °C)] 97.4 °F (36.3 °C)  Pulse:  [63-67] 64  Resp:  [14-19] 14  SpO2:  [87 %-93 %] 93 %  BP: ()/(51-62) 116/57     Weight: 84.1 kg (185 lb 6.5 oz)  Body mass index is 24.46 kg/m².    Intake/Output Summary (Last 24 hours) at 10/16/17 1604  Last data filed at 10/16/17 0641   Gross per 24 hour   Intake              280 ml   Output              600 ml   Net             -320 ml      Physical Exam   Constitutional: He appears well-developed and well-nourished. He is cooperative.   HENT:   Head: Normocephalic and atraumatic.    Eyes: Conjunctivae and lids are normal.   Neck: Full passive range of motion without pain. Neck supple. No JVD present. No edema present. No thyroid mass present.   Cardiovascular: S1 normal, S2 normal and intact distal pulses.    No murmur heard.  Pulmonary/Chest: Effort normal.   Abdominal: Soft. Bowel sounds are normal. He exhibits no distension and no abdominal bruit. There is no splenomegaly or hepatomegaly. There is no tenderness. There is no CVA tenderness.   Musculoskeletal: Normal range of motion.   Lymphadenopathy:     He has no cervical adenopathy.     He has no axillary adenopathy.   Neurological: He is alert. He has normal reflexes. He displays no tremor. He displays no seizure activity.   Skin: Skin is warm, dry and intact.   Psychiatric: He has a normal mood and affect. His speech is normal. Thought content normal. Cognition and memory are normal.       Significant Labs:   Blood Culture:   Recent Labs  Lab 10/16/17  0645   LABBLOO No Growth to date  No Growth to date     BMP:   Recent Labs  Lab 10/16/17  0644   GLU 85      K 4.1   CL 95   CO2 32*   BUN 44*   CREATININE 4.7*   CALCIUM 8.4*     CBC:   Recent Labs  Lab 10/15/17  0445 10/16/17  0644   WBC 8.00 6.94   HGB 10.2* 10.2*   HCT 30.7* 30.6*   PLT 67* 75*       Significant Imaging: I have reviewed all pertinent imaging results/findings within the past 24 hours.
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
## 2017-10-16 NOTE — CONSULTS
R knee aspirated    Fluid given to Pt RN to send for gram stain, cell count, cultures    X-ray knee and hip ordered.  U/S R hip ordered to rule out effusion

## 2017-10-16 NOTE — TELEPHONE ENCOUNTER
----- Message from [place] Acevedo sent at 10/16/2017 11:48 AM CDT -----  Contact: Sarahi Willson [phone] ext [phone]  Calling to inform of fax paperwork sending over ,regarding pt being admitted in hospital

## 2017-10-16 NOTE — PLAN OF CARE
Problem: Diabetes, Type 2 (Adult)  Goal: Signs and Symptoms of Listed Potential Problems Will be Absent, Minimized or Managed (Diabetes, Type 2)  Signs and symptoms of listed potential problems will be absent, minimized or managed by discharge/transition of care (reference Diabetes, Type 2 (Adult) CPG).   Outcome: Ongoing (interventions implemented as appropriate)   10/16/17 1636   Diabetes, Type 2   Problems Assessed (Type 2 Diabetes) all   Problems Present (Type 2 Diabetes) none       Problem: Fall Risk (Adult)  Goal: Identify Related Risk Factors and Signs and Symptoms  Related risk factors and signs and symptoms are identified upon initiation of Human Response Clinical Practice Guideline (CPG)   Outcome: Ongoing (interventions implemented as appropriate)   10/16/17 1636   Fall Risk   Related Risk Factors (Fall Risk) gait/mobility problems;polypharmacy;sensory deficits;environment unfamiliar   Signs and Symptoms (Fall Risk) presence of risk factors       Problem: Patient Care Overview  Goal: Plan of Care Review  Outcome: Ongoing (interventions implemented as appropriate)   10/16/17 1636   Coping/Psychosocial   Plan Of Care Reviewed With patient;spouse       Comments: Plan of care reviewed with patient and wife at bedside. Educated on fall risk, diabetic care. Educated on contact precautions. Discussed plan of care including IV antibiotics and consult to ortho surgeon. Patient off unit for hemodialysis. Will continue to monitor.    Lara Gordillo RN  10/16/2017  4:50 PM

## 2017-10-16 NOTE — PROGRESS NOTES
Progress Note  Infectious Disease    Admit Date: 10/14/2017   LOS: 2 days     SUBJECTIVE:     Follow-up For:  Group b strep sepsis    Antibiotics     Start     Stop Route Frequency Ordered    10/15/17 1300  cefTRIAXone (ROCEPHIN) 1 g in dextrose 5 % 50 mL IVPB      -- IV Every 24 hours (non-standard times) 10/15/17 1225          Review of Systems:  Constitutional: no fever or chills  Eyes: no visual changes  ENT: no nasal congestion or sore throat  Respiratory: no cough or shortness of breath  Cardiovascular: no chest pain or palpitations  Gastrointestinal: no nausea or vomiting, no abdominal pain or change in bowel habits  Musculoskeletal: no arthralgias or myalgias    OBJECTIVE:     Vital Signs (Most Recent)  Temp: 97.4 °F (36.3 °C) (10/16/17 1351)  Pulse: 64 (10/16/17 1351)  Resp: 14 (10/16/17 1351)  BP: (!) 116/57 (10/16/17 1351)  SpO2: (!) 93 % (10/16/17 1351)    Temperature Range Min/Max (Last 24H):  Temp:  [96.9 °F (36.1 °C)-98.4 °F (36.9 °C)]     I & O (Last 24H):  Intake/Output Summary (Last 24 hours) at 10/16/17 1509  Last data filed at 10/16/17 0641   Gross per 24 hour   Intake              280 ml   Output              600 ml   Net             -320 ml       Physical Exam:  General: well developed, well nourished  HENT: Head:normocephalic, atraumatic. Ears:bilateral TM's and external ear canals normal. Nose: Nares normal. Septum midline. Mucosa normal. No drainage or sinus tenderness., no discharge. Throat: lips, mucosa, and tongue normal; teeth and gums normal and no throat erythema.  Eyes: conjunctivae/corneas clear. PERRL.   Neck: supple, symmetrical, trachea midline, no JVD and thyroid not enlarged, symmetric, no tenderness/mass/nodules  Lungs:  clear to auscultation bilaterally and normal respiratory effort  Cardiovascular: Heart: regular rate and rhythm, S1, S2 normal, no murmur, click, rub or gallop. Chest Wall: no tenderness. Extremities: rt knee is warm and mildly swollen. Pulses: 2+ and  symmetric.  Abdomen/Rectal: Abdomen: soft, non-tender non-distented; bowel sounds normal; no masses,  no organomegaly. Rectal: not examined  Skin: Skin color, texture, turgor normal. No rashes or lesions  Musculoskeletal:no clubbing, cyanosis    Lines/Drains:       Tunneled Central Line Insertion/Assessment - Double Lumen  07/13/17 right internal jugular (Active)   Dressing biopatch in place;dressing dry and intact;transparent semipermeable dressing applied 7/15/2017 11:20 AM   IV Device Securement sutures 7/15/2017 11:20 AM   Distal Patency/Care flushed w/o difficulty;heparin locked 7/15/2017 11:20 AM   Proximal Patency/Care flushed w/o difficulty;heparin locked 7/15/2017 11:20 AM   Dressing Change Due 07/20/17 7/14/2017  7:46 PM   Daily Line Review Performed 7/14/2017  7:46 PM            Peripheral IV - Single Lumen 10/14/17 1401 Right Antecubital (Active)   Site Assessment Clean;Dry;Intact;No redness;No swelling 10/16/2017  8:27 AM   Line Status Flushed 10/16/2017  8:27 AM   Dressing Status Clean;Dry;Intact 10/16/2017  8:27 AM   Dressing Change Due 10/18/17 10/16/2017  8:27 AM   Site Change Due 10/18/17 10/16/2017  8:27 AM   Reason Not Rotated Not due 10/16/2017  8:27 AM            Peripheral IV - Single Lumen 10/14/17 1402 Right Forearm (Active)   Site Assessment Clean;Dry;Intact;No redness;No swelling 10/16/2017  8:27 AM   Line Status Flushed 10/16/2017  8:27 AM   Dressing Status Clean;Intact;Dry 10/16/2017  8:27 AM   Dressing Change Due 10/18/17 10/16/2017  8:27 AM   Site Change Due 10/18/17 10/16/2017  8:27 AM   Reason Not Rotated Not due 10/16/2017  8:27 AM       Laboratory:  CBC    Recent Labs  Lab 10/16/17  0644   WBC 6.94   RBC 3.61*   HGB 10.2*   HCT 30.6*   PLT 75*   MCV 85   MCH 28.3   MCHC 33.3     BMP    Recent Labs  Lab 10/16/17  0644      K 4.1   CL 95   CO2 32*   BUN 44*   CREATININE 4.7*   CALCIUM 8.4*     Microbiology Results (last 7 days)     Procedure Component Value Units Date/Time     Blood culture [[id number]] Collected:  10/16/17 0645    Order Status:  Completed Specimen:  Blood Updated:  10/16/17 1312     Blood Culture, Routine No Growth to date    Blood culture [[id number]] Collected:  10/16/17 0645    Order Status:  Completed Specimen:  Blood Updated:  10/16/17 1312     Blood Culture, Routine No Growth to date    Urine culture [[id number]] Collected:  10/14/17 1440    Order Status:  Completed Specimen:  Urine from Urine, Catheterized Updated:  10/16/17 1108     Urine Culture, Routine No growth    Blood culture [[id number]] Collected:  10/14/17 1420    Order Status:  Completed Specimen:  Blood from Peripheral, Hand, Right Updated:  10/16/17 0912     Blood Culture, Routine Gram stain aer bottle: Gram positive cocci in pairs AND  Gram positive      Blood Culture, Routine cocci in chains resembling Strep      Blood Culture, Routine Results called to and read back by: RICK SOSA CHARGE 3EAST  10/15/2017       Blood Culture, Routine 05:00     Blood Culture, Routine --     STREPTOCOCCUS AGALACTIAE (GROUP B)  Beta-hemolytic streptococci are routinely susceptible to   penicillins,cephalosporins and carbapenems.  For susceptibility see order #[id number]      Blood culture [[id number]]  (Susceptibility) Collected:  10/14/17 1405    Order Status:  Completed Specimen:  Blood from Peripheral, Hand, Right Updated:  10/16/17 0912     Blood Culture, Routine Gram stain aer bottle: Gram positive cocci in pairs AND  Gram positive      Blood Culture, Routine cocci in chains resembling Strep      Blood Culture, Routine Positive results previously called 10/15/2017  05:01     Blood Culture, Routine --     STREPTOCOCCUS AGALACTIAE (GROUP B)  Beta-hemolytic streptococci are routinely susceptible to   penicillins,cephalosporins and carbapenems.      Clostridium difficile EIA [[id number]]     Order Status:  No result Specimen:  Stool from Stool           Recent Labs  Lab 10/14/17  1440   COLORU Isabelle   SPECGRAV 1.015   PHUR  6.0   PROTEINUA 2+*   BACTERIA None   NITRITE Negative   LEUKOCYTESUR Negative   UROBILINOGEN Negative   HYALINECASTS 1       Diagnostic Results:  Labs: Reviewed  X-Ray: Reviewed    ASSESSMENT/PLAN:     Active Hospital Problems    Diagnosis  POA    *Fever [R50.9]  Yes    Acute on chronic combined systolic and diastolic heart failure [I50.43]  Yes    Chronic gout [M1A.9XX0]  Yes     Chronic    ESRD (end stage renal disease) on dialysis [N18.6, Z99.2]  Not Applicable    Streptococcal bacteremia [R78.81]  Yes    Type 2 diabetes mellitus, controlled, with renal complications [E11.29]  Yes     Chronic    Essential hypertension [I10]  Yes     Chronic    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       1. group b strep sepsis  ? Portal of entry  Lt arm avf looks ok  C/o pain rt knee  Ask ortho to review and obtain films  Us neg for clot  2. esrd  3. olt with prograf  tte neg for vegetations

## 2017-10-16 NOTE — ASSESSMENT & PLAN NOTE
AEB 2D echo 5/2016 LVEF 65% + DD + PA 47.   ECHO reviewed 10/16  Kidney disease contributory  Continue home diuretis, patient is ogliuric  Consult to Nephrology  Continue ASA/BB/monoxidil/demadex/lasix/hydralazine/minoxidil

## 2017-10-16 NOTE — ASSESSMENT & PLAN NOTE
On rocephin  Rule out DVT to RLE, may need aspirate of right knee by ortho  Appreciate ID recs   Ortho consulted

## 2017-10-17 LAB
ANION GAP SERPL CALC-SCNC: 8 MMOL/L
BASOPHILS # BLD AUTO: 0.07 K/UL
BASOPHILS NFR BLD: 1.3 %
BUN SERPL-MCNC: 29 MG/DL
CALCIUM SERPL-MCNC: 8.8 MG/DL
CHLORIDE SERPL-SCNC: 106 MMOL/L
CO2 SERPL-SCNC: 25 MMOL/L
CREAT SERPL-MCNC: 3.7 MG/DL
DIFFERENTIAL METHOD: ABNORMAL
EOSINOPHIL # BLD AUTO: 0.3 K/UL
EOSINOPHIL NFR BLD: 4.6 %
ERYTHROCYTE [DISTWIDTH] IN BLOOD BY AUTOMATED COUNT: 16.3 %
EST. GFR  (AFRICAN AMERICAN): 18 ML/MIN/1.73 M^2
EST. GFR  (NON AFRICAN AMERICAN): 15 ML/MIN/1.73 M^2
GLUCOSE SERPL-MCNC: 83 MG/DL
GRAM STN SPEC: NORMAL
GRAM STN SPEC: NORMAL
HCT VFR BLD AUTO: 32 %
HGB BLD-MCNC: 10.4 G/DL
LYMPHOCYTES # BLD AUTO: 1.1 K/UL
LYMPHOCYTES NFR BLD: 20.6 %
MCH RBC QN AUTO: 27.6 PG
MCHC RBC AUTO-ENTMCNC: 32.5 G/DL
MCV RBC AUTO: 85 FL
MONOCYTES # BLD AUTO: 0.9 K/UL
MONOCYTES NFR BLD: 15.7 %
NEUTROPHILS # BLD AUTO: 3.2 K/UL
NEUTROPHILS NFR BLD: 57.8 %
PLATELET # BLD AUTO: 93 K/UL
PMV BLD AUTO: 11.1 FL
POCT GLUCOSE: 126 MG/DL (ref 70–110)
POCT GLUCOSE: 146 MG/DL (ref 70–110)
POCT GLUCOSE: 157 MG/DL (ref 70–110)
POCT GLUCOSE: 88 MG/DL (ref 70–110)
POTASSIUM SERPL-SCNC: 4.7 MMOL/L
RBC # BLD AUTO: 3.77 M/UL
SODIUM SERPL-SCNC: 139 MMOL/L
WBC # BLD AUTO: 5.49 K/UL

## 2017-10-17 PROCEDURE — 36415 COLL VENOUS BLD VENIPUNCTURE: CPT | Mod: NTX

## 2017-10-17 PROCEDURE — 25000003 PHARM REV CODE 250: Mod: NTX | Performed by: EMERGENCY MEDICINE

## 2017-10-17 PROCEDURE — 25000003 PHARM REV CODE 250: Mod: NTX | Performed by: HOSPITALIST

## 2017-10-17 PROCEDURE — 12000002 HC ACUTE/MED SURGE SEMI-PRIVATE ROOM: Mod: NTX

## 2017-10-17 PROCEDURE — 80048 BASIC METABOLIC PNL TOTAL CA: CPT | Mod: NTX

## 2017-10-17 PROCEDURE — 85025 COMPLETE CBC W/AUTO DIFF WBC: CPT | Mod: NTX

## 2017-10-17 PROCEDURE — 25000003 PHARM REV CODE 250: Mod: NTX | Performed by: INTERNAL MEDICINE

## 2017-10-17 PROCEDURE — 63600175 PHARM REV CODE 636 W HCPCS: Mod: NTX | Performed by: NURSE PRACTITIONER

## 2017-10-17 PROCEDURE — 63600175 PHARM REV CODE 636 W HCPCS: Mod: NTX | Performed by: EMERGENCY MEDICINE

## 2017-10-17 PROCEDURE — 63600175 PHARM REV CODE 636 W HCPCS: Mod: NTX | Performed by: INTERNAL MEDICINE

## 2017-10-17 RX ORDER — LACTULOSE 10 G/15ML
SOLUTION ORAL; RECTAL
Qty: 2700 ML | Refills: 0 | Status: SHIPPED | OUTPATIENT
Start: 2017-10-17 | End: 2017-11-14 | Stop reason: SDUPTHER

## 2017-10-17 RX ADMIN — CLONAZEPAM 0.5 MG: 0.5 TABLET ORAL at 09:10

## 2017-10-17 RX ADMIN — CARVEDILOL 6.25 MG: 6.25 TABLET, FILM COATED ORAL at 10:10

## 2017-10-17 RX ADMIN — HEPARIN SODIUM 5000 UNITS: 5000 INJECTION, SOLUTION INTRAVENOUS; SUBCUTANEOUS at 01:10

## 2017-10-17 RX ADMIN — OXYCODONE AND ACETAMINOPHEN 2 TABLET: 5; 325 TABLET ORAL at 05:10

## 2017-10-17 RX ADMIN — HYDRALAZINE HYDROCHLORIDE 100 MG: 25 TABLET ORAL at 09:10

## 2017-10-17 RX ADMIN — CARVEDILOL 6.25 MG: 6.25 TABLET, FILM COATED ORAL at 09:10

## 2017-10-17 RX ADMIN — CEFTRIAXONE 1 G: 1 INJECTION, SOLUTION INTRAVENOUS at 01:10

## 2017-10-17 RX ADMIN — SEVELAMER CARBONATE 800 MG: 800 TABLET, FILM COATED ORAL at 05:10

## 2017-10-17 RX ADMIN — PANTOPRAZOLE SODIUM 40 MG: 40 TABLET, DELAYED RELEASE ORAL at 10:10

## 2017-10-17 RX ADMIN — HEPARIN SODIUM 5000 UNITS: 5000 INJECTION, SOLUTION INTRAVENOUS; SUBCUTANEOUS at 05:10

## 2017-10-17 RX ADMIN — COLCHICINE 0.6 MG: 0.6 TABLET, FILM COATED ORAL at 10:10

## 2017-10-17 RX ADMIN — RIFAXIMIN 200 MG: 200 TABLET ORAL at 10:10

## 2017-10-17 RX ADMIN — TACROLIMUS 0.5 MG: 0.5 CAPSULE ORAL at 10:10

## 2017-10-17 RX ADMIN — CLONIDINE HYDROCHLORIDE 0.1 MG: 0.1 TABLET ORAL at 09:10

## 2017-10-17 RX ADMIN — DOXAZOSIN 8 MG: 4 TABLET ORAL at 10:10

## 2017-10-17 RX ADMIN — CLONIDINE HYDROCHLORIDE 0.1 MG: 0.1 TABLET ORAL at 01:10

## 2017-10-17 RX ADMIN — MONTELUKAST SODIUM 10 MG: 10 TABLET, FILM COATED ORAL at 09:10

## 2017-10-17 RX ADMIN — RIFAXIMIN 200 MG: 200 TABLET ORAL at 09:10

## 2017-10-17 RX ADMIN — INSULIN DETEMIR 20 UNITS: 100 INJECTION, SOLUTION SUBCUTANEOUS at 09:10

## 2017-10-17 RX ADMIN — LACTULOSE 30 G: 10 SOLUTION ORAL at 10:10

## 2017-10-17 RX ADMIN — HYDRALAZINE HYDROCHLORIDE 100 MG: 25 TABLET ORAL at 01:10

## 2017-10-17 RX ADMIN — CLONIDINE HYDROCHLORIDE 0.1 MG: 0.1 TABLET ORAL at 05:10

## 2017-10-17 RX ADMIN — HYDRALAZINE HYDROCHLORIDE 100 MG: 25 TABLET ORAL at 05:10

## 2017-10-17 RX ADMIN — OXYCODONE AND ACETAMINOPHEN 2 TABLET: 5; 325 TABLET ORAL at 01:10

## 2017-10-17 RX ADMIN — ASPIRIN 81 MG 81 MG: 81 TABLET ORAL at 10:10

## 2017-10-17 RX ADMIN — HEPARIN SODIUM 5000 UNITS: 5000 INJECTION, SOLUTION INTRAVENOUS; SUBCUTANEOUS at 09:10

## 2017-10-17 RX ADMIN — INSULIN ASPART 2 UNITS: 100 INJECTION, SOLUTION INTRAVENOUS; SUBCUTANEOUS at 05:10

## 2017-10-17 RX ADMIN — LACTULOSE 30 G: 10 SOLUTION ORAL at 09:10

## 2017-10-17 RX ADMIN — SEVELAMER CARBONATE 800 MG: 800 TABLET, FILM COATED ORAL at 11:10

## 2017-10-17 RX ADMIN — ALLOPURINOL 100 MG: 100 TABLET ORAL at 10:10

## 2017-10-17 NOTE — PLAN OF CARE
Problem: Hemodialysis (Adult)  Goal: Signs and Symptoms of Listed Potential Problems Will be Absent, Minimized or Managed (Hemodialysis)  Signs and symptoms of listed potential problems will be absent, minimized or managed by discharge/transition of care (reference Hemodialysis (Adult) CPG).   Outcome: Ongoing (interventions implemented as appropriate)   10/16/17 1929   Hemodialysis   Problems Assessed (Hemodialysis) all   Problems Present (Hemodialysis) electrolyte imbalance   Hemodialysis with no UF ordered

## 2017-10-17 NOTE — NURSING
Called 's office and was informed he was here at the hospital. Paged overhead and waiting call back.

## 2017-10-17 NOTE — PROGRESS NOTES
Awake alert oriented NAD    Tolerated HD well yesterday    Not feeling much better, L knee a little better but still painful    Denies CNS ENT CP GI   OR DERM SX    Past Medical History:   Diagnosis Date    Anemia     Arthritis     Back pain     Bishop's esophagus     BPH (benign prostatic hypertrophy)     Chronic kidney disease     Cirrhosis     Diabetes mellitus     Diabetes mellitus, type 2     Diabetes with neurologic complications     Diabetic retinopathy     Elevated PSA     Heart failure     Hemolytic anemia     Hepatitis Hepatitis C    Hepatitis C     Hyperlipidemia     Hypertension     Hypertension     Immune disorder     Liver transplanted     Peripheral neuropathy     Sleep apnea     Transfusion reaction     Hep C from prev. blood transfusion    Transplanted liver     Trouble in sleeping     Type 2 diabetes mellitus with ophthalmic manifestations     Type 2 diabetes with peripheral circulatory disorder, controlled     Type II or unspecified type diabetes mellitus with renal manifestations, uncontrolled(250.42)     Type II or unspecified type diabetes mellitus with renal manifestations, uncontrolled(250.42)      Review of patient's allergies indicates:   Allergen Reactions    Corticosteroids (glucocorticoids) Other (See Comments)     Leg swelling    Hydrocortisone      Leg swelling    Neuromuscular blockers, steroidal      Leg swelling    Ribavirin      Intolerance, arthralgias       Current Facility-Administered Medications   Medication    acetaminophen tablet 650 mg    allopurinol tablet 100 mg    aspirin chewable tablet 81 mg    carvedilol tablet 6.25 mg    cefTRIAXone (ROCEPHIN) 1 g in dextrose 5 % 50 mL IVPB    clonazePAM tablet 0.5 mg    cloNIDine tablet 0.1 mg    colchicine capsule/tablet 0.6 mg    dextrose 50% injection 12.5 g    dextrose 50% injection 25 g    doxazosin tablet 8 mg    epoetin daniel injection 10,000 Units    glucagon (human  recombinant) injection 1 mg    glucose chewable tablet 16 g    glucose chewable tablet 24 g    heparin (porcine) injection 5,000 Units    hydrALAZINE tablet 100 mg    hydrOXYzine HCl tablet 50 mg    insulin aspart pen 1-10 Units    insulin detemir pen 20 Units    lactulose 20 gram/30 mL solution Soln 30 g    minoxidil tablet 5 mg    montelukast tablet 10 mg    ondansetron disintegrating tablet 4 mg    oxycodone-acetaminophen 5-325 mg per tablet 2 tablet    pantoprazole EC tablet 40 mg    rifAXImin tablet 200 mg    sevelamer carbonate tablet 800 mg    tacrolimus capsule 0.5 mg       LABS    Recent Results (from the past 24 hour(s))   2D echo with color flow doppler    Collection Time: 10/16/17 10:30 AM   Result Value Ref Range    EF 65 55 - 65    Diastolic Dysfunction Yes (A)     Est. PA Systolic Pressure 54.69 (A)     Pericardial Effusion MODERATE (A)     Mitral Valve Mobility NORMAL     Tricuspid Valve Regurgitation MILD    POCT glucose    Collection Time: 10/16/17  1:55 PM   Result Value Ref Range    POCT Glucose 136 (H) 70 - 110 mg/dL   POCT glucose    Collection Time: 10/16/17  4:02 PM   Result Value Ref Range    POCT Glucose 148 (H) 70 - 110 mg/dL   POCT glucose    Collection Time: 10/16/17  8:59 PM   Result Value Ref Range    POCT Glucose 123 (H) 70 - 110 mg/dL   Basic metabolic panel    Collection Time: 10/17/17  6:59 AM   Result Value Ref Range    Sodium 139 136 - 145 mmol/L    Potassium 4.7 3.5 - 5.1 mmol/L    Chloride 106 95 - 110 mmol/L    CO2 25 23 - 29 mmol/L    Glucose 83 70 - 110 mg/dL    BUN, Bld 29 (H) 8 - 23 mg/dL    Creatinine 3.7 (H) 0.5 - 1.4 mg/dL    Calcium 8.8 8.7 - 10.5 mg/dL    Anion Gap 8 8 - 16 mmol/L    eGFR if African American 18 (A) >60 mL/min/1.73 m^2    eGFR if non African American 15 (A) >60 mL/min/1.73 m^2   CBC auto differential    Collection Time: 10/17/17  6:59 AM   Result Value Ref Range    WBC 5.49 3.90 - 12.70 K/uL    RBC 3.77 (L) 4.60 - 6.20 M/uL     Hemoglobin 10.4 (L) 14.0 - 18.0 g/dL    Hematocrit 32.0 (L) 40.0 - 54.0 %    MCV 85 82 - 98 fL    MCH 27.6 27.0 - 31.0 pg    MCHC 32.5 32.0 - 36.0 g/dL    RDW 16.3 (H) 11.5 - 14.5 %    Platelets 93 (L) 150 - 350 K/uL    MPV 11.1 9.2 - 12.9 fL    Gran # 3.2 1.8 - 7.7 K/uL    Lymph # 1.1 1.0 - 4.8 K/uL    Mono # 0.9 0.3 - 1.0 K/uL    Eos # 0.3 0.0 - 0.5 K/uL    Baso # 0.07 0.00 - 0.20 K/uL    Gran% 57.8 38.0 - 73.0 %    Lymph% 20.6 18.0 - 48.0 %    Mono% 15.7 (H) 4.0 - 15.0 %    Eosinophil% 4.6 0.0 - 8.0 %    Basophil% 1.3 0.0 - 1.9 %    Differential Method Automated    POCT glucose    Collection Time: 10/17/17  7:25 AM   Result Value Ref Range    POCT Glucose 88 70 - 110 mg/dL   ]    I/O last 3 completed shifts:  In: 1230 [P.O.:680; Other:500; IV Piggyback:50]  Out: 1100 [Urine:600; Other:500]    Vitals:    10/16/17 2057 10/17/17 0020 10/17/17 0403 10/17/17 0723   BP: 135/60 (!) 124/58 127/60 (!) 119/57   Pulse: 70 71 64 61   Resp: 17 17 18 18   Temp: 98.3 °F (36.8 °C) 98.5 °F (36.9 °C) 98.9 °F (37.2 °C) 97.5 °F (36.4 °C)   TempSrc: Oral Oral Oral    SpO2: (!) 91% (!) 91% (!) 93% 98%   Weight: 84.2 kg (185 lb 10 oz)      Height:           No Jvd, Thyromegaly or Lymphadenopathy  Lungs: Fairly clear anteriorly and laterally  Cor: RRR no G or rubs  Abd: Soft benign good bowel sounds non tender  Ext: No E C C    A)    Fever with STREPTOCOCCUS AGALACTIAE (GROUP B) sepsis, source ? Echo negative  ESLD sp Liver tx 2001  ESRD on chronic HD usual rx at [facility]  Last surgical eval was an arteriogram by Dr William last month  Immune suppressed chronically  DM  Wide anion gap  Rising bicarb slightly better this am  Anemia and thrombocytopenia (75K)  HTN  Elevated total protein ?? Immunofix Interpretation:  IGG KAPPA SPECIFIC MONOLCONAL BAND IN MID-GAMMA.  Interpreted by Neelima Olmstead, PhD. DABCC 7/30/12  MGUS  DJD  R Knee Pain with joint effusion ( infectious vs gouty)  GOUT  Hep c  Neuropathy  KARON (does  not use his bipap)  Pulmonary htn  Small pericardial effusion (10/6/16) Moderate pericardial effusion ( ? Minoxidil vs Infectious? Doubt)  Chronic constipation  2nd hypeprth  Barretts esophagus  Mild Hyperammonemia    P)  HD MWF  Antibiotx per ID  Continue work up  Will dc minoxidil

## 2017-10-17 NOTE — PROGRESS NOTES
Knee aspiration done at 5pm yesterday  Fluid sent for cell count , gram stain , and cultures    No synovial fluid analysis done, no cell count, and no gram stain done. I just called lab.    Pt was made npo to determine if I&D of knee needed based on fluid analysis.  I can not determine if surgery I&D needed if lab will not run tests on fluid aspirated 19 hours ago.    Let Pt eat.    14:00  Gram stain now done after calling lab, no org seen, culture NG  No one can find the fluid in purple top tube sent for cell count, synovial fluid analysis and crystals.    This may be gout. If culture remains neg., I will re aspirate Wednesday and inject DepoMedrol right knee.    15:30  Joint fluid from yesterday now found, awaiting results

## 2017-10-18 LAB
ANION GAP SERPL CALC-SCNC: 9 MMOL/L
APPEARANCE FLD: NORMAL
APPEARANCE FLD: NORMAL
BASOPHILS # BLD AUTO: 0.05 K/UL
BASOPHILS NFR BLD: 0.7 %
BODY FLD TYPE: NORMAL
BUN SERPL-MCNC: 40 MG/DL
CALCIUM SERPL-MCNC: 8.9 MG/DL
CHLORIDE SERPL-SCNC: 108 MMOL/L
CO2 SERPL-SCNC: 23 MMOL/L
COLOR FLD: NORMAL
COLOR FLD: YELLOW
CREAT SERPL-MCNC: 4.5 MG/DL
CRYSTALS FLD MICRO: NEGATIVE
DIFFERENTIAL METHOD: ABNORMAL
EOSINOPHIL # BLD AUTO: 0.4 K/UL
EOSINOPHIL NFR BLD: 5.3 %
ERYTHROCYTE [DISTWIDTH] IN BLOOD BY AUTOMATED COUNT: 16.4 %
EST. GFR  (AFRICAN AMERICAN): 14 ML/MIN/1.73 M^2
EST. GFR  (NON AFRICAN AMERICAN): 12 ML/MIN/1.73 M^2
GLUCOSE SERPL-MCNC: 74 MG/DL
HCT VFR BLD AUTO: 31.4 %
HGB BLD-MCNC: 10.4 G/DL
LYMPHOCYTES # BLD AUTO: 1.3 K/UL
LYMPHOCYTES NFR BLD: 19.6 %
LYMPHOCYTES NFR FLD MANUAL: 1 %
LYMPHOCYTES NFR FLD MANUAL: 1 %
MCH RBC QN AUTO: 28 PG
MCHC RBC AUTO-ENTMCNC: 33.1 G/DL
MCV RBC AUTO: 85 FL
MONOCYTES # BLD AUTO: 0.8 K/UL
MONOCYTES NFR BLD: 11.3 %
MONOS+MACROS NFR FLD MANUAL: 2 %
MONOS+MACROS NFR FLD MANUAL: 2 %
NEUTROPHILS # BLD AUTO: 4.2 K/UL
NEUTROPHILS NFR BLD: 63.1 %
NEUTROPHILS NFR FLD MANUAL: 97 %
NEUTROPHILS NFR FLD MANUAL: 97 %
PLATELET # BLD AUTO: 117 K/UL
PMV BLD AUTO: 10.6 FL
POCT GLUCOSE: 103 MG/DL (ref 70–110)
POCT GLUCOSE: 139 MG/DL (ref 70–110)
POCT GLUCOSE: 58 MG/DL (ref 70–110)
POCT GLUCOSE: 58 MG/DL (ref 70–110)
POCT GLUCOSE: 94 MG/DL (ref 70–110)
POTASSIUM SERPL-SCNC: 4.5 MMOL/L
RBC # BLD AUTO: 3.71 M/UL
SODIUM SERPL-SCNC: 140 MMOL/L
WBC # BLD AUTO: 6.73 K/UL
WBC # FLD: NORMAL /CU MM
WBC # FLD: NORMAL /CU MM

## 2017-10-18 PROCEDURE — 86706 HEP B SURFACE ANTIBODY: CPT | Mod: NTX

## 2017-10-18 PROCEDURE — 12000002 HC ACUTE/MED SURGE SEMI-PRIVATE ROOM: Mod: NTX

## 2017-10-18 PROCEDURE — 25000003 PHARM REV CODE 250: Mod: NTX | Performed by: EMERGENCY MEDICINE

## 2017-10-18 PROCEDURE — 25000003 PHARM REV CODE 250: Mod: NTX | Performed by: INTERNAL MEDICINE

## 2017-10-18 PROCEDURE — 25000003 PHARM REV CODE 250: Mod: NTX | Performed by: HOSPITALIST

## 2017-10-18 PROCEDURE — 63600175 PHARM REV CODE 636 W HCPCS: Mod: NTX | Performed by: EMERGENCY MEDICINE

## 2017-10-18 PROCEDURE — 89060 EXAM SYNOVIAL FLUID CRYSTALS: CPT | Mod: NTX

## 2017-10-18 PROCEDURE — 87205 SMEAR GRAM STAIN: CPT | Mod: NTX

## 2017-10-18 PROCEDURE — 25000003 PHARM REV CODE 250: Mod: NTX | Performed by: NURSE PRACTITIONER

## 2017-10-18 PROCEDURE — 63600175 PHARM REV CODE 636 W HCPCS: Mod: NTX | Performed by: ORTHOPAEDIC SURGERY

## 2017-10-18 PROCEDURE — 80048 BASIC METABOLIC PNL TOTAL CA: CPT | Mod: NTX

## 2017-10-18 PROCEDURE — 63600175 PHARM REV CODE 636 W HCPCS: Mod: NTX | Performed by: INTERNAL MEDICINE

## 2017-10-18 PROCEDURE — 36415 COLL VENOUS BLD VENIPUNCTURE: CPT | Mod: NTX

## 2017-10-18 PROCEDURE — 90935 HEMODIALYSIS ONE EVALUATION: CPT | Mod: NTX

## 2017-10-18 PROCEDURE — 89051 BODY FLUID CELL COUNT: CPT | Mod: NTX

## 2017-10-18 PROCEDURE — 85025 COMPLETE CBC W/AUTO DIFF WBC: CPT | Mod: NTX

## 2017-10-18 PROCEDURE — 80074 ACUTE HEPATITIS PANEL: CPT | Mod: NTX

## 2017-10-18 RX ORDER — METHYLPREDNISOLONE ACETATE 80 MG/ML
80 INJECTION, SUSPENSION INTRA-ARTICULAR; INTRALESIONAL; INTRAMUSCULAR; SOFT TISSUE ONCE
Status: COMPLETED | OUTPATIENT
Start: 2017-10-18 | End: 2017-10-18

## 2017-10-18 RX ADMIN — CARVEDILOL 6.25 MG: 6.25 TABLET, FILM COATED ORAL at 09:10

## 2017-10-18 RX ADMIN — RIFAXIMIN 200 MG: 200 TABLET ORAL at 09:10

## 2017-10-18 RX ADMIN — HEPARIN SODIUM 5000 UNITS: 5000 INJECTION, SOLUTION INTRAVENOUS; SUBCUTANEOUS at 10:10

## 2017-10-18 RX ADMIN — Medication 16 G: at 09:10

## 2017-10-18 RX ADMIN — DOXAZOSIN 8 MG: 4 TABLET ORAL at 09:10

## 2017-10-18 RX ADMIN — ASPIRIN 81 MG 81 MG: 81 TABLET ORAL at 09:10

## 2017-10-18 RX ADMIN — MONTELUKAST SODIUM 10 MG: 10 TABLET, FILM COATED ORAL at 09:10

## 2017-10-18 RX ADMIN — METHYLPREDNISOLONE ACETATE 80 MG: 80 INJECTION, SUSPENSION INTRA-ARTICULAR; INTRALESIONAL; INTRAMUSCULAR; SOFT TISSUE at 02:10

## 2017-10-18 RX ADMIN — CEFTRIAXONE 1 G: 1 INJECTION, SOLUTION INTRAVENOUS at 04:10

## 2017-10-18 RX ADMIN — PANTOPRAZOLE SODIUM 40 MG: 40 TABLET, DELAYED RELEASE ORAL at 09:10

## 2017-10-18 RX ADMIN — LACTULOSE 30 G: 10 SOLUTION ORAL at 09:10

## 2017-10-18 RX ADMIN — HYDRALAZINE HYDROCHLORIDE 100 MG: 25 TABLET ORAL at 09:10

## 2017-10-18 RX ADMIN — SEVELAMER CARBONATE 800 MG: 800 TABLET, FILM COATED ORAL at 04:10

## 2017-10-18 RX ADMIN — CLONIDINE HYDROCHLORIDE 0.1 MG: 0.1 TABLET ORAL at 09:10

## 2017-10-18 RX ADMIN — HEPARIN SODIUM 5000 UNITS: 5000 INJECTION, SOLUTION INTRAVENOUS; SUBCUTANEOUS at 06:10

## 2017-10-18 RX ADMIN — ALLOPURINOL 100 MG: 100 TABLET ORAL at 09:10

## 2017-10-18 RX ADMIN — INSULIN DETEMIR 10 UNITS: 100 INJECTION, SOLUTION SUBCUTANEOUS at 09:10

## 2017-10-18 RX ADMIN — TACROLIMUS 0.5 MG: 0.5 CAPSULE ORAL at 09:10

## 2017-10-18 RX ADMIN — CLONIDINE HYDROCHLORIDE 0.1 MG: 0.1 TABLET ORAL at 06:10

## 2017-10-18 RX ADMIN — ERYTHROPOIETIN 10000 UNITS: 10000 INJECTION, SOLUTION INTRAVENOUS; SUBCUTANEOUS at 02:10

## 2017-10-18 RX ADMIN — COLCHICINE 0.6 MG: 0.6 TABLET, FILM COATED ORAL at 09:10

## 2017-10-18 RX ADMIN — OXYCODONE AND ACETAMINOPHEN 2 TABLET: 5; 325 TABLET ORAL at 04:10

## 2017-10-18 RX ADMIN — HYDRALAZINE HYDROCHLORIDE 100 MG: 25 TABLET ORAL at 06:10
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
## 2017-10-18 NOTE — PROGRESS NOTES
Awake alert NAD    Still with R knee pain, 50 % better. Tapped 2 days ago aerobic culture NG so far. Cells [id number]  98% Segs    Denies CNS ENT CP GI  OR DERM SX  Past Medical History:   Diagnosis Date    Anemia     Arthritis     Back pain     Bishop's esophagus     BPH (benign prostatic hypertrophy)     Chronic kidney disease     Cirrhosis     Diabetes mellitus     Diabetes mellitus, type 2     Diabetes with neurologic complications     Diabetic retinopathy     Elevated PSA     Heart failure     Hemolytic anemia     Hepatitis Hepatitis C    Hepatitis C     Hyperlipidemia     Hypertension     Hypertension     Immune disorder     Liver transplanted     Peripheral neuropathy     Sleep apnea     Transfusion reaction     Hep C from prev. blood transfusion    Transplanted liver     Trouble in sleeping     Type 2 diabetes mellitus with ophthalmic manifestations     Type 2 diabetes with peripheral circulatory disorder, controlled     Type II or unspecified type diabetes mellitus with renal manifestations, uncontrolled(250.42)     Type II or unspecified type diabetes mellitus with renal manifestations, uncontrolled(250.42)      Review of patient's allergies indicates:   Allergen Reactions    Corticosteroids (glucocorticoids) Other (See Comments)     Leg swelling    Hydrocortisone      Leg swelling    Neuromuscular blockers, steroidal      Leg swelling    Ribavirin      Intolerance, arthralgias       Current Facility-Administered Medications   Medication    acetaminophen tablet 650 mg    allopurinol tablet 100 mg    aspirin chewable tablet 81 mg    carvedilol tablet 6.25 mg    cefTRIAXone (ROCEPHIN) 1 g in dextrose 5 % 50 mL IVPB    clonazePAM tablet 0.5 mg    cloNIDine tablet 0.1 mg    colchicine capsule/tablet 0.6 mg    dextrose 50% injection 12.5 g    dextrose 50% injection 25 g    doxazosin tablet 8 mg    epoetin daniel injection 10,000 Units    glucagon (human recombinant)  injection 1 mg    glucose chewable tablet 16 g    glucose chewable tablet 24 g    heparin (porcine) injection 5,000 Units    hydrALAZINE tablet 100 mg    hydrOXYzine HCl tablet 50 mg    insulin aspart pen 1-10 Units    insulin detemir pen 20 Units    lactulose 20 gram/30 mL solution Soln 30 g    montelukast tablet 10 mg    ondansetron disintegrating tablet 4 mg    oxycodone-acetaminophen 5-325 mg per tablet 2 tablet    pantoprazole EC tablet 40 mg    rifAXImin tablet 200 mg    sevelamer carbonate tablet 800 mg    tacrolimus capsule 0.5 mg       LABS    Recent Results (from the past 24 hour(s))   POCT glucose    Collection Time: 10/17/17 11:34 AM   Result Value Ref Range    POCT Glucose 126 (H) 70 - 110 mg/dL   POCT glucose    Collection Time: 10/17/17  3:44 PM   Result Value Ref Range    POCT Glucose 157 (H) 70 - 110 mg/dL   POCT glucose    Collection Time: 10/17/17  8:03 PM   Result Value Ref Range    POCT Glucose 146 (H) 70 - 110 mg/dL   Basic metabolic panel    Collection Time: 10/18/17  6:07 AM   Result Value Ref Range    Sodium 140 136 - 145 mmol/L    Potassium 4.5 3.5 - 5.1 mmol/L    Chloride 108 95 - 110 mmol/L    CO2 23 23 - 29 mmol/L    Glucose 74 70 - 110 mg/dL    BUN, Bld 40 (H) 8 - 23 mg/dL    Creatinine 4.5 (H) 0.5 - 1.4 mg/dL    Calcium 8.9 8.7 - 10.5 mg/dL    Anion Gap 9 8 - 16 mmol/L    eGFR if African American 14 (A) >60 mL/min/1.73 m^2    eGFR if non African American 12 (A) >60 mL/min/1.73 m^2   CBC auto differential    Collection Time: 10/18/17  6:07 AM   Result Value Ref Range    WBC 6.73 3.90 - 12.70 K/uL    RBC 3.71 (L) 4.60 - 6.20 M/uL    Hemoglobin 10.4 (L) 14.0 - 18.0 g/dL    Hematocrit 31.4 (L) 40.0 - 54.0 %    MCV 85 82 - 98 fL    MCH 28.0 27.0 - 31.0 pg    MCHC 33.1 32.0 - 36.0 g/dL    RDW 16.4 (H) 11.5 - 14.5 %    Platelets 117 (L) 150 - 350 K/uL    MPV 10.6 9.2 - 12.9 fL    Gran # 4.2 1.8 - 7.7 K/uL    Lymph # 1.3 1.0 - 4.8 K/uL    Mono # 0.8 0.3 - 1.0 K/uL    Eos # 0.4  0.0 - 0.5 K/uL    Baso # 0.05 0.00 - 0.20 K/uL    Gran% 63.1 38.0 - 73.0 %    Lymph% 19.6 18.0 - 48.0 %    Mono% 11.3 4.0 - 15.0 %    Eosinophil% 5.3 0.0 - 8.0 %    Basophil% 0.7 0.0 - 1.9 %    Differential Method Automated    POCT glucose    Collection Time: 10/18/17  7:20 AM   Result Value Ref Range    POCT Glucose 58 (L) 70 - 110 mg/dL   POCT glucose    Collection Time: 10/18/17  8:52 AM   Result Value Ref Range    POCT Glucose 58 (L) 70 - 110 mg/dL   POCT glucose    Collection Time: 10/18/17  9:53 AM   Result Value Ref Range    POCT Glucose 94 70 - 110 mg/dL   ]    I/O last 3 completed shifts:  In: 500 [Other:500]  Out: 700 [Urine:200; Other:500]    Vitals:    10/18/17 0052 10/18/17 0610 10/18/17 0611 10/18/17 0722   BP: (!) 143/65 (!) 142/66 (!) 142/66 (!) 139/92   Pulse: 72 64  64   Resp: 18 18 18   Temp: 98.3 °F (36.8 °C) 98.5 °F (36.9 °C)  98.4 °F (36.9 °C)   TempSrc:  Oral  Oral   SpO2: (!) 94% 96%  96%   Weight:  84.2 kg (185 lb 10 oz)     Height:           No Jvd, Thyromegaly or Lymphadenopathy  Lungs: Fairly clear anteriorly and laterally  Cor: RRR no G or rubs  Abd: Soft benign good bowel sounds non tender  Ext: No E C C    A&P    Fever with STREPTOCOCCUS AGALACTIAE (GROUP B) bacteremia and sepsis, source ? Echo negative, L knee  aspirate final cs pending   On Rocephin  ESLD sp Liver tx 2001 on admit tacrolimus level <2  ESRD on chronic HD usual rx at [facility] now at [facility]   Last surgical eval was an arteriogram by Dr William last month  Immune suppressed chronically  DM  Wide anion gap Corretced  Bicrab 23 goal 20-24  Anemia and thrombocytopenia: platelets better 117 on epo, iron/iron stores monitored at his hd facility  HTN goal 140/90 or better  Elevated total protein ?? Immunofix Interpretation:   IGG KAPPA SPECIFIC MONOLCONAL BAND IN MID-GAMMA.   Interpreted by Neelima Olmstead, PhD. DABCC 7/30/12   MGUS  DJD  R Knee Pain with joint effusion ( infectious vs gouty)  GOUT  Hep c  treated  Neuropathy  KARON (does not use his bipap)  Pulmonary htn  Small pericardial effusion (10/6/16) Moderate pericardial effusion ( ? Minoxidil vs Infectious? Doubt)  Chronic constipation  2nd hypeprth on sevelamer will check po4  Barretts esophagus  Mild Hyperammonemia on lactulose

## 2017-10-18 NOTE — ASSESSMENT & PLAN NOTE
On rocephin  Rule out DVT to RLE, may need aspirate of right knee by ortho  Appreciate ID recs   Ortho consulted   Aspirated R knee

## 2017-10-18 NOTE — PLAN OF CARE
Problem: Hemodialysis (Adult)  Goal: Signs and Symptoms of Listed Potential Problems Will be Absent, Minimized or Managed (Hemodialysis)  Signs and symptoms of listed potential problems will be absent, minimized or managed by discharge/transition of care (reference Hemodialysis (Adult) CPG).   Outcome: Ongoing (interventions implemented as appropriate)   10/18/17 1319   Hemodialysis   Problems Assessed (Hemodialysis) all   Problems Present (Hemodialysis) cardiovascular complications;electrolyte imbalance;fluid imbalance;situational response   3 hour hd tx in progress.

## 2017-10-18 NOTE — SUBJECTIVE & OBJECTIVE
Interval History: *on lactulose and rifaximin with no BM, two doses of pain meds given today, awaiting culture from aspirate of right knee     Review of Systems   Constitutional: Positive for chills and fatigue. Negative for appetite change, diaphoresis and fever.   HENT: Negative for congestion, hearing loss, sore throat, tinnitus and trouble swallowing.    Eyes: Negative for photophobia, discharge, itching and visual disturbance.   Respiratory: Negative for apnea, cough, wheezing and stridor.    Cardiovascular: Negative for chest pain, palpitations and leg swelling.   Gastrointestinal: Negative for abdominal distention, abdominal pain, blood in stool, constipation, diarrhea and nausea.   Endocrine: Negative for polydipsia, polyphagia and polyuria.   Genitourinary: Negative for difficulty urinating, dysuria, flank pain and frequency.   Musculoskeletal: Positive for joint swelling. Negative for arthralgias and neck stiffness.   Skin: Negative for color change, rash and wound.   Neurological: Negative for dizziness, tremors, seizures, light-headedness, numbness and headaches.   Hematological: Negative for adenopathy.   Psychiatric/Behavioral: Negative for hallucinations and self-injury.     Objective:     Vital Signs (Most Recent):  Temp: 98.1 °F (36.7 °C) (10/17/17 1947)  Pulse: 70 (10/17/17 1947)  Resp: 18 (10/17/17 1947)  BP: (!) 153/71 (10/17/17 1947)  SpO2: 97 % (10/17/17 1947) Vital Signs (24h Range):  Temp:  [97.5 °F (36.4 °C)-98.9 °F (37.2 °C)] 98.1 °F (36.7 °C)  Pulse:  [61-86] 70  Resp:  [17-19] 18  SpO2:  [85 %-98 %] 97 %  BP: (119-153)/(57-71) 153/71     Weight: 84.2 kg (185 lb 10 oz)  Body mass index is 24.49 kg/m².  No intake or output data in the 24 hours ending 10/17/17 2101   Physical Exam   Constitutional: He appears well-developed and well-nourished. He is cooperative.   HENT:   Head: Normocephalic and atraumatic.   Eyes: Conjunctivae and lids are normal.   Neck: Full passive range of motion  without pain. Neck supple. No JVD present. No edema present. No thyroid mass present.   Cardiovascular: S1 normal, S2 normal and intact distal pulses.    No murmur heard.  Pulmonary/Chest: Effort normal.   Abdominal: Soft. Bowel sounds are normal. He exhibits no distension and no abdominal bruit. There is no splenomegaly or hepatomegaly. There is no tenderness. There is no CVA tenderness.   Musculoskeletal: Normal range of motion.   Lymphadenopathy:     He has no cervical adenopathy.     He has no axillary adenopathy.   Neurological: He is alert. He has normal reflexes. He displays no tremor. He displays no seizure activity.   Skin: Skin is warm, dry and intact.   Psychiatric: He has a normal mood and affect. His speech is normal. Thought content normal. Cognition and memory are normal.       Significant Labs:   Blood Culture:   Recent Labs  Lab 10/16/17  0645   LABBLOO No Growth to date  No Growth to date  No Growth to date  No Growth to date     BMP:   Recent Labs  Lab 10/17/17  0659   GLU 83      K 4.7      CO2 25   BUN 29*   CREATININE 3.7*   CALCIUM 8.8     CBC:   Recent Labs  Lab 10/16/17  0644 10/17/17  0659   WBC 6.94 5.49   HGB 10.2* 10.4*   HCT 30.6* 32.0*   PLT 75* 93*     POCT Glucose:   Recent Labs  Lab 10/17/17  1134 10/17/17  1544 10/17/17  2003   POCTGLUCOSE 126* 157* 146*       Significant Imaging: I have reviewed all pertinent imaging results/findings within the past 24 hours.

## 2017-10-18 NOTE — PROGRESS NOTES
Pt reports R knee feels 50% better with colcrys  Fluid show moderate PMN, no organism, no growth, no crystals    R knee with large effusion, no erythema. 0-60 degrees ROM with pain. NVI    R knee re aspirated today while in dialysis, 60cc slightly cloudy yellow serous fluid obtained, does not appear infectious. Sent for cell count, crystals, and gram stain again.  Discussed prior reaction to cortisone with Pt, does not sound like a true allergic reaction. Pt agreed to depomedrol injection due to suspected gout flare up. 80mg Depo injected R knee after aspiration.  Will follow and I&D if needed.

## 2017-10-19 PROBLEM — A41.9 SEPTICEMIA: Status: ACTIVE | Noted: 2017-10-14

## 2017-10-19 LAB
ANION GAP SERPL CALC-SCNC: 9 MMOL/L
BASOPHILS # BLD AUTO: ABNORMAL K/UL
BASOPHILS NFR BLD: 0 %
BODY FLD TYPE: NORMAL
BUN SERPL-MCNC: 30 MG/DL
CALCIUM SERPL-MCNC: 9.2 MG/DL
CHLORIDE SERPL-SCNC: 108 MMOL/L
CO2 SERPL-SCNC: 20 MMOL/L
CREAT SERPL-MCNC: 3.5 MG/DL
CRYSTALS FLD MICRO: NEGATIVE
DIFFERENTIAL METHOD: ABNORMAL
EOSINOPHIL # BLD AUTO: ABNORMAL K/UL
EOSINOPHIL NFR BLD: 0 %
ERYTHROCYTE [DISTWIDTH] IN BLOOD BY AUTOMATED COUNT: 16.4 %
EST. GFR  (AFRICAN AMERICAN): 19 ML/MIN/1.73 M^2
EST. GFR  (NON AFRICAN AMERICAN): 16 ML/MIN/1.73 M^2
GLUCOSE SERPL-MCNC: 161 MG/DL
GRAM STN SPEC: NORMAL
GRAM STN SPEC: NORMAL
HAV IGM SERPL QL IA: NEGATIVE
HBV CORE IGM SERPL QL IA: NEGATIVE
HBV SURFACE AG SERPL QL IA: NEGATIVE
HCT VFR BLD AUTO: 33.5 %
HCV AB SERPL QL IA: POSITIVE
HGB BLD-MCNC: 11.2 G/DL
INR PPP: 1
LYMPHOCYTES # BLD AUTO: ABNORMAL K/UL
LYMPHOCYTES NFR BLD: 12 %
MCH RBC QN AUTO: 28.3 PG
MCHC RBC AUTO-ENTMCNC: 33.4 G/DL
MCV RBC AUTO: 85 FL
MONOCYTES # BLD AUTO: ABNORMAL K/UL
MONOCYTES NFR BLD: 5 %
MYELOCYTES NFR BLD MANUAL: 1 %
NEUTROPHILS NFR BLD: 80 %
NEUTS BAND NFR BLD MANUAL: 2 %
PHOSPHATE SERPL-MCNC: 4.8 MG/DL
PLATELET # BLD AUTO: 148 K/UL
PMV BLD AUTO: 10.3 FL
POCT GLUCOSE: 149 MG/DL (ref 70–110)
POCT GLUCOSE: 154 MG/DL (ref 70–110)
POCT GLUCOSE: 188 MG/DL (ref 70–110)
POCT GLUCOSE: 200 MG/DL (ref 70–110)
POCT GLUCOSE: 230 MG/DL (ref 70–110)
POTASSIUM SERPL-SCNC: 5.2 MMOL/L
PROTHROMBIN TIME: 10.5 SEC
RBC # BLD AUTO: 3.96 M/UL
SODIUM SERPL-SCNC: 137 MMOL/L
WBC # BLD AUTO: 6.39 K/UL

## 2017-10-19 PROCEDURE — 84100 ASSAY OF PHOSPHORUS: CPT | Mod: NTX

## 2017-10-19 PROCEDURE — 63600175 PHARM REV CODE 636 W HCPCS: Mod: NTX | Performed by: EMERGENCY MEDICINE

## 2017-10-19 PROCEDURE — 80048 BASIC METABOLIC PNL TOTAL CA: CPT | Mod: NTX

## 2017-10-19 PROCEDURE — 63600175 PHARM REV CODE 636 W HCPCS: Mod: NTX | Performed by: HOSPITALIST

## 2017-10-19 PROCEDURE — 36415 COLL VENOUS BLD VENIPUNCTURE: CPT | Mod: NTX

## 2017-10-19 PROCEDURE — 12000002 HC ACUTE/MED SURGE SEMI-PRIVATE ROOM: Mod: NTX

## 2017-10-19 PROCEDURE — 63600175 PHARM REV CODE 636 W HCPCS: Mod: NTX | Performed by: INTERNAL MEDICINE

## 2017-10-19 PROCEDURE — 85610 PROTHROMBIN TIME: CPT | Mod: NTX

## 2017-10-19 PROCEDURE — 85027 COMPLETE CBC AUTOMATED: CPT | Mod: NTX

## 2017-10-19 PROCEDURE — 85007 BL SMEAR W/DIFF WBC COUNT: CPT | Mod: NTX

## 2017-10-19 PROCEDURE — 25000003 PHARM REV CODE 250: Mod: NTX | Performed by: EMERGENCY MEDICINE

## 2017-10-19 PROCEDURE — 25000003 PHARM REV CODE 250: Mod: NTX | Performed by: INTERNAL MEDICINE

## 2017-10-19 PROCEDURE — 25000003 PHARM REV CODE 250: Mod: NTX | Performed by: HOSPITALIST

## 2017-10-19 RX ADMIN — HEPARIN SODIUM 5000 UNITS: 5000 INJECTION, SOLUTION INTRAVENOUS; SUBCUTANEOUS at 09:10

## 2017-10-19 RX ADMIN — RIFAXIMIN 200 MG: 200 TABLET ORAL at 09:10

## 2017-10-19 RX ADMIN — MONTELUKAST SODIUM 10 MG: 10 TABLET, FILM COATED ORAL at 09:10

## 2017-10-19 RX ADMIN — LACTULOSE 10 G: 10 SOLUTION ORAL at 09:10

## 2017-10-19 RX ADMIN — HEPARIN SODIUM 5000 UNITS: 5000 INJECTION, SOLUTION INTRAVENOUS; SUBCUTANEOUS at 06:10

## 2017-10-19 RX ADMIN — ASPIRIN 81 MG 81 MG: 81 TABLET ORAL at 09:10

## 2017-10-19 RX ADMIN — ALLOPURINOL 100 MG: 100 TABLET ORAL at 09:10

## 2017-10-19 RX ADMIN — CARVEDILOL 6.25 MG: 6.25 TABLET, FILM COATED ORAL at 09:10

## 2017-10-19 RX ADMIN — COLCHICINE 0.6 MG: 0.6 TABLET, FILM COATED ORAL at 09:10

## 2017-10-19 RX ADMIN — CLONIDINE HYDROCHLORIDE 0.1 MG: 0.1 TABLET ORAL at 01:10

## 2017-10-19 RX ADMIN — INSULIN ASPART 2 UNITS: 100 INJECTION, SOLUTION INTRAVENOUS; SUBCUTANEOUS at 09:10

## 2017-10-19 RX ADMIN — PANTOPRAZOLE SODIUM 40 MG: 40 TABLET, DELAYED RELEASE ORAL at 09:10

## 2017-10-19 RX ADMIN — HYDRALAZINE HYDROCHLORIDE 100 MG: 25 TABLET ORAL at 06:10

## 2017-10-19 RX ADMIN — SEVELAMER CARBONATE 800 MG: 800 TABLET, FILM COATED ORAL at 05:10

## 2017-10-19 RX ADMIN — SEVELAMER CARBONATE 800 MG: 800 TABLET, FILM COATED ORAL at 12:10

## 2017-10-19 RX ADMIN — TACROLIMUS 0.5 MG: 0.5 CAPSULE ORAL at 09:10

## 2017-10-19 RX ADMIN — LACTULOSE 20 G: 10 SOLUTION ORAL at 09:10

## 2017-10-19 RX ADMIN — OXYCODONE AND ACETAMINOPHEN 2 TABLET: 5; 325 TABLET ORAL at 05:10

## 2017-10-19 RX ADMIN — CEFTRIAXONE 1 G: 1 INJECTION, SOLUTION INTRAVENOUS at 05:10

## 2017-10-19 RX ADMIN — CLONIDINE HYDROCHLORIDE 0.1 MG: 0.1 TABLET ORAL at 09:10

## 2017-10-19 RX ADMIN — INSULIN DETEMIR 10 UNITS: 100 INJECTION, SOLUTION SUBCUTANEOUS at 09:10

## 2017-10-19 RX ADMIN — CLONIDINE HYDROCHLORIDE 0.1 MG: 0.1 TABLET ORAL at 06:10

## 2017-10-19 RX ADMIN — SEVELAMER CARBONATE 800 MG: 800 TABLET, FILM COATED ORAL at 09:10

## 2017-10-19 RX ADMIN — HYDRALAZINE HYDROCHLORIDE 100 MG: 25 TABLET ORAL at 09:10

## 2017-10-19 RX ADMIN — HYDRALAZINE HYDROCHLORIDE 100 MG: 25 TABLET ORAL at 01:10

## 2017-10-19 RX ADMIN — INSULIN ASPART 4 UNITS: 100 INJECTION, SOLUTION INTRAVENOUS; SUBCUTANEOUS at 05:10

## 2017-10-19 RX ADMIN — DOXAZOSIN 8 MG: 4 TABLET ORAL at 09:10

## 2017-10-19 RX ADMIN — CLONAZEPAM 0.5 MG: 0.5 TABLET ORAL at 09:10

## 2017-10-19 NOTE — PROGRESS NOTES
Progress Note  Infectious Disease    Admit Date: 10/14/2017   LOS: 4 days     SUBJECTIVE:     Follow-up For:  Group b strep sepsis    Antibiotics     Start     Stop Route Frequency Ordered    10/16/17 2100  rifAXImin tablet 200 mg      -- Oral 2 times daily 10/16/17 1600    10/15/17 1300  cefTRIAXone (ROCEPHIN) 1 g in dextrose 5 % 50 mL IVPB      -- IV Every 24 hours (non-standard times) 10/15/17 1225          Review of Systems:  Constitutional: no fever or chills  Eyes: no visual changes  ENT: no nasal congestion or sore throat  Respiratory: no cough or shortness of breath  Cardiovascular: no chest pain or palpitations  Gastrointestinal: no nausea or vomiting, no abdominal pain or change in bowel habits  Genitourinary: no hematuria or dysuria  Musculoskeletal: rt knee hurts  Neurological: no seizures or tremors    OBJECTIVE:     Vital Signs (Most Recent)  Temp: 98 °F (36.7 °C) (10/18/17 1634)  Pulse: (!) 56 (10/18/17 1634)  Resp: 18 (10/18/17 1634)  BP: (!) 153/65 (10/18/17 1634)  SpO2: 98 % (10/18/17 1634)    Temperature Range Min/Max (Last 24H):  Temp:  [98 °F (36.7 °C)-98.7 °F (37.1 °C)]     I & O (Last 24H):  Intake/Output Summary (Last 24 hours) at 10/18/17 1908  Last data filed at 10/18/17 1652   Gross per 24 hour   Intake              500 ml   Output              951 ml   Net             -451 ml       Physical Exam:  General: well developed, well nourished  HENT: Head:normocephalic, atraumatic. Ears:bilateral TM's and external ear canals normal. Nose: Nares normal. Septum midline. Mucosa normal. No drainage or sinus tenderness., no discharge. Throat: lips, mucosa, and tongue normal; teeth and gums normal and no throat erythema.  Eyes: conjunctivae/corneas clear. PERRL.   Neck: supple, symmetrical, trachea midline, no JVD and thyroid not enlarged, symmetric, no tenderness/mass/nodules  Lungs:  clear to auscultation bilaterally and normal respiratory effort  Cardiovascular: Heart: regular rate and rhythm, S1,  S2 normal, no murmur, click, rub or gallop. Chest Wall: no tenderness. Extremities: rt knee is swollen but not red. Pulses: 2+ and symmetric.  Abdomen/Rectal: Abdomen: soft, non-tender non-distented; bowel sounds normal; no masses,  no organomegaly. Rectal: not examined  Skin: Skin color, texture, turgor normal. No rashes or lesions  Musculoskeletal:no clubbing, cyanosis    Lines/Drains:       Tunneled Central Line Insertion/Assessment - Double Lumen  07/13/17 right internal jugular (Active)   Dressing other (see comments) 10/18/2017 12:08 PM   IV Device Securement sutures 7/15/2017 11:20 AM   Distal Patency/Care flushed w/o difficulty;heparin locked 7/15/2017 11:20 AM   Proximal Patency/Care flushed w/o difficulty;heparin locked 7/15/2017 11:20 AM   Dressing Change Due 07/20/17 7/14/2017  7:46 PM   Daily Line Review Performed 7/14/2017  7:46 PM            Peripheral IV - Single Lumen 10/14/17 1401 Right Antecubital (Active)   Site Assessment Clean;Dry;Intact;No redness;No swelling 10/18/2017  7:01 AM   Line Status Saline locked 10/18/2017  7:01 AM   Dressing Status Clean;Intact;Dry 10/18/2017  7:01 AM   Dressing Change Due 10/18/17 10/16/2017  8:27 AM   Site Change Due 10/18/17 10/16/2017  8:27 AM   Reason Not Rotated Not due 10/16/2017  8:27 AM            Peripheral IV - Single Lumen 10/14/17 1402 Right Forearm (Active)   Site Assessment Clean;Dry;Intact;No redness;No swelling 10/18/2017  7:01 AM   Line Status Saline locked 10/18/2017  7:01 AM   Dressing Status Dry;Clean;Intact 10/18/2017  7:01 AM   Dressing Change Due 10/18/17 10/16/2017  8:27 AM   Site Change Due 10/18/17 10/16/2017  8:27 AM   Reason Not Rotated Not due 10/16/2017  8:27 AM       Laboratory:  CBC    Recent Labs  Lab 10/18/17  0607   WBC 6.73   RBC 3.71*   HGB 10.4*   HCT 31.4*   *   MCV 85   MCH 28.0   MCHC 33.1     BMP    Recent Labs  Lab 10/18/17  0607      K 4.5      CO2 23   BUN 40*   CREATININE 4.5*   CALCIUM 8.9      Microbiology Results (last 7 days)     Procedure Component Value Units Date/Time    Gram stain [[id number]] Collected:  10/18/17 1517    Order Status:  Sent Specimen:  Joint Fluid from Knee, Right Updated:  10/18/17 1559    AFB Culture & Smear [[id number]] Collected:  10/16/17 1700    Order Status:  Completed Specimen:  Joint Fluid from Knee, Right Updated:  10/18/17 1429     AFB CULTURE STAIN No acid fast bacilli seen.    Blood culture [[id number]] Collected:  10/16/17 0645    Order Status:  Completed Specimen:  Blood Updated:  10/18/17 0903     Blood Culture, Routine No Growth to date     Blood Culture, Routine No Growth to date     Blood Culture, Routine No Growth to date    Blood culture [[id number]] Collected:  10/16/17 0645    Order Status:  Completed Specimen:  Blood Updated:  10/18/17 0903     Blood Culture, Routine No Growth to date     Blood Culture, Routine No Growth to date     Blood Culture, Routine No Growth to date    Culture, Anaerobe [[id number]] Collected:  10/16/17 1700    Order Status:  Completed Specimen:  Joint Fluid from Knee, Right Updated:  10/18/17 0833     Anaerobic Culture Culture in progress    Gram stain [[id number]] Collected:  10/16/17 1700    Order Status:  Completed Specimen:  Joint Fluid from Knee, Right Updated:  10/17/17 1154     Gram Stain Result Moderate WBC's      No organisms seen    Aerobic culture [[id number]] Collected:  10/16/17 1700    Order Status:  Completed Specimen:  Joint Fluid from Knee, Right Updated:  10/17/17 1047     Aerobic Bacterial Culture No growth    Culture, Body Fluid (Aerobic) w/ GS [[id number]] Collected:  10/16/17 1700    Order Status:  Canceled Specimen:  Joint Fluid from Knee, Right Updated:  10/16/17 1837    Culture, Body Fluid (Aerobic) w/ GS [[id number]] Collected:  10/16/17 1700    Order Status:  Canceled Specimen:  Joint Fluid from Knee, Right Updated:  10/16/17 1836    Urine culture [[id number]] Collected:  10/14/17 1440    Order Status:  Completed  Specimen:  Urine from Urine, Catheterized Updated:  10/16/17 1108     Urine Culture, Routine No growth    Blood culture [[id number]] Collected:  10/14/17 1420    Order Status:  Completed Specimen:  Blood from Peripheral, Hand, Right Updated:  10/16/17 0912     Blood Culture, Routine Gram stain aer bottle: Gram positive cocci in pairs AND  Gram positive      Blood Culture, Routine cocci in chains resembling Strep      Blood Culture, Routine Results called to and read back by: RICK SOSA CHARGE [facility]EAST  10/15/2017       Blood Culture, Routine 05:00     Blood Culture, Routine --     STREPTOCOCCUS AGALACTIAE (GROUP B)  Beta-hemolytic streptococci are routinely susceptible to   penicillins,cephalosporins and carbapenems.  For susceptibility see order #[id number]      Blood culture [[id number]]  (Susceptibility) Collected:  10/14/17 1405    Order Status:  Completed Specimen:  Blood from Peripheral, Hand, Right Updated:  10/16/17 0912     Blood Culture, Routine Gram stain aer bottle: Gram positive cocci in pairs AND  Gram positive      Blood Culture, Routine cocci in chains resembling Strep      Blood Culture, Routine Positive results previously called 10/15/2017  05:01     Blood Culture, Routine --     STREPTOCOCCUS AGALACTIAE (GROUP B)  Beta-hemolytic streptococci are routinely susceptible to   penicillins,cephalosporins and carbapenems.      Clostridium difficile EIA [[id number]]     Order Status:  Canceled Specimen:  Stool from Stool           Recent Labs  Lab 10/14/17  1440   COLORU Isabelle   SPECGRAV 1.015   PHUR 6.0   PROTEINUA 2+*   BACTERIA None   NITRITE Negative   LEUKOCYTESUR Negative   UROBILINOGEN Negative   HYALINECASTS 1       Diagnostic Results:  Labs: Reviewed  X-Ray: Reviewed    ASSESSMENT/PLAN:     Active Hospital Problems    Diagnosis  POA    *Fever [R50.9]  Yes    Acute on chronic combined systolic and diastolic heart failure [I50.43]  Yes    Chronic gout [M1A.9XX0]  Yes     Chronic    ESRD (end stage  renal disease) on dialysis [N18.6, Z99.2]  Not Applicable    Streptococcal bacteremia [R78.81]  Yes    Type 2 diabetes mellitus, controlled, 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       1. Group b strep sepsis  50% of patients may not have an overt site of entry  He has exczematous changes both feet and this may have been source  Cleared  tte neg  2.rt knee arthritis  Gout vs septic  Neutrophilic pleocytosis with neg crystals  I would rx with vanco with hd x 4 weeks on dc  3. esrd and OLT  immunosupressed host

## 2017-10-19 NOTE — NURSING
Pt arrived back to room from dialysis. Pt is awake and alert. Wife at bedside. Right knee injection site oozing blood. Gauze/tegaderm dressing applied. Safety maintained. Will continue to monitor.

## 2017-10-19 NOTE — PROGRESS NOTES
Follow-up Information     Donnie Owens NP On 10/23/2017.    Specialty:  Internal Medicine  Why:  out patient services:  10:40 a.m. [facility]  Contact information:  [address] [place] [place] [address] [phone]

## 2017-10-19 NOTE — PLAN OF CARE
Problem: Infection, Risk/Actual (Adult)  Intervention: Manage Suspected/Actual Infection   10/19/17 1431   Safety Interventions   Isolation Precautions environmental surveillance   Prevent/Manage Colorectal Surgical Infection   Fever Reduction/Comfort Measures lightweight bedding;lightweight clothing     Intervention: Prevent Infection/Maximize Resistance   10/19/17 1431   Hygiene Care   Bathing/Skin Care linen changed   Nutrition Interventions   Glycemic Management blood glucose monitoring;supplemental insulin given   Pain/Comfort/Sleep Interventions   Sleep/Rest Enhancement awakenings minimized;consistent schedule promoted       Goal: Identify Related Risk Factors and Signs and Symptoms  Related risk factors and signs and symptoms are identified upon initiation of Human Response Clinical Practice Guideline (CPG)   Outcome: Ongoing (interventions implemented as appropriate)   10/19/17 1431   Infection, Risk/Actual   Related Risk Factors (Infection, Risk/Actual) exposure to microbes;immunosuppressed;medication effects   Signs and Symptoms (Infection, Risk/Actual) weakness;lab value changes;cultures positive     Goal: Infection Prevention/Resolution  Patient will demonstrate the desired outcomes by discharge/transition of care.   Outcome: Ongoing (interventions implemented as appropriate)   10/19/17 1431   Infection, Risk/Actual (Adult)   Infection Prevention/Resolution making progress toward outcome       Comments: ABx given. Tolerated well. Afebrile. R-knee frequently bleeding. Multiple dressing changes - saturated dressings. Ortho notified. PT-INR ordered. Blood thinners held per judgement. Will continue to monitor

## 2017-10-19 NOTE — PROGRESS NOTES
Progress Note  Infectious Disease    Admit Date: 10/14/2017   LOS: 5 days     SUBJECTIVE:     Follow-up For:  Group b strep sepsis    Antibiotics     Start     Stop Route Frequency Ordered    10/16/17 2100  rifAXImin tablet 200 mg      -- Oral 2 times daily 10/16/17 1600    10/15/17 1300  cefTRIAXone (ROCEPHIN) 1 g in dextrose 5 % 50 mL IVPB      -- IV Every 24 hours (non-standard times) 10/15/17 1225          Review of Systems:  Constitutional: no fever or chills  Eyes: no visual changes  ENT: no nasal congestion or sore throat  Respiratory: no cough or shortness of breath  Cardiovascular: no chest pain or palpitations  Gastrointestinal: no nausea or vomiting, no abdominal pain or change in bowel habits  Genitourinary: no hematuria or dysuria  Musculoskeletal: rt knee hurts  Neurological: no seizures or tremors    OBJECTIVE:     Vital Signs (Most Recent)  Temp: 97.8 °F (36.6 °C) (10/19/17 1229)  Pulse: (!) 56 (10/19/17 1229)  Resp: 18 (10/19/17 1229)  BP: (!) 142/66 (10/19/17 1229)  SpO2: (!) 93 % (10/19/17 1229)    Temperature Range Min/Max (Last 24H):  Temp:  [97.8 °F (36.6 °C)-99.1 °F (37.3 °C)]     I & O (Last 24H):    Intake/Output Summary (Last 24 hours) at 10/19/17 1316  Last data filed at 10/19/17 0939   Gross per 24 hour   Intake             1340 ml   Output              751 ml   Net              589 ml       Physical Exam:  General: well developed, well nourished  HENT: Head:normocephalic, atraumatic. Ears:bilateral TM's and external ear canals normal. Nose: Nares normal. Septum midline. Mucosa normal. No drainage or sinus tenderness., no discharge. Throat: lips, mucosa, and tongue normal; teeth and gums normal and no throat erythema.  Eyes: conjunctivae/corneas clear. PERRL.   Neck: supple, symmetrical, trachea midline, no JVD and thyroid not enlarged, symmetric, no tenderness/mass/nodules  Lungs:  clear to auscultation bilaterally and normal respiratory effort  Cardiovascular: Heart: regular rate and  rhythm, S1, S2 normal, no murmur, click, rub or gallop. Chest Wall: no tenderness. Extremities: rt knee is swollen but not red. Pulses: 2+ and symmetric.  Abdomen/Rectal: Abdomen: soft, non-tender non-distented; bowel sounds normal; no masses,  no organomegaly. Rectal: not examined  Skin: Skin color, texture, turgor normal. No rashes or lesions  Musculoskeletal:no clubbing, cyanosis   Rt knee is better and less painful    Lines/Drains:       Tunneled Central Line Insertion/Assessment - Double Lumen  07/13/17 right internal jugular (Active)   Dressing other (see comments) 10/18/2017 12:08 PM   IV Device Securement sutures 7/15/2017 11:20 AM   Distal Patency/Care flushed w/o difficulty;heparin locked 7/15/2017 11:20 AM   Proximal Patency/Care flushed w/o difficulty;heparin locked 7/15/2017 11:20 AM   Dressing Change Due 07/20/17 7/14/2017  7:46 PM   Daily Line Review Performed 7/14/2017  7:46 PM            Peripheral IV - Single Lumen 10/14/17 1401 Right Antecubital (Active)   Site Assessment Clean;Dry;Intact;No redness;No swelling 10/18/2017  7:01 AM   Line Status Saline locked 10/18/2017  7:01 AM   Dressing Status Clean;Intact;Dry 10/18/2017  7:01 AM   Dressing Change Due 10/18/17 10/16/2017  8:27 AM   Site Change Due 10/18/17 10/16/2017  8:27 AM   Reason Not Rotated Not due 10/16/2017  8:27 AM            Peripheral IV - Single Lumen 10/14/17 1402 Right Forearm (Active)   Site Assessment Clean;Dry;Intact;No redness;No swelling 10/18/2017  7:01 AM   Line Status Saline locked 10/18/2017  7:01 AM   Dressing Status Dry;Clean;Intact 10/18/2017  7:01 AM   Dressing Change Due 10/18/17 10/16/2017  8:27 AM   Site Change Due 10/18/17 10/16/2017  8:27 AM   Reason Not Rotated Not due 10/16/2017  8:27 AM       Laboratory:  CBC    Recent Labs  Lab 10/19/17  0711   WBC 6.39   RBC 3.96*   HGB 11.2*   HCT 33.5*   *   MCV 85   MCH 28.3   MCHC 33.4     BMP    Recent Labs  Lab 10/19/17  0711      K 5.2*      CO2 20*    BUN 30*   CREATININE 3.5*   CALCIUM 9.2     Microbiology Results (last 7 days)     Procedure Component Value Units Date/Time    Gram stain [[id number]] Collected:  10/18/17 1517    Order Status:  Completed Specimen:  Joint Fluid from Knee, Right Updated:  10/19/17 1057     Gram Stain Result No WBC's      No organisms seen    Blood culture [[id number]] Collected:  10/16/17 0645    Order Status:  Completed Specimen:  Blood Updated:  10/19/17 0903     Blood Culture, Routine No Growth to date     Blood Culture, Routine No Growth to date     Blood Culture, Routine No Growth to date     Blood Culture, Routine No Growth to date    Blood culture [[id number]] Collected:  10/16/17 0645    Order Status:  Completed Specimen:  Blood Updated:  10/19/17 0903     Blood Culture, Routine No Growth to date     Blood Culture, Routine No Growth to date     Blood Culture, Routine No Growth to date     Blood Culture, Routine No Growth to date    Aerobic culture [[id number]] Collected:  10/16/17 1700    Order Status:  Completed Specimen:  Joint Fluid from Knee, Right Updated:  10/19/17 0707     Aerobic Bacterial Culture No growth    AFB Culture & Smear [[id number]] Collected:  10/16/17 1700    Order Status:  Completed Specimen:  Joint Fluid from Knee, Right Updated:  10/18/17 2127     AFB Culture & Smear Culture in progress     AFB CULTURE STAIN No acid fast bacilli seen.    Culture, Anaerobe [[id number]] Collected:  10/16/17 1700    Order Status:  Completed Specimen:  Joint Fluid from Knee, Right Updated:  10/18/17 0833     Anaerobic Culture Culture in progress    Gram stain [[id number]] Collected:  10/16/17 1700    Order Status:  Completed Specimen:  Joint Fluid from Knee, Right Updated:  10/17/17 1154     Gram Stain Result Moderate WBC's      No organisms seen    Culture, Body Fluid (Aerobic) w/ GS [[id number]] Collected:  10/16/17 1700    Order Status:  Canceled Specimen:  Joint Fluid from Knee, Right Updated:  10/16/17 1837    Culture, Body  Fluid (Aerobic) w/ GS [[id number]] Collected:  10/16/17 1700    Order Status:  Canceled Specimen:  Joint Fluid from Knee, Right Updated:  10/16/17 1836    Urine culture [[id number]] Collected:  10/14/17 1440    Order Status:  Completed Specimen:  Urine from Urine, Catheterized Updated:  10/16/17 1108     Urine Culture, Routine No growth    Blood culture [[id number]] Collected:  10/14/17 1420    Order Status:  Completed Specimen:  Blood from Peripheral, Hand, Right Updated:  10/16/17 0912     Blood Culture, Routine Gram stain aer bottle: Gram positive cocci in pairs AND  Gram positive      Blood Culture, Routine cocci in chains resembling Strep      Blood Culture, Routine Results called to and read back by: RICK SOSA [facility]GE [facility]  10/15/2017       Blood Culture, Routine 05:00     Blood Culture, Routine --     STREPTOCOCCUS AGALACTIAE (GROUP B)  Beta-hemolytic streptococci are routinely susceptible to   penicillins,cephalosporins and carbapenems.  For susceptibility see order #[id number]      Blood culture [[id number]]  (Susceptibility) Collected:  10/14/17 1405    Order Status:  Completed Specimen:  Blood from Peripheral, Hand, Right Updated:  10/16/17 0912     Blood Culture, Routine Gram stain aer bottle: Gram positive cocci in pairs AND  Gram positive      Blood Culture, Routine cocci in chains resembling Strep      Blood Culture, Routine Positive results previously called 10/15/2017  05:01     Blood Culture, Routine --     STREPTOCOCCUS AGALACTIAE (GROUP B)  Beta-hemolytic streptococci are routinely susceptible to   penicillins,cephalosporins and carbapenems.      Clostridium difficile EIA [[id number]]     Order Status:  Canceled Specimen:  Stool from Stool           Recent Labs  Lab 10/14/17  1440   COLORU Isabelle   SPECGRAV 1.015   PHUR 6.0   PROTEINUA 2+*   BACTERIA None   NITRITE Negative   LEUKOCYTESUR Negative   UROBILINOGEN Negative   HYALINECASTS 1       Diagnostic Results:  Labs: Reviewed  X-Ray:  Reviewed    ASSESSMENT/PLAN:     Active Hospital Problems    Diagnosis  POA    *Fever [R50.9]  Yes    Acute on chronic combined systolic and diastolic heart failure [I50.43]  Yes    Chronic gout [M1A.9XX0]  Yes     Chronic    ESRD (end stage renal disease) on dialysis [N18.6, Z99.2]  Not Applicable    Streptococcal bacteremia [R78.81]  Yes    Type 2 diabetes mellitus, controlled, 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       1. Group b strep sepsis  50% of patients may not have an overt site of entry  He has exczematous changes both feet and this may have been source  Cleared  tte neg  2.rt knee arthritis  Gout vs septic  Neutrophilic pleocytosis with neg crystals  I would rx with vanco with hd x 4 weeks on dc  3. esrd and OLT  immunosupressed host  Discussed with wife and renal orders written  Dc at your discretion

## 2017-10-19 NOTE — ASSESSMENT & PLAN NOTE
AEB 2D echo 5/2016 LVEF 65% + DD + PA 47.   ECHO reviewed 10/16  Kidney disease contributory  Continue home diuretis, patient is ogliuric - ?lasix and demadex listed   Consult to Nephrology  Continue ASA/BB//hydralazine

## 2017-10-19 NOTE — SUBJECTIVE & OBJECTIVE
Interval History: pt reports bleeding from right knee last night and this morning, pain improved, pt has not ambulated yet, mental status improved and safe for PT/OT eval    Review of Systems   Constitutional: Positive for chills and fatigue. Negative for appetite change, diaphoresis and fever.   HENT: Negative for congestion, hearing loss, sore throat, tinnitus and trouble swallowing.    Eyes: Negative for photophobia, discharge, itching and visual disturbance.   Respiratory: Negative for apnea, cough, wheezing and stridor.    Cardiovascular: Negative for chest pain, palpitations and leg swelling.   Gastrointestinal: Negative for abdominal distention, abdominal pain, blood in stool, constipation, diarrhea and nausea.   Endocrine: Negative for polydipsia, polyphagia and polyuria.   Genitourinary: Negative for difficulty urinating, dysuria, flank pain and frequency.   Musculoskeletal: Positive for joint swelling. Negative for arthralgias and neck stiffness.   Skin: Negative for color change, rash and wound.   Neurological: Negative for dizziness, tremors, seizures, light-headedness, numbness and headaches.   Hematological: Negative for adenopathy.   Psychiatric/Behavioral: Negative for hallucinations and self-injury.     Objective:     Vital Signs (Most Recent):  Temp: 97.8 °F (36.6 °C) (10/19/17 1229)  Pulse: (!) 56 (10/19/17 1229)  Resp: 18 (10/19/17 1229)  BP: (!) 142/66 (10/19/17 1229)  SpO2: (!) 93 % (10/19/17 1229) Vital Signs (24h Range):  Temp:  [97.8 °F (36.6 °C)-99.1 °F (37.3 °C)] 97.8 °F (36.6 °C)  Pulse:  [55-65] 56  Resp:  [17-18] 18  SpO2:  [89 %-99 %] 93 %  BP: (122-191)/(51-78) 142/66     Weight: 82.9 kg (182 lb 12.2 oz)  Body mass index is 24.11 kg/m².    Intake/Output Summary (Last 24 hours) at 10/19/17 1308  Last data filed at 10/19/17 0939   Gross per 24 hour   Intake             1340 ml   Output              751 ml   Net              589 ml      Physical Exam   Constitutional: He appears  well-developed and well-nourished. He is cooperative.   HENT:   Head: Normocephalic and atraumatic.   Eyes: Conjunctivae and lids are normal.   Neck: Full passive range of motion without pain. Neck supple. No JVD present. No edema present. No thyroid mass present.   Cardiovascular: S1 normal, S2 normal and intact distal pulses.    No murmur heard.  Pulmonary/Chest: Effort normal.   Abdominal: Soft. Bowel sounds are normal. He exhibits no distension and no abdominal bruit. There is no splenomegaly or hepatomegaly. There is no tenderness. There is no CVA tenderness.   Musculoskeletal: Normal range of motion.   Lymphadenopathy:     He has no cervical adenopathy.     He has no axillary adenopathy.   Neurological: He is alert. He has normal reflexes. He displays no tremor. He displays no seizure activity.   Skin: Skin is warm, dry and intact.   Psychiatric: He has a normal mood and affect. His speech is normal. Thought content normal. Cognition and memory are normal.       Significant Labs:   Blood Culture: No results for input(s): LABBLOO in the last 48 hours.  BMP:   Recent Labs  Lab 10/19/17  0711   *      K 5.2*      CO2 20*   BUN 30*   CREATININE 3.5*   CALCIUM 9.2     CBC:   Recent Labs  Lab 10/18/17  0607 10/19/17  0711   WBC 6.73 6.39   HGB 10.4* 11.2*   HCT 31.4* 33.5*   * 148*     POCT Glucose:   Recent Labs  Lab 10/18/17  2104 10/19/17  0751 10/19/17  1227   POCTGLUCOSE 139* 154* 200*       Significant Imaging: I have reviewed all pertinent imaging results/findings within the past 24 hours.

## 2017-10-19 NOTE — SUBJECTIVE & OBJECTIVE
Interval History: no acute events, + BMS    Review of Systems   Constitutional: Positive for chills and fatigue. Negative for appetite change, diaphoresis and fever.   HENT: Negative for congestion, hearing loss, sore throat, tinnitus and trouble swallowing.    Eyes: Negative for photophobia, discharge, itching and visual disturbance.   Respiratory: Negative for apnea, cough, wheezing and stridor.    Cardiovascular: Negative for chest pain, palpitations and leg swelling.   Gastrointestinal: Negative for abdominal distention, abdominal pain, blood in stool, constipation, diarrhea and nausea.   Endocrine: Negative for polydipsia, polyphagia and polyuria.   Genitourinary: Negative for difficulty urinating, dysuria, flank pain and frequency.   Musculoskeletal: Positive for joint swelling. Negative for arthralgias and neck stiffness.   Skin: Negative for color change, rash and wound.   Neurological: Negative for dizziness, tremors, seizures, light-headedness, numbness and headaches.   Hematological: Negative for adenopathy.   Psychiatric/Behavioral: Negative for hallucinations and self-injury.     Objective:     Vital Signs (Most Recent):  Temp: 98 °F (36.7 °C) (10/18/17 1634)  Pulse: (!) 55 (10/18/17 2145)  Resp: 18 (10/18/17 1634)  BP: (!) 148/68 (10/18/17 2111)  SpO2: 95 % (10/18/17 2145) Vital Signs (24h Range):  Temp:  [98 °F (36.7 °C)-98.7 °F (37.1 °C)] 98 °F (36.7 °C)  Pulse:  [55-72] 55  Resp:  [18-20] 18  SpO2:  [94 %-99 %] 95 %  BP: (119-153)/(51-92) 148/68     Weight: 84.2 kg (185 lb 10 oz)  Body mass index is 24.49 kg/m².    Intake/Output Summary (Last 24 hours) at 10/18/17 2224  Last data filed at 10/18/17 1900   Gross per 24 hour   Intake              900 ml   Output             1026 ml   Net             -126 ml      Physical Exam   Constitutional: He appears well-developed and well-nourished. He is cooperative.   HENT:   Head: Normocephalic and atraumatic.   Eyes: Conjunctivae and lids are normal.   Neck:  Full passive range of motion without pain. Neck supple. No JVD present. No edema present. No thyroid mass present.   Cardiovascular: S1 normal, S2 normal and intact distal pulses.    No murmur heard.  Pulmonary/Chest: Effort normal.   Abdominal: Soft. Bowel sounds are normal. He exhibits no distension and no abdominal bruit. There is no splenomegaly or hepatomegaly. There is no tenderness. There is no CVA tenderness.   Musculoskeletal: Normal range of motion.   Lymphadenopathy:     He has no cervical adenopathy.     He has no axillary adenopathy.   Neurological: He is alert. He has normal reflexes. He displays no tremor. He displays no seizure activity.   Skin: Skin is warm, dry and intact.   Psychiatric: He has a normal mood and affect. His speech is normal. Thought content normal. Cognition and memory are normal.       Significant Labs:   Blood Culture: No results for input(s): LABBLOO in the last 48 hours.  BMP:   Recent Labs  Lab 10/18/17  0607   GLU 74      K 4.5      CO2 23   BUN 40*   CREATININE 4.5*   CALCIUM 8.9     CBC:   Recent Labs  Lab 10/17/17  0659 10/18/17  0607   WBC 5.49 6.73   HGB 10.4* 10.4*   HCT 32.0* 31.4*   PLT 93* 117*     POCT Glucose:   Recent Labs  Lab 10/18/17  0953 10/18/17  1635 10/18/17  2104   POCTGLUCOSE 94 103 139*     Urine Culture: No results for input(s): LABURIN in the last 48 hours.    Significant Imaging: I have reviewed all pertinent imaging results/findings within the past 24 hours.

## 2017-10-19 NOTE — ASSESSMENT & PLAN NOTE
Secondary to Strep bacteremia  Fevers resolved  ID following    Antibiotics narrowed to Rocephin  H/o c.diff   Appreciate ID recs for dc

## 2017-10-19 NOTE — ASSESSMENT & PLAN NOTE
On rocephin  Appreciate ID recs - vancomycin x 4 weeks with HD  Ortho consulted for right knee effusion - no crystals to suggest gout and cultures thus far negative

## 2017-10-19 NOTE — ASSESSMENT & PLAN NOTE
ALL Bp meds held on admit due to hypotension, now becoming more hypertensive  Minoxidil dc'd by nephrology - pericardial effusion on ECHO  Resume hydralazine, coreg and clonidine

## 2017-10-19 NOTE — NURSING
Report by CHRISSIE Moncada. 2L NC in use. R-knee dressing change completed. Bloody drainage. Minimal swelling and ROM with R- leg. Denies pain. No acute distress noted. Bed locked in lowest position with call bell in reach.     @1140 - Dressing saturated in blood. R-knee dressing change, pressure applied. Pedal pulses palpable. Reports no pain or discomfort. Dr. Wolff's office called. Notified by Alicia Schwartz that Dr. Wolff will not be in office for the rest of this week. Number given for [place] office: [phone]. Dr. Stein notified. Order Pt-INR and to continue dressing compression with ACE bandage.    @1534- R-knee dressing changed again. Wrapped with ACE.    @1601 -  Call placed to Dr. Stein. Patient has some bleeding on gauze, but the dressing isn't saturated.      @9412 - Dr. Stein returned call. No bleeding apparent on ACE bandage, small amount on gauze. Ordered to continue applying pressure and changing dressing PRN.

## 2017-10-19 NOTE — PT/OT/SLP PROGRESS
Physical Therapy      Philip Mathis III  MRN: [id number]    Patient not seen today secondary to other (right knee is currently bleeding). Will follow-up again tomorrow.    Lori Hebert, PT, MOT

## 2017-10-19 NOTE — PROGRESS NOTES
Awake alert oriented x2     States that he is feeling better    Denies CNS ENT CP GI  OR DERM SX    Worries me that when I asked him how was his HD yesterday he told me that he does not remember his dialysis.    Past Medical History:   Diagnosis Date    Anemia     Arthritis     Back pain     Bishop's esophagus     BPH (benign prostatic hypertrophy)     Chronic kidney disease     Cirrhosis     Diabetes mellitus     Diabetes mellitus, type 2     Diabetes with neurologic complications     Diabetic retinopathy     Elevated PSA     Heart failure     Hemolytic anemia     Hepatitis Hepatitis C    Hepatitis C     Hyperlipidemia     Hypertension     Hypertension     Immune disorder     Liver transplanted     Peripheral neuropathy     Sleep apnea     Transfusion reaction     Hep C from prev. blood transfusion    Transplanted liver     Trouble in sleeping     Type 2 diabetes mellitus with ophthalmic manifestations     Type 2 diabetes with peripheral circulatory disorder, controlled     Type II or unspecified type diabetes mellitus with renal manifestations, uncontrolled(250.42)     Type II or unspecified type diabetes mellitus with renal manifestations, uncontrolled(250.42)      Review of patient's allergies indicates:   Allergen Reactions    Corticosteroids (glucocorticoids) Other (See Comments)     Leg swelling    Hydrocortisone      Leg swelling    Neuromuscular blockers, steroidal      Leg swelling    Ribavirin      Intolerance, arthralgias       Current Facility-Administered Medications   Medication    acetaminophen tablet 650 mg    allopurinol tablet 100 mg    aspirin chewable tablet 81 mg    carvedilol tablet 6.25 mg    cefTRIAXone (ROCEPHIN) 1 g in dextrose 5 % 50 mL IVPB    clonazePAM tablet 0.5 mg    cloNIDine tablet 0.1 mg    colchicine capsule/tablet 0.6 mg    dextrose 50% injection 12.5 g    dextrose 50% injection 25 g    doxazosin tablet 8 mg    epoetin daniel  injection 10,000 Units    glucagon (human recombinant) injection 1 mg    glucose chewable tablet 16 g    glucose chewable tablet 24 g    heparin (porcine) injection 5,000 Units    hydrALAZINE tablet 100 mg    hydrOXYzine HCl tablet 50 mg    insulin aspart pen 1-10 Units    insulin detemir pen 10 Units    lactulose 20 gram/30 mL solution Soln 30 g    montelukast tablet 10 mg    ondansetron disintegrating tablet 4 mg    oxycodone-acetaminophen 5-325 mg per tablet 2 tablet    pantoprazole EC tablet 40 mg    rifAXImin tablet 200 mg    sevelamer carbonate tablet 800 mg    tacrolimus capsule 0.5 mg       LABS    Recent Results (from the past 24 hour(s))   WBC & Diff,Body Fluid Joint Fluid, Right Knee    Collection Time: 10/18/17  3:17 PM   Result Value Ref Range    Body Fluid Type Joint Fluid, Right Knee     Fluid Appearance Turbid     Fluid Color Yellow     WBC, Body Fluid 83802 /cu mm    Segs, Fluid 97 %    Lymphs, Fluid 1 %    Monocytes/Macrophages, Fluid 2 %   Body fluid crystal Joint Fluid, Right Knee    Collection Time: 10/18/17  3:17 PM   Result Value Ref Range    Body Fluid Source, Crystal Exam Joint Fluid, Right Knee     Body Fluid Crystal Negative Negative   Gram stain    Collection Time: 10/18/17  3:17 PM   Result Value Ref Range    Gram Stain Result No WBC's     Gram Stain Result No organisms seen    Hepatitis panel, acute    Collection Time: 10/18/17  3:33 PM   Result Value Ref Range    Hepatitis B Surface Ag Negative     Hep B C IgM Negative     Hep A IgM Negative     Hepatitis C Ab Positive (A)    POCT glucose    Collection Time: 10/18/17  4:35 PM   Result Value Ref Range    POCT Glucose 103 70 - 110 mg/dL   POCT glucose    Collection Time: 10/18/17  9:04 PM   Result Value Ref Range    POCT Glucose 139 (H) 70 - 110 mg/dL   Basic metabolic panel    Collection Time: 10/19/17  7:11 AM   Result Value Ref Range    Sodium 137 136 - 145 mmol/L    Potassium 5.2 (H) 3.5 - 5.1 mmol/L    Chloride 108  95 - 110 mmol/L    CO2 20 (L) 23 - 29 mmol/L    Glucose 161 (H) 70 - 110 mg/dL    BUN, Bld 30 (H) 8 - 23 mg/dL    Creatinine 3.5 (H) 0.5 - 1.4 mg/dL    Calcium 9.2 8.7 - 10.5 mg/dL    Anion Gap 9 8 - 16 mmol/L    eGFR if African American 19 (A) >60 mL/min/1.73 m^2    eGFR if non African American 16 (A) >60 mL/min/1.73 m^2   CBC auto differential    Collection Time: 10/19/17  7:11 AM   Result Value Ref Range    WBC 6.39 3.90 - 12.70 K/uL    RBC 3.96 (L) 4.60 - 6.20 M/uL    Hemoglobin 11.2 (L) 14.0 - 18.0 g/dL    Hematocrit 33.5 (L) 40.0 - 54.0 %    MCV 85 82 - 98 fL    MCH 28.3 27.0 - 31.0 pg    MCHC 33.4 32.0 - 36.0 g/dL    RDW 16.4 (H) 11.5 - 14.5 %    Platelets 148 (L) 150 - 350 K/uL    MPV 10.3 9.2 - 12.9 fL    Lymph # CANCELED 1.0 - 4.8 K/uL    Mono # CANCELED 0.3 - 1.0 K/uL    Eos # CANCELED 0.0 - 0.5 K/uL    Baso # CANCELED 0.00 - 0.20 K/uL    Gran% 80.0 (H) 38.0 - 73.0 %    Lymph% 12.0 (L) 18.0 - 48.0 %    Mono% 5.0 4.0 - 15.0 %    Eosinophil% 0.0 0.0 - 8.0 %    Basophil% 0.0 0.0 - 1.9 %    Bands 2.0 %    Myelocytes 1.0 %    Differential Method Manual    Phosphorus    Collection Time: 10/19/17  7:11 AM   Result Value Ref Range    Phosphorus 4.8 (H) 2.7 - 4.5 mg/dL   POCT glucose    Collection Time: 10/19/17  7:51 AM   Result Value Ref Range    POCT Glucose 154 (H) 70 - 110 mg/dL   ]    I/O last 3 completed shifts:  In: 1100 [P.O.:600; Other:500]  Out: 1026 [Urine:525; Other:501]    Vitals:    10/19/17 0604 10/19/17 0717 10/19/17 0753 10/19/17 1043   BP: (!) 191/78  (!) 154/69    Pulse: 63 61 62    Resp: 17  18    Temp: 97.9 °F (36.6 °C)  99.1 °F (37.3 °C)    TempSrc: Oral  Oral    SpO2: 95%  (!) 89% 95%   Weight: 82.9 kg (182 lb 12.2 oz)      Height:           No Jvd, Thyromegaly or Lymphadenopathy  Lungs: Fairly clear anteriorly and laterally  Cor: RRR no G or rubs  Abd: Soft benign good bowel sounds non tender  Ext: No E C C    A)    Fever with STREPTOCOCCUS AGALACTIAE (GROUP B) sepsis, source ? Echo  negative  ESLD sp Liver tx 2001  ESRD on chronic HD usual rx at [facility] now at [facility] M[facility]  Last surgical eval was an arteriogram by Dr William last month  Immune suppressed chronically  DM  Wide anion gap correted  Rising bicarb much better now within esrd range goal 20-24  Anemia ok  Thrombocytopenia corrected   HTN  Elevated total protein ?? Immunofix Interpretation:  IGG KAPPA SPECIFIC MONOLCONAL BAND IN MID-GAMMA.  Interpreted by Neelima Olmstead, PhD. DABCC 7/30/12  MGUS  DJD  R Knee Pain with joint effusion ( infectious vs gouty) orthopedics on the case  GOUT on double rx  Hep c  Neuropathy  KARON (does not use his bipap)  Pulmonary htn  Small pericardial effusion (10/6/16) Moderate pericardial effusion now ( ? Minoxidil vs Infectious? Doubt)  Chronic constipation  2nd hypeprth  Barretts esophagus  Mild Hyperammonemia on lactulose    I wonder if the pericardial effusion has any bearings in this pt's disease process    P)    Adjust HD ( increase qd and increase bicarb)  Epo  Check pthi
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
## 2017-10-19 NOTE — PLAN OF CARE
Problem: Fall Risk (Adult)  Intervention: Reduce Risk/Promote Restraint Free Environment   10/18/17 1942   Safety Interventions   Environmental Safety Modification lighting adjusted;clutter free environment maintained;room near unit station     Intervention: Review Medications/Identify Contributors to Fall Risk   10/18/17 1942   Safety Interventions   Medication Review/Management medications reviewed     Intervention: Patient Rounds   10/18/17 1942   Safety Interventions   Patient Rounds bed in low position;call light in reach;bed wheels locked;placement of personal items at bedside;ID band on;clutter free environment maintained;visualized patient;toileting offered     Intervention: Safety Promotion/Fall Prevention   10/18/17 1942   Safety Interventions   Safety Promotion/Fall Prevention bed alarm set;side rails raised x 2;room near unit station       Goal: Absence of Falls  Patient will demonstrate the desired outcomes by discharge/transition of care.    10/18/17 1942   Fall Risk (Adult)   Absence of Falls making progress toward outcome       Problem: Pressure Ulcer Risk (Dion Scale) (Adult,Obstetrics,Pediatric)  Intervention: Prevent/Manage Excess Moisture   10/18/17 1942   Skin Interventions   Skin Protection adhesive use limited   Hygiene Care   Perineal Care absorbent pad changed     Intervention: Maintain Head of Bed Elevation Less Than 30 Degrees as Tolerated   10/18/17 1942   Positioning   Head of Bed (HOB) HOB elevated     Intervention: Prevent/Minimize Sheer/Friction Injuries   10/18/17 1942   Skin Interventions   Pressure Reduction Techniques frequent weight shift encouraged   Positioning   Positioning/Transfer Devices pillows     Intervention: Turn/Reposition Often   10/18/17 1942   Skin Interventions   Pressure Reduction Techniques frequent weight shift encouraged       Goal: Identify Related Risk Factors and Signs and Symptoms  Related risk factors and signs and symptoms are identified upon  initiation of Human Response Clinical Practice Guideline (CPG)    10/18/17 1942   Pressure Ulcer Risk (Dion Scale)   Related Risk Factors (Pressure Ulcer Risk (Dion Scale)) hospitalization prolonged     Goal: Skin Integrity  Patient will demonstrate the desired outcomes by discharge/transition of care.    10/18/17 1942   Pressure Ulcer Risk (Dion Scale) (Adult,Obstetrics,Pediatric)   Skin Integrity making progress toward outcome       Problem: Patient Care Overview  Goal: Plan of Care Review   10/18/17 1942   Coping/Psychosocial   Plan Of Care Reviewed With patient;spouse     Goal: Interdisciplinary Rounds/Family Conf   10/18/17 1942   Interdisciplinary Rounds/Family Conf   Participants nursing;patient;family

## 2017-10-20 VITALS
WEIGHT: 177.5 LBS | HEART RATE: 53 BPM | RESPIRATION RATE: 20 BRPM | TEMPERATURE: 99 F | BODY MASS INDEX: 23.52 KG/M2 | OXYGEN SATURATION: 94 % | DIASTOLIC BLOOD PRESSURE: 70 MMHG | HEIGHT: 73 IN | SYSTOLIC BLOOD PRESSURE: 154 MMHG

## 2017-10-20 PROBLEM — I50.43 ACUTE ON CHRONIC COMBINED SYSTOLIC AND DIASTOLIC HEART FAILURE: Status: RESOLVED | Noted: 2017-04-26 | Resolved: 2017-10-20

## 2017-10-20 PROBLEM — A41.9 SEPTICEMIA: Status: RESOLVED | Noted: 2017-10-14 | Resolved: 2017-10-20

## 2017-10-20 LAB
ANION GAP SERPL CALC-SCNC: 10 MMOL/L
BACTERIA SPEC AEROBE CULT: NO GROWTH
BACTERIA SPEC ANAEROBE CULT: NORMAL
BASOPHILS # BLD AUTO: ABNORMAL K/UL
BASOPHILS NFR BLD: 2 %
BUN SERPL-MCNC: 46 MG/DL
CALCIUM SERPL-MCNC: 9 MG/DL
CHLORIDE SERPL-SCNC: 109 MMOL/L
CO2 SERPL-SCNC: 20 MMOL/L
CREAT SERPL-MCNC: 4.2 MG/DL
DIFFERENTIAL METHOD: ABNORMAL
EOSINOPHIL # BLD AUTO: ABNORMAL K/UL
EOSINOPHIL NFR BLD: 2 %
ERYTHROCYTE [DISTWIDTH] IN BLOOD BY AUTOMATED COUNT: 16.4 %
EST. GFR  (AFRICAN AMERICAN): 15 ML/MIN/1.73 M^2
EST. GFR  (NON AFRICAN AMERICAN): 13 ML/MIN/1.73 M^2
GLUCOSE SERPL-MCNC: 147 MG/DL
HCT VFR BLD AUTO: 32.7 %
HEP. B SURF AB, QUAL: NEGATIVE
HEP. B SURF AB, QUANT.: 7 MIU/ML
HGB BLD-MCNC: 10.9 G/DL
HYPOCHROMIA BLD QL SMEAR: ABNORMAL
LYMPHOCYTES # BLD AUTO: ABNORMAL K/UL
LYMPHOCYTES NFR BLD: 23 %
MCH RBC QN AUTO: 28.1 PG
MCHC RBC AUTO-ENTMCNC: 33.3 G/DL
MCV RBC AUTO: 84 FL
METAMYELOCYTES NFR BLD MANUAL: 2 %
MONOCYTES # BLD AUTO: ABNORMAL K/UL
MONOCYTES NFR BLD: 6 %
NEUTROPHILS NFR BLD: 48 %
NEUTS BAND NFR BLD MANUAL: 17 %
OVALOCYTES BLD QL SMEAR: ABNORMAL
PLATELET # BLD AUTO: 197 K/UL
PLATELET BLD QL SMEAR: ABNORMAL
PMV BLD AUTO: 10.6 FL
POCT GLUCOSE: 130 MG/DL (ref 70–110)
POCT GLUCOSE: 153 MG/DL (ref 70–110)
POTASSIUM SERPL-SCNC: 5.3 MMOL/L
PTH-INTACT SERPL-MCNC: 107.1 PG/ML
RBC # BLD AUTO: 3.88 M/UL
SODIUM SERPL-SCNC: 139 MMOL/L
WBC # BLD AUTO: 7.03 K/UL

## 2017-10-20 PROCEDURE — G8978 MOBILITY CURRENT STATUS: HCPCS | Mod: CI,NTX

## 2017-10-20 PROCEDURE — 25000003 PHARM REV CODE 250: Mod: NTX | Performed by: INTERNAL MEDICINE

## 2017-10-20 PROCEDURE — 36415 COLL VENOUS BLD VENIPUNCTURE: CPT | Mod: NTX

## 2017-10-20 PROCEDURE — G8980 MOBILITY D/C STATUS: HCPCS | Mod: CI,NTX

## 2017-10-20 PROCEDURE — 83970 ASSAY OF PARATHORMONE: CPT | Mod: NTX

## 2017-10-20 PROCEDURE — 63600175 PHARM REV CODE 636 W HCPCS: Mod: NTX | Performed by: INTERNAL MEDICINE

## 2017-10-20 PROCEDURE — 85007 BL SMEAR W/DIFF WBC COUNT: CPT | Mod: NTX

## 2017-10-20 PROCEDURE — 80048 BASIC METABOLIC PNL TOTAL CA: CPT | Mod: NTX

## 2017-10-20 PROCEDURE — G8979 MOBILITY GOAL STATUS: HCPCS | Mod: CI,NTX

## 2017-10-20 PROCEDURE — 97161 PT EVAL LOW COMPLEX 20 MIN: CPT | Mod: NTX

## 2017-10-20 PROCEDURE — 25000003 PHARM REV CODE 250: Mod: NTX | Performed by: HOSPITALIST

## 2017-10-20 PROCEDURE — 85027 COMPLETE CBC AUTOMATED: CPT | Mod: NTX

## 2017-10-20 PROCEDURE — 63600175 PHARM REV CODE 636 W HCPCS: Mod: NTX | Performed by: EMERGENCY MEDICINE

## 2017-10-20 PROCEDURE — 25000003 PHARM REV CODE 250: Mod: NTX | Performed by: EMERGENCY MEDICINE

## 2017-10-20 PROCEDURE — 97165 OT EVAL LOW COMPLEX 30 MIN: CPT | Mod: NTX

## 2017-10-20 PROCEDURE — 80100016 HC MAINTENANCE HEMODIALYSIS: Mod: NTX

## 2017-10-20 RX ORDER — LISINOPRIL 5 MG/1
10 TABLET ORAL DAILY
Status: DISCONTINUED | OUTPATIENT
Start: 2017-10-20 | End: 2017-10-20 | Stop reason: HOSPADM

## 2017-10-20 RX ADMIN — HEPARIN SODIUM 5000 UNITS: 5000 INJECTION, SOLUTION INTRAVENOUS; SUBCUTANEOUS at 05:10

## 2017-10-20 RX ADMIN — CLONIDINE HYDROCHLORIDE 0.1 MG: 0.1 TABLET ORAL at 06:10

## 2017-10-20 RX ADMIN — TACROLIMUS 0.5 MG: 0.5 CAPSULE ORAL at 12:10

## 2017-10-20 RX ADMIN — HYDRALAZINE HYDROCHLORIDE 100 MG: 25 TABLET ORAL at 05:10

## 2017-10-20 RX ADMIN — SEVELAMER CARBONATE 800 MG: 800 TABLET, FILM COATED ORAL at 06:10

## 2017-10-20 RX ADMIN — HYDRALAZINE HYDROCHLORIDE 100 MG: 25 TABLET ORAL at 06:10

## 2017-10-20 RX ADMIN — SEVELAMER CARBONATE 800 MG: 800 TABLET, FILM COATED ORAL at 12:10

## 2017-10-20 RX ADMIN — CARVEDILOL 6.25 MG: 6.25 TABLET, FILM COATED ORAL at 09:10

## 2017-10-20 RX ADMIN — PANTOPRAZOLE SODIUM 40 MG: 40 TABLET, DELAYED RELEASE ORAL at 09:10

## 2017-10-20 RX ADMIN — COLCHICINE 0.6 MG: 0.6 TABLET, FILM COATED ORAL at 09:10

## 2017-10-20 RX ADMIN — VANCOMYCIN HYDROCHLORIDE 1000 MG: 1 INJECTION, POWDER, LYOPHILIZED, FOR SOLUTION INTRAVENOUS at 03:10

## 2017-10-20 RX ADMIN — CLONIDINE HYDROCHLORIDE 0.1 MG: 0.1 TABLET ORAL at 05:10

## 2017-10-20 RX ADMIN — HEPARIN SODIUM 5000 UNITS: 5000 INJECTION, SOLUTION INTRAVENOUS; SUBCUTANEOUS at 06:10

## 2017-10-20 RX ADMIN — ALLOPURINOL 100 MG: 100 TABLET ORAL at 09:10

## 2017-10-20 RX ADMIN — ASPIRIN 81 MG 81 MG: 81 TABLET ORAL at 09:10

## 2017-10-20 RX ADMIN — RIFAXIMIN 200 MG: 200 TABLET ORAL at 09:10

## 2017-10-20 RX ADMIN — DOXAZOSIN 8 MG: 4 TABLET ORAL at 09:10

## 2017-10-20 RX ADMIN — SEVELAMER CARBONATE 800 MG: 800 TABLET, FILM COATED ORAL at 05:10

## 2017-10-20 RX ADMIN — LACTULOSE 15 G: 10 SOLUTION ORAL at 09:10

## 2017-10-20 RX ADMIN — ERYTHROPOIETIN 10000 UNITS: 10000 INJECTION, SOLUTION INTRAVENOUS; SUBCUTANEOUS at 09:10

## 2017-10-20 NOTE — PROGRESS NOTES
Progress Note  Infectious Disease    Admit Date: 10/14/2017   LOS: 6 days     SUBJECTIVE:     Follow-up For:  Group b strep sepsis    Antibiotics     Start     Stop Route Frequency Ordered    10/19/17 1418  vancomycin 1 g in dextrose 5 % 250 mL IVPB (ready to mix system)  (Vancomycin IVPB with levels panel)      11/16 1417 IV As needed (PRN) 10/19/17 1319    10/16/17 2100  rifAXImin tablet 200 mg      -- Oral 2 times daily 10/16/17 1600    10/15/17 1300  cefTRIAXone (ROCEPHIN) 1 g in dextrose 5 % 50 mL IVPB      -- IV Every 24 hours (non-standard times) 10/15/17 1225          Review of Systems:  Constitutional: no fever or chills  Eyes: no visual changes  ENT: no nasal congestion or sore throat  Respiratory: no cough or shortness of breath  Cardiovascular: no chest pain or palpitations  Gastrointestinal: no nausea or vomiting, no abdominal pain or change in bowel habits  Genitourinary: no hematuria or dysuria  Musculoskeletal: rt knee better  Neurological: no seizures or tremors    OBJECTIVE:     Vital Signs (Most Recent)  Temp: 99.2 °F (37.3 °C) (10/20/17 0802)  Pulse: (!) 50 (10/20/17 0802)  Resp: 17 (10/20/17 0802)  BP: (!) 161/70 (10/20/17 0802)  SpO2: 95 % (10/19/17 1951)    Temperature Range Min/Max (Last 24H):  Temp:  [97 °F (36.1 °C)-99.2 °F (37.3 °C)]     I & O (Last 24H):    Intake/Output Summary (Last 24 hours) at 10/20/17 1111  Last data filed at 10/20/17 0515   Gross per 24 hour   Intake                0 ml   Output              200 ml   Net             -200 ml       Physical Exam:  General: well developed, well nourished  HENT: Head:normocephalic, atraumatic. Ears:bilateral TM's and external ear canals normal. Nose: Nares normal. Septum midline. Mucosa normal. No drainage or sinus tenderness., no discharge. Throat: lips, mucosa, and tongue normal; teeth and gums normal and no throat erythema.  Eyes: conjunctivae/corneas clear. PERRL.   Neck: supple, symmetrical, trachea midline, no JVD and thyroid not  enlarged, symmetric, no tenderness/mass/nodules  Lungs:  clear to auscultation bilaterally and normal respiratory effort  Cardiovascular: Heart: regular rate and rhythm, S1, S2 normal, no murmur, click, rub or gallop. Chest Wall: no tenderness. Extremities: rt knee is swollen but not red. Pulses: 2+ and symmetric.  Abdomen/Rectal: Abdomen: soft, non-tender non-distented; bowel sounds normal; no masses,  no organomegaly. Rectal: not examined  Skin: Skin color, texture, turgor normal. No rashes or lesions  Musculoskeletal:no clubbing, cyanosis   Rt knee is better and less painful    Lines/Drains:       Tunneled Central Line Insertion/Assessment - Double Lumen  07/13/17 right internal jugular (Active)   Dressing other (see comments) 10/18/2017 12:08 PM   IV Device Securement sutures 7/15/2017 11:20 AM   Distal Patency/Care flushed w/o difficulty;heparin locked 7/15/2017 11:20 AM   Proximal Patency/Care flushed w/o difficulty;heparin locked 7/15/2017 11:20 AM   Dressing Change Due 07/20/17 7/14/2017  7:46 PM   Daily Line Review Performed 7/14/2017  7:46 PM            Peripheral IV - Single Lumen 10/14/17 1401 Right Antecubital (Active)   Site Assessment Clean;Dry;Intact;No redness;No swelling 10/18/2017  7:01 AM   Line Status Saline locked 10/18/2017  7:01 AM   Dressing Status Clean;Intact;Dry 10/18/2017  7:01 AM   Dressing Change Due 10/18/17 10/16/2017  8:27 AM   Site Change Due 10/18/17 10/16/2017  8:27 AM   Reason Not Rotated Not due 10/16/2017  8:27 AM            Peripheral IV - Single Lumen 10/14/17 1402 Right Forearm (Active)   Site Assessment Clean;Dry;Intact;No redness;No swelling 10/18/2017  7:01 AM   Line Status Saline locked 10/18/2017  7:01 AM   Dressing Status Dry;Clean;Intact 10/18/2017  7:01 AM   Dressing Change Due 10/18/17 10/16/2017  8:27 AM   Site Change Due 10/18/17 10/16/2017  8:27 AM   Reason Not Rotated Not due 10/16/2017  8:27 AM       Laboratory:  CBC    Recent Labs  Lab 10/20/17  0512   WBC  7.03   RBC 3.88*   HGB 10.9*   HCT 32.7*      MCV 84   MCH 28.1   MCHC 33.3     BMP    Recent Labs  Lab 10/20/17  0512      K 5.3*      CO2 20*   BUN 46*   CREATININE 4.2*   CALCIUM 9.0     Microbiology Results (last 7 days)     Procedure Component Value Units Date/Time    Blood culture [[id number]] Collected:  10/16/17 0645    Order Status:  Completed Specimen:  Blood Updated:  10/20/17 0903     Blood Culture, Routine No Growth to date     Blood Culture, Routine No Growth to date     Blood Culture, Routine No Growth to date     Blood Culture, Routine No Growth to date     Blood Culture, Routine No Growth to date    Blood culture [[id number]] Collected:  10/16/17 0645    Order Status:  Completed Specimen:  Blood Updated:  10/20/17 0903     Blood Culture, Routine No Growth to date     Blood Culture, Routine No Growth to date     Blood Culture, Routine No Growth to date     Blood Culture, Routine No Growth to date     Blood Culture, Routine No Growth to date    Aerobic culture [[id number]] Collected:  10/16/17 1700    Order Status:  Completed Specimen:  Joint Fluid from Knee, Right Updated:  10/20/17 0832     Aerobic Bacterial Culture No growth    Culture, Anaerobe [[id number]] Collected:  10/16/17 1700    Order Status:  Completed Specimen:  Joint Fluid from Knee, Right Updated:  10/20/17 0742     Anaerobic Culture No anaerobes isolated    Gram stain [[id number]] Collected:  10/18/17 1517    Order Status:  Completed Specimen:  Joint Fluid from Knee, Right Updated:  10/19/17 1057     Gram Stain Result No WBC's      No organisms seen    AFB Culture & Smear [[id number]] Collected:  10/16/17 1700    Order Status:  Completed Specimen:  Joint Fluid from Knee, Right Updated:  10/18/17 2127     AFB Culture & Smear Culture in progress     AFB CULTURE STAIN No acid fast bacilli seen.    Gram stain [[id number]] Collected:  10/16/17 1700    Order Status:  Completed Specimen:  Joint Fluid from Knee, Right Updated:   10/17/17 1154     Gram Stain Result Moderate WBC's      No organisms seen    Culture, Body Fluid (Aerobic) w/ GS [[id number]] Collected:  10/16/17 1700    Order Status:  Canceled Specimen:  Joint Fluid from Knee, Right Updated:  10/16/17 1837    Culture, Body Fluid (Aerobic) w/ GS [[id number]] Collected:  10/16/17 1700    Order Status:  Canceled Specimen:  Joint Fluid from Knee, Right Updated:  10/16/17 1836    Urine culture [[id number]] Collected:  10/14/17 1440    Order Status:  Completed Specimen:  Urine from Urine, Catheterized Updated:  10/16/17 1108     Urine Culture, Routine No growth    Blood culture [[id number]] Collected:  10/14/17 1420    Order Status:  Completed Specimen:  Blood from Peripheral, Hand, Right Updated:  10/16/17 0912     Blood Culture, Routine Gram stain aer bottle: Gram positive cocci in pairs AND  Gram positive      Blood Culture, Routine cocci in chains resembling Strep      Blood Culture, Routine Results called to and read back by: RICK SOSA [facility]GE [facility]  10/15/2017       Blood Culture, Routine 05:00     Blood Culture, Routine --     STREPTOCOCCUS AGALACTIAE (GROUP B)  Beta-hemolytic streptococci are routinely susceptible to   penicillins,cephalosporins and carbapenems.  For susceptibility see order #[id number]      Blood culture [[id number]]  (Susceptibility) Collected:  10/14/17 1405    Order Status:  Completed Specimen:  Blood from Peripheral, Hand, Right Updated:  10/16/17 0912     Blood Culture, Routine Gram stain aer bottle: Gram positive cocci in pairs AND  Gram positive      Blood Culture, Routine cocci in chains resembling Strep      Blood Culture, Routine Positive results previously called 10/15/2017  05:01     Blood Culture, Routine --     STREPTOCOCCUS AGALACTIAE (GROUP B)  Beta-hemolytic streptococci are routinely susceptible to   penicillins,cephalosporins and carbapenems.      Clostridium difficile EIA [[id number]]     Order Status:  Canceled Specimen:  Stool from Stool            Recent Labs  Lab 10/14/17  1440   COLORU Isabelle   SPECGRAV 1.015   PHUR 6.0   PROTEINUA 2+*   BACTERIA None   NITRITE Negative   LEUKOCYTESUR Negative   UROBILINOGEN Negative   HYALINECASTS 1       Diagnostic Results:  Labs: Reviewed  X-Ray: Reviewed    ASSESSMENT/PLAN:     Active Hospital Problems    Diagnosis  POA    *Septicemia [A41.9]  Yes    Acute on chronic combined systolic and diastolic heart failure [I50.43]  Yes    Chronic gout [M1A.9XX0]  Yes     Chronic    ESRD (end stage renal disease) on dialysis [N18.6, Z99.2]  Not Applicable    Streptococcal bacteremia [R78.81]  Yes    Type 2 diabetes mellitus, controlled, 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       1. Group b strep sepsis  50% of patients may not have an overt site of entry  He has exczematous changes both feet and this may have been source  Cleared  tte neg  2.rt knee arthritis  Gout vs septic  Neutrophilic pleocytosis with neg crystals  I would rx with vanco with hd x 4 weeks on dc  3. esrd and OLT  immunosupressed host  Discussed with wife and renal orders written  Dc at your discretion  I pratibha sign off.

## 2017-10-20 NOTE — NURSING
D/C instructions given to patient and spouse at bedside. Pt verbalizes understanding of instructions. Pt states willingness to comply. Saline lock and tele monitoring removed. D/C temporarily delayed due to BP. BP medications given. Will re-assess within the hour.

## 2017-10-20 NOTE — ASSESSMENT & PLAN NOTE
Secondary to Strep bacteremia  Fevers resolved  ID following    Antibiotics narrowed to Rocephin  H/o c.diff   Appreciate ID recs for dc   Will have 4 weeks of vanc with dialysis

## 2017-10-20 NOTE — PT/OT/SLP EVAL
Occupational Therapy  Evaluation    Philip Mathis III   MRN: [id number]   Admitting Diagnosis: Septicemia    OT Date of Treatment: 10/20/17   OT Start Time: 1105  OT Stop Time: 1122  OT Total Time (min): 17 min    Billable Minutes:  Evaluation 17 with co treatment with PT    Diagnosis: Septicemia       Past Medical History:   Diagnosis Date    Anemia     Arthritis     Back pain     Bishop's esophagus     BPH (benign prostatic hypertrophy)     Chronic kidney disease     Cirrhosis     Diabetes mellitus     Diabetes mellitus, type 2     Diabetes with neurologic complications     Diabetic retinopathy     Elevated PSA     Heart failure     Hemolytic anemia     Hepatitis Hepatitis C    Hepatitis C     Hyperlipidemia     Hypertension     Hypertension     Immune disorder     Liver transplanted     Peripheral neuropathy     Sleep apnea     Transfusion reaction     Hep C from prev. blood transfusion    Transplanted liver     Trouble in sleeping     Type 2 diabetes mellitus with ophthalmic manifestations     Type 2 diabetes with peripheral circulatory disorder, controlled     Type II or unspecified type diabetes mellitus with renal manifestations, uncontrolled(250.42)     Type II or unspecified type diabetes mellitus with renal manifestations, uncontrolled(250.42)       Past Surgical History:   Procedure Laterality Date    broken nose      CATARACT EXTRACTION Bilateral     EYE SURGERY      cataracts    FEMUR SURGERY      FRACTURE SURGERY      right femur fracture     LIVER TRANSPLANT  2001    PORTACATH PLACEMENT Left     SKIN GRAFT      graft from right thigh , applied to the left back and left arm.       Referring physician: Tyrel  Date referred to OT: 10/19/17    General Precautions: Standard, fall  Orthopedic Precautions: N/A  Braces: N/A          Patient History:  Living Environment  Lives With: spouse  Living Arrangements: house  Home Accessibility: stairs to enter home  Home Layout:  Able to live on 1st floor  Equipment Currently Used at Home: none    Prior level of function:   Bed Mobility/Transfers: independent  Grooming: independent  Bathing: independent  Upper Body Dressing: independent  Lower Body Dressing: independent  Toileting: independent  Home Management Skills: independent  Homemaking Responsibilities: No  Driving License: Yes  Mode of Transportation: Family, Car     Dominant hand: right    Subjective:  Communicated with nurse Myers prior to session.    Chief Complaint: none  Patient/Family stated goals: to go to dialysis    Pain/Comfort  Pain Rating 1: 0/10  Pain Rating Post-Intervention 1: 0/10    Objective:  Patient found with: peripheral IV    Cognitive Exam:  Oriented to: Person, Place, Time and Situation  Follows Commands/attention: Follows two-step commands  Communication: clear/fluent  Memory:  No Deficits noted  Safety awareness/insight to disability: intact  Coping skills/emotional control: Appropriate to situation    Visual/perceptual:  Intact    Physical Exam:  Postural examination/scapula alignment: Head forward  Skin integrity: Visible skin intact  Edema: None noted     Sensation:   Intact    Upper Extremity Range of Motion:  Right Upper Extremity: WFL  Left Upper Extremity: WFL    Upper Extremity Strength:  Right Upper Extremity: WFL  Left Upper Extremity: WFL   Strength: good    Fine motor coordination:   Intact    Gross motor coordination: WFL    Functional Mobility:  Bed Mobility:  Rolling/Turning to Left: Modified independent  Rolling/Turning Right: Modified independent  Scooting/Bridging: Modified Independent  Supine to Sit: Modified Independent  Sit to Supine: Modified Independent    Transfers:  Sit <> Stand Assistance: Modified Independent  Sit <> Stand Assistive Device: No Assistive Device  Toilet Transfer Technique: Stand Pivot  Toilet Transfer Assistance: Modified Independent  Toilet Transfer Assistive Device: No Assistive Device    Functional Ambulation:  "Pt able to ambulate to bathroom without an assistance device with supervision    Activities of Daily Living:  Feeding Level of Assistance: Modified independent  UE Dressing Level of Assistance: Set-up Assistance     Toileting Where Assessed: Toilet  Toileting Level of Assistance: Modified independent   Balance:   Static Sit: GOOD: Takes MODERATE challenges from all directions  Dynamic Sit: GOOD+: Maintains balance through MAXIMAL excursions of active trunk motion  Static Stand: FAIR+: Takes MINIMAL challenges from all directions  Dynamic stand: FAIR+: Needs CLOSE SUPERVISION during gait and is able to right self with minor LOB      AM-[facility] 6 CLICK ADL  How much help from another person does this patient currently need?  1 = Unable, Total/Dependent Assistance  2 = A lot, Maximum/Moderate Assistance  3 = A little, Minimum/Contact Guard/Supervision  4 = None, Modified [place]/Independent    Putting on and taking off regular lower body clothing? : 3  Bathing (including washing, rinsing, drying)?: 3  Toileting, which includes using toilet, bedpan, or urinal? : 4  Putting on and taking off regular upper body clothing?: 4  Taking care of personal grooming such as brushing teeth?: 4  Eating meals?: 4  Total Score: 22    AM-PAC Raw Score CMS "G-Code Modifier Level of Impairment Assistance   6 % Total / Unable   7 - 9 CM 80 - 100% Maximal Assist   10-14 CL 60 - 80% Moderate Assist   15 - 19 CK 40 - 60% Moderate Assist   20 - 22 CJ 20 - 40% Minimal Assist   23 CI 1-20% SBA / CGA   24 CH 0% Independent/ Mod I       Patient left supine with all lines intact, call button in reach and nurse Louis notified    Assessment:  Philip Mathis III is a 72 y.o. male with a medical diagnosis of Septicemia and presents with good functional mobility and family support. No need for further OT at this time.    Rehab identified problem list/impairments: Rehab identified problem list/impairments: weakness    Rehab potential is " good.    Activity tolerance: Good    Discharge recommendations: Discharge Facility/Level Of Care Needs: home     Barriers to discharge:      Equipment recommendations: none     GOALS:    Occupational Therapy Goals     Not on file          Multidisciplinary Problems (Resolved)        Problem: Occupational Therapy Goal    Goal Priority Disciplines Outcome Interventions   Occupational Therapy Goal   (Resolved)     OT, PT/OT Outcome(s) achieved                    PLAN:  Patient to be seen   to address the above listed problems via    Plan of Care expires:    Plan of Care reviewed with: patient         Daniela Arshad OT  10/20/2017

## 2017-10-20 NOTE — PROGRESS NOTES
Much more alert and oriented    Denies cns ent cp gi gu or rheum sx    For dialysis later today  Past Medical History:   Diagnosis Date    Anemia     Arthritis     Back pain     Bishop's esophagus     BPH (benign prostatic hypertrophy)     Chronic kidney disease     Cirrhosis     Diabetes mellitus     Diabetes mellitus, type 2     Diabetes with neurologic complications     Diabetic retinopathy     Elevated PSA     Heart failure     Hemolytic anemia     Hepatitis Hepatitis C    Hepatitis C     Hyperlipidemia     Hypertension     Hypertension     Immune disorder     Liver transplanted     Peripheral neuropathy     Sleep apnea     Transfusion reaction     Hep C from prev. blood transfusion    Transplanted liver     Trouble in sleeping     Type 2 diabetes mellitus with ophthalmic manifestations     Type 2 diabetes with peripheral circulatory disorder, controlled     Type II or unspecified type diabetes mellitus with renal manifestations, uncontrolled(250.42)     Type II or unspecified type diabetes mellitus with renal manifestations, uncontrolled(250.42)      Review of patient's allergies indicates:   Allergen Reactions    Corticosteroids (glucocorticoids) Other (See Comments)     Leg swelling    Hydrocortisone      Leg swelling    Neuromuscular blockers, steroidal      Leg swelling    Ribavirin      Intolerance, arthralgias       Current Facility-Administered Medications   Medication    acetaminophen tablet 650 mg    allopurinol tablet 100 mg    aspirin chewable tablet 81 mg    carvedilol tablet 6.25 mg    cefTRIAXone (ROCEPHIN) 1 g in dextrose 5 % 50 mL IVPB    clonazePAM tablet 0.5 mg    cloNIDine tablet 0.1 mg    colchicine capsule/tablet 0.6 mg    dextrose 50% injection 12.5 g    dextrose 50% injection 25 g    doxazosin tablet 8 mg    epoetin daniel injection 10,000 Units    glucagon (human recombinant) injection 1 mg    glucose chewable tablet 16 g    glucose  chewable tablet 24 g    heparin (porcine) injection 5,000 Units    hydrALAZINE tablet 100 mg    hydrOXYzine HCl tablet 50 mg    insulin aspart pen 1-10 Units    insulin detemir pen 10 Units    lactulose 20 gram/30 mL solution Soln 30 g    montelukast tablet 10 mg    ondansetron disintegrating tablet 4 mg    oxycodone-acetaminophen 5-325 mg per tablet 2 tablet    pantoprazole EC tablet 40 mg    rifAXImin tablet 200 mg    sevelamer carbonate tablet 800 mg    tacrolimus capsule 0.5 mg    vancomycin 1 g in dextrose 5 % 250 mL IVPB (ready to mix system)       LABS    Recent Results (from the past 24 hour(s))   Protime-INR    Collection Time: 10/19/17 12:17 PM   Result Value Ref Range    Prothrombin Time 10.5 9.0 - 12.5 sec    INR 1.0 0.8 - 1.2   POCT glucose    Collection Time: 10/19/17 12:27 PM   Result Value Ref Range    POCT Glucose 200 (H) 70 - 110 mg/dL   POCT glucose    Collection Time: 10/19/17  3:42 PM   Result Value Ref Range    POCT Glucose 188 (H) 70 - 110 mg/dL   POCT glucose    Collection Time: 10/19/17  5:58 PM   Result Value Ref Range    POCT Glucose 230 (H) 70 - 110 mg/dL   POCT glucose    Collection Time: 10/19/17  8:20 PM   Result Value Ref Range    POCT Glucose 149 (H) 70 - 110 mg/dL   Basic metabolic panel    Collection Time: 10/20/17  5:12 AM   Result Value Ref Range    Sodium 139 136 - 145 mmol/L    Potassium 5.3 (H) 3.5 - 5.1 mmol/L    Chloride 109 95 - 110 mmol/L    CO2 20 (L) 23 - 29 mmol/L    Glucose 147 (H) 70 - 110 mg/dL    BUN, Bld 46 (H) 8 - 23 mg/dL    Creatinine 4.2 (H) 0.5 - 1.4 mg/dL    Calcium 9.0 8.7 - 10.5 mg/dL    Anion Gap 10 8 - 16 mmol/L    eGFR if African American 15 (A) >60 mL/min/1.73 m^2    eGFR if non African American 13 (A) >60 mL/min/1.73 m^2   CBC auto differential    Collection Time: 10/20/17  5:12 AM   Result Value Ref Range    WBC 7.03 3.90 - 12.70 K/uL    RBC 3.88 (L) 4.60 - 6.20 M/uL    Hemoglobin 10.9 (L) 14.0 - 18.0 g/dL    Hematocrit 32.7 (L) 40.0 -  54.0 %    MCV 84 82 - 98 fL    MCH 28.1 27.0 - 31.0 pg    MCHC 33.3 32.0 - 36.0 g/dL    RDW 16.4 (H) 11.5 - 14.5 %    Platelets 197 150 - 350 K/uL    MPV 10.6 9.2 - 12.9 fL    Lymph # CANCELED 1.0 - 4.8 K/uL    Mono # CANCELED 0.3 - 1.0 K/uL    Eos # CANCELED 0.0 - 0.5 K/uL    Baso # CANCELED 0.00 - 0.20 K/uL    Gran% 48.0 38.0 - 73.0 %    Lymph% 23.0 18.0 - 48.0 %    Mono% 6.0 4.0 - 15.0 %    Eosinophil% 2.0 0.0 - 8.0 %    Basophil% 2.0 (H) 0.0 - 1.9 %    Bands 17.0 %    Metamyelocytes 2.0 %    Platelet Estimate Appears normal     Hypo Occasional     Ovalocytes Occasional     Differential Method Manual    PTH, intact    Collection Time: 10/20/17  5:12 AM   Result Value Ref Range    PTH, Intact 107.1 (H) 9.0 - 77.0 pg/mL   POCT glucose    Collection Time: 10/20/17  7:52 AM   Result Value Ref Range    POCT Glucose 130 (H) 70 - 110 mg/dL   ]    I/O last 3 completed shifts:  In: 440 [P.O.:440]  Out: 300 [Urine:300]    Vitals:    10/20/17 0030 10/20/17 0515 10/20/17 0716 10/20/17 0802   BP: (!) 191/81 (!) 177/77  (!) 161/70   Pulse: 60 (!) 53 (!) 49 (!) 50   Resp: 18 20 17   Temp: 98.3 °F (36.8 °C) 98.6 °F (37 °C)  99.2 °F (37.3 °C)   TempSrc:    Oral   SpO2:       Weight:  80.5 kg (177 lb 7.5 oz)     Height:           No Jvd, Thyromegaly or Lymphadenopathy  Lungs: Fairly clear anteriorly and laterally  Cor: RRR no G or rubs  Abd: Soft benign good bowel sounds non tender  Ext: No E C C    A)  Fever with STREPTOCOCCUS AGALACTIAE (GROUP B) sepsis, source ?,  Echo negative  ESLD sp Liver tx 2001  ESRD on chronic HD [place] [facility] [place] [facility]  Last surgical eval was an arteriogram by Dr William aug or sept /2017  Hyperk for hd an 3 K bath  Immune suppressed chronically  DM  Wide anion gap correted  bicarb better now within esrd range goal 20-24  Anemia ok on epo no recnet iron   Thrombocytopenia corrected   HTN trending up since we stopped the Minoxidil. on coreg/clomnidine/hydralazine considering   amlodipine or and ACEI  Elevated total protein ?? Immunofix Interpretation:  IGG KAPPA SPECIFIC MONOLCONAL BAND IN MID-GAMMA.  Interpreted by Neelima Olmstead, PhD. DABCC 7/30/12  MGUS  DJD  R Knee Pain with joint effusion ( infectious vs gouty) orthopedics on the case  GOUT on double rx  Hep c  Neuropathy  KARON (does not use his bipap)  Pulmonary htn  Small pericardial effusion (10/6/16) Moderate pericardial effusion now ( ? Minoxidil vs Infectious? Doubt)  Chronic constipation  2nd hypeprth, pthi 107/po4 4.8  Barretts esophagus  Mild Hyperammonemia on lactulose      P)  HD later today    Renal wise pt stable and

## 2017-10-20 NOTE — PROGRESS NOTES
TN met with pt to discuss f/u appt and what HD clinic pt has been transferred to beginning 10/26/17. Did not know the name. TN contacted pt's wife at [phone]. HD is [facility].     TN contacted [facility] at [phone]; spoke with Lisa and informed pt will discharge today and needs Vancomycin with HD. TN faxed clinicals: face sheet, H&P, discharge summary, H&P, and Rx to [phone].      TN to give AVS, previously discussed with pt and pt's spouse via telephone. Informed floor nurse, Ryanne, care management is complete and can proceed with discharge once pt returns from HD.
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
## 2017-10-20 NOTE — PLAN OF CARE
10/20/17 1439   Final Note   Assessment Type Final Discharge Note   Discharge Disposition Home   Discharge planning education complete? Yes   What phone number can be called within the next 1-3 days to see how you are doing after discharge? (Spouse, [phone])   Hospital Follow Up  Appt(s) scheduled? Yes   Discharge plans and expectations educations in teach back method with documentation complete? Yes   Offered [facility] -- Bedside Delivery? n/a   Schedule Hospital follow up within 4-7 days   Discharge/Hospital Encounter Summary to (non-[facility]) PCP n/a   Referral to Outpatient Case Management complete? No   Referral to / orders for Home Health Complete? n/a   30 day supply of medicines given at discharge, if documented non-compliance / non-adherence? n/a   Any social issues identified prior to discharge? No   Did you assess the readiness or willingness of the family or caregiver to support self management of care? Yes   Right Care Referral Info   Post Acute Recommendation No Care

## 2017-10-20 NOTE — PLAN OF CARE
Problem: Occupational Therapy Goal  Goal: Occupational Therapy Goal  Outcome: Outcome(s) achieved Date Met: 10/20/17  OT completed evaluation with no needs at this time.

## 2017-10-20 NOTE — PLAN OF CARE
Problem: Physical Therapy Goal  Goal: Physical Therapy Goal  Outcome: Outcome(s) achieved Date Met: 10/20/17    Patient okay to ambulate with nursing staff supervision. He has no PT needs at discharge. PT to sign off.

## 2017-10-20 NOTE — PROGRESS NOTES
Follow-up Information     Donnie Owens NP On 10/23/2017.    Specialty:  Internal Medicine  Why:  out patient services:  10:40 a.m. follow up from the hsopital  Contact information:  [address] [id number]  [phone]             [facility].    Specialty:  Dialysis Center  Why:  Outpatient Services HD; new clinic; next HD on Monday 10/26/17  Contact information:  [address] [facility] [place] [address]  [phone]               PLEASE BRING TO ALL FOLLOW UP APPOINTMENTS:   A COPY YOUR DISCHARGE INSTRUCTIONS, Any new MEDICINES YOU ARE CURRENTLY TAKING IN THEIR ORIGINAL BOTTLES  And IDENTIFICATION AND INSURANCE CARD     **PLEASE ARRIVE 15 MINUTES AHEAD OF SCHEDULED APPOINTMENT TIME   ++PLEASE CALL 24 HOURS IN ADVANCE IF YOU MUST RESCHEDULE YOUR APPOINTMENT DAY AND/OR TIME     [facility]    WRITTEN HEALTHCARE AND DISCHARGE INFORMATION                       Help at Home           [phone]  After discharge for assistance [facility] On Call Nurse Care Line 24/7  Assistance    Things You are responsible For To Manage Your Care At Home:  1.    Getting your prescriptions filled   2.    Taking your medications as directed, DO NOT MISS ANY DOSES!  3.    Going to your follow-up doctor appointment. This is important because it  allow the doctor to monitor your progress and determine if  any changes need to made to your treatment plan.     Thank you for choosing [facility] for your care.  Please answer any calls you may receive from [facility] we want to continue to support you as you manage your healthcare needs. [facility] happy to have the opportunity to serve you.     Sincerely,  Your [facility] Team,  CHRISSIE Arce; Transition Navigator [phone]

## 2017-10-20 NOTE — SUBJECTIVE & OBJECTIVE
Interval History: No new issues. Would like to go home   Review of Systems   Constitutional: Negative for activity change.   Respiratory: Negative for chest tightness and shortness of breath.    Cardiovascular: Negative for chest pain.   Gastrointestinal: Negative for abdominal pain.     Objective:     Vital Signs (Most Recent):  Temp: 99.2 °F (37.3 °C) (10/20/17 0802)  Pulse: (!) 50 (10/20/17 0802)  Resp: 17 (10/20/17 0802)  BP: (!) 161/70 (10/20/17 0802)  SpO2: 95 % (10/19/17 1951) Vital Signs (24h Range):  Temp:  [97 °F (36.1 °C)-99.2 °F (37.3 °C)] 99.2 °F (37.3 °C)  Pulse:  [49-60] 50  Resp:  [17-20] 17  SpO2:  [93 %-95 %] 95 %  BP: (142-191)/(64-81) 161/70     Weight: 80.5 kg (177 lb 7.5 oz)  Body mass index is 23.41 kg/m².    Intake/Output Summary (Last 24 hours) at 10/20/17 1149  Last data filed at 10/20/17 0515   Gross per 24 hour   Intake                0 ml   Output              200 ml   Net             -200 ml      Physical Exam   Constitutional: He is oriented to person, place, and time. He appears well-developed and well-nourished.   HENT:   Head: Normocephalic and atraumatic.   Neurological: He is alert and oriented to person, place, and time.   Vitals reviewed.      Significant Labs:   BMP:   Recent Labs  Lab 10/20/17  0512   *      K 5.3*      CO2 20*   BUN 46*   CREATININE 4.2*   CALCIUM 9.0     CBC:   Recent Labs  Lab 10/19/17  0711 10/20/17  0512   WBC 6.39 7.03   HGB 11.2* 10.9*   HCT 33.5* 32.7*   * 197       Significant Imaging:

## 2017-10-20 NOTE — PLAN OF CARE
Problem: Diabetes, Type 2 (Adult)  Intervention: Support/Optimize Psychosocial Response to Condition   10/19/17 2100   Coping/Psychosocial Interventions   Supportive Measures active listening utilized;counseling provided;positive reinforcement provided;verbalization of feelings encouraged   Environmental Support calm environment promoted;rest periods encouraged   Psychosocial Support   Family/Support System Care self-care encouraged;support provided     Intervention: Optimize Glycemic Control   10/19/17 2100   Nutrition Interventions   Glycemic Management blood glucose monitoring       Goal: Signs and Symptoms of Listed Potential Problems Will be Absent, Minimized or Managed (Diabetes, Type 2)  Signs and symptoms of listed potential problems will be absent, minimized or managed by discharge/transition of care (reference Diabetes, Type 2 (Adult) CPG).    10/20/17 0212   Diabetes, Type 2   Problems Assessed (Type 2 Diabetes) hyperglycemia   Problems Present (Type 2 Diabetes) hyperglycemia       Problem: Fall Risk (Adult)  Intervention: Monitor/Assist with Self Care   10/19/17 2100 10/20/17 0200   Functional Level Current   Ambulation 2 - assistive person --    Transferring 2 - assistive person --    Toileting 2 - assistive person --    Bathing 2 - assistive person --    Dressing 2 - assistive person --    Eating 0 - independent --    Communication 0 - understands/communicates without difficulty --    Swallowing 0 - swallows foods/liquids without difficulty --    Daily Care Interventions   Self-Care Promotion independence encouraged;BADL personal objects within reach --    Activity   Activity Assistance Provided --  assistance, 1 person     Intervention: Reduce Risk/Promote Restraint Free Environment   10/20/17 0212   Safety Interventions   Environmental Safety Modification assistive device/personal items within reach;clutter free environment maintained;lighting adjusted;room near unit station;room organization  consistent   Prevent [place] Drop/Fall   Safety/Security Measures bed alarm set     Intervention: Review Medications/Identify Contributors to Fall Risk   10/20/17 0212   Safety Interventions   Medication Review/Management medications reviewed;high risk medications identified     Intervention: Patient Rounds   10/20/17 0200   Safety Interventions   Patient Rounds bed in low position;bed wheels locked;call light in reach;clutter free environment maintained;ID band on;placement of personal items at bedside;visualized patient     Intervention: Safety Promotion/Fall Prevention   10/20/17 0200   Safety Interventions   Safety Promotion/Fall Prevention assistive device/personal item within reach;bed alarm set;side rails raised x 2;nonskid shoes/socks when out of bed       Goal: Identify Related Risk Factors and Signs and Symptoms  Related risk factors and signs and symptoms are identified upon initiation of Human Response Clinical Practice Guideline (CPG)    10/20/17 0212   Fall Risk   Related Risk Factors (Fall Risk) gait/mobility problems;polypharmacy;environment unfamiliar   Signs and Symptoms (Fall Risk) presence of risk factors     Goal: Absence of Falls  Patient will demonstrate the desired outcomes by discharge/transition of care.    10/20/17 0212   Fall Risk (Adult)   Absence of Falls making progress toward outcome       Comments: DM - scheduled long acting insulin given as ordered and Blood Glucose monitoring provided - Patient remains free of falls with implementation of frequent purposeful rounding, use of bed alarm and instruction to call for assistance with ambulation.

## 2017-10-20 NOTE — PLAN OF CARE
Problem: Hemodialysis (Adult)  Goal: Signs and Symptoms of Listed Potential Problems Will be Absent, Minimized or Managed (Hemodialysis)  Signs and symptoms of listed potential problems will be absent, minimized or managed by discharge/transition of care (reference Hemodialysis (Adult) CPG).   Outcome: Ongoing (interventions implemented as appropriate)   10/20/17 1627   Hemodialysis   Problems Assessed (Hemodialysis) all   Problems Present (Hemodialysis) electrolyte imbalance   Hemodialysis as ordered.

## 2017-10-20 NOTE — PT/OT/SLP EVAL
Physical Therapy  Evaluation / Discharge    Philip Mathis III   MRN: [id number]   Admitting Diagnosis: Septicemia    PT Received On: 10/20/17  PT Start Time: 1115     PT Stop Time: 1126    PT Total Time (min): 11 min       Billable Minutes:  Evaluation 11    Diagnosis: Septicemia    Past Medical History:   Diagnosis Date    Anemia     Arthritis     Back pain     Bishop's esophagus     BPH (benign prostatic hypertrophy)     Chronic kidney disease     Cirrhosis     Diabetes mellitus     Diabetes mellitus, type 2     Diabetes with neurologic complications     Diabetic retinopathy     Elevated PSA     Heart failure     Hemolytic anemia     Hepatitis Hepatitis C    Hepatitis C     Hyperlipidemia     Hypertension     Hypertension     Immune disorder     Liver transplanted     Peripheral neuropathy     Sleep apnea     Transfusion reaction     Hep C from prev. blood transfusion    Transplanted liver     Trouble in sleeping     Type 2 diabetes mellitus with ophthalmic manifestations     Type 2 diabetes with peripheral circulatory disorder, controlled     Type II or unspecified type diabetes mellitus with renal manifestations, uncontrolled(250.42)     Type II or unspecified type diabetes mellitus with renal manifestations, uncontrolled(250.42)       Past Surgical History:   Procedure Laterality Date    broken nose      CATARACT EXTRACTION Bilateral     EYE SURGERY      cataracts    FEMUR SURGERY      FRACTURE SURGERY      right femur fracture     LIVER TRANSPLANT  2001    PORTACATH PLACEMENT Left     SKIN GRAFT      graft from right thigh , applied to the left back and left arm.     Referring physician: Prejeant  Date referred to PT: 10/19/17    General Precautions: Standard, fall  Orthopedic Precautions: N/A   Braces: N/A       Patient History:  Lives With: spouse  Living Arrangements: house  Home Accessibility: other (see comments) (no concerns)  Living Environment Comment: Patient still  drives.   Equipment Currently Used at Home: none    Previous Level of Function:  Ambulation Skills: independent  Transfer Skills: independent  ADL Skills: independent    Subjective:  Communicated with nurse uRby prior to session.  Patient agreeable to participate in PT evaluation.   Chief Complaint: Minimal discomfort to right knee.   Patient goals: To go home.     Pain/Comfort  Pain Rating 1: 0/10      Objective:   Patient found with: peripheral IV     Cognitive Exam:  Oriented to: Person, Place, Time and Situation    Follows Commands/attention: Follows multistep  commands  Communication: clear/fluent  Safety awareness/insight to disability: intact    Physical Exam:  Postural examination/scapula alignment: Rounded shoulder and Head forward    Skin integrity: ace bandage to right knee  Edema: Mild R knee    Sensation:   Intact to B LE's    Lower Extremity Range of Motion:  Right Lower Extremity: WFL  Left Lower Extremity: WFL    Lower Extremity Strength:  Right Lower Extremity: WFL  Left Lower Extremity: WFL     Fine motor coordination:Intact    Gross motor coordination: WFL    Functional Mobility:  Bed Mobility:  Supine to Sit: Supervision  Sit to Supine: Supervision    Transfers:  Sit <> Stand Assistance: Supervision  Sit <> Stand Assistive Device: No Assistive Device    Gait:   Gait Distance: ~200ft   Assistance 1: Stand by Assistance  Gait Assistive Device: No device  Gait Pattern: reciprocal  Gait Deviation(s): decreased step length    Balance:   Static Sit: GOOD+: Takes MAXIMAL challenges from all directions.    Dynamic Sit: GOOD: Maintains balance through MODERATE excursions of active trunk movement  Static Stand: FAIR+: Takes MINIMAL challenges from all directions  Dynamic stand: FAIR+: Needs CLOSE SUPERVISION during gait and is able to right self with minor LOB    AM-PAC 6 CLICK MOBILITY  How much help from another person does this patient currently need?   1 = Unable, Total/Dependent Assistance  2 = A  lot, Maximum/Moderate Assistance  3 = A little, Minimum/Contact Guard/Supervision  4 = None, Modified [place]/Independent    Turning over in bed (including adjusting bedclothes, sheets and blankets)?: 4  Sitting down on and standing up from a chair with arms (e.g., wheelchair, bedside commode, etc.): 4  Moving from lying on back to sitting on the side of the bed?: 4  Moving to and from a bed to a chair (including a wheelchair)?: 4  Need to walk in hospital room?: 4  Climbing 3-5 steps with a railing?: 3  Total Score: 23     AM-PAC Raw Score CMS G-Code Modifier Level of Impairment Assistance   6 % Total / Unable   7 - 9 CM 80 - 100% Maximal Assist   10 - 14 CL 60 - 80% Moderate Assist   15 - 19 CK 40 - 60% Moderate Assist   20 - 22 CJ 20 - 40% Minimal Assist   23 CI 1-20% SBA / CGA   24 CH 0% Independent/ Mod I     Patient left supine with all lines intact, call button in reach and nurse notified.    Assessment:   Philip Mathis III is a 72 y.o. male with a medical diagnosis of Septicemia and presents with impaired balance and functional mobility due to right knee being aspirated by ortho. Patient stated that his baseline is at his PLOF status. He was educated to call for nursing staff assistance to ambulate in hallway. He verbalized understanding. He is scheduled to discharge home this PM.     Rehab identified problem list/impairments: Rehab identified problem list/impairments: impaired balance, impaired functional mobilty    Rehab potential is good.    Activity tolerance: Good    Discharge recommendations: Discharge Facility/Level Of Care Needs: home     Barriers to discharge: Barriers to Discharge: None    Equipment recommendations: Equipment Needed After Discharge: none     GOALS:    Physical Therapy Goals     Not on file          Multidisciplinary Problems (Resolved)        Problem: Physical Therapy Goal    Goal Priority Disciplines Outcome Goal Variances Interventions   Physical Therapy Goal    (Resolved)     PT/OT, PT Outcome(s) achieved                   PLAN:    Plan of Care reviewed with: patient    Functional Assessment Tool Used: AM PAC   Score: 23  Functional Limitation: Mobility: Walking and moving around  Mobility: Walking and Moving Around Current Status (): CI  Mobility: Walking and Moving Around Goal Status (): CI  Mobility: Walking and Moving Around Discharge Status (): CI     Lori Hebert, PT, MOT  10/20/2017
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
## 2017-10-20 NOTE — NURSING
Bedside report given to Angeles RN. Reports pain 5/10 and feeling sleepy. Recently received pain medication. Dressing to R-knee intact and dry. No acute distress noted. Chart check completed.

## 2017-10-20 NOTE — NURSING
Rec'd Report from CHRISSIE Pearl - AAOx4 - c/o Pain to R knee at 5/10 - dressing to R knee clean, dry and intact - Patient on telemetry - Bed alarm set, bed locked in lowest position, call light in reach, patient instructed to call for assistance

## 2017-10-20 NOTE — NURSING
Report by Dusty RN. No acute distress noted. Denies pain. Dressing to R-knee intact and dry. Reported no signs of bleeding overnight. Bed locked in lowest position, call bell in reach.     @916 - transported off unit to US. NAD noted.     @1010 - transported back to unit. NAD noted. Ambulated to bathroom with assist by tech.     @1253 - transported to dialysis. NAD noted.

## 2017-10-20 NOTE — PROGRESS NOTES
Ortho Daily Progress Note    Philip Mathis III is a 72 y.o. male admitted on 10/14/2017      Chief Complaint/Reason for admission: Extremity Weakness (Pt weak and complaining of severe right leg pain following dialysis. Temp 101.)       Hospital Day: 6  Post Op Day: * No surgery found *     _______________    Vitals:    10/19/17 2100 10/20/17 0030 10/20/17 0515 10/20/17 0802   BP:  (!) 191/81 (!) 177/77 (!) 161/70   Pulse: (!) 58 60 (!) 53 (!) 50   Resp:  18 20 17   Temp:  98.3 °F (36.8 °C) 98.6 °F (37 °C) 99.2 °F (37.3 °C)   TempSrc:    Oral   SpO2:       Weight:   80.5 kg (177 lb 7.5 oz)    Height:           Vital Signs (Most Recent)  Temp: 99.2 °F (37.3 °C) (10/20/17 0802)  Pulse: (!) 50 (10/20/17 0802)  Resp: 17 (10/20/17 0802)  BP: (!) 161/70 (10/20/17 0802)  SpO2: 95 % (10/19/17 1951)    Vital Signs Range (Last 24H):  Temp:  [97 °F (36.1 °C)-99.2 °F (37.3 °C)]   Pulse:  [50-60]   Resp:  [17-20]   BP: (142-191)/(64-81)   SpO2:  [93 %-95 %]       Physical:    Right knee ROM improved  No bleeding at this time from right knee  No severe effusion  No erythema      Recent Labs      10/18/17   0607  10/19/17   0711  10/19/17   1217  10/20/17   0512   K  4.5  5.2*   --   5.3*   PHOS   --   4.8*   --    --    CALCIUM  8.9  9.2   --   9.0   WBC  6.73  6.39   --   7.03   HGB  10.4*  11.2*   --   10.9*   HCT  31.4*  33.5*   --   32.7*   PLT  117*  148*   --   197   INR   --    --   1.0   --        I/O last 3 completed shifts:  In: 440 [P.O.:440]  Out: 300 [Urine:300]          Assessment:  Right knee pain resolved              Plan:    Pt had some bleeding from the aspiration site of right knee yesterday. This has resolved with pressure.  Continue current carte  Pain has resolved to right knee      Atul Sterling MD  [facility]

## 2017-10-21 LAB
BACTERIA BLD CULT: NORMAL
BACTERIA BLD CULT: NORMAL

## 2017-10-23 ENCOUNTER — OFFICE VISIT (OUTPATIENT)
Dept: FAMILY MEDICINE | Facility: CLINIC | Age: 72
End: 2017-10-23
Payer: MEDICARE

## 2017-10-23 VITALS
WEIGHT: 184.94 LBS | HEIGHT: 74 IN | BODY MASS INDEX: 23.74 KG/M2 | OXYGEN SATURATION: 93 % | DIASTOLIC BLOOD PRESSURE: 40 MMHG | TEMPERATURE: 99 F | HEART RATE: 69 BPM | SYSTOLIC BLOOD PRESSURE: 140 MMHG

## 2017-10-23 DIAGNOSIS — N18.6 ESRD (END STAGE RENAL DISEASE): ICD-10-CM

## 2017-10-23 DIAGNOSIS — A40.1 SEPSIS DUE TO STREPTOCOCCUS, GROUP B: Primary | ICD-10-CM

## 2017-10-23 DIAGNOSIS — I31.39 PERICARDIAL EFFUSION: ICD-10-CM

## 2017-10-23 PROCEDURE — 99215 OFFICE O/P EST HI 40 MIN: CPT | Mod: S$GLB,,, | Performed by: NURSE PRACTITIONER

## 2017-10-23 PROCEDURE — 99499 UNLISTED E&M SERVICE: CPT | Mod: S$PBB,,, | Performed by: NURSE PRACTITIONER

## 2017-10-23 PROCEDURE — 99999 PR PBB SHADOW E&M-EST. PATIENT-LVL V: CPT | Mod: PBBFAC,,, | Performed by: NURSE PRACTITIONER

## 2017-10-23 RX ORDER — ONDANSETRON 4 MG/1
TABLET, ORALLY DISINTEGRATING ORAL
Qty: 60 TABLET | Refills: 4 | Status: SHIPPED | OUTPATIENT
Start: 2017-10-23 | End: 2018-01-01 | Stop reason: SDUPTHER

## 2017-10-23 RX ORDER — DOXAZOSIN 8 MG/1
8 TABLET ORAL DAILY
Qty: 90 TABLET | Refills: 4 | Status: SHIPPED | OUTPATIENT
Start: 2017-10-23 | End: 2018-01-01 | Stop reason: SDUPTHER

## 2017-10-23 RX ORDER — AMMONIUM LACTATE 12 G/100G
12 CREAM TOPICAL 3 TIMES DAILY
Refills: 6 | COMMUNITY
Start: 2017-10-18 | End: 2018-01-01 | Stop reason: SDUPTHER

## 2017-10-23 NOTE — PROGRESS NOTES
This dictation has been generated using Dragon Dictation some phonetic errors may occur.     Philip was seen today for hospital follow up.    Diagnoses and all orders for this visit:    Sepsis due to Streptococcus, group B  Comments:  vanc on dialysis    Pericardial effusion  Comments:  asymptomatic    ESRD (end stage renal disease)    Other orders  -     doxazosin (CARDURA) 8 MG Tab; Take 1 tablet (8 mg total) by mouth once daily.  -     ondansetron (ZOFRAN-ODT) 4 MG TbDL; DISSOLVE 1 TABLET(4 MG) ON THE TONGUE EVERY 12 HOURS AS NEEDED      Group B streptococcus Sepsis recent admit.  Discharge and plan for Vanc 4 weeks given at dialysis.   Pericardial effusion asymptomatic.  Reviewed echo with the patient.  End-stage renal disease continue same  BPH refill doxazosin  Chronic a.m. nausea.  Discussed GERD.  Patient is to take his Protonix 30 minutes before eating.  He may move the dosing to the evening and see if that helps with a.m. symptoms.    Return in about 1 month (around 11/23/2017).      ________________________________________________________________  ________________________________________________________________        Chief Complaint   Patient presents with    Hospital Follow Up     History of present illness  This 72 y.o. presents today for complaint of follow-up from a hospital.  He presented for evaluation of severe right lower extremity pain that began following dialysis PTA, with associated chills and fever (retal tmax: 105F). He was able to completed his dialysis treatment.  Hospital Course:   Pt admitted with fever and blood cultures growing Strep species. Pt has pain in right knee with limited range of motion. Ultrasound to rule out DVT pending. Nephrology consulted (HD on TTS). ID consulted. On immunosupressants for liver transplant 2001. Treated hep c.      Blood culture growing GroupB strep, On rocephin, repeat culture negative to date, ECHO without evidence of vegetations, ?moderate  pericardial effusion without tamponade, ortho consulted for Rleg pain and decreased ROM.     10/17: ortho aspirated right knee, culture negative.   10/18: continue ABx, lactulose and rifaximin, gout therapy   10/19: right knee bleeding s/p steroid injection and aspiration, no crystals on fluid, cultures negative, plan for vancomycin with HD x 4weeks for strep bacteremia.   was consulted to arrange Vancomycin for. The rest of the hospital course was unremarkable and the patient was requesting discharge. Activity as tolerated. Diet- low NA. ADA 1800 danny diet. Follow up with PCP in one week    Presents today without fever or chills.  No chest pain or shortness of breath.  Overall feels better.  He does note episodes of nausea and vomiting.  This occurs predominantly in the morning.  Starts with reflux-like symptoms.  He has used Zofran.  He is taking Protonix.  We discussed appropriate utilization of Protonix.  He hasn't vomited any blood.  No change in his stools.  ROS:   CONST: weight stable.  EYES: no vision change.  ENT: No sore throat, headache, or earache.  CV: no chest pain w/ exertion.  RESP: No shortness of breath.  GI: No dysphagia. Appetite good.   : no urinary issues.  MUSCULOSKELETAL: no new myalgias or arthralgias.  SKIN: no new changes.  NEURO: no focal deficits. Denies headache.  PSYCH: no new issues.  ENDOCRINE: no polyuria.  HEME: no lymph nodes.  ALLERGY: no general pruritis.    Reviewed past histories.  Past Medical History:   Diagnosis Date    Anemia     Arthritis     Back pain     Bishop's esophagus     BPH (benign prostatic hypertrophy)     Chronic kidney disease     Cirrhosis     Diabetes mellitus     Diabetes mellitus, type 2     Diabetes with neurologic complications     Diabetic retinopathy     Elevated PSA     Heart failure     Hemolytic anemia     Hepatitis Hepatitis C    Hepatitis C     Hyperlipidemia     Hypertension     Hypertension     Immune  disorder     Liver transplanted     Peripheral neuropathy     Sleep apnea     Transfusion reaction     Hep C from prev. blood transfusion    Transplanted liver     Trouble in sleeping     Type 2 diabetes mellitus with ophthalmic manifestations     Type 2 diabetes with peripheral circulatory disorder, controlled     Type II or unspecified type diabetes mellitus with renal manifestations, uncontrolled(250.42)     Type II or unspecified type diabetes mellitus with renal manifestations, uncontrolled(250.42)        Past Surgical History:   Procedure Laterality Date    broken nose      CATARACT EXTRACTION Bilateral     EYE SURGERY      cataracts    FEMUR SURGERY      FRACTURE SURGERY      right femur fracture     LIVER TRANSPLANT  2001    PORTACATH PLACEMENT Left     SKIN GRAFT      graft from right thigh , applied to the left back and left arm.       Family History   Problem Relation Age of Onset    Cancer Mother      cancer 55 years ago    Coronary artery disease Father     Hypertension Father     Diabetes Father     Heart disease Father     Cancer Sister      breast CA    Colon cancer Neg Hx        Social History     Social History    Marital status:      Spouse name: N/A    Number of children: N/A    Years of education: N/A     Social History Main Topics    Smoking status: Former Smoker     Quit date: 7/30/1998    Smokeless tobacco: Never Used      Comment: pt reports quitting in 1998    Alcohol use No      Comment: pt reports hx of alcholism and reports quit in 1998    Drug use: No    Sexual activity: Yes     Partners: Female     Other Topics Concern    None     Social History Narrative    None       Current Outpatient Prescriptions   Medication Sig Dispense Refill    allopurinol (ZYLOPRIM) 100 MG tablet TAKE 1 TABLET EVERY DAY 90 tablet 3    ammonium lactate (LAC-HYDRIN) 12 % lotion Apply topically as needed for Dry Skin. 225 g 6    aspirin 81 MG Chew Take 1 tablet (81 mg  total) by mouth once daily. 90 tablet 3    blood sugar diagnostic (ACCU-CHEK JOANN PLUS TEST STRP) Strp 1 strip by Misc.(Non-Drug; Combo Route) route 4 (four) times daily. 360 each 3    blood-glucose meter (ACCU-CHEK JOANN PLUS METER) Misc Use as directed 1 each 0    carvedilol (COREG) 6.25 MG tablet Take 1 tablet (6.25 mg total) by mouth 2 (two) times daily. 180 tablet 3    ciclopirox (PENLAC) 8 % Soln Apply to affected nails daily x 6-12 mos.  Remove weekly with rubbing alcohol 1 Bottle 5    clonazePAM (KLONOPIN) 0.5 MG tablet Take 1 tablet (0.5 mg total) by mouth every evening. (Patient taking differently: Take 0.5 mg by mouth daily as needed. ) 90 tablet 1    cloNIDine (CATAPRES) 0.1 MG tablet Take 1 tablet (0.1 mg total) by mouth 3 (three) times daily. 270 tablet 6    doxazosin (CARDURA) 8 MG Tab Take 1 tablet (8 mg total) by mouth once daily. 90 tablet 4    furosemide (LASIX) 80 MG tablet Take 80 mg by mouth 2 (two) times daily.      GENERLAC 10 gram/15 mL solution TAKE 30 ML 'S BY MOUTH THREE TIMES DAILY 2700 mL 0    glucagon (human recombinant) inj 1mg/mL kit Inject 1 mL (1 mg total) into the muscle as needed. 1 kit 2    hydrALAZINE (APRESOLINE) 100 MG tablet Take 1 tablet (100 mg total) by mouth 3 (three) times daily. 270 tablet 3    hydrocodone-acetaminophen 5-325mg (NORCO) 5-325 mg per tablet Take 1 tablet by mouth every 6 (six) hours as needed for Pain. 35 tablet 0    hydrOXYzine HCl (ATARAX) 25 MG tablet Take 2 tablets (50 mg total) by mouth 3 (three) times daily as needed for Itching. 60 tablet 0    hydrOXYzine HCl (ATARAX) 25 MG tablet TAKE 1 TABLET(25 MG) BY MOUTH THREE TIMES DAILY AS NEEDED FOR ITCHING 90 tablet 0    insulin aspart (NOVOLOG) 100 unit/mL InPn pen Inject 6 units w/ breakfast, 4 units w/ lunch and dinner plus scale 180-230 +1, 231-280 +2, 281-330 +3, 331-380 +4, >380 +5. 90 day supply 3 Box 3    insulin glargine (LANTUS SOLOSTAR) 100 unit/mL (3 mL) InPn pen Inject 8  "Units into the skin every evening. 2 Box 6    insulin glargine (LANTUS SOLOSTAR) 100 unit/mL (3 mL) InPn pen Inject 8-10 units nightly. 30 mL 3    insulin needles, disposable, (NOVOFINE 32) 32 x 1/4 " Ndle 1 each by Misc.(Non-Drug; Combo Route) route 3 (three) times daily. 100 each 5    lactulose (GENERLAC) 10 gram/15 mL solution 30 ML BY MOUTH NIGHTLY 2700 mL 0    lancets (ACCU-CHEK SOFTCLIX LANCETS) Misc 1 lancet by Misc.(Non-Drug; Combo Route) route 4 (four) times daily. 360 each 3    minoxidil (LONITEN) 2.5 MG tablet Take 2 tablets (5 mg total) by mouth 2 (two) times daily. 360 tablet 6    montelukast (SINGULAIR) 10 mg tablet Take 1 tablet (10 mg total) by mouth every evening. 90 tablet 3    ondansetron (ZOFRAN-ODT) 4 MG TbDL DISSOLVE 1 TABLET(4 MG) ON THE TONGUE EVERY 12 HOURS AS NEEDED 60 tablet 4    pantoprazole (PROTONIX) 40 MG tablet TAKE 1 TABLET ONE TIME DAILY 90 tablet 3    rifaximin (XIFAXAN) 550 mg Tab Take 1 tablet (550 mg total) by mouth 2 (two) times daily. 60 tablet 11    sevelamer carbonate (RENVELA) 800 mg Tab Take 1 tablet (800 mg total) by mouth 3 (three) times daily with meals. 90 tablet 11    tacrolimus (PROGRAF) 0.5 MG Cap Take 1 capsule (0.5 mg total) by mouth once daily. 30 capsule 11    torsemide (DEMADEX) 20 MG Tab Take 3 tablets (60 mg total) by mouth once daily. 180 tablet 3    urea (CARMOL) 40 % Crea Apply topically once daily. 85 g 5    VANCOMYCIN HCL (VANCOMYCIN 1 G/250 ML D5W, READY TO MIX SYSTEM,) Inject 250 mLs (1,000 mg total) into the vein as needed (with hd). 1 mL 1    ammonium lactate 12 % Crea Apply 12 g topically 3 (three) times daily.  6     No current facility-administered medications for this visit.        Review of patient's allergies indicates:   Allergen Reactions    Corticosteroids (glucocorticoids) Other (See Comments)     Leg swelling    Hydrocortisone      Leg swelling    Neuromuscular blockers, steroidal      Leg swelling    Ribavirin      " Intolerance, arthralgias       Physical examination  Vitals Reviewed  Gen. Well-dressed well-nourished no apparent distress  Skin warm dry and intact.  No rashes noted.  Neck is supple without adenopathy  Chest.  Respirations are even unlabored.  Lungs are clear to auscultation.  Cardiac regular rate and rhythm.  No chest wall adenopathy noted.  Neuro. Awake alert oriented x4.  Normal judgment and cognition noted.  Extremities no clubbing cyanosis or edema noted.     Call or return to clinic prn if these symptoms worsen or fail to improve as anticipated.

## 2017-10-29 RX ORDER — HYDROXYZINE HYDROCHLORIDE 25 MG/1
TABLET, FILM COATED ORAL
Qty: 90 TABLET | Refills: 0 | Status: SHIPPED | OUTPATIENT
Start: 2017-10-29 | End: 2017-11-06 | Stop reason: SDUPTHER

## 2017-11-02 DIAGNOSIS — Z94.4 LIVER TRANSPLANTED: Primary | ICD-10-CM

## 2017-11-02 DIAGNOSIS — K74.60 CIRRHOSIS OF TRANSPLANTED LIVER: ICD-10-CM

## 2017-11-02 DIAGNOSIS — T86.49 CIRRHOSIS OF TRANSPLANTED LIVER: ICD-10-CM

## 2017-11-06 ENCOUNTER — OFFICE VISIT (OUTPATIENT)
Dept: FAMILY MEDICINE | Facility: CLINIC | Age: 72
End: 2017-11-06
Payer: MEDICARE

## 2017-11-06 ENCOUNTER — HOSPITAL ENCOUNTER (OUTPATIENT)
Dept: RADIOLOGY | Facility: HOSPITAL | Age: 72
Discharge: HOME | End: 2017-11-06
Attending: NURSE PRACTITIONER
Payer: MEDICARE

## 2017-11-06 VITALS
TEMPERATURE: 98 F | BODY MASS INDEX: 23.4 KG/M2 | HEART RATE: 66 BPM | OXYGEN SATURATION: 97 % | WEIGHT: 182.31 LBS | DIASTOLIC BLOOD PRESSURE: 56 MMHG | SYSTOLIC BLOOD PRESSURE: 146 MMHG | HEIGHT: 74 IN

## 2017-11-06 DIAGNOSIS — M25.562 PAIN AND SWELLING OF KNEE, LEFT: ICD-10-CM

## 2017-11-06 DIAGNOSIS — M25.461 PAIN AND SWELLING OF KNEE, RIGHT: ICD-10-CM

## 2017-11-06 DIAGNOSIS — M25.561 PAIN AND SWELLING OF KNEE, RIGHT: ICD-10-CM

## 2017-11-06 DIAGNOSIS — M25.462 PAIN AND SWELLING OF KNEE, LEFT: ICD-10-CM

## 2017-11-06 DIAGNOSIS — M25.461 EFFUSION OF RIGHT KNEE: ICD-10-CM

## 2017-11-06 DIAGNOSIS — M17.11 OSTEOARTHRITIS OF RIGHT KNEE, UNSPECIFIED OSTEOARTHRITIS TYPE: Primary | ICD-10-CM

## 2017-11-06 PROCEDURE — 99999 PR PBB SHADOW E&M-EST. PATIENT-LVL IV: CPT | Mod: PBBFAC,,, | Performed by: NURSE PRACTITIONER

## 2017-11-06 PROCEDURE — 99214 OFFICE O/P EST MOD 30 MIN: CPT | Mod: S$GLB,,, | Performed by: NURSE PRACTITIONER

## 2017-11-06 PROCEDURE — 73562 X-RAY EXAM OF KNEE 3: CPT | Mod: TC,PO,RT

## 2017-11-06 PROCEDURE — 73562 X-RAY EXAM OF KNEE 3: CPT | Mod: 26,RT,, | Performed by: RADIOLOGY

## 2017-11-06 NOTE — PATIENT INSTRUCTIONS
Knee Pain  Knee pain is very common. Its especially common in active people who put a lot of pressure on their knees, like runners. It affects women more often than men.  Your kneecap (patella) is a thick, round bone. It covers and protects the front portion of your knee joint. It moves along a groove in your thighbone (femur) as part of the patellofemoral joint. A layer of cartilage surrounds the underside of your kneecap. This layer protects it from grinding against your femur.  When this cartilage softens and breaks down, it can cause knee pain. This is partly because of repetitive stress. The stress irritates the lining of the joint. This causes pain in the underlying bone.  What causes knee pain?  Many things can cause knee pain. You may have more than one cause. Some of these include:  · Overuse of the knee joint  · The kneecap doesnt line up with the tissue around it  · Damage to small nerves in the area  · Damage to the ligament-like structure that holds the kneecap in place (retinaculum)  · Breakdown of the bone under the cartilage  · Swelling in the soft tissues around the kneecap  · Injury  You might be more likely to have knee pain if you:  · Exercise a lot  · Recently increased the intensity of your workouts  · Have a body mass index (BMI) greater than 25  · Have poor alignment of your kneecap  · Walk with your feet turned overly outward or inward  · Have weakness in surrounding muscle groups (inner quad or hip adductor muscles)  · Have too much tightness in surrounding muscle groups (hamstrings or iliotibial band)  · Have a recent history of injury to the area  · Are female  Symptoms of knee pain  This type of knee pain is a dull, aching pain in the front of the knee in the area under and around the kneecap. This pain may start quickly or slowly. Your pain might be worse when you squat, run, or sit for a long time. You might also sometimes feel like your knee is giving out. You may have symptoms in  one or both of your knees.  Diagnosing knee pain  Your healthcare provider will ask about your medical history and your symptoms. Be sure to describe any activities that make your knee pain worse. He or she will look at your knee. This will include tests of your range of motion, strength, and areas of pain of your knee. Your knee alignment will be checked.  Your healthcare provider will need to rule out other causes of your knee pain, such as arthritis. You may need an imaging test, such as an X-ray or MRI.  Treatment for knee pain  Treatments that can help ease your symptoms may include:  · Avoiding activities for a while that make your pain worse, returning to activity over time  · Icing the outside of your knee when it causes you pain  · Taking over-the-counter pain medicine  · Wearing a knee brace or taping your knee to support it  · Wearing special shoe inserts to help keep your feet in the proper alignment  · Doing special exercises to stretch and strengthen the muscles around your hip and your knee  These steps help most people manage knee pain. But some cases of knee pain need to be treated with surgery. You may need surgery right away. Or you may need it later if other treatments dont work. Your healthcare provider may refer you to an orthopedic surgeon. He or she will talk with you about your choices.  Preventing knee pain  Losing weight and correcting excess muscle tightness or muscle weakness may help lower your risk.  In some cases, you can prevent knee pain. To help prevent a flare-up of knee pain, you do these things:  · Regularly do all the exercises your doctor or physical therapist advises  · Support your knee as advised by your doctor or physical therapist  · Increase training gradually, and ease up on training when needed  · Have an expert check your gait for running or other sporting activities  · Stretch properly before and after exercise  · Replace your running shoes regularly  · Lose excess  weight     When to call your healthcare provider  Call your healthcare provider right away if:  · Your symptoms dont get better after a few weeks of treatment  · You have any new symptoms   Date Last Reviewed: 4/1/2017  © 5099-5138 [employer]. [address] [place] [address], [place] [address]. All rights reserved. This information is not intended as a substitute for professional medical care. Always follow your healthcare professional's instructions.

## 2017-11-06 NOTE — PROGRESS NOTES
Subjective:       Patient ID: Philip Mathis III is a 72 y.o. male.    Chief Complaint: Knee Pain (right knee) and Difficulty Walking    Knee Pain    The incident occurred 2 days ago. The incident occurred at home. There was no injury mechanism. The pain is present in the right knee. The quality of the pain is described as aching. The pain is at a severity of 9/10. The pain is moderate. The pain has been constant since onset. Pertinent negatives include no inability to bear weight, loss of motion, loss of sensation, muscle weakness, numbness or tingling. Associated symptoms comments: Moderate swelling   . He reports no foreign bodies present. The symptoms are aggravated by movement, palpation and weight bearing.     Review of Systems   Respiratory: Negative for chest tightness and shortness of breath.    Cardiovascular: Negative for palpitations and leg swelling.   Musculoskeletal: Positive for arthralgias (right knee), gait problem (hurts to walk on right knee) and joint swelling (right knee). Negative for back pain and myalgias.   Skin: Negative for color change and wound.   Neurological: Negative for tingling, weakness and numbness.   Hematological: Negative for adenopathy. Does not bruise/bleed easily.       Objective:      Physical Exam   Constitutional: He is oriented to person, place, and time. Vital signs are normal. He appears well-developed and well-nourished.   HENT:   Head: Normocephalic and atraumatic.   Cardiovascular: Normal rate, regular rhythm and normal heart sounds.    Pulmonary/Chest: Effort normal and breath sounds normal.   Abdominal: Soft. Bowel sounds are normal.   Musculoskeletal:        Right knee: He exhibits decreased range of motion, swelling, effusion and bony tenderness. He exhibits no ecchymosis, no deformity, no laceration, normal meniscus and no MCL laxity. Tenderness found. Medial joint line and lateral joint line tenderness noted. No MCL, no LCL and no patellar tendon tenderness  noted.   Neurological: He is alert and oriented to person, place, and time.   Skin: Skin is warm, dry and intact. Capillary refill takes less than 2 seconds.   Psychiatric: He has a normal mood and affect.       Assessment:       1. Osteoarthritis of right knee, unspecified osteoarthritis type    2. Pain and swelling of knee, right    3. Effusion of right knee        Plan:       Philip was seen today for knee pain and difficulty walking.    Diagnoses and all orders for this visit:    Osteoarthritis of right knee, unspecified osteoarthritis type  -     Ambulatory referral to Orthopedics    Pain and swelling of knee, right  -     X-Ray Knee 3 View Right; Future  -     Ambulatory referral to Orthopedics    Effusion of right knee  -     Ambulatory referral to Orthopedics      Home care  · Rest the involved joint(s) until your symptoms improve.   · You may be prescribed pain medicine. If none is prescribed, you may use acetaminophen or ibuprofen to control pain and inflammation.  Follow-up care  Follow up with your healthcare provider or as advised.  When to seek medical advice  Contact your healthcare provider right away if any of the following occurs:  · Pain, swelling, or redness of joint increases  · Pain worsens or recurs after a period of improvement  · Pain moves to other joints  · You cannot bear weight on the affected joint   · You cannot move the affected joint  · Joint appears deformed  · New rash appears  · Fever of 100.4ºF (38ºC) or higher, or as directed by your healthcare provider  Date Last Reviewed: 3/1/2017  © 1625-0439 [employer]. [address] [place] [address], [place] [address]. All rights reserved. This information is not intended as a substitute for professional medical care. Always follow your healthcare professional's instructions.

## 2017-11-07 ENCOUNTER — TELEPHONE (OUTPATIENT)
Dept: TRANSPLANT | Facility: CLINIC | Age: 72
End: 2017-11-07

## 2017-11-07 DIAGNOSIS — Z94.4 LIVER TRANSPLANTED: Primary | ICD-10-CM

## 2017-11-07 DIAGNOSIS — K74.60 CIRRHOSIS OF LIVER WITHOUT ASCITES, UNSPECIFIED HEPATIC CIRRHOSIS TYPE: ICD-10-CM

## 2017-11-07 DIAGNOSIS — R77.2 ELEVATED AFP: ICD-10-CM

## 2017-11-07 DIAGNOSIS — C22.0 HCC (HEPATOCELLULAR CARCINOMA): ICD-10-CM

## 2017-11-07 NOTE — TELEPHONE ENCOUNTER
Called pt's wife and we reviewed pt's lab tests and that dr mott would like to schedule a ct prior to his dialysis. Note he goes to dialysis at noon. Will call wife back once we get tests scheduled.

## 2017-11-07 NOTE — TELEPHONE ENCOUNTER
----- Message from Danay Nath MD sent at 11/6/2017  5:07 PM CST -----  AFP has increased to 27 from 4.4, worrisome for HCC.  Pl get u/s abd and CT scan w/wo contrast immediately followed by Hemodialysis.

## 2017-11-08 ENCOUNTER — TELEPHONE (OUTPATIENT)
Dept: TRANSPLANT | Facility: CLINIC | Age: 72
End: 2017-11-08

## 2017-11-08 NOTE — TELEPHONE ENCOUNTER
----- Message from Danay Nath MD sent at 11/7/2017 10:21 PM CST -----  Prof level low, but since enzymes are normal, can continue current dose of prograf

## 2017-11-08 NOTE — LETTER
November 8, 2017    Philip Mathis  [address] [place] [address]          Dear Philip Mathis:  MRN: [id number]    Your lab results were overall stable. The AFP level is elevated and that is why Dr. Nath has ordered the liver ultrasound and CT Abdomen to investigate further.  There are no medicine changes.  Please have your labs drawn again on 2/5/18.      If you cannot have your labs drawn on the scheduled date, it is your responsibility to call the transplant department to reschedule.  To reschedule or make an appointment, please as to speak to or leave a message for my assistant, Estefany Yang, at [phone].  When leaving a message for Celia Allison, or myself, we ask that you leave a brief message regarding your request.    Sincerely,      Michell Rowe, RN, BSN  Your Liver Transplant Coordinator    [facility]  [address] [place] [id number]  [phone]

## 2017-11-09 ENCOUNTER — HOSPITAL ENCOUNTER (OUTPATIENT)
Dept: RADIOLOGY | Facility: HOSPITAL | Age: 72
Discharge: HOME | End: 2017-11-09
Attending: INTERNAL MEDICINE
Payer: MEDICARE

## 2017-11-09 DIAGNOSIS — T86.49 CIRRHOSIS OF TRANSPLANTED LIVER: ICD-10-CM

## 2017-11-09 DIAGNOSIS — K74.60 CIRRHOSIS OF TRANSPLANTED LIVER: ICD-10-CM

## 2017-11-09 DIAGNOSIS — Z94.4 LIVER TRANSPLANTED: ICD-10-CM

## 2017-11-09 PROCEDURE — 76705 ECHO EXAM OF ABDOMEN: CPT | Mod: 26,59,, | Performed by: RADIOLOGY

## 2017-11-09 PROCEDURE — 93975 VASCULAR STUDY: CPT | Mod: 26,,, | Performed by: RADIOLOGY

## 2017-11-09 PROCEDURE — 93975 VASCULAR STUDY: CPT | Mod: TC

## 2017-11-11 ENCOUNTER — NURSE TRIAGE (OUTPATIENT)
Dept: ADMINISTRATIVE | Facility: CLINIC | Age: 72
End: 2017-11-11

## 2017-11-13 ENCOUNTER — TELEPHONE (OUTPATIENT)
Dept: TRANSPLANT | Facility: CLINIC | Age: 72
End: 2017-11-13

## 2017-11-13 DIAGNOSIS — R77.2 ELEVATED AFP: ICD-10-CM

## 2017-11-13 DIAGNOSIS — C22.0 HCC (HEPATOCELLULAR CARCINOMA): ICD-10-CM

## 2017-11-13 DIAGNOSIS — Z94.4 LIVER TRANSPLANTED: Primary | ICD-10-CM

## 2017-11-13 NOTE — TELEPHONE ENCOUNTER
----- Message from Danay Nath MD sent at 11/11/2017  6:19 PM CST -----  Stable liver lesion 2 cm, pl inform pt.   Get CT scan w/wo contrast followed by HD. If features of HCC, will need TACE.

## 2017-11-13 NOTE — TELEPHONE ENCOUNTER
Pt's wife called and stated that she cancelled his scheduled ct scan because pt did not feel well. We reviewed the importance of getting scan done. torey try to get scans rescheduled for 11/22/ or 11/29.

## 2017-11-15 RX ORDER — LACTULOSE 10 G/15ML
SOLUTION ORAL; RECTAL
Qty: 2700 ML | Refills: 0 | Status: SHIPPED | OUTPATIENT
Start: 2017-11-15 | End: 2018-01-01 | Stop reason: SDUPTHER

## 2017-11-16 ENCOUNTER — TELEPHONE (OUTPATIENT)
Dept: TRANSPLANT | Facility: CLINIC | Age: 72
End: 2017-11-16

## 2017-11-16 DIAGNOSIS — G93.40 ENCEPHALOPATHY: ICD-10-CM

## 2017-11-16 NOTE — TELEPHONE ENCOUNTER
Called back pt's wife and [facility] Will call in script for pt.

## 2017-11-16 NOTE — TELEPHONE ENCOUNTER
----- Message from Kamran Cast sent at 11/16/2017 11:16 AM CST -----  Contact: patient   Patient called for medication refill on rifaximin (XIFAXAN) 550 mg Tab        Please call [phone]        Thanks!!!!

## 2017-11-16 NOTE — TELEPHONE ENCOUNTER
----- Message from Yazmin Brantley sent at 11/16/2017 11:23 AM CST -----  Contact: wife  Calling to speak with Michell needs a new script for rifaximin (XIFAXAN) 550 mg Tab to [facility] fax # [phone].

## 2017-11-17 ENCOUNTER — TELEPHONE (OUTPATIENT)
Dept: TRANSPLANT | Facility: CLINIC | Age: 72
End: 2017-11-17

## 2017-11-20 ENCOUNTER — LAB VISIT (OUTPATIENT)
Dept: LAB | Facility: HOSPITAL | Age: 72
End: 2017-11-20
Attending: NURSE PRACTITIONER
Payer: MEDICARE

## 2017-11-20 DIAGNOSIS — E11.319 DIABETIC RETINOPATHY ASSOCIATED WITH TYPE 2 DIABETES MELLITUS, MACULAR EDEMA PRESENCE UNSPECIFIED, UNSPECIFIED LATERALITY, UNSPECIFIED RETINOPATHY SEVERITY: ICD-10-CM

## 2017-11-20 LAB
ESTIMATED AVG GLUCOSE: 120 MG/DL
HBA1C MFR BLD HPLC: 5.8 %

## 2017-11-20 PROCEDURE — 83036 HEMOGLOBIN GLYCOSYLATED A1C: CPT

## 2017-11-20 PROCEDURE — 36415 COLL VENOUS BLD VENIPUNCTURE: CPT | Mod: PO

## 2017-11-29 ENCOUNTER — HOSPITAL ENCOUNTER (OUTPATIENT)
Dept: RADIOLOGY | Facility: HOSPITAL | Age: 72
Discharge: HOME | End: 2017-11-29
Attending: INTERNAL MEDICINE
Payer: MEDICARE

## 2017-11-29 DIAGNOSIS — R77.2 ELEVATED AFP: ICD-10-CM

## 2017-11-29 DIAGNOSIS — Z94.4 LIVER TRANSPLANTED: ICD-10-CM

## 2017-11-29 DIAGNOSIS — C22.0 HCC (HEPATOCELLULAR CARCINOMA): ICD-10-CM

## 2017-11-29 PROCEDURE — 25500020 PHARM REV CODE 255: Performed by: INTERNAL MEDICINE

## 2017-11-29 PROCEDURE — 71260 CT THORAX DX C+: CPT | Mod: TC

## 2017-11-29 PROCEDURE — 74177 CT ABD & PELVIS W/CONTRAST: CPT | Mod: TC

## 2017-11-29 PROCEDURE — 71260 CT THORAX DX C+: CPT | Mod: 26,,, | Performed by: RADIOLOGY

## 2017-11-29 PROCEDURE — 74177 CT ABD & PELVIS W/CONTRAST: CPT | Mod: 26,,, | Performed by: RADIOLOGY

## 2017-11-29 RX ADMIN — IOHEXOL 15 ML: 350 INJECTION, SOLUTION INTRAVENOUS at 07:11

## 2017-11-29 RX ADMIN — IOHEXOL 15 ML: 350 INJECTION, SOLUTION INTRAVENOUS at 08:11

## 2017-11-29 RX ADMIN — IOHEXOL 100 ML: 350 INJECTION, SOLUTION INTRAVENOUS at 09:11

## 2017-12-01 ENCOUNTER — TELEPHONE (OUTPATIENT)
Dept: TRANSPLANT | Facility: CLINIC | Age: 72
End: 2017-12-01

## 2017-12-01 NOTE — TELEPHONE ENCOUNTER
Informed pt CT scan reviewed by Dr. Nath. No mass seen, will repeat triple phase CT in 3 months. Pt verbalizes understanding.

## 2017-12-01 NOTE — TELEPHONE ENCOUNTER
----- Message from Danay Nath MD sent at 11/30/2017 10:57 PM CST -----  Pl inform Mr. Mathis:  CT scan showed a small cyst in the liver.  No mass seen.   Continue Triple phase CT and AFP monitoring in 3 months.

## 2017-12-04 ENCOUNTER — OFFICE VISIT (OUTPATIENT)
Dept: TRANSPLANT | Facility: CLINIC | Age: 72
End: 2017-12-04
Payer: MEDICARE

## 2017-12-04 ENCOUNTER — TELEPHONE (OUTPATIENT)
Dept: TRANSPLANT | Facility: CLINIC | Age: 72
End: 2017-12-04

## 2017-12-04 VITALS
BODY MASS INDEX: 25.89 KG/M2 | DIASTOLIC BLOOD PRESSURE: 45 MMHG | HEIGHT: 74 IN | OXYGEN SATURATION: 96 % | WEIGHT: 201.75 LBS | RESPIRATION RATE: 18 BRPM | SYSTOLIC BLOOD PRESSURE: 121 MMHG | TEMPERATURE: 98 F | HEART RATE: 72 BPM

## 2017-12-04 DIAGNOSIS — K76.82 HEPATIC ENCEPHALOPATHY: Primary | ICD-10-CM

## 2017-12-04 DIAGNOSIS — G93.40 ENCEPHALOPATHY: ICD-10-CM

## 2017-12-04 PROCEDURE — 99214 OFFICE O/P EST MOD 30 MIN: CPT | Mod: S$GLB,,, | Performed by: INTERNAL MEDICINE

## 2017-12-04 PROCEDURE — 99499 UNLISTED E&M SERVICE: CPT | Mod: S$GLB,,, | Performed by: INTERNAL MEDICINE

## 2017-12-04 PROCEDURE — 99999 PR PBB SHADOW E&M-EST. PATIENT-LVL III: CPT | Mod: PBBFAC,,, | Performed by: INTERNAL MEDICINE

## 2017-12-04 RX ORDER — HYDROXYZINE HYDROCHLORIDE 25 MG/1
TABLET, FILM COATED ORAL
Qty: 90 TABLET | Refills: 0 | Status: SHIPPED | OUTPATIENT
Start: 2017-12-04 | End: 2018-01-06 | Stop reason: SDUPTHER

## 2017-12-04 NOTE — PATIENT INSTRUCTIONS
Plan:  1.  Continue current dose of prograf 0.5 mg daily, trough has been <1.5 to 2.1, monitor prograf level.    2.  Has not been getting aranesp/procrit at [facility].  Hgb around 10-11.    3.  Labs and prograf level q 1 month.   4.  Present to IR conf tomorrow, for CT and u/s showing 1.9 cm liver lesion, likely cyst, but with elevated AFP (which has been normal since hep C treated), is concerning for HCC.   4.  Return in 1 month.   5.  Please give appt with Dr. Amezcua to see if any adjustments need to be made to meds, whether Aranesp/procrit should be given, as patient persistently weak, fatigued.    6.  Elastography (fibroscan) in February 2018 to reassess fibrosis post-cure of hep C.  Pl get approval in January 2018.     IR CONFERENCE NOTE - DONE

## 2017-12-04 NOTE — TELEPHONE ENCOUNTER
Patient: Philip Mathis III       MRN: [id number]      : 1945     Age: 72 y.o.  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  Has a liver lesion, 1.9 cm, could be cyst.  BUT, AFP has increased from 4.4 to 27, therefore, I am concerned it may be HCC.    Imaging to be reviewed:   CT 17 and U/S 17.      HCC Treatment History: none    ABO: O POS    Platelets:   Lab Results   Component Value Date/Time     (L) 2017 09:02 AM     Creatinine:   Lab Results   Component Value Date/Time    CREATININE 5.0 (H) 2017 09:02 AM     Bilirubin:   Lab Results   Component Value Date/Time    BILITOT 0.4 2017 09:02 AM     AFP Last 3 each if available:   Lab Results   Component Value Date/Time    AFP 27 (H) 2017 09:02 AM    AFP 4.4 2017 07:32 AM    AFP 4.5 2017 07:03 AM       MELD: MELD-Na score: 21 at 2017  9:02 AM  MELD score: 21 at 2017  9:02 AM  Calculated from:  Serum Creatinine: 5.0 mg/dL (Rounded to 4) at 2017  9:02 AM  Serum Sodium: 142 mmol/L (Rounded to 137) at 2017  9:02 AM  Total Bilirubin: 0.4 mg/dL (Rounded to 1) at 2017  9:02 AM  INR(ratio): 1.1 at 2017  9:02 AM  Age: 72 years    Plan:     Follow-up Provider:

## 2017-12-04 NOTE — Clinical Note
December 4, 2017                     [facility] Liver Transplant  [address]  [facility] [address]  Phone: [phone]   Patient: Philip Mathis III   MR Number: [id number]   YOB: 1945   Date of Visit: 12/4/2017       Dear      Thank you for referring Philip Mathis to me for evaluation. Attached you will find relevant portions of my assessment and plan of care.    If you have questions, please do not hesitate to call me. I look forward to following Philip Mathis along with you.    Sincerely,    Danay Nath MD    Enclosure    If you would like to receive this communication electronically, please contact [email or web address] or [phone] to request [employer] access.    [employer] is a tool which provides read-only access to select patient information with whom you have a relationship. Its easy to use and provides real time access to review your patients record including encounter summaries, notes, results, and demographic information.    If you feel you have received this communication in error or would no longer like to receive these types of communications, please e-mail [email or web address]

## 2017-12-04 NOTE — Clinical Note
Plan: 1.  Continue current dose of prograf 0.5 mg daily, trough has been <1.5 to 2.1, monitor prograf level.   2.  Has not been getting aranesp/procrit at [facility].  Hgb around 10-11.   3.  Labs with AFP, prograf level q 1 month.  4.  Present to IR conf tomorrow, for CT and u/s showing 1.9 cm liver lesion, likely cyst, but with elevated AFP (which has been normal since hep C treated), is concerning for HCC.  4.  Return in 1 month.  5.  Please give appt with Dr. Amezcua to see if any adjustments need to be made to meds, whether Aranesp/procrit should be given, as patient persistently weak, fatigued.   6.  Elastography (fibroscan) in February 2018 to reassess fibrosis post-cure of hep C.  Pl get approval in January 2018.   IR CONFERENCE NOTE - DONE

## 2017-12-05 ENCOUNTER — TELEPHONE (OUTPATIENT)
Dept: PHARMACY | Facility: CLINIC | Age: 72
End: 2017-12-05

## 2017-12-11 NOTE — TELEPHONE ENCOUNTER
Patient: Philip Mathis III       MRN: [id number]      : 1945     Age: 72 y.o.  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  Has a liver lesion, 1.9 cm, could be cyst.  BUT, AFP has increased from 4.4 to 27, therefore, I am concerned it may be HCC.     Imaging to be reviewed:   CT 17 and U/S 17.       HCC Treatment History: none     ABO: O POS     Platelets:         Lab Results   Component Value Date/Time      (L) 2017 09:02 AM      Creatinine:         Lab Results   Component Value Date/Time     CREATININE 5.0 (H) 2017 09:02 AM      Bilirubin:         Lab Results   Component Value Date/Time     BILITOT 0.4 2017 09:02 AM      AFP Last 3 each if available:         Lab Results   Component Value Date/Time     AFP 27 (H) 2017 09:02 AM     AFP 4.4 2017 07:32 AM     AFP 4.5 2017 07:03 AM         MELD: MELD-Na score: 21 at 2017  9:02 AM  MELD score: 21 at 2017  9:02 AM  Calculated from:  Serum Creatinine: 5.0 mg/dL (Rounded to 4) at 2017  9:02 AM  Serum Sodium: 142 mmol/L (Rounded to 137) at 2017  9:02 AM  Total Bilirubin: 0.4 mg/dL (Rounded to 1) at 2017  9:02 AM  INR(ratio): 1.1 at 2017  9:02 AM  Age: 72 years     Plan: CT shows a 1.9 hypodense lesion in segement 7 which is indeterminate and correlates with hypoechoic lesion seen on US. This is not a cyst. It has enalrged since first seen on 2016 (1.9 now, 1.3 initially). Rec bx with US.         Follow-up Provider: Dr Nath    Note forwarded to Post Transplant Coordinator    to coordinate follow-up

## 2017-12-12 ENCOUNTER — CONFERENCE (OUTPATIENT)
Dept: TRANSPLANT | Facility: CLINIC | Age: 72
End: 2017-12-12

## 2017-12-13 ENCOUNTER — TELEPHONE (OUTPATIENT)
Dept: TRANSPLANT | Facility: CLINIC | Age: 72
End: 2017-12-13

## 2017-12-13 DIAGNOSIS — Z94.4 LIVER TRANSPLANTED: Primary | ICD-10-CM

## 2017-12-13 DIAGNOSIS — K76.9 LIVER LESION: ICD-10-CM

## 2017-12-13 DIAGNOSIS — T86.49 FIBROSIS OF TRANSPLANTED LIVER: ICD-10-CM

## 2017-12-13 DIAGNOSIS — R79.89 ABNORMAL LFTS: ICD-10-CM

## 2017-12-13 DIAGNOSIS — K76.9 LIVER LESION, TRANSPLANTED LIVER: ICD-10-CM

## 2017-12-13 DIAGNOSIS — K74.00 FIBROSIS OF TRANSPLANTED LIVER: ICD-10-CM

## 2017-12-13 DIAGNOSIS — K74.69 OTHER CIRRHOSIS OF LIVER: ICD-10-CM

## 2017-12-13 DIAGNOSIS — T86.49 LIVER LESION, TRANSPLANTED LIVER: ICD-10-CM

## 2017-12-13 DIAGNOSIS — C22.0 HCC (HEPATOCELLULAR CARCINOMA): ICD-10-CM

## 2017-12-13 NOTE — TELEPHONE ENCOUNTER
Talked with pt's wife shelby to discuss that dr mott would like pt to get a liver bx after IR review. We discussed what day he can do a liver u/s and labs this week and that he can do the bx on tues or thurs next week. He will start holding asa tomorrow am.

## 2017-12-14 ENCOUNTER — TELEPHONE (OUTPATIENT)
Dept: TRANSPLANT | Facility: CLINIC | Age: 72
End: 2017-12-14

## 2017-12-14 NOTE — TELEPHONE ENCOUNTER
----- Message from Marianne Montoya RN sent at 12/13/2017 11:20 AM CST -----      ----- Message -----  From: Marianne Montoya RN  Sent: 12/8/2017  10:54 AM  To: Jabari Elizabeth MD        ----- Message -----  From: Marielos Amezcua MD  Sent: 12/8/2017  10:13 AM  To: CHRISSIE Joseph,    Thanks for your message. Please give my regards to Dr. Nath. I, no longer, follow Mr. Mathis. I miss seeing him. Since he started dialysis he transitioned nephrology care to another physician. He was to do home dialysis which he and his wife decided not to do once he started in center hemodialysis. The latest I hear is he is now transferred from [facility] to a clinic on the [facility]. I am sorry I don't have any details as to who would be his current nephrologist. If it is an [facility] clinic he is going to, it could be either Dr. Elizabeth or Dr. Love.    ThanksMarielos    ----- Message -----  From: Marianne Montoya RN  Sent: 12/8/2017   9:58 AM  To: MD Dr Portillo Jewell saw patient in clinic this week. He had complaints of persistent weakness and fatigue.  She wanted to make you aware in case any adjustments needed to be made to meds, whether Aranesp/procrit should be given.      Thanks  Marianne Millet, RN  Liver Transplant Coordinator.

## 2017-12-15 ENCOUNTER — TELEPHONE (OUTPATIENT)
Dept: TRANSPLANT | Facility: CLINIC | Age: 72
End: 2017-12-15

## 2017-12-15 ENCOUNTER — HOSPITAL ENCOUNTER (OUTPATIENT)
Dept: RADIOLOGY | Facility: HOSPITAL | Age: 72
Discharge: HOME | End: 2017-12-15
Attending: INTERNAL MEDICINE
Payer: MEDICARE

## 2017-12-15 DIAGNOSIS — Z94.4 LIVER TRANSPLANTED: Primary | ICD-10-CM

## 2017-12-15 DIAGNOSIS — Z94.4 LIVER TRANSPLANTED: ICD-10-CM

## 2017-12-15 DIAGNOSIS — K76.9 LESION OF TRANSPLANTED LIVER: ICD-10-CM

## 2017-12-15 DIAGNOSIS — T86.49 LESION OF TRANSPLANTED LIVER: ICD-10-CM

## 2017-12-15 DIAGNOSIS — T86.49 LIVER LESION, TRANSPLANTED LIVER: ICD-10-CM

## 2017-12-15 DIAGNOSIS — K76.9 LIVER LESION, TRANSPLANTED LIVER: ICD-10-CM

## 2017-12-15 PROCEDURE — 93975 VASCULAR STUDY: CPT | Mod: TC

## 2017-12-15 PROCEDURE — 76705 ECHO EXAM OF ABDOMEN: CPT | Mod: 26,59,, | Performed by: RADIOLOGY

## 2017-12-15 PROCEDURE — 93975 VASCULAR STUDY: CPT | Mod: 26,,, | Performed by: RADIOLOGY

## 2017-12-15 NOTE — TELEPHONE ENCOUNTER
Talked with Barbara in IR. Mr. Mathis scheduled for Liver bx on 12/21. He should arrive at 1030am. Left Vm on wife's phone.

## 2017-12-16 RX ORDER — DICLOFENAC SODIUM 10 MG/G
GEL TOPICAL
Qty: 100 G | Refills: 0 | OUTPATIENT
Start: 2017-12-16

## 2017-12-18 ENCOUNTER — TELEPHONE (OUTPATIENT)
Dept: HEPATOLOGY | Facility: CLINIC | Age: 72
End: 2017-12-18

## 2017-12-18 NOTE — TELEPHONE ENCOUNTER
Called patient to discuss biopsy of the liver mass.  Wife answered, she will pass on the information.  Liver lesion was indeterminate based on IR review of CT scan, it is a mass NOT a cyst.      He had no colon CA or pancreatic CA in family members. He had normal colon on endoscopy 8/2015.      I Answered all of wife's questions.  He is scheduled on 12/21/17 for CT guided bx of liver mass.

## 2017-12-19 ENCOUNTER — TELEPHONE (OUTPATIENT)
Dept: TRANSPLANT | Facility: CLINIC | Age: 72
End: 2017-12-19

## 2017-12-19 DIAGNOSIS — Z94.4 LIVER TRANSPLANTED: Primary | ICD-10-CM

## 2017-12-19 LAB
ACID FAST MOD KINY STN SPEC: NORMAL
MYCOBACTERIUM SPEC QL CULT: NORMAL

## 2017-12-19 NOTE — TELEPHONE ENCOUNTER
----- Message from Danay Nath MD sent at 12/17/2017  9:28 AM CST -----  Pl inform pt: U/s abd did not show change in size, but CT finding with elevated AFP is worrisome for liver cancer. Biopsy will add more information.

## 2017-12-19 NOTE — TELEPHONE ENCOUNTER
----- Message from Danay Nath MD sent at 12/17/2017  9:22 AM CST -----  AFP increasing from 27 to 32, highly suspicious for HCC.  Will talk to patient later today.

## 2017-12-19 NOTE — LETTER
December 19, 2017    Philip Mathis  [address] [place] [address]          Dear Philip Mathis:  MRN: [id number]    This is just a reminder letter that Dr. Nath would like you to have labs done on January 15,2018. If you need to reschedule, please call Celia Allison, our Transplant Schedulers at [phone].      Happy Holidays,      Michell Rowe, RN ,BSN  Your Liver Transplant Coordinator    [facility]  [address] [place] [id number] [phone]

## 2017-12-20 ENCOUNTER — TELEPHONE (OUTPATIENT)
Dept: INTERVENTIONAL RADIOLOGY/VASCULAR | Facility: HOSPITAL | Age: 72
End: 2017-12-20

## 2017-12-20 NOTE — TELEPHONE ENCOUNTER
Phone call ( complete to wife)   Arrival time-11        NPO reinforced  Allergies reviewed  Directions given  Instructed to take meds in AM  Has Ride ( wife)             Labs (current  )      Approx recovery length discussed            Anti coags (held )             Instructed to modify insulin per protocol

## 2017-12-21 ENCOUNTER — SURGERY (OUTPATIENT)
Age: 72
End: 2017-12-21

## 2017-12-21 ENCOUNTER — HOSPITAL ENCOUNTER (OUTPATIENT)
Facility: HOSPITAL | Age: 72
Discharge: HOME | End: 2017-12-21
Attending: INTERNAL MEDICINE | Admitting: INTERNAL MEDICINE
Payer: MEDICARE

## 2017-12-21 ENCOUNTER — TELEPHONE (OUTPATIENT)
Dept: ADMINISTRATIVE | Facility: HOSPITAL | Age: 72
End: 2017-12-21

## 2017-12-21 VITALS
DIASTOLIC BLOOD PRESSURE: 45 MMHG | HEIGHT: 74 IN | TEMPERATURE: 99 F | HEART RATE: 72 BPM | BODY MASS INDEX: 24.38 KG/M2 | WEIGHT: 190 LBS | OXYGEN SATURATION: 96 % | SYSTOLIC BLOOD PRESSURE: 100 MMHG | RESPIRATION RATE: 16 BRPM

## 2017-12-21 DIAGNOSIS — T86.49 LESION OF TRANSPLANTED LIVER: ICD-10-CM

## 2017-12-21 DIAGNOSIS — K76.9 LESION OF TRANSPLANTED LIVER: ICD-10-CM

## 2017-12-21 DIAGNOSIS — Z94.4 LIVER TRANSPLANTED: ICD-10-CM

## 2017-12-21 LAB — POCT GLUCOSE: 179 MG/DL (ref 70–110)

## 2017-12-21 PROCEDURE — 88333 PATH CONSLTJ SURG CYTO XM 1: CPT | Mod: 26,,, | Performed by: PATHOLOGY

## 2017-12-21 PROCEDURE — 25000003 PHARM REV CODE 250: Performed by: FAMILY MEDICINE

## 2017-12-21 PROCEDURE — 88307 TISSUE EXAM BY PATHOLOGIST: CPT | Performed by: PATHOLOGY

## 2017-12-21 PROCEDURE — 88307 TISSUE EXAM BY PATHOLOGIST: CPT | Mod: 26,,, | Performed by: PATHOLOGY

## 2017-12-21 PROCEDURE — 88342 IMHCHEM/IMCYTCHM 1ST ANTB: CPT | Mod: 26,,, | Performed by: PATHOLOGY

## 2017-12-21 PROCEDURE — 82962 GLUCOSE BLOOD TEST: CPT

## 2017-12-21 PROCEDURE — 63600175 PHARM REV CODE 636 W HCPCS: Performed by: RADIOLOGY

## 2017-12-21 RX ORDER — MIDAZOLAM HYDROCHLORIDE 1 MG/ML
1 INJECTION INTRAMUSCULAR; INTRAVENOUS
Status: DISCONTINUED | OUTPATIENT
Start: 2017-12-21 | End: 2017-12-21 | Stop reason: HOSPADM

## 2017-12-21 RX ORDER — SODIUM CHLORIDE 9 MG/ML
500 INJECTION, SOLUTION INTRAVENOUS ONCE
Status: COMPLETED | OUTPATIENT
Start: 2017-12-21 | End: 2017-12-21

## 2017-12-21 RX ORDER — MIDAZOLAM HYDROCHLORIDE 1 MG/ML
INJECTION INTRAMUSCULAR; INTRAVENOUS CODE/TRAUMA/SEDATION MEDICATION
Status: COMPLETED | OUTPATIENT
Start: 2017-12-21 | End: 2017-12-21

## 2017-12-21 RX ORDER — LIDOCAINE HYDROCHLORIDE 10 MG/ML
1 INJECTION, SOLUTION EPIDURAL; INFILTRATION; INTRACAUDAL; PERINEURAL ONCE
Status: DISCONTINUED | OUTPATIENT
Start: 2017-12-21 | End: 2017-12-21 | Stop reason: HOSPADM

## 2017-12-21 RX ORDER — FENTANYL CITRATE 50 UG/ML
50 INJECTION, SOLUTION INTRAMUSCULAR; INTRAVENOUS
Status: DISCONTINUED | OUTPATIENT
Start: 2017-12-21 | End: 2017-12-21 | Stop reason: HOSPADM

## 2017-12-21 RX ORDER — FENTANYL CITRATE 50 UG/ML
INJECTION, SOLUTION INTRAMUSCULAR; INTRAVENOUS CODE/TRAUMA/SEDATION MEDICATION
Status: COMPLETED | OUTPATIENT
Start: 2017-12-21 | End: 2017-12-21

## 2017-12-21 RX ADMIN — MIDAZOLAM HYDROCHLORIDE 0.5 MG: 1 INJECTION, SOLUTION INTRAMUSCULAR; INTRAVENOUS at 12:12

## 2017-12-21 RX ADMIN — FENTANYL CITRATE 25 MCG: 50 INJECTION, SOLUTION INTRAMUSCULAR; INTRAVENOUS at 12:12

## 2017-12-21 RX ADMIN — MIDAZOLAM HYDROCHLORIDE 1 MG: 1 INJECTION, SOLUTION INTRAMUSCULAR; INTRAVENOUS at 12:12

## 2017-12-21 RX ADMIN — SODIUM CHLORIDE 250 ML: 0.9 INJECTION, SOLUTION INTRAVENOUS at 11:12

## 2017-12-21 NOTE — PROGRESS NOTES
CT guided liver biopsy complete, dressing applied, CDI. Specimen sent to lab with pathology. Pt placed in right side lying position, to remain until 1410. No acute events. Pt resting comfortably.

## 2017-12-21 NOTE — PROCEDURES
Radiology Post-Procedure Note    Pre Op Diagnosis: right liver mass    Post Op Diagnosis: right liver mass    Procedure: right liver biopsy    Procedure performed by: Belén Benítez MD, MD.     Written Informed Consent Obtained: Yes    Specimen Removed: YES     Estimated Blood Loss: Minimal    Findings:   Using CT guidance a 19g sheath needle was placed into a right liver mass. 19g monopty biopsy gun used to take 6 core biopsy samples. Specimen sent to pathology.     Patient tolerated procedure well.    Nessa Mg M.D. 1:13 PM 12/21/2017

## 2017-12-21 NOTE — PROGRESS NOTES
Pt arrived to procedure room. Pt oriented to unit and staff. Comfort measures utilized. Pt safely transferred from stretcher to procedural table. Safety strap applied. Blankets applied. Pt prepped and draped utilizing standard sterile technique. Pt resting comfortably. Denies pain/discomfort. Will continue to monitor. See flow sheets for monitoring.

## 2017-12-21 NOTE — DISCHARGE SUMMARY
Radiology Discharge Summary      Hospital Course: No complications    Admit Date: 12/21/2017  Discharge Date: 12/21/2017     Instructions Given to Patient: Yes  Diet: Resume prior diet  Activity: activity as tolerated    Description of Condition on Discharge: Stable  Vital Signs (Most Recent): Temp: 98.3 °F (36.8 °C) (12/21/17 1149)  Pulse: 72 (12/21/17 1305)  Resp: 16 (12/21/17 1305)  BP: (!) 121/57 (12/21/17 1305)  SpO2: 95 % (12/21/17 1305)    Discharge Disposition: Home    Discharge Diagnosis: Liver mass; s/p liver transplant     Pt underwent RHL mass biopsy. Pt tolerated the procedure well.     Follow-up: per primary team

## 2017-12-21 NOTE — PROGRESS NOTES
Pt discharged to home.  Discharge instructions given, pt and wife stated understanding.  Dressing to abdomen dry and intact, IV removed.  Pt left via wheelchair with wife to home.

## 2017-12-21 NOTE — DISCHARGE INSTRUCTIONS
For scheduling: Call Barbara at [phone]    For questions or concerns call: KEISHA MON-FRI 8 AM- 5PM [phone]. Radiology resident on call [phone].    For immediate concerns that are not emergent, you may call our radiology clinic at: [phone]

## 2017-12-21 NOTE — H&P
Radiology History & Physical      SUBJECTIVE:     Chief Complaint: Liver lesion    History of Present Illness:  Philip Mathis III is a 72 y.o. male who presents for CT guided liver lesion biopsy. Pt is s/p liver transplant with slow growing lesion.    Past Medical History:   Diagnosis Date    Anemia     Arthritis     Back pain     Bishop's esophagus     BPH (benign prostatic hypertrophy)     Chronic kidney disease     Cirrhosis     Diabetes mellitus     Diabetes mellitus, type 2     Diabetes with neurologic complications     Diabetic retinopathy     Elevated PSA     Heart failure     Hemolytic anemia     Hepatitis Hepatitis C    Hepatitis C     Hyperlipidemia     Hypertension     Hypertension     Immune disorder     Liver transplanted     Peripheral neuropathy     Sleep apnea     Transfusion reaction     Hep C from prev. blood transfusion    Transplanted liver     Trouble in sleeping     Type 2 diabetes mellitus with ophthalmic manifestations     Type 2 diabetes with peripheral circulatory disorder, controlled     Type II or unspecified type diabetes mellitus with renal manifestations, uncontrolled(250.42)     Type II or unspecified type diabetes mellitus with renal manifestations, uncontrolled(250.42)      Past Surgical History:   Procedure Laterality Date    broken nose      CATARACT EXTRACTION Bilateral     EYE SURGERY      cataracts    FEMUR SURGERY      FRACTURE SURGERY      right femur fracture     LIVER TRANSPLANT  2001    PORTACATH PLACEMENT Left     SKIN GRAFT      graft from right thigh , applied to the left back and left arm.       Home Meds:   Prior to Admission medications    Medication Sig Start Date End Date Taking? Authorizing Provider   allopurinol (ZYLOPRIM) 100 MG tablet TAKE 1 TABLET EVERY DAY 10/3/17  Yes Donnie Owens NP   carvedilol (COREG) 6.25 MG tablet Take 1 tablet (6.25 mg total) by mouth 2 (two) times daily. 4/3/17 4/3/18 Yes Marielos Amezcua,  MD   clonazePAM (KLONOPIN) 0.5 MG tablet Take 1 tablet (0.5 mg total) by mouth every evening.  Patient taking differently: Take 0.5 mg by mouth daily as needed.  10/13/14  Yes Adelaide Salas MD   cloNIDine (CATAPRES) 0.1 MG tablet Take 1 tablet (0.1 mg total) by mouth 3 (three) times daily. 5/25/17  Yes Marielos Amezcua MD   doxazosin (CARDURA) 8 MG Tab Take 1 tablet (8 mg total) by mouth once daily. 10/23/17 10/23/18 Yes Donnie Owens NP   furosemide (LASIX) 80 MG tablet Take 80 mg by mouth 2 (two) times daily.   Yes Darlyn Campos MD   hydrALAZINE (APRESOLINE) 100 MG tablet Take 1 tablet (100 mg total) by mouth 3 (three) times daily. 4/3/17  Yes Marielos Amezcua MD   hydrOXYzine HCl (ATARAX) 25 MG tablet Take 2 tablets (50 mg total) by mouth 3 (three) times daily as needed for Itching. 4/28/17  Yes Donnie Owens NP   insulin aspart (NOVOLOG) 100 unit/mL InPn pen Inject 6 units w/ breakfast, 4 units w/ lunch and dinner plus scale 180-230 +1, 231-280 +2, 281-330 +3, 331-380 +4, >380 +5. 90 day supply 5/17/17  Yes Viviane Carter, APRN, FNP   minoxidil (LONITEN) 2.5 MG tablet Take 2 tablets (5 mg total) by mouth 2 (two) times daily. 5/25/17 5/25/18 Yes Marielos Amezcua MD   montelukast (SINGULAIR) 10 mg tablet Take 1 tablet (10 mg total) by mouth every evening. 6/30/17 6/30/18 Yes Marni Sheffield MD   ondansetron (ZOFRAN-ODT) 4 MG TbDL DISSOLVE 1 TABLET(4 MG) ON THE TONGUE EVERY 12 HOURS AS NEEDED 10/23/17  Yes Donnie Owens NP   pantoprazole (PROTONIX) 40 MG tablet TAKE 1 TABLET ONE TIME DAILY 1/8/17  Yes Danay Nath MD   rifAXIMin (XIFAXAN) 550 mg Tab Take 1 tablet (550 mg total) by mouth 2 (two) times daily. 12/4/17  Yes Danay Nath MD   sevelamer carbonate (RENVELA) 800 mg Tab Take 1 tablet (800 mg total) by mouth 3 (three) times daily with meals. 9/28/17 9/28/18 Yes JAMIE Hightower   tacrolimus (PROGRAF) 0.5 MG Cap Take 1 capsule (0.5 mg total) by mouth once  "daily. 3/21/17  Yes Danay Nath MD   ammonium lactate (LAC-HYDRIN) 12 % lotion Apply topically as needed for Dry Skin. 10/4/17   Faye Larson DPM   ammonium lactate 12 % Crea Apply 12 g topically 3 (three) times daily. 10/18/17   Darlyn Campos MD   aspirin 81 MG Chew Take 1 tablet (81 mg total) by mouth once daily. 7/20/17 7/20/18  Jodie Shearer MD   blood sugar diagnostic (ACCU-CHEK JOANN PLUS TEST STRP) Strp 1 strip by Misc.(Non-Drug; Combo Route) route 4 (four) times daily. 1/11/16   MILTON Carrillo FNP-C   blood-glucose meter (ACCU-CHEK JOANN PLUS METER) Misc Use as directed 1/11/16   MILTON Carrillo FNP-C   ciclopirox (PENLAC) 8 % Soln Apply to affected nails daily x 6-12 mos.  Remove weekly with rubbing alcohol 7/25/16   Caleb Chavez DPM   GENERLAC 10 gram/15 mL solution TAKE 30 ML 'S BY MOUTH THREE TIMES DAILY 11/15/17   Danay Nath MD   hydrocodone-acetaminophen 5-325mg (NORCO) 5-325 mg per tablet Take 1 tablet by mouth every 6 (six) hours as needed for Pain. 6/15/17   Berenice Brice MD   hydrOXYzine HCl (ATARAX) 25 MG tablet TAKE 1 TABLET(25 MG) BY MOUTH THREE TIMES DAILY AS NEEDED FOR ITCHING 12/4/17   Reji Castillo MD   insulin glargine (LANTUS SOLOSTAR) 100 unit/mL (3 mL) InPn pen Inject 8 Units into the skin every evening. 5/17/17   MILTON Salomon, AMALIAP   insulin needles, disposable, (NOVOFINE 32) 32 x 1/4 " Ndle 1 each by Misc.(Non-Drug; Combo Route) route 3 (three) times daily. 1/6/15   Hu Grant II, MD   lancets (ACCU-CHEK SOFTCLIX LANCETS) Misc 1 lancet by Misc.(Non-Drug; Combo Route) route 4 (four) times daily. 1/11/16   MILTON Carrillo FNP-C   urea (CARMOL) 40 % Crea Apply topically once daily. 11/16/16   Faye Larson DPM     Anticoagulants/Antiplatelets: aspirin; d/c x 7 days     Allergies:   Review of patient's allergies indicates:   Allergen Reactions    Corticosteroids (glucocorticoids) Other (See Comments)     " Leg swelling    Hydrocortisone      Leg swelling    Neuromuscular blockers, steroidal      Leg swelling    Ribavirin      Intolerance, arthralgias     Sedation History:  no adverse reactions    Review of Systems:   Hematological: no known coagulopathies  Respiratory: no shortness of breath  Cardiovascular: no chest pain  Gastrointestinal: no abdominal pain  Genito-Urinary: no dysuria  Musculoskeletal: negative  Neurological: no TIA or stroke symptoms         OBJECTIVE:     Vital Signs (Most Recent)       Physical Exam:  ASA: 2  Mallampati: 2    General: no acute distress  Mental Status: alert and oriented to person, place and time  HEENT: normocephalic, atraumatic  Chest: unlabored breathing  Heart: regular heart rate  Abdomen: nondistended  Extremity: moves all extremities    Laboratory  Lab Results   Component Value Date    INR 1.0 12/15/2017       Lab Results   Component Value Date    WBC 5.78 12/15/2017    HGB 8.8 (L) 12/15/2017    HCT 26.4 (L) 12/15/2017    MCV 85 12/15/2017     (L) 12/15/2017      Lab Results   Component Value Date    GLU 52 (L) 12/15/2017     12/15/2017    K 4.4 12/15/2017    CL 98 12/15/2017    CO2 32 (H) 12/15/2017    BUN 46 (H) 12/15/2017    CREATININE 4.8 (H) 12/15/2017    CALCIUM 9.5 12/15/2017    MG 1.8 10/14/2017    ALT 10 12/15/2017    AST 19 12/15/2017    ALBUMIN 3.8 12/15/2017    BILITOT 0.5 12/15/2017    BILIDIR 0.2 09/22/2016       ASSESSMENT/PLAN:     Sedation Plan: moderate  Patient will undergo CT guided liver mass biopsy.    Nessa Mg M.D. 11:42 AM 12/21/2017

## 2017-12-22 ENCOUNTER — OFFICE VISIT (OUTPATIENT)
Dept: SPORTS MEDICINE | Facility: CLINIC | Age: 72
End: 2017-12-22
Payer: MEDICARE

## 2017-12-22 VITALS
HEIGHT: 74 IN | DIASTOLIC BLOOD PRESSURE: 62 MMHG | SYSTOLIC BLOOD PRESSURE: 143 MMHG | WEIGHT: 190 LBS | BODY MASS INDEX: 24.38 KG/M2 | HEART RATE: 73 BPM

## 2017-12-22 DIAGNOSIS — M25.561 ACUTE PAIN OF RIGHT KNEE: ICD-10-CM

## 2017-12-22 DIAGNOSIS — M17.11 OSTEOARTHRITIS OF RIGHT KNEE, UNSPECIFIED OSTEOARTHRITIS TYPE: Primary | ICD-10-CM

## 2017-12-22 DIAGNOSIS — M25.461 EFFUSION OF RIGHT KNEE JOINT: ICD-10-CM

## 2017-12-22 PROCEDURE — 99999 PR PBB SHADOW E&M-EST. PATIENT-LVL IV: CPT | Mod: PBBFAC,,, | Performed by: PHYSICIAN ASSISTANT

## 2017-12-22 PROCEDURE — 20610 DRAIN/INJ JOINT/BURSA W/O US: CPT | Mod: RT,S$GLB,, | Performed by: PHYSICIAN ASSISTANT

## 2017-12-22 PROCEDURE — 99204 OFFICE O/P NEW MOD 45 MIN: CPT | Mod: 25,S$GLB,, | Performed by: PHYSICIAN ASSISTANT

## 2017-12-22 RX ORDER — TRIAMCINOLONE ACETONIDE 40 MG/ML
40 INJECTION, SUSPENSION INTRA-ARTICULAR; INTRAMUSCULAR
Status: COMPLETED | OUTPATIENT
Start: 2017-12-22 | End: 2017-12-22

## 2017-12-22 RX ORDER — LIDOCAINE HYDROCHLORIDE 10 MG/ML
2 INJECTION INFILTRATION; PERINEURAL
Status: COMPLETED | OUTPATIENT
Start: 2017-12-22 | End: 2017-12-22

## 2017-12-22 RX ORDER — BUPIVACAINE HYDROCHLORIDE 2.5 MG/ML
2 INJECTION, SOLUTION INFILTRATION; PERINEURAL
Status: COMPLETED | OUTPATIENT
Start: 2017-12-22 | End: 2017-12-22

## 2017-12-22 RX ORDER — TRIAMCINOLONE ACETONIDE 40 MG/ML
40 INJECTION, SUSPENSION INTRA-ARTICULAR; INTRAMUSCULAR
Status: DISCONTINUED | OUTPATIENT
Start: 2017-12-22 | End: 2017-12-22

## 2017-12-22 RX ADMIN — LIDOCAINE HYDROCHLORIDE 2 ML: 10 INJECTION INFILTRATION; PERINEURAL at 03:12

## 2017-12-22 RX ADMIN — BUPIVACAINE HYDROCHLORIDE 5 MG: 2.5 INJECTION, SOLUTION INFILTRATION; PERINEURAL at 03:12

## 2017-12-22 RX ADMIN — TRIAMCINOLONE ACETONIDE 40 MG: 40 INJECTION, SUSPENSION INTRA-ARTICULAR; INTRAMUSCULAR at 03:12

## 2017-12-22 NOTE — LETTER
December 22, 2017      MNOI Cesar  [address]  [address]  [facility] [address]           [facility]  [address] [facility] [address]  Phone: [phone]          Patient: Philip Mathis III   MR Number: [id number]   YOB: 1945   Date of Visit: 12/22/2017       Dear Moni Cesar:    Thank you for referring Philip Mathis to me for evaluation. Attached you will find relevant portions of my assessment and plan of care.    If you have questions, please do not hesitate to call me. I look forward to following Philip Mathis along with you.    Sincerely,    Philip Oliverio MARTE, ZAFAR    Enclosure  CC:  No Recipients    If you would like to receive this communication electronically, please contact [email or web address] or [phone] to request more information on [employer] access.    For providers and/or their staff who would like to refer a patient to [facility], please contact us through our one-stop-shop provider referral line, Jared Lynch, at [phone].    If you feel you have received this communication in error or would no longer like to receive these types of communications, please e-mail [email or web address]

## 2017-12-22 NOTE — PROGRESS NOTES
CC: right knee pain    72 y.o. Male with PMH of Immunocompromised state, s/p liver transplant, ESRD, DM2, CHF, who reports posterior knee pain and swelling refractory to conservative mgmt that has been present and worsening over the last 1 week with no inciting injury or trauma.     On discussion with the patient and on review of his chart, he was previous admitted to the hospital with bacteremia on 10/14/17 originating from unknown source. During this hospital stay on 10/17/17 the patient states that his knee began to bother him similar to how it is today. His knee was aspirated and fugal, aerobic and anaerobic cultures were negative for any growth. Also, no gout or pseudogout crystals were seen in his joint fluid. He then received a depomedrol intra-articular knee injection which helped to resolve his knee pain and swelling until recently.     He reports that the pain is worse with steps.  It also bothers him at night.    He denies fever, chills, nausea, vomiting, malaise, or erythema at this time.     Is affecting ADLs.     No mechanical symptoms, no instability    Review of Systems   Constitution: Negative. Negative for chills, fever and night sweats.   HENT: Negative for congestion and headaches.    Eyes: Negative for blurred vision, left vision loss and right vision loss.   Cardiovascular: Negative for chest pain and syncope.   Respiratory: Negative for cough and shortness of breath.    Endocrine: Negative for polydipsia, polyphagia and polyuria.   Hematologic/Lymphatic: Negative for bleeding problem. Does not bruise/bleed easily.   Skin: Negative for dry skin, itching and rash.   Musculoskeletal: Negative for falls and muscle weakness.   Gastrointestinal: Negative for abdominal pain and bowel incontinence.   Genitourinary: Negative for bladder incontinence and nocturia.   Neurological: Negative for disturbances in coordination, loss of balance and seizures.   Psychiatric/Behavioral: Negative for depression.  The patient does not have insomnia.    Allergic/Immunologic: Negative for hives and persistent infections.   All other systems negative      PAST MEDICAL HISTORY:   Past Medical History:   Diagnosis Date    Anemia     Arthritis     Back pain     Bishop's esophagus     BPH (benign prostatic hypertrophy)     Chronic kidney disease     Cirrhosis     Diabetes mellitus     Diabetes mellitus, type 2     Diabetes with neurologic complications     Diabetic retinopathy     Elevated PSA     Heart failure     Hemolytic anemia     Hepatitis Hepatitis C    Hepatitis C     Hyperlipidemia     Hypertension     Hypertension     Immune disorder     Liver transplanted     Peripheral neuropathy     Sleep apnea     Transfusion reaction     Hep C from prev. blood transfusion    Transplanted liver     Trouble in sleeping     Type 2 diabetes mellitus with ophthalmic manifestations     Type 2 diabetes with peripheral circulatory disorder, controlled     Type II or unspecified type diabetes mellitus with renal manifestations, uncontrolled(250.42)     Type II or unspecified type diabetes mellitus with renal manifestations, uncontrolled(250.42)      PAST SURGICAL HISTORY:   Past Surgical History:   Procedure Laterality Date    broken nose      CATARACT EXTRACTION Bilateral     EYE SURGERY      cataracts    FEMUR SURGERY      FRACTURE SURGERY      right femur fracture     LIVER TRANSPLANT  2001    PORTACATH PLACEMENT Left     SKIN GRAFT      graft from right thigh , applied to the left back and left arm.     FAMILY HISTORY:   Family History   Problem Relation Age of Onset    Cancer Mother      cancer 55 years ago    Coronary artery disease Father     Hypertension Father     Diabetes Father     Heart disease Father     Cancer Sister      breast CA    Colon cancer Neg Hx      SOCIAL HISTORY:   Social History     Social History    Marital status:      Spouse name: N/A    Number of children:  N/A    Years of education: N/A     Occupational History    Not on file.     Social History Main Topics    Smoking status: Former Smoker     Quit date: 7/30/1998    Smokeless tobacco: Never Used      Comment: pt reports quitting in 1998    Alcohol use No      Comment: pt reports hx of alcholism and reports quit in 1998    Drug use: No    Sexual activity: Yes     Partners: Female     Other Topics Concern    Not on file     Social History Narrative    No narrative on file       MEDICATIONS:   Current Outpatient Prescriptions:     allopurinol (ZYLOPRIM) 100 MG tablet, TAKE 1 TABLET EVERY DAY, Disp: 90 tablet, Rfl: 3    ammonium lactate (LAC-HYDRIN) 12 % lotion, Apply topically as needed for Dry Skin., Disp: 225 g, Rfl: 6    ammonium lactate 12 % Crea, Apply 12 g topically 3 (three) times daily., Disp: , Rfl: 6    aspirin 81 MG Chew, Take 1 tablet (81 mg total) by mouth once daily., Disp: 90 tablet, Rfl: 3    blood sugar diagnostic (ACCU-CHEK JOANN PLUS TEST STRP) Strp, 1 strip by Misc.(Non-Drug; Combo Route) route 4 (four) times daily., Disp: 360 each, Rfl: 3    blood-glucose meter (ACCU-CHEK JOANN PLUS METER) Misc, Use as directed, Disp: 1 each, Rfl: 0    carvedilol (COREG) 6.25 MG tablet, Take 1 tablet (6.25 mg total) by mouth 2 (two) times daily., Disp: 180 tablet, Rfl: 3    ciclopirox (PENLAC) 8 % Soln, Apply to affected nails daily x 6-12 mos.  Remove weekly with rubbing alcohol, Disp: 1 Bottle, Rfl: 5    clonazePAM (KLONOPIN) 0.5 MG tablet, Take 1 tablet (0.5 mg total) by mouth every evening. (Patient taking differently: Take 0.5 mg by mouth daily as needed. ), Disp: 90 tablet, Rfl: 1    cloNIDine (CATAPRES) 0.1 MG tablet, Take 1 tablet (0.1 mg total) by mouth 3 (three) times daily., Disp: 270 tablet, Rfl: 6    doxazosin (CARDURA) 8 MG Tab, Take 1 tablet (8 mg total) by mouth once daily., Disp: 90 tablet, Rfl: 4    furosemide (LASIX) 80 MG tablet, Take 80 mg by mouth 2 (two) times daily.,  "Disp: , Rfl:     GENERLAC 10 gram/15 mL solution, TAKE 30 ML 'S BY MOUTH THREE TIMES DAILY, Disp: 2700 mL, Rfl: 0    hydrALAZINE (APRESOLINE) 100 MG tablet, Take 1 tablet (100 mg total) by mouth 3 (three) times daily., Disp: 270 tablet, Rfl: 3    hydrocodone-acetaminophen 5-325mg (NORCO) 5-325 mg per tablet, Take 1 tablet by mouth every 6 (six) hours as needed for Pain., Disp: 35 tablet, Rfl: 0    hydrOXYzine HCl (ATARAX) 25 MG tablet, Take 2 tablets (50 mg total) by mouth 3 (three) times daily as needed for Itching., Disp: 60 tablet, Rfl: 0    hydrOXYzine HCl (ATARAX) 25 MG tablet, TAKE 1 TABLET(25 MG) BY MOUTH THREE TIMES DAILY AS NEEDED FOR ITCHING, Disp: 90 tablet, Rfl: 0    insulin aspart (NOVOLOG) 100 unit/mL InPn pen, Inject 6 units w/ breakfast, 4 units w/ lunch and dinner plus scale 180-230 +1, 231-280 +2, 281-330 +3, 331-380 +4, >380 +5. 90 day supply, Disp: 3 Box, Rfl: 3    insulin glargine (LANTUS SOLOSTAR) 100 unit/mL (3 mL) InPn pen, Inject 8 Units into the skin every evening., Disp: 2 Box, Rfl: 6    insulin needles, disposable, (NOVOFINE 32) 32 x 1/4 " Ndle, 1 each by Misc.(Non-Drug; Combo Route) route 3 (three) times daily., Disp: 100 each, Rfl: 5    lancets (ACCU-CHEK SOFTCLIX LANCETS) Misc, 1 lancet by Misc.(Non-Drug; Combo Route) route 4 (four) times daily., Disp: 360 each, Rfl: 3    minoxidil (LONITEN) 2.5 MG tablet, Take 2 tablets (5 mg total) by mouth 2 (two) times daily., Disp: 360 tablet, Rfl: 6    montelukast (SINGULAIR) 10 mg tablet, Take 1 tablet (10 mg total) by mouth every evening., Disp: 90 tablet, Rfl: 3    ondansetron (ZOFRAN-ODT) 4 MG TbDL, DISSOLVE 1 TABLET(4 MG) ON THE TONGUE EVERY 12 HOURS AS NEEDED, Disp: 60 tablet, Rfl: 4    pantoprazole (PROTONIX) 40 MG tablet, TAKE 1 TABLET ONE TIME DAILY, Disp: 90 tablet, Rfl: 3    rifAXIMin (XIFAXAN) 550 mg Tab, Take 1 tablet (550 mg total) by mouth 2 (two) times daily., Disp: 60 tablet, Rfl: 11    sevelamer carbonate " "(RENVELA) 800 mg Tab, Take 1 tablet (800 mg total) by mouth 3 (three) times daily with meals., Disp: 90 tablet, Rfl: 11    tacrolimus (PROGRAF) 0.5 MG Cap, Take 1 capsule (0.5 mg total) by mouth once daily., Disp: 30 capsule, Rfl: 11    urea (CARMOL) 40 % Crea, Apply topically once daily., Disp: 85 g, Rfl: 5  No current facility-administered medications for this visit.   ALLERGIES:   Review of patient's allergies indicates:   Allergen Reactions    Corticosteroids (glucocorticoids) Other (See Comments)     Leg swelling    Hydrocortisone      Leg swelling    Neuromuscular blockers, steroidal      Leg swelling    Ribavirin      Intolerance, arthralgias       VITAL SIGNS: BP (!) 143/62   Pulse 73   Ht 6' 2" (1.88 m)   Wt 86.2 kg (190 lb)   BMI 24.39 kg/m²      PHYSICAL EXAMINATION    General:  The patient is alert and oriented x 3.  Mood is pleasant.  Observation of ears, eyes and nose reveal no gross abnormalities.  HEENT: NCAT, sclera nonicteric  Lungs: Respirations are equal and unlabored.    right  KNEE EXAMINATION     OBSERVATION / INSPECTION   Gait:   Nonantalgic   Alignment:  Neutral   Scars:   None   Muscle atrophy: moderate  Effusion:  moderate   Warmth:  None   Discoloration:   none     TENDERNESS / CREPITUS (T / C):          T / C      T / C   Patella   - / -   Lateral joint line   - / -      Peripatellar medial  +  Medial joint line    - / -   Peripatellar lateral +  Medial plica   - / -   Patellar tendon -   Popliteal fossa  Mild + / -   Quad tendon   -   Gastrocnemius   -   Prepatellar Bursa - / -   Quadricep   -   Tibial tubercle  -  Thigh/hamstring  -   Pes anserine/HS -  Fibula    -   ITB   - / -  Tibia     -   Tib/fib joint  - / -  LCL    -     MFC   - / -   MCL: Proximal  -    LFC   - / -    Distal   -          ROM: (* = pain)  PASSIVE   ACTIVE    Left :   0 / 0 / 135   0 / 0 / 135     Right :    0 / 5 / 100   0 / 0 /  110*    Patellofemoral examination:  See above noted areas of " tenderness.   Patella position    Subluxation / dislocation: Centered           Sup. / Inf;   Normal   Crepitus (PF):    Absent   Patellar Mobility:       Medial-lateral:   Normal    Superior-inferior:  Normal    Inferior tilt   Normal    Patellar tendon:  Normal   Lateral tilt:    Normal   J-sign:     None   Patellofemoral grind:   -       MENISCAL SIGNS:     Pain on terminal extension:  -  Pain on terminal flexion:  +  Alisas maneuver:  -  Squat     NT    LIGAMENT EXAMINATION:  ACL / Lachman:  normal (-1 to 2mm)    PCL-Post.  drawer: normal 0 to 2mm  MCL- Valgus:  normal 0 to 2mm  LCL- Varus:  normal 0 to 2mm  Pivot shift: normal (Equal)   Dial Test: difference c/w other side   At 30° flexion: normal (< 5°)    At 90° flexion: normal (< 5°)   Reverse Pivot Shift:   normal (Equal)     STRENGTH: (* = with pain) PAINFUL SIDE   Quadricep   4/5   Hamstrin/5    EXTREMITY NEURO-VASCULAR EXAMINATION:   Sensation:  Grossly intact to light touch all dermatomal regions.   Motor Function:  Fully intact motor function at hip, knee, foot and ankle    DTRs;  quadriceps and  achilles 2+.  No clonus and downgoing Babinski.    Vascular status:  DP and PT pulses 2+, brisk capillary refill, symmetric.     Other Findings:     Xrays:  Xrays of the right knee 3 views were ordered and independently reviewed by me today. No fracture, subluxation, mild to moderate DJD noted of knee joint with patellofemoral joint space narrowing noted.     ASSESSMENT:    1. Right Knee pain, acute  2. Right knee effusion  3. Right knee, osteoarthritis  Bilateral hip abd/core weakness    I have considered and do not suspect septic joint or infectious etiology at this time.     PLAN:    1.  PROCEDURE NOTE:   right KNEE ASPIRATION and INJECTION  After consent was obtained, time out was performed, including verification of patient ID, procedure, site and side, availability of information and equipment, review of safety issues, and agreement with  consent, the procedure site was marked and the patient was prepped aseptically.    Using a sterile technique the right knee was prepped and 3 ml's of 1% plain Lidocaine used to anesthetize the needle tract into the joint from the superior lateral knee approach. The knee joint was entered with a 18 gauge needle and 45 ml's of serous colored fluid was withdrawn and not sent for analysis.   The procedure was well tolerated.       A diagnostic and therapeutic injection of 1cc 40 mg kenalog and 1% lidocaine/0.5% sensorcaine was given under sterile technique using the same 18 gauge needle into the superior lateral knee site with patient in supine position.     The patient had no adverse reactions to the medication. Pain decreased. The patient was instructed to apply ice to the joint for 20 minutes and avoid strenuous activities for 24-36 hours following the injection. Patient was warned of possible blood sugar and/or blood pressure changes during that time. Following that time, patient can resume regular activities.  Watch for fever, or increased swelling or persistent pain in knee. Call or return to clinic prn if such symptoms occur or the knee fails to improve as anticipated.     2. Knee sleeve given today. Ice compresses prn pain  3. RTC in 3 weeks for recheck. Patient would like to hold off on PT at this time. Consider starting this at next appointment  4. Consider euflexxa injections in the future.   RTC sooner if things worsen.     All questions were answered, pt will contact us for questions or concerns in the interim.    Due to complexity of patient and case, extensive chart review had to be performed today.     I made the decision to obtain old records of the patient including previous notes and imaging. New imaging was ordered today of the extremity or extremities evaluated. I independently reviewed and interpreted the radiographs and/or MRIs today as well as prior imaging.    The total face-to-face encounter  time with this patient was 65 minutes and greater than 50% of of the encounter time was spent counseling the patient and coordinating care. Significant time was also spent educating the patient, giving detail post-visit instructions, and discussing risks and benefits of treatment. Patient also had several specific questions that were answered during the visit today.

## 2017-12-28 ENCOUNTER — TELEPHONE (OUTPATIENT)
Dept: TRANSPLANT | Facility: CLINIC | Age: 72
End: 2017-12-28

## 2017-12-28 ENCOUNTER — CONFERENCE (OUTPATIENT)
Dept: TRANSPLANT | Facility: CLINIC | Age: 72
End: 2017-12-28

## 2017-12-28 DIAGNOSIS — Z94.4 LIVER TRANSPLANTED: Primary | ICD-10-CM

## 2017-12-28 DIAGNOSIS — G93.40 ENCEPHALOPATHY: ICD-10-CM

## 2017-12-28 NOTE — TELEPHONE ENCOUNTER
----- Message from Yazmin Brantley sent at 12/28/2017 11:21 AM CST -----  Contact: Miriam/Adarsh  Needs a script for rifAXIMin (XIFAXAN) 550 mg Tab fax to    Phone # [phone] 8-5 pm.

## 2017-12-29 ENCOUNTER — TELEPHONE (OUTPATIENT)
Dept: TRANSPLANT | Facility: CLINIC | Age: 72
End: 2017-12-29

## 2017-12-29 NOTE — TELEPHONE ENCOUNTER
Patient: Philip Mathis III       MRN: [id number]      : 1945     Age: 72 y.o.  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  Has a liver lesion, 1.9 cm, could be cyst, but more likely mass.  AFP has increased from 4.4 to 27, now 32.  Last review fel lesion was indeterminate, hence, biopsy performed.  Confirmed HCC.  Now needs treatment decision, TACE/MWA.    Plt 110, INR 10, T bili 0.5, alb 3.8, creat 4.8 (on HD), AFP 32 (previously 27 on 17).     Imaging to be reviewed: CT 17, U/S 17    HCC Treatment History: none    ABO: O POS    Platelets:   Lab Results   Component Value Date/Time     (L) 12/15/2017 08:04 AM     Creatinine:   Lab Results   Component Value Date/Time    CREATININE 4.8 (H) 12/15/2017 08:04 AM     Bilirubin:   Lab Results   Component Value Date/Time    BILITOT 0.5 12/15/2017 08:04 AM     AFP Last 3 each if available:   Lab Results   Component Value Date/Time    AFP 32 (H) 12/15/2017 08:04 AM    AFP 27 (H) 2017 09:02 AM    AFP 4.4 2017 07:32 AM       MELD: MELD-Na score: 20 at 12/15/2017  8:04 AM  MELD score: 20 at 12/15/2017  8:04 AM  Calculated from:  Serum Creatinine: 4.8 mg/dL (Rounded to 4) at 12/15/2017  8:04 AM  Serum Sodium: 143 mmol/L (Rounded to 137) at 12/15/2017  8:04 AM  Total Bilirubin: 0.5 mg/dL (Rounded to 1) at 12/15/2017  8:04 AM  INR(ratio): 1.0 at 12/15/2017  8:04 AM  Age: 72 years    Plan: Biopsy proven HCC.  IR consult for TACE followed by MWA  Referral to IR entered and consult date requested.    Note forwarded to post transplant clinical coordinators to schedule    Follow-up Provider: Danay Nath MD

## 2017-12-29 NOTE — TELEPHONE ENCOUNTER
----- Message from Danay Nath MD sent at 12/28/2017  4:14 PM CST -----  I have informed patient's wife the FNA cytology of the liver mass showed HCC.  We will discuss in IR conf what method of treatment recommended, either TACE or MVA.  She stated patient has been depressed, worried he doesn't feel well after dialysis, and she is worried about giving him the diagnosis. But she will wait until after the IR conf.

## 2017-12-29 NOTE — TELEPHONE ENCOUNTER
----- Message from Loree Enriquez RN sent at 12/28/2017  9:11 AM CST -----  Biopsy reviewed in path conference - positive for HCC

## 2018-01-01 ENCOUNTER — HOSPITAL ENCOUNTER (OUTPATIENT)
Dept: RADIOLOGY | Facility: HOSPITAL | Age: 73
Discharge: HOME | End: 2018-06-12
Attending: FAMILY MEDICINE
Payer: MEDICARE

## 2018-01-01 ENCOUNTER — TELEPHONE (OUTPATIENT)
Dept: SPORTS MEDICINE | Facility: CLINIC | Age: 73
End: 2018-01-01

## 2018-01-01 ENCOUNTER — TELEPHONE (OUTPATIENT)
Dept: TRANSPLANT | Facility: CLINIC | Age: 73
End: 2018-01-01

## 2018-01-01 ENCOUNTER — TELEPHONE (OUTPATIENT)
Dept: HEPATOLOGY | Facility: CLINIC | Age: 73
End: 2018-01-01

## 2018-01-01 ENCOUNTER — TELEPHONE (OUTPATIENT)
Dept: INTERVENTIONAL RADIOLOGY/VASCULAR | Facility: HOSPITAL | Age: 73
End: 2018-01-01

## 2018-01-01 ENCOUNTER — HOSPITAL ENCOUNTER (OUTPATIENT)
Dept: INTERVENTIONAL RADIOLOGY/VASCULAR | Facility: HOSPITAL | Age: 73
Discharge: HOME | End: 2018-12-04
Attending: NURSE PRACTITIONER
Payer: MEDICARE

## 2018-01-01 ENCOUNTER — LAB VISIT (OUTPATIENT)
Dept: LAB | Facility: HOSPITAL | Age: 73
End: 2018-01-01
Attending: INTERNAL MEDICINE
Payer: MEDICARE

## 2018-01-01 ENCOUNTER — PES CALL (OUTPATIENT)
Dept: ADMINISTRATIVE | Facility: CLINIC | Age: 73
End: 2018-01-01

## 2018-01-01 ENCOUNTER — HOSPITAL ENCOUNTER (OUTPATIENT)
Dept: RADIOLOGY | Facility: HOSPITAL | Age: 73
Discharge: HOME | End: 2018-12-14
Attending: INTERNAL MEDICINE
Payer: MEDICARE

## 2018-01-01 ENCOUNTER — HOSPITAL ENCOUNTER (OUTPATIENT)
Dept: RADIOLOGY | Facility: HOSPITAL | Age: 73
Discharge: HOME | End: 2018-04-03
Attending: FAMILY MEDICINE
Payer: MEDICARE

## 2018-01-01 ENCOUNTER — CONFERENCE (OUTPATIENT)
Dept: TRANSPLANT | Facility: CLINIC | Age: 73
End: 2018-01-01

## 2018-01-01 ENCOUNTER — TELEPHONE (OUTPATIENT)
Dept: ENDOCRINOLOGY | Facility: HOSPITAL | Age: 73
End: 2018-01-01

## 2018-01-01 ENCOUNTER — OFFICE VISIT (OUTPATIENT)
Dept: SPORTS MEDICINE | Facility: CLINIC | Age: 73
End: 2018-01-01
Payer: MEDICARE

## 2018-01-01 ENCOUNTER — OFFICE VISIT (OUTPATIENT)
Dept: PODIATRY | Facility: CLINIC | Age: 73
End: 2018-01-01
Payer: MEDICARE

## 2018-01-01 ENCOUNTER — TELEPHONE (OUTPATIENT)
Dept: SLEEP MEDICINE | Facility: CLINIC | Age: 73
End: 2018-01-01

## 2018-01-01 ENCOUNTER — TELEPHONE (OUTPATIENT)
Dept: FAMILY MEDICINE | Facility: CLINIC | Age: 73
End: 2018-01-01

## 2018-01-01 ENCOUNTER — HOSPITAL ENCOUNTER (OUTPATIENT)
Dept: RADIOLOGY | Facility: HOSPITAL | Age: 73
Discharge: HOME | End: 2018-10-25
Attending: INTERNAL MEDICINE
Payer: MEDICARE

## 2018-01-01 ENCOUNTER — HOSPITAL ENCOUNTER (OUTPATIENT)
Dept: RADIOLOGY | Facility: HOSPITAL | Age: 73
Discharge: HOME | End: 2018-12-19
Attending: INTERNAL MEDICINE
Payer: MEDICARE

## 2018-01-01 ENCOUNTER — OFFICE VISIT (OUTPATIENT)
Dept: FAMILY MEDICINE | Facility: CLINIC | Age: 73
End: 2018-01-01
Payer: MEDICARE

## 2018-01-01 ENCOUNTER — HOSPITAL ENCOUNTER (OUTPATIENT)
Dept: RADIOLOGY | Facility: HOSPITAL | Age: 73
Discharge: HOME | End: 2018-06-04
Attending: INTERNAL MEDICINE
Payer: MEDICARE

## 2018-01-01 ENCOUNTER — OFFICE VISIT (OUTPATIENT)
Dept: TRANSPLANT | Facility: CLINIC | Age: 73
End: 2018-01-01
Payer: MEDICARE

## 2018-01-01 ENCOUNTER — OFFICE VISIT (OUTPATIENT)
Dept: OPTOMETRY | Facility: CLINIC | Age: 73
End: 2018-01-01
Payer: MEDICARE

## 2018-01-01 ENCOUNTER — HOSPITAL ENCOUNTER (OUTPATIENT)
Dept: RADIOLOGY | Facility: HOSPITAL | Age: 73
Discharge: HOME | End: 2018-10-02
Attending: INTERNAL MEDICINE
Payer: MEDICARE

## 2018-01-01 ENCOUNTER — HOSPITAL ENCOUNTER (OUTPATIENT)
Dept: RADIOLOGY | Facility: HOSPITAL | Age: 73
Discharge: HOME | End: 2018-04-23
Attending: PHYSICIAN ASSISTANT
Payer: MEDICARE

## 2018-01-01 ENCOUNTER — OFFICE VISIT (OUTPATIENT)
Dept: INTERVENTIONAL RADIOLOGY/VASCULAR | Facility: CLINIC | Age: 73
End: 2018-01-01
Payer: MEDICARE

## 2018-01-01 VITALS
WEIGHT: 200 LBS | HEART RATE: 70 BPM | BODY MASS INDEX: 27.09 KG/M2 | DIASTOLIC BLOOD PRESSURE: 57 MMHG | HEIGHT: 72 IN | SYSTOLIC BLOOD PRESSURE: 136 MMHG

## 2018-01-01 VITALS
HEIGHT: 74 IN | WEIGHT: 202.63 LBS | HEART RATE: 80 BPM | DIASTOLIC BLOOD PRESSURE: 60 MMHG | SYSTOLIC BLOOD PRESSURE: 150 MMHG | BODY MASS INDEX: 26 KG/M2 | OXYGEN SATURATION: 94 % | TEMPERATURE: 99 F

## 2018-01-01 VITALS
DIASTOLIC BLOOD PRESSURE: 44 MMHG | WEIGHT: 200 LBS | SYSTOLIC BLOOD PRESSURE: 120 MMHG | HEIGHT: 72 IN | BODY MASS INDEX: 27.09 KG/M2

## 2018-01-01 VITALS
HEART RATE: 62 BPM | WEIGHT: 202 LBS | SYSTOLIC BLOOD PRESSURE: 151 MMHG | BODY MASS INDEX: 26.77 KG/M2 | HEIGHT: 73 IN | DIASTOLIC BLOOD PRESSURE: 58 MMHG

## 2018-01-01 VITALS
BODY MASS INDEX: 26.77 KG/M2 | HEART RATE: 68 BPM | SYSTOLIC BLOOD PRESSURE: 139 MMHG | DIASTOLIC BLOOD PRESSURE: 61 MMHG | SYSTOLIC BLOOD PRESSURE: 133 MMHG | HEIGHT: 73 IN | SYSTOLIC BLOOD PRESSURE: 144 MMHG | DIASTOLIC BLOOD PRESSURE: 57 MMHG | RESPIRATION RATE: 16 BRPM | OXYGEN SATURATION: 93 % | WEIGHT: 202 LBS | BODY MASS INDEX: 26.77 KG/M2 | DIASTOLIC BLOOD PRESSURE: 50 MMHG | WEIGHT: 202 LBS | HEART RATE: 65 BPM | HEIGHT: 73 IN | HEIGHT: 73 IN | SYSTOLIC BLOOD PRESSURE: 133 MMHG | TEMPERATURE: 99 F | DIASTOLIC BLOOD PRESSURE: 55 MMHG | WEIGHT: 202 LBS | HEART RATE: 66 BPM | HEIGHT: 73 IN | HEART RATE: 64 BPM | BODY MASS INDEX: 29.08 KG/M2 | BODY MASS INDEX: 26.77 KG/M2 | WEIGHT: 219.38 LBS

## 2018-01-01 VITALS
SYSTOLIC BLOOD PRESSURE: 162 MMHG | HEART RATE: 65 BPM | TEMPERATURE: 99 F | OXYGEN SATURATION: 92 % | DIASTOLIC BLOOD PRESSURE: 64 MMHG | RESPIRATION RATE: 18 BRPM | HEIGHT: 72 IN | BODY MASS INDEX: 30.19 KG/M2 | WEIGHT: 222.88 LBS

## 2018-01-01 VITALS
SYSTOLIC BLOOD PRESSURE: 154 MMHG | DIASTOLIC BLOOD PRESSURE: 56 MMHG | SYSTOLIC BLOOD PRESSURE: 138 MMHG | HEIGHT: 72 IN | DIASTOLIC BLOOD PRESSURE: 60 MMHG | HEART RATE: 70 BPM | BODY MASS INDEX: 27.17 KG/M2 | HEIGHT: 72 IN | WEIGHT: 200 LBS | HEART RATE: 69 BPM | WEIGHT: 200.63 LBS | BODY MASS INDEX: 27.09 KG/M2

## 2018-01-01 VITALS
TEMPERATURE: 99 F | OXYGEN SATURATION: 96 % | HEIGHT: 72 IN | WEIGHT: 200.63 LBS | BODY MASS INDEX: 27.17 KG/M2 | RESPIRATION RATE: 16 BRPM | DIASTOLIC BLOOD PRESSURE: 83 MMHG | SYSTOLIC BLOOD PRESSURE: 176 MMHG | HEART RATE: 78 BPM

## 2018-01-01 VITALS
SYSTOLIC BLOOD PRESSURE: 130 MMHG | HEIGHT: 73 IN | WEIGHT: 202 LBS | BODY MASS INDEX: 26.77 KG/M2 | DIASTOLIC BLOOD PRESSURE: 50 MMHG

## 2018-01-01 VITALS
DIASTOLIC BLOOD PRESSURE: 40 MMHG | HEIGHT: 74 IN | SYSTOLIC BLOOD PRESSURE: 140 MMHG | WEIGHT: 202 LBS | BODY MASS INDEX: 25.93 KG/M2

## 2018-01-01 VITALS
SYSTOLIC BLOOD PRESSURE: 142 MMHG | DIASTOLIC BLOOD PRESSURE: 63 MMHG | BODY MASS INDEX: 25.93 KG/M2 | WEIGHT: 202 LBS | HEART RATE: 69 BPM | HEIGHT: 74 IN

## 2018-01-01 VITALS
WEIGHT: 210 LBS | SYSTOLIC BLOOD PRESSURE: 119 MMHG | HEIGHT: 74 IN | HEART RATE: 73 BPM | BODY MASS INDEX: 26.95 KG/M2 | DIASTOLIC BLOOD PRESSURE: 48 MMHG

## 2018-01-01 VITALS
WEIGHT: 202.63 LBS | SYSTOLIC BLOOD PRESSURE: 118 MMHG | BODY MASS INDEX: 26.85 KG/M2 | DIASTOLIC BLOOD PRESSURE: 40 MMHG | TEMPERATURE: 99 F | HEIGHT: 73 IN | HEART RATE: 68 BPM | OXYGEN SATURATION: 94 %

## 2018-01-01 VITALS
HEIGHT: 72 IN | DIASTOLIC BLOOD PRESSURE: 61 MMHG | SYSTOLIC BLOOD PRESSURE: 140 MMHG | BODY MASS INDEX: 27.09 KG/M2 | HEART RATE: 67 BPM | WEIGHT: 200 LBS

## 2018-01-01 DIAGNOSIS — M25.461 EFFUSION OF RIGHT KNEE JOINT: ICD-10-CM

## 2018-01-01 DIAGNOSIS — E83.39 HYPERPHOSPHATEMIA: ICD-10-CM

## 2018-01-01 DIAGNOSIS — R18.8 OTHER ASCITES: ICD-10-CM

## 2018-01-01 DIAGNOSIS — C22.0 HCC (HEPATOCELLULAR CARCINOMA): Primary | ICD-10-CM

## 2018-01-01 DIAGNOSIS — Z86.19 HISTORY OF HEPATITIS C: Chronic | ICD-10-CM

## 2018-01-01 DIAGNOSIS — C22.0 HCC (HEPATOCELLULAR CARCINOMA): ICD-10-CM

## 2018-01-01 DIAGNOSIS — N18.6 ESRD (END STAGE RENAL DISEASE): ICD-10-CM

## 2018-01-01 DIAGNOSIS — M17.11 PRIMARY OSTEOARTHRITIS OF RIGHT KNEE: ICD-10-CM

## 2018-01-01 DIAGNOSIS — Z94.4 LIVER TRANSPLANTED: Primary | ICD-10-CM

## 2018-01-01 DIAGNOSIS — R60.9 EDEMA, UNSPECIFIED TYPE: ICD-10-CM

## 2018-01-01 DIAGNOSIS — M20.32 HALLUX MALLEUS OF LEFT FOOT: ICD-10-CM

## 2018-01-01 DIAGNOSIS — R58 BLEEDING: ICD-10-CM

## 2018-01-01 DIAGNOSIS — Z79.4 CONTROLLED TYPE 2 DIABETES MELLITUS WITH CHRONIC KIDNEY DISEASE ON CHRONIC DIALYSIS, WITH LONG-TERM CURRENT USE OF INSULIN: Chronic | ICD-10-CM

## 2018-01-01 DIAGNOSIS — G89.29 CHRONIC PAIN OF RIGHT KNEE: Primary | ICD-10-CM

## 2018-01-01 DIAGNOSIS — B35.3 TINEA PEDIS OF BOTH FEET: ICD-10-CM

## 2018-01-01 DIAGNOSIS — K74.60 CIRRHOSIS OF LIVER WITH ASCITES, UNSPECIFIED HEPATIC CIRRHOSIS TYPE: ICD-10-CM

## 2018-01-01 DIAGNOSIS — D69.6 THROMBOCYTOPENIA: ICD-10-CM

## 2018-01-01 DIAGNOSIS — T86.49 CIRRHOSIS OF TRANSPLANTED LIVER: ICD-10-CM

## 2018-01-01 DIAGNOSIS — M25.561 CHRONIC PAIN OF RIGHT KNEE: Primary | ICD-10-CM

## 2018-01-01 DIAGNOSIS — R77.2 ELEVATED AFP: ICD-10-CM

## 2018-01-01 DIAGNOSIS — R14.0 ABDOMINAL BLOATING: ICD-10-CM

## 2018-01-01 DIAGNOSIS — M20.42 HAMMER TOES OF BOTH FEET: ICD-10-CM

## 2018-01-01 DIAGNOSIS — E11.49 TYPE II DIABETES MELLITUS WITH NEUROLOGICAL MANIFESTATIONS: Primary | ICD-10-CM

## 2018-01-01 DIAGNOSIS — M17.11 OSTEOARTHRITIS OF RIGHT KNEE, UNSPECIFIED OSTEOARTHRITIS TYPE: ICD-10-CM

## 2018-01-01 DIAGNOSIS — R18.8 CIRRHOSIS OF LIVER WITH ASCITES, UNSPECIFIED HEPATIC CIRRHOSIS TYPE: ICD-10-CM

## 2018-01-01 DIAGNOSIS — Z99.2 ESRD (END STAGE RENAL DISEASE) ON DIALYSIS: ICD-10-CM

## 2018-01-01 DIAGNOSIS — Z94.4 LIVER TRANSPLANTED: Chronic | ICD-10-CM

## 2018-01-01 DIAGNOSIS — M20.31 HALLUX MALLEUS OF RIGHT FOOT: ICD-10-CM

## 2018-01-01 DIAGNOSIS — Z94.4 LIVER TRANSPLANTED: ICD-10-CM

## 2018-01-01 DIAGNOSIS — B35.1 ONYCHOMYCOSIS DUE TO DERMATOPHYTE: ICD-10-CM

## 2018-01-01 DIAGNOSIS — N18.6 ESRD (END STAGE RENAL DISEASE) ON DIALYSIS: ICD-10-CM

## 2018-01-01 DIAGNOSIS — L98.8 MACERATION OF SKIN: ICD-10-CM

## 2018-01-01 DIAGNOSIS — Z78.9 STATIN INTOLERANCE: ICD-10-CM

## 2018-01-01 DIAGNOSIS — M62.838 MUSCLE SPASM: ICD-10-CM

## 2018-01-01 DIAGNOSIS — G93.40 ENCEPHALOPATHY: ICD-10-CM

## 2018-01-01 DIAGNOSIS — R18.8 OTHER ASCITES: Primary | ICD-10-CM

## 2018-01-01 DIAGNOSIS — D84.9 IMMUNOSUPPRESSED STATUS: Chronic | ICD-10-CM

## 2018-01-01 DIAGNOSIS — H52.7 REFRACTIVE ERROR: ICD-10-CM

## 2018-01-01 DIAGNOSIS — Z99.2 DEPENDENCE ON HEMODIALYSIS: ICD-10-CM

## 2018-01-01 DIAGNOSIS — E44.0 MODERATE PROTEIN MALNUTRITION: ICD-10-CM

## 2018-01-01 DIAGNOSIS — N18.4 CKD (CHRONIC KIDNEY DISEASE), STAGE IV: ICD-10-CM

## 2018-01-01 DIAGNOSIS — N18.6 ESRD (END STAGE RENAL DISEASE): Primary | ICD-10-CM

## 2018-01-01 DIAGNOSIS — N18.6 CONTROLLED TYPE 2 DIABETES MELLITUS WITH CHRONIC KIDNEY DISEASE ON CHRONIC DIALYSIS, WITH LONG-TERM CURRENT USE OF INSULIN: Chronic | ICD-10-CM

## 2018-01-01 DIAGNOSIS — R60.0 BILATERAL LOWER EXTREMITY EDEMA: ICD-10-CM

## 2018-01-01 DIAGNOSIS — R25.2 MUSCLE CRAMPS: Primary | ICD-10-CM

## 2018-01-01 DIAGNOSIS — Z96.1 PSEUDOPHAKIA, BOTH EYES: ICD-10-CM

## 2018-01-01 DIAGNOSIS — M89.8X7 EXOSTOSIS OF LEFT FOOT: ICD-10-CM

## 2018-01-01 DIAGNOSIS — L84 CORN OR CALLUS: ICD-10-CM

## 2018-01-01 DIAGNOSIS — M62.838 MUSCLE SPASM OF RIGHT LEG: Primary | ICD-10-CM

## 2018-01-01 DIAGNOSIS — K74.60 CIRRHOSIS OF TRANSPLANTED LIVER: ICD-10-CM

## 2018-01-01 DIAGNOSIS — R10.84 GENERALIZED ABDOMINAL PAIN: Primary | ICD-10-CM

## 2018-01-01 DIAGNOSIS — M21.41 PES PLANUS OF BOTH FEET: ICD-10-CM

## 2018-01-01 DIAGNOSIS — M25.561 RIGHT KNEE PAIN, UNSPECIFIED CHRONICITY: ICD-10-CM

## 2018-01-01 DIAGNOSIS — Z01.00 DIABETIC EYE EXAM: Primary | ICD-10-CM

## 2018-01-01 DIAGNOSIS — M17.11 OSTEOARTHRITIS OF RIGHT KNEE, UNSPECIFIED OSTEOARTHRITIS TYPE: Primary | ICD-10-CM

## 2018-01-01 DIAGNOSIS — R16.0 LIVER MASS: ICD-10-CM

## 2018-01-01 DIAGNOSIS — N18.6 ANEMIA IN ESRD (END-STAGE RENAL DISEASE): ICD-10-CM

## 2018-01-01 DIAGNOSIS — Z99.2 CONTROLLED TYPE 2 DIABETES MELLITUS WITH CHRONIC KIDNEY DISEASE ON CHRONIC DIALYSIS, WITH LONG-TERM CURRENT USE OF INSULIN: Chronic | ICD-10-CM

## 2018-01-01 DIAGNOSIS — I70.0 AORTIC ATHEROSCLEROSIS: ICD-10-CM

## 2018-01-01 DIAGNOSIS — M21.42 PES PLANUS OF BOTH FEET: ICD-10-CM

## 2018-01-01 DIAGNOSIS — C22.0 HEPATOCELLULAR CARCINOMA: ICD-10-CM

## 2018-01-01 DIAGNOSIS — N18.5 CKD (CHRONIC KIDNEY DISEASE), STAGE V: ICD-10-CM

## 2018-01-01 DIAGNOSIS — D63.1 ANEMIA IN ESRD (END-STAGE RENAL DISEASE): ICD-10-CM

## 2018-01-01 DIAGNOSIS — L50.9 URTICARIA: Primary | ICD-10-CM

## 2018-01-01 DIAGNOSIS — I87.2 EDEMA OF BOTH LOWER EXTREMITIES DUE TO PERIPHERAL VENOUS INSUFFICIENCY: ICD-10-CM

## 2018-01-01 DIAGNOSIS — K74.60 CIRRHOSIS OF LIVER WITHOUT ASCITES, UNSPECIFIED HEPATIC CIRRHOSIS TYPE: ICD-10-CM

## 2018-01-01 DIAGNOSIS — M20.41 HAMMER TOES OF BOTH FEET: ICD-10-CM

## 2018-01-01 DIAGNOSIS — E11.9 DIABETIC EYE EXAM: Primary | ICD-10-CM

## 2018-01-01 DIAGNOSIS — E11.22 CONTROLLED TYPE 2 DIABETES MELLITUS WITH CHRONIC KIDNEY DISEASE ON CHRONIC DIALYSIS, WITH LONG-TERM CURRENT USE OF INSULIN: Chronic | ICD-10-CM

## 2018-01-01 DIAGNOSIS — M25.561 ACUTE PAIN OF RIGHT KNEE: ICD-10-CM

## 2018-01-01 DIAGNOSIS — N25.81 SECONDARY HYPERPARATHYROIDISM: ICD-10-CM

## 2018-01-01 DIAGNOSIS — M21.6X2 ROCKER BOTTOM FOOT, ACQUIRED, LEFT: ICD-10-CM

## 2018-01-01 DIAGNOSIS — G89.29 CHRONIC PAIN OF RIGHT KNEE: ICD-10-CM

## 2018-01-01 DIAGNOSIS — Z79.4 LONG-TERM INSULIN USE: ICD-10-CM

## 2018-01-01 DIAGNOSIS — M25.561 CHRONIC PAIN OF RIGHT KNEE: ICD-10-CM

## 2018-01-01 DIAGNOSIS — K76.82 HEPATIC ENCEPHALOPATHY: Primary | ICD-10-CM

## 2018-01-01 DIAGNOSIS — Z94.4 STATUS POST LIVER TRANSPLANT: ICD-10-CM

## 2018-01-01 DIAGNOSIS — R25.2 MUSCLE CRAMPS: ICD-10-CM

## 2018-01-01 LAB
ACID FAST MOD KINY STN SPEC: NORMAL
AFP SERPL-MCNC: 3.6 NG/ML
AFP SERPL-MCNC: 3.8 NG/ML
ALBUMIN SERPL BCP-MCNC: 3.6 G/DL
ALBUMIN SERPL BCP-MCNC: 3.8 G/DL
ALBUMIN SERPL BCP-MCNC: 3.8 G/DL
ALP SERPL-CCNC: 78 U/L
ALP SERPL-CCNC: 83 U/L
ALP SERPL-CCNC: 85 U/L
ALT SERPL W/O P-5'-P-CCNC: 13 U/L
ALT SERPL W/O P-5'-P-CCNC: 13 U/L
ALT SERPL W/O P-5'-P-CCNC: 16 U/L
ANION GAP SERPL CALC-SCNC: 10 MMOL/L
AST SERPL-CCNC: 20 U/L
AST SERPL-CCNC: 22 U/L
AST SERPL-CCNC: 22 U/L
BACTERIA SPEC AEROBE CULT: NO GROWTH
BACTERIA SPEC ANAEROBE CULT: NORMAL
BASOPHILS # BLD AUTO: 0.05 K/UL
BASOPHILS # BLD AUTO: 0.06 K/UL
BASOPHILS # BLD AUTO: 0.09 K/UL
BASOPHILS NFR BLD: 1.1 %
BASOPHILS NFR BLD: 1.2 %
BASOPHILS NFR BLD: 1.9 %
BILIRUB DIRECT SERPL-MCNC: 0.2 MG/DL
BILIRUB SERPL-MCNC: 0.4 MG/DL
BILIRUB SERPL-MCNC: 0.5 MG/DL
BILIRUB SERPL-MCNC: 0.7 MG/DL
BODY FLD TYPE: NORMAL
BUN SERPL-MCNC: 29 MG/DL
BUN SERPL-MCNC: 34 MG/DL
BUN SERPL-MCNC: 35 MG/DL
CALCIUM SERPL-MCNC: 8.2 MG/DL
CALCIUM SERPL-MCNC: 8.4 MG/DL
CALCIUM SERPL-MCNC: 8.5 MG/DL
CHLORIDE SERPL-SCNC: 104 MMOL/L
CHLORIDE SERPL-SCNC: 106 MMOL/L
CHLORIDE SERPL-SCNC: 99 MMOL/L
CO2 SERPL-SCNC: 25 MMOL/L
CO2 SERPL-SCNC: 26 MMOL/L
CO2 SERPL-SCNC: 32 MMOL/L
CREAT SERPL-MCNC: 3.2 MG/DL (ref 0.5–1.4)
CREAT SERPL-MCNC: 3.7 MG/DL
CREAT SERPL-MCNC: 3.8 MG/DL
CREAT SERPL-MCNC: 4.6 MG/DL
CRYSTALS FLD MICRO: NEGATIVE
DIFFERENTIAL METHOD: ABNORMAL
EOSINOPHIL # BLD AUTO: 0.3 K/UL
EOSINOPHIL # BLD AUTO: 0.4 K/UL
EOSINOPHIL # BLD AUTO: 0.4 K/UL
EOSINOPHIL NFR BLD: 6.1 %
EOSINOPHIL NFR BLD: 7.5 %
EOSINOPHIL NFR BLD: 8.3 %
ERYTHROCYTE [DISTWIDTH] IN BLOOD BY AUTOMATED COUNT: 14.4 %
ERYTHROCYTE [DISTWIDTH] IN BLOOD BY AUTOMATED COUNT: 14.7 %
ERYTHROCYTE [DISTWIDTH] IN BLOOD BY AUTOMATED COUNT: 15 %
EST. GFR  (AFRICAN AMERICAN): 13.5 ML/MIN/1.73 M^2
EST. GFR  (AFRICAN AMERICAN): 17 ML/MIN/1.73 M^2
EST. GFR  (AFRICAN AMERICAN): 17.7 ML/MIN/1.73 M^2
EST. GFR  (NON AFRICAN AMERICAN): 11.7 ML/MIN/1.73 M^2
EST. GFR  (NON AFRICAN AMERICAN): 14.7 ML/MIN/1.73 M^2
EST. GFR  (NON AFRICAN AMERICAN): 15.3 ML/MIN/1.73 M^2
GLUCOSE SERPL-MCNC: 118 MG/DL
GLUCOSE SERPL-MCNC: 135 MG/DL
GLUCOSE SERPL-MCNC: 84 MG/DL
GRAM STN SPEC: NORMAL
GRAM STN SPEC: NORMAL
HCT VFR BLD AUTO: 32.2 %
HCT VFR BLD AUTO: 34.8 %
HCT VFR BLD AUTO: 35.2 %
HGB BLD-MCNC: 10.6 G/DL
HGB BLD-MCNC: 11.3 G/DL
HGB BLD-MCNC: 11.4 G/DL
IMM GRANULOCYTES # BLD AUTO: 0.01 K/UL
IMM GRANULOCYTES # BLD AUTO: 0.01 K/UL
IMM GRANULOCYTES # BLD AUTO: 0.02 K/UL
IMM GRANULOCYTES NFR BLD AUTO: 0.2 %
IMM GRANULOCYTES NFR BLD AUTO: 0.2 %
IMM GRANULOCYTES NFR BLD AUTO: 0.5 %
INR PPP: 1.1
LYMPHOCYTES # BLD AUTO: 0.8 K/UL
LYMPHOCYTES # BLD AUTO: 1 K/UL
LYMPHOCYTES # BLD AUTO: 1 K/UL
LYMPHOCYTES NFR BLD: 18.5 %
LYMPHOCYTES NFR BLD: 19.9 %
LYMPHOCYTES NFR BLD: 20.1 %
MAGNESIUM SERPL-MCNC: 2.2 MG/DL
MCH RBC QN AUTO: 29.7 PG
MCH RBC QN AUTO: 30.1 PG
MCH RBC QN AUTO: 31.4 PG
MCHC RBC AUTO-ENTMCNC: 32.4 G/DL
MCHC RBC AUTO-ENTMCNC: 32.5 G/DL
MCHC RBC AUTO-ENTMCNC: 32.9 G/DL
MCV RBC AUTO: 91 FL
MCV RBC AUTO: 93 FL
MCV RBC AUTO: 95 FL
MONOCYTES # BLD AUTO: 0.5 K/UL
MONOCYTES NFR BLD: 10 %
MONOCYTES NFR BLD: 10.9 %
MONOCYTES NFR BLD: 11.3 %
MYCOBACTERIUM SPEC QL CULT: NORMAL
NEUTROPHILS # BLD AUTO: 2.8 K/UL
NEUTROPHILS # BLD AUTO: 2.8 K/UL
NEUTROPHILS # BLD AUTO: 3 K/UL
NEUTROPHILS NFR BLD: 59.4 %
NEUTROPHILS NFR BLD: 60.4 %
NEUTROPHILS NFR BLD: 62.5 %
NRBC BLD-RTO: 0 /100 WBC
PATH INTERP FLD-IMP: NORMAL
PLATELET # BLD AUTO: 100 K/UL
PLATELET # BLD AUTO: 97 K/UL
PLATELET # BLD AUTO: 99 K/UL
PMV BLD AUTO: 11 FL
PMV BLD AUTO: 11 FL
PMV BLD AUTO: 11.3 FL
POTASSIUM SERPL-SCNC: 4.3 MMOL/L
POTASSIUM SERPL-SCNC: 4.6 MMOL/L
POTASSIUM SERPL-SCNC: 4.8 MMOL/L
PROT SERPL-MCNC: 7.2 G/DL
PROT SERPL-MCNC: 7.5 G/DL
PROT SERPL-MCNC: 7.5 G/DL
PROTHROMBIN TIME: 11.1 SEC
PROTHROMBIN TIME: 11.4 SEC
PROTHROMBIN TIME: 11.7 SEC
RBC # BLD AUTO: 3.38 M/UL
RBC # BLD AUTO: 3.79 M/UL
RBC # BLD AUTO: 3.81 M/UL
SAMPLE: ABNORMAL
SODIUM SERPL-SCNC: 139 MMOL/L
SODIUM SERPL-SCNC: 141 MMOL/L
SODIUM SERPL-SCNC: 142 MMOL/L
TACROLIMUS BLD-MCNC: 2 NG/ML
TACROLIMUS BLD-MCNC: <1.5 NG/ML
TACROLIMUS BLD-MCNC: <1.5 NG/ML
WBC # BLD AUTO: 4.44 K/UL
WBC # BLD AUTO: 4.78 K/UL
WBC # BLD AUTO: 4.92 K/UL

## 2018-01-01 PROCEDURE — 3044F HG A1C LEVEL LT 7.0%: CPT | Mod: CPTII,,, | Performed by: PODIATRIST

## 2018-01-01 PROCEDURE — 76705 ECHO EXAM OF ABDOMEN: CPT | Mod: 26,HCNC,, | Performed by: RADIOLOGY

## 2018-01-01 PROCEDURE — 99499 UNLISTED E&M SERVICE: CPT | Mod: S$GLB,,, | Performed by: INTERNAL MEDICINE

## 2018-01-01 PROCEDURE — 99999 PR PBB SHADOW E&M-EST. PATIENT-LVL V: CPT | Mod: PBBFAC,,, | Performed by: NURSE PRACTITIONER

## 2018-01-01 PROCEDURE — 99499 UNLISTED E&M SERVICE: CPT | Mod: S$GLB,,, | Performed by: PHYSICIAN ASSISTANT

## 2018-01-01 PROCEDURE — 11721 DEBRIDE NAIL 6 OR MORE: CPT | Mod: 59,Q9,S$GLB, | Performed by: PODIATRIST

## 2018-01-01 PROCEDURE — 92015 DETERMINE REFRACTIVE STATE: CPT | Mod: HCNC,S$GLB,, | Performed by: OPTOMETRIST

## 2018-01-01 PROCEDURE — 20610 DRAIN/INJ JOINT/BURSA W/O US: CPT | Mod: RT,S$GLB,, | Performed by: PHYSICIAN ASSISTANT

## 2018-01-01 PROCEDURE — 87070 CULTURE OTHR SPECIMN AEROBIC: CPT

## 2018-01-01 PROCEDURE — 93975 VASCULAR STUDY: CPT | Mod: TC,TXP

## 2018-01-01 PROCEDURE — 20610 DRAIN/INJ JOINT/BURSA W/O US: CPT | Mod: HCNC,RT,S$GLB, | Performed by: PHYSICIAN ASSISTANT

## 2018-01-01 PROCEDURE — 82105 ALPHA-FETOPROTEIN SERUM: CPT

## 2018-01-01 PROCEDURE — 99999 PR PBB SHADOW E&M-EST. PATIENT-LVL V: CPT | Mod: PBBFAC,HCNC,, | Performed by: NURSE PRACTITIONER

## 2018-01-01 PROCEDURE — 99999 PR PBB SHADOW E&M-EST. PATIENT-LVL V: CPT | Mod: PBBFAC,HCNC,, | Performed by: PHYSICIAN ASSISTANT

## 2018-01-01 PROCEDURE — 87116 MYCOBACTERIA CULTURE: CPT

## 2018-01-01 PROCEDURE — 99213 OFFICE O/P EST LOW 20 MIN: CPT | Mod: S$GLB,,, | Performed by: FAMILY MEDICINE

## 2018-01-01 PROCEDURE — 99999 PR PBB SHADOW E&M-EST. PATIENT-LVL III: CPT | Mod: PBBFAC,,,

## 2018-01-01 PROCEDURE — 74150 CT ABDOMEN W/O CONTRAST: CPT | Mod: 26,HCNC,, | Performed by: RADIOLOGY

## 2018-01-01 PROCEDURE — 99499 UNLISTED E&M SERVICE: CPT | Mod: S$PBB,,, | Performed by: NURSE PRACTITIONER

## 2018-01-01 PROCEDURE — 1101F PT FALLS ASSESS-DOCD LE1/YR: CPT | Mod: CPTII,,, | Performed by: PODIATRIST

## 2018-01-01 PROCEDURE — 1101F PT FALLS ASSESS-DOCD LE1/YR: CPT | Mod: CPTII,HCNC,S$GLB, | Performed by: INTERNAL MEDICINE

## 2018-01-01 PROCEDURE — 73564 X-RAY EXAM KNEE 4 OR MORE: CPT | Mod: TC,50,FY,PO

## 2018-01-01 PROCEDURE — 92014 COMPRE OPH EXAM EST PT 1/>: CPT | Mod: HCNC,S$GLB,, | Performed by: OPTOMETRIST

## 2018-01-01 PROCEDURE — 76705 ECHO EXAM OF ABDOMEN: CPT | Mod: TC,59

## 2018-01-01 PROCEDURE — 99999 PR PBB SHADOW E&M-EST. PATIENT-LVL IV: CPT | Mod: PBBFAC,HCNC,, | Performed by: INTERNAL MEDICINE

## 2018-01-01 PROCEDURE — 76705 ECHO EXAM OF ABDOMEN: CPT | Mod: 59,TC

## 2018-01-01 PROCEDURE — 74160 CT ABDOMEN W/CONTRAST: CPT | Mod: 26,,, | Performed by: RADIOLOGY

## 2018-01-01 PROCEDURE — 99213 OFFICE O/P EST LOW 20 MIN: CPT | Mod: PBBFAC,PO | Performed by: PODIATRIST

## 2018-01-01 PROCEDURE — 25500020 PHARM REV CODE 255: Mod: HCNC | Performed by: INTERNAL MEDICINE

## 2018-01-01 PROCEDURE — 89060 EXAM SYNOVIAL FLUID CRYSTALS: CPT

## 2018-01-01 PROCEDURE — 99213 OFFICE O/P EST LOW 20 MIN: CPT | Mod: S$GLB,,, | Performed by: NURSE PRACTITIONER

## 2018-01-01 PROCEDURE — 99999 PR PBB SHADOW E&M-EST. PATIENT-LVL V: CPT | Mod: PBBFAC,,, | Performed by: PHYSICIAN ASSISTANT

## 2018-01-01 PROCEDURE — 93975 VASCULAR STUDY: CPT | Mod: TC

## 2018-01-01 PROCEDURE — 36415 COLL VENOUS BLD VENIPUNCTURE: CPT | Mod: PO

## 2018-01-01 PROCEDURE — 73564 X-RAY EXAM KNEE 4 OR MORE: CPT | Mod: 26,50,, | Performed by: RADIOLOGY

## 2018-01-01 PROCEDURE — 36415 COLL VENOUS BLD VENIPUNCTURE: CPT | Mod: HCNC,PO

## 2018-01-01 PROCEDURE — 87205 SMEAR GRAM STAIN: CPT

## 2018-01-01 PROCEDURE — 99214 OFFICE O/P EST MOD 30 MIN: CPT | Mod: S$PBB,HCNC,, | Performed by: PHYSICIAN ASSISTANT

## 2018-01-01 PROCEDURE — 74178 CT ABD&PLV WO CNTR FLWD CNTR: CPT | Mod: 26,HCNC,, | Performed by: RADIOLOGY

## 2018-01-01 PROCEDURE — 25500020 PHARM REV CODE 255: Performed by: FAMILY MEDICINE

## 2018-01-01 PROCEDURE — 11055 PARING/CUTG B9 HYPRKER LES 1: CPT | Mod: Q9,S$GLB,, | Performed by: PODIATRIST

## 2018-01-01 PROCEDURE — 99499 UNLISTED E&M SERVICE: CPT | Mod: HCNC,S$GLB,, | Performed by: PHYSICIAN ASSISTANT

## 2018-01-01 PROCEDURE — 74160 CT ABDOMEN W/CONTRAST: CPT | Mod: TC

## 2018-01-01 PROCEDURE — 99214 OFFICE O/P EST MOD 30 MIN: CPT | Mod: HCNC,S$GLB,, | Performed by: NURSE PRACTITIONER

## 2018-01-01 PROCEDURE — 85025 COMPLETE CBC W/AUTO DIFF WBC: CPT | Mod: HCNC

## 2018-01-01 PROCEDURE — 99999 PR PBB SHADOW E&M-EST. PATIENT-LVL II: CPT | Mod: PBBFAC,,, | Performed by: PODIATRIST

## 2018-01-01 PROCEDURE — 11721 DEBRIDE NAIL 6 OR MORE: CPT | Mod: Q9,59,S$PBB, | Performed by: PODIATRIST

## 2018-01-01 PROCEDURE — 80197 ASSAY OF TACROLIMUS: CPT | Mod: HCNC

## 2018-01-01 PROCEDURE — 99214 OFFICE O/P EST MOD 30 MIN: CPT | Mod: 25,S$GLB,, | Performed by: PHYSICIAN ASSISTANT

## 2018-01-01 PROCEDURE — 85610 PROTHROMBIN TIME: CPT | Mod: HCNC

## 2018-01-01 PROCEDURE — 99999 PR PBB SHADOW E&M-EST. PATIENT-LVL II: CPT | Mod: PBBFAC,HCNC,, | Performed by: OPTOMETRIST

## 2018-01-01 PROCEDURE — 93975 VASCULAR STUDY: CPT | Mod: 26,,, | Performed by: RADIOLOGY

## 2018-01-01 PROCEDURE — 99499 UNLISTED E&M SERVICE: CPT | Mod: S$GLB,,, | Performed by: PODIATRIST

## 2018-01-01 PROCEDURE — 87075 CULTR BACTERIA EXCEPT BLOOD: CPT

## 2018-01-01 PROCEDURE — 85610 PROTHROMBIN TIME: CPT

## 2018-01-01 PROCEDURE — 74150 CT ABDOMEN W/O CONTRAST: CPT | Mod: TC,HCNC

## 2018-01-01 PROCEDURE — 99213 OFFICE O/P EST LOW 20 MIN: CPT | Mod: S$GLB,,, | Performed by: INTERNAL MEDICINE

## 2018-01-01 PROCEDURE — 76705 ECHO EXAM OF ABDOMEN: CPT | Mod: 26,XS,, | Performed by: RADIOLOGY

## 2018-01-01 PROCEDURE — 80053 COMPREHEN METABOLIC PANEL: CPT

## 2018-01-01 PROCEDURE — 3044F HG A1C LEVEL LT 7.0%: CPT | Mod: CPTII,S$GLB,, | Performed by: NURSE PRACTITIONER

## 2018-01-01 PROCEDURE — 93975 VASCULAR STUDY: CPT | Mod: 26,TXP,, | Performed by: RADIOLOGY

## 2018-01-01 PROCEDURE — 82248 BILIRUBIN DIRECT: CPT | Mod: HCNC

## 2018-01-01 PROCEDURE — 99214 OFFICE O/P EST MOD 30 MIN: CPT | Mod: HCNC,S$GLB,, | Performed by: INTERNAL MEDICINE

## 2018-01-01 PROCEDURE — 83735 ASSAY OF MAGNESIUM: CPT | Mod: HCNC

## 2018-01-01 PROCEDURE — 99499 UNLISTED E&M SERVICE: CPT | Mod: HCNC,S$GLB,, | Performed by: NURSE PRACTITIONER

## 2018-01-01 PROCEDURE — 74177 CT ABD & PELVIS W/CONTRAST: CPT | Mod: 26,,, | Performed by: RADIOLOGY

## 2018-01-01 PROCEDURE — 85025 COMPLETE CBC W/AUTO DIFF WBC: CPT

## 2018-01-01 PROCEDURE — 11055 PARING/CUTG B9 HYPRKER LES 1: CPT | Mod: Q9,S$PBB,, | Performed by: PODIATRIST

## 2018-01-01 PROCEDURE — 80053 COMPREHEN METABOLIC PANEL: CPT | Mod: HCNC

## 2018-01-01 PROCEDURE — 87206 SMEAR FLUORESCENT/ACID STAI: CPT

## 2018-01-01 PROCEDURE — 99499 UNLISTED E&M SERVICE: CPT | Mod: S$PBB,,, | Performed by: FAMILY MEDICINE

## 2018-01-01 PROCEDURE — 1101F PT FALLS ASSESS-DOCD LE1/YR: CPT | Mod: CPTII,HCNC,S$GLB, | Performed by: NURSE PRACTITIONER

## 2018-01-01 PROCEDURE — 99499 UNLISTED E&M SERVICE: CPT | Mod: S$PBB,,, | Performed by: PODIATRIST

## 2018-01-01 PROCEDURE — 74178 CT ABD&PLV WO CNTR FLWD CNTR: CPT | Mod: TC,HCNC

## 2018-01-01 PROCEDURE — 1101F PT FALLS ASSESS-DOCD LE1/YR: CPT | Mod: CPTII,HCNC,, | Performed by: PHYSICIAN ASSISTANT

## 2018-01-01 PROCEDURE — 99999 PR PBB SHADOW E&M-EST. PATIENT-LVL III: CPT | Mod: PBBFAC,,, | Performed by: PODIATRIST

## 2018-01-01 PROCEDURE — 80197 ASSAY OF TACROLIMUS: CPT

## 2018-01-01 PROCEDURE — 76705 ECHO EXAM OF ABDOMEN: CPT | Mod: 26,59,TXP, | Performed by: RADIOLOGY

## 2018-01-01 PROCEDURE — 25500020 PHARM REV CODE 255: Performed by: INTERNAL MEDICINE

## 2018-01-01 PROCEDURE — 11721 DEBRIDE NAIL 6 OR MORE: CPT | Performed by: PODIATRIST

## 2018-01-01 PROCEDURE — 74177 CT ABD & PELVIS W/CONTRAST: CPT | Mod: TC

## 2018-01-01 PROCEDURE — 76705 ECHO EXAM OF ABDOMEN: CPT | Mod: TC,HCNC

## 2018-01-01 PROCEDURE — 99215 OFFICE O/P EST HI 40 MIN: CPT | Mod: PBBFAC,HCNC,PO | Performed by: PHYSICIAN ASSISTANT

## 2018-01-01 PROCEDURE — 99214 OFFICE O/P EST MOD 30 MIN: CPT | Mod: 25,S$PBB,, | Performed by: PODIATRIST

## 2018-01-01 PROCEDURE — 99999 PR PBB SHADOW E&M-EST. PATIENT-LVL III: CPT | Mod: PBBFAC,,, | Performed by: INTERNAL MEDICINE

## 2018-01-01 PROCEDURE — 11055 PARING/CUTG B9 HYPRKER LES 1: CPT | Performed by: PODIATRIST

## 2018-01-01 PROCEDURE — 99999 PR PBB SHADOW E&M-EST. PATIENT-LVL III: CPT | Mod: PBBFAC,,, | Performed by: PHYSICIAN ASSISTANT

## 2018-01-01 RX ORDER — DICLOFENAC SODIUM 10 MG/G
2 GEL TOPICAL 3 TIMES DAILY
Qty: 1 TUBE | Refills: 0 | Status: SHIPPED | OUTPATIENT
Start: 2018-01-01

## 2018-01-01 RX ORDER — CARVEDILOL 6.25 MG/1
6.25 TABLET ORAL 2 TIMES DAILY
Qty: 180 TABLET | Refills: 3 | OUTPATIENT
Start: 2018-01-01 | End: 2019-05-14

## 2018-01-01 RX ORDER — FUROSEMIDE 80 MG/1
80 TABLET ORAL 2 TIMES DAILY
Qty: 60 TABLET | Refills: 6 | Status: CANCELLED | OUTPATIENT
Start: 2018-01-01 | End: 2019-06-04

## 2018-01-01 RX ORDER — IBUPROFEN 800 MG/1
800 TABLET ORAL EVERY 12 HOURS PRN
Qty: 30 TABLET | Refills: 0 | Status: SHIPPED | OUTPATIENT
Start: 2018-01-01 | End: 2019-01-01

## 2018-01-01 RX ORDER — SILVER NITRATE 38.21; 12.74 MG/1; MG/1
STICK TOPICAL
Qty: 100 EACH | Refills: 0 | Status: ON HOLD | OUTPATIENT
Start: 2018-01-01 | End: 2019-01-01

## 2018-01-01 RX ORDER — PANTOPRAZOLE SODIUM 40 MG/1
TABLET, DELAYED RELEASE ORAL
Qty: 90 TABLET | Refills: 3 | Status: SHIPPED | OUTPATIENT
Start: 2018-01-01

## 2018-01-01 RX ORDER — TRIAMCINOLONE ACETONIDE 40 MG/ML
40 INJECTION, SUSPENSION INTRA-ARTICULAR; INTRAMUSCULAR
Status: COMPLETED | OUTPATIENT
Start: 2018-01-01 | End: 2018-01-01

## 2018-01-01 RX ORDER — CETIRIZINE HYDROCHLORIDE 10 MG/1
10 TABLET ORAL DAILY
Qty: 90 TABLET | Refills: 3 | Status: SHIPPED | OUTPATIENT
Start: 2018-01-01 | End: 2019-08-15

## 2018-01-01 RX ORDER — HYDROXYZINE HYDROCHLORIDE 25 MG/1
TABLET, FILM COATED ORAL
Qty: 90 TABLET | Refills: 0 | Status: SHIPPED | OUTPATIENT
Start: 2018-01-01 | End: 2018-01-01 | Stop reason: SDUPTHER

## 2018-01-01 RX ORDER — TIZANIDINE 4 MG/1
TABLET ORAL
Qty: 90 TABLET | Refills: 0 | Status: SHIPPED | OUTPATIENT
Start: 2018-01-01 | End: 2018-01-01 | Stop reason: SDUPTHER

## 2018-01-01 RX ORDER — CETIRIZINE HYDROCHLORIDE 10 MG/1
10 TABLET ORAL DAILY
COMMUNITY
End: 2018-01-01 | Stop reason: SDUPTHER

## 2018-01-01 RX ORDER — DOXAZOSIN 8 MG/1
TABLET ORAL
Qty: 90 TABLET | Refills: 4 | Status: SHIPPED | OUTPATIENT
Start: 2018-01-01

## 2018-01-01 RX ORDER — FLUTICASONE PROPIONATE 50 MCG
SPRAY, SUSPENSION (ML) NASAL
Qty: 16 ML | Refills: 0 | Status: SHIPPED | OUTPATIENT
Start: 2018-01-01 | End: 2018-01-01 | Stop reason: SDUPTHER

## 2018-01-01 RX ORDER — BUPIVACAINE HYDROCHLORIDE 2.5 MG/ML
2 INJECTION, SOLUTION INFILTRATION; PERINEURAL
Status: COMPLETED | OUTPATIENT
Start: 2018-01-01 | End: 2018-01-01

## 2018-01-01 RX ORDER — HYDROXYZINE HYDROCHLORIDE 25 MG/1
TABLET, FILM COATED ORAL
Qty: 90 TABLET | Refills: 0 | Status: SHIPPED | OUTPATIENT
Start: 2018-01-01

## 2018-01-01 RX ORDER — TIZANIDINE 4 MG/1
TABLET ORAL
Qty: 90 TABLET | Refills: 0 | Status: SHIPPED | OUTPATIENT
Start: 2018-01-01 | End: 2019-01-01 | Stop reason: SDUPTHER

## 2018-01-01 RX ORDER — FLUTICASONE PROPIONATE 50 MCG
SPRAY, SUSPENSION (ML) NASAL
Qty: 16 ML | Refills: 0 | Status: SHIPPED | OUTPATIENT
Start: 2018-01-01 | End: 2019-01-01 | Stop reason: SDUPTHER

## 2018-01-01 RX ORDER — ALLOPURINOL 100 MG/1
TABLET ORAL
Qty: 90 TABLET | Refills: 3 | Status: SHIPPED | OUTPATIENT
Start: 2018-01-01

## 2018-01-01 RX ORDER — METHOCARBAMOL 500 MG/1
TABLET, FILM COATED ORAL
Qty: 5 TABLET | Refills: 0 | Status: SHIPPED | OUTPATIENT
Start: 2018-01-01

## 2018-01-01 RX ORDER — MINOXIDIL 2.5 MG/1
5 TABLET ORAL 2 TIMES DAILY
Qty: 360 TABLET | Refills: 3 | Status: SHIPPED | OUTPATIENT
Start: 2018-01-01 | End: 2019-08-14

## 2018-01-01 RX ORDER — LACTULOSE 10 G/15ML
SOLUTION ORAL; RECTAL
Qty: 2700 ML | Refills: 5 | Status: SHIPPED | OUTPATIENT
Start: 2018-01-01

## 2018-01-01 RX ORDER — TIZANIDINE 4 MG/1
TABLET ORAL
Qty: 360 TABLET | Refills: 0 | Status: SHIPPED | OUTPATIENT
Start: 2018-01-01 | End: 2018-01-01 | Stop reason: SDUPTHER

## 2018-01-01 RX ORDER — LIDOCAINE HYDROCHLORIDE 10 MG/ML
2 INJECTION INFILTRATION; PERINEURAL
Status: COMPLETED | OUTPATIENT
Start: 2018-01-01 | End: 2018-01-01

## 2018-01-01 RX ORDER — FUROSEMIDE 80 MG/1
TABLET ORAL
Qty: 120 TABLET | Refills: 3 | Status: SHIPPED | OUTPATIENT
Start: 2018-01-01 | End: 2018-01-01

## 2018-01-01 RX ORDER — CARVEDILOL 6.25 MG/1
TABLET ORAL
Refills: 3 | COMMUNITY
Start: 2018-01-01

## 2018-01-01 RX ORDER — LANOLIN ALCOHOL/MO/W.PET/CERES
400 CREAM (GRAM) TOPICAL DAILY
Qty: 30 TABLET | Refills: 6 | COMMUNITY
Start: 2018-01-01 | End: 2018-01-01 | Stop reason: ALTCHOICE

## 2018-01-01 RX ORDER — MINOXIDIL 2.5 MG/1
TABLET ORAL
Qty: 360 TABLET | Refills: 6 | OUTPATIENT
Start: 2018-01-01

## 2018-01-01 RX ORDER — TIZANIDINE 4 MG/1
TABLET ORAL
Qty: 60 TABLET | Refills: 0 | Status: SHIPPED | OUTPATIENT
Start: 2018-01-01 | End: 2018-01-01

## 2018-01-01 RX ORDER — TIZANIDINE 4 MG/1
4 TABLET ORAL EVERY 6 HOURS PRN
Qty: 60 TABLET | Refills: 0 | Status: SHIPPED | OUTPATIENT
Start: 2018-01-01 | End: 2018-01-01 | Stop reason: SDUPTHER

## 2018-01-01 RX ORDER — HYDROXYZINE HYDROCHLORIDE 25 MG/1
TABLET, FILM COATED ORAL
Qty: 90 TABLET | Refills: 2 | Status: SHIPPED | OUTPATIENT
Start: 2018-01-01 | End: 2018-01-01 | Stop reason: SDUPTHER

## 2018-01-01 RX ORDER — INSULIN ASPART 100 [IU]/ML
INJECTION, SOLUTION INTRAVENOUS; SUBCUTANEOUS
Qty: 45 ML | Refills: 2 | Status: SHIPPED | OUTPATIENT
Start: 2018-01-01 | End: 2018-01-01 | Stop reason: SDUPTHER

## 2018-01-01 RX ORDER — TIZANIDINE 4 MG/1
TABLET ORAL
Qty: 60 TABLET | Refills: 0 | Status: SHIPPED | OUTPATIENT
Start: 2018-01-01 | End: 2018-01-01 | Stop reason: SDUPTHER

## 2018-01-01 RX ORDER — HYALURONATE SODIUM 20 MG/2 ML
2 SYRINGE (ML) INTRAARTICULAR
Status: COMPLETED | OUTPATIENT
Start: 2018-01-01 | End: 2018-01-01

## 2018-01-01 RX ORDER — SIMETHICONE 80 MG
80 TABLET,CHEWABLE ORAL EVERY 6 HOURS PRN
Qty: 60 TABLET | Refills: 3 | Status: SHIPPED | OUTPATIENT
Start: 2018-01-01 | End: 2019-01-01

## 2018-01-01 RX ORDER — HYDROXYZINE HYDROCHLORIDE 25 MG/1
TABLET, FILM COATED ORAL
Qty: 90 TABLET | Refills: 0 | OUTPATIENT
Start: 2018-01-01

## 2018-01-01 RX ORDER — HYDRALAZINE HYDROCHLORIDE 100 MG/1
100 TABLET, FILM COATED ORAL 3 TIMES DAILY
Qty: 270 TABLET | Refills: 3 | Status: SHIPPED | OUTPATIENT
Start: 2018-01-01

## 2018-01-01 RX ORDER — FUROSEMIDE 20 MG/1
40 TABLET ORAL DAILY
Qty: 16 TABLET | Refills: 11 | OUTPATIENT
Start: 2018-01-01 | End: 2019-12-14

## 2018-01-01 RX ORDER — FLUTICASONE PROPIONATE 50 MCG
2 SPRAY, SUSPENSION (ML) NASAL DAILY
Qty: 16 G | Refills: 3 | Status: SHIPPED | OUTPATIENT
Start: 2018-01-01 | End: 2018-01-01 | Stop reason: SDUPTHER

## 2018-01-01 RX ORDER — SEVELAMER CARBONATE 800 MG/1
TABLET, FILM COATED ORAL
Qty: 270 TABLET | Refills: 11 | OUTPATIENT
Start: 2018-01-01

## 2018-01-01 RX ORDER — INSULIN GLARGINE 100 [IU]/ML
INJECTION, SOLUTION SUBCUTANEOUS
Qty: 30 ML | Refills: 2 | Status: SHIPPED | OUTPATIENT
Start: 2018-01-01 | End: 2018-01-01 | Stop reason: SDUPTHER

## 2018-01-01 RX ORDER — TIZANIDINE 4 MG/1
TABLET ORAL
Qty: 90 TABLET | Refills: 0 | Status: SHIPPED | OUTPATIENT
Start: 2018-01-01 | End: 2018-01-01

## 2018-01-01 RX ORDER — ECONAZOLE NITRATE 10 MG/G
CREAM TOPICAL 2 TIMES DAILY
Qty: 85 G | Refills: 1 | Status: SHIPPED | OUTPATIENT
Start: 2018-01-01 | End: 2019-01-01 | Stop reason: ALTCHOICE

## 2018-01-01 RX ORDER — BLOOD SUGAR DIAGNOSTIC
STRIP MISCELLANEOUS
Qty: 300 STRIP | Refills: 11 | Status: SHIPPED | OUTPATIENT
Start: 2018-01-01

## 2018-01-01 RX ORDER — TIZANIDINE 4 MG/1
TABLET ORAL
Qty: 90 TABLET | Refills: 12 | Status: SHIPPED | OUTPATIENT
Start: 2018-01-01

## 2018-01-01 RX ORDER — MUPIROCIN 20 MG/G
OINTMENT TOPICAL 3 TIMES DAILY
Qty: 22 G | Refills: 3 | Status: SHIPPED | OUTPATIENT
Start: 2018-01-01 | End: 2018-01-01

## 2018-01-01 RX ORDER — ONDANSETRON 4 MG/1
TABLET, ORALLY DISINTEGRATING ORAL
Qty: 60 TABLET | Refills: 0 | Status: SHIPPED | OUTPATIENT
Start: 2018-01-01 | End: 2019-01-01 | Stop reason: SDUPTHER

## 2018-01-01 RX ADMIN — IOHEXOL 100 ML: 350 INJECTION, SOLUTION INTRAVENOUS at 10:12

## 2018-01-01 RX ADMIN — LIDOCAINE HYDROCHLORIDE 2 ML: 10 INJECTION INFILTRATION; PERINEURAL at 01:03

## 2018-01-01 RX ADMIN — Medication 20 MG: at 09:04

## 2018-01-01 RX ADMIN — IOHEXOL 100 ML: 350 INJECTION, SOLUTION INTRAVENOUS at 11:10

## 2018-01-01 RX ADMIN — IOHEXOL 100 ML: 350 INJECTION, SOLUTION INTRAVENOUS at 09:04

## 2018-01-01 RX ADMIN — BUPIVACAINE HYDROCHLORIDE 5 MG: 2.5 INJECTION, SOLUTION INFILTRATION; PERINEURAL at 01:03

## 2018-01-01 RX ADMIN — TRIAMCINOLONE ACETONIDE 40 MG: 40 INJECTION, SUSPENSION INTRA-ARTICULAR; INTRAMUSCULAR at 01:03

## 2018-01-01 RX ADMIN — LIDOCAINE HYDROCHLORIDE 2 ML: 10 INJECTION INFILTRATION; PERINEURAL at 08:04

## 2018-01-01 RX ADMIN — TRIAMCINOLONE ACETONIDE 40 MG: 40 INJECTION, SUSPENSION INTRA-ARTICULAR; INTRAMUSCULAR at 08:04

## 2018-01-01 RX ADMIN — IOHEXOL 100 ML: 350 INJECTION, SOLUTION INTRAVENOUS at 08:06

## 2018-01-01 RX ADMIN — BUPIVACAINE HYDROCHLORIDE 5 MG: 2.5 INJECTION, SOLUTION INFILTRATION; PERINEURAL at 08:04

## 2018-01-02 ENCOUNTER — CONFERENCE (OUTPATIENT)
Dept: TRANSPLANT | Facility: CLINIC | Age: 73
End: 2018-01-02

## 2018-01-02 DIAGNOSIS — C22.0 HEPATOCELLULAR CARCINOMA: ICD-10-CM

## 2018-01-05 ENCOUNTER — TELEPHONE (OUTPATIENT)
Dept: TRANSPLANT | Facility: CLINIC | Age: 73
End: 2018-01-05

## 2018-01-05 NOTE — TELEPHONE ENCOUNTER
Called mrs gonzáles back to make sure she talked with dr mott and did not have any further questions. She did not.

## 2018-01-05 NOTE — TELEPHONE ENCOUNTER
Received call from pt's wife asking about plan for pt with IR appt and procedure. Awaiting appt date/time for IR. WIll message Dr. Nath to see if she needs to call pt.

## 2018-01-07 RX ORDER — HYDROXYZINE HYDROCHLORIDE 25 MG/1
TABLET, FILM COATED ORAL
Qty: 90 TABLET | Refills: 0 | Status: SHIPPED | OUTPATIENT
Start: 2018-01-07 | End: 2018-02-07 | Stop reason: SDUPTHER

## 2018-01-09 ENCOUNTER — OFFICE VISIT (OUTPATIENT)
Dept: SPORTS MEDICINE | Facility: CLINIC | Age: 73
End: 2018-01-09
Payer: MEDICARE

## 2018-01-09 VITALS
BODY MASS INDEX: 24.38 KG/M2 | HEIGHT: 74 IN | WEIGHT: 190 LBS | DIASTOLIC BLOOD PRESSURE: 65 MMHG | HEART RATE: 72 BPM | SYSTOLIC BLOOD PRESSURE: 135 MMHG

## 2018-01-09 DIAGNOSIS — M25.561 ACUTE PAIN OF RIGHT KNEE: ICD-10-CM

## 2018-01-09 DIAGNOSIS — M17.11 OSTEOARTHRITIS OF RIGHT KNEE, UNSPECIFIED OSTEOARTHRITIS TYPE: Primary | ICD-10-CM

## 2018-01-09 PROCEDURE — 99214 OFFICE O/P EST MOD 30 MIN: CPT | Mod: S$GLB,,, | Performed by: PHYSICIAN ASSISTANT

## 2018-01-09 PROCEDURE — 99999 PR PBB SHADOW E&M-EST. PATIENT-LVL III: CPT | Mod: PBBFAC,,, | Performed by: PHYSICIAN ASSISTANT

## 2018-01-10 NOTE — PROGRESS NOTES
CC: right knee pain    72 y.o. Male with PMH of Immunocompromised state, s/p liver transplant, ESRD, DM2, CHF, who reports to clinic today for recheck of his right knee after it was aspirated and injected with steroid 2.5 weeks ago. He reports that his knee pain and swelling has completely resolved and he is 100% better. No knee issues. He did just find out that he has liver cancer.     Previously reported posterior knee pain and swelling refractory to conservative mgmt that has been present with no inciting injury or trauma.     On discussion with the patient and on review of his chart, he was previous admitted to the hospital with bacteremia on 10/14/17 originating from unknown source. During this hospital stay on 10/17/17 the patient states that his knee began to bother him similar to how it is today. His knee was aspirated and fugal, aerobic and anaerobic cultures were negative for any growth. Also, no gout or pseudogout crystals were seen in his joint fluid. He then received a depomedrol intra-articular knee injection which helped to resolve his knee pain and swelling until recently.       He denies fever, chills, nausea, vomiting, malaise, or erythema at this time.     Is affecting ADLs.     No mechanical symptoms, no instability    Review of Systems   Constitution: Negative. Negative for chills, fever and night sweats.   HENT: Negative for congestion and headaches.    Eyes: Negative for blurred vision, left vision loss and right vision loss.   Cardiovascular: Negative for chest pain and syncope.   Respiratory: Negative for cough and shortness of breath.    Endocrine: Negative for polydipsia, polyphagia and polyuria.   Hematologic/Lymphatic: Negative for bleeding problem. Does not bruise/bleed easily.   Skin: Negative for dry skin, itching and rash.   Musculoskeletal: Negative for falls and muscle weakness.   Gastrointestinal: Negative for abdominal pain and bowel incontinence.   Genitourinary: Negative for  bladder incontinence and nocturia.   Neurological: Negative for disturbances in coordination, loss of balance and seizures.   Psychiatric/Behavioral: Negative for depression. The patient does not have insomnia.    Allergic/Immunologic: Negative for hives and persistent infections.   All other systems negative      PAST MEDICAL HISTORY:   Past Medical History:   Diagnosis Date    Anemia     Arthritis     Back pain     Bishop's esophagus     BPH (benign prostatic hypertrophy)     Chronic kidney disease     Cirrhosis     Diabetes mellitus     Diabetes mellitus, type 2     Diabetes with neurologic complications     Diabetic retinopathy     Elevated PSA     Heart failure     Hemolytic anemia     Hepatitis Hepatitis C    Hepatitis C     Hepatocellular carcinoma 1/2/2018    Hyperlipidemia     Hypertension     Hypertension     Immune disorder     Liver transplanted     Peripheral neuropathy     Sleep apnea     Transfusion reaction     Hep C from prev. blood transfusion    Transplanted liver     Trouble in sleeping     Type 2 diabetes mellitus with ophthalmic manifestations     Type 2 diabetes with peripheral circulatory disorder, controlled     Type II or unspecified type diabetes mellitus with renal manifestations, uncontrolled(250.42)     Type II or unspecified type diabetes mellitus with renal manifestations, uncontrolled(250.42)      PAST SURGICAL HISTORY:   Past Surgical History:   Procedure Laterality Date    broken nose      CATARACT EXTRACTION Bilateral     EYE SURGERY      cataracts    FEMUR SURGERY      FRACTURE SURGERY      right femur fracture     LIVER TRANSPLANT  2001    PORTACATH PLACEMENT Left     SKIN GRAFT      graft from right thigh , applied to the left back and left arm.     FAMILY HISTORY:   Family History   Problem Relation Age of Onset    Cancer Mother      cancer 55 years ago    Coronary artery disease Father     Hypertension Father     Diabetes Father      Heart disease Father     Cancer Sister      breast CA    Colon cancer Neg Hx      SOCIAL HISTORY:   Social History     Social History    Marital status:      Spouse name: N/A    Number of children: N/A    Years of education: N/A     Occupational History    Not on file.     Social History Main Topics    Smoking status: Former Smoker     Quit date: 7/30/1998    Smokeless tobacco: Never Used      Comment: pt reports quitting in 1998    Alcohol use No      Comment: pt reports hx of alcholism and reports quit in 1998    Drug use: No    Sexual activity: Yes     Partners: Female     Other Topics Concern    Not on file     Social History Narrative    No narrative on file       MEDICATIONS:   Current Outpatient Prescriptions:     allopurinol (ZYLOPRIM) 100 MG tablet, TAKE 1 TABLET EVERY DAY, Disp: 90 tablet, Rfl: 3    ammonium lactate (LAC-HYDRIN) 12 % lotion, Apply topically as needed for Dry Skin., Disp: 225 g, Rfl: 6    ammonium lactate 12 % Crea, Apply 12 g topically 3 (three) times daily., Disp: , Rfl: 6    aspirin 81 MG Chew, Take 1 tablet (81 mg total) by mouth once daily., Disp: 90 tablet, Rfl: 3    blood sugar diagnostic (ACCU-CHEK JOANN PLUS TEST STRP) Strp, 1 strip by Misc.(Non-Drug; Combo Route) route 4 (four) times daily., Disp: 360 each, Rfl: 3    blood-glucose meter (ACCU-CHEK JOANN PLUS METER) [facility], Use as directed, Disp: 1 each, Rfl: 0    carvedilol (COREG) 6.25 MG tablet, Take 1 tablet (6.25 mg total) by mouth 2 (two) times daily., Disp: 180 tablet, Rfl: 3    ciclopirox (PENLAC) 8 % Soln, Apply to affected nails daily x 6-12 mos.  Remove weekly with rubbing alcohol, Disp: 1 Bottle, Rfl: 5    clonazePAM (KLONOPIN) 0.5 MG tablet, Take 1 tablet (0.5 mg total) by mouth every evening. (Patient taking differently: Take 0.5 mg by mouth daily as needed. ), Disp: 90 tablet, Rfl: 1    cloNIDine (CATAPRES) 0.1 MG tablet, Take 1 tablet (0.1 mg total) by mouth 3 (three) times daily.,  "Disp: 270 tablet, Rfl: 6    doxazosin (CARDURA) 8 MG Tab, Take 1 tablet (8 mg total) by mouth once daily., Disp: 90 tablet, Rfl: 4    furosemide (LASIX) 80 MG tablet, Take 80 mg by mouth 2 (two) times daily., Disp: , Rfl:     GENERLAC 10 gram/15 mL solution, TAKE 30 ML 'S BY MOUTH THREE TIMES DAILY, Disp: 2700 mL, Rfl: 0    hydrALAZINE (APRESOLINE) 100 MG tablet, Take 1 tablet (100 mg total) by mouth 3 (three) times daily., Disp: 270 tablet, Rfl: 3    hydrocodone-acetaminophen 5-325mg (NORCO) 5-325 mg per tablet, Take 1 tablet by mouth every 6 (six) hours as needed for Pain., Disp: 35 tablet, Rfl: 0    hydrOXYzine HCl (ATARAX) 25 MG tablet, Take 2 tablets (50 mg total) by mouth 3 (three) times daily as needed for Itching., Disp: 60 tablet, Rfl: 0    hydrOXYzine HCl (ATARAX) 25 MG tablet, TAKE 1 TABLET(25 MG) BY MOUTH THREE TIMES DAILY AS NEEDED FOR ITCHING, Disp: 90 tablet, Rfl: 0    insulin aspart (NOVOLOG) 100 unit/mL InPn pen, Inject 6 units w/ breakfast, 4 units w/ lunch and dinner plus scale 180-230 +1, 231-280 +2, 281-330 +3, 331-380 +4, >380 +5. 90 day supply, Disp: 3 Box, Rfl: 3    insulin glargine (LANTUS SOLOSTAR) 100 unit/mL (3 mL) InPn pen, Inject 8 Units into the skin every evening., Disp: 2 Box, Rfl: 6    insulin needles, disposable, (NOVOFINE 32) 32 x 1/4 " Ndle, 1 each by Misc.(Non-Drug; Combo Route) route 3 (three) times daily., Disp: 100 each, Rfl: 5    lancets (ACCU-CHEK SOFTCLIX LANCETS) Misc, 1 lancet by Misc.(Non-Drug; Combo Route) route 4 (four) times daily., Disp: 360 each, Rfl: 3    minoxidil (LONITEN) 2.5 MG tablet, Take 2 tablets (5 mg total) by mouth 2 (two) times daily., Disp: 360 tablet, Rfl: 6    montelukast (SINGULAIR) 10 mg tablet, Take 1 tablet (10 mg total) by mouth every evening., Disp: 90 tablet, Rfl: 3    ondansetron (ZOFRAN-ODT) 4 MG TbDL, DISSOLVE 1 TABLET(4 MG) ON THE TONGUE EVERY 12 HOURS AS NEEDED, Disp: 60 tablet, Rfl: 4    pantoprazole (PROTONIX) 40 MG " "tablet, TAKE 1 TABLET ONE TIME DAILY, Disp: 90 tablet, Rfl: 3    rifAXIMin (XIFAXAN) 550 mg Tab, Take 1 tablet (550 mg total) by mouth 2 (two) times daily., Disp: 60 tablet, Rfl: 11    sevelamer carbonate (RENVELA) 800 mg Tab, Take 1 tablet (800 mg total) by mouth 3 (three) times daily with meals., Disp: 90 tablet, Rfl: 11    tacrolimus (PROGRAF) 0.5 MG Cap, Take 1 capsule (0.5 mg total) by mouth once daily., Disp: 30 capsule, Rfl: 11    urea (CARMOL) 40 % Crea, Apply topically once daily., Disp: 85 g, Rfl: 5  ALLERGIES:   Review of patient's allergies indicates:   Allergen Reactions    Corticosteroids (glucocorticoids) Other (See Comments)     Leg swelling    Hydrocortisone      Leg swelling    Neuromuscular blockers, steroidal      Leg swelling    Ribavirin      Intolerance, arthralgias       VITAL SIGNS: /65   Pulse 72   Ht 6' 2" (1.88 m)   Wt 86.2 kg (190 lb)   BMI 24.39 kg/m²      PHYSICAL EXAMINATION    General:  The patient is alert and oriented x 3.  Mood is pleasant.  Observation of ears, eyes and nose reveal no gross abnormalities.  HEENT: NCAT, sclera nonicteric  Lungs: Respirations are equal and unlabored.    right  KNEE EXAMINATION     OBSERVATION / INSPECTION   Gait:   Nonantalgic   Alignment:  Neutral   Scars:   None   Muscle atrophy: moderate  Effusion:  none   Warmth:  None   Discoloration:   none     TENDERNESS / CREPITUS (T / C):          T / C      T / C   Patella   - / -   Lateral joint line   - / -      Peripatellar medial  -  Medial joint line    - / -   Peripatellar lateral -  Medial plica   - / -   Patellar tendon -   Popliteal fossa  -/ -   Quad tendon   -   Gastrocnemius   -   Prepatellar Bursa - / -   Quadricep   -   Tibial tubercle  -  Thigh/hamstring  -   Pes anserine/HS -  Fibula    -   ITB   - / -  Tibia     -   Tib/fib joint  - / -  LCL    -     MFC   - / -   MCL: Proximal  -    LFC   - / -    Distal   -          ROM: (* = pain)  PASSIVE   ACTIVE    Left :   0 / 0 " / 135   0 / 0 / 135     Right :    0 / 0 / 130   0 / 0 /  130    Patellofemoral examination:  See above noted areas of tenderness.   Patella position    Subluxation / dislocation: Centered           Sup. / Inf;   Normal   Crepitus (PF):    Absent   Patellar Mobility:       Medial-lateral:   Normal    Superior-inferior:  Normal    Inferior tilt   Normal    Patellar tendon:  Normal   Lateral tilt:    Normal   J-sign:     None   Patellofemoral grind:   -       MENISCAL SIGNS:     Pain on terminal extension:  -  Pain on terminal flexion:  -  Alisas maneuver:  -  Squat     NT    LIGAMENT EXAMINATION:  ACL / Lachman:  normal (-1 to 2mm)    PCL-Post.  drawer: normal 0 to 2mm  MCL- Valgus:  normal 0 to 2mm  LCL- Varus:  normal 0 to 2mm  Pivot shift: normal (Equal)   Dial Test: difference c/w other side   At 30° flexion: normal (< 5°)    At 90° flexion: normal (< 5°)   Reverse Pivot Shift:   normal (Equal)     STRENGTH: (* = with pain) PAINFUL SIDE   Quadricep   4/5   Hamstrin/5    EXTREMITY NEURO-VASCULAR EXAMINATION:   Sensation:  Grossly intact to light touch all dermatomal regions.   Motor Function:  Fully intact motor function at hip, knee, foot and ankle    DTRs;  quadriceps and  achilles 2+.  No clonus and downgoing Babinski.    Vascular status:  DP and PT pulses 2+, brisk capillary refill, symmetric.     Other Findings:     Xrays:  Xrays of the right knee 3 views were reviewed by me today. No fracture, subluxation, mild to moderate DJD noted of knee joint with patellofemoral joint space narrowing noted.     ASSESSMENT:    1. Right Knee pain, acute  2. Right knee, osteoarthritis  Bilateral hip abd/core weakness      PLAN:    1. Issues resolved. Carry on with activity as tolerated.     2. Ice compresses prn pain  3. RTC prn knee pain or issues.  Patient would like to hold off on PT at this time.   4. Consider euflexxa injections in the future.        All questions were answered, pt will contact us for  questions or concerns in the interim.

## 2018-01-16 ENCOUNTER — LAB VISIT (OUTPATIENT)
Dept: LAB | Facility: HOSPITAL | Age: 73
End: 2018-01-16
Attending: INTERNAL MEDICINE
Payer: MEDICARE

## 2018-01-16 ENCOUNTER — INITIAL CONSULT (OUTPATIENT)
Dept: INTERVENTIONAL RADIOLOGY/VASCULAR | Facility: CLINIC | Age: 73
End: 2018-01-16
Payer: MEDICARE

## 2018-01-16 ENCOUNTER — TELEPHONE (OUTPATIENT)
Dept: HEPATOLOGY | Facility: CLINIC | Age: 73
End: 2018-01-16

## 2018-01-16 VITALS
DIASTOLIC BLOOD PRESSURE: 57 MMHG | HEART RATE: 68 BPM | WEIGHT: 203.38 LBS | BODY MASS INDEX: 26.1 KG/M2 | SYSTOLIC BLOOD PRESSURE: 137 MMHG | HEIGHT: 74 IN

## 2018-01-16 DIAGNOSIS — Z94.4 LIVER TRANSPLANTED: Primary | ICD-10-CM

## 2018-01-16 DIAGNOSIS — C22.0 HCC (HEPATOCELLULAR CARCINOMA): ICD-10-CM

## 2018-01-16 DIAGNOSIS — K74.60 CIRRHOSIS OF LIVER WITHOUT ASCITES, UNSPECIFIED HEPATIC CIRRHOSIS TYPE: ICD-10-CM

## 2018-01-16 DIAGNOSIS — Z94.4 LIVER TRANSPLANTED: ICD-10-CM

## 2018-01-16 LAB
AFP SERPL-MCNC: 47 NG/ML
ALBUMIN SERPL BCP-MCNC: 3.8 G/DL
ALP SERPL-CCNC: 90 U/L
ALT SERPL W/O P-5'-P-CCNC: 15 U/L
ANION GAP SERPL CALC-SCNC: 10 MMOL/L
AST SERPL-CCNC: 19 U/L
BASOPHILS # BLD AUTO: 0.08 K/UL
BASOPHILS NFR BLD: 2.1 %
BILIRUB DIRECT SERPL-MCNC: 0.3 MG/DL
BILIRUB SERPL-MCNC: 0.7 MG/DL
BUN SERPL-MCNC: 33 MG/DL
CALCIUM SERPL-MCNC: 9 MG/DL
CHLORIDE SERPL-SCNC: 99 MMOL/L
CO2 SERPL-SCNC: 32 MMOL/L
CREAT SERPL-MCNC: 3.7 MG/DL
DIFFERENTIAL METHOD: ABNORMAL
EOSINOPHIL # BLD AUTO: 0.3 K/UL
EOSINOPHIL NFR BLD: 8.2 %
ERYTHROCYTE [DISTWIDTH] IN BLOOD BY AUTOMATED COUNT: 15.3 %
EST. GFR  (AFRICAN AMERICAN): 17.7 ML/MIN/1.73 M^2
EST. GFR  (NON AFRICAN AMERICAN): 15.3 ML/MIN/1.73 M^2
GLUCOSE SERPL-MCNC: 162 MG/DL
HCT VFR BLD AUTO: 29.9 %
HGB BLD-MCNC: 10 G/DL
IMM GRANULOCYTES # BLD AUTO: 0.01 K/UL
IMM GRANULOCYTES NFR BLD AUTO: 0.3 %
INR PPP: 1.1
LYMPHOCYTES # BLD AUTO: 1.3 K/UL
LYMPHOCYTES NFR BLD: 33.5 %
MCH RBC QN AUTO: 29.9 PG
MCHC RBC AUTO-ENTMCNC: 33.4 G/DL
MCV RBC AUTO: 90 FL
MONOCYTES # BLD AUTO: 0.4 K/UL
MONOCYTES NFR BLD: 11.6 %
NEUTROPHILS # BLD AUTO: 1.7 K/UL
NEUTROPHILS NFR BLD: 44.3 %
NRBC BLD-RTO: 0 /100 WBC
PLATELET # BLD AUTO: 92 K/UL
PMV BLD AUTO: 11.1 FL
POTASSIUM SERPL-SCNC: 4.6 MMOL/L
PROT SERPL-MCNC: 8 G/DL
PROTHROMBIN TIME: 11.6 SEC
RBC # BLD AUTO: 3.34 M/UL
SODIUM SERPL-SCNC: 141 MMOL/L
WBC # BLD AUTO: 3.79 K/UL

## 2018-01-16 PROCEDURE — 85610 PROTHROMBIN TIME: CPT

## 2018-01-16 PROCEDURE — 99499 UNLISTED E&M SERVICE: CPT | Mod: S$PBB,,, | Performed by: FAMILY MEDICINE

## 2018-01-16 PROCEDURE — 99999 PR PBB SHADOW E&M-EST. PATIENT-LVL III: CPT | Mod: PBBFAC,,,

## 2018-01-16 PROCEDURE — 82105 ALPHA-FETOPROTEIN SERUM: CPT

## 2018-01-16 PROCEDURE — 82248 BILIRUBIN DIRECT: CPT

## 2018-01-16 PROCEDURE — 80197 ASSAY OF TACROLIMUS: CPT

## 2018-01-16 PROCEDURE — 80053 COMPREHEN METABOLIC PANEL: CPT

## 2018-01-16 PROCEDURE — 99214 OFFICE O/P EST MOD 30 MIN: CPT | Mod: S$GLB,,, | Performed by: FAMILY MEDICINE

## 2018-01-16 PROCEDURE — 36415 COLL VENOUS BLD VENIPUNCTURE: CPT | Mod: PO

## 2018-01-16 PROCEDURE — 85025 COMPLETE CBC W/AUTO DIFF WBC: CPT

## 2018-01-16 NOTE — TELEPHONE ENCOUNTER
AFP has crept up to 47 from 32 a month ago. Pl let patient know when TACE is done, I anticipate the tumor marker will come down significantly.

## 2018-01-16 NOTE — PROGRESS NOTES
Subjective:       Patient ID: Philip Mathis III is a 72 y.o. male.    Chief Complaint: Hepatocellular Carcinoma    Patient here to discuss treatment of his hepatocellular carcinoma recently identified during routine surveillance for cirrhosis. An indeterminate lesion was noted on imaging in November 2017. A biopsy of this lesion was performed on 12/21/2017, which revealed hepatocellular carcinoma. He currently receives dialysis on MW. He denies any abdominal pain or distention. He is accompanied by his wife.      Review of Systems   Constitutional: Positive for activity change (increased since), appetite change (increased since dialysis and knee injection), chills (for a few years) and fatigue. Negative for fever.   HENT: Positive for hearing loss. Negative for congestion, drooling, ear discharge, postnasal drip, sneezing and trouble swallowing.    Eyes: Negative for pain, discharge, redness and itching.        Wears glasses   Respiratory: Negative for cough, shortness of breath, wheezing and stridor.    Cardiovascular: Negative for chest pain, palpitations and leg swelling.   Gastrointestinal: Positive for constipation (alleviated with generlac) and nausea (jayden in the morning; improves with medication). Negative for abdominal distention, abdominal pain, diarrhea and vomiting.   Genitourinary: Negative for difficulty urinating, dysuria, frequency and urgency.   Musculoskeletal: Positive for myalgias (in dialysis; alleviated with medication provided in dialyasis). Negative for arthralgias, back pain, gait problem and joint swelling.   Skin: Negative for color change, pallor and rash.   Neurological: Positive for dizziness (after dialysis) and weakness (especially after dialysis). Negative for headaches.       Objective:      Physical Exam   Constitutional: He is oriented to person, place, and time. He appears well-developed and well-nourished. No distress.   HENT:   Head: Normocephalic and atraumatic.   Right Ear:  External ear normal.   Left Ear: External ear normal.   Eyes: Conjunctivae are normal. Pupils are equal, round, and reactive to light. Right eye exhibits no discharge. Left eye exhibits no discharge. No scleral icterus.   Neck: Neck supple. No tracheal deviation present. No thyromegaly present.   Cardiovascular: Normal rate, regular rhythm, normal heart sounds and intact distal pulses.  Exam reveals no gallop and no friction rub.    No murmur heard.  Pulmonary/Chest: Effort normal and breath sounds normal. No stridor. No respiratory distress. He has no wheezes. He has no rales.   Abdominal: Soft. Bowel sounds are normal. He exhibits no distension and no mass. There is no hepatosplenomegaly. There is no tenderness. There is no rebound and no guarding.   Musculoskeletal: He exhibits no edema.   Lymphadenopathy:     He has no cervical adenopathy.   Neurological: He is alert and oriented to person, place, and time. Gait normal.   Skin: Skin is warm and dry. He is not diaphoretic. No cyanosis. Nails show no clubbing.   Psychiatric: He has a normal mood and affect.   Vitals reviewed.      Assessment:       1. Liver transplanted    2. HCC (hepatocellular carcinoma)        Plan:         Explained liver conference discussed TACE vs ablation. TACE procedure discussed in detail with the patient including risks, benefits, potential complications, usual pre and post procedure course, as well as the potential to develop post-embolization syndrome. Utilized images from his biopsy and drawings to illustrate treatment. Also discussed how ablation would be performed, risk/benefits, possible complications, and pre-post procedure expectations. Spoke with Dr. Marc regarding TACE vs ablation. Recommendation is to perform TACE due to location of lesion. Patient and wife verbalized understanding and agreement. We will call to schedule. Clinic phone number provided.

## 2018-01-17 ENCOUNTER — TELEPHONE (OUTPATIENT)
Dept: INTERVENTIONAL RADIOLOGY/VASCULAR | Facility: CLINIC | Age: 73
End: 2018-01-17

## 2018-01-17 ENCOUNTER — TELEPHONE (OUTPATIENT)
Dept: TRANSPLANT | Facility: CLINIC | Age: 73
End: 2018-01-17

## 2018-01-17 DIAGNOSIS — C22.0 HCC (HEPATOCELLULAR CARCINOMA): Primary | ICD-10-CM

## 2018-01-17 DIAGNOSIS — Z94.4 LIVER TRANSPLANTED: ICD-10-CM

## 2018-01-17 LAB — TACROLIMUS BLD-MCNC: <1.5 NG/ML

## 2018-01-17 NOTE — TELEPHONE ENCOUNTER
Labs stable. No medication changes. Next labs will be due on 2/19/18. Will call his wife and mail a reminder letter.

## 2018-01-17 NOTE — TELEPHONE ENCOUNTER
Spoke with patient's wife via telephone. Explained that after further review and calculations, Dr. Marc recommends ablation over chemoembolization. He feels he can safely ablate Mr. Mathis's lesion. Discussed how the procedure will be performed, risk/benefits, possible complications, and pre-post procedure expectations. Patient's wife verbalized understanding and agreement. Barbara will call to schedule once we have coordinated with anesthesia. Phone number provided.

## 2018-01-17 NOTE — TELEPHONE ENCOUNTER
----- Message from Danay Nath MD sent at 1/16/2018  3:15 PM CST -----  Results ok. No change needed

## 2018-01-17 NOTE — TELEPHONE ENCOUNTER
----- Message from Danay Nath MD sent at 1/17/2018 10:50 AM CST -----  Prograf not detected, but enzymes normal, tumor marker going up, so leave  On current dose of prograf.

## 2018-01-18 ENCOUNTER — TELEPHONE (OUTPATIENT)
Dept: TRANSPLANT | Facility: CLINIC | Age: 73
End: 2018-01-18

## 2018-02-05 DIAGNOSIS — C22.0 HCC (HEPATOCELLULAR CARCINOMA): Primary | ICD-10-CM

## 2018-02-06 ENCOUNTER — TELEPHONE (OUTPATIENT)
Dept: TRANSPLANT | Facility: CLINIC | Age: 73
End: 2018-02-06

## 2018-02-06 ENCOUNTER — HOSPITAL ENCOUNTER (OUTPATIENT)
Dept: RADIOLOGY | Facility: HOSPITAL | Age: 73
Discharge: HOME | End: 2018-02-06
Attending: INTERNAL MEDICINE
Payer: MEDICARE

## 2018-02-06 DIAGNOSIS — Z94.4 LIVER TRANSPLANTED: ICD-10-CM

## 2018-02-06 DIAGNOSIS — K74.60 CIRRHOSIS OF TRANSPLANTED LIVER: ICD-10-CM

## 2018-02-06 DIAGNOSIS — T86.49 CIRRHOSIS OF TRANSPLANTED LIVER: ICD-10-CM

## 2018-02-06 PROCEDURE — 93975 VASCULAR STUDY: CPT | Mod: TC

## 2018-02-06 PROCEDURE — 76705 ECHO EXAM OF ABDOMEN: CPT | Mod: 26,59,, | Performed by: RADIOLOGY

## 2018-02-06 PROCEDURE — 93975 VASCULAR STUDY: CPT | Mod: 26,,, | Performed by: RADIOLOGY

## 2018-02-06 PROCEDURE — 76705 ECHO EXAM OF ABDOMEN: CPT | Mod: 59,TC

## 2018-02-06 NOTE — TELEPHONE ENCOUNTER
----- Message from Clifford Cole sent at 2/6/2018 12:13 PM CST -----  Contact: Pt wife-Veronika  Calling to speak with iMchell to see if she wants the pt to keep his labs for 2/19 or if she needs them done a week earlier so the Dr can have them for the visit on 2/19. She just wants to make sure she has enough time to get the results before the apt.       Call back number: [phone]

## 2018-02-07 ENCOUNTER — TELEPHONE (OUTPATIENT)
Dept: TRANSPLANT | Facility: CLINIC | Age: 73
End: 2018-02-07

## 2018-02-07 RX ORDER — HYDROXYZINE HYDROCHLORIDE 25 MG/1
TABLET, FILM COATED ORAL
Qty: 90 TABLET | Refills: 0 | Status: SHIPPED | OUTPATIENT
Start: 2018-02-07 | End: 2018-03-11 | Stop reason: SDUPTHER

## 2018-02-08 ENCOUNTER — LAB VISIT (OUTPATIENT)
Dept: LAB | Facility: HOSPITAL | Age: 73
End: 2018-02-08
Attending: INTERNAL MEDICINE
Payer: MEDICARE

## 2018-02-08 ENCOUNTER — TELEPHONE (OUTPATIENT)
Dept: TRANSPLANT | Facility: CLINIC | Age: 73
End: 2018-02-08

## 2018-02-08 DIAGNOSIS — Z94.4 LIVER TRANSPLANTED: ICD-10-CM

## 2018-02-08 DIAGNOSIS — K74.60 CIRRHOSIS OF LIVER WITHOUT ASCITES, UNSPECIFIED HEPATIC CIRRHOSIS TYPE: ICD-10-CM

## 2018-02-08 DIAGNOSIS — C22.0 HCC (HEPATOCELLULAR CARCINOMA): ICD-10-CM

## 2018-02-08 LAB
AFP SERPL-MCNC: 48 NG/ML
ALBUMIN SERPL BCP-MCNC: 3.7 G/DL
ALP SERPL-CCNC: 95 U/L
ALT SERPL W/O P-5'-P-CCNC: 11 U/L
ANION GAP SERPL CALC-SCNC: 10 MMOL/L
AST SERPL-CCNC: 19 U/L
BASOPHILS # BLD AUTO: 0.09 K/UL
BASOPHILS NFR BLD: 1.9 %
BILIRUB DIRECT SERPL-MCNC: 0.2 MG/DL
BILIRUB SERPL-MCNC: 0.5 MG/DL
BUN SERPL-MCNC: 31 MG/DL
CALCIUM SERPL-MCNC: 9 MG/DL
CHLORIDE SERPL-SCNC: 100 MMOL/L
CO2 SERPL-SCNC: 32 MMOL/L
CREAT SERPL-MCNC: 4.1 MG/DL
DIFFERENTIAL METHOD: ABNORMAL
EOSINOPHIL # BLD AUTO: 0.5 K/UL
EOSINOPHIL NFR BLD: 9.7 %
ERYTHROCYTE [DISTWIDTH] IN BLOOD BY AUTOMATED COUNT: 14.1 %
EST. GFR  (AFRICAN AMERICAN): 15.6 ML/MIN/1.73 M^2
EST. GFR  (NON AFRICAN AMERICAN): 13.5 ML/MIN/1.73 M^2
GLUCOSE SERPL-MCNC: 66 MG/DL
HCT VFR BLD AUTO: 32.5 %
HGB BLD-MCNC: 10.9 G/DL
IMM GRANULOCYTES # BLD AUTO: 0.02 K/UL
IMM GRANULOCYTES NFR BLD AUTO: 0.4 %
INR PPP: 1.1
LYMPHOCYTES # BLD AUTO: 1.3 K/UL
LYMPHOCYTES NFR BLD: 26.8 %
MCH RBC QN AUTO: 30.1 PG
MCHC RBC AUTO-ENTMCNC: 33.5 G/DL
MCV RBC AUTO: 90 FL
MONOCYTES # BLD AUTO: 0.6 K/UL
MONOCYTES NFR BLD: 13.5 %
NEUTROPHILS # BLD AUTO: 2.3 K/UL
NEUTROPHILS NFR BLD: 47.7 %
NRBC BLD-RTO: 0 /100 WBC
PLATELET # BLD AUTO: 92 K/UL
PMV BLD AUTO: 11.7 FL
POTASSIUM SERPL-SCNC: 3.9 MMOL/L
PROT SERPL-MCNC: 8 G/DL
PROTHROMBIN TIME: 11.2 SEC
RBC # BLD AUTO: 3.62 M/UL
SODIUM SERPL-SCNC: 142 MMOL/L
WBC # BLD AUTO: 4.73 K/UL

## 2018-02-08 PROCEDURE — 85025 COMPLETE CBC W/AUTO DIFF WBC: CPT

## 2018-02-08 PROCEDURE — 85610 PROTHROMBIN TIME: CPT

## 2018-02-08 PROCEDURE — 82105 ALPHA-FETOPROTEIN SERUM: CPT

## 2018-02-08 PROCEDURE — 80197 ASSAY OF TACROLIMUS: CPT

## 2018-02-08 PROCEDURE — 82248 BILIRUBIN DIRECT: CPT

## 2018-02-08 PROCEDURE — 36415 COLL VENOUS BLD VENIPUNCTURE: CPT | Mod: PO

## 2018-02-08 PROCEDURE — 80053 COMPREHEN METABOLIC PANEL: CPT

## 2018-02-08 NOTE — TELEPHONE ENCOUNTER
----- Message from Danay Nath MD sent at 2/6/2018  2:44 PM CST -----  Pl inform pt's wife, liver lesion has increased a little in size.   TACE. Scheduled for 2/27/18.

## 2018-02-09 LAB — TACROLIMUS BLD-MCNC: <1.5 NG/ML

## 2018-02-14 ENCOUNTER — TELEPHONE (OUTPATIENT)
Dept: TRANSPLANT | Facility: CLINIC | Age: 73
End: 2018-02-14

## 2018-02-14 NOTE — TELEPHONE ENCOUNTER
----- Message from Jailyn Jones sent at 2/14/2018 10:59 AM CST -----   (sun).  I'm not sure if you were notified, but Dr. Nath sent a message asking to move Philip Mathis's ablation to an earlier date.  He's scheduled 2/27.  Please review and reschedule if there's an earlier date.     Thanks

## 2018-02-15 DIAGNOSIS — Z94.4 STATUS POST LIVER TRANSPLANT: ICD-10-CM

## 2018-02-15 DIAGNOSIS — N18.4 CKD (CHRONIC KIDNEY DISEASE), STAGE IV: ICD-10-CM

## 2018-02-15 RX ORDER — CARVEDILOL 6.25 MG/1
6.25 TABLET ORAL 2 TIMES DAILY
Qty: 180 TABLET | Refills: 3 | OUTPATIENT
Start: 2018-02-15 | End: 2019-01-01

## 2018-02-16 NOTE — TELEPHONE ENCOUNTER
----- Message from Danay Nath MD sent at 2/10/2018  5:35 PM CST -----  Level low, but enzymes ok, so leave dose as is.

## 2018-02-16 NOTE — TELEPHONE ENCOUNTER
Labs reviewed with pt's wife. No med changes. Next labs 3/13. Pt will be having an ablation on 2/27.

## 2018-02-16 NOTE — TELEPHONE ENCOUNTER
----- Message from Danay Nath MD sent at 2/8/2018 10:59 PM CST -----  Pl inform pt's wife, tumor marker is stable. Other labs ok

## 2018-02-18 RX ORDER — TACROLIMUS 0.5 MG/1
CAPSULE ORAL
Qty: 90 CAPSULE | Refills: 11 | Status: SHIPPED | OUTPATIENT
Start: 2018-02-18 | End: 2019-01-01 | Stop reason: SDUPTHER

## 2018-02-19 ENCOUNTER — OFFICE VISIT (OUTPATIENT)
Dept: TRANSPLANT | Facility: CLINIC | Age: 73
End: 2018-02-19
Payer: MEDICARE

## 2018-02-19 ENCOUNTER — PROCEDURE VISIT (OUTPATIENT)
Dept: HEPATOLOGY | Facility: CLINIC | Age: 73
End: 2018-02-19
Attending: INTERNAL MEDICINE
Payer: MEDICARE

## 2018-02-19 VITALS
HEIGHT: 74 IN | SYSTOLIC BLOOD PRESSURE: 137 MMHG | BODY MASS INDEX: 26.03 KG/M2 | WEIGHT: 202.81 LBS | OXYGEN SATURATION: 97 % | DIASTOLIC BLOOD PRESSURE: 61 MMHG | RESPIRATION RATE: 20 BRPM | TEMPERATURE: 98 F | HEART RATE: 63 BPM

## 2018-02-19 DIAGNOSIS — T86.49 FIBROSIS OF TRANSPLANTED LIVER: ICD-10-CM

## 2018-02-19 DIAGNOSIS — K74.69 OTHER CIRRHOSIS OF LIVER: ICD-10-CM

## 2018-02-19 DIAGNOSIS — T42.75XA: ICD-10-CM

## 2018-02-19 DIAGNOSIS — Z94.4 LIVER TRANSPLANTED: ICD-10-CM

## 2018-02-19 DIAGNOSIS — K74.00 FIBROSIS OF TRANSPLANTED LIVER: ICD-10-CM

## 2018-02-19 PROCEDURE — 91200 LIVER ELASTOGRAPHY: CPT | Mod: S$GLB,,, | Performed by: INTERNAL MEDICINE

## 2018-02-19 PROCEDURE — 99213 OFFICE O/P EST LOW 20 MIN: CPT | Mod: S$GLB,,, | Performed by: INTERNAL MEDICINE

## 2018-02-19 PROCEDURE — 1159F MED LIST DOCD IN RCRD: CPT | Mod: S$GLB,,, | Performed by: INTERNAL MEDICINE

## 2018-02-19 PROCEDURE — 3008F BODY MASS INDEX DOCD: CPT | Mod: S$GLB,,, | Performed by: INTERNAL MEDICINE

## 2018-02-19 PROCEDURE — 99999 PR PBB SHADOW E&M-EST. PATIENT-LVL III: CPT | Mod: PBBFAC,,, | Performed by: INTERNAL MEDICINE

## 2018-02-19 PROCEDURE — 99499 UNLISTED E&M SERVICE: CPT | Mod: S$PBB,,, | Performed by: INTERNAL MEDICINE

## 2018-02-19 RX ORDER — CLONAZEPAM 0.5 MG/1
0.5 TABLET ORAL DAILY PRN
Qty: 90 TABLET | Refills: 3 | Status: SHIPPED | OUTPATIENT
Start: 2018-02-19

## 2018-02-19 NOTE — PATIENT INSTRUCTIONS
Fibroscan Procedure     Name: Philip Mathis III  Date of Procedure : 2018   :: Danay Nath MD  Diagnosis: HCV    Probe: M    Fibroscan reading: 10.0 KPa    Fibrosis:F2 - moderate fibrosis (improved from cirrhosis)    CAP readin dB/m    Steatosis: :S3 = >67% steatosis.     Discussed findings with patient and wife.

## 2018-02-19 NOTE — Clinical Note
Plan: 1.  Continue current dose of prograf 0.5 mg daily, trough has been <1.5 to 2.1, monitor prograf level.   2.  Has not been getting aranesp/procrit at [facility] dialysis.  Hgb around 10-11.   3.  Labs routine with AFP, prograf level q 1 month.  4.  Go forward with microwave ablation per IR recommendation.  4.  Return end of March.

## 2018-02-19 NOTE — LETTER
February 19, 2018                     [facility]  [address]  [facility] [address]  Phone: [phone]   Patient: Philip Mathis III   MR Number: [id number]   YOB: 1945   Date of Visit: 2/19/2018       Dear      Thank you for referring Philip Mathis to me for evaluation. Attached you will find relevant portions of my assessment and plan of care.    If you have questions, please do not hesitate to call me. I look forward to following Philip Mathis along with you.    Sincerely,    Danay Nath MD    Enclosure    If you would like to receive this communication electronically, please contact [email or web address] or [phone] to request [employer] access.    [employer] is a tool which provides read-only access to select patient information with whom you have a relationship. Its easy to use and provides real time access to review your patients record including encounter summaries, notes, results, and demographic information.    If you feel you have received this communication in error or would no longer like to receive these types of communications, please e-mail [email or web address]

## 2018-02-19 NOTE — PATIENT INSTRUCTIONS
Plan:  1.  Continue current dose of prograf 0.5 mg daily, trough has been <1.5 to 2.1, monitor prograf level.    2.  Has not been getting aranesp/procrit at [facility] dialysis.  Hgb around 10-11.    3.  Labs routine with AFP, prograf level q 1 month.   4.  Go forward with microwave ablation per IR recommendation.   4.  Return end of March.
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
## 2018-02-20 NOTE — PROGRESS NOTES
Patient Philip Mathis III, MRN [id number], was dependent on dialysis (ICD10 Z99.2) at the time of this visit on 2/19/18. This addendum is made to the medical record on 02/19/2018.

## 2018-02-26 ENCOUNTER — OFFICE VISIT (OUTPATIENT)
Dept: PODIATRY | Facility: CLINIC | Age: 73
End: 2018-02-26
Payer: MEDICARE

## 2018-02-26 VITALS
HEIGHT: 74 IN | DIASTOLIC BLOOD PRESSURE: 50 MMHG | BODY MASS INDEX: 26.03 KG/M2 | SYSTOLIC BLOOD PRESSURE: 120 MMHG | WEIGHT: 202.81 LBS

## 2018-02-26 DIAGNOSIS — L84 PRE-ULCERATIVE CALLUSES: ICD-10-CM

## 2018-02-26 DIAGNOSIS — M20.41 HAMMER TOES OF BOTH FEET: ICD-10-CM

## 2018-02-26 DIAGNOSIS — M20.32 HALLUX MALLEUS OF LEFT FOOT: ICD-10-CM

## 2018-02-26 DIAGNOSIS — M21.41 PES PLANUS OF BOTH FEET: ICD-10-CM

## 2018-02-26 DIAGNOSIS — M20.42 HAMMER TOES OF BOTH FEET: ICD-10-CM

## 2018-02-26 DIAGNOSIS — R60.0 BILATERAL LOWER EXTREMITY EDEMA: ICD-10-CM

## 2018-02-26 DIAGNOSIS — M20.31 HALLUX MALLEUS OF RIGHT FOOT: ICD-10-CM

## 2018-02-26 DIAGNOSIS — L84 CORN OR CALLUS: ICD-10-CM

## 2018-02-26 DIAGNOSIS — M21.42 PES PLANUS OF BOTH FEET: ICD-10-CM

## 2018-02-26 DIAGNOSIS — B35.1 ONYCHOMYCOSIS DUE TO DERMATOPHYTE: ICD-10-CM

## 2018-02-26 DIAGNOSIS — M89.8X7 EXOSTOSIS OF LEFT FOOT: ICD-10-CM

## 2018-02-26 DIAGNOSIS — E11.49 TYPE II DIABETES MELLITUS WITH NEUROLOGICAL MANIFESTATIONS: Primary | ICD-10-CM

## 2018-02-26 PROCEDURE — 11055 PARING/CUTG B9 HYPRKER LES 1: CPT | Mod: Q9,S$GLB,, | Performed by: PODIATRIST

## 2018-02-26 PROCEDURE — 99499 UNLISTED E&M SERVICE: CPT | Mod: S$GLB,,, | Performed by: PODIATRIST

## 2018-02-26 PROCEDURE — 99999 PR PBB SHADOW E&M-EST. PATIENT-LVL III: CPT | Mod: PBBFAC,,, | Performed by: PODIATRIST

## 2018-02-26 PROCEDURE — 11721 DEBRIDE NAIL 6 OR MORE: CPT | Mod: 59,Q9,S$GLB, | Performed by: PODIATRIST

## 2018-02-26 PROCEDURE — 99499 UNLISTED E&M SERVICE: CPT | Mod: S$PBB,,, | Performed by: PODIATRIST

## 2018-02-26 NOTE — PROGRESS NOTES
Subjective:      Patient ID: Philip Mathis III is a 72 y.o. male.    Chief Complaint: Diabetes Mellitus (pcp Roman/ 10-23-17); Diabetic Foot Exam (numbness/ tingling in left foot all the time ); and Nail Care    Philip is a 72 y.o. male who presents to the clinic for evaluation and treatment of diabetic feet. Philip has a past medical history of Anemia; Arthritis; Back pain; Bishop's esophagus; BPH (benign prostatic hypertrophy); Chronic kidney disease; Cirrhosis; Diabetes mellitus; Diabetes mellitus, type 2; Diabetes with neurologic complications; Diabetic retinopathy; Elevated PSA; Heart failure; Hemolytic anemia; Hepatitis C; Hepatocellular carcinoma (1/2/2018); Hyperlipidemia; Hypertension; Hypertension; Immune disorder; Liver transplanted; Peripheral neuropathy; Sleep apnea; Trouble in sleeping; Type 2 diabetes mellitus with ophthalmic manifestations; Type 2 diabetes with peripheral circulatory disorder, controlled; and Type II or unspecified type diabetes mellitus with renal manifestations, uncontrolled(250.42). Patient relates no major problem with feet. Only complaints today consist of long thick toenails that are difficult for him to trim. Also due for new pair of DM shoes. Missed last appointment and had to get a pedicure. Otherwise doing well.     PCP: Donnie Owens NP    Date Last Seen by PCP:   Chief Complaint   Patient presents with    Diabetes Mellitus     pcp Roman/ 10-23-17    Diabetic Foot Exam     numbness/ tingling in left foot all the time     Nail Care         Current shoe gear: Extra depth shoes with custome accommodative insoles    Hemoglobin A1C   Date Value Ref Range Status   11/20/2017 5.8 (H) 4.0 - 5.6 % Final     Comment:     According to ADA guidelines, hemoglobin A1c <7.0% represents  optimal control in non-pregnant diabetic patients. Different  metrics may apply to specific patient populations.   Standards of Medical Care in Diabetes-2016.  For the purpose of screening for  the presence of diabetes:  <5.7%     Consistent with the absence of diabetes  5.7-6.4%  Consistent with increasing risk for diabetes   (prediabetes)  >or=6.5%  Consistent with diabetes  Currently, no consensus exists for use of hemoglobin A1c  for diagnosis of diabetes for children.  This Hemoglobin A1c assay has significant interference with fetal   hemoglobin   (HbF). The results are invalid for patients with abnormal amounts of   HbF,   including those with known Hereditary Persistence   of Fetal Hemoglobin. Heterozygous hemoglobin variants (HbAS, HbAC,   HbAD, HbAE, HbA2) do not significantly interfere with this assay;   however, presence of multiple variants in a sample may impact the %   interference.     10/14/2017 5.7 (H) 4.0 - 5.6 % Final     Comment:     According to ADA guidelines, hemoglobin A1c <7.0% represents  optimal control in non-pregnant diabetic patients. Different  metrics may apply to specific patient populations.   Standards of Medical Care in Diabetes-2016.  For the purpose of screening for the presence of diabetes:  <5.7%     Consistent with the absence of diabetes  5.7-6.4%  Consistent with increasing risk for diabetes   (prediabetes)  >or=6.5%  Consistent with diabetes  Currently, no consensus exists for use of hemoglobin A1c  for diagnosis of diabetes for children.  This Hemoglobin A1c assay has significant interference with fetal   hemoglobin   (HbF). The results are invalid for patients with abnormal amounts of   HbF,   including those with known Hereditary Persistence   of Fetal Hemoglobin. Heterozygous hemoglobin variants (HbAS, HbAC,   HbAD, HbAE, HbA2) do not significantly interfere with this assay;   however, presence of multiple variants in a sample may impact the %   interference.     07/15/2017 5.3 4.0 - 5.6 % Final     Comment:     According to ADA guidelines, hemoglobin A1c <7.0% represents  optimal control in non-pregnant diabetic patients. Different  metrics may apply to  specific patient populations.   Standards of Medical Care in Diabetes-2016.  For the purpose of screening for the presence of diabetes:  <5.7%     Consistent with the absence of diabetes  5.7-6.4%  Consistent with increasing risk for diabetes   (prediabetes)  >or=6.5%  Consistent with diabetes  Currently, no consensus exists for use of hemoglobin A1c  for diagnosis of diabetes for children.  This Hemoglobin A1c assay has significant interference with fetal   hemoglobin   (HbF). The results are invalid for patients with abnormal amounts of   HbF,   including those with known Hereditary Persistence   of Fetal Hemoglobin. Heterozygous hemoglobin variants (HbAS, HbAC,   HbAD, HbAE, HbA2) do not significantly interfere with this assay;   however, presence of multiple variants in a sample may impact the %   interference.             Review of Systems   Constitution: Negative for chills and fever.   Cardiovascular: Positive for leg swelling. Negative for chest pain and claudication.   Respiratory: Negative for cough and shortness of breath.    Skin: Positive for dry skin and nail changes.   Musculoskeletal: Positive for arthritis and stiffness.   Gastrointestinal: Negative for nausea and vomiting.   Neurological: Positive for paresthesias. Negative for numbness.   Psychiatric/Behavioral: Negative for altered mental status.           Objective:      Physical Exam   Constitutional: He is oriented to person, place, and time. He appears well-developed and well-nourished.   HENT:   Head: Normocephalic.   Cardiovascular: Intact distal pulses.    Pulses:       Dorsalis pedis pulses are 2+ on the right side, and 2+ on the left side.        Posterior tibial pulses are 1+ on the right side, and 1+ on the left side.   Mild edema noted b/l LEs with varicosities  present. The skin of both feet and ankles is thin, shiny, atrophic and cool to touch.    Pulmonary/Chest: No respiratory distress.   Musculoskeletal:   Gastrocnemius equinus  noted to b/l ankles with decreased DF noted on exam. MMT 5/5 in DF/PF/Inv/Ev resistance with no reproduction of pain in any direction. Passive range of motion of ankle and pedal joints is painless. Adequate pedal joint ROM.   Prominent midfoot left plantar aspect at TN joint with palpable bone. Rocker bottom foot type left. Semi-reducible hammertoe contractures noted to toes 2-4 b/l-asymptomatic. HAV, mild, non trackbound noted b/l with mild medial bony prominence at 1st met head--asymptomatic.      Neurological: He is alert and oriented to person, place, and time. He has normal strength. A sensory deficit is present.   Light touch, proprioception, and sharp/dull sensation are all intact bilaterally. Protective threshold with the [place]-Wienstein monofilament is decreased bilaterally. Vibratory sensation diminished b/l distal foot.    Skin: Skin is warm, dry and intact. No erythema.   No open lesions, lacerations or wounds noted. Nails are thickened, elongated, discolored yellow/brown with subungual debris and brittleness to R 1-5 and L 1-5. Interdigital spaces clean, dry and intact b/l. Pedal hair absent. Skin of forefoot is cool to touch with proximal warmth.   Moderate hyperkeratosis noted to plantar medial midfoot at bony prominence, left.   Mild hyperpigmentation to skin of both ankles consistent with hemosiderin deposits.    Psychiatric: He has a normal mood and affect. His behavior is normal. Judgment and thought content normal.   Vitals reviewed.          Assessment:       Encounter Diagnoses   Name Primary?    Type II diabetes mellitus with neurological manifestations Yes    Bilateral lower extremity edema     Corn or callus     Pes planus of both feet     Hammer toes of both feet     Exostosis of left foot     Hallux malleus of left foot     Hallux malleus of right foot     Pre-ulcerative calluses     Onychomycosis due to dermatophyte          Plan:       Philip was seen today for diabetes  mellitus, diabetic foot exam and nail care.    Diagnoses and all orders for this visit:    Type II diabetes mellitus with neurological manifestations  -     DIABETIC SHOES FOR HOME USE    Bilateral lower extremity edema  -     DIABETIC SHOES FOR HOME USE    Corn or callus  -     DIABETIC SHOES FOR HOME USE    Pes planus of both feet  -     DIABETIC SHOES FOR HOME USE    Hammer toes of both feet  -     DIABETIC SHOES FOR HOME USE    Exostosis of left foot  -     DIABETIC SHOES FOR HOME USE    Hallux malleus of left foot  -     DIABETIC SHOES FOR HOME USE    Hallux malleus of right foot  -     DIABETIC SHOES FOR HOME USE    Pre-ulcerative calluses  -     DIABETIC SHOES FOR HOME USE    Onychomycosis due to dermatophyte      I counseled the patient on his conditions, their implications and medical management.        - Shoe inspection. Diabetic Foot Education. Patient reminded of the importance of good nutrition and blood sugar control to help prevent podiatric complications of diabetes. Patient instructed on proper foot hygeine. We discussed wearing proper shoe gear, daily foot inspections, never walking without protective shoe gear, never putting sharp instruments to feet, routine podiatric nail visits every 2-3 months.      - With patient's permission, nails were aggressively reduced and debrided x 10 to their soft tissue attachment mechanically and with electric , removing all offending nail and debris. Patient relates relief following the procedure. He will continue to monitor the areas daily, inspect his feet, wear protective shoe gear when ambulatory, moisturizer to maintain skin integrity and follow in this office in approximately 2-3 months, sooner p.r.n.    - The affected area was cleansed with an alcohol prep pad. Next, utilizing a No.15 scalpel, the hyperkeratotic tissues were trimmed from plantar medial midfoot left, down to appropriate level of skin. Care was taken to remove any nucleated core from  the center of the lesion. No pinpoint bleeding was encountered. The patient tolerated relief following this procedure. (1 lesion total).     Long discussion with patient regarding appropriate, supportive and comfortable shoes. Recommended DM shoes with adequate arch supports to alleviate abnormal pressure and improve stability of foot while walking. Avoid flat shoes and barefoot walking as these will exacerbate or worsen symptoms. Continue with Diabetic shoes with custom inserts. New Rx provided today (lost last one).    Rx diabetic shoes with custom molded inserts to be worn at all times while ambulating. Prescription provided with list of local retailers.     Discussed continued use of regular and routine moisturizer to skin of both feet to help improve dry skin. Advised to apply twice daily until resolution of symptoms. Avoid between toes. Continue Urea, Ok to switch to OTC as needed.      F/u 3 months, sooner PRN

## 2018-02-27 ENCOUNTER — EDUCATION (OUTPATIENT)
Dept: INFECTIOUS DISEASES | Facility: HOSPITAL | Age: 73
End: 2018-02-27

## 2018-02-27 ENCOUNTER — TELEPHONE (OUTPATIENT)
Dept: TRANSPLANT | Facility: CLINIC | Age: 73
End: 2018-02-27

## 2018-02-27 ENCOUNTER — HOSPITAL ENCOUNTER (OUTPATIENT)
Facility: HOSPITAL | Age: 73
Discharge: HOME | End: 2018-02-27
Attending: RADIOLOGY | Admitting: RADIOLOGY
Payer: MEDICARE

## 2018-02-27 ENCOUNTER — SURGERY (OUTPATIENT)
Age: 73
End: 2018-02-27

## 2018-02-27 ENCOUNTER — ANESTHESIA (OUTPATIENT)
Dept: ENDOSCOPY | Facility: HOSPITAL | Age: 73
End: 2018-02-27
Payer: MEDICARE

## 2018-02-27 ENCOUNTER — ANESTHESIA EVENT (OUTPATIENT)
Dept: ENDOSCOPY | Facility: HOSPITAL | Age: 73
End: 2018-02-27
Payer: MEDICARE

## 2018-02-27 VITALS
TEMPERATURE: 97 F | HEART RATE: 78 BPM | SYSTOLIC BLOOD PRESSURE: 130 MMHG | DIASTOLIC BLOOD PRESSURE: 62 MMHG | RESPIRATION RATE: 18 BRPM | OXYGEN SATURATION: 94 %

## 2018-02-27 DIAGNOSIS — Z94.4 LIVER TRANSPLANTED: ICD-10-CM

## 2018-02-27 DIAGNOSIS — C22.0 HCC (HEPATOCELLULAR CARCINOMA): ICD-10-CM

## 2018-02-27 DIAGNOSIS — N18.4 CKD (CHRONIC KIDNEY DISEASE), STAGE IV: Primary | ICD-10-CM

## 2018-02-27 LAB
POCT GLUCOSE: 139 MG/DL (ref 70–110)
POCT GLUCOSE: 156 MG/DL (ref 70–110)

## 2018-02-27 PROCEDURE — 63600175 PHARM REV CODE 636 W HCPCS: Performed by: NURSE ANESTHETIST, CERTIFIED REGISTERED

## 2018-02-27 PROCEDURE — D9220A PRA ANESTHESIA: Mod: ANES,,, | Performed by: ANESTHESIOLOGY

## 2018-02-27 PROCEDURE — 63600175 PHARM REV CODE 636 W HCPCS: Performed by: RADIOLOGY

## 2018-02-27 PROCEDURE — 82962 GLUCOSE BLOOD TEST: CPT

## 2018-02-27 PROCEDURE — 82962 GLUCOSE BLOOD TEST: CPT | Mod: 91

## 2018-02-27 PROCEDURE — S0030 INJECTION, METRONIDAZOLE: HCPCS | Performed by: STUDENT IN AN ORGANIZED HEALTH CARE EDUCATION/TRAINING PROGRAM

## 2018-02-27 PROCEDURE — 37000009 HC ANESTHESIA EA ADD 15 MINS

## 2018-02-27 PROCEDURE — 94761 N-INVAS EAR/PLS OXIMETRY MLT: CPT

## 2018-02-27 PROCEDURE — 25000003 PHARM REV CODE 250

## 2018-02-27 PROCEDURE — 71000033 HC RECOVERY, INTIAL HOUR

## 2018-02-27 PROCEDURE — 25000003 PHARM REV CODE 250: Performed by: FAMILY MEDICINE

## 2018-02-27 PROCEDURE — 25000003 PHARM REV CODE 250: Performed by: NURSE ANESTHETIST, CERTIFIED REGISTERED

## 2018-02-27 PROCEDURE — D9220A PRA ANESTHESIA: Mod: CRNA,,, | Performed by: NURSE ANESTHETIST, CERTIFIED REGISTERED

## 2018-02-27 PROCEDURE — 25000003 PHARM REV CODE 250: Performed by: STUDENT IN AN ORGANIZED HEALTH CARE EDUCATION/TRAINING PROGRAM

## 2018-02-27 PROCEDURE — 37000008 HC ANESTHESIA 1ST 15 MINUTES

## 2018-02-27 PROCEDURE — 27000221 HC OXYGEN, UP TO 24 HOURS

## 2018-02-27 RX ORDER — ONDANSETRON 2 MG/ML
INJECTION INTRAMUSCULAR; INTRAVENOUS
Status: DISCONTINUED | OUTPATIENT
Start: 2018-02-27 | End: 2018-02-27

## 2018-02-27 RX ORDER — SODIUM CHLORIDE 9 MG/ML
INJECTION, SOLUTION INTRAVENOUS CONTINUOUS PRN
Status: DISCONTINUED | OUTPATIENT
Start: 2018-02-27 | End: 2018-02-27

## 2018-02-27 RX ORDER — METRONIDAZOLE 500 MG/100ML
500 INJECTION, SOLUTION INTRAVENOUS ONCE
Status: COMPLETED | OUTPATIENT
Start: 2018-02-27 | End: 2018-02-27

## 2018-02-27 RX ORDER — PROPOFOL 10 MG/ML
VIAL (ML) INTRAVENOUS
Status: DISCONTINUED | OUTPATIENT
Start: 2018-02-27 | End: 2018-02-27

## 2018-02-27 RX ORDER — CEFTRIAXONE 1 G/1
1 INJECTION, POWDER, FOR SOLUTION INTRAMUSCULAR; INTRAVENOUS ONCE
Status: DISCONTINUED | OUTPATIENT
Start: 2018-02-27 | End: 2018-02-27 | Stop reason: HOSPADM

## 2018-02-27 RX ORDER — CIPROFLOXACIN 2 MG/ML
400 INJECTION, SOLUTION INTRAVENOUS ONCE
Status: COMPLETED | OUTPATIENT
Start: 2018-02-27 | End: 2018-02-27

## 2018-02-27 RX ORDER — FENTANYL CITRATE 50 UG/ML
INJECTION, SOLUTION INTRAMUSCULAR; INTRAVENOUS
Status: DISCONTINUED | OUTPATIENT
Start: 2018-02-27 | End: 2018-02-27

## 2018-02-27 RX ORDER — FENTANYL CITRATE 50 UG/ML
25 INJECTION, SOLUTION INTRAMUSCULAR; INTRAVENOUS EVERY 5 MIN PRN
Status: DISCONTINUED | OUTPATIENT
Start: 2018-02-27 | End: 2018-02-27 | Stop reason: HOSPADM

## 2018-02-27 RX ORDER — OXYCODONE HYDROCHLORIDE 5 MG/1
5 TABLET ORAL EVERY 6 HOURS PRN
Qty: 28 TABLET | Refills: 0 | Status: ON HOLD | OUTPATIENT
Start: 2018-02-27 | End: 2019-01-01 | Stop reason: SDUPTHER

## 2018-02-27 RX ORDER — LIDOCAINE HYDROCHLORIDE 10 MG/ML
1 INJECTION, SOLUTION EPIDURAL; INFILTRATION; INTRACAUDAL; PERINEURAL ONCE
Status: DISCONTINUED | OUTPATIENT
Start: 2018-02-27 | End: 2018-02-27 | Stop reason: HOSPADM

## 2018-02-27 RX ORDER — OXYCODONE HYDROCHLORIDE 5 MG/1
TABLET ORAL
Status: COMPLETED
Start: 2018-02-27 | End: 2018-02-27

## 2018-02-27 RX ORDER — CIPROFLOXACIN 500 MG/1
500 TABLET ORAL 2 TIMES DAILY
Qty: 10 TABLET | Refills: 0 | Status: SHIPPED | OUTPATIENT
Start: 2018-02-27 | End: 2018-03-04

## 2018-02-27 RX ORDER — SODIUM CHLORIDE 9 MG/ML
500 INJECTION, SOLUTION INTRAVENOUS ONCE
Status: COMPLETED | OUTPATIENT
Start: 2018-02-27 | End: 2018-02-27

## 2018-02-27 RX ORDER — CARVEDILOL 6.25 MG/1
6.25 TABLET ORAL 2 TIMES DAILY
Qty: 180 TABLET | Refills: 3 | OUTPATIENT
Start: 2018-02-27 | End: 2019-01-01

## 2018-02-27 RX ORDER — FENTANYL CITRATE 50 UG/ML
INJECTION, SOLUTION INTRAMUSCULAR; INTRAVENOUS
Status: DISCONTINUED
Start: 2018-02-27 | End: 2018-02-27 | Stop reason: WASHOUT

## 2018-02-27 RX ORDER — OXYCODONE HYDROCHLORIDE 5 MG/1
5 TABLET ORAL
Status: DISCONTINUED | OUTPATIENT
Start: 2018-02-27 | End: 2018-02-27 | Stop reason: HOSPADM

## 2018-02-27 RX ORDER — ONDANSETRON 8 MG/1
8 TABLET, ORALLY DISINTEGRATING ORAL ONCE
Status: DISCONTINUED | OUTPATIENT
Start: 2018-02-27 | End: 2018-02-27 | Stop reason: HOSPADM

## 2018-02-27 RX ORDER — GLYCOPYRROLATE 0.2 MG/ML
INJECTION INTRAMUSCULAR; INTRAVENOUS
Status: DISCONTINUED | OUTPATIENT
Start: 2018-02-27 | End: 2018-02-27

## 2018-02-27 RX ORDER — ROCURONIUM BROMIDE 10 MG/ML
INJECTION, SOLUTION INTRAVENOUS
Status: DISCONTINUED | OUTPATIENT
Start: 2018-02-27 | End: 2018-02-27

## 2018-02-27 RX ORDER — VASOPRESSIN 20 [USP'U]/ML
INJECTION, SOLUTION INTRAMUSCULAR; SUBCUTANEOUS
Status: DISCONTINUED | OUTPATIENT
Start: 2018-02-27 | End: 2018-02-27

## 2018-02-27 RX ORDER — SUCCINYLCHOLINE CHLORIDE 20 MG/ML
INJECTION INTRAMUSCULAR; INTRAVENOUS
Status: DISCONTINUED | OUTPATIENT
Start: 2018-02-27 | End: 2018-02-27

## 2018-02-27 RX ORDER — LIDOCAINE HCL/PF 100 MG/5ML
SYRINGE (ML) INTRAVENOUS
Status: DISCONTINUED | OUTPATIENT
Start: 2018-02-27 | End: 2018-02-27

## 2018-02-27 RX ORDER — PHENYLEPHRINE HYDROCHLORIDE 10 MG/ML
INJECTION INTRAVENOUS
Status: DISCONTINUED | OUTPATIENT
Start: 2018-02-27 | End: 2018-02-27

## 2018-02-27 RX ORDER — MIDAZOLAM HYDROCHLORIDE 1 MG/ML
INJECTION, SOLUTION INTRAMUSCULAR; INTRAVENOUS
Status: DISCONTINUED | OUTPATIENT
Start: 2018-02-27 | End: 2018-02-27

## 2018-02-27 RX ADMIN — VASOPRESSIN 2 UNITS: 20 INJECTION INTRAVENOUS at 12:02

## 2018-02-27 RX ADMIN — ROCURONIUM BROMIDE 5 MG: 10 INJECTION, SOLUTION INTRAVENOUS at 11:02

## 2018-02-27 RX ADMIN — METRONIDAZOLE 500 MG: 500 INJECTION, SOLUTION INTRAVENOUS at 10:02

## 2018-02-27 RX ADMIN — GLYCOPYRROLATE 0.2 MG: 0.2 INJECTION, SOLUTION INTRAMUSCULAR; INTRAVENOUS at 12:02

## 2018-02-27 RX ADMIN — SODIUM CHLORIDE: 0.9 INJECTION, SOLUTION INTRAVENOUS at 11:02

## 2018-02-27 RX ADMIN — SUCCINYLCHOLINE CHLORIDE 120 MG: 20 INJECTION, SOLUTION INTRAMUSCULAR; INTRAVENOUS at 11:02

## 2018-02-27 RX ADMIN — EPHEDRINE SULFATE 10 MG: 50 INJECTION, SOLUTION INTRAMUSCULAR; INTRAVENOUS; SUBCUTANEOUS at 12:02

## 2018-02-27 RX ADMIN — ONDANSETRON 4 MG: 2 INJECTION INTRAMUSCULAR; INTRAVENOUS at 12:02

## 2018-02-27 RX ADMIN — EPHEDRINE SULFATE 5 MG: 50 INJECTION, SOLUTION INTRAMUSCULAR; INTRAVENOUS; SUBCUTANEOUS at 12:02

## 2018-02-27 RX ADMIN — EPHEDRINE SULFATE 5 MG: 50 INJECTION, SOLUTION INTRAMUSCULAR; INTRAVENOUS; SUBCUTANEOUS at 01:02

## 2018-02-27 RX ADMIN — PROPOFOL 100 MG: 10 INJECTION, EMULSION INTRAVENOUS at 11:02

## 2018-02-27 RX ADMIN — OXYCODONE HYDROCHLORIDE 5 MG: 5 TABLET ORAL at 01:02

## 2018-02-27 RX ADMIN — CIPROFLOXACIN 400 MG: 2 INJECTION, SOLUTION INTRAVENOUS at 12:02

## 2018-02-27 RX ADMIN — FENTANYL CITRATE 50 MCG: 50 INJECTION, SOLUTION INTRAMUSCULAR; INTRAVENOUS at 11:02

## 2018-02-27 RX ADMIN — SODIUM CHLORIDE 500 ML: 0.9 INJECTION, SOLUTION INTRAVENOUS at 10:02

## 2018-02-27 RX ADMIN — PHENYLEPHRINE HYDROCHLORIDE 200 MCG: 10 INJECTION INTRAVENOUS at 11:02

## 2018-02-27 RX ADMIN — PHENYLEPHRINE HYDROCHLORIDE 100 MCG: 10 INJECTION INTRAVENOUS at 11:02

## 2018-02-27 RX ADMIN — MIDAZOLAM HYDROCHLORIDE 2 MG: 1 INJECTION, SOLUTION INTRAMUSCULAR; INTRAVENOUS at 11:02

## 2018-02-27 RX ADMIN — LIDOCAINE HYDROCHLORIDE 100 MG: 20 INJECTION, SOLUTION INTRAVENOUS at 11:02

## 2018-02-27 RX ADMIN — VASOPRESSIN 1 UNITS: 20 INJECTION INTRAVENOUS at 12:02

## 2018-02-27 NOTE — PLAN OF CARE
Pt is AAOx4. VSS. NAD. IV discontinued. Discharge instructions given. Verbalized understanding. Dr. St cleared patient for discharge. Pt discharged home with family.

## 2018-02-27 NOTE — ANESTHESIA POSTPROCEDURE EVALUATION
Anesthesia Post Evaluation    Patient: Philip Mathis III    Procedure(s) Performed: Procedure(s) (LRB):  ABLATION, RADIOFREQUENCY (N/A)    Final Anesthesia Type: general  Patient location during evaluation: PACU  Patient participation: Yes- Able to Participate  Level of consciousness: awake and alert and oriented  Post-procedure vital signs: reviewed and stable  Pain management: adequate  Airway patency: patent  PONV status at discharge: No PONV  Anesthetic complications: no      Cardiovascular status: stable  Respiratory status: unassisted  Hydration status: euvolemic  Follow-up not needed.        Visit Vitals  /62   Pulse 78   Temp 36.3 °C (97.3 °F) (Temporal)   Resp 18   SpO2 (!) 94%       Pain/Tom Score: Pain Assessment Performed: Yes (2/27/2018  3:00 PM)  Presence of Pain: denies (2/27/2018  3:00 PM)  Pain Rating Prior to Med Admin: 0 (2/27/2018  1:33 PM)  Tom Score: 10 (2/27/2018  3:00 PM)  Modified Tom Score: 19 (2/27/2018  3:00 PM)

## 2018-02-27 NOTE — TRANSFER OF CARE
Anesthesia Transfer of Care Note    Patient: Philip Mathis III    Procedure(s) Performed: Procedure(s) (LRB):  ABLATION, RADIOFREQUENCY (N/A)    Patient location: PACU    Anesthesia Type: general    Transport from OR: Transported from OR on 6-10 L/min O2 by face mask with adequate spontaneous ventilation. Continuous SpO2 monitoring in transport    Post pain: adequate analgesia    Post assessment: no apparent anesthetic complications and tolerated procedure well    Post vital signs: stable    Level of consciousness: awake and oriented    Nausea/Vomiting: no nausea/vomiting    Complications: none    Transfer of care protocol was followed      Last vitals:   Visit Vitals  BP (!) 110/56 (BP Location: Right arm, Patient Position: Lying)   Pulse 88   Temp 36.6 °C (97.9 °F) (Temporal)   Resp 16   SpO2 98%

## 2018-02-27 NOTE — PROGRESS NOTES
Pt  Arrive to the IR dept  Room 173 for Liver Ablation . Pt is under the direct care of Anesthesia team at this time.

## 2018-02-27 NOTE — DISCHARGE INSTRUCTIONS
Understanding Radiofrequency Ablation (RFA) of Liver Tumors    Radiofrequency ablation (RFA) is a way to treat cancer that is growing in the liver. The treatment uses heat to kill cancer cells. A type of radio wave is sent into a liver tumor through wires (electrodes). This creates heat that kills the cells of the tumor without harming too many nearby healthy cells.  How to say it  [id number]   Why RFA of liver tumors is done  Radiofrequency ablation may be done if you cannot have liver surgery. Or it may be done in addition to surgery. Its most often done if you have a small number of tumors in your liver that are not over a certain size.  How RFA of liver tumors is done  The treatment is done in a hospital, and takes 1 to 3 hours. During the procedure:  · You are given medicine to make you relax or sleep through the treatment. The area to be treated may be numbed with medicine.  · The healthcare provider makes a small cut (incision) in your skin. In some cases, the provider makes several cuts and a long, thin tube with a tiny light and camera on the end (laparoscope) is put through one of the cuts. This is to help the provider see the procedure. In other cases, the provider may make one large cut instead.  · The provider puts a metal probe through the cut and into your liver. The probe may have several wires called electrodes. Or the probe may be a single electrode the size of a needle. The provider then uses imaging such as ultrasound or CT to help guide the probe into the tumor.  · The provider connects the probe to a machine that sends radio waves through the electrodes. This creates heat that kills the cells in the tumor. This is done for each tumor that needs to be treated. The tools are then removed, and the cuts in the skin are closed with stitches (sutures).  · After the treatment, you will stay in the hospital for 1 or more nights. Or you may be able to go home the same  day.  Risks of RFA of liver tumors  · Bleeding from the liver  · Leaking of bile from the liver  · Infection  · Damage to nearby organs, such as the gallbladder, intestines, or lungs  · Aches, fever, and upset stomach (nausea) for several days to several weeks (postablation syndrome)  · Need to repeat the procedure

## 2018-02-27 NOTE — PROGRESS NOTES
Microwave ablation complete. Pt tolerated well.  Pt will continue to remain under direct care of anesthesia team.  Awaiting PACU bed.

## 2018-02-27 NOTE — PROCEDURES
Radiology Post-Procedure Note    Pre Op Diagnosis: right liver mass    Post Op Diagnosis: right liver mass    Procedure: right liver CT and ultrasound guided ablation.    Procedure performed by: Maciel Marc    Written Informed Consent Obtained: Yes    Specimen Removed: NO    Estimated Blood Loss: Minimal    Findings:   Using CT  And ultrasound guidance a microwave ablation needle was placed into a right liver mass. CT and ultrasound were performed to verify needle placement. Ablation was performed for 5 minutes. Contrast enhanced CT was performed with verified ablation of the lesion, without evidence of surrounding injury. 2 minutes of further ablation was performed. CT verified appropriate ablation margins.    Patient tolerated procedure well.    Anthony St MD  Radiology

## 2018-02-27 NOTE — H&P
Inpatient Radiology Pre-procedure Note    History of Present Illness:  Philip Mathis III is a 72 y.o. 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 Known thrombosed main portal vein, with reversal of flow in the left portal vein, patient post transplant from 2001, not a re transplant candidate because of need for kidney transplant.  Admission H&P reviewed.  Past Medical History:   Diagnosis Date    Anemia     Arthritis     Back pain     Bishop's esophagus     BPH (benign prostatic hypertrophy)     Chronic kidney disease     Cirrhosis     Diabetes mellitus     Diabetes mellitus, type 2     Diabetes with neurologic complications     Diabetic retinopathy     Elevated PSA     Heart failure     Hemolytic anemia     Hepatitis C     successfully treated with Harvoni    Hepatocellular carcinoma 1/2/2018    Hyperlipidemia     Hypertension     Hypertension     Immune disorder     Liver transplanted     Peripheral neuropathy     Sleep apnea     Trouble in sleeping     Type 2 diabetes mellitus with ophthalmic manifestations     Type 2 diabetes with peripheral circulatory disorder, controlled     Type II or unspecified type diabetes mellitus with renal manifestations, uncontrolled(250.42)      Past Surgical History:   Procedure Laterality Date    broken nose      CATARACT EXTRACTION Bilateral     EYE SURGERY      cataracts    FEMUR SURGERY      FRACTURE SURGERY      right femur fracture     LIVER TRANSPLANT  2001    PORTACATH PLACEMENT Left     SKIN GRAFT      graft from right thigh , applied to the left back and left arm.       Review of Systems:   As documented in primary team H&P    Home Meds:   Prior to Admission medications    Medication Sig Start Date End Date Taking? Authorizing Provider   allopurinol (ZYLOPRIM) 100 MG tablet TAKE 1 TABLET EVERY DAY  Patient taking differently: TAKE 1 TABLET EVERY NIGHT 10/3/17  Yes Donnie Owens NP   ammonium  lactate (LAC-HYDRIN) 12 % lotion Apply topically as needed for Dry Skin. 10/4/17  Yes Faye Larson DPM   ammonium lactate 12 % Crea Apply 12 g topically 3 (three) times daily. 10/18/17  Yes Historical Provider, MD   aspirin 81 MG Chew Take 1 tablet (81 mg total) by mouth once daily. 7/20/17 7/20/18 Yes Jodie Shearer MD   blood sugar diagnostic (ACCU-CHEK JOANN PLUS TEST STRP) Strp 1 strip by Misc.(Non-Drug; Combo Route) route 4 (four) times daily. 1/11/16  Yes MILTON Carrillo FNP-C   blood-glucose meter (ACCU-CHEK JOANN PLUS METER) Misc Use as directed 1/11/16  Yes MILTON Carrillo FNP-C   carvedilol (COREG) 6.25 MG tablet Take 1 tablet (6.25 mg total) by mouth 2 (two) times daily. 4/3/17 4/3/18 Yes Marielos Amezcua MD   ciclopirox (PENLAC) 8 % Soln Apply to affected nails daily x 6-12 mos.  Remove weekly with rubbing alcohol 7/25/16  Yes Caleb Chavez DPM   clonazePAM (KLONOPIN) 0.5 MG tablet Take 1 tablet (0.5 mg total) by mouth daily as needed. 2/19/18  Yes Danay Nath MD   doxazosin (CARDURA) 8 MG Tab Take 1 tablet (8 mg total) by mouth once daily.  Patient taking differently: Take 8 mg by mouth every evening.  10/23/17 10/23/18 Yes Donnie Owens NP   furosemide (LASIX) 80 MG tablet Take 80 mg by mouth 2 (two) times daily.   Yes Historical Provider, MD   GENERLAC 10 gram/15 mL solution TAKE 30 ML 'S BY MOUTH THREE TIMES DAILY  Patient taking differently: TAKE 30 ML 'S BY MOUTH NIGHTLY 11/15/17  Yes Danay Nath MD   hydrALAZINE (APRESOLINE) 100 MG tablet Take 1 tablet (100 mg total) by mouth 3 (three) times daily. 4/3/17  Yes Marielos Amezcua MD   hydrOXYzine HCl (ATARAX) 25 MG tablet TAKE 1 TABLET(25 MG) BY MOUTH THREE TIMES DAILY AS NEEDED FOR ITCHING 2/7/18  Yes Donnie Owens NP   insulin aspart (NOVOLOG) 100 unit/mL InPn pen Inject 6 units w/ breakfast, 4 units w/ lunch and dinner plus scale 180-230 +1, 231-280 +2, 281-330 +3, 331-380 +4, >380 +5. 90 day  "supply 5/17/17  Yes MILTON Salomon FNP   insulin glargine (LANTUS SOLOSTAR) 100 unit/mL (3 mL) InPn pen Inject 8 Units into the skin every evening. 5/17/17  Yes MILTON Salomon FNP   insulin needles, disposable, (NOVOFINE 32) 32 x 1/4 " Ndle 1 each by Misc.(Non-Drug; Combo Route) route 3 (three) times daily. 1/6/15  Yes Hu Grant II, MD   lancets (ACCU-CHEK SOFTCLIX LANCETS) Misc 1 lancet by Misc.(Non-Drug; Combo Route) route 4 (four) times daily. 1/11/16  Yes MILTON Carrillo FNP-C   minoxidil (LONITEN) 2.5 MG tablet Take 2 tablets (5 mg total) by mouth 2 (two) times daily. 5/25/17 5/25/18 Yes Marielos Amezcua MD   montelukast (SINGULAIR) 10 mg tablet Take 1 tablet (10 mg total) by mouth every evening. 6/30/17 6/30/18 Yes Marni Sheffield MD   multivitamin capsule Take 1 capsule by mouth once daily.   Yes Darlyn Campos MD   ondansetron (ZOFRAN-ODT) 4 MG TbDL DISSOLVE 1 TABLET(4 MG) ON THE TONGUE EVERY 12 HOURS AS NEEDED 10/23/17  Yes Donnie Owens NP   pantoprazole (PROTONIX) 40 MG tablet TAKE 1 TABLET ONE TIME DAILY 1/8/17  Yes Danay Nath MD   rifAXIMin (XIFAXAN) 550 mg Tab Take 1 tablet (550 mg total) by mouth 2 (two) times daily. 12/28/17  Yes Kathi Kapadia MD   sevelamer carbonate (RENVELA) 800 mg Tab Take 1 tablet (800 mg total) by mouth 3 (three) times daily with meals. 9/28/17 9/28/18 Yes JAMIE Hightower   tacrolimus (PROGRAF) 0.5 MG Cap TAKE 1 CAPSULE EVERY DAY 2/18/18  Yes Danay Nath MD   UNABLE TO FIND 1 tablet once daily. Pro Renal Plus D Oral Tabs   Yes Historical Provider, MD   urea (CARMOL) 40 % Crea Apply topically once daily. 11/16/16  Yes Faye Larson DPM     Scheduled Meds:    sodium chloride 0.9%  500 mL Intravenous Once    cefTRIAXone  1 g Intravenous Once    lidocaine (PF) 10 mg/ml (1%)  1 mL Intradermal Once    metronidazole  500 mg Intravenous Once     Continuous Infusions:   PRN Meds:  Anticoagulants/Antiplatelets: no " anticoagulation    Allergies:   Review of patient's allergies indicates:   Allergen Reactions    Corticosteroids (glucocorticoids) Other (See Comments)     Leg swelling    Hydrocortisone      Leg swelling    Neuromuscular blockers, steroidal      Leg swelling    Ribavirin      Intolerance, arthralgias     Sedation Hx: have not been any systemic reactions    Labs:  No results for input(s): INR in the last 168 hours.    Invalid input(s):  PT,  PTT  No results for input(s): WBC, HGB, HCT, MCV, PLT in the last 168 hours. No results for input(s): GLU, NA, K, CL, CO2, BUN, CREATININE, CALCIUM, MG, ALT, AST, ALBUMIN, BILITOT, BILIDIR in the last 168 hours.      Vitals:  Resp: 16 (02/27/18 0945)  BP: (!) 130/49 (02/27/18 0945)  SpO2: (!) 94 % (02/27/18 0945)     Physical Exam:  ASA: 3  Mallampati: per anethesia    General: no acute distress  Mental Status: alert and oriented to person, place and time  HEENT: normocephalic, atraumatic  Chest: unlabored breathing  Heart: regular heart rate  Abdomen: nondistended  Extremity: moves all extremities    Plan: ablation with microwave system of the biopsy proven HCC. Preopertaive antibiotics ordered.  Sedation Plan: per anesthesia    Anthony St MD  Radiology

## 2018-02-27 NOTE — DISCHARGE SUMMARY
Radiology Discharge Summary      Hospital Course: No complications    Admit Date: 2/27/2018  Discharge Date: 02/27/2018     Instructions Given to Patient: Yes  Diet: Resume prior diet  Activity: activity as tolerated    Description of Condition on Discharge: Stable  Vital Signs (Most Recent): Temp: 97.9 °F (36.6 °C) (02/27/18 1330)  Pulse: 91 (02/27/18 1345)  Resp: 11 (02/27/18 1345)  BP: 115/70 (02/27/18 1345)  SpO2: 96 % (02/27/18 1345)    Discharge Disposition: Home    Discharge Diagnosis: s/p ablation of a right liver tumor.    Followup: Follow up imaging in one month from today with triple phase imaging or MRI w/ contrast, and return to clinic in one month from today. Patient instructed to call if there is any complication, post procedural pain, or signs of infection.    Anthony St MD  Radiology

## 2018-02-28 ENCOUNTER — TELEPHONE (OUTPATIENT)
Dept: TRANSPLANT | Facility: CLINIC | Age: 73
End: 2018-02-28

## 2018-02-28 NOTE — TELEPHONE ENCOUNTER
----- Message from Alfredito Nj MA sent at 2/28/2018  2:36 PM CST -----  Contact: Pt's Wife Mrs Mathis [phone]      ----- Message -----  From: Linda Whitehead  Sent: 2/28/2018   1:26 PM  To: [facility] Staff    Pt's Wife Mrs Mathis called to speak to the nurse regarding a CT Scan scheduled for the pt for 3/5/2018 and would like a call back today.    Mrs Mathis can be reached at [phone].    Thanks

## 2018-02-28 NOTE — TELEPHONE ENCOUNTER
Returned call to pt's wife Merlene. Cancelled the HCC scans since he just had ablation and is being monitored closely.

## 2018-03-01 DIAGNOSIS — C22.0 HCC (HEPATOCELLULAR CARCINOMA): Primary | ICD-10-CM

## 2018-03-06 ENCOUNTER — TELEPHONE (OUTPATIENT)
Dept: TRANSPLANT | Facility: CLINIC | Age: 73
End: 2018-03-06

## 2018-03-06 NOTE — TELEPHONE ENCOUNTER
----- Message from Danay Nath MD sent at 3/6/2018  4:31 AM CST -----  Regarding: RE: question  Yes. Start after the MRI  ----- Message -----  From: Michell Rowe RN  Sent: 3/1/2018  11:27 AM  To: Danay Nath MD  Subject: question                                         Hi Dr. Nath,        I just had a question in regards to HCC lab f/u scans for Mr. Mathis. His last scans were in December. He gets them Q3mo. He was due this month but had an ablation on the liver tumor and will have a f/u MRI in one month. I just wanted to make sure that it was okay to restart the HCC protocol scans after the f/u MRI.    Thanks,  Michell

## 2018-03-11 RX ORDER — HYDROXYZINE HYDROCHLORIDE 25 MG/1
TABLET, FILM COATED ORAL
Qty: 90 TABLET | Refills: 0 | Status: SHIPPED | OUTPATIENT
Start: 2018-03-11 | End: 2018-01-01 | Stop reason: SDUPTHER

## 2018-03-12 ENCOUNTER — TELEPHONE (OUTPATIENT)
Dept: ENDOCRINOLOGY | Facility: CLINIC | Age: 73
End: 2018-03-12

## 2018-03-12 NOTE — TELEPHONE ENCOUNTER
----- Message from Candy Vargas sent at 3/12/2018  9:28 AM CDT -----  Contact: Elena lima/ [place] tel:   [phone]  Pls call to discuss the Nova fine Pen needles for insulin that pt. Needs.

## 2018-03-13 ENCOUNTER — LAB VISIT (OUTPATIENT)
Dept: LAB | Facility: HOSPITAL | Age: 73
End: 2018-03-13
Attending: INTERNAL MEDICINE
Payer: MEDICARE

## 2018-03-13 DIAGNOSIS — Z94.4 LIVER TRANSPLANTED: ICD-10-CM

## 2018-03-13 DIAGNOSIS — C22.0 HCC (HEPATOCELLULAR CARCINOMA): ICD-10-CM

## 2018-03-13 DIAGNOSIS — K74.60 CIRRHOSIS OF LIVER WITHOUT ASCITES, UNSPECIFIED HEPATIC CIRRHOSIS TYPE: ICD-10-CM

## 2018-03-13 LAB
AFP SERPL-MCNC: 14 NG/ML
ALBUMIN SERPL BCP-MCNC: 3.9 G/DL
ALP SERPL-CCNC: 80 U/L
ALT SERPL W/O P-5'-P-CCNC: 15 U/L
ANION GAP SERPL CALC-SCNC: 8 MMOL/L
AST SERPL-CCNC: 24 U/L
BASOPHILS # BLD AUTO: 0.09 K/UL
BASOPHILS NFR BLD: 1.6 %
BILIRUB SERPL-MCNC: 0.4 MG/DL
BUN SERPL-MCNC: 29 MG/DL
CALCIUM SERPL-MCNC: 9.4 MG/DL
CHLORIDE SERPL-SCNC: 103 MMOL/L
CO2 SERPL-SCNC: 31 MMOL/L
CREAT SERPL-MCNC: 4.4 MG/DL
DIFFERENTIAL METHOD: ABNORMAL
EOSINOPHIL # BLD AUTO: 0.4 K/UL
EOSINOPHIL NFR BLD: 6.8 %
ERYTHROCYTE [DISTWIDTH] IN BLOOD BY AUTOMATED COUNT: 13.2 %
EST. GFR  (AFRICAN AMERICAN): 14.3 ML/MIN/1.73 M^2
EST. GFR  (NON AFRICAN AMERICAN): 12.4 ML/MIN/1.73 M^2
GLUCOSE SERPL-MCNC: 87 MG/DL
HCT VFR BLD AUTO: 33 %
HGB BLD-MCNC: 11.1 G/DL
IMM GRANULOCYTES # BLD AUTO: 0.02 K/UL
IMM GRANULOCYTES NFR BLD AUTO: 0.4 %
INR PPP: 1.1
LYMPHOCYTES # BLD AUTO: 1.2 K/UL
LYMPHOCYTES NFR BLD: 22.2 %
MCH RBC QN AUTO: 29.7 PG
MCHC RBC AUTO-ENTMCNC: 33.6 G/DL
MCV RBC AUTO: 88 FL
MONOCYTES # BLD AUTO: 0.7 K/UL
MONOCYTES NFR BLD: 12.5 %
NEUTROPHILS # BLD AUTO: 3.2 K/UL
NEUTROPHILS NFR BLD: 56.5 %
NRBC BLD-RTO: 0 /100 WBC
PLATELET # BLD AUTO: 111 K/UL
PMV BLD AUTO: 11 FL
POTASSIUM SERPL-SCNC: 4.5 MMOL/L
PROT SERPL-MCNC: 7.8 G/DL
PROTHROMBIN TIME: 11.3 SEC
RBC # BLD AUTO: 3.74 M/UL
SODIUM SERPL-SCNC: 142 MMOL/L
WBC # BLD AUTO: 5.58 K/UL

## 2018-03-13 PROCEDURE — 82105 ALPHA-FETOPROTEIN SERUM: CPT

## 2018-03-13 PROCEDURE — 80053 COMPREHEN METABOLIC PANEL: CPT

## 2018-03-13 PROCEDURE — 80197 ASSAY OF TACROLIMUS: CPT

## 2018-03-13 PROCEDURE — 36415 COLL VENOUS BLD VENIPUNCTURE: CPT | Mod: PO

## 2018-03-13 PROCEDURE — 85025 COMPLETE CBC W/AUTO DIFF WBC: CPT

## 2018-03-13 PROCEDURE — 85610 PROTHROMBIN TIME: CPT

## 2018-03-14 LAB — TACROLIMUS BLD-MCNC: <1.5 NG/ML

## 2018-03-15 ENCOUNTER — TELEPHONE (OUTPATIENT)
Dept: TRANSPLANT | Facility: CLINIC | Age: 73
End: 2018-03-15

## 2018-03-15 DIAGNOSIS — Z94.4 LIVER TRANSPLANTED: Primary | ICD-10-CM

## 2018-03-15 DIAGNOSIS — C22.0 HCC (HEPATOCELLULAR CARCINOMA): ICD-10-CM

## 2018-03-15 NOTE — TELEPHONE ENCOUNTER
----- Message from Danay Nath MD sent at 3/13/2018  7:16 PM CDT -----  Pl inform pt:  Tumor marker has come down to 14 from 48.  Looks good.

## 2018-03-24 NOTE — PROGRESS NOTES
CC: right knee pain    72 y.o. Male with PMH of Immunocompromised state, s/p liver transplant, ESRD, DM2, CHF, who reports to clinic today for repeat right knee pain and effusion that began 2 days ago spontaneously without any injury or trauma. 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 His symptoms are identical to when this last occurred 3 months ago. At that time he has 100% improvement following aspiration and steroid injection. He reports knee pain that is aching and worse with activity and weightbearing.     He denies fever, chills, nausea, vomiting, malaise, or erythema at this time.     Is affecting ADLs.     No mechanical symptoms, no instability    Review of Systems   Constitution: Negative. Negative for chills, fever and night sweats.   HENT: Negative for congestion and headaches.    Eyes: Negative for blurred vision, left vision loss and right vision loss.   Cardiovascular: Negative for chest pain and syncope.   Respiratory: Negative for cough and shortness of breath.    Endocrine: Negative for polydipsia, polyphagia and polyuria.   Hematologic/Lymphatic: Negative for bleeding problem. Does not bruise/bleed easily.   Skin: Negative for dry skin, itching and rash.   Musculoskeletal: Negative for falls and muscle weakness.   Gastrointestinal: Negative for abdominal pain and bowel incontinence.   Genitourinary: Negative for bladder incontinence and nocturia.   Neurological: Negative for disturbances in coordination, loss of balance and seizures.   Psychiatric/Behavioral: Negative for depression. The patient does not have insomnia.    Allergic/Immunologic: Negative for hives and persistent infections.   All other systems negative      PAST MEDICAL HISTORY:   Past Medical History:   Diagnosis Date    Anemia     Arthritis     Back pain     Bishop's esophagus     BPH (benign prostatic hypertrophy)     Chronic kidney disease     Cirrhosis     Diabetes mellitus     Diabetes mellitus,  type 2     Diabetes with neurologic complications     Diabetic retinopathy     Elevated PSA     Heart failure     Hemolytic anemia     Hepatitis C     successfully treated with Harvoni    Hepatocellular carcinoma 1/2/2018    Hyperlipidemia     Hypertension     Hypertension     Immune disorder     Liver transplanted     Peripheral neuropathy     Sleep apnea     Trouble in sleeping     Type 2 diabetes mellitus with ophthalmic manifestations     Type 2 diabetes with peripheral circulatory disorder, controlled     Type II or unspecified type diabetes mellitus with renal manifestations, uncontrolled(250.42)      PAST SURGICAL HISTORY:   Past Surgical History:   Procedure Laterality Date    broken nose      CATARACT EXTRACTION Bilateral     EYE SURGERY      cataracts    FEMUR SURGERY      FRACTURE SURGERY      right femur fracture     LIVER TRANSPLANT  2001    PORTACATH PLACEMENT Left     SKIN GRAFT      graft from right thigh , applied to the left back and left arm.     FAMILY HISTORY:   Family History   Problem Relation Age of Onset    Cancer Mother      cancer 55 years ago    Coronary artery disease Father     Hypertension Father     Diabetes Father     Heart disease Father     Cancer Sister      breast CA    Colon cancer Neg Hx      SOCIAL HISTORY:   Social History     Social History    Marital status:      Spouse name: N/A    Number of children: N/A    Years of education: N/A     Occupational History    Not on file.     Social History Main Topics    Smoking status: Former Smoker     Quit date: 7/30/1998    Smokeless tobacco: Never Used      Comment: pt reports quitting in 1998    Alcohol use No      Comment: pt reports hx of alcholism and reports quit in 1998    Drug use: No    Sexual activity: Yes     Partners: Female     Other Topics Concern    Not on file     Social History Narrative    No narrative on file       MEDICATIONS:   Current Outpatient Prescriptions:      allopurinol (ZYLOPRIM) 100 MG tablet, TAKE 1 TABLET EVERY DAY (Patient taking differently: TAKE 1 TABLET EVERY NIGHT), Disp: 90 tablet, Rfl: 3    ammonium lactate (LAC-HYDRIN) 12 % lotion, Apply topically as needed for Dry Skin., Disp: 225 g, Rfl: 6    ammonium lactate 12 % Crea, Apply 12 g topically 3 (three) times daily., Disp: , Rfl: 6    aspirin 81 MG Chew, Take 1 tablet (81 mg total) by mouth once daily., Disp: 90 tablet, Rfl: 3    blood sugar diagnostic (ACCU-CHEK JOANN PLUS TEST STRP) Strp, 1 strip by Misc.(Non-Drug; Combo Route) route 4 (four) times daily., Disp: 360 each, Rfl: 3    blood-glucose meter (ACCU-CHEK JOANN PLUS METER) [facility], Use as directed, Disp: 1 each, Rfl: 0    carvedilol (COREG) 6.25 MG tablet, Take 1 tablet (6.25 mg total) by mouth 2 (two) times daily., Disp: 180 tablet, Rfl: 3    ciclopirox (PENLAC) 8 % Soln, Apply to affected nails daily x 6-12 mos.  Remove weekly with rubbing alcohol, Disp: 1 Bottle, Rfl: 5    clonazePAM (KLONOPIN) 0.5 MG tablet, Take 1 tablet (0.5 mg total) by mouth daily as needed., Disp: 90 tablet, Rfl: 3    doxazosin (CARDURA) 8 MG Tab, Take 1 tablet (8 mg total) by mouth once daily. (Patient taking differently: Take 8 mg by mouth every evening. ), Disp: 90 tablet, Rfl: 4    furosemide (LASIX) 80 MG tablet, Take 80 mg by mouth 2 (two) times daily., Disp: , Rfl:     GENERLAC 10 gram/15 mL solution, TAKE 30 ML 'S BY MOUTH THREE TIMES DAILY (Patient taking differently: TAKE 30 ML 'S BY MOUTH NIGHTLY), Disp: 2700 mL, Rfl: 0    hydrALAZINE (APRESOLINE) 100 MG tablet, Take 1 tablet (100 mg total) by mouth 3 (three) times daily., Disp: 270 tablet, Rfl: 3    hydrOXYzine HCl (ATARAX) 25 MG tablet, TAKE 1 TABLET(25 MG) BY MOUTH THREE TIMES DAILY AS NEEDED FOR ITCHING, Disp: 90 tablet, Rfl: 0    insulin aspart (NOVOLOG) 100 unit/mL InPn pen, Inject 6 units w/ breakfast, 4 units w/ lunch and dinner plus scale 180-230 +1, 231-280 +2, 281-330 +3, 331-380 +4,  ">380 +5. 90 day supply, Disp: 3 Box, Rfl: 3    insulin glargine (LANTUS SOLOSTAR) 100 unit/mL (3 mL) InPn pen, Inject 8 Units into the skin every evening., Disp: 2 Box, Rfl: 6    insulin needles, disposable, (NOVOFINE 32) 32 x 1/4 " Ndle, 1 each by Misc.(Non-Drug; Combo Route) route 3 (three) times daily., Disp: 100 each, Rfl: 5    lancets (ACCU-CHEK SOFTCLIX LANCETS) Misc, 1 lancet by Misc.(Non-Drug; Combo Route) route 4 (four) times daily., Disp: 360 each, Rfl: 3    minoxidil (LONITEN) 2.5 MG tablet, Take 2 tablets (5 mg total) by mouth 2 (two) times daily., Disp: 360 tablet, Rfl: 6    montelukast (SINGULAIR) 10 mg tablet, Take 1 tablet (10 mg total) by mouth every evening., Disp: 90 tablet, Rfl: 3    multivitamin capsule, Take 1 capsule by mouth once daily., Disp: , Rfl:     ondansetron (ZOFRAN-ODT) 4 MG TbDL, DISSOLVE 1 TABLET(4 MG) ON THE TONGUE EVERY 12 HOURS AS NEEDED, Disp: 60 tablet, Rfl: 4    oxyCODONE (ROXICODONE) 5 MG immediate release tablet, Take 1 tablet (5 mg total) by mouth every 6 (six) hours as needed for Pain., Disp: 28 tablet, Rfl: 0    pantoprazole (PROTONIX) 40 MG tablet, TAKE 1 TABLET ONE TIME DAILY, Disp: 90 tablet, Rfl: 3    rifAXIMin (XIFAXAN) 550 mg Tab, Take 1 tablet (550 mg total) by mouth 2 (two) times daily., Disp: 60 tablet, Rfl: 11    sevelamer carbonate (RENVELA) 800 mg Tab, Take 1 tablet (800 mg total) by mouth 3 (three) times daily with meals., Disp: 90 tablet, Rfl: 11    tacrolimus (PROGRAF) 0.5 MG Cap, TAKE 1 CAPSULE EVERY DAY, Disp: 90 capsule, Rfl: 11    UNABLE TO FIND, 1 tablet once daily. Pro Renal Plus D Oral Tabs, Disp: , Rfl:     urea (CARMOL) 40 % Crea, Apply topically once daily., Disp: 85 g, Rfl: 5  ALLERGIES:   Review of patient's allergies indicates:   Allergen Reactions    Corticosteroids (glucocorticoids) Other (See Comments)     Leg swelling    Hydrocortisone      Leg swelling    Neuromuscular blockers, steroidal      Leg swelling    Ribavirin  " "    Intolerance, arthralgias       VITAL SIGNS: BP (!) 142/63   Pulse 69   Ht 6' 2" (1.88 m)   Wt 91.6 kg (202 lb)   BMI 25.94 kg/m²      PHYSICAL EXAMINATION    General:  The patient is alert and oriented x 3.  Mood is pleasant.  Observation of ears, eyes and nose reveal no gross abnormalities.  HEENT: NCAT, sclera nonicteric  Lungs: Respirations are equal and unlabored.    right  KNEE EXAMINATION     OBSERVATION / INSPECTION   Gait:   Nonantalgic   Alignment:  Neutral   Scars:   None   Muscle atrophy: moderate  Effusion:  none   Warmth:  None   Discoloration:   none     TENDERNESS / CREPITUS (T / C):          T / C      T / C   Patella   - / -   Lateral joint line   - / -      Peripatellar medial  -  Medial joint line    + / -   Peripatellar lateral -  Medial plica   - / -   Patellar tendon -   Popliteal fossa  -/ -   Quad tendon   -   Gastrocnemius   -   Prepatellar Bursa - / -   Quadricep   -   Tibial tubercle  -  Thigh/hamstring  -   Pes anserine/HS -  Fibula    -   ITB   - / -  Tibia     -   Tib/fib joint  - / -  LCL    -     MFC   - / -   MCL: Proximal  -    LFC   - / -    Distal   -          ROM: (* = pain)  PASSIVE   ACTIVE    Left :   0 / 0 / 135   0 / 0 / 135     Right :    0 / 0 / 110   0 / 0 /  100*    Patellofemoral examination:  See above noted areas of tenderness.   Patella position    Subluxation / dislocation: Centered           Sup. / Inf;   Normal   Crepitus (PF):    Absent   Patellar Mobility:       Medial-lateral:   Normal    Superior-inferior:  Normal    Inferior tilt   Normal    Patellar tendon:  Normal   Lateral tilt:    Normal   J-sign:     None   Patellofemoral grind:   -       MENISCAL SIGNS:     Pain on terminal extension:  -  Pain on terminal flexion:  Mild +  Alisas maneuver:  -  Squat     NT    LIGAMENT EXAMINATION:  ACL / Lachman:  normal (-1 to 2mm)    PCL-Post.  drawer: normal 0 to 2mm  MCL- Valgus:  normal 0 to 2mm  LCL- Varus:  normal 0 to 2mm  Pivot shift: normal " (Equal)   Dial Test: difference c/w other side   At 30° flexion: normal (< 5°)    At 90° flexion: normal (< 5°)   Reverse Pivot Shift:   normal (Equal)     STRENGTH: (* = with pain) PAINFUL SIDE   Quadricep   4/5   Hamstrin/5    EXTREMITY NEURO-VASCULAR EXAMINATION:   Sensation:  Grossly intact to light touch all dermatomal regions.   Motor Function:  Fully intact motor function at hip, knee, foot and ankle    DTRs;  quadriceps and  achilles 2+.  No clonus and downgoing Babinski.    Vascular status:  DP and PT pulses 2+, brisk capillary refill, symmetric.     Other Findings:     Xrays:  Xrays of the right knee 3 views were reviewed by me today. No fracture, subluxation, mild to moderate DJD noted of knee joint with patellofemoral joint space narrowing noted.     ASSESSMENT:    1. Right Knee pain, acute  2. Right knee, osteoarthritis  3. Right knee effusion  Bilateral hip abd/core weakness      PLAN:    1.  PROCEDURE NOTE:   right KNEE ASPIRATION and INJECTION  After consent was obtained, time out was performed, including verification of patient ID, procedure, site and side, availability of information and equipment, review of safety issues, and agreement with consent, the procedure site was marked and the patient was prepped aseptically.    Using a sterile technique the right knee was prepped and 3 ml's of 1% plain Lidocaine used to anesthetize the needle tract into the joint from the superior lateral knee approach. The knee joint was entered with a 18 gauge needle and 70 ml's of clear serous colored fluid was withdrawn.   The procedure was well tolerated.       A diagnostic and therapeutic injection of 1cc 40 mg kenalog and 1% lidocaine/0.5% sensorcaine was given under sterile technique using the same 18 gauge needle into the superior lateral knee site with patient in supine position.     The patient had no adverse reactions to the medication. Pain decreased. The patient was instructed to apply ice to the  joint for 20 minutes and avoid strenuous activities for 24-36 hours following the injection. Patient was warned of possible blood sugar and/or blood pressure changes during that time. Following that time, patient can resume regular activities.  Watch for fever, or increased swelling or persistent pain in knee. Call or return to clinic prn if such symptoms occur or the knee fails to improve as anticipated.     2. Ice compresses prn pain  3. RTC prn knee pain or issues.  Patient would like to hold off on PT at this time.   4. Consider euflexxa injections in the future.     5. Call me if any symptoms occur different than after last injection.      All questions were answered, pt will contact us for questions or concerns in the interim.

## 2018-04-03 NOTE — PROGRESS NOTES
CC: right knee pain    72 y.o. Male with PMH of Immunocompromised state, s/p liver transplant, ESRD, DM2, CHF, who reports to clinic today for repeated right knee pain and effusion that began 2 days ago spontaneously without any injury or trauma. 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 His knee pain and effusion was recently improved on 3/21/18 with aspiration and 40mg steroid injection until his recent increased activity with the drill press.  He reports knee pain that is aching and worse with activity and weightbearing. He is requesting a repeat knee steroid injection because he has a garden show this coming weekend.     He denies fever, chills, nausea, vomiting, malaise, or erythema at this time.     Is affecting ADLs.     No mechanical symptoms, no instability    Review of Systems   Constitution: Negative. Negative for chills, fever and night sweats.   HENT: Negative for congestion and headaches.    Eyes: Negative for blurred vision, left vision loss and right vision loss.   Cardiovascular: Negative for chest pain and syncope.   Respiratory: Negative for cough and shortness of breath.    Endocrine: Negative for polydipsia, polyphagia and polyuria.   Hematologic/Lymphatic: Negative for bleeding problem. Does not bruise/bleed easily.   Skin: Negative for dry skin, itching and rash.   Musculoskeletal: Negative for falls and muscle weakness.   Gastrointestinal: Negative for abdominal pain and bowel incontinence.   Genitourinary: Negative for bladder incontinence and nocturia.   Neurological: Negative for disturbances in coordination, loss of balance and seizures.   Psychiatric/Behavioral: Negative for depression. The patient does not have insomnia.    Allergic/Immunologic: Negative for hives and persistent infections.   All other systems negative      PAST MEDICAL HISTORY:   Past Medical History:   Diagnosis  Date    Anemia     Arthritis     Back pain     Bishop's esophagus     BPH (benign prostatic hypertrophy)     Chronic kidney disease     Cirrhosis     Diabetes mellitus     Diabetes mellitus, type 2     Diabetes with neurologic complications     Diabetic retinopathy     Elevated PSA     Heart failure     Hemolytic anemia     Hepatitis C     successfully treated with Harvoni    Hepatocellular carcinoma 1/2/2018    Hyperlipidemia     Hypertension     Hypertension     Immune disorder     Liver transplanted     Peripheral neuropathy     Sleep apnea     Trouble in sleeping     Type 2 diabetes mellitus with ophthalmic manifestations     Type 2 diabetes with peripheral circulatory disorder, controlled     Type II or unspecified type diabetes mellitus with renal manifestations, uncontrolled(250.42)      PAST SURGICAL HISTORY:   Past Surgical History:   Procedure Laterality Date    broken nose      CATARACT EXTRACTION Bilateral     EYE SURGERY      cataracts    FEMUR SURGERY      FRACTURE SURGERY      right femur fracture     LIVER TRANSPLANT  2001    PORTACATH PLACEMENT Left     SKIN GRAFT      graft from right thigh , applied to the left back and left arm.     FAMILY HISTORY:   Family History   Problem Relation Age of Onset    Cancer Mother      cancer 55 years ago    Coronary artery disease Father     Hypertension Father     Diabetes Father     Heart disease Father     Cancer Sister      breast CA    Colon cancer Neg Hx      SOCIAL HISTORY:   Social History     Social History    Marital status:      Spouse name: N/A    Number of children: N/A    Years of education: N/A     Occupational History    Not on file.     Social History Main Topics    Smoking status: Former Smoker     Quit date: 7/30/1998    Smokeless tobacco: Never Used      Comment: pt reports quitting in 1998    Alcohol use No      Comment: pt reports hx of alcholism and reports quit in 1998    Drug use:  No    Sexual activity: Yes     Partners: Female     Other Topics Concern    Not on file     Social History Narrative    No narrative on file       MEDICATIONS:   Current Outpatient Prescriptions:     allopurinol (ZYLOPRIM) 100 MG tablet, TAKE 1 TABLET EVERY DAY (Patient taking differently: TAKE 1 TABLET EVERY NIGHT), Disp: 90 tablet, Rfl: 3    ammonium lactate (LAC-HYDRIN) 12 % lotion, Apply topically as needed for Dry Skin., Disp: 225 g, Rfl: 6    ammonium lactate 12 % Crea, Apply 12 g topically 3 (three) times daily., Disp: , Rfl: 6    aspirin 81 MG Chew, Take 1 tablet (81 mg total) by mouth once daily., Disp: 90 tablet, Rfl: 3    blood sugar diagnostic (ACCU-CHEK JOANN PLUS TEST STRP) Strp, 1 strip by Misc.(Non-Drug; Combo Route) route 4 (four) times daily., Disp: 360 each, Rfl: 3    blood-glucose meter (ACCU-CHEK JOANN PLUS METER) [facility] [place], Use as directed, Disp: 1 each, Rfl: 0    carvedilol (COREG) 6.25 MG tablet, Take 1 tablet (6.25 mg total) by mouth 2 (two) times daily., Disp: 180 tablet, Rfl: 3    ciclopirox (PENLAC) 8 % Soln, Apply to affected nails daily x 6-12 mos.  Remove weekly with rubbing alcohol, Disp: 1 Bottle, Rfl: 5    clonazePAM (KLONOPIN) 0.5 MG tablet, Take 1 tablet (0.5 mg total) by mouth daily as needed., Disp: 90 tablet, Rfl: 3    doxazosin (CARDURA) 8 MG Tab, Take 1 tablet (8 mg total) by mouth once daily. (Patient taking differently: Take 8 mg by mouth every evening. ), Disp: 90 tablet, Rfl: 4    furosemide (LASIX) 80 MG tablet, Take 80 mg by mouth 2 (two) times daily., Disp: , Rfl:     GENERLAC 10 gram/15 mL solution, TAKE 30 ML 'S BY MOUTH THREE TIMES DAILY (Patient taking differently: TAKE 30 ML 'S BY MOUTH NIGHTLY), Disp: 2700 mL, Rfl: 0    hydrALAZINE (APRESOLINE) 100 MG tablet, Take 1 tablet (100 mg total) by mouth 3 (three) times daily., Disp: 270 tablet, Rfl: 3    hydrOXYzine HCl (ATARAX) 25 MG tablet, TAKE 1 TABLET(25 MG) BY MOUTH THREE TIMES DAILY AS NEEDED FOR  "ITCHING, Disp: 90 tablet, Rfl: 0    insulin aspart (NOVOLOG) 100 unit/mL InPn pen, Inject 6 units w/ breakfast, 4 units w/ lunch and dinner plus scale 180-230 +1, 231-280 +2, 281-330 +3, 331-380 +4, >380 +5. 90 day supply, Disp: 3 Box, Rfl: 3    insulin aspart U-100 (NOVOLOG FLEXPEN U-100 INSULIN) 100 unit/mL InPn pen, Inject 6 units w/ breakfast, 4 units w/ lunch and dinner scale. Max daily 30 units., Disp: 45 mL, Rfl: 2    insulin glargine (LANTUS SOLOSTAR) 100 unit/mL (3 mL) InPn pen, Inject 8 Units into the skin every evening., Disp: 2 Box, Rfl: 6    insulin needles, disposable, (NOVOFINE 32) 32 x 1/4 " Ndle, 1 each by Misc.(Non-Drug; Combo Route) route 3 (three) times daily., Disp: 100 each, Rfl: 5    lancets (ACCU-CHEK SOFTCLIX LANCETS) Misc, 1 lancet by Misc.(Non-Drug; Combo Route) route 4 (four) times daily., Disp: 360 each, Rfl: 3    minoxidil (LONITEN) 2.5 MG tablet, Take 2 tablets (5 mg total) by mouth 2 (two) times daily., Disp: 360 tablet, Rfl: 6    montelukast (SINGULAIR) 10 mg tablet, Take 1 tablet (10 mg total) by mouth every evening., Disp: 90 tablet, Rfl: 3    multivitamin capsule, Take 1 capsule by mouth once daily., Disp: , Rfl:     ondansetron (ZOFRAN-ODT) 4 MG TbDL, DISSOLVE 1 TABLET(4 MG) ON THE TONGUE EVERY 12 HOURS AS NEEDED, Disp: 60 tablet, Rfl: 4    oxyCODONE (ROXICODONE) 5 MG immediate release tablet, Take 1 tablet (5 mg total) by mouth every 6 (six) hours as needed for Pain., Disp: 28 tablet, Rfl: 0    pantoprazole (PROTONIX) 40 MG tablet, TAKE 1 TABLET ONE TIME DAILY, Disp: 90 tablet, Rfl: 3    rifAXIMin (XIFAXAN) 550 mg Tab, Take 1 tablet (550 mg total) by mouth 2 (two) times daily., Disp: 60 tablet, Rfl: 11    sevelamer carbonate (RENVELA) 800 mg Tab, Take 1 tablet (800 mg total) by mouth 3 (three) times daily with meals., Disp: 90 tablet, Rfl: 11    tacrolimus (PROGRAF) 0.5 MG Cap, TAKE 1 CAPSULE EVERY DAY, Disp: 90 capsule, Rfl: 11    UNABLE TO FIND, 1 tablet once " "daily. Pro Renal Plus D Oral Tabs, Disp: , Rfl:     urea (CARMOL) 40 % Crea, Apply topically once daily., Disp: 85 g, Rfl: 5  No current facility-administered medications for this visit.   ALLERGIES:   Review of patient's allergies indicates:   Allergen Reactions    Corticosteroids (glucocorticoids) Other (See Comments)     Leg swelling    Hydrocortisone      Leg swelling    Neuromuscular blockers, steroidal      Leg swelling    Ribavirin      Intolerance, arthralgias       VITAL SIGNS: BP (!) 119/48   Pulse 73   Ht 6' 2" (1.88 m)   Wt 95.3 kg (210 lb)   BMI 26.96 kg/m²      PHYSICAL EXAMINATION    General:  The patient is alert and oriented x 3.  Mood is pleasant.  Observation of ears, eyes and nose reveal no gross abnormalities.  HEENT: NCAT, sclera nonicteric  Lungs: Respirations are equal and unlabored.    right  KNEE EXAMINATION     OBSERVATION / INSPECTION   Gait:   Nonantalgic   Alignment:  Neutral   Scars:   None   Muscle atrophy: moderate  Effusion:  none   Warmth:  None   Discoloration:   none     TENDERNESS / CREPITUS (T / C):          T / C      T / C   Patella   - / -   Lateral joint line   - / -      Peripatellar medial  -  Medial joint line    + / -   Peripatellar lateral -  Medial plica   - / -   Patellar tendon -   Popliteal fossa  -/ -   Quad tendon   -   Gastrocnemius   -   Prepatellar Bursa - / -   Quadricep   -   Tibial tubercle  -  Thigh/hamstring  -   Pes anserine/HS -  Fibula    -   ITB   - / -  Tibia     -   Tib/fib joint  - / -  LCL    -     MFC   - / -   MCL: Proximal  -    LFC   - / -    Distal   -          ROM: (* = pain)  PASSIVE   ACTIVE    Left :   0 / 0 / 135   0 / 0 / 135     Right :    0 / 0 / 110   0 / 0 /  100*    Patellofemoral examination:  See above noted areas of tenderness.   Patella position    Subluxation / dislocation: Centered           Sup. / Inf;   Normal   Crepitus (PF):    Absent   Patellar " Mobility:       Medial-lateral:   Normal    Superior-inferior:  Normal    Inferior tilt   Normal    Patellar tendon:  Normal   Lateral tilt:    Normal   J-sign:     None   Patellofemoral grind:   -       MENISCAL SIGNS:     Pain on terminal extension:  -  Pain on terminal flexion:  Mild +  Alisas maneuver:  -  Squat     NT    LIGAMENT EXAMINATION:  ACL / Lachman:  normal (-1 to 2mm)    PCL-Post.  drawer: normal 0 to 2mm  MCL- Valgus:  normal 0 to 2mm  LCL- Varus:  normal 0 to 2mm  Pivot shift: normal (Equal)   Dial Test: difference c/w other side   At 30° flexion: normal (< 5°)    At 90° flexion: normal (< 5°)   Reverse Pivot Shift:   normal (Equal)     STRENGTH: (* = with pain) PAINFUL SIDE   Quadricep   4/5   Hamstrin/5    EXTREMITY NEURO-VASCULAR EXAMINATION:   Sensation:  Grossly intact to light touch all dermatomal regions.   Motor Function:  Fully intact motor function at hip, knee, foot and ankle    DTRs;  quadriceps and  achilles 2+.  No clonus and downgoing Babinski.    Vascular status:  DP and PT pulses 2+, brisk capillary refill, symmetric.     Other Findings:     Xrays:  Xrays of the right knee 3 views were reviewed by me today. No fracture, subluxation, mild to moderate DJD noted of knee joint with patellofemoral joint space narrowing noted.     ASSESSMENT:    1. Right Knee pain, acute  2. Right knee, osteoarthritis  3. Right knee effusion  Bilateral hip abd/core weakness      I do not suspect septic joint or infectious etiology at this time.     PLAN:    1.  PROCEDURE NOTE:   right KNEE ASPIRATION and INJECTION  After consent was obtained, time out was performed, including verification of patient ID, procedure, site and side, availability of information and equipment, review of safety issues, and agreement with consent, the procedure site was marked and the patient was prepped aseptically.    Using a sterile technique the right knee was prepped and 3 ml's of 1% plain Lidocaine used to  anesthetize the needle tract into the joint from the superior lateral knee approach. The knee joint was entered with a 18 gauge needle and 25 ml's of clear serous colored fluid was withdrawn.   The procedure was well tolerated.       A diagnostic and therapeutic injection of 1cc 40 mg kenalog and 1% lidocaine/0.5% sensorcaine was given under sterile technique using the same 18 gauge needle into the superior lateral knee site with patient in supine position.     The patient had no adverse reactions to the medication. Pain decreased. The patient was instructed to apply ice to the joint for 20 minutes and avoid strenuous activities for 24-36 hours following the injection. Patient was warned of possible blood sugar and/or blood pressure changes during that time. Following that time, patient can resume regular activities.  Watch for fever, or increased swelling or persistent pain in knee. Call or return to clinic prn if such symptoms occur or the knee fails to improve as anticipated.     2. Ice compresses prn pain  3. RTC prn knee pain or issues.  Patient would like to hold off on PT at this time.   4. Consider euflexxa injections in the future. He will call me if he choses to pursue this. He cannot have another steroid injection for a few months.     5. Call me if any symptoms occur different than after last injection.      All questions were answered, pt will contact us for questions or concerns in the interim.

## 2018-04-03 NOTE — PATIENT INSTRUCTIONS
Plan:  1.  Review imaging in IR conf. For treatment recommendation or monitoring for the new lesion in the liver.    2.  Continue current dose of prograf 0.5 mg daily, trough has been <1.5 to 2.1, monitor prograf level.    3.  Has not been getting aranesp/procrit at [facility] dialysis.  Hgb around 11.    4.  Labs routine with AFP, prograf level q 1 month.   5.  Had microwave ablation, follow-up CT was done today, will call patient with result.     6.  Return end of 3 months.
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
## 2018-04-03 NOTE — Clinical Note
Plan: 1.  Review imaging in IR conf. For treatment recommendation or monitoring for the new lesion in the liver.   2.  Continue current dose of prograf 0.5 mg daily, trough has been <1.5 to 2.1, monitor prograf level.   3.  Has not been getting aranesp/procrit at [facility] dialysis.  Hgb around 11.   4.  Labs routine with AFP, prograf level q 1 month.  5.  Had microwave ablation, follow-up CT was done today, will call patient with result.    6.  Return end of 3 months.

## 2018-04-03 NOTE — PROGRESS NOTES
"Pt. Presented for CT-Scan exam with contrast and H20 intake, on dialysis, treatment days, M W F, patient states, "I can only take 32 Oz of fluid for the day", consult Radiology, Godfrey Botello, states, "Patient can drink as much as able to tolerate", "The procedure fluid is not counted as part of pt. Fluid consumption".  "

## 2018-04-03 NOTE — LETTER
April 3, 2018                     [facility]  [address]  [facility] [address]  Phone: [phone]   Patient: Philip Mathis III   MR Number: [id number]   YOB: 1945   Date of Visit: 4/3/2018       Dear      Thank you for referring Philip Mathis to me for evaluation. Attached you will find relevant portions of my assessment and plan of care.    If you have questions, please do not hesitate to call me. I look forward to following Philip Mathis along with you.    Sincerely,    Danay Nath MD    Enclosure    If you would like to receive this communication electronically, please contact [email or web address] or [phone] to request [employer] access.    [employer] is a tool which provides read-only access to select patient information with whom you have a relationship. Its easy to use and provides real time access to review your patients record including encounter summaries, notes, results, and demographic information.    If you feel you have received this communication in error or would no longer like to receive these types of communications, please e-mail [email or web address]

## 2018-04-05 NOTE — TELEPHONE ENCOUNTER
Patient: Philip Mathis III       MRN: [id number]      : 1945     Age: 72 y.o.  [address]  [place] [address]    Provider: Hepatologist Mary Nath    Urgency of review: urgent    Patient Transplant Status: Not a candidate    Reason for presentation: Reassessment    Clinical Summary: 71 y/o male with liver transplant 6/10/2001, ESRD on HD, had a liver lesion with FNA positive for HCC, RFA on 18. Please review post-RFA CT which shows no arterial enhacement in the treated lesion, but has a new 1.5 cm hypodensity without arterial enhancement, but given history of HCC, concerning for additional HCC lesion.      AFP declined from 48 to 14 post RFA.  Plt ct 111, INR 1.1, creat 4.4 (ESRD on HD), T bili 0.4, albumin 3.9.      Imaging to be reviewed: CT abd 4/3/18, US 18,     HCC Treatment History: RFA 18    ABO: O POS    Platelets:   Lab Results   Component Value Date/Time     (L) 2018 07:12 AM     Creatinine:   Lab Results   Component Value Date/Time    CREATININE 4.4 (H) 2018 07:12 AM     Bilirubin:   Lab Results   Component Value Date/Time    BILITOT 0.4 2018 07:12 AM     AFP Last 3 each if available:   Lab Results   Component Value Date/Time    AFP 14 (H) 2018 07:12 AM    AFP 48 (H) 2018 07:20 AM    AFP 47 (H) 2018 07:30 AM       MELD: MELD-Na score: 21 at 3/13/2018  7:12 AM  MELD score: 21 at 3/13/2018  7:12 AM  Calculated from:  Serum Creatinine: 4.4 mg/dL (Rounded to 4) at 3/13/2018  7:12 AM  Serum Sodium: 142 mmol/L (Rounded to 137) at 3/13/2018  7:12 AM  Total Bilirubin: 0.4 mg/dL (Rounded to 1) at 3/13/2018  7:12 AM  INR(ratio): 1.1 at 3/13/2018  7:12 AM  Age: 72 years    Plan:     Follow-up Provider:

## 2018-04-05 NOTE — TELEPHONE ENCOUNTER
----- Message from Danay Nath MD sent at 4/3/2018 11:25 AM CDT -----  Plan:  1.  Continue current dose of prograf 0.5 mg daily, trough has been <1.5 to 2.1, monitor prograf level.    2.  Has not been getting aranesp/procrit at [facility] dialysis.  Hgb around 11.    3.  Labs routine with AFP, prograf level q 1 month.   4.  Had microwave ablation, follow-up CT was done today, will call patient with result.     4.  Return end of 3 months.

## 2018-04-05 NOTE — TELEPHONE ENCOUNTER
Patient: Philip Mathis III       MRN: [id number]      : 1945     Age: 72 y.o.  [address]  [place] [address]    Provider: Hepatologist - Angela    Urgency of review: urgent    Patient Transplant Status: Not a candidate    Reason for presentation: Reassessment    Clinical Summary: 73 y/o male with liver transplant 6/10/2001, ESRD on HD, had a liver lesion with FNA positive for HCC, RFA on 18. Please review post-RFA CT which shows no arterial enhacement in the treated lesion, but has a new 1.5 cm hypodensity without arterial enhancement, but given history of HCC, concerning for additional HCC lesion.      AFP declined from 48 to 14 post RFA.  Plt ct 111, INR 1.1, creat 4.4 (ESRD on HD), T bili 0.4, albumin 3.9.      Imaging to be reviewed: CT abd 4/3/18, US 18,     HCC Treatment History: RFA 18    ABO: O POS    Platelets:   Lab Results   Component Value Date/Time     (L) 2018 07:12 AM     Creatinine:   Lab Results   Component Value Date/Time    CREATININE 4.4 (H) 2018 07:12 AM     Bilirubin:   Lab Results   Component Value Date/Time    BILITOT 0.4 2018 07:12 AM     AFP Last 3 each if available:   Lab Results   Component Value Date/Time    AFP 14 (H) 2018 07:12 AM    AFP 48 (H) 2018 07:20 AM    AFP 47 (H) 2018 07:30 AM       MELD: MELD-Na score: 21 at 3/13/2018  7:12 AM  MELD score: 21 at 3/13/2018  7:12 AM  Calculated from:  Serum Creatinine: 4.4 mg/dL (Rounded to 4) at 3/13/2018  7:12 AM  Serum Sodium: 142 mmol/L (Rounded to 137) at 3/13/2018  7:12 AM  Total Bilirubin: 0.4 mg/dL (Rounded to 1) at 3/13/2018  7:12 AM  INR(ratio): 1.1 at 3/13/2018  7:12 AM  Age: 72 years    Plan: Ablated lesion looks good.  A new 1.5 cm hypodense lesion without enhancement.  Repeat CT and AFP 2-3 months.    Note forwarded to Post Liver Transplant Clinical staff to coordinate    Follow-up Provider: Danay Nath MD

## 2018-04-05 NOTE — TELEPHONE ENCOUNTER
----- Message from Danay Nath MD sent at 4/3/2018 11:25 AM CDT -----  Plan:  1.  Continue current dose of prograf 0.5 mg daily, trough has been <1.5 to 2.1, monitor prograf level.    2.  Has not been getting aranesp/procrit at [facility] dialysis.  Hgb around 11.    3.  Labs routine with AFP, prograf level q 1 month.   4.  Had microwave ablation, follow-up CT was done today, will call patient with result.     4.  Return end of 3 months.

## 2018-04-05 NOTE — TELEPHONE ENCOUNTER
----- Message from Danay Nath MD sent at 4/3/2018  2:06 PM CDT -----  Plan:  1.  Review imaging in IR conf. For treatment recommendation or monitoring for the new lesion in the liver.    2.  Continue current dose of prograf 0.5 mg daily, trough has been <1.5 to 2.1, monitor prograf level.    3.  Has not been getting aranesp/procrit at [facility] dialysis.  Hgb around 11.    4.  Labs routine with AFP, prograf level q 1 month.   5.  Had microwave ablation, follow-up CT was done today, will call patient with result.     6.  Return end of 3 months.

## 2018-04-06 NOTE — TELEPHONE ENCOUNTER
I informed the patient of abd CT scan result: RFA treated lesion in the liver shows good response, no tumor activity.  However, there is another smaller lesion, not meeting all criteria for HCC.  It is of concern because it is seen in a liver with cancer, we will review it in conf next Tuesday, and let him know our recommendation and treatment plan. Patient showed understanding.

## 2018-04-10 NOTE — PROGRESS NOTES
Subjective:       Patient ID: Philip Mathis III is a 72 y.o. male.    Chief Complaint: Hepatocellular Carcinoma    Patient here for follow up of his hepatocellular carcinoma recently treated with microwave ablation on 2/27/2018. He reports feeling well and tells me he is still working. He denies any abdominal pain or distention. He is accompanied by his wife. He had a CT scan performed on 4/3/2018.      Review of Systems   Constitutional: Negative for activity change, appetite change, chills, fatigue and fever.   Respiratory: Negative for cough, shortness of breath, wheezing and stridor.    Cardiovascular: Negative for chest pain, palpitations and leg swelling.   Gastrointestinal: Negative for abdominal distention, abdominal pain, constipation, diarrhea, nausea and vomiting.       Objective:      Physical Exam   Constitutional: He is oriented to person, place, and time. He appears well-developed and well-nourished. No distress.   Cardiovascular: Normal rate, regular rhythm and normal heart sounds.  Exam reveals no gallop and no friction rub.    No murmur heard.  Pulmonary/Chest: Effort normal and breath sounds normal. No respiratory distress. He has no wheezes. He has no rales.   Abdominal: Soft. Bowel sounds are normal. He exhibits no distension and no mass. There is no hepatosplenomegaly. There is no tenderness. There is no guarding.   Neurological: He is alert and oriented to person, place, and time.   Skin: Skin is warm and dry. He is not diaphoretic.   Psychiatric: He has a normal mood and affect.   Vitals reviewed.      Assessment:       1. HCC (hepatocellular carcinoma)        Plan:         Discussed liver findings from CT scan. Explained recommendation of Dr. Marc and liver conference is to repeat imaging in 2 months to see if new lesion grows. Explained if this new lesion shows significant growth, Dr. Marc recommends treating with ablation. Patient verbalized understanding and agreement. CT scan  scheduled for 6/12/2018.

## 2018-04-12 NOTE — TELEPHONE ENCOUNTER
Called Pt to left him know his appointment for his first injection has been scheduled. Spoke with his wife Mrs. Banda.

## 2018-04-13 NOTE — TELEPHONE ENCOUNTER
I called patient yesterday to inform him of iR dawson review of CT  Scan: The ablated lesion looks good. A new 1.5 cm hypodense lesion without enhancement is seen.   This lesion needs to be little larger to ablate, Give it 2-3 months to see if it grows, then ablate it.     Recommendation is to get  A CT abd w/wo contrast and AFP in 2-3 months (mid to end of June 2018).   Please schedule CT, AFP.    Thanks

## 2018-04-17 NOTE — PROGRESS NOTES
Patient is here for follow up of knee arthritis. Pt is requesting Euflexxa injection #1.  PMFH reviewed per encounter record. Has failed other conservative modalities including NSAIDS, activity modification, weight loss.    He has never received these injections in the past.     The prior shots were tolerated well. No relief from last drainage and steroid injection.    PHYSICAL EXAMINATION:     General: The patient is alert and oriented x 3. Mood is pleasant.   Observation of ears, eyes and nose reveals no gross abnormalities. No   labored breathing observed.     No signs of infection or adverse reaction to knee.      Euflexxa Injection Procedure #1    A time out was performed, including verification of patient ID, procedure, site and side, availability of information and equipment, review of safety issues, and agreement with consent, the procedure site was marked.    Using a sterile technique the right knee joint was entered from the superior lateral approach with a 18 gauge needle and 10 ml's of serous colored fluid was withdrawn and sent for analysis as a precaution.   The procedure was well tolerated.      Then using the same needle, a therapeutic injection of 2cc Euflexxa was given under sterile technique using a 21.5g x 1.5 needle from the same superior lateral aspect of the right knee Joint with the patient in the supine position.     Philip MORELAND Del ROSANNA had no adverse reactions to the medication. Pain decreased. male was instructed to apply ice to the joint for 20 minutes and avoid strenuous activities for 24-36 hours following the injection. male was warned of possible blood pressure changes during that time. Following that time, male can resume activities as prior to the injection.    male was reminded to call the clinic immediately for any adverse side effects as explained in clinic today.    RTC in 1 week for injection #2  -try voltaren gel applied to knee to improve pain  -Joint fluid sent for analysis as  a precaution due to continued knee pain in an immune compromised person and past history of possible knee infection previous. I doubt infection or septic joint today.   All patients questions were answered. Patient was advised to call us with any concerns or questions.

## 2018-04-19 NOTE — LETTER
April 19, 2018    Del Mathis  [address] [place] [address]          Dear Del Mathis:  MRN: [id number]    Your lab results were stable.  There are no medicine changes.  Please have your labs drawn again on 5/21/2018.      If you cannot have your labs drawn on the scheduled date, it is your responsibility to call the transplant department to reschedule.  To reschedule or make an appointment, please as to speak to or leave a message for my assistant, Estefany Yang, at [phone].  When leaving a message for Celia Allison, or myself, we ask that you leave a brief message regarding your request.    Sincerely,      Michell Rowe, RN, BSN  Your Liver Transplant Coordinator    [facility]  [address] [place] [id number] [phone]

## 2018-04-19 NOTE — TELEPHONE ENCOUNTER
----- Message from Danay Nath MD sent at 4/17/2018  2:51 PM CDT -----  Please inform patient results are OK. Continue routine labs.

## 2018-04-23 NOTE — PROGRESS NOTES
Patient is here for follow up of knee arthritis. Pt is requesting Euflexxa injection #2.  PMFH reviewed per encounter record. Has failed other conservative modalities including NSAIDS, activity modification, weight loss.    He has received good relief with his last injection.    The prior shots were tolerated well. No relief from last drainage and steroid injection.    PHYSICAL EXAMINATION:     General: The patient is alert and oriented x 3. Mood is pleasant.   Observation of ears, eyes and nose reveals no gross abnormalities. No   labored breathing observed.     No signs of infection or adverse reaction to knee.      Euflexxa Injection Procedure #2    A time out was performed, including verification of patient ID, procedure, site and side, availability of information and equipment, review of safety issues, and agreement with consent, the procedure site was marked.    Using a sterile technique the right knee joint was entered from the superior lateral approach and a therapeutic injection of 2cc Euflexxa was given under sterile technique using a 21.5g x 1.5 needle with the patient in the supine position.     Philip Mathis III had no adverse reactions to the medication. Pain decreased. male was instructed to apply ice to the joint for 20 minutes and avoid strenuous activities for 24-36 hours following the injection. male was warned of possible blood pressure changes during that time. Following that time, male can resume activities as prior to the injection.    male was reminded to call the clinic immediately for any adverse side effects as explained in clinic today.    Xrays:  Xrays of the bilateral knees with flexion were ordered and reviewed by me today. No fracture, subluxation. 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      RTC in 1 week for injection #3  -try voltaren gel applied to knee to improve pain  -Joint fluid cultures and analysis was negative from last  appointment.   -Patient fitted with Viscoskin knee brace today to help support the knee and improve pain.   [id number] - Justyn Bird, performed a custom orthotic / brace adjustment, fitting and training with the patient. The patient demonstrated understanding and proper care. This was performed for 15 minutes.    All patients questions were answered. Patient was advised to call us with any concerns or questions.

## 2018-05-03 NOTE — PROGRESS NOTES
Patient is here for follow up of knee arthritis. Pt is requesting Euflexxa injection #3.  PMFH reviewed per encounter record. Has failed other conservative modalities including NSAIDS, activity modification, weight loss.    He has received good relief with his last injection. Knee is feeling much better with knee brace and injections.     The prior shots were tolerated well. No relief from last drainage and steroid injection.    PHYSICAL EXAMINATION:     General: The patient is alert and oriented x 3. Mood is pleasant.   Observation of ears, eyes and nose reveals no gross abnormalities. No   labored breathing observed.     No signs of infection or adverse reaction to knee.      Euflexxa Injection Procedure #3    A time out was performed, including verification of patient ID, procedure, site and side, availability of information and equipment, review of safety issues, and agreement with consent, the procedure site was marked.    Using a sterile technique the right knee joint was entered from the superior lateral approach and a therapeutic injection of 2cc Euflexxa was given under sterile technique using a 21.5g x 1.5 needle with the patient in the supine position.     Philip ANICETO Mathis III had no adverse reactions to the medication. Pain decreased. male was instructed to apply ice to the joint for 20 minutes and avoid strenuous activities for 24-36 hours following the injection. male was warned of possible blood pressure changes during that time. Following that time, male can resume activities as prior to the injection.    male was reminded to call the clinic immediately for any adverse side effects as explained in clinic today.    Xrays:  Xrays of the bilateral knees with flexion were reviewed by me today. No fracture, subluxation. 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      RTC prn knee pain  -Continue voltaren gel applied to knee to improve pain  -Continue Viscoskin knee  brace today to help support the knee and improve pain.     All patients questions were answered. Patient was advised to call us with any concerns or questions.

## 2018-05-11 NOTE — PROGRESS NOTES
Subjective:      Patient ID: Philip Mathis III is a 72 y.o. male.    Chief Complaint: Diabetic Foot Exam (left PCp Dr. Owens 10/23/2017); Diabetes Mellitus; Nail Care; and Foot Pain    Philip is a 72 y.o. male who presents to the clinic for evaluation and treatment of diabetic feet. Philip has a past medical history of Anemia; Arthritis; Back pain; Bishop's esophagus; BPH (benign prostatic hypertrophy); Chronic kidney disease; Cirrhosis; Diabetes mellitus; Diabetes mellitus, type 2; Diabetes with neurologic complications; Diabetic retinopathy; Elevated PSA; Heart failure; Hemolytic anemia; Hepatitis C; Hepatocellular carcinoma (1/2/2018); Hyperlipidemia; Hypertension; Hypertension; Immune disorder; Liver transplanted; Peripheral neuropathy; Sleep apnea; Trouble in sleeping; Type 2 diabetes mellitus with ophthalmic manifestations; Type 2 diabetes with peripheral circulatory disorder, controlled; and Type II or unspecified type diabetes mellitus with renal manifestations, uncontrolled(250.42). Patient relates no major problem with feet. Only complaints today consist of long thick toenails that are difficult for him to trim. Also has callusing to toes and routine trimming helps. Never got around to getting new pair of DM shoes. Requesting new Rx. No other pedal complaints. Doing well.     PCP: Donnie Owens, NP    Date Last Seen by PCP:   Chief Complaint   Patient presents with    Diabetic Foot Exam     left PCp Dr. Owens 10/23/2017    Diabetes Mellitus    Nail Care    Foot Pain         Current shoe gear: Extra depth shoes with custome accommodative insoles    Hemoglobin A1C   Date Value Ref Range Status   11/20/2017 5.8 (H) 4.0 - 5.6 % Final     Comment:     According to ADA guidelines, hemoglobin A1c <7.0% represents  optimal control in non-pregnant diabetic patients. Different  metrics may apply to specific patient populations.   Standards of Medical Care in Diabetes-2016.  For the purpose of screening for  the presence of diabetes:  <5.7%     Consistent with the absence of diabetes  5.7-6.4%  Consistent with increasing risk for diabetes   (prediabetes)  >or=6.5%  Consistent with diabetes  Currently, no consensus exists for use of hemoglobin A1c  for diagnosis of diabetes for children.  This Hemoglobin A1c assay has significant interference with fetal   hemoglobin   (HbF). The results are invalid for patients with abnormal amounts of   HbF,   including those with known Hereditary Persistence   of Fetal Hemoglobin. Heterozygous hemoglobin variants (HbAS, HbAC,   HbAD, HbAE, HbA2) do not significantly interfere with this assay;   however, presence of multiple variants in a sample may impact the %   interference.     10/14/2017 5.7 (H) 4.0 - 5.6 % Final     Comment:     According to ADA guidelines, hemoglobin A1c <7.0% represents  optimal control in non-pregnant diabetic patients. Different  metrics may apply to specific patient populations.   Standards of Medical Care in Diabetes-2016.  For the purpose of screening for the presence of diabetes:  <5.7%     Consistent with the absence of diabetes  5.7-6.4%  Consistent with increasing risk for diabetes   (prediabetes)  >or=6.5%  Consistent with diabetes  Currently, no consensus exists for use of hemoglobin A1c  for diagnosis of diabetes for children.  This Hemoglobin A1c assay has significant interference with fetal   hemoglobin   (HbF). The results are invalid for patients with abnormal amounts of   HbF,   including those with known Hereditary Persistence   of Fetal Hemoglobin. Heterozygous hemoglobin variants (HbAS, HbAC,   HbAD, HbAE, HbA2) do not significantly interfere with this assay;   however, presence of multiple variants in a sample may impact the %   interference.     07/15/2017 5.3 4.0 - 5.6 % Final     Comment:     According to ADA guidelines, hemoglobin A1c <7.0% represents  optimal control in non-pregnant diabetic patients. Different  metrics may apply to  specific patient populations.   Standards of Medical Care in Diabetes-2016.  For the purpose of screening for the presence of diabetes:  <5.7%     Consistent with the absence of diabetes  5.7-6.4%  Consistent with increasing risk for diabetes   (prediabetes)  >or=6.5%  Consistent with diabetes  Currently, no consensus exists for use of hemoglobin A1c  for diagnosis of diabetes for children.  This Hemoglobin A1c assay has significant interference with fetal   hemoglobin   (HbF). The results are invalid for patients with abnormal amounts of   HbF,   including those with known Hereditary Persistence   of Fetal Hemoglobin. Heterozygous hemoglobin variants (HbAS, HbAC,   HbAD, HbAE, HbA2) do not significantly interfere with this assay;   however, presence of multiple variants in a sample may impact the %   interference.             Review of Systems   Constitution: Negative for chills and fever.   Cardiovascular: Positive for leg swelling. Negative for chest pain and claudication.   Respiratory: Negative for cough and shortness of breath.    Skin: Positive for dry skin and nail changes.   Musculoskeletal: Positive for arthritis and stiffness.   Gastrointestinal: Negative for nausea and vomiting.   Neurological: Positive for paresthesias. Negative for numbness.   Psychiatric/Behavioral: Negative for altered mental status.           Objective:      Physical Exam   Constitutional: He is oriented to person, place, and time. He appears well-developed and well-nourished.   HENT:   Head: Normocephalic.   Cardiovascular: Intact distal pulses.    Pulses:       Dorsalis pedis pulses are 2+ on the right side, and 2+ on the left side.        Posterior tibial pulses are 1+ on the right side, and 1+ on the left side.   Mild edema noted b/l LEs with varicosities  present. The skin of both feet and ankles is thin, shiny, atrophic and cool to touch.    Pulmonary/Chest: No respiratory distress.   Musculoskeletal:   Gastrocnemius equinus  noted to b/l ankles with decreased DF noted on exam. MMT 5/5 in DF/PF/Inv/Ev resistance with no reproduction of pain in any direction. Passive range of motion of ankle and pedal joints is painless. Adequate pedal joint ROM.   Prominent midfoot left plantar aspect at TN joint with palpable bone. Rocker bottom foot type left. Semi-reducible hammertoe contractures noted to toes 2-4 b/l-asymptomatic. HAV, mild, non trackbound noted b/l with mild medial bony prominence at 1st met head--asymptomatic.      Neurological: He is alert and oriented to person, place, and time. He has normal strength. A sensory deficit is present.   Light touch, proprioception, and sharp/dull sensation are all intact bilaterally. Protective threshold with the Stanford-Wienstein monofilament is decreased bilaterally. Vibratory sensation diminished b/l distal foot.    Skin: Skin is warm, dry and intact. No erythema.   No open lesions, lacerations or wounds noted. Nails are thickened, elongated, discolored yellow/brown with subungual debris and brittleness to R 1-5 and L 1-5. Interdigital spaces clean, dry and intact b/l. Pedal hair absent. Skin of forefoot is cool to touch with proximal warmth.   Moderate hyperkeratosis noted to plantar medial midfoot at bony prominence, left.   Mild hyperpigmentation to skin of both ankles consistent with hemosiderin deposits.    Psychiatric: He has a normal mood and affect. His behavior is normal. Judgment and thought content normal.   Vitals reviewed.          Assessment:       Encounter Diagnoses   Name Primary?    Type II diabetes mellitus with neurological manifestations Yes    Bilateral lower extremity edema     Corn or callus     Onychomycosis due to dermatophyte     Hammer toes of both feet     Pes planus of both feet     Exostosis of left foot     Hallux malleus of left foot     Hallux malleus of right foot          Plan:       Philip was seen today for diabetic foot exam, diabetes mellitus, nail  care and foot pain.    Diagnoses and all orders for this visit:    Type II diabetes mellitus with neurological manifestations  -     DIABETIC SHOES FOR HOME USE    Bilateral lower extremity edema  -     DIABETIC SHOES FOR HOME USE    Corn or callus  -     DIABETIC SHOES FOR HOME USE    Onychomycosis due to dermatophyte    Hammer toes of both feet  -     DIABETIC SHOES FOR HOME USE    Pes planus of both feet  -     DIABETIC SHOES FOR HOME USE    Exostosis of left foot  -     DIABETIC SHOES FOR HOME USE    Hallux malleus of left foot  -     DIABETIC SHOES FOR HOME USE    Hallux malleus of right foot  -     DIABETIC SHOES FOR HOME USE      I counseled the patient on his conditions, their implications and medical management.        - Shoe inspection. Diabetic Foot Education. Patient reminded of the importance of good nutrition and blood sugar control to help prevent podiatric complications of diabetes. Patient instructed on proper foot hygeine. We discussed wearing proper shoe gear, daily foot inspections, never walking without protective shoe gear, never putting sharp instruments to feet, routine podiatric nail visits every 2-3 months.      - With patient's permission, nails were aggressively reduced and debrided x 10 to their soft tissue attachment mechanically and with electric , removing all offending nail and debris. Patient relates relief following the procedure. He will continue to monitor the areas daily, inspect his feet, wear protective shoe gear when ambulatory, moisturizer to maintain skin integrity and follow in this office in approximately 2-3 months, sooner p.r.n.    - The affected area was cleansed with an alcohol prep pad. Next, utilizing a No.15 scalpel, the hyperkeratotic tissues were trimmed from plantar medial midfoot left, down to appropriate level of skin. Care was taken to remove any nucleated core from the center of the lesion. No pinpoint bleeding was encountered. The patient tolerated  relief following this procedure. (1 lesion total).     Long discussion with patient regarding appropriate, supportive and comfortable shoes. Recommended DM shoes with adequate arch supports to alleviate abnormal pressure and improve stability of foot while walking. Avoid flat shoes and barefoot walking as these will exacerbate or worsen symptoms. Continue with Diabetic shoes with custom inserts. New Rx provided today (lost last one).    Rx diabetic shoes with custom molded inserts to be worn at all times while ambulating. Prescription provided with list of local retailers.     Discussed continued use of regular and routine moisturizer to skin of both feet to help improve dry skin. Advised to apply twice daily until resolution of symptoms. Avoid between toes. Continue Urea, Ok to switch to OTC as needed.      F/u 3 months, sooner PRN

## 2018-05-15 NOTE — TELEPHONE ENCOUNTER
Received logs  March 1-22nd  On novolog 8-6-6, lantus 10-13 units at night mostly, 15 units cap  fbg [id number], 1 outlier 229  Pre lunch   Pre dinner   Bedtime , 1 outlier 277  No changes at this time.  Overall within goal of  mostly  a1c goal less than 7% w/ minimal hypoglycemia or 7.5% r/t ESRD/age.

## 2018-05-22 NOTE — TELEPHONE ENCOUNTER
[facility] is requesting a refill on hydrOXYzine HCl (ATARAX) 25 MG tablet

## 2018-06-04 NOTE — TELEPHONE ENCOUNTER
Patient: Philip Mathis III       MRN: [id number]      : 1945     Age: 73 y.o.  [address]  [place] [id number]    Provider: Hepatologist - Portillo Marc    Urgency of review: Non- urgent    Patient Transplant Status: Not a candidate    Reason for presentation: Indeterminate lesion but stable size, in a transplant recipient with biopsy-proven HCC.    Clinical Summary: 73 y/o male with liver transplant 6/10/2001, ESRD on HD, had a liver lesion with FNA positive for HCC, RFA on 18. Post-RFA CT on 4/3/18 showed no arterial enhacement in the treated lesion, but had a new 1.5 cm hypodensity without arterial enhancement, and given history of HCC, concerning for additional HCC lesion.  Had repeat U/S 2 months later, on 18, which shows a stable 1.5 cm lesion in the dome of the right lobe, and the previously ablated lesion.      AFP declined from 48 to 3.6 post RFA.  Plt ct 97, INR 1.1, creat 3.7 (ESRD on HD), T bili 0.7, albumin 3.7.    Imaging to be reviewed: CT 4/3/18, U/S 18    HCC Treatment History: RF ablation 18.    ABO: O POS    Platelets:   Lab Results   Component Value Date/Time    PLT 97 (L) 2018 07:52 AM     Creatinine:   Lab Results   Component Value Date/Time    CREATININE 3.7 (H) 2018 07:52 AM     Bilirubin:   Lab Results   Component Value Date/Time    BILITOT 0.7 2018 07:52 AM     AFP Last 3 each if available:   Lab Results   Component Value Date/Time    AFP 3.6 2018 07:52 AM    AFP 14 (H) 2018 07:12 AM    AFP 48 (H) 2018 07:20 AM       MELD: MELD-Na score: 20 at 2018  7:52 AM  MELD score: 20 at 2018  7:52 AM  Calculated from:  Serum Creatinine: 3.7 mg/dL at 2018  7:52 AM  Serum Sodium: 139 mmol/L (Rounded to 137) at 2018  7:52 AM  Total Bilirubin: 0.7 mg/dL (Rounded to 1) at 2018  7:52 AM  INR(ratio): 1.1 at 2018  7:52 AM  Age: 72 years    Plan:     Follow-up Provider:

## 2018-06-05 NOTE — TELEPHONE ENCOUNTER
Patient: Philip Mathis III       MRN: [id number]      : 1945     Age: 73 y.o.  [address]  [place] [id number]    Provider: Hepatologist - Portillo Marc    Urgency of review: Non- urgent    Patient Transplant Status: Not a candidate    Reason for presentation: Indeterminate lesion but stable size, in a transplant recipient with biopsy-proven HCC.    Clinical Summary: 71 y/o male with liver transplant 6/10/2001, ESRD on HD, had a liver lesion with FNA positive for HCC, RFA on 18. Post-RFA CT on 4/3/18 showed no arterial enhacement in the treated lesion, but had a new 1.5 cm hypodensity without arterial enhancement, and given history of HCC, concerning for additional HCC lesion.  Had repeat U/S 2 months later, on 18, which shows a stable 1.5 cm lesion in the dome of the right lobe, and the previously ablated lesion.      AFP declined from 48 to 3.6 post RFA.  Plt ct 97, INR 1.1, creat 3.7 (ESRD on HD), T bili 0.7, albumin 3.7.    Imaging to be reviewed: CT 4/3/18, U/S 18    HCC Treatment History: RF ablation 18.    ABO: O POS    Platelets:   Lab Results   Component Value Date/Time    PLT 97 (L) 2018 07:52 AM     Creatinine:   Lab Results   Component Value Date/Time    CREATININE 3.7 (H) 2018 07:52 AM     Bilirubin:   Lab Results   Component Value Date/Time    BILITOT 0.7 2018 07:52 AM     AFP Last 3 each if available:   Lab Results   Component Value Date/Time    AFP 3.6 2018 07:52 AM    AFP 14 (H) 2018 07:12 AM    AFP 48 (H) 2018 07:20 AM       MELD: MELD-Na score: 20 at 2018  7:52 AM  MELD score: 20 at 2018  7:52 AM  Calculated from:  Serum Creatinine: 3.7 mg/dL at 2018  7:52 AM  Serum Sodium: 139 mmol/L (Rounded to 137) at 2018  7:52 AM  Total Bilirubin: 0.7 mg/dL (Rounded to 1) at 2018  7:52 AM  INR(ratio): 1.1 at 2018  7:52 AM  Age: 72 years    Plan: Ablated lesion without recurrence.  Previously identified hypoenhancing  lesion slightly smaller on recent scan.  New enhancing lesion seg VII 1.6 cm without washout or capsule,  is indeterminate. Continue follow-up    Note forwarded to Post Liver Transplant Clinical  Coordinators to coordinate follow-up    Follow-up Provider: Danay Nath MD

## 2018-06-05 NOTE — TELEPHONE ENCOUNTER
----- Message from Danay Nath MD sent at 6/4/2018  3:48 PM CDT -----  I informed patient's wife U/S showed 1.5 cm stable lesion in the liver, He is scheduled for labs and CT on the 12th. Pl make sure he gets AFP and our routine labs on the 12th, and in that case, we don't need labs this week. I will postpone presenting to IR conf until after the CT on 12th.

## 2018-06-07 NOTE — PROGRESS NOTES
"This dictation has been generated using Dragon Dictation some phonetic errors may occur.     Problem List Items Addressed This Visit        FERAHEME (FERUMOXYTOL) TWO DOSES    Anemia in ESRD (end-stage renal disease)Stable and controlled. Continue current treatment plan as previously prescribed with your PCP.           Other    Type 2 diabetes mellitus, controlled, with renal complications (Chronic)Stable and controlled. Continue current treatment plan as previously prescribed with your PCP.     ThrombocytopeniaStable and controlled. Continue current treatment plan as previously prescribed with your PCP.     Moderate protein malnutritionStable and controlled. Continue current treatment plan as previously prescribed with your PCP.     Secondary hyperparathyroidismStable and controlled. Continue current treatment plan as previously prescribed with your PCP.     Long-term insulin useStable and controlled. Continue current treatment plan as previously prescribed with your PCP.     Aortic atherosclerosisStable and controlled. Continue current treatment plan as previously prescribed with your PCP. Statin intol due to liver disease.     Overview     "The aorta is normal in course and caliber with atherosclerotic calcifications throughout its course" - CT Abdomen Pelvis w/o contrast 2-         Dependence on hemodialysisStable and controlled. Continue current treatment plan as previously prescribed with your PCP.     CKD (chronic kidney disease), stage VStable and controlled. Continue current treatment plan as previously prescribed with your PCP.     ESRD (end stage renal disease)Stable and controlled. Continue current treatment plan as previously prescribed with your PCP.       Other Visit Diagnoses     Generalized abdominal pain    -  Primary    Relevant Orders    X-Ray Abdomen Flat And Erect    Abdominal bloating        Relevant Orders    X-Ray Abdomen Flat And Erect    Statin intolerance            Orders Placed " This Encounter    X-Ray Abdomen Flat And Erect    simethicone (MYLICON) 80 MG chewable tablet    fluticasone (FLONASE) 50 mcg/actuation nasal spray     Generalized abdominal pain and abdominal bloating check x-ray as above and rule out obstructive process.  Recommended simethicone and increasing lactulose to 45 ML's daily from current 30 mL dosage.  ALLERGIES treat with Flonase as above.    Follow-up if symptoms worsen or fail to improve.    ________________________________________________________________  ________________________________________________________________      Chief Complaint   Patient presents with    abd. swollen     History of present illness  This 73 y.o. presents today for complaint of abdominal bloating and discomfort.  Symptoms have been present for about a week.  Patient denies change in dietary habits.  He is taking lactulose.  He had a bowel movement yesterday and notes bowel movement daily.  Denies any blood or mucus in the stool.  Patient denies any abdominal trauma.  He does note presence of a hernia but denies any discomfort in that area.  I have seen him for similar complaint in the past and workup has always been unremarkable.  He is excited today given his improvement since onset of dialysis.  Patient notes increased energy.  He is back working at his hobby.  He is able to tolerate more activity.  Review of systems  No fever or chills  No urinary urgency frequency or dysuria  No blood or mucus in the stool.  No melena.      Past Medical History:   Diagnosis Date    Anemia     Arthritis     Back pain     Bishop's esophagus     BPH (benign prostatic hypertrophy)     Chronic kidney disease     Cirrhosis     Diabetes mellitus     Diabetes mellitus, type 2     Diabetes with neurologic complications     Diabetic retinopathy     Elevated PSA     Heart failure     Hemolytic anemia     Hepatitis C     successfully treated with Harvoni    Hepatocellular carcinoma 1/2/2018     Hyperlipidemia     Hypertension     Hypertension     Immune disorder     Liver transplanted     Peripheral neuropathy     Sleep apnea     Trouble in sleeping     Type 2 diabetes mellitus with ophthalmic manifestations     Type 2 diabetes with peripheral circulatory disorder, controlled     Type II or unspecified type diabetes mellitus with renal manifestations, uncontrolled(250.42)        Past Surgical History:   Procedure Laterality Date    broken nose      CATARACT EXTRACTION Bilateral     EYE SURGERY      cataracts    FEMUR SURGERY      FRACTURE SURGERY      right femur fracture     LIVER TRANSPLANT  2001    PORTACATH PLACEMENT Left     SKIN GRAFT      graft from right thigh , applied to the left back and left arm.       Family History   Problem Relation Age of Onset    Cancer Mother         cancer 55 years ago    Coronary artery disease Father     Hypertension Father     Diabetes Father     Heart disease Father     Cancer Sister         breast CA    Colon cancer Neg Hx        Social History     Social History    Marital status:      Spouse name: N/A    Number of children: N/A    Years of education: N/A     Social History Main Topics    Smoking status: Former Smoker     Quit date: 7/30/1998    Smokeless tobacco: Never Used      Comment: pt reports quitting in 1998    Alcohol use No      Comment: pt reports hx of alcholism and reports quit in 1998    Drug use: No    Sexual activity: Yes     Partners: Female     Other Topics Concern    None     Social History Narrative    None       Current Outpatient Prescriptions   Medication Sig Dispense Refill    allopurinol (ZYLOPRIM) 100 MG tablet TAKE 1 TABLET EVERY DAY (Patient taking differently: TAKE 1 TABLET EVERY NIGHT) 90 tablet 3    ammonium lactate (LAC-HYDRIN) 12 % lotion Apply topically as needed for Dry Skin. 225 g 6    ammonium lactate 12 % Crea Apply 12 g topically 3 (three) times daily.  6    aspirin 81 MG Chew  "Take 1 tablet (81 mg total) by mouth once daily. 90 tablet 3    blood sugar diagnostic (ACCU-CHEK JOANN PLUS TEST STRP) Strp 1 strip by Misc.(Non-Drug; Combo Route) route 4 (four) times daily. 360 each 3    blood-glucose meter (ACCU-CHEK JOANN PLUS METER) Misc Use as directed 1 each 0    ciclopirox (PENLAC) 8 % Soln Apply to affected nails daily x 6-12 mos.  Remove weekly with rubbing alcohol 1 Bottle 5    clonazePAM (KLONOPIN) 0.5 MG tablet Take 1 tablet (0.5 mg total) by mouth daily as needed. 90 tablet 3    diclofenac sodium (VOLTAREN) 1 % Gel Apply 2 g topically 3 (three) times daily. To the right knee. 1 Tube 0    doxazosin (CARDURA) 8 MG Tab Take 1 tablet (8 mg total) by mouth once daily. (Patient taking differently: Take 8 mg by mouth every evening. ) 90 tablet 4    furosemide (LASIX) 80 MG tablet Take 80 mg by mouth 2 (two) times daily.      furosemide (LASIX) 80 MG tablet TAKE ONE TABLET BY MOUTH TWO TIMES A DAY 60 tablet 6    GENERLAC 10 gram/15 mL solution TAKE 30 ML 'S BY MOUTH THREE TIMES DAILY 2700 mL 5    hydrALAZINE (APRESOLINE) 100 MG tablet Take 1 tablet (100 mg total) by mouth 3 (three) times daily. 270 tablet 3    hydrOXYzine HCl (ATARAX) 25 MG tablet TAKE 1 TABLET(25 MG) BY MOUTH THREE TIMES DAILY AS NEEDED FOR ITCHING 90 tablet 2    insulin aspart (NOVOLOG) 100 unit/mL InPn pen Inject 6 units w/ breakfast, 4 units w/ lunch and dinner plus scale 180-230 +1, 231-280 +2, 281-330 +3, 331-380 +4, >380 +5. 90 day supply 3 Box 3    insulin aspart U-100 (NOVOLOG FLEXPEN U-100 INSULIN) 100 unit/mL InPn pen Inject 6 units w/ breakfast, 4 units w/ lunch and dinner scale. Max daily 30 units. 45 mL 2    insulin glargine (LANTUS SOLOSTAR) 100 unit/mL (3 mL) InPn pen Inject 8 Units into the skin every evening. 2 Box 6    insulin needles, disposable, (NOVOFINE 32) 32 x 1/4 " Ndle 1 each by Misc.(Non-Drug; Combo Route) route 3 (three) times daily. 100 each 5    lancets (ACCU-CHEK SOFTCLIX " LANCETS) Misc 1 lancet by Misc.(Non-Drug; Combo Route) route 4 (four) times daily. 360 each 3    montelukast (SINGULAIR) 10 mg tablet Take 1 tablet (10 mg total) by mouth every evening. 90 tablet 3    multivitamin capsule Take 1 capsule by mouth once daily.      ondansetron (ZOFRAN-ODT) 4 MG TbDL DISSOLVE 1 TABLET(4 MG) ON THE TONGUE EVERY 12 HOURS AS NEEDED 60 tablet 4    oxyCODONE (ROXICODONE) 5 MG immediate release tablet Take 1 tablet (5 mg total) by mouth every 6 (six) hours as needed for Pain. 28 tablet 0    pantoprazole (PROTONIX) 40 MG tablet TAKE 1 TABLET EVERY DAY 90 tablet 3    rifAXIMin (XIFAXAN) 550 mg Tab Take 1 tablet (550 mg total) by mouth 2 (two) times daily. 60 tablet 11    sevelamer carbonate (RENVELA) 800 mg Tab Take 1 tablet (800 mg total) by mouth 3 (three) times daily with meals. 90 tablet 11    tacrolimus (PROGRAF) 0.5 MG Cap TAKE 1 CAPSULE EVERY DAY 90 capsule 11    UNABLE TO FIND 1 tablet once daily. Pro Renal Plus D Oral Tabs      urea (CARMOL) 40 % Crea Apply topically once daily. 85 g 5    carvedilol (COREG) 6.25 MG tablet Take 1 tablet (6.25 mg total) by mouth 2 (two) times daily. 180 tablet 3    fluticasone (FLONASE) 50 mcg/actuation nasal spray 2 sprays (100 mcg total) by Each Nare route once daily. 16 g 3    minoxidil (LONITEN) 2.5 MG tablet Take 2 tablets (5 mg total) by mouth 2 (two) times daily. 360 tablet 6    simethicone (MYLICON) 80 MG chewable tablet Take 1 tablet (80 mg total) by mouth every 6 (six) hours as needed for Flatulence (and bloating). 60 tablet 3     No current facility-administered medications for this visit.        Review of patient's allergies indicates:   Allergen Reactions    Corticosteroids (glucocorticoids) Other (See Comments)     Leg swelling    Hydrocortisone      Leg swelling    Neuromuscular blockers, steroidal      Leg swelling    Ribavirin      Intolerance, arthralgias       Physical examination  Vitals Reviewed  Gen. Well-dressed  well-nourished no apparent distress  Skin warm dry and intact.  No rashes noted.  Neck is supple without adenopathy  Chest.  Respirations are even unlabored.  Lungs are clear to auscultation.  Cardiac regular rate and rhythm.  No chest wall adenopathy noted.  Abdomen is soft and not distended.  Bowel sounds are present.  No tenderness during palpation of the abdomen.  No Hepatosplenomegaly noted.  Hernia is noted RUQ.  No CVA tenderness to percussion.    Neuro. Awake alert oriented x4.  Normal judgment and cognition noted.  Extremities no clubbing cyanosis or edema noted.     Call or return to clinic prn if these symptoms worsen or fail to improve as anticipated.

## 2018-06-13 NOTE — TELEPHONE ENCOUNTER
----- Message from Danay Nath MD sent at 6/13/2018  8:10 AM CDT -----  Please inform patient results are OK. Continue routine labs.

## 2018-06-13 NOTE — LETTER
June 13, 2018    Del Mathis  [address] [place] [address]          Dear Del Mathis:  MRN: [id number]    Your lab results were stable.  There are no medicine changes.  Please have your labs drawn again on 7/17/18.      If you cannot have your labs drawn on the scheduled date, it is your responsibility to call the transplant department to reschedule.  To reschedule or make an appointment, please as to speak to or leave a message for my assistant, Estefany Yang, at [phone].  When leaving a message for Celia Allison, or myself, we ask that you leave a brief message regarding your request.    Sincerely,      Michell Rowe, RN, BSN, CCTC  Your Liver Transplant Coordinator    [facility]  [address] [place] [id number]  [phone]

## 2018-06-20 NOTE — TELEPHONE ENCOUNTER
Received call from Veronika, pt wife.  She stated that the patient refuses to come in for his appointment on 06/22/2018 for CT results.  He would like them to be given over the phone.  Pts wife cancelled his appointment per his request. Send message to Yazmin Serrano NP.  Thanks

## 2018-06-21 NOTE — TELEPHONE ENCOUNTER
Spoke with patient's wife. Notified her of CT results and recommendation of liver conference to repeat imaging in 3 months. Verbalized understanding and will let her  know.

## 2018-07-06 NOTE — TELEPHONE ENCOUNTER
----- Message from Lazara Wilkins MA sent at 7/6/2018 11:45 AM CDT -----  Contact: quita Banda wife      ----- Message -----  From: Brandy Colvin  Sent: 7/6/2018   9:14 AM  To: [facility] Staff    Veronika would like to reschedule pts appt that is scheduled for Monday.  Pt has dialysis on Monday Wed and Fridays.      Veronika can be reached at [phone]

## 2018-07-06 NOTE — TELEPHONE ENCOUNTER
Called Veronika, and let her know that coord wanted to clarify with Dr. Nath before rescheduling scans since pt just had a CT abd 6/12.

## 2018-07-20 NOTE — PROGRESS NOTES
Subjective:      Patient ID: Philip Mathis III is a 73 y.o. male.    Chief Complaint: Diabetes Mellitus (Pcp Dr. Owens  6/7/18); Diabetic Foot Exam; and Nail Care    Philip is a 73 y.o. male who presents to the clinic for evaluation and treatment of diabetic feet. Philip has a past medical history of Anemia; Arthritis; Back pain; Bishop's esophagus; BPH (benign prostatic hypertrophy); Chronic kidney disease; Cirrhosis; Diabetes mellitus; Diabetes mellitus, type 2; Diabetes with neurologic complications; Diabetic retinopathy; Elevated PSA; Heart failure; Hemolytic anemia; Hepatitis C; Hepatocellular carcinoma (1/2/2018); Hyperlipidemia; Hypertension; Hypertension; Immune disorder; Liver transplanted; Peripheral neuropathy; Sleep apnea; Trouble in sleeping; Type 2 diabetes mellitus with ophthalmic manifestations; Type 2 diabetes with peripheral circulatory disorder, controlled; and Type II or unspecified type diabetes mellitus with renal manifestations, uncontrolled(250.42). Patient relates no major problem with feet. Only complaints today consist of long thick toenails that are difficult for him to trim. Also has callusing to toes and routine trimming helps.  No other pedal complaints. Doing well.     PCP: Donnie Owens NP    Date Last Seen by PCP:   Chief Complaint   Patient presents with    Diabetes Mellitus     Pcp Dr. Owens  6/7/18    Diabetic Foot Exam    Nail Care         Current shoe gear: Extra depth shoes with custome accommodative insoles    Hemoglobin A1C   Date Value Ref Range Status   11/20/2017 5.8 (H) 4.0 - 5.6 % Final     Comment:     According to ADA guidelines, hemoglobin A1c <7.0% represents  optimal control in non-pregnant diabetic patients. Different  metrics may apply to specific patient populations.   Standards of Medical Care in Diabetes-2016.  For the purpose of screening for the presence of diabetes:  <5.7%     Consistent with the absence of diabetes  5.7-6.4%  Consistent with  increasing risk for diabetes   (prediabetes)  >or=6.5%  Consistent with diabetes  Currently, no consensus exists for use of hemoglobin A1c  for diagnosis of diabetes for children.  This Hemoglobin A1c assay has significant interference with fetal   hemoglobin   (HbF). The results are invalid for patients with abnormal amounts of   HbF,   including those with known Hereditary Persistence   of Fetal Hemoglobin. Heterozygous hemoglobin variants (HbAS, HbAC,   HbAD, HbAE, HbA2) do not significantly interfere with this assay;   however, presence of multiple variants in a sample may impact the %   interference.     10/14/2017 5.7 (H) 4.0 - 5.6 % Final     Comment:     According to ADA guidelines, hemoglobin A1c <7.0% represents  optimal control in non-pregnant diabetic patients. Different  metrics may apply to specific patient populations.   Standards of Medical Care in Diabetes-2016.  For the purpose of screening for the presence of diabetes:  <5.7%     Consistent with the absence of diabetes  5.7-6.4%  Consistent with increasing risk for diabetes   (prediabetes)  >or=6.5%  Consistent with diabetes  Currently, no consensus exists for use of hemoglobin A1c  for diagnosis of diabetes for children.  This Hemoglobin A1c assay has significant interference with fetal   hemoglobin   (HbF). The results are invalid for patients with abnormal amounts of   HbF,   including those with known Hereditary Persistence   of Fetal Hemoglobin. Heterozygous hemoglobin variants (HbAS, HbAC,   HbAD, HbAE, HbA2) do not significantly interfere with this assay;   however, presence of multiple variants in a sample may impact the %   interference.     07/15/2017 5.3 4.0 - 5.6 % Final     Comment:     According to ADA guidelines, hemoglobin A1c <7.0% represents  optimal control in non-pregnant diabetic patients. Different  metrics may apply to specific patient populations.   Standards of Medical Care in Diabetes-2016.  For the purpose of screening  for the presence of diabetes:  <5.7%     Consistent with the absence of diabetes  5.7-6.4%  Consistent with increasing risk for diabetes   (prediabetes)  >or=6.5%  Consistent with diabetes  Currently, no consensus exists for use of hemoglobin A1c  for diagnosis of diabetes for children.  This Hemoglobin A1c assay has significant interference with fetal   hemoglobin   (HbF). The results are invalid for patients with abnormal amounts of   HbF,   including those with known Hereditary Persistence   of Fetal Hemoglobin. Heterozygous hemoglobin variants (HbAS, HbAC,   HbAD, HbAE, HbA2) do not significantly interfere with this assay;   however, presence of multiple variants in a sample may impact the %   interference.             Review of Systems   Constitution: Negative for chills and fever.   Cardiovascular: Positive for leg swelling. Negative for chest pain and claudication.   Respiratory: Negative for cough and shortness of breath.    Skin: Positive for dry skin and nail changes.   Musculoskeletal: Positive for arthritis and stiffness.   Gastrointestinal: Negative for nausea and vomiting.   Neurological: Positive for paresthesias. Negative for numbness.   Psychiatric/Behavioral: Negative for altered mental status.           Objective:      Physical Exam   Constitutional: He is oriented to person, place, and time. He appears well-developed and well-nourished.   HENT:   Head: Normocephalic.   Cardiovascular: Intact distal pulses.    Pulses:       Dorsalis pedis pulses are 2+ on the right side, and 2+ on the left side.        Posterior tibial pulses are 1+ on the right side, and 1+ on the left side.   Mild edema noted b/l LEs with varicosities  present. The skin of both feet and ankles is thin, shiny, atrophic and cool to touch.    Pulmonary/Chest: No respiratory distress.   Musculoskeletal:   Gastrocnemius equinus noted to b/l ankles with decreased DF noted on exam. MMT 5/5 in DF/PF/Inv/Ev resistance with no  reproduction of pain in any direction. Passive range of motion of ankle and pedal joints is painless. Adequate pedal joint ROM.   Prominent midfoot left plantar aspect at TN joint with palpable bone. Rocker bottom foot type left. Semi-reducible hammertoe contractures noted to toes 2-4 b/l-asymptomatic. HAV, mild, non trackbound noted b/l with mild medial bony prominence at 1st met head--asymptomatic.      Neurological: He is alert and oriented to person, place, and time. He has normal strength. A sensory deficit is present.   Light touch, proprioception, and sharp/dull sensation are all intact bilaterally. Protective threshold with the Mission-Wienstein monofilament is decreased bilaterally. Vibratory sensation diminished b/l distal foot.    Skin: Skin is warm, dry and intact. No bruising, no ecchymosis and no lesion noted. No erythema.   No open lesions, lacerations or wounds noted. Nails are thickened, elongated, discolored yellow/brown with subungual debris and brittleness to R 1-5 and L 1-5. Interdigital spaces clean, dry and intact b/l. Pedal hair absent. Skin of forefoot is cool to touch with proximal warmth.   Moderate hyperkeratosis noted to plantar medial midfoot at bony prominence, left.   Mild hyperpigmentation to skin of both ankles consistent with hemosiderin deposits.   Mild maceration to skin between toes 4/5 bilateral. No drainage or open wounds. No SOI.    Psychiatric: He has a normal mood and affect. His behavior is normal. Judgment and thought content normal.   Vitals reviewed.          Assessment:       Encounter Diagnoses   Name Primary?    Type II diabetes mellitus with neurological manifestations Yes    Bilateral lower extremity edema     Onychomycosis due to dermatophyte     Maceration of skin - Right Foot     Maceration of skin - Left Foot          Plan:       Philip was seen today for diabetes mellitus, diabetic foot exam and nail care.    Diagnoses and all orders for this  visit:    Type II diabetes mellitus with neurological manifestations    Bilateral lower extremity edema    Onychomycosis due to dermatophyte    Maceration of skin - Right Foot    Maceration of skin - Left Foot      I counseled the patient on his conditions, their implications and medical management.        - Shoe inspection. Diabetic Foot Education. Patient reminded of the importance of good nutrition and blood sugar control to help prevent podiatric complications of diabetes. Patient instructed on proper foot hygeine. We discussed wearing proper shoe gear, daily foot inspections, never walking without protective shoe gear, never putting sharp instruments to feet, routine podiatric nail visits every 2-3 months.      - With patient's permission, nails were aggressively reduced and debrided x 10 to their soft tissue attachment mechanically and with electric , removing all offending nail and debris. Patient relates relief following the procedure. He will continue to monitor the areas daily, inspect his feet, wear protective shoe gear when ambulatory, moisturizer to maintain skin integrity and follow in this office in approximately 2-3 months, sooner p.r.n.    - The affected area was cleansed with an alcohol prep pad. Next, utilizing a No.15 scalpel, the hyperkeratotic tissues were trimmed from plantar medial midfoot left, down to appropriate level of skin. Care was taken to remove any nucleated core from the center of the lesion. No pinpoint bleeding was encountered. The patient tolerated relief following this procedure. (1 lesion total).     Long discussion with patient regarding appropriate, supportive and comfortable shoes. Recommended DM shoes with adequate arch supports to alleviate abnormal pressure and improve stability of foot while walking. Avoid flat shoes and barefoot walking as these will exacerbate or worsen symptoms. Continue with Diabetic shoes with custom inserts. New Rx provided today (lost last  one).    Rx diabetic shoes with custom molded inserts to be worn at all times while ambulating. Continue.     Discussed continued use of regular and routine moisturizer to skin of both feet to help improve dry skin. Advised to apply twice daily until resolution of symptoms. Avoid between toes. Continue Urea, Ok to switch to OTC as needed.      F/u 3 months, sooner PRN

## 2018-08-06 NOTE — TELEPHONE ENCOUNTER
----- Message from Abigail Colvin sent at 8/6/2018 10:21 AM CDT -----  Contact: Wife   Patient's wife says they never received his order for his Diabetic shoes and would like you to refax it to the Supply company . Please call patient at [phone]

## 2018-08-16 NOTE — PROGRESS NOTES
This dictation has been generated using Modal Fluency Dictation some phonetic errors may occur. Please contact author for clarification if needed.     Problem List Items Addressed This Visit     None      Visit Diagnoses     Urticaria    -  Primary    Bleeding        Muscle spasm              Orders Placed This Encounter    cetirizine (ZYRTEC) 10 MG tablet    mupirocin (BACTROBAN) 2 % ointment    hydrOXYzine HCl (ATARAX) 25 MG tablet    silver nitrate applicators 75-25 % applicator       Her to carry it discussed somnolence with hydroxyzine the.  Patient may increase dosing for itching but I will have further meds to add.  Bleeding.  Patient notes a area of his testicles which does get irritated and bleeds.  We discussed use of silver nitrate sticks.  Muscle spasms recommended muscle relaxer if needed.  Discussed risk of somnolence again with patient. Tizanidine 4 mg added       No Follow-up on file.    ________________________________________________________________  ________________________________________________________________      Chief Complaint   Patient presents with    talk about medications    Medication Refill     History of present illness  This 73 y.o. presents today for complaint of a couple of issues.  Patient requests a refill of Bactroban.  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  He does planting as a hobby.  Bactroban is helpful for avoiding infections.  He also requests a prescription for Claritin or Zyrtec for his allergies symptoms.  We will send that to the pharmacy would discuss that he might have to buy it over-the-counter.  He also notes that he has a or the area on the testicle which is irritated and bleeds that time.  He has difficulty stopping the bleeding.  We discussed use of silver nitrate stick.  No active bleeding at today's visit.  Patient continues to go to dialysis on Monday Wednesday and Friday.  He goes to [facility].   ROS:   CONST: weight stable.  EYES: no  vision change.  ENT: No sore throat, headache, or earache.  CV: no chest pain w/ exertion.  RESP: No shortness of breath.  GI: no nausea, vomiting, diarrhea. No dysphagia. Appetite good.   : no urinary issues.  MUSCULOSKELETAL: no new myalgias or arthralgias.  SKIN: no new changes.  NEURO: no focal deficits. Denies headache.  PSYCH: no new issues.  ENDOCRINE: no polyuria.  HEME: no lymph nodes.  ALLERGY: no general pruritis.    Past Medical History:   Diagnosis Date    Anemia     Arthritis     Back pain     Bishop's esophagus     BPH (benign prostatic hypertrophy)     Chronic kidney disease     Cirrhosis     Diabetes mellitus     Diabetes mellitus, type 2     Diabetes with neurologic complications     Diabetic retinopathy     Elevated PSA     Heart failure     Hemolytic anemia     Hepatitis C     successfully treated with Harvoni    Hepatocellular carcinoma 1/2/2018    Hyperlipidemia     Hypertension     Hypertension     Immune disorder     Liver transplanted     Peripheral neuropathy     Sleep apnea     Trouble in sleeping     Type 2 diabetes mellitus with ophthalmic manifestations     Type 2 diabetes with peripheral circulatory disorder, controlled     Type II or unspecified type diabetes mellitus with renal manifestations, uncontrolled(250.42)        Past Surgical History:   Procedure Laterality Date    broken nose      CATARACT EXTRACTION Bilateral     EYE SURGERY      cataracts    FEMUR SURGERY      FRACTURE SURGERY      right femur fracture     LIVER TRANSPLANT  2001    PORTACATH PLACEMENT Left     SKIN GRAFT      graft from right thigh , applied to the left back and left arm.       Family History   Problem Relation Age of Onset    Cancer Mother         cancer 55 years ago    Coronary artery disease Father     Hypertension Father     Diabetes Father     Heart disease Father     Cancer Sister         breast CA    Colon cancer Neg Hx        Social History      Socioeconomic History    Marital status:      Spouse name: None    Number of children: None    Years of education: None    Highest education level: None   Social Needs    Financial resource strain: None    Food insecurity - worry: None    Food insecurity - inability: None    Transportation needs - medical: None    Transportation needs - non-medical: None   Occupational History    None   Tobacco Use    Smoking status: Former Smoker     Last attempt to quit: 1998     Years since quittin.0    Smokeless tobacco: Never Used    Tobacco comment: pt reports quitting in    Substance and Sexual Activity    Alcohol use: No     Comment: pt reports hx of alcholism and reports quit in     Drug use: No    Sexual activity: Yes     Partners: Female   Other Topics Concern    None   Social History Narrative    None       Current Outpatient Medications   Medication Sig Dispense Refill    ACCU-CHEK JOANN PLUS TEST STRP Strp CHECK BLOOD SUGAR THREE TIMES DAILY 300 strip 11    allopurinol (ZYLOPRIM) 100 MG tablet TAKE 1 TABLET EVERY DAY 90 tablet 3    ammonium lactate (LAC-HYDRIN) 12 % lotion Apply topically as needed for Dry Skin. 225 g 6    ammonium lactate 12 % Crea Apply 12 g topically 3 (three) times daily.  6    blood-glucose meter (ACCU-CHEK JOANN PLUS METER) Misc Use as directed 1 each 0    carvedilol (COREG) 6.25 MG tablet TK 1 T PO  BID  3    ciclopirox (PENLAC) 8 % Soln Apply to affected nails daily x 6-12 mos.  Remove weekly with rubbing alcohol 1 Bottle 5    clonazePAM (KLONOPIN) 0.5 MG tablet Take 1 tablet (0.5 mg total) by mouth daily as needed. 90 tablet 3    diclofenac sodium (VOLTAREN) 1 % Gel Apply 2 g topically 3 (three) times daily. To the right knee. 1 Tube 0    doxazosin (CARDURA) 8 MG Tab Take 1 tablet (8 mg total) by mouth once daily. (Patient taking differently: Take 8 mg by mouth every evening. ) 90 tablet 4    fluticasone (FLONASE) 50 mcg/actuation nasal  "spray 2 sprays (100 mcg total) by Each Nare route once daily. 16 g 3    furosemide (LASIX) 80 MG tablet TAKE 1 TABLET BY MOUTH TWICE DAILY ON NON-DIALYSIS DAYS(TUESDAY, THURSDAY, AND SATURDAY) 120 tablet 3    GENERLAC 10 gram/15 mL solution TAKE 30 ML 'S BY MOUTH THREE TIMES DAILY 2700 mL 5    hydrALAZINE (APRESOLINE) 100 MG tablet Take 1 tablet (100 mg total) by mouth 3 (three) times daily. 270 tablet 3    hydrOXYzine HCl (ATARAX) 25 MG tablet TAKE 1 TABLET(25 MG) BY MOUTH THREE TIMES DAILY AS NEEDED FOR ITCHING 90 tablet 2    insulin aspart (NOVOLOG) 100 unit/mL InPn pen Inject 6 units w/ breakfast, 4 units w/ lunch and dinner plus scale 180-230 +1, 231-280 +2, 281-330 +3, 331-380 +4, >380 +5. 90 day supply 3 Box 3    insulin aspart U-100 (NOVOLOG FLEXPEN U-100 INSULIN) 100 unit/mL InPn pen Inject 6 units w/ breakfast, 4 units w/ lunch and dinner scale. Max daily 30 units. 45 mL 2    insulin glargine (LANTUS SOLOSTAR) 100 unit/mL (3 mL) InPn pen Inject 8 Units into the skin every evening. 2 Box 6    insulin needles, disposable, (NOVOFINE 32) 32 x 1/4 " Ndle 1 each by Misc.(Non-Drug; Combo Route) route 3 (three) times daily. 100 each 5    lancets (ACCU-CHEK SOFTCLIX LANCETS) Misc 1 lancet by Misc.(Non-Drug; Combo Route) route 4 (four) times daily. 360 each 3    LANTUS SOLOSTAR U-100 INSULIN 100 unit/mL (3 mL) InPn pen INJECT 8 TO 10 UNITS UNDER THE SKIN EVERY NIGHT 30 mL 2    minoxidil (LONITEN) 2.5 MG tablet Take 2 tablets (5 mg total) by mouth 2 (two) times daily. 360 tablet 3    multivitamin capsule Take 1 capsule by mouth once daily.      ondansetron (ZOFRAN-ODT) 4 MG TbDL DISSOLVE 1 TABLET(4 MG) ON THE TONGUE EVERY 12 HOURS AS NEEDED 60 tablet 4    oxyCODONE (ROXICODONE) 5 MG immediate release tablet Take 1 tablet (5 mg total) by mouth every 6 (six) hours as needed for Pain. 28 tablet 0    pantoprazole (PROTONIX) 40 MG tablet TAKE 1 TABLET EVERY DAY 90 tablet 3    rifAXIMin (XIFAXAN) 550 mg Tab " Take 1 tablet (550 mg total) by mouth 2 (two) times daily. 60 tablet 11    sevelamer carbonate (RENVELA) 800 mg Tab Take 1 tablet (800 mg total) by mouth 3 (three) times daily with meals. 90 tablet 11    simethicone (MYLICON) 80 MG chewable tablet Take 1 tablet (80 mg total) by mouth every 6 (six) hours as needed for Flatulence (and bloating). 60 tablet 3    tacrolimus (PROGRAF) 0.5 MG Cap TAKE 1 CAPSULE EVERY DAY 90 capsule 11    UNABLE TO FIND 1 tablet once daily. Pro Renal Plus D Oral Tabs      urea (CARMOL) 40 % Crea Apply topically once daily. 85 g 5    aspirin 81 MG Chew Take 1 tablet (81 mg total) by mouth once daily. 90 tablet 3    cetirizine (ZYRTEC) 10 MG tablet Take 1 tablet (10 mg total) by mouth once daily. 90 tablet 3    mupirocin (BACTROBAN) 2 % ointment Apply topically 3 (three) times daily. aa for 7 days 22 g 3    [START ON 8/17/2018] silver nitrate applicators 75-25 % applicator Apply topically 3 (three) times a week. Apply to bleeder on warty formation. 100 each 0    tiZANidine (ZANAFLEX) 4 MG tablet TAKE 1 TABLET(4 MG) BY MOUTH EVERY 6 HOURS AS NEEDED 360 tablet 0     No current facility-administered medications for this visit.        Review of patient's allergies indicates:   Allergen Reactions    Corticosteroids (glucocorticoids) Other (See Comments)     Leg swelling    Hydrocortisone      Leg swelling    Neuromuscular blockers, steroidal      Leg swelling    Ribavirin      Intolerance, arthralgias       Physical examination  Vitals Reviewed  Gen. Well-dressed well-nourished   Skin warm dry and intact.  No rashes noted.  Neck is supple without adenopathy  Chest.  Respirations are even unlabored.  Lungs are clear to auscultation.  Cardiac regular rate and rhythm.  No chest wall adenopathy noted.  Neuro. Awake alert oriented x4.  Normal judgment and cognition noted.  Extremities no clubbing cyanosis or edema noted.     Call or return to clinic prn if these symptoms worsen or fail to  improve as anticipated.

## 2018-08-17 NOTE — TELEPHONE ENCOUNTER
----- Message from Vikki Florez sent at 8/17/2018 11:13 AM CDT -----  Contact: [facility]  Med refill : [phone]    Silver Nitrate Applicator   Isn't covered by insurance needs something else instead.     [facility]

## 2018-08-17 NOTE — TELEPHONE ENCOUNTER
Sorry, there is no convenient alternative. There is creams and ointments but that is difficult to apply and can be problematic.   I understand that the other is expensive.

## 2018-08-21 NOTE — TELEPHONE ENCOUNTER
----- Message from Joyce Francois sent at 8/20/2018 11:23 AM CDT -----  Contact: Pharmacy  Pharmacy called to state that script of silver nitrate applicators 75-25 % applicator is not covered by insurance. Please call in another script. thanks

## 2018-08-21 NOTE — TELEPHONE ENCOUNTER
I don't favor use of the cream or ointments. I will address this at his next visit. Let him know.

## 2018-08-28 NOTE — TELEPHONE ENCOUNTER
----- Message from Annalisa Schulz sent at 8/27/2018  4:36 PM CDT -----  Contact: Spouse  Pts wife is calling in regarding to pts medication silver nitrate applicators 75-25 % applicator. Please call Merlene at [phone]

## 2018-09-07 NOTE — TELEPHONE ENCOUNTER
Working on getting pt's labs rescheduled along with the liver u/s that is overdue. Will have Celia, our  call pt's wife to schedule.

## 2018-10-05 NOTE — TELEPHONE ENCOUNTER
----- Message from Danay Nath MD sent at 10/4/2018  3:55 AM CDT -----  Pl inform patient:  Two lesions were seen, of which, one remains stable in size.  Second lesion has slightly increased in size from 1.2 x 0.9 to 1.7 x 1.9 cm.  I have requested IR to let us know if ablation could be performed.  Due to CKD, would like to limit exposure to contrast.

## 2018-10-05 NOTE — TELEPHONE ENCOUNTER
----- Message from Danay Nath MD sent at 10/3/2018  4:33 PM CDT -----  Creat up, he is on HD, no action needed

## 2018-10-05 NOTE — TELEPHONE ENCOUNTER
Labs reviewed. Creatinine elevated but pt on dialysis. Otherwise, labs stable. Dr mott will review liver u/s with IR conference. Called pt's wife Merlene, and reviewed stable labs and that Dr Mott will review liver u/s with IR conference to determine future treatment/monitoring

## 2018-10-15 NOTE — TELEPHONE ENCOUNTER
IR wants CT without contrast before they can do ablation of liver mass. Order placed. Pl schedule patient ASAP.

## 2018-10-15 NOTE — TELEPHONE ENCOUNTER
IR wants CT without contrast before they can do ablation of liver mass. Order placed. Pl schedule patient ASAP.    Will call pt's wife.

## 2018-10-23 NOTE — PATIENT INSTRUCTIONS
Recommend lotions: eucerin, eucerin for diabetics, aquaphor, A&D ointment, gold bond for diabetics, sween, [place]'s Bees all purpose baby ointment,  urea 40 with aloe (found on amazon.com)    Shoe recommendations: (try 6pm.com, zappos.[employer] , nordstromrack.[employer], or shoes.[employer] for discounted prices) you can visit DSW shoes in [place]  or Sharely[employer] in the [facility] (there are also several shoe brand outlets in the [facility])    Asics (GT 2000 or gel foundations), new balance stability type shoes, saucony (stabil c3),  Piña (GTS or Beast or transcend), vionic, propet (tennis shoe)    sofft brand, clarks, crocs, aerosoles, naturalizers, SAS, ecco, born, cara holly, rockports (dress shoes)    Vionic, burkenstocks, fitflops, propet (sandals)  Nike comfort thong sandals, crocs, propet (house shoes)        Diabetes: Inspecting Your Feet  Diabetes increases your chances of developing foot problems. So inspect your feet every day. This helps you find small skin irritations before they become serious infections. If you have trouble seeing the bottoms of your feet, use a mirror or ask a family member or friend to help.     Pressure spots on the bottom of the foot are common areas where problems develop.   How to check your feet  Below are tips to help you look for foot problems. Try to check your feet at the same time each day, such as when you get out of bed in the morning:  · Check the top of each foot. The tops of toes, back of the heel, and outer edge of the foot can get a lot of rubbing from poor-fitting shoes.  · Check the bottom of each foot. Daily wear and tear often leads to problems at pressure spots.  · Check the toes and nails. Fungal infections often occur between toes. Toenail problems can also be a sign of fungal infections or lead to breaks in the skin.  · Check your shoes, too. Loose objects inside a shoe can injure the foot. Use your hand to feel inside your shoes for things like kvng, loose stitching, or  rough areas that could irritate your skin.  Warning signs  Look for any color changes in the foot. Redness with streaks can signal a severe infection, which needs immediate medical attention. Tell your doctor right away if you have any of these problems:  · Swelling, sometimes with color changes, may be a sign of poor blood flow or infection. Symptoms include tenderness and an increase in the size of your foot.  · Warm or hot areas on your feet may be signs of infection. A foot that is cold may not be getting enough blood.  · Sensations such as burning, tingling, or pins and needles can be signs of a problem. Also check for areas that may be numb.  · Hot spots are caused by friction or pressure. Look for hot spots in areas that get a lot of rubbing. Hot spots can turn into blisters, calluses, or sores.  · Cracks and sores are caused by dry or irritated skin. They are a sign that the skin is breaking down, which can lead to infection.  · Toenail problems to watch for include nails growing into the skin (ingrown toenail) and causing redness or pain. Thick, yellow, or discolored nails can signal a fungal infection.  · Drainage and odor can develop from untreated sores and ulcers. Call your doctor right away if you notice white or yellow drainage, bleeding, or unpleasant odor.   © 7072-7247 [employer]. [address] [place] [address]. All rights reserved. This information is not intended as a substitute for professional medical care. Always follow your healthcare professional's instructions.        Step-by-Step:  Inspecting Your Feet (Diabetes)    Date Last Reviewed: 10/1/2016  © 1385-2210 [employer]. [address] [place] [id number]. All rights reserved. This information is not intended as a substitute for professional medical care. Always follow your healthcare professional's instructions.

## 2018-10-23 NOTE — PROGRESS NOTES
Subjective:      Patient ID: Philip Mathis III is a 73 y.o. male.    Chief Complaint: Diabetes Mellitus (Pcp Dr. Castillo  seen Roman 8/15/18); Diabetic Foot Exam; and Nail Care    Philip is a 73 y.o. male who presents to the clinic for evaluation and treatment of diabetic feet. Philip has a past medical history of Anemia, Arthritis, Back pain, Bishop's esophagus, BPH (benign prostatic hypertrophy), Chronic kidney disease, Cirrhosis, Diabetes mellitus, Diabetes mellitus, type 2, Diabetes with neurologic complications, Diabetic retinopathy, Elevated PSA, Heart failure, Hemolytic anemia, Hepatitis C, Hepatocellular carcinoma (1/2/2018), Hyperlipidemia, Hypertension, Hypertension, Immune disorder, Liver transplanted, Peripheral neuropathy, Sleep apnea, Trouble in sleeping, Type 2 diabetes mellitus with ophthalmic manifestations, Type 2 diabetes with peripheral circulatory disorder, controlled, and Type II or unspecified type diabetes mellitus with renal manifestations, uncontrolled(250.42). Patient relates no major problem with feet. Only complaints today consist of long thick toenails that are difficult for him to trim. Also has callusing to toes and routine trimming helps.  No other pedal complaints. Doing well.     Previously seen by my colleague, new to me.    PCP: Reji Castillo MD    Date Last Seen by PCP:   Chief Complaint   Patient presents with    Diabetes Mellitus     Pcp Dr. Castillo  seen Roman 8/15/18    Diabetic Foot Exam    Nail Care         Current shoe gear: Extra depth shoes with custome accommodative insoles    Hemoglobin A1C   Date Value Ref Range Status   11/20/2017 5.8 (H) 4.0 - 5.6 % Final     Comment:     According to ADA guidelines, hemoglobin A1c <7.0% represents  optimal control in non-pregnant diabetic patients. Different  metrics may apply to specific patient populations.   Standards of Medical Care in Diabetes-2016.  For the purpose of screening for the presence of  diabetes:  <5.7%     Consistent with the absence of diabetes  5.7-6.4%  Consistent with increasing risk for diabetes   (prediabetes)  >or=6.5%  Consistent with diabetes  Currently, no consensus exists for use of hemoglobin A1c  for diagnosis of diabetes for children.  This Hemoglobin A1c assay has significant interference with fetal   hemoglobin   (HbF). The results are invalid for patients with abnormal amounts of   HbF,   including those with known Hereditary Persistence   of Fetal Hemoglobin. Heterozygous hemoglobin variants (HbAS, HbAC,   HbAD, HbAE, HbA2) do not significantly interfere with this assay;   however, presence of multiple variants in a sample may impact the %   interference.     10/14/2017 5.7 (H) 4.0 - 5.6 % Final     Comment:     According to ADA guidelines, hemoglobin A1c <7.0% represents  optimal control in non-pregnant diabetic patients. Different  metrics may apply to specific patient populations.   Standards of Medical Care in Diabetes-2016.  For the purpose of screening for the presence of diabetes:  <5.7%     Consistent with the absence of diabetes  5.7-6.4%  Consistent with increasing risk for diabetes   (prediabetes)  >or=6.5%  Consistent with diabetes  Currently, no consensus exists for use of hemoglobin A1c  for diagnosis of diabetes for children.  This Hemoglobin A1c assay has significant interference with fetal   hemoglobin   (HbF). The results are invalid for patients with abnormal amounts of   HbF,   including those with known Hereditary Persistence   of Fetal Hemoglobin. Heterozygous hemoglobin variants (HbAS, HbAC,   HbAD, HbAE, HbA2) do not significantly interfere with this assay;   however, presence of multiple variants in a sample may impact the %   interference.     07/15/2017 5.3 4.0 - 5.6 % Final     Comment:     According to ADA guidelines, hemoglobin A1c <7.0% represents  optimal control in non-pregnant diabetic patients. Different  metrics may apply to specific patient  "populations.   Standards of Medical Care in Diabetes-2016.  For the purpose of screening for the presence of diabetes:  <5.7%     Consistent with the absence of diabetes  5.7-6.4%  Consistent with increasing risk for diabetes   (prediabetes)  >or=6.5%  Consistent with diabetes  Currently, no consensus exists for use of hemoglobin A1c  for diagnosis of diabetes for children.  This Hemoglobin A1c assay has significant interference with fetal   hemoglobin   (HbF). The results are invalid for patients with abnormal amounts of   HbF,   including those with known Hereditary Persistence   of Fetal Hemoglobin. Heterozygous hemoglobin variants (HbAS, HbAC,   HbAD, HbAE, HbA2) do not significantly interfere with this assay;   however, presence of multiple variants in a sample may impact the %   interference.             Review of Systems   Constitution: Negative for chills and fever.   Cardiovascular: Positive for leg swelling. Negative for chest pain and claudication.   Respiratory: Negative for cough and shortness of breath.    Skin: Positive for dry skin and nail changes.   Musculoskeletal: Positive for arthritis and stiffness.   Gastrointestinal: Negative for nausea and vomiting.   Neurological: Positive for paresthesias. Negative for numbness.   Psychiatric/Behavioral: Negative for altered mental status.           Objective:       Vitals:    10/23/18 1110   BP: (!) 130/50   Weight: 91.6 kg (202 lb)   Height: 6' 1" (1.854 m)   PainSc: 0-No pain       Physical Exam   Constitutional: He is oriented to person, place, and time. He appears well-developed and well-nourished.   HENT:   Head: Normocephalic.   Cardiovascular: Intact distal pulses.   Pulses:       Dorsalis pedis pulses are 1+ on the right side, and 1+ on the left side.        Posterior tibial pulses are 1+ on the right side, and 1+ on the left side.   1+ pitting edema b/l LEs with varicosities  present. The skin of both feet and ankles is thin, shiny, atrophic and " cool to touch.    Pulmonary/Chest: No respiratory distress.   Musculoskeletal:   Gastrocnemius equinus noted to b/l ankles with decreased DF noted on exam. MMT 5/5 in DF/PF/Inv/Ev resistance with no reproduction of pain in any direction. Passive range of motion of ankle and pedal joints is painless. Adequate pedal joint ROM.     Prominent midfoot left plantar aspect at TN joint with palpable bone. Rocker bottom foot type left. Semi-reducible hammertoe contractures noted to toes 1-4 b/l-asymptomatic. HAV, mild, non trackbound noted b/l with mild medial bony prominence at 1st met head--asymptomatic.      Neurological: He is alert and oriented to person, place, and time. He has normal strength. A sensory deficit is present.   Light touch, proprioception, and sharp/dull sensation are all intact bilaterally. Protective threshold with the Litchfield-Wienstein monofilament is decreased bilaterally. Vibratory sensation diminished b/l distal foot.    Skin: Skin is warm, dry and intact. Rash noted. No bruising, no ecchymosis and no lesion noted. He is not diaphoretic. There is erythema. No pallor.   Nails are thickened, elongated, discolored yellow/brown with subungual debris and brittleness to R 1-5 and L 1-5. Interdigital spaces clean, dry and intact b/l. Pedal hair absent.     Skin of forefoot is cool to touch with proximal warmth.   Moderate hyperkeratosis noted to plantar medial midfoot at bony prominence, left.     Mild hyperpigmentation to skin of both ankles consistent with hemosiderin deposits.     Mild maceration to skin between toes 4/5 bilateral. No drainage or open wounds. No SOI.     Scaling dryness in a moccasin distribution is noted to the bilateral lower extremities with associated erythema.       Psychiatric: He has a normal mood and affect. His behavior is normal. Judgment and thought content normal.   Vitals reviewed.          Assessment:       Encounter Diagnoses   Name Primary?    Type II diabetes mellitus  with neurological manifestations Yes    Bilateral lower extremity edema     Onychomycosis due to dermatophyte     Maceration of skin - Right Foot     Corn or callus     Hammer toes of both feet     Pes planus of both feet     Hallux malleus of left foot     Hallux malleus of right foot     Rocker bottom foot, acquired, left     Hallux malleus, unspecified laterality     Tinea pedis of both feet          Plan:       Philip was seen today for diabetes mellitus, diabetic foot exam and nail care.    Diagnoses and all orders for this visit:    Type II diabetes mellitus with neurological manifestations    Bilateral lower extremity edema    Onychomycosis due to dermatophyte    Maceration of skin - Right Foot    Corn or callus    Hammer toes of both feet    Pes planus of both feet    Hallux malleus of left foot    Hallux malleus of right foot    Rocker bottom foot, acquired, left    Hallux malleus, unspecified laterality    Tinea pedis of both feet    Other orders  -     econazole nitrate 1 % cream; Apply topically 2 (two) times daily.      I counseled the patient on his conditions, their implications and medical management.      - Shoe inspection. Diabetic Foot Education. Patient reminded of the importance of good nutrition and blood sugar control to help prevent podiatric complications of diabetes. Patient instructed on proper foot hygeine. We discussed wearing proper shoe gear, daily foot inspections, never walking without protective shoe gear.      With patient's permission, nails were aggressively reduced and debrided x 10 to their soft tissue attachment mechanically and with electric , removing all offending nail and debris. Patient relates relief following the procedure.     The affected area was cleansed with an alcohol prep pad. Next, utilizing a No.15 scalpel, the hyperkeratotic tissues were trimmed from plantar medial midfoot left, down to appropriate level of skin. Care was taken to remove any  nucleated core from the center of the lesion. No pinpoint bleeding was encountered. The patient tolerated relief following this procedure. (1 lesion total).     Long discussion with patient regarding appropriate, supportive and comfortable shoes. Recommended DM shoes with adequate arch supports to alleviate abnormal pressure and improve stability of foot while walking. Avoid flat shoes and barefoot walking as these will exacerbate or worsen symptoms. Continue with Diabetic shoes with custom inserts.     Discussed continued use of regular and routine moisturizer to skin of both feet to help improve dry skin. Advised to apply twice daily until resolution of symptoms. Avoid between toes. Continue Urea, Ok to switch to OTC as needed.      Instructed patient on the importance of keeping feet dry. Patient instructed to use absorbent cotton socks and change them if they become sweaty; or wear an open-toe shoe or sandal. Wash the feet at least once a day with soap and water. Apply the antifungal gel as prescribed. Instructed patient that it takes time for symptoms to completely dissipate. Patient instructed to use lysol or over-the-counter antifungal powders or sprays to shoes daily and allow them to air dry, switching shoes from every other day would be optimal. Patient is to avoid barefoot walking in  high-risk environments (public showers, gyms and locker rooms) may prevent future infections.     Patient to RTC if:  Increasing redness or swelling of the foot   Pus draining from cracks in the skin   Fever of 100.4ºF (38ºC) or higher      He will continue to monitor the areas daily, inspect his feet, wear protective shoe gear when ambulatory, moisturizer to maintain skin integrity and follow in this office in approximately 2-3 months, sooner p.r.n.

## 2018-10-26 NOTE — TELEPHONE ENCOUNTER
Patient: Philip Mathis III       MRN: [id number]      : 1945     Age: 73 y.o.  [address] [place] [address]    Provider: Hepatologist Mary Nath    Urgency of review: urgent    Patient Transplant Status: Not a candidate    Reason for presentation: Indeterminate lesion    Clinical Summary:   OLT , Hep C treated and cured, cirrhosis, ESRD on HD, and 2.3 cm liver lesion ablated in 2018.  Now has two lesions 1.9 cm and 1.1 cm, both felt to be indeterminate.  Please review - should we monitor or ablate?    Imaging to be reviewed: CT 10/25/18, 18    HCC Treatment History: ablation 2018    ABO: O POS    Platelets:   Lab Results   Component Value Date/Time     (L) 10/02/2018 07:50 AM     Creatinine:   Lab Results   Component Value Date/Time    CREATININE 5.2 (H) 10/02/2018 07:50 AM     Bilirubin:   Lab Results   Component Value Date/Time    BILITOT 0.6 10/02/2018 07:50 AM     AFP Last 3 each if available:   Lab Results   Component Value Date/Time    AFP 3.8 2018 07:06 AM    AFP 3.6 2018 07:16 AM    AFP 3.6 2018 07:52 AM       MELD: MELD-Na score: 21 at 10/2/2018  7:50 AM  MELD score: 21 at 10/2/2018  7:50 AM  Calculated from:  Serum Creatinine: 5.2 mg/dL (Rounded to 4 mg/dL) at 10/2/2018  7:50 AM  Serum Sodium: 141 mmol/L (Rounded to 137 mmol/L) at 10/2/2018  7:50 AM  Total Bilirubin: 0.6 mg/dL (Rounded to 1 mg/dL) at 10/2/2018  7:50 AM  INR(ratio): 1.1 at 10/2/2018  7:50 AM  Age: 73 years    Plan: Would continue surveillance, have seen similar indeterminate lesions in the past that are no longer present    Few enhancing regions that tend to wax and wane on surveillance.  Good treatment response.  Continue 3 month  Surveillance (CT and AFP).    Note forwarded to post liver transplant clinical staff to coordinate follow-up    Follow-up Provider: Danay Nath MD

## 2018-10-26 NOTE — TELEPHONE ENCOUNTER
Patient: Philip Mathis III       MRN: [id number]      : 1945     Age: 73 y.o.  [address]  [place] [address]    Provider: Hepatologist Mary Nath    Urgency of review: urgent    Patient Transplant Status: Not a candidate    Reason for presentation: Indeterminate lesion    Clinical Summary:   OLT , Hep C treated and cured, cirrhosis, ESRD on HD, and 2.3 cm liver lesion ablated in 2018.  Now has two lesions 1.9 cm and 1.1 cm, both felt to be indeterminate.  Please review - should we monitor or ablate?    Imaging to be reviewed: CT 10/25/18, 18    HCC Treatment History: ablation 2018    ABO: O POS    Platelets:   Lab Results   Component Value Date/Time     (L) 10/02/2018 07:50 AM     Creatinine:   Lab Results   Component Value Date/Time    CREATININE 5.2 (H) 10/02/2018 07:50 AM     Bilirubin:   Lab Results   Component Value Date/Time    BILITOT 0.6 10/02/2018 07:50 AM     AFP Last 3 each if available:   Lab Results   Component Value Date/Time    AFP 3.8 2018 07:06 AM    AFP 3.6 2018 07:16 AM    AFP 3.6 2018 07:52 AM       MELD: MELD-Na score: 21 at 10/2/2018  7:50 AM  MELD score: 21 at 10/2/2018  7:50 AM  Calculated from:  Serum Creatinine: 5.2 mg/dL (Rounded to 4 mg/dL) at 10/2/2018  7:50 AM  Serum Sodium: 141 mmol/L (Rounded to 137 mmol/L) at 10/2/2018  7:50 AM  Total Bilirubin: 0.6 mg/dL (Rounded to 1 mg/dL) at 10/2/2018  7:50 AM  INR(ratio): 1.1 at 10/2/2018  7:50 AM  Age: 73 years    Plan:     Follow-up Provider:

## 2018-10-31 NOTE — PROGRESS NOTES
CC: right knee pain    73 y.o. Male , who reports knee swelling and mild fullness/discomfort refractory to conservative mgmt of the right knee that began over the last 1 week with no new injury or trauma. He reports that the knee has been doing well previously following euflexxa series injections 6 months ago. Yesterday he reports that knee got really swollen yesterday and it was limiting his movement and made his knee feel weak. He used ice compresses with some swelling and pain improvement.     Is affecting ADLs.     No mechanical symptoms, no instability    Review of Systems   Constitution: Negative. Negative for chills, fever and night sweats.   HENT: Negative for congestion and headaches.    Eyes: Negative for blurred vision, left vision loss and right vision loss.   Cardiovascular: Negative for chest pain and syncope.   Respiratory: Negative for cough and shortness of breath.    Endocrine: Negative for polydipsia, polyphagia and polyuria.   Hematologic/Lymphatic: Negative for bleeding problem. Does not bruise/bleed easily.   Skin: Negative for dry skin, itching and rash.   Musculoskeletal: Negative for falls and muscle weakness.   Gastrointestinal: Negative for abdominal pain and bowel incontinence.   Genitourinary: Negative for bladder incontinence and nocturia.   Neurological: Negative for disturbances in coordination, loss of balance and seizures.   Psychiatric/Behavioral: Negative for depression. The patient does not have insomnia.    Allergic/Immunologic: Negative for hives and persistent infections.   All other systems negative      PAST MEDICAL HISTORY:   Past Medical History:   Diagnosis Date    Anemia     Arthritis     Back pain     Bishop's esophagus     BPH (benign prostatic hypertrophy)     Chronic kidney disease     Cirrhosis     Diabetes mellitus     Diabetes mellitus, type 2     Diabetes with neurologic complications     Diabetic retinopathy     Elevated PSA     Heart  failure     Hemolytic anemia     Hepatitis C     successfully treated with Harvoni    Hepatocellular carcinoma 1/2/2018    Hyperlipidemia     Hypertension     Hypertension     Immune disorder     Liver transplanted     Peripheral neuropathy     Sleep apnea     Trouble in sleeping     Type 2 diabetes mellitus with ophthalmic manifestations     Type 2 diabetes with peripheral circulatory disorder, controlled     Type II or unspecified type diabetes mellitus with renal manifestations, uncontrolled(250.42)      PAST SURGICAL HISTORY:   Past Surgical History:   Procedure Laterality Date    1) Creation of L arm basilic vein transposition (brachial artery-to-basilic vein AVF)  2) Ligation of L brachial AVF (which was 1st stage creation previously)    Left 6/15/2017    Performed by Silvio William MD at [facility] OR 2ND FLR    ABLATION, RADIOFREQUENCY N/A 2/27/2018    Performed by Esther Surgeon at [facility] ESTHER    EEPEBO-ELJCSS-DR N/A 12/21/2017    Performed by [facility] Diagnostic Provider at [facility] OR 2ND FLR    broken nose      CATARACT EXTRACTION Bilateral     colonoscopy N/A 7/23/2014    Performed by Moises St MD at [facility] ENDO (4TH FLR)    AXKFFOIU-YGELBXN-DL POSSIBLE AV GRAFT Left 11/8/2016    Performed by Silvio William MD at [facility] OR 2ND FLR    DEBRIDEMENT-WOUND Left 4/12/2015    Performed by Jimmy Light MD at [facility] OR 2ND FLR    DEBRIDEMENT-WOUND Left 4/11/2015    Performed by Jimmy Light MD at [facility] OR 2ND FLR    DEBRIDEMENT-WOUND Left 4/10/2015    Performed by Jimmy Light MD at [facility] OR 2ND FLR    DEBRIDEMENT-WOUND Left 4/9/2015    Performed by Jimmy Light MD at [facility] OR 1ST FLR    DEBRIDEMENT-WOUND Left 4/6/2015    Performed by Jimmy Light MD at [facility] OR 2ND FLR    DEBRIDEMENT-WOUND Left 4/4/2015    Performed by Jimmy Light MD at [facility] OR 2ND FLR    DEBRIDEMENT-WOUND Left 4/2/2015    Performed by Jimmy Light MD at [facility] OR 1ST FLR    DRESSING CHANGE-  Left 4/10/2015    Performed by Jimmy Light MD at [facility] OR 2ND FLR    DRESSING CHANGE-s/p burn debridement Left 4/15/2015    Performed by Jimmy Light MD at [facility] OR 2ND FLR    DRESSING CHANGE-s/p burn debridement Left 4/14/2015    Performed by Jimmy Light MD at [facility] OR 2ND FLR    DRESSING CHANGE-s/p wound debridement Left 4/13/2015    Performed by Jimmy Light MD at [facility] OR 1ST FLR    DRESSING CHANGE-wound vac Left 4/20/2015    Performed by Jimmy Light MD at [facility] OR 2ND FLR    EYE SURGERY      cataracts    FEMUR SURGERY      FRACTURE SURGERY      right femur fracture     INSERTION-CATHETER-PERM-A-CATH Right 7/13/2017    Performed by Silvio William MD at [facility] OR 2ND FLR    LIVER TRANSPLANT  2001    Open Biome Fecal Transplant N/A 8/5/2015    Performed by Elmo Gan MD at [facility] ENDO (4TH FLR)    PLACEMENT-WOUND VAC  4/15/2015    Performed by Jimmy Light MD at [facility] OR 2ND FLR    PLACEMENT-WOUND VAC Left 4/4/2015    Performed by Jimmy Light MD at [facility] OR 2ND FLR    PORTACATH PLACEMENT Left     SKIN GRAFT      graft from right thigh , applied to the left back and left arm.    SPLIT THICKNESS SKIN GRAFT - left trunk, abd and axilla N/A 4/15/2015    Performed by Jimmy Light MD at [facility] OR 2ND FLR     FAMILY HISTORY:   Family History   Problem Relation Age of Onset    Cancer Mother         cancer 55 years ago    Coronary artery disease Father     Hypertension Father     Diabetes Father     Heart disease Father     Cancer Sister         breast CA    Colon cancer Neg Hx      SOCIAL HISTORY:   Social History     Socioeconomic History    Marital status:      Spouse name: Not on file    Number of children: Not on file    Years of education: Not on file    Highest education level: Not on file   Social Needs    Financial resource strain: Not on file    Food insecurity - worry: Not on file    Food insecurity - inability: Not on file     Transportation needs - medical: Not on file    Transportation needs - non-medical: Not on file   Occupational History    Not on file   Tobacco Use    Smoking status: Former Smoker     Last attempt to quit: 1998     Years since quittin.2    Smokeless tobacco: Never Used    Tobacco comment: pt reports quitting in    Substance and Sexual Activity    Alcohol use: No     Comment: pt reports hx of alcholism and reports quit in     Drug use: No    Sexual activity: Yes     Partners: Female   Other Topics Concern    Not on file   Social History Narrative    Not on file       MEDICATIONS:   Current Outpatient Medications:     ACCU-CHEK JOANN PLUS TEST STRP Strp, CHECK BLOOD SUGAR THREE TIMES DAILY, Disp: 300 strip, Rfl: 11    allopurinol (ZYLOPRIM) 100 MG tablet, TAKE 1 TABLET EVERY DAY, Disp: 90 tablet, Rfl: 3    ammonium lactate (LAC-HYDRIN) 12 % lotion, Apply topically as needed for Dry Skin., Disp: 225 g, Rfl: 6    ammonium lactate 12 % Crea, Apply 12 g topically 3 (three) times daily., Disp: , Rfl: 6    blood-glucose meter (ACCU-CHEK JOANN PLUS METER) [facility], Use as directed, Disp: 1 each, Rfl: 0    carvedilol (COREG) 6.25 MG tablet, TK 1 T PO  BID, Disp: , Rfl: 3    cetirizine (ZYRTEC) 10 MG tablet, Take 1 tablet (10 mg total) by mouth once daily., Disp: 90 tablet, Rfl: 3    ciclopirox (PENLAC) 8 % Soln, Apply to affected nails daily x 6-12 mos.  Remove weekly with rubbing alcohol, Disp: 1 Bottle, Rfl: 5    clonazePAM (KLONOPIN) 0.5 MG tablet, Take 1 tablet (0.5 mg total) by mouth daily as needed., Disp: 90 tablet, Rfl: 3    diclofenac sodium (VOLTAREN) 1 % Gel, Apply 2 g topically 3 (three) times daily. To the right knee., Disp: 1 Tube, Rfl: 0    econazole nitrate 1 % cream, Apply topically 2 (two) times daily., Disp: 85 g, Rfl: 1    fluticasone (FLONASE) 50 mcg/actuation nasal spray, SHAKE LIQUID AND USE 2 SPRAYS(100 MCG) IN EACH NOSTRIL EVERY DAY, Disp: 16 mL, Rfl: 0     "furosemide (LASIX) 80 MG tablet, TAKE 1 TABLET BY MOUTH TWICE DAILY ON NON-DIALYSIS DAYS(TUESDAY, THURSDAY, AND SATURDAY), Disp: 120 tablet, Rfl: 3    GENERLAC 10 gram/15 mL solution, TAKE 30 ML 'S BY MOUTH THREE TIMES DAILY, Disp: 2700 mL, Rfl: 5    hydrALAZINE (APRESOLINE) 100 MG tablet, Take 1 tablet (100 mg total) by mouth 3 (three) times daily., Disp: 270 tablet, Rfl: 3    hydrOXYzine HCl (ATARAX) 25 MG tablet, TAKE 1 TABLET(25 MG) BY MOUTH THREE TIMES DAILY AS NEEDED FOR ITCHING, Disp: 90 tablet, Rfl: 2    hydrOXYzine HCl (ATARAX) 25 MG tablet, TAKE 1 TABLET(25 MG) BY MOUTH THREE TIMES DAILY AS NEEDED FOR ITCHING, Disp: 90 tablet, Rfl: 0    insulin aspart (NOVOLOG) 100 unit/mL InPn pen, Inject 6 units w/ breakfast, 4 units w/ lunch and dinner plus scale 180-230 +1, 231-280 +2, 281-330 +3, 331-380 +4, >380 +5. 90 day supply, Disp: 3 Box, Rfl: 3    insulin aspart U-100 (NOVOLOG FLEXPEN U-100 INSULIN) 100 unit/mL InPn pen, Inject 6 units w/ breakfast, 4 units w/ lunch and dinner scale. Max daily 30 units., Disp: 45 mL, Rfl: 2    insulin glargine (LANTUS SOLOSTAR) 100 unit/mL (3 mL) InPn pen, Inject 8 Units into the skin every evening., Disp: 2 Box, Rfl: 6    insulin needles, disposable, (NOVOFINE 32) 32 x 1/4 " Ndle, 1 each by Misc.(Non-Drug; Combo Route) route 3 (three) times daily., Disp: 100 each, Rfl: 5    lancets (ACCU-CHEK SOFTCLIX LANCETS) Misc, 1 lancet by Misc.(Non-Drug; Combo Route) route 4 (four) times daily., Disp: 360 each, Rfl: 3    LANTUS SOLOSTAR U-100 INSULIN 100 unit/mL (3 mL) InPn pen, INJECT 8 TO 10 UNITS UNDER THE SKIN EVERY NIGHT, Disp: 30 mL, Rfl: 2    minoxidil (LONITEN) 2.5 MG tablet, Take 2 tablets (5 mg total) by mouth 2 (two) times daily., Disp: 360 tablet, Rfl: 3    multivitamin capsule, Take 1 capsule by mouth once daily., Disp: , Rfl:     ondansetron (ZOFRAN-ODT) 4 MG TbDL, DISSOLVE 1 TABLET(4 MG) ON THE TONGUE EVERY 12 HOURS AS NEEDED, Disp: 60 tablet, Rfl: 4    " oxyCODONE (ROXICODONE) 5 MG immediate release tablet, Take 1 tablet (5 mg total) by mouth every 6 (six) hours as needed for Pain., Disp: 28 tablet, Rfl: 0    pantoprazole (PROTONIX) 40 MG tablet, TAKE 1 TABLET EVERY DAY, Disp: 90 tablet, Rfl: 3    rifAXIMin (XIFAXAN) 550 mg Tab, Take 1 tablet (550 mg total) by mouth 2 (two) times daily., Disp: 60 tablet, Rfl: 11    silver nitrate applicators 75-25 % applicator, Apply topically 3 (three) times a week. Apply to bleeder on warty formation., Disp: 100 each, Rfl: 0    simethicone (MYLICON) 80 MG chewable tablet, Take 1 tablet (80 mg total) by mouth every 6 (six) hours as needed for Flatulence (and bloating)., Disp: 60 tablet, Rfl: 3    tacrolimus (PROGRAF) 0.5 MG Cap, TAKE 1 CAPSULE EVERY DAY, Disp: 90 capsule, Rfl: 11    tiZANidine (ZANAFLEX) 4 MG tablet, TAKE 1 TABLET(4 MG) BY MOUTH EVERY 6 HOURS AS NEEDED, Disp: 60 tablet, Rfl: 0    tiZANidine (ZANAFLEX) 4 MG tablet, TAKE 1 TABLET BY MOUTH EVERY 6 HOURS AS NEEDED, Disp: 90 tablet, Rfl: 0    UNABLE TO FIND, 1 tablet once daily. Pro Renal Plus D Oral Tabs, Disp: , Rfl:     urea (CARMOL) 40 % Crea, Apply topically once daily., Disp: 85 g, Rfl: 5    aspirin 81 MG Chew, Take 1 tablet (81 mg total) by mouth once daily., Disp: 90 tablet, Rfl: 3    doxazosin (CARDURA) 8 MG Tab, Take 1 tablet (8 mg total) by mouth once daily. (Patient taking differently: Take 8 mg by mouth every evening. ), Disp: 90 tablet, Rfl: 4    ibuprofen (ADVIL,MOTRIN) 800 MG tablet, Take 1 tablet (800 mg total) by mouth every 12 (twelve) hours as needed for Pain. Take with food., Disp: 30 tablet, Rfl: 0    sevelamer carbonate (RENVELA) 800 mg Tab, Take 1 tablet (800 mg total) by mouth 3 (three) times daily with meals., Disp: 90 tablet, Rfl: 11  ALLERGIES:   Review of patient's allergies indicates:   Allergen Reactions    Corticosteroids (glucocorticoids) Other (See Comments)     Leg swelling    Hydrocortisone      Leg swelling     "Neuromuscular blockers, steroidal      Leg swelling    Ribavirin      Intolerance, arthralgias       VITAL SIGNS: BP (!) 151/58   Pulse 62   Ht 6' 1" (1.854 m)   Wt 91.6 kg (202 lb)   BMI 26.65 kg/m²      PHYSICAL EXAMINATION    General:  The patient is alert and oriented x 3.  Mood is pleasant.  Observation of ears, eyes and nose reveal no gross abnormalities.  HEENT: NCAT, sclera nonicteric  Lungs: Respirations are equal and unlabored.    left  KNEE EXAMINATION     OBSERVATION / INSPECTION   Gait:   Nonantalgic   Alignment:  Neutral   Scars:   None   Muscle atrophy: Mild  Effusion:  None   Warmth:  None   Discoloration:   none     TENDERNESS / CREPITUS (T / C):          T / C      T / C   Patella   - / -   Lateral joint line   - / -      Peripatellar medial  -  Medial joint line    + / -   Peripatellar lateral -  Medial plica   - / -   Patellar tendon -   Popliteal fossa  - / -   Quad tendon   -   Gastrocnemius   -   Prepatellar Bursa - / -   Quadricep   -   Tibial tubercle  -  Thigh/hamstring  -   Pes anserine/HS -  Fibula    -   ITB   - / -  Tibia     -   Tib/fib joint  - / -  LCL    -     MFC   - / -   MCL: Proximal  -    LFC   - / -    Distal   -          ROM: (* = pain)  PASSIVE   ACTIVE    Left :   5 / 0 / 145   5 / 0 / 145     Right :    0 / 0 / 135   0 / 0 / 135    Patellofemoral examination:  See above noted areas of tenderness.   Patella position    Subluxation / dislocation: Centered           Sup. / Inf;   Normal   Crepitus (PF):    Absent   Patellar Mobility:       Medial-lateral:   Normal    Superior-inferior:  Normal    Inferior tilt   Normal    Patellar tendon:  Normal   Lateral tilt:    Normal   J-sign:     None   Patellofemoral grind:   -       MENISCAL SIGNS:     Pain on terminal extension:  -  Pain on terminal flexion:  -  Alisas maneuver:  -  Squat     NT    LIGAMENT EXAMINATION:  ACL / Lachman:  normal (-1 to 2mm)    PCL-Post.  drawer: normal 0 to 2mm  MCL- Valgus:  normal 0 to " 2mm  LCL- Varus:  normal 0 to 2mm  Pivot shift: normal (Equal)   Dial Test: difference c/w other side   At 30° flexion: normal (< 5°)    At 90° flexion: normal (< 5°)   Reverse Pivot Shift:   normal (Equal)     STRENGTH: (* = with pain) PAINFUL SIDE   Quadricep   4/5   Hamstrin/5    EXTREMITY NEURO-VASCULAR EXAMINATION:   Sensation:  Grossly intact to light touch all dermatomal regions.   Motor Function:  Fully intact motor function at hip, knee, foot and ankle    DTRs;  quadriceps and  achilles 2+.  No clonus and downgoing Babinski.    Vascular status:  DP and PT pulses 2+, brisk capillary refill, symmetric.     Other Findings:  + step down bilat  + bridge test     Xrays:  Xrays of the right knee 3 views were reviewed by me today. No fracture, subluxation, mild to moderate DJD noted of knee joint with patellofemoral joint space narrowing noted.       ASSESSMENT:    1. right Knee pain  2. Right knee osteoarthritis   3. Right knee effusion  Bilateral hip abd/core weakness    PLAN:    1. Procedure Note:  After consent was obtained, using sterile technique the right knee was prepped with iodine. The knee joint was entered from the lateral suprapatellar approach and 65 ml's of serous colored fluid was withdrawn and not  sent for analysis.  The procedure was well tolerated.  The patient is asked to continue to rest the knee for a few more days before resuming regular activities.  It may be more painful for the first 1-2 days.  Watch for fever, or increased swelling or persistent pain in knee. Call or return to clinic prn if such symptoms occur or the knee fails to improve as anticipated.    2. Ice compresses prn pain. Knee sleeve given. Wear viscoskin knee brace when active.    3. Patient does not want to do PT.   4. euflexxa prior auth placed. Return for injections.      All questions were answered, pt will contact us for questions or concerns in the interim.

## 2018-11-02 NOTE — TELEPHONE ENCOUNTER
IR conf review 10/30/18:  Few artrial enhancing lesions in liver. continue surveillance.  Please inform patient and get CT abd w wo contrast 3 months from last, Jan 25, 2019 with dialysis to follow.

## 2018-11-06 NOTE — PROGRESS NOTES
Patient is here for follow up of knee arthritis. Pt is requesting Euflexxa injection #1.  [facility] Has failed other conservative modalities including NSAIDS, activity modification, weight loss.    He has received these injections in the past and tolerated them well with great pain and swelling relief.     The prior shots were tolerated well. No relief from last drainage and steroid injection.    He also reports that since finishing dialysis earlier today he has been having some sharp pains of his inner right lower leg that occurs intermittently and spontaneously. It lasts for a few seconds and then resolves. He denies swelling or recent injury.     PHYSICAL EXAMINATION:     General: The patient is alert and oriented x 3. Mood is pleasant.   Observation of ears, eyes and nose reveals no gross abnormalities. No   labored breathing observed.     No signs of infection or adverse reaction to knee.    No TTP of the right lower leg. Negative valdemar's sign, no swelling or bruising.       Euflexxa Injection Procedure #1    A time out was performed, including verification of patient ID, procedure, site and side, availability of information and equipment, review of safety issues, and agreement with consent, the procedure site was marked.    Using a sterile technique the right knee joint was entered from the superior lateral approach with a 18 gauge needle and 35 ml's of serous colored fluid was withdrawn and not sent for analysis.   The procedure was well tolerated.      Then using the same needle, a therapeutic injection of 2cc Euflexxa was given under sterile technique using a 21.5g x 1.5 needle from the same superior lateral aspect of the right knee Joint with the patient in the supine position.     Philip MORELAND Del MARTE had no adverse reactions to the medication. Pain decreased. male was instructed to apply ice to the joint for 20 minutes and avoid strenuous activities for 24-36 hours following the  injection. male was warned of possible blood pressure changes during that time. Following that time, male can resume activities as prior to the injection.    male was reminded to call the clinic immediately for any adverse side effects as explained in clinic today.    RTC in 1 week for injection #2    -I have considered and do not suspect lower extremity DVT. Pain is likely due to muscle spasm/strain. Due to his severe pain when spasm occurs, I will give him a short course of robaxin 500mg every 24h to help control pain. He was also advised to use heat compresses to area if severe pain continues. I gave him strict instructions about how to no burn himself due to chronic DM. Told to use very low heat for only 20 minutes at a time and then remove. Wife who takes close care of him should check heating pad first on her skin to ensure low temperature setting.   -patient expressed understanding.     All patients questions were answered. Patient was advised to call us with any concerns or questions.

## 2018-11-08 NOTE — TELEPHONE ENCOUNTER
----- Message from Danay Nath MD sent at 11/7/2018  6:24 AM CST -----  Please inform patient results are OK. Continue routine labs.

## 2018-11-08 NOTE — LETTER
November 8, 2018    Del Mathis  [address] [place] [address]          Dear Del Mathis:  MRN: [id number]    Your lab results were stable.  There are no medicine changes.  Please have your labs drawn again on 12/4/2018.      If you cannot have your labs drawn on the scheduled date, it is your responsibility to call the transplant department to reschedule.  To reschedule or make an appointment, please as to speak to or leave a message for my assistant, Estefany Yang, at [phone].  When leaving a message for Celia Allison, or myself, we ask that you leave a brief message regarding your request.    Sincerely,      Michell Rowe, RN, BSN, CCTC  Your Liver Transplant Coordinator    [facility]  [address] [place] [id number]  [phone]

## 2018-11-09 NOTE — PATIENT INSTRUCTIONS
Plan:  1.  Continue current dose of prograf 0.5 mg daily, trough has been <1.5 to 2.1, monitor prograf level.    2.  Gets Aranesp every other week with good response, Hgb up to 9.8.      3.  MELD labs and prograf level q 1 month.   4.  Return in 3 months.   5.  Please give appt with Dr. Amezcua to see if any adjustments need to be made to meds, whether Aranesp/procrit should be given, as patient persistently weak, fatigued.    6.  Elastography (fibroscan) in February 2018 to reassess fibrosis post-cure of hep C.  Pl get approval in January 2018.     
no

## 2018-11-12 NOTE — PROGRESS NOTES
Patient is here for follow up of knee arthritis. Pt is requesting Euflexxa injection #2.  [facility] reviewed per encounter record. Has failed other conservative modalities including NSAIDS, activity modification, weight loss.    He has received these injections in the past and tolerated them well with great pain and swelling relief.     The prior shots were tolerated well. No relief from last drainage and steroid injection, but no pain. He does however report that he had to move 1000 potted plants indoors 2 days ago due to the weather and this made he knee hurt and swell worse.     Muscles spasms have resolved since last visit. He denies swelling or recent injury.     PHYSICAL EXAMINATION:     General: The patient is alert and oriented x 3. Mood is pleasant.   Observation of ears, eyes and nose reveals no gross abnormalities. No   labored breathing observed.     No signs of infection or adverse reaction to knee.    No TTP of the right lower leg. Negative valdemar's sign, no swelling or bruising.       Euflexxa Injection Procedure #2    A time out was performed, including verification of patient ID, procedure, site and side, availability of information and equipment, review of safety issues, and agreement with consent, the procedure site was marked.    Using a sterile technique the right knee joint was entered from the superior lateral approach with a 18 gauge needle and 26 ml's of serous colored fluid was withdrawn and not sent for analysis.   The procedure was well tolerated.      Then using the same needle, a therapeutic injection of 2cc Euflexxa was given under sterile technique using a 21.5g x 1.5 needle from the same superior lateral aspect of the right knee Joint with the patient in the supine position.     Philip MORELAND Del ROSANNA had no adverse reactions to the medication. Pain decreased. male was instructed to apply ice to the joint for 20 minutes and avoid strenuous activities for 24-36 hours following the injection. male  was warned of possible blood pressure changes during that time. Following that time, male can resume activities as prior to the injection.    male was reminded to call the clinic immediately for any adverse side effects as explained in clinic today.    RTC in 1 week for injection #3    -Told patient to wear knee brace if he has to do strenous work or move plants and there is no way to avoid doing it. This will help the stability of the knee and prevent it from swelling.     All patients questions were answered. Patient was advised to call us with any concerns or questions.

## 2018-11-19 NOTE — PROGRESS NOTES
Patient is here for follow up of knee arthritis. Pt is requesting Euflexxa injection #3.  PMFH reviewed per encounter record. Has failed other conservative modalities including NSAIDS, activity modification, weight loss.    He has received these injections in the past and tolerated them well with great pain and swelling relief. Swelling has decreased over the course of the injections. Denies knee pain now.     Muscles spasms have resolved since last visit. He denies swelling or recent injury.     PHYSICAL EXAMINATION:     General: The patient is alert and oriented x 3. Mood is pleasant.   Observation of ears, eyes and nose reveals no gross abnormalities. No   labored breathing observed.     No signs of infection or adverse reaction to knee.    No TTP of the right lower leg. Negative valdemar's sign, no swelling or bruising.       Euflexxa Injection Procedure #3    A time out was performed, including verification of patient ID, procedure, site and side, availability of information and equipment, review of safety issues, and agreement with consent, the procedure site was marked.    Using a sterile technique the right knee joint was entered from the superior lateral approach with a 18 gauge needle and 35 ml's of serous colored fluid was withdrawn and not sent for analysis.   The procedure was well tolerated.      Then using the same needle, a therapeutic injection of 2cc Euflexxa was given under sterile technique using a 21.5g x 1.5 needle from the same superior lateral aspect of the right knee Joint with the patient in the supine position.     Philip ANICETO Mathis III had no adverse reactions to the medication. Pain decreased. male was instructed to apply ice to the joint for 20 minutes and avoid strenuous activities for 24-36 hours following the injection. male was warned of possible blood pressure changes during that time. Following that time, male can resume activities as prior to the injection.    male was reminded to call  the clinic immediately for any adverse side effects as explained in clinic today.    RTC prn knee pain or swelling    -Told patient to wear knee brace if he has to do strenous work or move plants and there is no way to avoid doing it. This will help the stability of the knee and prevent it from swelling.     All patients questions were answered. Patient was advised to call us with any concerns or questions.

## 2018-11-26 NOTE — TELEPHONE ENCOUNTER
----- Message from Estefany Cast sent at 11/26/2018  9:59 AM CST -----  Contact: Patient Wife      ----- Message -----  From: Rbittani Luis Felipe  Sent: 11/26/2018   8:25 AM  To: [facility] Post-Liver Transplant Non-Clinical    Needs Advice    Reason for call: Patient wife called wishes to schedule a appt with Portillo. She states his stomach is getting bigger and is bothering him.        Communication Preference: Merlene [phone]    Additional Information: n/a

## 2018-11-28 NOTE — TELEPHONE ENCOUNTER
talked to Jada in IR and pt is scheduled for a para on 12/4 at 11am. He is to report to the 2nd floor Radiology. Called pt's wife Merlene and left a detailed message in regards to his appt.

## 2018-11-28 NOTE — PROGRESS NOTES
Transplant Hepatology  Liver Transplant Recipient Follow-up    Transplant Date: 6/10/2001  UNOS Native Liver Dx: Cirrhosis: Type C    Philip is here for follow up of his liver transplant.    ORGAN: LIVER  Whole or Partial: whole liver  Donor Type:   CDC High Risk:   Donor CMV Status: Positive  Donor HCV Status: Negative  Donor HBcAb: Negative  Donor HBV LUIS:   Donor HCV LUIS:   Biliary Anastomosis:   Arterial Anatomy:   IVC reconstruction:   Portal vein status:     He has had the following complications since transplant: ESRD on dialysis, recurrent cirrhosis, and HCC. The noted complications need to be addressed more thoroughly today.    Subjective:     HPI: Philip was last seen on 4/3/18 by Dr. Nath. 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 HCV treated with successful SVR (last HCV RNA negative in 2/2017). He had a Fibroscan 2/2018 with kPa 10.0, F3 or significant fibrosis, though he has multiple findings of portal HTN. Significant steatosis (S3, >67%) noted on Fibroscan as well. He was denied for re-transplant (liver/kidney) due to high surgical risk.            He also had biopsy proven HCC (12/2017) s/p radiofrequency ablation 2/27/18. Follow-up CTs post ablation (4/3/18, 6/12/18, 10/25/18) have shown new indeterminate lesions. Reviewed and IR conference -- plan to continue surveillance and repeat CT and AFP in 3 months. Last AFP 3.8 (7/17/18).            Current immunosuppression: tacrolimus 0.5 mg daily. Last prograf level < 1.5 (trough ranges <1.5-2). LFTs normal. Platelets noted to be low, ~100.      Interval history:   Currently, he is experiencing worsening abdominal distention. Notes this has been occurring over the last 6 months. His weight is up ~20 lb today. He is also having more lower extremity swelling. He dialyzes (HD) MWF. Also takes lasix 80 mg daily (held on HD days). Not following a strict low Na diet. He reports constipation as well, must take lactulose to have daily BM. Other  "issues include weakness (worse on dialysis days), mobility issues (has fallen a few times this year), and nausea every AM (relieved with PRN zofran). Denies any s/s hepatic encephalopathy or GI bleeding.               Review of Systems   GENERAL: Denies fever, chills. (+) weight gain, (+) fatigue / weakness  HEENT: Denies headaches, dizziness, vision/hearing changes  CARDIOVASCULAR: Denies chest pain, palpitations. (+) leg edema  RESPIRATORY: Denies dyspnea, cough  GI: Denies abdominal pain, rectal bleeding. (+) nausea, no vomiting. No change in bowel pattern or color  : Denies dysuria, hematuria   SKIN: Denies rash, itching   NEURO: Denies confusion, memory loss, or mood changes  PSYCH: Denies depression or anxiety  HEME/LYMPH: Denies easy bruising or bleeding  MSK: Denies any joint pain or myalgias.        Objective:     Physical Exam    Friendly White male, in no acute distress; alert and oriented to person, place and time  VITALS: /61 (BP Location: Right arm, Patient Position: Sitting, BP Method: Medium (Automatic))   Pulse 66   Temp 98.6 °F (37 °C) (Oral)   Resp 16   Ht 6' 1" (1.854 m)   Wt 99.5 kg (219 lb 5.7 oz)   SpO2 (!) 93%   BMI 28.94 kg/m²   HENT: Normocephalic, without obvious abnormality. Oral mucosa pink and moist. Dentition good.  EYES: Sclerae anicteric. No conjunctival pallor.   NECK: Supple. No masses or cervical adenopathy.  CARDIOVASCULAR: Regular rate and rhythm. (+) muffled heart tones  RESPIRATORY: Normal respiratory effort. BBS CTA. No wheezes or crackles.  GI: Non-tender. Significantly distended, appears consistent with ascites (shifting dullness).  Liver transplant incision well healed, CDI. Hernia noted to mid-upper gastric region, non-tender, easily reducible.   EXTREMITIES:  No clubbing, cyanosis. +2 pitting edema to BLE.   SKIN: Warm and dry. No jaundice. No rashes noted to exposed skin. No telangectasias noted. No palmar erythema.  NEURO:  Normal gait. No " asterixis.  PSYCH:  Memory intact. Thought and speech pattern appropriate. Behavior normal. No depression or anxiety noted.      Lab Results   Component Value Date    BILITOT 0.4 11/06/2018    AST 22 11/06/2018    ALT 16 11/06/2018    ALKPHOS 78 11/06/2018    CREATININE 4.6 (H) 11/06/2018    ALBUMIN 3.8 11/06/2018     Lab Results   Component Value Date    WBC 4.78 11/06/2018    HGB 10.6 (L) 11/06/2018    HCT 32.2 (L) 11/06/2018     (L) 11/06/2018     Lab Results   Component Value Date    TACROLIMUS <1.5 (L) 11/06/2018    CYCLOSPORINE <10 (L) 09/23/2010    SIROLIMUSLEV 4.2 03/23/2015       Assessment/Plan:   1. S/P liver transplant due to: HCV cirrhosis  -- Normal LFTs. Continue monitoring symptoms, labs, and drug levels for drug-related toxicity and side effects.  -- Continue with post transplant labs as scheduled (monthly right now).     2. Prophylactic immunotherapy  -- Current IS: tacrolimus 0.5 mg daily. Most recent prograf level = <1.5. Low prograf level maintained due to recurrent HCC and ESRD. Labs stable per Dr. Nath, noted on 11/7/18. Monitoring prograf levels monthly.       3. Abdominal distention, likely worsening ascites  -- Patient has progressive abdominal distention and lower extremity edema over the last few months. Not following strict low Na diet (education provided).   -- Paracentesis ordered. Will send ascitic fluid for cell counts including culture and cytology.      4. Liver lesions, indeterminate  -- s/p ablation for biopsy proven HCC (2/27/18)  -- IR conference plan to continue surveillance and repeat CT / AFP in 3 months. CT scheduled 1/25/19.     -- AFP 3.8 (7/17/18)    5. Cirrhosis  -- Fibroscan post HCV treatment / SVR suggested F3, though patient has findings of portal hypertension including ascites, lower extremity edema, thrombocytopenia, splenic collaterals, portal vein thrombus   -- Has not had recent EGD, will likely need this set up to evaluate for varices     6. Hepatic  encephalopathy  -- Well controlled on lactulose and xifaxan     7. ESRD on dialysis  -- HD MWF, followed by Nephrology     8. Lower extremity edema      -- Current diuretics: lasix 80 mg daily   -- Low Na diet education reinforced    9. Fatty liver  -- Risk factors: overweight, Body mass index is 28.94 kg/m²., HTN, DM2        Discussed with Dr. Nath -- Will attempt to obtain para with fluid cell counts first. Patient to work on strict low Na diet to help with fluid retention. If abdominal distention persists or is not due to ascites, will likely push up cross-sectional imaging scheduled for Jan.    Return to clinic in 4-6 weeks with Dr. Nath.        Zayra Molina NP    Hepatology and Liver Transplant        [facility] Patient Status  Functional Status: 70% - Cares for self: unable to carry on normal activity or active work  Physical Capacity: No Limitations

## 2018-11-28 NOTE — LETTER
November 29, 2018                     [facility]  [address]  [facility] [address]  Phone: [phone]   Patient: Philip Mathis III   MR Number: [id number]   YOB: 1945   Date of Visit: 11/28/2018       Dear      Thank you for referring Philip Mathis to me for evaluation. Attached you will find relevant portions of my assessment and plan of care.    If you have questions, please do not hesitate to call me. I look forward to following Philip Mathis along with you.    Sincerely,    Zayra Molina, NP    Enclosure    If you would like to receive this communication electronically, please contact [email or web address] or [phone] to request [employer] access.    [employer] is a tool which provides read-only access to select patient information with whom you have a relationship. Its easy to use and provides real time access to review your patients record including encounter summaries, notes, results, and demographic information.    If you feel you have received this communication in error or would no longer like to receive these types of communications, please e-mail [email or web address]

## 2018-11-29 PROBLEM — Z79.4 LONG-TERM INSULIN USE: Status: RESOLVED | Noted: 2017-01-09 | Resolved: 2018-01-01

## 2018-11-29 PROBLEM — N18.5 CKD (CHRONIC KIDNEY DISEASE), STAGE V: Status: RESOLVED | Noted: 2017-05-24 | Resolved: 2018-01-01

## 2018-11-29 PROBLEM — C22.0 HCC (HEPATOCELLULAR CARCINOMA): Status: RESOLVED | Noted: 2018-02-27 | Resolved: 2018-01-01

## 2018-11-29 PROBLEM — N18.6 ESRD (END STAGE RENAL DISEASE): Status: RESOLVED | Noted: 2017-07-20 | Resolved: 2018-01-01

## 2018-12-03 NOTE — HOSPITAL COURSE
7/13/17: Permacath placement; pt admitted to establish dialysis, tolerated first run of HD    spoke with the ER at 15:28 to have consent form completed prior to MRI examination

## 2018-12-04 NOTE — H&P
Radiology History & Physical      SUBJECTIVE:     Chief Complaint: abdominal distention    History of Present Illness:  Philip Mathis III is a 73 y.o. male who presents for evaluation of ascites  Past Medical History:   Diagnosis Date    Anemia     Arthritis     Back pain     Bishop's esophagus     BPH (benign prostatic hypertrophy)     Chronic kidney disease     Cirrhosis     Diabetes mellitus     Diabetes mellitus, type 2     Diabetes with neurologic complications     Diabetic retinopathy     Elevated PSA     Heart failure     Hemolytic anemia     Hepatitis C     successfully treated with Harvoni    Hepatocellular carcinoma 1/2/2018    Hyperlipidemia     Hypertension     Hypertension     Immune disorder     Liver transplanted     Peripheral neuropathy     Sleep apnea     Trouble in sleeping     Type 2 diabetes mellitus with ophthalmic manifestations     Type 2 diabetes with peripheral circulatory disorder, controlled     Type II or unspecified type diabetes mellitus with renal manifestations, uncontrolled(250.42)      Past Surgical History:   Procedure Laterality Date    1) Creation of L arm basilic vein transposition (brachial artery-to-basilic vein AVF)  2) Ligation of L brachial AVF (which was 1st stage creation previously)    Left 6/15/2017    Performed by [facility] at [facility] OR 2ND FLR    ABLATION, RADIOFREQUENCY N/A 2/27/2018    Performed by Esther Surgeon at [facility] ESTHER    INRYZT-[id number]-FX N/A 12/21/2017    Performed by [facility] Diagnostic Provider at [facility] OR 2ND FLR    broken nose      CATARACT EXTRACTION Bilateral     colonoscopy N/A 7/23/2014    Performed by Moises St MD at [facility] ENDO (4TH FLR)    BBYERDFE-TAXRFFE-MG POSSIBLE AV GRAFT Left 11/8/2016    Performed by Silvio William MD at [facility] OR 2ND FLR    DEBRIDEMENT-WOUND Left 4/12/2015    Performed by Jimmy Light MD at [facility] OR 2ND FLR    DEBRIDEMENT-WOUND Left 4/11/2015    Performed by Jimmy RAO  MD Nade[facility] at [facility] OR 2ND FLR    DEBRIDEMENT-WOUND Left 4/10/2015    Performed by Jimmy [facility] MD at [facility] OR 2ND FLR    DEBRIDEMENT-WOUND Left 4/9/2015    Performed by Jimmy Light MD at [facility] OR 1ST FLR    DEBRIDEMENT-WOUND Left 4/6/2015    Performed by Jimmy Light MD at [facility] OR 2ND FLR    DEBRIDEMENT-WOUND Left 4/4/2015    Performed by Jimmy Light MD at [facility] OR 2ND FLR    DEBRIDEMENT-WOUND Left 4/2/2015    Performed by Jimmy Light MD at [facility] OR 1ST FLR    DRESSING CHANGE- Left 4/10/2015    Performed by Jimmy Light MD at [facility] OR 2ND FLR    DRESSING CHANGE-s/p burn debridement Left 4/15/2015    Performed by Jimmy Light MD at [facility] OR 2ND FLR    DRESSING CHANGE-s/p burn debridement Left 4/14/2015    Performed by Jimmy Light MD at [facility] OR 2ND FLR    DRESSING CHANGE-s/p wound debridement Left 4/13/2015    Performed by Jimmy Light MD at [facility] OR 1ST FLR    DRESSING CHANGE-wound vac Left 4/20/2015    Performed by Jimmy [facility] at [facility] OR 2ND [facility]    EYE SURGERY      cataracts    FEMUR SURGERY      FRACTURE SURGERY      right femur fracture     INSERTION-CATHETER-PERM-A-CATH Right 7/13/2017    Performed by Silvio William MD at [facility] OR 2ND FLR    LIVER TRANSPLANT  2001    Open Biome Fecal Transplant N/A 8/5/2015    Performed by Elmo Gan MD at [facility] ENDO (4TH FLR)    PLACEMENT-WOUND VAC  4/15/2015    Performed by Jimmy Light MD at [facility] OR 2ND FLR    PLACEMENT-WOUND VAC Left 4/4/2015    Performed by Jimmy Light MD at [facility] OR 2ND FLR    PORTACATH PLACEMENT Left     SKIN GRAFT      graft from right thigh , applied to the left back and left arm.    SPLIT THICKNESS SKIN GRAFT - left trunk, abd and axilla N/A 4/15/2015    Performed by Jimmy Light MD at [facility] OR 2ND FLR       Home Meds:   Prior to Admission medications    Medication Sig Start Date End Date Taking? Authorizing Provider   ACCU-CHEK JOANN PLUS TEST STRP Strp  CHECK BLOOD SUGAR THREE TIMES DAILY 7/17/18   Donnie Owens NP   allopurinol (ZYLOPRIM) 100 MG tablet TAKE 1 TABLET EVERY DAY 7/17/18   Donnie Owens NP   ammonium lactate (LAC-HYDRIN) 12 % lotion Apply topically as needed for Dry Skin. 10/4/17   Faye Larson DPM   aspirin 81 MG Chew Take 1 tablet (81 mg total) by mouth once daily. 7/20/17 11/28/18  Jodie Shearer MD   blood-glucose meter (ACCU-CHEK JOANN PLUS METER) Misc Use as directed 1/11/16   MILTON Carrillo,FNP-C   carvedilol (COREG) 6.25 MG tablet TK 1 T PO  BID 6/8/18   Darlyn Campos MD   cetirizine (ZYRTEC) 10 MG tablet Take 1 tablet (10 mg total) by mouth once daily. 8/15/18 8/15/19  Donnie Owens NP   clonazePAM (KLONOPIN) 0.5 MG tablet Take 1 tablet (0.5 mg total) by mouth daily as needed. 2/19/18   Danay Nath MD   diclofenac sodium (VOLTAREN) 1 % Gel Apply 2 g topically 3 (three) times daily. To the right knee. 4/16/18   Philip Duron III, PA-C   doxazosin (CARDURA) 8 MG Tab TAKE 1 TABLET ONCE DAILY. 11/8/18   Donnie Owens NP   econazole nitrate 1 % cream Apply topically 2 (two) times daily. 10/23/18 11/28/18  Claudette Palencia DPM   fluticasone (FLONASE) 50 mcg/actuation nasal spray SHAKE LIQUID AND USE 2 SPRAYS(100 MCG) IN EACH NOSTRIL EVERY DAY 11/29/18   Donnie Owens NP   furosemide (LASIX) 80 MG tablet TAKE 1 TABLET BY MOUTH TWICE DAILY ON NON-DIALYSIS DAYS(TUESDAY, THURSDAY, AND SATURDAY) 6/14/18   Jabari Elizabeth MD   GENERLAC 10 gram/15 mL solution TAKE 30 ML 'S BY MOUTH THREE TIMES DAILY 6/2/18   Danay Nath MD   hydrALAZINE (APRESOLINE) 100 MG tablet Take 1 tablet (100 mg total) by mouth 3 (three) times daily. 5/30/18   Jabari Elizabeth MD   hydrOXYzine HCl (ATARAX) 25 MG tablet TAKE 1 TABLET(25 MG) BY MOUTH THREE TIMES DAILY AS NEEDED FOR ITCHING 8/15/18   Donnie Owens NP   ibuprofen (ADVIL,MOTRIN) 800 MG tablet Take 1 tablet (800 mg total) by mouth every 12 (twelve) hours  "as needed for Pain. Take with food. 10/29/18   Philip Duron III, PA-C   insulin aspart (NOVOLOG) 100 unit/mL InPn pen Inject 6 units w/ breakfast, 4 units w/ lunch and dinner plus scale 180-230 +1, 231-280 +2, 281-330 +3, 331-380 +4, >380 +5. 90 day supply  Patient taking differently: Inject 8 units w/ breakfast, 6 units w/ lunch and dinner plus scale 180-230 +1, 231-280 +2, 281-330 +3, 331-380 +4, >380 +5. 90 day supply 5/17/17   MILTON Álvarez FNP   insulin glargine (LANTUS SOLOSTAR) 100 unit/mL (3 mL) InPn pen Inject 8 Units into the skin every evening.  Patient taking differently: Inject 10 Units into the skin every evening.  5/17/17   MILTON Álvarez, JAMIE   insulin needles, disposable, (NOVOFINE 32) 32 x 1/4 " Ndle 1 each by Misc.(Non-Drug; Combo Route) route 3 (three) times daily. 1/6/15   Hu Grant II, MD   lancets (ACCU-CHEK SOFTCLIX LANCETS) Misc 1 lancet by Misc.(Non-Drug; Combo Route) route 4 (four) times daily. 1/11/16   MILTON Carrillo,JAMIE-C   methocarbamol (ROBAXIN) 500 MG Tab Take 1 tablet by mouth daily (every 24 hours) as needed for muscle spasm. 11/5/18   Philip Duron III, PA-C   minoxidil (LONITEN) 2.5 MG tablet Take 2 tablets (5 mg total) by mouth 2 (two) times daily. 8/14/18 8/14/19  Jabari Elizabeth MD   multivitamin capsule Take 1 capsule by mouth once daily.    Historical Provider, MD   ondansetron (ZOFRAN-ODT) 4 MG TbDL DISSOLVE 1 TABLET(4 MG) ON THE TONGUE EVERY 12 HOURS AS NEEDED 10/23/17   Donnie Owens, SABRINA   oxyCODONE (ROXICODONE) 5 MG immediate release tablet Take 1 tablet (5 mg total) by mouth every 6 (six) hours as needed for Pain. 2/27/18   Anthony St MD   pantoprazole (PROTONIX) 40 MG tablet TAKE 1 TABLET EVERY DAY 5/12/18   Danay Nath MD   rifAXIMin (XIFAXAN) 550 mg Tab Take 1 tablet (550 mg total) by mouth 2 (two) times daily. 12/28/17   Kathi Kapadia MD   sevelamer carbonate (RENVELA) 800 mg Tab Take 1 tablet (800 mg " total) by mouth 3 (three) times daily with meals. 9/28/17 11/28/18  JAMIE Hightower   silver nitrate applicators 75-25 % applicator Apply topically 3 (three) times a week. Apply to bleeder on warty formation. 8/17/18   Donnie Owens NP   simethicone (MYLICON) 80 MG chewable tablet Take 1 tablet (80 mg total) by mouth every 6 (six) hours as needed for Flatulence (and bloating). 6/7/18   Donnie Owens NP   tacrolimus (PROGRAF) 0.5 MG Cap TAKE 1 CAPSULE EVERY DAY 2/18/18   Danay Nath MD   tiZANidine (ZANAFLEX) 4 MG tablet TAKE 1 TABLET BY MOUTH EVERY 6 HOURS AS NEEDED 11/21/18   Reji Castillo MD   UNABLE TO FIND 1 tablet once daily. Pro Renal Plus D Oral Tabs    Historical Provider, MD   urea (CARMOL) 40 % Crea Apply topically once daily. 11/16/16   Faye Larson DPM     Anticoagulants/Antiplatelets: no anticoagulation    Allergies:   Review of patient's allergies indicates:   Allergen Reactions    Corticosteroids (glucocorticoids) Other (See Comments)     Leg swelling    Hydrocortisone      Leg swelling    Neuromuscular blockers, steroidal      Leg swelling    Ribavirin      Intolerance, arthralgias     Sedation History:  no adverse reactions    Review of Systems:   Hematological: no known coagulopathies  Respiratory: no shortness of breath  Cardiovascular: no chest pain  Gastrointestinal: no abdominal pain  Genito-Urinary: no dysuria  Musculoskeletal: negative  Neurological: no TIA or stroke symptoms         OBJECTIVE:     Vital Signs (Most Recent)       Physical Exam:  ASA: 2  Mallampati: na    General: no acute distress  Mental Status: alert and oriented to person, place and time  HEENT: normocephalic, atraumatic  Chest: unlabored breathing  Heart: regular heart rate  Abdomen: distended  Extremity: moves all extremities    Laboratory  Lab Results   Component Value Date    INR 1.1 11/06/2018       Lab Results   Component Value Date    WBC 4.78 11/06/2018    HGB 10.6 (L) 11/06/2018     HCT 32.2 (L) 11/06/2018    MCV 95 11/06/2018     (L) 11/06/2018      Lab Results   Component Value Date     (H) 11/06/2018     11/06/2018    K 4.8 11/06/2018     11/06/2018    CO2 26 11/06/2018    BUN 35 (H) 11/06/2018    CREATININE 4.6 (H) 11/06/2018    CALCIUM 8.5 (L) 11/06/2018    MG 1.8 10/14/2017    ALT 16 11/06/2018    AST 22 11/06/2018    ALBUMIN 3.8 11/06/2018    BILITOT 0.4 11/06/2018    BILIDIR 0.2 11/06/2018       ASSESSMENT/PLAN:     Sedation Plan: local  Patient will undergo US guided paracentesis

## 2018-12-06 NOTE — TELEPHONE ENCOUNTER
called pt's wife Merlene and let her know that coord received her msg about the para. Msg sent to Dr Nath. Labs stable and next labs due 1/9/19.

## 2018-12-06 NOTE — TELEPHONE ENCOUNTER
----- Message from Danay Nath MD sent at 12/5/2018  6:22 PM CST -----  Glucose was too low, needs to check with PCP about reducing insulin.  Other labs are stable.

## 2018-12-11 NOTE — TELEPHONE ENCOUNTER
----- Message from Zayra Molina NP sent at 12/10/2018  4:07 PM CST -----  Regarding: Abdominal distention  Hi Michell,    I see that he couldn't get the paracentesis due to insufficient fluid. Not sure what to make of the abdominal distention, but I spoke with Dr. Nath and she recommends we push up the CT that is scheduled for January. Just to make sure we aren't missing anything new.     Thanks,  Zayra

## 2018-12-11 NOTE — TELEPHONE ENCOUNTER
Received call from pt's wife. She states pt having increased abdominal distention and anxiety related to this. She would like to know if Dr Nath can call pt to discuss. Also, coord let Mrs Mathis know that we will be setting up a CT scan but have to coordinate with his dialysis. It will be done this week or next. Also emphasized if pt has any changes in condition to please report to the ER. SHe states he wont but that she is aware. Will keep Mrs Mathis updated and send Dr Nath a msg now.

## 2018-12-12 NOTE — TELEPHONE ENCOUNTER
Called patient's number, his wife answered.  Patient is anxious and nervous about his abd girth increasing while the U/S done to locate ascites did not reveal enough fluid to drain safely.  We have scheduled a CT with contrast this Friday (dialysis to follow).  I will see him after his CT, approx 9 AM in clinic on Friday, before [place].  Mrs Mathis accepted this suggestion.    Please schedule appt 9 AM to see me in transplant clinic.     Thanks

## 2018-12-14 NOTE — Clinical Note
December 14, 2018                     [facility]  [address]  [facility] [address]  Phone: [phone]   Patient: Philip Mathis III   MR Number: [id number]   YOB: 1945   Date of Visit: 12/14/2018       Dear      Thank you for referring Philip Mathis to me for evaluation. Attached you will find relevant portions of my assessment and plan of care.    If you have questions, please do not hesitate to call me. I look forward to following Philip Mathis along with you.    Sincerely,    Danay Nath MD    Enclosure    If you would like to receive this communication electronically, please contact [email or web address] or [phone] to request [employer] access.    [employer] is a tool which provides read-only access to select patient information with whom you have a relationship. Its easy to use and provides real time access to review your patients record including encounter summaries, notes, results, and demographic information.    If you feel you have received this communication in error or would no longer like to receive these types of communications, please e-mail [email or web address]
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
## 2018-12-14 NOTE — PATIENT INSTRUCTIONS
Plan:  -  Mag level next Tuesday 12/18/18 at 7 AM at [facility].   -  Start magox 400 mg daily by mouth  -  Recheck mag level in 1 and 3 weeks after magox started.    -  Theraworx lotion to be applied before dialysis.  -  Restrict fluids to 600-750 mL.   -  Add lasix 40 mg to AM dose on no-dialysis days and see if wt comes down.    -  He was offered home HD, which could be done over 8 hrs, and might might be better for him.  If gets to be too much, will need to come in for CRRT.  -  CT wo contrast today 12/14/18 showed hepatic lesion larger than previous (4.5 cm vs 1.9 cm), it could be due to post-ablation changes, but it could also be recurrence locally.    Needs CT w wo contrast.    -  Review imaging in IR conf. When contrast study available.   -  Continue current dose of prograf 0.5 mg daily, trough has been <1.5 to 2.1, monitor prograf level.    -  Labs routine with AFP, prograf level q 1 month.   -  Return in 2 months

## 2018-12-14 NOTE — Clinical Note
Plan:-  Mag level next Tuesday 12/18/18 at 7 AM at [facility]. -  Start magox 400 mg daily by mouth-  Recheck mag level in 1 and 3 weeks after magox started.  -  Theraworx lotion to be applied before dialysis.-  Restrict fluids to 600-750 mL. -  He was offered home HD, which could be done over 8 hrs, and might might be better for him.  If gets to be too much, will need to come in for CRRT.-  CT wo contrast today 12/14/18 showed hepatic lesion larger than previous (4.5 cm vs 1.9 cm), it could be due to post-ablation changes, but it could also be recurrence locally.  Needs CT w wo contrast.  -  Review imaging in IR conf. When contrast study available. -  Continue current dose of prograf 0.5 mg daily, trough has been <1.5 to 2.1, monitor prograf level.  -  Labs routine with AFP, prograf level q 1 month. -  Return in 2 months

## 2018-12-14 NOTE — TELEPHONE ENCOUNTER
----- Message from Danay Nath MD sent at 12/14/2018 12:01 PM CST -----  -  Add lasix 40 mg to AM dose on no-dialysis days and see if wt comes down.

## 2018-12-14 NOTE — TELEPHONE ENCOUNTER
----- Message from Danay Nath MD sent at 12/14/2018 11:10 AM CST -----  Plan:  -  Mag level next Tuesday 12/18/18 at 7 AM at [facility].   -  Start magox 400 mg daily by mouth  -  Recheck mag level in 1 and 3 weeks after magox started.    -  Theraworx lotion to be applied before dialysis.  -  Restrict fluids to 600-750 mL.   -  He was offered home HD, which could be done over 8 hrs, and might might be better for him.  If gets to be too much, will need to come in for CRRT.  -  CT wo contrast today 12/14/18 showed hepatic lesion larger than previous (4.5 cm vs 1.9 cm), it could be due to post-ablation changes, but it could also be recurrence locally.    Needs CT w wo contrast.    -  Review imaging in IR conf. When contrast study available.   -  Continue current dose of prograf 0.5 mg daily, trough has been <1.5 to 2.1, monitor prograf level.    -  Labs routine with AFP, prograf level q 1 month.   -  Return in 2 months

## 2018-12-18 NOTE — TELEPHONE ENCOUNTER
----- Message from Alissa Syed sent at 12/18/2018  2:36 PM CST -----  Patient Returning Call from [facility]    Who Left Message for Patient: Michell Rowe  Communication Preference: [phone]  Additional Information:

## 2018-12-18 NOTE — TELEPHONE ENCOUNTER
called pt's wife and wanted to review plan for CT tomorrow. Coord put on hold by wife had to hang up. Will call her back later today.

## 2018-12-18 NOTE — TELEPHONE ENCOUNTER
Returned call to Merlene, pt's wife. We reviewed that pt will have a CT abd with/without contrast and that he will have dialysis afterward. Will call dialysis ctr tomorrow am to find out what time pt should present. Will also call Radiology dept and talk to tech about not changing the order.    Pt to be NPO at midnight for procedure. Merlene states she understands.

## 2018-12-19 NOTE — TELEPHONE ENCOUNTER
----- Message from Danay Nath MD sent at 12/19/2018 12:08 AM CST -----  Please inform patient results are OK. Continue routine labs.

## 2018-12-19 NOTE — TELEPHONE ENCOUNTER
Called [facility]. The nurse has already called his wife and gave her instructions on when pt should arrive to dialysis after CT scan.    Coord called outpt CT scan to ask them to please do not change the order because pt gettting dialysis immediately after. The tech verified the correct order.

## 2018-12-19 NOTE — TELEPHONE ENCOUNTER
----- Message from Danay Nath MD sent at 12/15/2018  6:02 AM CST -----  Informed Patient in clinic and his wife by phone of CT findings. Minimal abdominal fluid, pleural fluid, stable pericardial fluid.  Needs triple phase CT to define the liver mass. Please schedule as we discussed. Thank you.

## 2018-12-20 NOTE — TELEPHONE ENCOUNTER
Patient: Philip Mathis III       MRN: [id number]      : 1945     Age: 73 y.o.  [address]  [place] [address]    Provider: Hepatologist Mary Nath    Urgency of review: urgent    Patient Transplant Status: Not a candidate    Reason for presentation: Recurrent HCC with stellite lesions.  Can he get Y90? And med-onc for sorafenib.    Clinical Summary:   OLT , Hep C treated and cured, cirrhosis, ESRD on HD, and 2.3 cm liver lesion ablated in 2018.  Now has increase in size of a heterogeneously hypoattenuating lesion in the region of the ablation measuring 4.5 cm (previously 1.9 cm).  There are multiple smaller satellite lesions in hepatic segments VII and VIII.  There is a subtle stable appearing 1.1 cm focus of enhancement without definite washout in hepatic segment V. 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  Can he get Y90?  Will refer to Oncology also for sorafenib    Imaging to be reviewed: CT 18, 10/25/18, 18    HCC Treatment History: ablation 2018    ABO: O POS    Platelets:   Lab Results   Component Value Date/Time     (L) 2018 07:10 AM     Creatinine:   Lab Results   Component Value Date/Time    CREATININE 4.6 (H) 2018 07:10 AM     Bilirubin:   Lab Results   Component Value Date/Time    BILITOT 0.5 2018 07:10 AM     AFP Last 3 each if available:   Lab Results   Component Value Date/Time    AFP 3.8 2018 07:06 AM    AFP 3.6 2018 07:16 AM    AFP 3.6 2018 07:52 AM       MELD: MELD-Na score: 22 at 2018  7:10 AM  MELD score: 22 at 2018  7:10 AM  Calculated from:  Serum Creatinine: 4.6 mg/dL (Rounded to 4 mg/dL) at 2018  7:10 AM  Serum Sodium: 142 mmol/L (Rounded to 137 mmol/L) at 2018  7:10 AM  Total Bilirubin: 0.5 mg/dL (Rounded to 1 mg/dL) at 2018  7:10 AM  INR(ratio): 1.2 at 2018  7:10 AM  Age: 73 years    Plan:     Follow-up Provider:

## 2018-12-21 NOTE — TELEPHONE ENCOUNTER
Patient: Philip Mathis III       MRN: [id number]      : 1945     Age: 73 y.o.  [address]  [place] [address]    Provider: Hepatologist Mary Nath    Urgency of review: urgent    Patient Transplant Status: Not a candidate    Reason for presentation: Recurrent HCC with stellite lesions.  Can he get Y90? And med-onc for sorafenib.    Clinical Summary:   OLT , Hep C treated and cured, cirrhosis, ESRD on HD, and 2.3 cm liver lesion ablated in 2018.  Now has increase in size of a heterogeneously hypoattenuating lesion in the region of the ablation measuring 4.5 cm (previously 1.9 cm).  There are multiple smaller satellite lesions in hepatic segments VII and VIII.  There is a subtle stable appearing 1.1 cm focus of enhancement without definite washout in hepatic segment V. 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  Can he get Y90?  Will refer to Oncology also for sorafenib    Imaging to be reviewed: CT 18, 10/25/18, 18    HCC Treatment History: ablation 2018    ABO: O POS    Platelets:   Lab Results   Component Value Date/Time     (L) 2018 07:10 AM     Creatinine:   Lab Results   Component Value Date/Time    CREATININE 4.6 (H) 2018 07:10 AM     Bilirubin:   Lab Results   Component Value Date/Time    BILITOT 0.5 2018 07:10 AM     AFP Last 3 each if available:   Lab Results   Component Value Date/Time    AFP 3.8 2018 07:06 AM    AFP 3.6 2018 07:16 AM    AFP 3.6 2018 07:52 AM       MELD: MELD-Na score: 22 at 2018  7:10 AM  MELD score: 22 at 2018  7:10 AM  Calculated from:  Serum Creatinine: 4.6 mg/dL (Rounded to 4 mg/dL) at 2018  7:10 AM  Serum Sodium: 142 mmol/L (Rounded to 137 mmol/L) at 2018  7:10 AM  Total Bilirubin: 0.5 mg/dL (Rounded to 1 mg/dL) at 2018  7:10 AM  INR(ratio): 1.2 at 2018  7:10 AM  Age: 73 years    Plan: CT 2018 showing enlargment of previously treated lesion now measuring 4.5 cm. Multiple  additional new lesions are visualized suspicious for multifocal disease. Has PVT as well. Locoregional would be y90 if patient wants to pursue.     ---No real enhancement seen, but arterial phase is not the greatest.  Referral to Hem-Onc.    Note forwarded to post-liver transplant coordinators to coordinate follow-up    Follow-up Provider: Danay Nath MD

## 2018-12-26 PROBLEM — Z96.1 PSEUDOPHAKIA, BOTH EYES: Status: ACTIVE | Noted: 2018-01-01

## 2018-12-26 PROBLEM — H52.7 REFRACTIVE ERROR: Status: ACTIVE | Noted: 2018-01-01

## 2018-12-26 NOTE — PROGRESS NOTES
Subjective:       Patient ID: Philip Mathis III is a 73 y.o. male      Chief Complaint   Patient presents with    Diabetic Eye Exam     History of Present Illness  Pt here for diabetic eye exam.  Last Eye Exam: 8/14/2017 w/ Dr. Jaquelin Garcia.    Hemoglobin A1C       Date                     Value               Ref Range           Status            11/20/2017               5.8 (H)             4.0 - 5.6 %         Final             10/14/2017               5.7 (H)             4.0 - 5.6 %         Final           07/15/2017               5.3                 4.0 - 5.6 %         Final        CURRENT EYE PROBLEM(S):  (--) eye pain/discomfort  (--) blurry vision  (--) double vision  (--) itchy Eyes  (--) tearing  (+) dry eyes  (--) floaters  (--) black splots  (--) flashes  (--) headaches  (--) photophobia    EYE MEDS:  1. otc drops for dry eyes ou prn--pt unsure of name of drops.    LENSES:  Glasses: PALs--pt states that he doesn't wear glasses because he doesn't like them.  Contacts: none       Assessment/Plan:     1. Diabetic eye exam  [facility] vision.    No diabetic retinopathy. Discussed with pt the effects of diabetes on vision, importance of good blood sugar control, compliance with meds, and follow up care with PCP. Return in 1 year for dilated eye exam, sooner PRN.    2. Pseudophakia, both eyes  Well-centered.     3. Refractive error  Educated patient on refractive error and discussed lens options. Dispensed updated spectacle Rx. Educated about adaptation period to new specs.    Eyeglass Final Rx     Eyeglass Final Rx       Sphere Cylinder Axis Add    Right -1.00 +1.00 180 +2.50    Left -1.00 Sphere  +2.50    Expiration Date:  12/27/2019                  Follow-up in about 1 year (around 12/26/2019) for Diabetic Eye Exam.

## 2018-12-27 NOTE — TELEPHONE ENCOUNTER
Called Mr. Mathis re: IR conf. Discussion of CT findings; previously treated lesion is larger and with satellite spots which could be small tumors.  Unsure if Y-90 will help in post-transplant patients.    Recommendation: was to see IR to discuss pros/cons of treating with Y-90.  Also give oncology appt with DR. Smith for Nexavar treatment, he could take 200 mg once daily, .       Michell: Please call Mrs. Mathis when making appts.

## 2018-12-27 NOTE — TELEPHONE ENCOUNTER
Mag level was ok. So he can stop oral mag.  Theraworks local mag application over calf muscles / where cramps occur during/after dialysis is ok to continue.

## 2019-01-01 ENCOUNTER — HOSPITAL ENCOUNTER (OUTPATIENT)
Dept: RADIOLOGY | Facility: HOSPITAL | Age: 74
Discharge: HOME | End: 2019-01-29
Attending: FAMILY MEDICINE | Admitting: INTERNAL MEDICINE
Payer: MEDICARE

## 2019-01-01 ENCOUNTER — HOSPITAL ENCOUNTER (OUTPATIENT)
Dept: RADIOLOGY | Facility: HOSPITAL | Age: 74
Discharge: HOME | End: 2019-01-26
Attending: INTERNAL MEDICINE
Payer: MEDICARE

## 2019-01-01 ENCOUNTER — TELEPHONE (OUTPATIENT)
Dept: WOUND CARE | Facility: CLINIC | Age: 74
End: 2019-01-01

## 2019-01-01 ENCOUNTER — TELEPHONE (OUTPATIENT)
Dept: TRANSPLANT | Facility: CLINIC | Age: 74
End: 2019-01-01

## 2019-01-01 ENCOUNTER — OFFICE VISIT (OUTPATIENT)
Dept: FAMILY MEDICINE | Facility: CLINIC | Age: 74
End: 2019-01-01
Payer: MEDICARE

## 2019-01-01 ENCOUNTER — OFFICE VISIT (OUTPATIENT)
Dept: HEMATOLOGY/ONCOLOGY | Facility: CLINIC | Age: 74
End: 2019-01-01
Payer: MEDICARE

## 2019-01-01 ENCOUNTER — HOSPITAL ENCOUNTER (OUTPATIENT)
Dept: RADIOLOGY | Facility: HOSPITAL | Age: 74
Discharge: HOME | End: 2019-01-31
Attending: INTERNAL MEDICINE
Payer: MEDICARE

## 2019-01-01 ENCOUNTER — INITIAL CONSULT (OUTPATIENT)
Dept: HEMATOLOGY/ONCOLOGY | Facility: CLINIC | Age: 74
End: 2019-01-01
Payer: MEDICARE

## 2019-01-01 ENCOUNTER — ANESTHESIA EVENT (OUTPATIENT)
Dept: ENDOSCOPY | Facility: HOSPITAL | Age: 74
End: 2019-01-01
Payer: MEDICARE

## 2019-01-01 ENCOUNTER — TELEPHONE (OUTPATIENT)
Dept: PODIATRY | Facility: CLINIC | Age: 74
End: 2019-01-01

## 2019-01-01 ENCOUNTER — OFFICE VISIT (OUTPATIENT)
Dept: PODIATRY | Facility: CLINIC | Age: 74
End: 2019-01-01
Payer: MEDICARE

## 2019-01-01 ENCOUNTER — TELEPHONE (OUTPATIENT)
Dept: HEMATOLOGY/ONCOLOGY | Facility: CLINIC | Age: 74
End: 2019-01-01

## 2019-01-01 ENCOUNTER — HOSPITAL ENCOUNTER (OUTPATIENT)
Facility: HOSPITAL | Age: 74
Discharge: HOME | End: 2019-01-29
Attending: INTERNAL MEDICINE | Admitting: INTERNAL MEDICINE
Payer: MEDICARE

## 2019-01-01 ENCOUNTER — OFFICE VISIT (OUTPATIENT)
Dept: INTERVENTIONAL RADIOLOGY/VASCULAR | Facility: CLINIC | Age: 74
End: 2019-01-01
Payer: MEDICARE

## 2019-01-01 ENCOUNTER — OFFICE VISIT (OUTPATIENT)
Dept: WOUND CARE | Facility: CLINIC | Age: 74
End: 2019-01-01
Payer: MEDICARE

## 2019-01-01 ENCOUNTER — TELEPHONE (OUTPATIENT)
Dept: INTERVENTIONAL RADIOLOGY/VASCULAR | Facility: HOSPITAL | Age: 74
End: 2019-01-01

## 2019-01-01 ENCOUNTER — HOSPITAL ENCOUNTER (OUTPATIENT)
Facility: HOSPITAL | Age: 74
Discharge: HOME | End: 2019-03-07
Attending: RADIOLOGY | Admitting: RADIOLOGY
Payer: MEDICARE

## 2019-01-01 ENCOUNTER — TELEPHONE (OUTPATIENT)
Dept: INTERVENTIONAL RADIOLOGY/VASCULAR | Facility: CLINIC | Age: 74
End: 2019-01-01

## 2019-01-01 ENCOUNTER — LAB VISIT (OUTPATIENT)
Dept: LAB | Facility: HOSPITAL | Age: 74
End: 2019-01-01
Attending: INTERNAL MEDICINE
Payer: MEDICARE

## 2019-01-01 ENCOUNTER — TELEPHONE (OUTPATIENT)
Dept: FAMILY MEDICINE | Facility: CLINIC | Age: 74
End: 2019-01-01

## 2019-01-01 ENCOUNTER — HOSPITAL ENCOUNTER (INPATIENT)
Facility: HOSPITAL | Age: 74
LOS: 6 days | DRG: 441 | End: 2019-03-17
Attending: EMERGENCY MEDICINE | Admitting: INTERNAL MEDICINE
Payer: MEDICARE

## 2019-01-01 ENCOUNTER — ANESTHESIA (OUTPATIENT)
Dept: ENDOSCOPY | Facility: HOSPITAL | Age: 74
End: 2019-01-01
Payer: MEDICARE

## 2019-01-01 ENCOUNTER — PES CALL (OUTPATIENT)
Dept: ADMINISTRATIVE | Facility: CLINIC | Age: 74
End: 2019-01-01

## 2019-01-01 VITALS
DIASTOLIC BLOOD PRESSURE: 50 MMHG | BODY MASS INDEX: 28.85 KG/M2 | WEIGHT: 213 LBS | HEIGHT: 72 IN | HEART RATE: 73 BPM | SYSTOLIC BLOOD PRESSURE: 138 MMHG

## 2019-01-01 VITALS
OXYGEN SATURATION: 92 % | HEART RATE: 68 BPM | DIASTOLIC BLOOD PRESSURE: 60 MMHG | RESPIRATION RATE: 16 BRPM | BODY MASS INDEX: 28.68 KG/M2 | TEMPERATURE: 99 F | SYSTOLIC BLOOD PRESSURE: 130 MMHG | RESPIRATION RATE: 14 BRPM | WEIGHT: 213 LBS | TEMPERATURE: 100 F | DIASTOLIC BLOOD PRESSURE: 44 MMHG | BODY MASS INDEX: 28.23 KG/M2 | OXYGEN SATURATION: 96 % | SYSTOLIC BLOOD PRESSURE: 118 MMHG | WEIGHT: 211.75 LBS | HEART RATE: 67 BPM | HEIGHT: 72 IN | HEIGHT: 73 IN

## 2019-01-01 VITALS
HEART RATE: 73 BPM | WEIGHT: 212.75 LBS | TEMPERATURE: 98 F | BODY MASS INDEX: 28.82 KG/M2 | RESPIRATION RATE: 18 BRPM | SYSTOLIC BLOOD PRESSURE: 138 MMHG | OXYGEN SATURATION: 100 % | HEIGHT: 72 IN | DIASTOLIC BLOOD PRESSURE: 50 MMHG

## 2019-01-01 VITALS
HEIGHT: 72 IN | SYSTOLIC BLOOD PRESSURE: 77 MMHG | TEMPERATURE: 103 F | DIASTOLIC BLOOD PRESSURE: 44 MMHG | OXYGEN SATURATION: 95 % | BODY MASS INDEX: 27.77 KG/M2 | HEART RATE: 124 BPM | WEIGHT: 205 LBS | RESPIRATION RATE: 20 BRPM

## 2019-01-01 VITALS
WEIGHT: 205 LBS | BODY MASS INDEX: 27.17 KG/M2 | DIASTOLIC BLOOD PRESSURE: 48 MMHG | HEIGHT: 73 IN | HEART RATE: 72 BPM | SYSTOLIC BLOOD PRESSURE: 116 MMHG | TEMPERATURE: 99 F

## 2019-01-01 VITALS
BODY MASS INDEX: 28.23 KG/M2 | HEIGHT: 73 IN | DIASTOLIC BLOOD PRESSURE: 50 MMHG | SYSTOLIC BLOOD PRESSURE: 140 MMHG | WEIGHT: 213 LBS

## 2019-01-01 VITALS
WEIGHT: 211.19 LBS | TEMPERATURE: 99 F | OXYGEN SATURATION: 90 % | OXYGEN SATURATION: 95 % | BODY MASS INDEX: 27.99 KG/M2 | SYSTOLIC BLOOD PRESSURE: 136 MMHG | HEIGHT: 73 IN | SYSTOLIC BLOOD PRESSURE: 138 MMHG | DIASTOLIC BLOOD PRESSURE: 63 MMHG | TEMPERATURE: 100 F | HEART RATE: 75 BPM | DIASTOLIC BLOOD PRESSURE: 50 MMHG | HEIGHT: 73 IN | BODY MASS INDEX: 27.55 KG/M2 | RESPIRATION RATE: 18 BRPM | WEIGHT: 207.88 LBS | HEART RATE: 66 BPM

## 2019-01-01 VITALS
TEMPERATURE: 98 F | WEIGHT: 205 LBS | BODY MASS INDEX: 27.17 KG/M2 | RESPIRATION RATE: 18 BRPM | SYSTOLIC BLOOD PRESSURE: 102 MMHG | HEART RATE: 70 BPM | DIASTOLIC BLOOD PRESSURE: 52 MMHG | OXYGEN SATURATION: 92 % | HEIGHT: 73 IN

## 2019-01-01 VITALS — BODY MASS INDEX: 27.83 KG/M2 | WEIGHT: 210 LBS | HEIGHT: 73 IN

## 2019-01-01 DIAGNOSIS — R79.89 ELEVATED TROPONIN: ICD-10-CM

## 2019-01-01 DIAGNOSIS — C22.0 HEPATOCELLULAR CARCINOMA: Primary | ICD-10-CM

## 2019-01-01 DIAGNOSIS — M21.42 PES PLANUS OF BOTH FEET: ICD-10-CM

## 2019-01-01 DIAGNOSIS — K74.60 DECOMPENSATED HEPATIC CIRRHOSIS: ICD-10-CM

## 2019-01-01 DIAGNOSIS — M20.42 HAMMER TOES OF BOTH FEET: ICD-10-CM

## 2019-01-01 DIAGNOSIS — K76.9 LIVER LESION: ICD-10-CM

## 2019-01-01 DIAGNOSIS — J96.01 ACUTE HYPOXEMIC RESPIRATORY FAILURE: ICD-10-CM

## 2019-01-01 DIAGNOSIS — K74.60 CIRRHOSIS OF LIVER WITH ASCITES, UNSPECIFIED HEPATIC CIRRHOSIS TYPE: Primary | ICD-10-CM

## 2019-01-01 DIAGNOSIS — Z23 NEED FOR INFLUENZA VACCINATION: ICD-10-CM

## 2019-01-01 DIAGNOSIS — C22.0 HEPATOCELLULAR CARCINOMA: ICD-10-CM

## 2019-01-01 DIAGNOSIS — Z94.4 LIVER TRANSPLANTED: ICD-10-CM

## 2019-01-01 DIAGNOSIS — T31.0 BURNS INVOLVING LESS THAN 10% OF BODY SURFACE: ICD-10-CM

## 2019-01-01 DIAGNOSIS — N18.6 ESRD ON HEMODIALYSIS: ICD-10-CM

## 2019-01-01 DIAGNOSIS — Z99.2 ESRD ON HEMODIALYSIS: ICD-10-CM

## 2019-01-01 DIAGNOSIS — Z23 NEED FOR PNEUMOCOCCAL VACCINATION: ICD-10-CM

## 2019-01-01 DIAGNOSIS — Z86.19 HISTORY OF HEPATITIS C: Chronic | ICD-10-CM

## 2019-01-01 DIAGNOSIS — Z79.4 CONTROLLED TYPE 2 DIABETES MELLITUS WITH CHRONIC KIDNEY DISEASE ON CHRONIC DIALYSIS, WITH LONG-TERM CURRENT USE OF INSULIN: Chronic | ICD-10-CM

## 2019-01-01 DIAGNOSIS — Z94.4 LIVER TRANSPLANTED: Chronic | ICD-10-CM

## 2019-01-01 DIAGNOSIS — Z94.4 LIVER REPLACED BY TRANSPLANT: Primary | ICD-10-CM

## 2019-01-01 DIAGNOSIS — N18.6 ANEMIA IN ESRD (END-STAGE RENAL DISEASE): ICD-10-CM

## 2019-01-01 DIAGNOSIS — M21.41 PES PLANUS OF BOTH FEET: ICD-10-CM

## 2019-01-01 DIAGNOSIS — M25.512 ACUTE PAIN OF LEFT SHOULDER: ICD-10-CM

## 2019-01-01 DIAGNOSIS — E11.22 CONTROLLED TYPE 2 DIABETES MELLITUS WITH CHRONIC KIDNEY DISEASE ON CHRONIC DIALYSIS, WITH LONG-TERM CURRENT USE OF INSULIN: Chronic | ICD-10-CM

## 2019-01-01 DIAGNOSIS — G93.40 ENCEPHALOPATHY: ICD-10-CM

## 2019-01-01 DIAGNOSIS — C22.0 HCC (HEPATOCELLULAR CARCINOMA): Primary | ICD-10-CM

## 2019-01-01 DIAGNOSIS — I50.43 ACUTE ON CHRONIC COMBINED SYSTOLIC AND DIASTOLIC HEART FAILURE: Primary | ICD-10-CM

## 2019-01-01 DIAGNOSIS — R14.0 ABDOMINAL DISTENTION: ICD-10-CM

## 2019-01-01 DIAGNOSIS — T14.8XXA OPEN WOUND: Primary | ICD-10-CM

## 2019-01-01 DIAGNOSIS — C22.0 HCC (HEPATOCELLULAR CARCINOMA): ICD-10-CM

## 2019-01-01 DIAGNOSIS — N25.81 SECONDARY HYPERPARATHYROIDISM: ICD-10-CM

## 2019-01-01 DIAGNOSIS — D84.9 IMMUNOSUPPRESSED STATUS: Chronic | ICD-10-CM

## 2019-01-01 DIAGNOSIS — R18.8 CIRRHOSIS OF LIVER WITH ASCITES, UNSPECIFIED HEPATIC CIRRHOSIS TYPE: Primary | ICD-10-CM

## 2019-01-01 DIAGNOSIS — Z51.5 PALLIATIVE CARE ENCOUNTER: ICD-10-CM

## 2019-01-01 DIAGNOSIS — R00.0 TACHYCARDIA: ICD-10-CM

## 2019-01-01 DIAGNOSIS — Z94.4 LIVER TRANSPLANTED: Primary | ICD-10-CM

## 2019-01-01 DIAGNOSIS — Z94.4 STATUS POST LIVER TRANSPLANT: ICD-10-CM

## 2019-01-01 DIAGNOSIS — L84 CORN OR CALLUS: ICD-10-CM

## 2019-01-01 DIAGNOSIS — I10 ESSENTIAL HYPERTENSION: Chronic | ICD-10-CM

## 2019-01-01 DIAGNOSIS — E11.21 DIABETIC NEPHROPATHY ASSOCIATED WITH TYPE 2 DIABETES MELLITUS: ICD-10-CM

## 2019-01-01 DIAGNOSIS — I50.30 (HFPEF) HEART FAILURE WITH PRESERVED EJECTION FRACTION: ICD-10-CM

## 2019-01-01 DIAGNOSIS — I70.0 AORTIC ATHEROSCLEROSIS: ICD-10-CM

## 2019-01-01 DIAGNOSIS — N18.6 ESRD (END STAGE RENAL DISEASE) ON DIALYSIS: ICD-10-CM

## 2019-01-01 DIAGNOSIS — Z66 DNR (DO NOT RESUSCITATE): ICD-10-CM

## 2019-01-01 DIAGNOSIS — E11.49 TYPE II DIABETES MELLITUS WITH NEUROLOGICAL MANIFESTATIONS: Primary | ICD-10-CM

## 2019-01-01 DIAGNOSIS — M20.32 HALLUX MALLEUS OF LEFT FOOT: ICD-10-CM

## 2019-01-01 DIAGNOSIS — K21.9 GASTROESOPHAGEAL REFLUX DISEASE, ESOPHAGITIS PRESENCE NOT SPECIFIED: Primary | Chronic | ICD-10-CM

## 2019-01-01 DIAGNOSIS — R53.82 CHRONIC FATIGUE: Primary | ICD-10-CM

## 2019-01-01 DIAGNOSIS — Z71.89 GOALS OF CARE, COUNSELING/DISCUSSION: ICD-10-CM

## 2019-01-01 DIAGNOSIS — N18.6 CONTROLLED TYPE 2 DIABETES MELLITUS WITH CHRONIC KIDNEY DISEASE ON CHRONIC DIALYSIS, WITH LONG-TERM CURRENT USE OF INSULIN: Chronic | ICD-10-CM

## 2019-01-01 DIAGNOSIS — Z79.4 TYPE 2 DIABETES MELLITUS WITH STAGE 5 CHRONIC KIDNEY DISEASE NOT ON CHRONIC DIALYSIS, WITH LONG-TERM CURRENT USE OF INSULIN: Chronic | ICD-10-CM
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
## 2019-01-01 DIAGNOSIS — M20.31 HALLUX MALLEUS OF RIGHT FOOT: ICD-10-CM

## 2019-01-01 DIAGNOSIS — R07.9 CHEST PAIN IN ADULT: ICD-10-CM

## 2019-01-01 DIAGNOSIS — D63.1 ANEMIA IN ESRD (END-STAGE RENAL DISEASE): ICD-10-CM

## 2019-01-01 DIAGNOSIS — R41.82 ALTERED MENTAL STATE: ICD-10-CM

## 2019-01-01 DIAGNOSIS — Z99.2 ESRD (END STAGE RENAL DISEASE) ON DIALYSIS: ICD-10-CM

## 2019-01-01 DIAGNOSIS — Z99.2 CONTROLLED TYPE 2 DIABETES MELLITUS WITH CHRONIC KIDNEY DISEASE ON CHRONIC DIALYSIS, WITH LONG-TERM CURRENT USE OF INSULIN: Chronic | ICD-10-CM

## 2019-01-01 DIAGNOSIS — K72.90 DECOMPENSATED HEPATIC CIRRHOSIS: ICD-10-CM

## 2019-01-01 DIAGNOSIS — D59.8 OTHER ACQUIRED HEMOLYTIC ANEMIAS: ICD-10-CM

## 2019-01-01 DIAGNOSIS — M21.6X2 ROCKER BOTTOM FOOT, ACQUIRED, LEFT: ICD-10-CM

## 2019-01-01 DIAGNOSIS — E44.0 MODERATE PROTEIN MALNUTRITION: ICD-10-CM

## 2019-01-01 DIAGNOSIS — K21.9 GASTROESOPHAGEAL REFLUX DISEASE, ESOPHAGITIS PRESENCE NOT SPECIFIED: Chronic | ICD-10-CM

## 2019-01-01 DIAGNOSIS — R60.0 BILATERAL LOWER EXTREMITY EDEMA: ICD-10-CM

## 2019-01-01 DIAGNOSIS — L98.8 MACERATION OF SKIN: ICD-10-CM

## 2019-01-01 DIAGNOSIS — M20.41 HAMMER TOES OF BOTH FEET: ICD-10-CM

## 2019-01-01 DIAGNOSIS — B37.2 CANDIDIASIS, CUTANEOUS: ICD-10-CM

## 2019-01-01 DIAGNOSIS — N18.5 TYPE 2 DIABETES MELLITUS WITH STAGE 5 CHRONIC KIDNEY DISEASE NOT ON CHRONIC DIALYSIS, WITH LONG-TERM CURRENT USE OF INSULIN: Chronic | ICD-10-CM

## 2019-01-01 DIAGNOSIS — I50.43 ACUTE ON CHRONIC COMBINED SYSTOLIC AND DIASTOLIC HEART FAILURE: ICD-10-CM

## 2019-01-01 DIAGNOSIS — B35.1 ONYCHOMYCOSIS DUE TO DERMATOPHYTE: ICD-10-CM

## 2019-01-01 DIAGNOSIS — D72.829 LEUKOCYTOSIS, UNSPECIFIED TYPE: ICD-10-CM

## 2019-01-01 DIAGNOSIS — G93.40 ACUTE ENCEPHALOPATHY: ICD-10-CM

## 2019-01-01 DIAGNOSIS — E11.22 TYPE 2 DIABETES MELLITUS WITH STAGE 5 CHRONIC KIDNEY DISEASE NOT ON CHRONIC DIALYSIS, WITH LONG-TERM CURRENT USE OF INSULIN: Chronic | ICD-10-CM

## 2019-01-01 LAB
ABO + RH BLD: NORMAL
ABO + RH BLD: NORMAL
AFP SERPL-MCNC: 12 NG/ML
ALBUMIN FLD-MCNC: 1.4 G/DL
ALBUMIN SERPL BCP-MCNC: 1.7 G/DL
ALBUMIN SERPL BCP-MCNC: 1.8 G/DL
ALBUMIN SERPL BCP-MCNC: 1.9 G/DL
ALBUMIN SERPL BCP-MCNC: 2 G/DL
ALBUMIN SERPL BCP-MCNC: 2.1 G/DL
ALBUMIN SERPL BCP-MCNC: 2.2 G/DL
ALBUMIN SERPL BCP-MCNC: 2.3 G/DL
ALBUMIN SERPL BCP-MCNC: 2.5 G/DL
ALBUMIN SERPL BCP-MCNC: 3 G/DL
ALBUMIN SERPL BCP-MCNC: 3.5 G/DL
ALLENS TEST: ABNORMAL
ALP SERPL-CCNC: 109 U/L
ALP SERPL-CCNC: 118 U/L
ALP SERPL-CCNC: 126 U/L
ALP SERPL-CCNC: 132 U/L
ALP SERPL-CCNC: 142 U/L
ALP SERPL-CCNC: 148 U/L
ALP SERPL-CCNC: 186 U/L
ALP SERPL-CCNC: 186 U/L
ALP SERPL-CCNC: 194 U/L
ALP SERPL-CCNC: 237 U/L
ALT SERPL W/O P-5'-P-CCNC: 14 U/L
ALT SERPL W/O P-5'-P-CCNC: 19 U/L
ALT SERPL W/O P-5'-P-CCNC: 22 U/L
ALT SERPL W/O P-5'-P-CCNC: 22 U/L
ALT SERPL W/O P-5'-P-CCNC: 23 U/L
ALT SERPL W/O P-5'-P-CCNC: 24 U/L
ALT SERPL W/O P-5'-P-CCNC: 26 U/L
ALT SERPL W/O P-5'-P-CCNC: 26 U/L
ALT SERPL W/O P-5'-P-CCNC: 28 U/L
ALT SERPL W/O P-5'-P-CCNC: 28 U/L
AMMONIA PLAS-SCNC: 45 UMOL/L
AMMONIA PLAS-SCNC: 55 UMOL/L
AMPHET+METHAMPHET UR QL: NORMAL
AMYLASE, BODY FLUID: 5 U/L
ANION GAP SERPL CALC-SCNC: 10 MMOL/L
ANION GAP SERPL CALC-SCNC: 11 MMOL/L
ANION GAP SERPL CALC-SCNC: 12 MMOL/L
ANION GAP SERPL CALC-SCNC: 13 MMOL/L
ANION GAP SERPL CALC-SCNC: 14 MMOL/L
ANION GAP SERPL CALC-SCNC: 8 MMOL/L
APAP SERPL-MCNC: <3 UG/ML
APPEARANCE FLD: NORMAL
APTT BLDCRRT: 37.4 SEC
ASCENDING AORTA: 3.7 CM
AST SERPL-CCNC: 25 U/L
AST SERPL-CCNC: 29 U/L
AST SERPL-CCNC: 44 U/L
AST SERPL-CCNC: 47 U/L
AST SERPL-CCNC: 54 U/L
AST SERPL-CCNC: 54 U/L
AST SERPL-CCNC: 60 U/L
AST SERPL-CCNC: 64 U/L
AST SERPL-CCNC: 75 U/L
AST SERPL-CCNC: 91 U/L
AV INDEX (PROSTH): 0.89
AV MEAN GRADIENT: 6.42 MMHG
AV PEAK GRADIENT: 10.24 MMHG
AV VALVE AREA: 2.68 CM2
AV VELOCITY RATIO: 0.85
BACTERIA BLD CULT: NORMAL
BACTERIA BLD CULT: NORMAL
BACTERIA FLD CULT: NORMAL
BACTERIA SPEC AEROBE CULT: NO GROWTH
BACTERIA SPEC AEROBE CULT: NORMAL
BACTERIA SPEC AEROBE CULT: NORMAL
BARBITURATES UR QL SCN>200 NG/ML: NORMAL
BASOPHILS # BLD AUTO: 0.04 K/UL
BASOPHILS # BLD AUTO: 0.05 K/UL
BASOPHILS # BLD AUTO: 0.06 K/UL
BASOPHILS # BLD AUTO: 0.09 K/UL
BASOPHILS # BLD AUTO: 0.11 K/UL
BASOPHILS # BLD AUTO: 0.11 K/UL
BASOPHILS NFR BLD: 0.3 %
BASOPHILS NFR BLD: 0.4 %
BASOPHILS NFR BLD: 1.2 %
BASOPHILS NFR BLD: 1.6 %
BENZODIAZ UR QL SCN>200 NG/ML: NORMAL
BILIRUB DIRECT SERPL-MCNC: 0.3 MG/DL
BILIRUB DIRECT SERPL-MCNC: 0.4 MG/DL
BILIRUB SERPL-MCNC: 0.6 MG/DL
BILIRUB SERPL-MCNC: 0.8 MG/DL
BILIRUB SERPL-MCNC: 1.7 MG/DL
BILIRUB SERPL-MCNC: 1.7 MG/DL
BILIRUB SERPL-MCNC: 1.8 MG/DL
BILIRUB SERPL-MCNC: 2 MG/DL
BILIRUB SERPL-MCNC: 2 MG/DL
BILIRUB SERPL-MCNC: 2.2 MG/DL
BILIRUB UR QL STRIP: NEGATIVE
BLD GP AB SCN CELLS X3 SERPL QL: NORMAL
BLD GP AB SCN CELLS X3 SERPL QL: NORMAL
BODY FLD TYPE: NORMAL
BODY FLUID SOURCE AMYLASE: NORMAL
BODY FLUID SOURCE, LDH: NORMAL
BSA FOR ECHO PROCEDURE: 2.17 M2
BUN SERPL-MCNC: 26 MG/DL
BUN SERPL-MCNC: 36 MG/DL
BUN SERPL-MCNC: 39 MG/DL
BUN SERPL-MCNC: 40 MG/DL
BUN SERPL-MCNC: 44 MG/DL
BUN SERPL-MCNC: 45 MG/DL
BUN SERPL-MCNC: 45 MG/DL
BUN SERPL-MCNC: 49 MG/DL
BUN SERPL-MCNC: 50 MG/DL
BUN SERPL-MCNC: 52 MG/DL
BUN SERPL-MCNC: 61 MG/DL
BZE UR QL SCN: NEGATIVE
CALCIUM SERPL-MCNC: 7.4 MG/DL
CALCIUM SERPL-MCNC: 7.9 MG/DL
CALCIUM SERPL-MCNC: 8 MG/DL
CALCIUM SERPL-MCNC: 8.1 MG/DL
CALCIUM SERPL-MCNC: 8.1 MG/DL
CALCIUM SERPL-MCNC: 8.2 MG/DL
CALCIUM SERPL-MCNC: 8.3 MG/DL
CALCIUM SERPL-MCNC: 8.5 MG/DL
CALCIUM SERPL-MCNC: 8.7 MG/DL
CANNABINOIDS UR QL SCN: NORMAL
CHLORIDE SERPL-SCNC: 101 MMOL/L
CHLORIDE SERPL-SCNC: 103 MMOL/L
CHLORIDE SERPL-SCNC: 103 MMOL/L
CHLORIDE SERPL-SCNC: 104 MMOL/L
CHLORIDE SERPL-SCNC: 104 MMOL/L
CHLORIDE SERPL-SCNC: 105 MMOL/L
CHLORIDE SERPL-SCNC: 106 MMOL/L
CHLORIDE SERPL-SCNC: 107 MMOL/L
CHLORIDE SERPL-SCNC: 108 MMOL/L
CHLORIDE SERPL-SCNC: 113 MMOL/L
CHLORIDE SERPL-SCNC: 99 MMOL/L
CLARITY UR: CLEAR
CO2 SERPL-SCNC: 18 MMOL/L
CO2 SERPL-SCNC: 19 MMOL/L
CO2 SERPL-SCNC: 22 MMOL/L
CO2 SERPL-SCNC: 23 MMOL/L
CO2 SERPL-SCNC: 23 MMOL/L
CO2 SERPL-SCNC: 24 MMOL/L
CO2 SERPL-SCNC: 25 MMOL/L
COLOR FLD: YELLOW
COLOR UR: NORMAL
CREAT SERPL-MCNC: 4.9 MG/DL
CREAT SERPL-MCNC: 5.2 MG/DL
CREAT SERPL-MCNC: 5.3 MG/DL
CREAT SERPL-MCNC: 5.3 MG/DL
CREAT SERPL-MCNC: 5.7 MG/DL
CREAT SERPL-MCNC: 5.7 MG/DL
CREAT SERPL-MCNC: 6.5 MG/DL
CREAT SERPL-MCNC: 6.8 MG/DL
CREAT SERPL-MCNC: 7.3 MG/DL
CREAT SERPL-MCNC: 7.4 MG/DL
CREAT SERPL-MCNC: 7.5 MG/DL
CREAT UR-MCNC: 123.4 MG/DL
CV ECHO LV RWT: 0.56 CM
DELSYS: ABNORMAL
DIFFERENTIAL METHOD: ABNORMAL
DOP CALC AO PEAK VEL: 1.6 M/S
DOP CALC AO VTI: 33.13 CM
DOP CALC LVOT AREA: 3.02 CM2
DOP CALC LVOT DIAMETER: 1.96 CM
DOP CALC LVOT PEAK VEL: 1.36 M/S
DOP CALC LVOT STROKE VOLUME: 88.81 CM3
DOP CALCLVOT PEAK VEL VTI: 29.45 CM
E WAVE DECELERATION TIME: 370.67 MSEC
E/A RATIO: 0.88
E/E' RATIO: 7.2
ECHO LV POSTERIOR WALL: 1.3 CM (ref 0.6–1.1)
EOSINOPHIL # BLD AUTO: 0.1 K/UL
EOSINOPHIL # BLD AUTO: 0.2 K/UL
EOSINOPHIL # BLD AUTO: 0.3 K/UL
EOSINOPHIL # BLD AUTO: 0.3 K/UL
EOSINOPHIL # BLD AUTO: 0.4 K/UL
EOSINOPHIL # BLD AUTO: 0.5 K/UL
EOSINOPHIL # BLD AUTO: 0.6 K/UL
EOSINOPHIL NFR BLD: 0.8 %
EOSINOPHIL NFR BLD: 1.4 %
EOSINOPHIL NFR BLD: 1.5 %
EOSINOPHIL NFR BLD: 1.5 %
EOSINOPHIL NFR BLD: 1.7 %
EOSINOPHIL NFR BLD: 1.8 %
EOSINOPHIL NFR BLD: 1.9 %
EOSINOPHIL NFR BLD: 2 %
EOSINOPHIL NFR BLD: 6.4 %
EOSINOPHIL NFR BLD: 6.9 %
ERYTHROCYTE [DISTWIDTH] IN BLOOD BY AUTOMATED COUNT: 14.3 %
ERYTHROCYTE [DISTWIDTH] IN BLOOD BY AUTOMATED COUNT: 15.3 %
ERYTHROCYTE [DISTWIDTH] IN BLOOD BY AUTOMATED COUNT: 18.7 %
ERYTHROCYTE [DISTWIDTH] IN BLOOD BY AUTOMATED COUNT: 19.2 %
ERYTHROCYTE [DISTWIDTH] IN BLOOD BY AUTOMATED COUNT: 19.3 %
ERYTHROCYTE [DISTWIDTH] IN BLOOD BY AUTOMATED COUNT: 19.6 %
ERYTHROCYTE [DISTWIDTH] IN BLOOD BY AUTOMATED COUNT: 20.1 %
ERYTHROCYTE [DISTWIDTH] IN BLOOD BY AUTOMATED COUNT: 20.5 %
ERYTHROCYTE [DISTWIDTH] IN BLOOD BY AUTOMATED COUNT: 21.4 %
ERYTHROCYTE [DISTWIDTH] IN BLOOD BY AUTOMATED COUNT: 22.9 %
ERYTHROCYTE [SEDIMENTATION RATE] IN BLOOD BY WESTERGREN METHOD: 16 MM/H
ERYTHROCYTE [SEDIMENTATION RATE] IN BLOOD BY WESTERGREN METHOD: 18 MM/H
ERYTHROCYTE [SEDIMENTATION RATE] IN BLOOD BY WESTERGREN METHOD: 18 MM/H
EST. GFR  (AFRICAN AMERICAN): 10.4 ML/MIN/1.73 M^2
EST. GFR  (AFRICAN AMERICAN): 10.4 ML/MIN/1.73 M^2
EST. GFR  (AFRICAN AMERICAN): 11.4 ML/MIN/1.73 M^2
EST. GFR  (AFRICAN AMERICAN): 11.4 ML/MIN/1.73 M^2
EST. GFR  (AFRICAN AMERICAN): 11.6 ML/MIN/1.73 M^2
EST. GFR  (AFRICAN AMERICAN): 12.5 ML/MIN/1.73 M^2
EST. GFR  (AFRICAN AMERICAN): 7.5 ML/MIN/1.73 M^2
EST. GFR  (AFRICAN AMERICAN): 7.6 ML/MIN/1.73 M^2
EST. GFR  (AFRICAN AMERICAN): 7.7 ML/MIN/1.73 M^2
EST. GFR  (AFRICAN AMERICAN): 8.4 ML/MIN/1.73 M^2
EST. GFR  (AFRICAN AMERICAN): 9 ML/MIN/1.73 M^2
EST. GFR  (NON AFRICAN AMERICAN): 10.1 ML/MIN/1.73 M^2
EST. GFR  (NON AFRICAN AMERICAN): 10.8 ML/MIN/1.73 M^2
EST. GFR  (NON AFRICAN AMERICAN): 6.5 ML/MIN/1.73 M^2
EST. GFR  (NON AFRICAN AMERICAN): 6.6 ML/MIN/1.73 M^2
EST. GFR  (NON AFRICAN AMERICAN): 6.7 ML/MIN/1.73 M^2
EST. GFR  (NON AFRICAN AMERICAN): 7.3 ML/MIN/1.73 M^2
EST. GFR  (NON AFRICAN AMERICAN): 8 ML/MIN/1.73 M^2
EST. GFR  (NON AFRICAN AMERICAN): 9 ML/MIN/1.73 M^2
EST. GFR  (NON AFRICAN AMERICAN): 9 ML/MIN/1.73 M^2
EST. GFR  (NON AFRICAN AMERICAN): 9.8 ML/MIN/1.73 M^2
EST. GFR  (NON AFRICAN AMERICAN): 9.8 ML/MIN/1.73 M^2
ESTIMATED AVG GLUCOSE: 91 MG/DL
ETHANOL SERPL-MCNC: <10 MG/DL
FIO2: 30
FIO2: 40
FLOW: 3
FLOW: 3
FRACTIONAL SHORTENING: 23 % (ref 28–44)
GLUCOSE SERPL-MCNC: 135 MG/DL
GLUCOSE SERPL-MCNC: 160 MG/DL
GLUCOSE SERPL-MCNC: 168 MG/DL
GLUCOSE SERPL-MCNC: 191 MG/DL
GLUCOSE SERPL-MCNC: 218 MG/DL
GLUCOSE SERPL-MCNC: 252 MG/DL
GLUCOSE SERPL-MCNC: 255 MG/DL
GLUCOSE SERPL-MCNC: 263 MG/DL
GLUCOSE SERPL-MCNC: 312 MG/DL
GLUCOSE SERPL-MCNC: 62 MG/DL
GLUCOSE SERPL-MCNC: 67 MG/DL
GLUCOSE UR QL STRIP: NEGATIVE
GRAM STN SPEC: NORMAL
HBA1C MFR BLD HPLC: 4.8 %
HCO3 UR-SCNC: 19.2 MMOL/L (ref 24–28)
HCO3 UR-SCNC: 19.3 MMOL/L (ref 24–28)
HCO3 UR-SCNC: 22 MMOL/L (ref 24–28)
HCO3 UR-SCNC: 22.2 MMOL/L (ref 24–28)
HCO3 UR-SCNC: 22.4 MMOL/L (ref 24–28)
HCO3 UR-SCNC: 22.5 MMOL/L (ref 24–28)
HCO3 UR-SCNC: 23.4 MMOL/L (ref 24–28)
HCO3 UR-SCNC: 23.8 MMOL/L (ref 24–28)
HCT VFR BLD AUTO: 29.4 %
HCT VFR BLD AUTO: 29.7 %
HCT VFR BLD AUTO: 30.7 %
HCT VFR BLD AUTO: 30.9 %
HCT VFR BLD AUTO: 31.5 %
HCT VFR BLD AUTO: 31.7 %
HCT VFR BLD AUTO: 32.2 %
HCT VFR BLD AUTO: 33.2 %
HGB BLD-MCNC: 10 G/DL
HGB BLD-MCNC: 10.1 G/DL
HGB BLD-MCNC: 10.4 G/DL
HGB BLD-MCNC: 9.4 G/DL
HGB BLD-MCNC: 9.6 G/DL
HGB BLD-MCNC: 9.7 G/DL
HGB BLD-MCNC: 9.7 G/DL
HGB BLD-MCNC: 9.9 G/DL
HGB UR QL STRIP: NEGATIVE
IMM GRANULOCYTES # BLD AUTO: 0.01 K/UL
IMM GRANULOCYTES # BLD AUTO: 0.03 K/UL
IMM GRANULOCYTES # BLD AUTO: 0.13 K/UL
IMM GRANULOCYTES # BLD AUTO: 0.17 K/UL
IMM GRANULOCYTES # BLD AUTO: 0.19 K/UL
IMM GRANULOCYTES # BLD AUTO: 0.21 K/UL
IMM GRANULOCYTES # BLD AUTO: 0.21 K/UL
IMM GRANULOCYTES # BLD AUTO: 0.33 K/UL
IMM GRANULOCYTES # BLD AUTO: 0.38 K/UL
IMM GRANULOCYTES NFR BLD AUTO: 0.1 %
IMM GRANULOCYTES NFR BLD AUTO: 0.3 %
IMM GRANULOCYTES NFR BLD AUTO: 1 %
IMM GRANULOCYTES NFR BLD AUTO: 1.2 %
IMM GRANULOCYTES NFR BLD AUTO: 1.3 %
IMM GRANULOCYTES NFR BLD AUTO: 1.4 %
IMM GRANULOCYTES NFR BLD AUTO: 1.5 %
IMM GRANULOCYTES NFR BLD AUTO: 1.6 %
IMM GRANULOCYTES NFR BLD AUTO: 1.9 %
INR PPP: 1.2
INR PPP: 1.5
INR PPP: 1.5
INR PPP: 1.6
INR PPP: 1.7
INR PPP: 1.9
INTERVENTRICULAR SEPTUM: 1.4 CM (ref 0.6–1.1)
KETONES UR QL STRIP: NEGATIVE
LA MAJOR: 6.15 CM
LA MINOR: 5.92 CM
LA WIDTH: 3.91 CM
LACTATE SERPL-SCNC: 1.1 MMOL/L
LACTATE SERPL-SCNC: 1.1 MMOL/L
LACTATE SERPL-SCNC: 1.2 MMOL/L
LACTATE SERPL-SCNC: 3.8 MMOL/L
LDH FLD L TO P-CCNC: 99 U/L
LDH SERPL L TO P-CCNC: 341 U/L
LEFT ATRIUM SIZE: 4.84 CM
LEFT ATRIUM VOLUME INDEX: 45.1 ML/M2
LEFT ATRIUM VOLUME: 97.04 CM3
LEFT INTERNAL DIMENSION IN SYSTOLE: 3.57 CM (ref 2.1–4)
LEFT VENTRICLE DIASTOLIC VOLUME INDEX: 46.38 ML/M2
LEFT VENTRICLE DIASTOLIC VOLUME: 99.86 ML
LEFT VENTRICLE MASS INDEX: 114.9 G/M2
LEFT VENTRICLE SYSTOLIC VOLUME INDEX: 24.7 ML/M2
LEFT VENTRICLE SYSTOLIC VOLUME: 53.18 ML
LEFT VENTRICULAR INTERNAL DIMENSION IN DIASTOLE: 4.65 CM (ref 3.5–6)
LEFT VENTRICULAR MASS: 247.31 G
LEUKOCYTE ESTERASE UR QL STRIP: NEGATIVE
LIPASE SERPL-CCNC: 6 U/L
LV LATERAL E/E' RATIO: 5.54
LV SEPTAL E/E' RATIO: 10.29
LYMPHOCYTES # BLD AUTO: 0.4 K/UL
LYMPHOCYTES # BLD AUTO: 0.5 K/UL
LYMPHOCYTES # BLD AUTO: 0.6 K/UL
LYMPHOCYTES # BLD AUTO: 0.8 K/UL
LYMPHOCYTES # BLD AUTO: 0.8 K/UL
LYMPHOCYTES # BLD AUTO: 0.9 K/UL
LYMPHOCYTES # BLD AUTO: 1 K/UL
LYMPHOCYTES # BLD AUTO: 1 K/UL
LYMPHOCYTES NFR BLD: 13.5 %
LYMPHOCYTES NFR BLD: 3.5 %
LYMPHOCYTES NFR BLD: 3.5 %
LYMPHOCYTES NFR BLD: 4 %
LYMPHOCYTES NFR BLD: 4 %
LYMPHOCYTES NFR BLD: 5.6 %
LYMPHOCYTES NFR BLD: 5.8 %
LYMPHOCYTES NFR BLD: 6.2 %
LYMPHOCYTES NFR BLD: 6.7 %
LYMPHOCYTES NFR BLD: 9 %
LYMPHOCYTES NFR FLD MANUAL: 62 %
MAGNESIUM SERPL-MCNC: 1.9 MG/DL
MAGNESIUM SERPL-MCNC: 2 MG/DL
MAGNESIUM SERPL-MCNC: 2.1 MG/DL
MAGNESIUM SERPL-MCNC: 2.2 MG/DL
MAGNESIUM SERPL-MCNC: 2.2 MG/DL
MAGNESIUM SERPL-MCNC: 2.3 MG/DL
MAGNESIUM SERPL-MCNC: 2.5 MG/DL
MCH RBC QN AUTO: 27.4 PG
MCH RBC QN AUTO: 27.4 PG
MCH RBC QN AUTO: 27.5 PG
MCH RBC QN AUTO: 27.6 PG
MCH RBC QN AUTO: 27.6 PG
MCH RBC QN AUTO: 27.7 PG
MCH RBC QN AUTO: 28 PG
MCH RBC QN AUTO: 28.2 PG
MCH RBC QN AUTO: 28.2 PG
MCH RBC QN AUTO: 29 PG
MCHC RBC AUTO-ENTMCNC: 31.1 G/DL
MCHC RBC AUTO-ENTMCNC: 31.3 G/DL
MCHC RBC AUTO-ENTMCNC: 31.4 G/DL
MCHC RBC AUTO-ENTMCNC: 31.6 G/DL
MCHC RBC AUTO-ENTMCNC: 31.9 G/DL
MCHC RBC AUTO-ENTMCNC: 32 G/DL
MCHC RBC AUTO-ENTMCNC: 32.3 G/DL
MCV RBC AUTO: 85 FL
MCV RBC AUTO: 86 FL
MCV RBC AUTO: 86 FL
MCV RBC AUTO: 87 FL
MCV RBC AUTO: 88 FL
MCV RBC AUTO: 88 FL
MCV RBC AUTO: 89 FL
MCV RBC AUTO: 90 FL
MCV RBC AUTO: 91 FL
MCV RBC AUTO: 93 FL
METHADONE UR QL SCN>300 NG/ML: NORMAL
MIN VOL: 10
MODE: ABNORMAL
MONOCYTES # BLD AUTO: 0.9 K/UL
MONOCYTES # BLD AUTO: 1.1 K/UL
MONOCYTES # BLD AUTO: 1.2 K/UL
MONOCYTES # BLD AUTO: 1.2 K/UL
MONOCYTES # BLD AUTO: 1.3 K/UL
MONOCYTES # BLD AUTO: 1.5 K/UL
MONOCYTES # BLD AUTO: 1.7 K/UL
MONOCYTES # BLD AUTO: 1.7 K/UL
MONOCYTES # BLD AUTO: 1.9 K/UL
MONOCYTES # BLD AUTO: 2.2 K/UL
MONOCYTES NFR BLD: 10 %
MONOCYTES NFR BLD: 10.2 %
MONOCYTES NFR BLD: 10.9 %
MONOCYTES NFR BLD: 11.5 %
MONOCYTES NFR BLD: 12 %
MONOCYTES NFR BLD: 12.8 %
MONOCYTES NFR BLD: 13 %
MONOCYTES NFR BLD: 8.6 %
MONOCYTES NFR BLD: 9.5 %
MONOCYTES NFR BLD: 9.6 %
MONOS+MACROS NFR FLD MANUAL: 21 %
MV PEAK A VEL: 0.82 M/S
MV PEAK E VEL: 0.72 M/S
NEUTROPHILS # BLD AUTO: 10.4 K/UL
NEUTROPHILS # BLD AUTO: 10.5 K/UL
NEUTROPHILS # BLD AUTO: 11.2 K/UL
NEUTROPHILS # BLD AUTO: 11.4 K/UL
NEUTROPHILS # BLD AUTO: 12 K/UL
NEUTROPHILS # BLD AUTO: 12.5 K/UL
NEUTROPHILS # BLD AUTO: 13.5 K/UL
NEUTROPHILS # BLD AUTO: 19.1 K/UL
NEUTROPHILS # BLD AUTO: 4.4 K/UL
NEUTROPHILS # BLD AUTO: 6.2 K/UL
NEUTROPHILS NFR BLD: 64.9 %
NEUTROPHILS NFR BLD: 70.3 %
NEUTROPHILS NFR BLD: 78.5 %
NEUTROPHILS NFR BLD: 78.7 %
NEUTROPHILS NFR BLD: 79.3 %
NEUTROPHILS NFR BLD: 81.3 %
NEUTROPHILS NFR BLD: 82.8 %
NEUTROPHILS NFR BLD: 82.8 %
NEUTROPHILS NFR BLD: 84.4 %
NEUTROPHILS NFR BLD: 85.4 %
NEUTROPHILS NFR FLD MANUAL: 17 %
NITRITE UR QL STRIP: NEGATIVE
NRBC BLD-RTO: 0 /100 WBC
OPIATES UR QL SCN: NORMAL
PCO2 BLDA: 33.8 MMHG (ref 35–45)
PCO2 BLDA: 34.1 MMHG (ref 35–45)
PCO2 BLDA: 36.7 MMHG (ref 35–45)
PCO2 BLDA: 38.7 MMHG (ref 35–45)
PCO2 BLDA: 40.1 MMHG (ref 35–45)
PCO2 BLDA: 42.4 MMHG (ref 35–45)
PCO2 BLDA: 47.1 MMHG (ref 35–45)
PCO2 BLDA: 47.5 MMHG (ref 35–45)
PCP UR QL SCN>25 NG/ML: NORMAL
PEEP: 5
PEEP: 5
PH SMN: 7.28 [PH] (ref 7.35–7.45)
PH SMN: 7.29 [PH] (ref 7.35–7.45)
PH SMN: 7.33 [PH] (ref 7.35–7.45)
PH SMN: 7.36 [PH] (ref 7.35–7.45)
PH SMN: 7.36 [PH] (ref 7.35–7.45)
PH SMN: 7.37 [PH] (ref 7.35–7.45)
PH SMN: 7.38 [PH] (ref 7.35–7.45)
PH SMN: 7.4 [PH] (ref 7.35–7.45)
PH UR STRIP: 5 [PH] (ref 5–8)
PHOSPHATE SERPL-MCNC: 1.9 MG/DL
PHOSPHATE SERPL-MCNC: 2 MG/DL
PHOSPHATE SERPL-MCNC: 2.1 MG/DL
PHOSPHATE SERPL-MCNC: 3.3 MG/DL
PHOSPHATE SERPL-MCNC: 3.4 MG/DL
PHOSPHATE SERPL-MCNC: 5.2 MG/DL
PIP: 26
PISA TR MAX VEL: 2 M/S
PLATELET # BLD AUTO: 142 K/UL
PLATELET # BLD AUTO: 153 K/UL
PLATELET # BLD AUTO: 166 K/UL
PLATELET # BLD AUTO: 168 K/UL
PLATELET # BLD AUTO: 177 K/UL
PLATELET # BLD AUTO: 184 K/UL
PLATELET # BLD AUTO: 186 K/UL
PLATELET # BLD AUTO: 190 K/UL
PLATELET # BLD AUTO: 190 K/UL
PLATELET # BLD AUTO: 201 K/UL
PMV BLD AUTO: 10.8 FL
PMV BLD AUTO: 11.1 FL
PMV BLD AUTO: 11.2 FL
PMV BLD AUTO: 11.5 FL
PMV BLD AUTO: 11.8 FL
PMV BLD AUTO: 11.9 FL
PMV BLD AUTO: 12.3 FL
PMV BLD AUTO: 12.7 FL
PMV BLD AUTO: 12.7 FL
PMV BLD AUTO: 12.9 FL
PO2 BLDA: 104 MMHG (ref 80–100)
PO2 BLDA: 303 MMHG (ref 80–100)
PO2 BLDA: 47 MMHG (ref 40–60)
PO2 BLDA: 74 MMHG (ref 80–100)
PO2 BLDA: 83 MMHG (ref 80–100)
PO2 BLDA: 91 MMHG (ref 80–100)
PO2 BLDA: 94 MMHG (ref 80–100)
PO2 BLDA: 97 MMHG (ref 80–100)
POC BE: -1 MMOL/L
POC BE: -2 MMOL/L
POC BE: -3 MMOL/L
POC BE: -4 MMOL/L
POC BE: -4 MMOL/L
POC BE: -5 MMOL/L
POC BE: -6 MMOL/L
POC BE: -6 MMOL/L
POC SATURATED O2: 100 % (ref 95–100)
POC SATURATED O2: 82 % (ref 95–100)
POC SATURATED O2: 94 % (ref 95–100)
POC SATURATED O2: 96 % (ref 95–100)
POC SATURATED O2: 97 % (ref 95–100)
POC SATURATED O2: 97 % (ref 95–100)
POC TCO2: 20 MMOL/L (ref 23–27)
POC TCO2: 20 MMOL/L (ref 24–29)
POC TCO2: 23 MMOL/L (ref 23–27)
POC TCO2: 24 MMOL/L (ref 23–27)
POC TCO2: 25 MMOL/L (ref 23–27)
POC TCO2: 25 MMOL/L (ref 23–27)
POCT GLUCOSE: 102 MG/DL (ref 70–110)
POCT GLUCOSE: 133 MG/DL (ref 70–110)
POCT GLUCOSE: 139 MG/DL (ref 70–110)
POCT GLUCOSE: 148 MG/DL (ref 70–110)
POCT GLUCOSE: 151 MG/DL (ref 70–110)
POCT GLUCOSE: 161 MG/DL (ref 70–110)
POCT GLUCOSE: 170 MG/DL (ref 70–110)
POCT GLUCOSE: 174 MG/DL (ref 70–110)
POCT GLUCOSE: 183 MG/DL (ref 70–110)
POCT GLUCOSE: 185 MG/DL (ref 70–110)
POCT GLUCOSE: 191 MG/DL (ref 70–110)
POCT GLUCOSE: 195 MG/DL (ref 70–110)
POCT GLUCOSE: 195 MG/DL (ref 70–110)
POCT GLUCOSE: 201 MG/DL (ref 70–110)
POCT GLUCOSE: 216 MG/DL (ref 70–110)
POCT GLUCOSE: 222 MG/DL (ref 70–110)
POCT GLUCOSE: 227 MG/DL (ref 70–110)
POCT GLUCOSE: 238 MG/DL (ref 70–110)
POCT GLUCOSE: 238 MG/DL (ref 70–110)
POCT GLUCOSE: 249 MG/DL (ref 70–110)
POCT GLUCOSE: 259 MG/DL (ref 70–110)
POCT GLUCOSE: 263 MG/DL (ref 70–110)
POCT GLUCOSE: 265 MG/DL (ref 70–110)
POCT GLUCOSE: 269 MG/DL (ref 70–110)
POCT GLUCOSE: 271 MG/DL (ref 70–110)
POCT GLUCOSE: 282 MG/DL (ref 70–110)
POCT GLUCOSE: 286 MG/DL (ref 70–110)
POCT GLUCOSE: 295 MG/DL (ref 70–110)
POCT GLUCOSE: 299 MG/DL (ref 70–110)
POCT GLUCOSE: 308 MG/DL (ref 70–110)
POCT GLUCOSE: 325 MG/DL (ref 70–110)
POCT GLUCOSE: 329 MG/DL (ref 70–110)
POCT GLUCOSE: 334 MG/DL (ref 70–110)
POCT GLUCOSE: 338 MG/DL (ref 70–110)
POCT GLUCOSE: 360 MG/DL (ref 70–110)
POCT GLUCOSE: 380 MG/DL (ref 70–110)
POCT GLUCOSE: 61 MG/DL (ref 70–110)
POTASSIUM SERPL-SCNC: 3.9 MMOL/L
POTASSIUM SERPL-SCNC: 4.2 MMOL/L
POTASSIUM SERPL-SCNC: 4.4 MMOL/L
POTASSIUM SERPL-SCNC: 4.4 MMOL/L
POTASSIUM SERPL-SCNC: 4.5 MMOL/L
POTASSIUM SERPL-SCNC: 4.6 MMOL/L
POTASSIUM SERPL-SCNC: 4.8 MMOL/L
POTASSIUM SERPL-SCNC: 5.1 MMOL/L
POTASSIUM SERPL-SCNC: 5.3 MMOL/L
PROCALCITONIN SERPL IA-MCNC: 8.83 NG/ML
PROT FLD-MCNC: 3.5 G/DL
PROT SERPL-MCNC: 6.6 G/DL
PROT SERPL-MCNC: 6.8 G/DL
PROT SERPL-MCNC: 6.8 G/DL
PROT SERPL-MCNC: 7.1 G/DL
PROT SERPL-MCNC: 7.2 G/DL
PROT SERPL-MCNC: 7.2 G/DL
PROT SERPL-MCNC: 7.3 G/DL
PROT SERPL-MCNC: 7.7 G/DL
PROT SERPL-MCNC: 8 G/DL
PROT SERPL-MCNC: 8 G/DL
PROT UR QL STRIP: NEGATIVE
PROTHROMBIN TIME: 11.8 SEC
PROTHROMBIN TIME: 14.7 SEC
PROTHROMBIN TIME: 15.4 SEC
PROTHROMBIN TIME: 16.1 SEC
PROTHROMBIN TIME: 16.3 SEC
PROTHROMBIN TIME: 18.5 SEC
RA MAJOR: 5.82 CM
RA PRESSURE: 3 MMHG
RA WIDTH: 4.12 CM
RBC # BLD AUTO: 3.41 M/UL
RBC # BLD AUTO: 3.46 M/UL
RBC # BLD AUTO: 3.48 M/UL
RBC # BLD AUTO: 3.51 M/UL
RBC # BLD AUTO: 3.51 M/UL
RBC # BLD AUTO: 3.54 M/UL
RBC # BLD AUTO: 3.57 M/UL
RBC # BLD AUTO: 3.67 M/UL
RBC # BLD AUTO: 3.69 M/UL
RBC # BLD AUTO: 3.69 M/UL
RIGHT VENTRICULAR END-DIASTOLIC DIMENSION: 5.2 CM
SALICYLATES SERPL-MCNC: <5 MG/DL
SAMPLE: ABNORMAL
SINUS: 3.4 CM
SITE: ABNORMAL
SODIUM SERPL-SCNC: 134 MMOL/L
SODIUM SERPL-SCNC: 136 MMOL/L
SODIUM SERPL-SCNC: 137 MMOL/L
SODIUM SERPL-SCNC: 138 MMOL/L
SODIUM SERPL-SCNC: 138 MMOL/L
SODIUM SERPL-SCNC: 140 MMOL/L
SODIUM SERPL-SCNC: 140 MMOL/L
SODIUM SERPL-SCNC: 141 MMOL/L
SODIUM SERPL-SCNC: 141 MMOL/L
SODIUM SERPL-SCNC: 143 MMOL/L
SODIUM SERPL-SCNC: 144 MMOL/L
SP GR UR STRIP: 1.02 (ref 1–1.03)
SP02: 95
SP02: 96
SP02: 99
SPECIMEN SOURCE: NORMAL
SPECIMEN SOURCE: NORMAL
STJ: 3.69 CM
TACROLIMUS BLD-MCNC: 1.6 NG/ML
TACROLIMUS BLD-MCNC: 1.9 NG/ML
TACROLIMUS BLD-MCNC: 2.1 NG/ML
TACROLIMUS BLD-MCNC: 2.2 NG/ML
TACROLIMUS BLD-MCNC: <1.5 NG/ML
TDI LATERAL: 0.13
TDI SEPTAL: 0.07
TDI: 0.1
TOXICOLOGY INFORMATION: NORMAL
TR MAX PG: 16 MMHG
TRICUSPID ANNULAR PLANE SYSTOLIC EXCURSION: 1.9 CM
TROPONIN I SERPL DL<=0.01 NG/ML-MCNC: 0.18 NG/ML
TROPONIN I SERPL DL<=0.01 NG/ML-MCNC: 0.26 NG/ML
TROPONIN I SERPL DL<=0.01 NG/ML-MCNC: 0.35 NG/ML
TROPONIN I SERPL DL<=0.01 NG/ML-MCNC: 0.36 NG/ML
TROPONIN I SERPL DL<=0.01 NG/ML-MCNC: 0.37 NG/ML
TROPONIN I SERPL DL<=0.01 NG/ML-MCNC: 0.39 NG/ML
TSH SERPL DL<=0.005 MIU/L-ACNC: 2.75 UIU/ML
TV REST PULMONARY ARTERY PRESSURE: 19 MMHG
URN SPEC COLLECT METH UR: NORMAL
UROBILINOGEN UR STRIP-ACNC: NEGATIVE EU/DL
VANCOMYCIN SERPL-MCNC: 11.9 UG/ML
VANCOMYCIN SERPL-MCNC: 12.6 UG/ML
VANCOMYCIN SERPL-MCNC: 15.2 UG/ML
VT: 500
VT: 500
WBC # BLD AUTO: 12.26 K/UL
WBC # BLD AUTO: 12.87 K/UL
WBC # BLD AUTO: 14.14 K/UL
WBC # BLD AUTO: 14.44 K/UL
WBC # BLD AUTO: 14.55 K/UL
WBC # BLD AUTO: 14.66 K/UL
WBC # BLD AUTO: 17.02 K/UL
WBC # BLD AUTO: 23.06 K/UL
WBC # BLD AUTO: 6.82 K/UL
WBC # BLD AUTO: 8.86 K/UL
WBC # FLD: 124 /CU MM

## 2019-01-01 PROCEDURE — 82962 GLUCOSE BLOOD TEST: CPT | Mod: HCNC

## 2019-01-01 PROCEDURE — 25000003 PHARM REV CODE 250: Mod: HCNC | Performed by: HOSPITALIST

## 2019-01-01 PROCEDURE — 63600175 PHARM REV CODE 636 W HCPCS: Mod: HCNC | Performed by: STUDENT IN AN ORGANIZED HEALTH CARE EDUCATION/TRAINING PROGRAM

## 2019-01-01 PROCEDURE — 11721 PR DEBRIDEMENT OF NAILS, 6 OR MORE: ICD-10-PCS | Mod: 59,Q9,HCNC,S$GLB | Performed by: PODIATRIST

## 2019-01-01 PROCEDURE — 85610 PROTHROMBIN TIME: CPT | Mod: HCNC

## 2019-01-01 PROCEDURE — 84484 ASSAY OF TROPONIN QUANT: CPT | Mod: 91,HCNC

## 2019-01-01 PROCEDURE — 82803 BLOOD GASES ANY COMBINATION: CPT | Mod: HCNC

## 2019-01-01 PROCEDURE — 25000003 PHARM REV CODE 250: Mod: HCNC | Performed by: STUDENT IN AN ORGANIZED HEALTH CARE EDUCATION/TRAINING PROGRAM

## 2019-01-01 PROCEDURE — 95951 PR EEG MONITORING/VIDEORECORD: ICD-10-PCS | Mod: 26,HCNC,, | Performed by: PSYCHIATRY & NEUROLOGY

## 2019-01-01 PROCEDURE — 63600175 PHARM REV CODE 636 W HCPCS: Mod: HCNC | Performed by: ANESTHESIOLOGY

## 2019-01-01 PROCEDURE — 99999 PR PBB SHADOW E&M-EST. PATIENT-LVL IV: CPT | Mod: PBBFAC,HCNC,, | Performed by: NURSE PRACTITIONER

## 2019-01-01 PROCEDURE — 87102 FUNGUS ISOLATION CULTURE: CPT | Mod: HCNC

## 2019-01-01 PROCEDURE — 87205 SMEAR GRAM STAIN: CPT | Mod: HCNC

## 2019-01-01 PROCEDURE — 80197 ASSAY OF TACROLIMUS: CPT | Mod: HCNC

## 2019-01-01 PROCEDURE — 63600175 PHARM REV CODE 636 W HCPCS: Mod: HCNC | Performed by: HOSPITALIST

## 2019-01-01 PROCEDURE — 36600 WITHDRAWAL OF ARTERIAL BLOOD: CPT | Mod: HCNC

## 2019-01-01 PROCEDURE — 80053 COMPREHEN METABOLIC PANEL: CPT | Mod: HCNC

## 2019-01-01 PROCEDURE — 94003 VENT MGMT INPAT SUBQ DAY: CPT | Mod: HCNC

## 2019-01-01 PROCEDURE — 99999 PR PBB SHADOW E&M-EST. PATIENT-LVL IV: ICD-10-PCS | Mod: PBBFAC,HCNC,, | Performed by: NURSE PRACTITIONER

## 2019-01-01 PROCEDURE — 99232 SBSQ HOSP IP/OBS MODERATE 35: CPT | Mod: ,,, | Performed by: INTERNAL MEDICINE

## 2019-01-01 PROCEDURE — 82105 ALPHA-FETOPROTEIN SERUM: CPT | Mod: HCNC

## 2019-01-01 PROCEDURE — 78201 LIVER IMAGING STATIC ONLY: CPT | Mod: 26,HCNC,, | Performed by: RADIOLOGY

## 2019-01-01 PROCEDURE — 99213 OFFICE O/P EST LOW 20 MIN: CPT | Mod: HCNC,S$GLB,, | Performed by: NURSE PRACTITIONER

## 2019-01-01 PROCEDURE — 99233 SBSQ HOSP IP/OBS HIGH 50: CPT | Mod: HCNC,GC,, | Performed by: INTERNAL MEDICINE

## 2019-01-01 PROCEDURE — 11055 PR TRIM HYPERKERATOTIC SKIN LESION, ONE: ICD-10-PCS | Mod: Q9,HCNC,S$GLB, | Performed by: PODIATRIST

## 2019-01-01 PROCEDURE — 99291 PR CRITICAL CARE, E/M 30-74 MINUTES: ICD-10-PCS | Mod: 25,HCNC,, | Performed by: INTERNAL MEDICINE

## 2019-01-01 PROCEDURE — 85730 THROMBOPLASTIN TIME PARTIAL: CPT | Mod: HCNC

## 2019-01-01 PROCEDURE — 99900035 HC TECH TIME PER 15 MIN (STAT): Mod: HCNC

## 2019-01-01 PROCEDURE — 99233 PR SUBSEQUENT HOSPITAL CARE,LEVL III: ICD-10-PCS | Mod: HCNC,,, | Performed by: INTERNAL MEDICINE

## 2019-01-01 PROCEDURE — 99233 SBSQ HOSP IP/OBS HIGH 50: CPT | Mod: HCNC,,, | Performed by: NURSE PRACTITIONER

## 2019-01-01 PROCEDURE — A9552 F18 FDG: HCPCS | Mod: HCNC

## 2019-01-01 PROCEDURE — 94761 N-INVAS EAR/PLS OXIMETRY MLT: CPT | Mod: HCNC

## 2019-01-01 PROCEDURE — 99499 NO LOS: ICD-10-PCS | Mod: HCNC,S$GLB,, | Performed by: PODIATRIST

## 2019-01-01 PROCEDURE — 80307 DRUG TEST PRSMV CHEM ANLYZR: CPT | Mod: HCNC

## 2019-01-01 PROCEDURE — 83735 ASSAY OF MAGNESIUM: CPT | Mod: HCNC

## 2019-01-01 PROCEDURE — 99291 CRITICAL CARE FIRST HOUR: CPT | Mod: 25,HCNC,, | Performed by: INTERNAL MEDICINE

## 2019-01-01 PROCEDURE — 99999 PR PBB SHADOW E&M-EST. PATIENT-LVL III: CPT | Mod: PBBFAC,HCNC,, | Performed by: PODIATRIST

## 2019-01-01 PROCEDURE — 80202 ASSAY OF VANCOMYCIN: CPT | Mod: HCNC

## 2019-01-01 PROCEDURE — 27000221 HC OXYGEN, UP TO 24 HOURS: Mod: HCNC

## 2019-01-01 PROCEDURE — 84100 ASSAY OF PHOSPHORUS: CPT | Mod: HCNC

## 2019-01-01 PROCEDURE — 99214 PR OFFICE/OUTPT VISIT, EST, LEVL IV, 30-39 MIN: ICD-10-PCS | Mod: HCNC,S$GLB,, | Performed by: INTERNAL MEDICINE

## 2019-01-01 PROCEDURE — 99214 OFFICE O/P EST MOD 30 MIN: CPT | Mod: HCNC,S$GLB,, | Performed by: INTERNAL MEDICINE

## 2019-01-01 PROCEDURE — 1101F PT FALLS ASSESS-DOCD LE1/YR: CPT | Mod: CPTII,HCNC,S$GLB, | Performed by: FAMILY MEDICINE

## 2019-01-01 PROCEDURE — 97165 OT EVAL LOW COMPLEX 30 MIN: CPT | Mod: HCNC

## 2019-01-01 PROCEDURE — 31500 INSERT EMERGENCY AIRWAY: CPT | Mod: HCNC,,, | Performed by: FAMILY MEDICINE

## 2019-01-01 PROCEDURE — 80100014 HC HEMODIALYSIS 1:1: Mod: HCNC

## 2019-01-01 PROCEDURE — 99233 PR SUBSEQUENT HOSPITAL CARE,LEVL III: ICD-10-PCS | Mod: HCNC,GC,, | Performed by: INTERNAL MEDICINE

## 2019-01-01 PROCEDURE — 90662 FLU VACCINE - HIGH DOSE (65+) PRESERVATIVE FREE IM: ICD-10-PCS | Mod: HCNC,S$GLB,, | Performed by: PHYSICIAN ASSISTANT

## 2019-01-01 PROCEDURE — 99999 PR PBB SHADOW E&M-EST. PATIENT-LVL III: CPT | Mod: PBBFAC,HCNC,,

## 2019-01-01 PROCEDURE — 36415 COLL VENOUS BLD VENIPUNCTURE: CPT | Mod: HCNC,PO

## 2019-01-01 PROCEDURE — 87106 FUNGI IDENTIFICATION YEAST: CPT | Mod: HCNC

## 2019-01-01 PROCEDURE — 88112 CYTOPATH CELL ENHANCE TECH: CPT | Mod: 26,HCNC,, | Performed by: PATHOLOGY

## 2019-01-01 PROCEDURE — 87040 BLOOD CULTURE FOR BACTERIA: CPT | Mod: 59,HCNC

## 2019-01-01 PROCEDURE — 84484 ASSAY OF TROPONIN QUANT: CPT | Mod: HCNC

## 2019-01-01 PROCEDURE — 92610 EVALUATE SWALLOWING FUNCTION: CPT | Mod: HCNC

## 2019-01-01 PROCEDURE — 99900026 HC AIRWAY MAINTENANCE (STAT): Mod: HCNC

## 2019-01-01 PROCEDURE — 31500 INSERT EMERGENCY AIRWAY: CPT | Mod: HCNC,,, | Performed by: INTERNAL MEDICINE

## 2019-01-01 PROCEDURE — 37000009 HC ANESTHESIA EA ADD 15 MINS: Mod: HCNC

## 2019-01-01 PROCEDURE — 87070 CULTURE OTHR SPECIMN AEROBIC: CPT | Mod: HCNC

## 2019-01-01 PROCEDURE — 82140 ASSAY OF AMMONIA: CPT | Mod: HCNC

## 2019-01-01 PROCEDURE — 25500020 PHARM REV CODE 255: Mod: HCNC | Performed by: EMERGENCY MEDICINE

## 2019-01-01 PROCEDURE — 31500 INTUBATION: ICD-10-PCS | Mod: HCNC,,, | Performed by: FAMILY MEDICINE

## 2019-01-01 PROCEDURE — 43752 NASAL/OROGASTRIC W/TUBE PLMT: CPT | Mod: HCNC

## 2019-01-01 PROCEDURE — 80048 BASIC METABOLIC PNL TOTAL CA: CPT | Mod: HCNC

## 2019-01-01 PROCEDURE — S0028 INJECTION, FAMOTIDINE, 20 MG: HCPCS | Mod: HCNC | Performed by: STUDENT IN AN ORGANIZED HEALTH CARE EDUCATION/TRAINING PROGRAM

## 2019-01-01 PROCEDURE — 63600175 PHARM REV CODE 636 W HCPCS: Mod: HCNC | Performed by: EMERGENCY MEDICINE

## 2019-01-01 PROCEDURE — G0008 FLU VACCINE - HIGH DOSE (65+) PRESERVATIVE FREE IM: ICD-10-PCS | Mod: HCNC,S$GLB,, | Performed by: PHYSICIAN ASSISTANT

## 2019-01-01 PROCEDURE — 83690 ASSAY OF LIPASE: CPT | Mod: HCNC

## 2019-01-01 PROCEDURE — 85025 COMPLETE CBC W/AUTO DIFF WBC: CPT | Mod: HCNC

## 2019-01-01 PROCEDURE — 99499 UNLISTED E&M SERVICE: CPT | Mod: HCNC,S$GLB,, | Performed by: PODIATRIST

## 2019-01-01 PROCEDURE — 99291 PR CRITICAL CARE, E/M 30-74 MINUTES: ICD-10-PCS | Mod: HCNC,GC,, | Performed by: INTERNAL MEDICINE

## 2019-01-01 PROCEDURE — 99999 PR PBB SHADOW E&M-EST. PATIENT-LVL V: CPT | Mod: PBBFAC,HCNC,, | Performed by: INTERNAL MEDICINE

## 2019-01-01 PROCEDURE — 99223 PR INITIAL HOSPITAL CARE,LEVL III: ICD-10-PCS | Mod: HCNC,,, | Performed by: NURSE PRACTITIONER

## 2019-01-01 PROCEDURE — 93010 ELECTROCARDIOGRAM REPORT: CPT | Mod: HCNC,,, | Performed by: INTERNAL MEDICINE

## 2019-01-01 PROCEDURE — 78201 LIVER IMAGING STATIC ONLY: CPT | Mod: TC,HCNC

## 2019-01-01 PROCEDURE — 88305 TISSUE EXAM BY PATHOLOGIST: CPT | Mod: HCNC | Performed by: PATHOLOGY

## 2019-01-01 PROCEDURE — 99233 SBSQ HOSP IP/OBS HIGH 50: CPT | Mod: HCNC,,, | Performed by: INTERNAL MEDICINE

## 2019-01-01 PROCEDURE — 63600175 PHARM REV CODE 636 W HCPCS: Mod: HCNC | Performed by: NURSE PRACTITIONER

## 2019-01-01 PROCEDURE — 25000003 PHARM REV CODE 250: Mod: HCNC | Performed by: NURSE ANESTHETIST, CERTIFIED REGISTERED

## 2019-01-01 PROCEDURE — 20600001 HC STEP DOWN PRIVATE ROOM: Mod: HCNC

## 2019-01-01 PROCEDURE — 83615 LACTATE (LD) (LDH) ENZYME: CPT | Mod: 91,HCNC

## 2019-01-01 PROCEDURE — 1101F PR PT FALLS ASSESS DOC 0-1 FALLS W/OUT INJ PAST YR: ICD-10-PCS | Mod: HCNC,CPTII,S$GLB, | Performed by: PHYSICIAN ASSISTANT

## 2019-01-01 PROCEDURE — 89051 BODY FLUID CELL COUNT: CPT | Mod: HCNC

## 2019-01-01 PROCEDURE — 84157 ASSAY OF PROTEIN OTHER: CPT | Mod: HCNC

## 2019-01-01 PROCEDURE — 90732 PNEUMOCOCCAL POLYSACCHARIDE VACCINE 23-VALENT =>2YO SQ IM: ICD-10-PCS | Mod: HCNC,S$GLB,, | Performed by: PHYSICIAN ASSISTANT

## 2019-01-01 PROCEDURE — 25000003 PHARM REV CODE 250: Mod: HCNC | Performed by: EMERGENCY MEDICINE

## 2019-01-01 PROCEDURE — 99213 OFFICE O/P EST LOW 20 MIN: CPT | Mod: HCNC,S$GLB,, | Performed by: FAMILY MEDICINE

## 2019-01-01 PROCEDURE — 99223 PR INITIAL HOSPITAL CARE,LEVL III: ICD-10-PCS | Mod: HCNC,GC,, | Performed by: INTERNAL MEDICINE

## 2019-01-01 PROCEDURE — 99291 PR CRITICAL CARE, E/M 30-74 MINUTES: ICD-10-PCS | Mod: HCNC,,, | Performed by: INTERNAL MEDICINE

## 2019-01-01 PROCEDURE — 37000008 HC ANESTHESIA 1ST 15 MINUTES: Mod: HCNC

## 2019-01-01 PROCEDURE — 90732 PPSV23 VACC 2 YRS+ SUBQ/IM: CPT | Mod: HCNC,S$GLB,, | Performed by: PHYSICIAN ASSISTANT

## 2019-01-01 PROCEDURE — 1101F PT FALLS ASSESS-DOCD LE1/YR: CPT | Mod: HCNC,CPTII,S$GLB, | Performed by: NURSE PRACTITIONER

## 2019-01-01 PROCEDURE — 88305 TISSUE EXAM BY PATHOLOGIST: CPT | Mod: 26,HCNC,, | Performed by: PATHOLOGY

## 2019-01-01 PROCEDURE — 25000003 PHARM REV CODE 250: Mod: HCNC

## 2019-01-01 PROCEDURE — 87040 BLOOD CULTURE FOR BACTERIA: CPT | Mod: HCNC

## 2019-01-01 PROCEDURE — 71000033 HC RECOVERY, INTIAL HOUR: Mod: HCNC

## 2019-01-01 PROCEDURE — 87075 CULTR BACTERIA EXCEPT BLOOD: CPT | Mod: HCNC

## 2019-01-01 PROCEDURE — G0009 ADMIN PNEUMOCOCCAL VACCINE: HCPCS | Mod: HCNC,S$GLB,, | Performed by: PHYSICIAN ASSISTANT

## 2019-01-01 PROCEDURE — 96368 THER/DIAG CONCURRENT INF: CPT | Mod: HCNC

## 2019-01-01 PROCEDURE — 99291 CRITICAL CARE FIRST HOUR: CPT | Mod: HCNC,GC,, | Performed by: INTERNAL MEDICINE

## 2019-01-01 PROCEDURE — 99223 1ST HOSP IP/OBS HIGH 75: CPT | Mod: HCNC,GC,, | Performed by: INTERNAL MEDICINE

## 2019-01-01 PROCEDURE — 25500020 PHARM REV CODE 255: Mod: HCNC | Performed by: INTERNAL MEDICINE

## 2019-01-01 PROCEDURE — 82248 BILIRUBIN DIRECT: CPT | Mod: HCNC

## 2019-01-01 PROCEDURE — 99223 1ST HOSP IP/OBS HIGH 75: CPT | Mod: HCNC,,, | Performed by: CLINICAL NURSE SPECIALIST

## 2019-01-01 PROCEDURE — 36415 COLL VENOUS BLD VENIPUNCTURE: CPT | Mod: HCNC

## 2019-01-01 PROCEDURE — 82150 ASSAY OF AMYLASE: CPT | Mod: HCNC

## 2019-01-01 PROCEDURE — 83036 HEMOGLOBIN GLYCOSYLATED A1C: CPT | Mod: HCNC

## 2019-01-01 PROCEDURE — 80320 DRUG SCREEN QUANTALCOHOLS: CPT | Mod: HCNC

## 2019-01-01 PROCEDURE — 83615 LACTATE (LD) (LDH) ENZYME: CPT | Mod: HCNC

## 2019-01-01 PROCEDURE — 93005 ELECTROCARDIOGRAM TRACING: CPT | Mod: HCNC

## 2019-01-01 PROCEDURE — 1101F PR PT FALLS ASSESS DOC 0-1 FALLS W/OUT INJ PAST YR: ICD-10-PCS | Mod: HCNC,CPTII,S$GLB, | Performed by: NURSE PRACTITIONER

## 2019-01-01 PROCEDURE — S0030 INJECTION, METRONIDAZOLE: HCPCS | Mod: HCNC | Performed by: NURSE PRACTITIONER

## 2019-01-01 PROCEDURE — 73221 MRI JOINT UPR EXTREM W/O DYE: CPT | Mod: TC,HCNC,LT

## 2019-01-01 PROCEDURE — 99999 PR PBB SHADOW E&M-EST. PATIENT-LVL V: CPT | Mod: PBBFAC,HCNC,, | Performed by: NURSE PRACTITIONER

## 2019-01-01 PROCEDURE — 1101F PT FALLS ASSESS-DOCD LE1/YR: CPT | Mod: CPTII,HCNC,S$GLB, | Performed by: INTERNAL MEDICINE

## 2019-01-01 PROCEDURE — 85025 COMPLETE CBC W/AUTO DIFF WBC: CPT | Mod: 91,HCNC

## 2019-01-01 PROCEDURE — 99232 PR SUBSEQUENT HOSPITAL CARE,LEVL II: ICD-10-PCS | Mod: ,,, | Performed by: INTERNAL MEDICINE

## 2019-01-01 PROCEDURE — 99213 PR OFFICE/OUTPT VISIT, EST, LEVL III, 20-29 MIN: ICD-10-PCS | Mod: HCNC,S$GLB,, | Performed by: NURSE PRACTITIONER

## 2019-01-01 PROCEDURE — 99233 PR SUBSEQUENT HOSPITAL CARE,LEVL III: ICD-10-PCS | Mod: HCNC,,, | Performed by: NURSE PRACTITIONER

## 2019-01-01 PROCEDURE — 99214 OFFICE O/P EST MOD 30 MIN: CPT | Mod: 25,HCNC,S$GLB, | Performed by: PHYSICIAN ASSISTANT

## 2019-01-01 PROCEDURE — 20000000 HC ICU ROOM: Mod: HCNC

## 2019-01-01 PROCEDURE — 99999 PR PBB SHADOW E&M-EST. PATIENT-LVL III: ICD-10-PCS | Mod: PBBFAC,HCNC,,

## 2019-01-01 PROCEDURE — 99999 PR PBB SHADOW E&M-EST. PATIENT-LVL V: ICD-10-PCS | Mod: PBBFAC,HCNC,, | Performed by: PHYSICIAN ASSISTANT

## 2019-01-01 PROCEDURE — 25000003 PHARM REV CODE 250: Mod: HCNC | Performed by: NURSE PRACTITIONER

## 2019-01-01 PROCEDURE — 27200966 HC CLOSED SUCTION SYSTEM: Mod: HCNC

## 2019-01-01 PROCEDURE — D9220A PRA ANESTHESIA: Mod: HCNC,ANES,, | Performed by: ANESTHESIOLOGY

## 2019-01-01 PROCEDURE — 83735 ASSAY OF MAGNESIUM: CPT | Mod: 91,HCNC

## 2019-01-01 PROCEDURE — 78815 NM PET CT ROUTINE: ICD-10-PCS | Mod: 26,HCNC,PI, | Performed by: RADIOLOGY

## 2019-01-01 PROCEDURE — 95951 PR EEG MONITORING/VIDEORECORD: CPT | Mod: 26,52,, | Performed by: PSYCHIATRY & NEUROLOGY

## 2019-01-01 PROCEDURE — 99223 1ST HOSP IP/OBS HIGH 75: CPT | Mod: HCNC,AI,, | Performed by: HOSPITALIST

## 2019-01-01 PROCEDURE — 99999 PR PBB SHADOW E&M-EST. PATIENT-LVL V: CPT | Mod: PBBFAC,HCNC,, | Performed by: PHYSICIAN ASSISTANT

## 2019-01-01 PROCEDURE — D9220A PRA ANESTHESIA: ICD-10-PCS | Mod: HCNC,ANES,, | Performed by: ANESTHESIOLOGY

## 2019-01-01 PROCEDURE — 90662 IIV NO PRSV INCREASED AG IM: CPT | Mod: HCNC,S$GLB,, | Performed by: PHYSICIAN ASSISTANT

## 2019-01-01 PROCEDURE — 94002 VENT MGMT INPAT INIT DAY: CPT | Mod: HCNC

## 2019-01-01 PROCEDURE — 99999 PR PBB SHADOW E&M-EST. PATIENT-LVL V: ICD-10-PCS | Mod: PBBFAC,HCNC,, | Performed by: INTERNAL MEDICINE

## 2019-01-01 PROCEDURE — 99999 PR PBB SHADOW E&M-EST. PATIENT-LVL V: ICD-10-PCS | Mod: PBBFAC,HCNC,, | Performed by: NURSE PRACTITIONER

## 2019-01-01 PROCEDURE — 99233 PR SUBSEQUENT HOSPITAL CARE,LEVL III: ICD-10-PCS | Mod: ,,, | Performed by: INTERNAL MEDICINE

## 2019-01-01 PROCEDURE — 96366 THER/PROPH/DIAG IV INF ADDON: CPT | Mod: HCNC

## 2019-01-01 PROCEDURE — 99285 EMERGENCY DEPT VISIT HI MDM: CPT | Mod: 25,HCNC

## 2019-01-01 PROCEDURE — 95951 HC EEG MONITORING/VIDEO RECORD: CPT | Mod: HCNC

## 2019-01-01 PROCEDURE — 1101F PT FALLS ASSESS-DOCD LE1/YR: CPT | Mod: CPTII,HCNC,S$GLB, | Performed by: NURSE PRACTITIONER

## 2019-01-01 PROCEDURE — 49082 ABD PARACENTESIS: CPT | Mod: HCNC

## 2019-01-01 PROCEDURE — G0009 PNEUMOCOCCAL POLYSACCHARIDE VACCINE 23-VALENT =>2YO SQ IM: ICD-10-PCS | Mod: HCNC,S$GLB,, | Performed by: PHYSICIAN ASSISTANT

## 2019-01-01 PROCEDURE — 86850 RBC ANTIBODY SCREEN: CPT | Mod: HCNC

## 2019-01-01 PROCEDURE — 95951 PR EEG MONITORING/VIDEORECORD: CPT | Mod: 26,HCNC,, | Performed by: PSYCHIATRY & NEUROLOGY

## 2019-01-01 PROCEDURE — 88305 CYTOLOGY SPECIMEN- MEDICAL CYTOLOGY (FLUID/WASH/BRUSH): ICD-10-PCS | Mod: 26,HCNC,, | Performed by: PATHOLOGY

## 2019-01-01 PROCEDURE — 99223 1ST HOSP IP/OBS HIGH 75: CPT | Mod: HCNC,,, | Performed by: NURSE PRACTITIONER

## 2019-01-01 PROCEDURE — 83605 ASSAY OF LACTIC ACID: CPT | Mod: HCNC

## 2019-01-01 PROCEDURE — 99214 PR OFFICE/OUTPT VISIT, EST, LEVL IV, 30-39 MIN: ICD-10-PCS | Mod: 25,HCNC,S$GLB, | Performed by: PHYSICIAN ASSISTANT

## 2019-01-01 PROCEDURE — 31500 PR INSERT, EMERGENCY ENDOTRACH AIRWAY: ICD-10-PCS | Mod: HCNC,,, | Performed by: INTERNAL MEDICINE

## 2019-01-01 PROCEDURE — 97162 PT EVAL MOD COMPLEX 30 MIN: CPT | Mod: HCNC

## 2019-01-01 PROCEDURE — 84443 ASSAY THYROID STIM HORMONE: CPT | Mod: HCNC

## 2019-01-01 PROCEDURE — 87206 SMEAR FLUORESCENT/ACID STAI: CPT | Mod: HCNC

## 2019-01-01 PROCEDURE — 11721 DEBRIDE NAIL 6 OR MORE: CPT | Mod: 59,Q9,HCNC,S$GLB | Performed by: PODIATRIST

## 2019-01-01 PROCEDURE — 93010 EKG 12-LEAD: ICD-10-PCS | Mod: HCNC,,, | Performed by: INTERNAL MEDICINE

## 2019-01-01 PROCEDURE — 84145 PROCALCITONIN (PCT): CPT | Mod: HCNC

## 2019-01-01 PROCEDURE — 99232 SBSQ HOSP IP/OBS MODERATE 35: CPT | Mod: HCNC,,, | Performed by: NURSE PRACTITIONER

## 2019-01-01 PROCEDURE — 63600175 PHARM REV CODE 636 W HCPCS: Mod: HCNC | Performed by: RADIOLOGY

## 2019-01-01 PROCEDURE — 51701 INSERT BLADDER CATHETER: CPT | Mod: HCNC

## 2019-01-01 PROCEDURE — 1101F PT FALLS ASSESS-DOCD LE1/YR: CPT | Mod: HCNC,CPTII,S$GLB, | Performed by: INTERNAL MEDICINE

## 2019-01-01 PROCEDURE — 1101F PR PT FALLS ASSESS DOC 0-1 FALLS W/OUT INJ PAST YR: ICD-10-PCS | Mod: CPTII,HCNC,S$GLB, | Performed by: NURSE PRACTITIONER

## 2019-01-01 PROCEDURE — 31500 INSERT EMERGENCY AIRWAY: CPT | Mod: HCNC

## 2019-01-01 PROCEDURE — 1101F PR PT FALLS ASSESS DOC 0-1 FALLS W/OUT INJ PAST YR: ICD-10-PCS | Mod: CPTII,HCNC,S$GLB, | Performed by: INTERNAL MEDICINE

## 2019-01-01 PROCEDURE — 81003 URINALYSIS AUTO W/O SCOPE: CPT | Mod: HCNC,59

## 2019-01-01 PROCEDURE — 71000039 HC RECOVERY, EACH ADD'L HOUR: Mod: HCNC

## 2019-01-01 PROCEDURE — 63600175 PHARM REV CODE 636 W HCPCS: Mod: HCNC,JG | Performed by: HOSPITALIST

## 2019-01-01 PROCEDURE — 99499 RISK ADDL DX/OHS AUDIT: ICD-10-PCS | Mod: S$GLB,,, | Performed by: PHYSICIAN ASSISTANT

## 2019-01-01 PROCEDURE — 73221 MRI JOINT UPR EXTREM W/O DYE: CPT | Mod: 26,HCNC,LT, | Performed by: RADIOLOGY

## 2019-01-01 PROCEDURE — 86901 BLOOD TYPING SEROLOGIC RH(D): CPT | Mod: HCNC

## 2019-01-01 PROCEDURE — 63600175 PHARM REV CODE 636 W HCPCS: Mod: HCNC

## 2019-01-01 PROCEDURE — 99232 PR SUBSEQUENT HOSPITAL CARE,LEVL II: ICD-10-PCS | Mod: HCNC,,, | Performed by: NURSE PRACTITIONER

## 2019-01-01 PROCEDURE — 1101F PT FALLS ASSESS-DOCD LE1/YR: CPT | Mod: HCNC,CPTII,S$GLB, | Performed by: PHYSICIAN ASSISTANT

## 2019-01-01 PROCEDURE — 80329 ANALGESICS NON-OPIOID 1 OR 2: CPT | Mod: HCNC

## 2019-01-01 PROCEDURE — 99499 UNLISTED E&M SERVICE: CPT | Mod: S$GLB,,, | Performed by: PHYSICIAN ASSISTANT

## 2019-01-01 PROCEDURE — 1101F PR PT FALLS ASSESS DOC 0-1 FALLS W/OUT INJ PAST YR: ICD-10-PCS | Mod: CPTII,HCNC,S$GLB, | Performed by: FAMILY MEDICINE

## 2019-01-01 PROCEDURE — 78201 NM LIVER IMAGING STATIC PRE Y-90 EMBOLIZATION: ICD-10-PCS | Mod: 26,HCNC,, | Performed by: RADIOLOGY

## 2019-01-01 PROCEDURE — 99223 PR INITIAL HOSPITAL CARE,LEVL III: ICD-10-PCS | Mod: HCNC,AI,, | Performed by: HOSPITALIST

## 2019-01-01 PROCEDURE — 78815 PET IMAGE W/CT SKULL-THIGH: CPT | Mod: TC,HCNC,PI

## 2019-01-01 PROCEDURE — 51702 INSERT TEMP BLADDER CATH: CPT | Mod: HCNC

## 2019-01-01 PROCEDURE — 82042 OTHER SOURCE ALBUMIN QUAN EA: CPT | Mod: HCNC

## 2019-01-01 PROCEDURE — 95951 PR EEG MONITORING/VIDEORECORD: ICD-10-PCS | Mod: 26,52,, | Performed by: PSYCHIATRY & NEUROLOGY

## 2019-01-01 PROCEDURE — 83605 ASSAY OF LACTIC ACID: CPT | Mod: 91,HCNC

## 2019-01-01 PROCEDURE — 88112 CYTOLOGY SPECIMEN- MEDICAL CYTOLOGY (FLUID/WASH/BRUSH): ICD-10-PCS | Mod: 26,HCNC,, | Performed by: PATHOLOGY

## 2019-01-01 PROCEDURE — 63600175 PHARM REV CODE 636 W HCPCS: Mod: HCNC | Performed by: NURSE ANESTHETIST, CERTIFIED REGISTERED

## 2019-01-01 PROCEDURE — 87070 CULTURE OTHR SPECIMN AEROBIC: CPT | Mod: 59,HCNC

## 2019-01-01 PROCEDURE — 11055 PARING/CUTG B9 HYPRKER LES 1: CPT | Mod: Q9,HCNC,S$GLB, | Performed by: PODIATRIST

## 2019-01-01 PROCEDURE — 99233 SBSQ HOSP IP/OBS HIGH 50: CPT | Mod: ,,, | Performed by: INTERNAL MEDICINE

## 2019-01-01 PROCEDURE — 99213 PR OFFICE/OUTPT VISIT, EST, LEVL III, 20-29 MIN: ICD-10-PCS | Mod: HCNC,S$GLB,, | Performed by: FAMILY MEDICINE

## 2019-01-01 PROCEDURE — 80053 COMPREHEN METABOLIC PANEL: CPT | Mod: 91,HCNC

## 2019-01-01 PROCEDURE — 99223 PR INITIAL HOSPITAL CARE,LEVL III: ICD-10-PCS | Mod: HCNC,,, | Performed by: CLINICAL NURSE SPECIALIST

## 2019-01-01 PROCEDURE — 99291 CRITICAL CARE FIRST HOUR: CPT | Mod: HCNC,,, | Performed by: INTERNAL MEDICINE

## 2019-01-01 PROCEDURE — P9047 ALBUMIN (HUMAN), 25%, 50ML: HCPCS | Mod: HCNC,JG | Performed by: HOSPITALIST

## 2019-01-01 PROCEDURE — 78815 PET IMAGE W/CT SKULL-THIGH: CPT | Mod: 26,HCNC,PI, | Performed by: RADIOLOGY

## 2019-01-01 PROCEDURE — 99999 PR PBB SHADOW E&M-EST. PATIENT-LVL III: ICD-10-PCS | Mod: PBBFAC,HCNC,, | Performed by: PODIATRIST

## 2019-01-01 PROCEDURE — G0008 ADMIN INFLUENZA VIRUS VAC: HCPCS | Mod: HCNC,S$GLB,, | Performed by: PHYSICIAN ASSISTANT

## 2019-01-01 PROCEDURE — 27201109 HC SYSTEM FECAL MANAGEMENT: Mod: HCNC

## 2019-01-01 PROCEDURE — 96365 THER/PROPH/DIAG IV INF INIT: CPT | Mod: HCNC

## 2019-01-01 PROCEDURE — 73221 MRI SHOULDER WITHOUT CONTRAST LEFT: ICD-10-PCS | Mod: 26,HCNC,LT, | Performed by: RADIOLOGY

## 2019-01-01 PROCEDURE — 87116 MYCOBACTERIA CULTURE: CPT | Mod: HCNC

## 2019-01-01 PROCEDURE — 25000003 PHARM REV CODE 250: Mod: HCNC | Performed by: INTERNAL MEDICINE

## 2019-01-01 PROCEDURE — 1101F PR PT FALLS ASSESS DOC 0-1 FALLS W/OUT INJ PAST YR: ICD-10-PCS | Mod: HCNC,CPTII,S$GLB, | Performed by: INTERNAL MEDICINE

## 2019-01-01 RX ORDER — NOREPINEPHRINE BITARTRATE/D5W 4MG/250ML
0.02 PLASTIC BAG, INJECTION (ML) INTRAVENOUS CONTINUOUS
Status: DISCONTINUED | OUTPATIENT
Start: 2019-01-01 | End: 2019-01-01

## 2019-01-01 RX ORDER — HYDROMORPHONE HYDROCHLORIDE 1 MG/ML
1 INJECTION, SOLUTION INTRAMUSCULAR; INTRAVENOUS; SUBCUTANEOUS EVERY 4 HOURS PRN
Status: DISCONTINUED | OUTPATIENT
Start: 2019-01-01 | End: 2019-01-01 | Stop reason: HOSPADM

## 2019-01-01 RX ORDER — PROPOFOL 10 MG/ML
5 INJECTION, EMULSION INTRAVENOUS CONTINUOUS
Status: DISCONTINUED | OUTPATIENT
Start: 2019-01-01 | End: 2019-01-01

## 2019-01-01 RX ORDER — ONDANSETRON 4 MG/1
4 TABLET, FILM COATED ORAL EVERY 6 HOURS
Qty: 12 TABLET | Refills: 0 | Status: ON HOLD | OUTPATIENT
Start: 2019-01-01 | End: 2019-01-01

## 2019-01-01 RX ORDER — FENTANYL CITRATE 50 UG/ML
50 INJECTION, SOLUTION INTRAMUSCULAR; INTRAVENOUS
Status: CANCELLED | OUTPATIENT
Start: 2019-01-01

## 2019-01-01 RX ORDER — PANTOPRAZOLE SODIUM 40 MG/1
40 TABLET, DELAYED RELEASE ORAL DAILY
Qty: 90 TABLET | Refills: 3 | Status: SHIPPED | OUTPATIENT
Start: 2019-01-01

## 2019-01-01 RX ORDER — ETOMIDATE 2 MG/ML
0.3 INJECTION INTRAVENOUS ONCE
Status: DISCONTINUED | OUTPATIENT
Start: 2019-01-01 | End: 2019-01-01

## 2019-01-01 RX ORDER — LACTULOSE 10 G/15ML
30 SOLUTION ORAL
Status: DISCONTINUED | OUTPATIENT
Start: 2019-01-01 | End: 2019-01-01

## 2019-01-01 RX ORDER — INSULIN ASPART 100 [IU]/ML
1-10 INJECTION, SOLUTION INTRAVENOUS; SUBCUTANEOUS EVERY 4 HOURS PRN
Status: DISCONTINUED | OUTPATIENT
Start: 2019-01-01 | End: 2019-03-18 | Stop reason: HOSPADM

## 2019-01-01 RX ORDER — ROCURONIUM BROMIDE 10 MG/ML
INJECTION, SOLUTION INTRAVENOUS
Status: DISPENSED
Start: 2019-01-01 | End: 2019-01-01

## 2019-01-01 RX ORDER — CHLORHEXIDINE GLUCONATE ORAL RINSE 1.2 MG/ML
15 SOLUTION DENTAL 2 TIMES DAILY
Status: DISCONTINUED | OUTPATIENT
Start: 2019-01-01 | End: 2019-03-18 | Stop reason: HOSPADM

## 2019-01-01 RX ORDER — HEPARIN SODIUM 5000 [USP'U]/ML
5000 INJECTION, SOLUTION INTRAVENOUS; SUBCUTANEOUS EVERY 8 HOURS
Status: DISCONTINUED | OUTPATIENT
Start: 2019-01-01 | End: 2019-03-18 | Stop reason: HOSPADM

## 2019-01-01 RX ORDER — KETOROLAC TROMETHAMINE 30 MG/ML
15 INJECTION, SOLUTION INTRAMUSCULAR; INTRAVENOUS ONCE
Status: COMPLETED | OUTPATIENT
Start: 2019-01-01 | End: 2019-01-01

## 2019-01-01 RX ORDER — GLUCAGON 1 MG
1 KIT INJECTION
Status: DISCONTINUED | OUTPATIENT
Start: 2019-01-01 | End: 2019-03-18 | Stop reason: HOSPADM

## 2019-01-01 RX ORDER — OXYCODONE HYDROCHLORIDE 5 MG/1
5 TABLET ORAL EVERY 6 HOURS PRN
Status: DISCONTINUED | OUTPATIENT
Start: 2019-01-01 | End: 2019-01-01 | Stop reason: HOSPADM

## 2019-01-01 RX ORDER — IBUPROFEN 200 MG
16 TABLET ORAL
Status: DISCONTINUED | OUTPATIENT
Start: 2019-01-01 | End: 2019-01-01

## 2019-01-01 RX ORDER — INSULIN ASPART 100 [IU]/ML
3 INJECTION, SOLUTION INTRAVENOUS; SUBCUTANEOUS EVERY 4 HOURS
Status: DISCONTINUED | OUTPATIENT
Start: 2019-01-01 | End: 2019-01-01

## 2019-01-01 RX ORDER — NALOXONE HCL 0.4 MG/ML
VIAL (ML) INJECTION
Status: COMPLETED
Start: 2019-01-01 | End: 2019-01-01

## 2019-01-01 RX ORDER — INSULIN ASPART 100 [IU]/ML
0-5 INJECTION, SOLUTION INTRAVENOUS; SUBCUTANEOUS EVERY 4 HOURS PRN
Status: DISCONTINUED | OUTPATIENT
Start: 2019-01-01 | End: 2019-01-01

## 2019-01-01 RX ORDER — FENTANYL CITRATE 50 UG/ML
50 INJECTION, SOLUTION INTRAMUSCULAR; INTRAVENOUS
Status: DISCONTINUED | OUTPATIENT
Start: 2019-01-01 | End: 2019-01-01

## 2019-01-01 RX ORDER — ROCURONIUM BROMIDE 10 MG/ML
1 INJECTION, SOLUTION INTRAVENOUS ONCE
Status: DISCONTINUED | OUTPATIENT
Start: 2019-01-01 | End: 2019-01-01

## 2019-01-01 RX ORDER — RAMELTEON 8 MG/1
8 TABLET ORAL NIGHTLY PRN
Status: DISCONTINUED | OUTPATIENT
Start: 2019-01-01 | End: 2019-03-18 | Stop reason: HOSPADM

## 2019-01-01 RX ORDER — CARVEDILOL 6.25 MG/1
6.25 TABLET ORAL 2 TIMES DAILY
Status: DISCONTINUED | OUTPATIENT
Start: 2019-01-01 | End: 2019-01-01

## 2019-01-01 RX ORDER — LIDOCAINE HCL/PF 100 MG/5ML
SYRINGE (ML) INTRAVENOUS
Status: DISCONTINUED | OUTPATIENT
Start: 2019-01-01 | End: 2019-01-01

## 2019-01-01 RX ORDER — FLUCONAZOLE 2 MG/ML
200 INJECTION, SOLUTION INTRAVENOUS
Status: DISCONTINUED | OUTPATIENT
Start: 2019-01-01 | End: 2019-03-18 | Stop reason: HOSPADM

## 2019-01-01 RX ORDER — HYDROCODONE BITARTRATE AND ACETAMINOPHEN 5; 325 MG/1; MG/1
1 TABLET ORAL EVERY 4 HOURS PRN
Status: DISCONTINUED | OUTPATIENT
Start: 2019-01-01 | End: 2019-01-01 | Stop reason: HOSPADM

## 2019-01-01 RX ORDER — MUPIROCIN 20 MG/G
OINTMENT TOPICAL 3 TIMES DAILY
Qty: 30 G | Refills: 2 | Status: SHIPPED | OUTPATIENT
Start: 2019-01-01 | End: 2019-01-01 | Stop reason: ALTCHOICE

## 2019-01-01 RX ORDER — ROCURONIUM BROMIDE 10 MG/ML
INJECTION, SOLUTION INTRAVENOUS
Status: COMPLETED
Start: 2019-01-01 | End: 2019-01-01

## 2019-01-01 RX ORDER — INSULIN ASPART 100 [IU]/ML
3 INJECTION, SOLUTION INTRAVENOUS; SUBCUTANEOUS
Status: DISCONTINUED | OUTPATIENT
Start: 2019-01-01 | End: 2019-01-01

## 2019-01-01 RX ORDER — MIDAZOLAM HYDROCHLORIDE 1 MG/ML
INJECTION, SOLUTION INTRAMUSCULAR; INTRAVENOUS
Status: DISCONTINUED | OUTPATIENT
Start: 2019-01-01 | End: 2019-01-01

## 2019-01-01 RX ORDER — PROPOFOL 10 MG/ML
VIAL (ML) INTRAVENOUS
Status: DISCONTINUED | OUTPATIENT
Start: 2019-01-01 | End: 2019-01-01

## 2019-01-01 RX ORDER — KETOCONAZOLE 20 MG/G
CREAM TOPICAL
Refills: 1 | COMMUNITY
Start: 2019-01-01

## 2019-01-01 RX ORDER — DEXMEDETOMIDINE HYDROCHLORIDE 4 UG/ML
0.2 INJECTION, SOLUTION INTRAVENOUS CONTINUOUS
Status: DISCONTINUED | OUTPATIENT
Start: 2019-01-01 | End: 2019-01-01

## 2019-01-01 RX ORDER — MIDAZOLAM HYDROCHLORIDE 1 MG/ML
1 INJECTION INTRAMUSCULAR; INTRAVENOUS
Status: CANCELLED | OUTPATIENT
Start: 2019-01-01

## 2019-01-01 RX ORDER — PROPOFOL 10 MG/ML
5 INJECTION, EMULSION INTRAVENOUS CONTINUOUS
Status: DISPENSED | OUTPATIENT
Start: 2019-01-01 | End: 2019-01-01

## 2019-01-01 RX ORDER — NEOSTIGMINE METHYLSULFATE 1 MG/ML
INJECTION, SOLUTION INTRAVENOUS
Status: DISCONTINUED | OUTPATIENT
Start: 2019-01-01 | End: 2019-01-01

## 2019-01-01 RX ORDER — LIDOCAINE HYDROCHLORIDE 10 MG/ML
1 INJECTION, SOLUTION EPIDURAL; INFILTRATION; INTRACAUDAL; PERINEURAL ONCE AS NEEDED
Status: DISCONTINUED | OUTPATIENT
Start: 2019-01-01 | End: 2019-01-01 | Stop reason: HOSPADM

## 2019-01-01 RX ORDER — FAMOTIDINE 20 MG/1
20 TABLET, FILM COATED ORAL DAILY
Status: DISCONTINUED | OUTPATIENT
Start: 2019-01-01 | End: 2019-03-18 | Stop reason: HOSPADM

## 2019-01-01 RX ORDER — NOREPINEPHRINE BITARTRATE 1 MG/ML
INJECTION, SOLUTION INTRAVENOUS
Status: COMPLETED
Start: 2019-01-01 | End: 2019-01-01

## 2019-01-01 RX ORDER — DOXAZOSIN 4 MG/1
8 TABLET ORAL NIGHTLY
Status: DISCONTINUED | OUTPATIENT
Start: 2019-01-01 | End: 2019-01-01

## 2019-01-01 RX ORDER — MINOXIDIL 2.5 MG/1
5 TABLET ORAL 2 TIMES DAILY
Status: DISCONTINUED | OUTPATIENT
Start: 2019-01-01 | End: 2019-01-01

## 2019-01-01 RX ORDER — FENTANYL CITRATE 50 UG/ML
25 INJECTION, SOLUTION INTRAMUSCULAR; INTRAVENOUS
Status: DISCONTINUED | OUTPATIENT
Start: 2019-01-01 | End: 2019-01-01

## 2019-01-01 RX ORDER — PANTOPRAZOLE SODIUM 40 MG/1
40 TABLET, DELAYED RELEASE ORAL DAILY
Status: DISCONTINUED | OUTPATIENT
Start: 2019-01-01 | End: 2019-01-01

## 2019-01-01 RX ORDER — TACROLIMUS 0.5 MG/1
0.5 CAPSULE ORAL DAILY
Qty: 30 CAPSULE | Refills: 11 | Status: SHIPPED | OUTPATIENT
Start: 2019-01-01

## 2019-01-01 RX ORDER — HEPARIN SODIUM 200 [USP'U]/100ML
500 INJECTION, SOLUTION INTRAVENOUS CONTINUOUS
Status: CANCELLED | OUTPATIENT
Start: 2019-01-01

## 2019-01-01 RX ORDER — SODIUM CHLORIDE 0.9 % (FLUSH) 0.9 %
5 SYRINGE (ML) INJECTION
Status: DISCONTINUED | OUTPATIENT
Start: 2019-01-01 | End: 2019-03-18 | Stop reason: HOSPADM

## 2019-01-01 RX ORDER — PHENYLEPHRINE HYDROCHLORIDE 10 MG/ML
INJECTION INTRAVENOUS
Status: DISCONTINUED | OUTPATIENT
Start: 2019-01-01 | End: 2019-01-01

## 2019-01-01 RX ORDER — PROMETHAZINE HYDROCHLORIDE 12.5 MG/1
25 TABLET ORAL EVERY 6 HOURS PRN
Status: DISCONTINUED | OUTPATIENT
Start: 2019-01-01 | End: 2019-03-18 | Stop reason: HOSPADM

## 2019-01-01 RX ORDER — FENTANYL CITRATE 50 UG/ML
INJECTION, SOLUTION INTRAMUSCULAR; INTRAVENOUS CODE/TRAUMA/SEDATION MEDICATION
Status: COMPLETED | OUTPATIENT
Start: 2019-01-01 | End: 2019-01-01

## 2019-01-01 RX ORDER — HYDRALAZINE HYDROCHLORIDE 20 MG/ML
20 INJECTION INTRAMUSCULAR; INTRAVENOUS EVERY 6 HOURS PRN
Status: DISCONTINUED | OUTPATIENT
Start: 2019-01-01 | End: 2019-01-01

## 2019-01-01 RX ORDER — LACTULOSE 10 G/15ML
30 SOLUTION ORAL 3 TIMES DAILY
Status: DISCONTINUED | OUTPATIENT
Start: 2019-01-01 | End: 2019-01-01

## 2019-01-01 RX ORDER — ALLOPURINOL 100 MG/1
100 TABLET ORAL NIGHTLY
Status: DISCONTINUED | OUTPATIENT
Start: 2019-01-01 | End: 2019-01-01

## 2019-01-01 RX ORDER — ACETAMINOPHEN 325 MG/1
650 TABLET ORAL EVERY 8 HOURS PRN
Status: DISCONTINUED | OUTPATIENT
Start: 2019-01-01 | End: 2019-03-18 | Stop reason: HOSPADM

## 2019-01-01 RX ORDER — SODIUM CHLORIDE 9 MG/ML
INJECTION, SOLUTION INTRAVENOUS CONTINUOUS PRN
Status: DISCONTINUED | OUTPATIENT
Start: 2019-01-01 | End: 2019-01-01

## 2019-01-01 RX ORDER — OXYCODONE HYDROCHLORIDE 5 MG/1
5 TABLET ORAL EVERY 6 HOURS PRN
Qty: 18 TABLET | Refills: 0 | Status: SHIPPED | OUTPATIENT
Start: 2019-01-01

## 2019-01-01 RX ORDER — ONDANSETRON 4 MG/1
TABLET, ORALLY DISINTEGRATING ORAL
Qty: 60 TABLET | Refills: 12 | Status: SHIPPED | OUTPATIENT
Start: 2019-01-01

## 2019-01-01 RX ORDER — LIDOCAINE HYDROCHLORIDE 10 MG/ML
INJECTION, SOLUTION EPIDURAL; INFILTRATION; INTRACAUDAL; PERINEURAL
Status: DISPENSED
Start: 2019-01-01 | End: 2019-01-01

## 2019-01-01 RX ORDER — POLYETHYLENE GLYCOL 3350 17 G/17G
17 POWDER, FOR SOLUTION ORAL 2 TIMES DAILY PRN
Status: DISCONTINUED | OUTPATIENT
Start: 2019-01-01 | End: 2019-03-18 | Stop reason: HOSPADM

## 2019-01-01 RX ORDER — ETOMIDATE 2 MG/ML
28 INJECTION INTRAVENOUS ONCE
Status: COMPLETED | OUTPATIENT
Start: 2019-01-01 | End: 2019-01-01

## 2019-01-01 RX ORDER — FENTANYL CITRATE 50 UG/ML
25 INJECTION, SOLUTION INTRAMUSCULAR; INTRAVENOUS
Status: DISCONTINUED | OUTPATIENT
Start: 2019-01-01 | End: 2019-03-18 | Stop reason: HOSPADM

## 2019-01-01 RX ORDER — FAMOTIDINE 10 MG/ML
20 INJECTION INTRAVENOUS DAILY
Status: DISCONTINUED | OUTPATIENT
Start: 2019-01-01 | End: 2019-01-01

## 2019-01-01 RX ORDER — ALBUMIN HUMAN 250 G/1000ML
12.5 SOLUTION INTRAVENOUS ONCE
Status: COMPLETED | OUTPATIENT
Start: 2019-01-01 | End: 2019-01-01

## 2019-01-01 RX ORDER — PHENYLEPHRINE HCL IN 0.9% NACL 1 MG/10 ML
SYRINGE (ML) INTRAVENOUS
Status: COMPLETED
Start: 2019-01-01 | End: 2019-01-01

## 2019-01-01 RX ORDER — ALLOPURINOL 100 MG/1
100 TABLET ORAL NIGHTLY
Status: DISCONTINUED | OUTPATIENT
Start: 2019-01-01 | End: 2019-03-18 | Stop reason: HOSPADM

## 2019-01-01 RX ORDER — CIPROFLOXACIN 500 MG/1
500 TABLET ORAL EVERY 12 HOURS
Qty: 10 TABLET | Refills: 0 | Status: SHIPPED | OUTPATIENT
Start: 2019-01-01 | End: 2019-01-01

## 2019-01-01 RX ORDER — NYSTATIN 100000 U/G
CREAM TOPICAL 2 TIMES DAILY
Qty: 30 G | Refills: 1 | Status: SHIPPED | OUTPATIENT
Start: 2019-01-01

## 2019-01-01 RX ORDER — FENTANYL CITRATE 50 UG/ML
50 INJECTION, SOLUTION INTRAMUSCULAR; INTRAVENOUS
Status: DISCONTINUED | OUTPATIENT
Start: 2019-03-20 | End: 2019-01-01

## 2019-01-01 RX ORDER — ACETAMINOPHEN 325 MG/1
650 TABLET ORAL EVERY 4 HOURS PRN
Status: DISCONTINUED | OUTPATIENT
Start: 2019-01-01 | End: 2019-01-01 | Stop reason: HOSPADM

## 2019-01-01 RX ORDER — CISATRACURIUM BESYLATE 2 MG/ML
INJECTION, SOLUTION INTRAVENOUS
Status: DISCONTINUED | OUTPATIENT
Start: 2019-01-01 | End: 2019-01-01

## 2019-01-01 RX ORDER — NALOXONE HYDROCHLORIDE 1 MG/ML
0.4 INJECTION INTRAMUSCULAR; INTRAVENOUS; SUBCUTANEOUS ONCE
Status: COMPLETED | OUTPATIENT
Start: 2019-01-01 | End: 2019-01-01

## 2019-01-01 RX ORDER — INSULIN ASPART 100 [IU]/ML
4 INJECTION, SOLUTION INTRAVENOUS; SUBCUTANEOUS EVERY 4 HOURS
Status: DISCONTINUED | OUTPATIENT
Start: 2019-01-01 | End: 2019-03-18 | Stop reason: HOSPADM

## 2019-01-01 RX ORDER — ROCURONIUM BROMIDE 10 MG/ML
94 INJECTION, SOLUTION INTRAVENOUS ONCE
Status: COMPLETED | OUTPATIENT
Start: 2019-01-01 | End: 2019-01-01

## 2019-01-01 RX ORDER — SODIUM CHLORIDE 9 MG/ML
INJECTION, SOLUTION INTRAVENOUS ONCE
Status: DISCONTINUED | OUTPATIENT
Start: 2019-01-01 | End: 2019-01-01

## 2019-01-01 RX ORDER — EPHEDRINE SULFATE 50 MG/ML
INJECTION, SOLUTION INTRAVENOUS
Status: DISCONTINUED | OUTPATIENT
Start: 2019-01-01 | End: 2019-01-01

## 2019-01-01 RX ORDER — CIPROFLOXACIN 2 MG/ML
400 INJECTION, SOLUTION INTRAVENOUS
Status: COMPLETED | OUTPATIENT
Start: 2019-01-01 | End: 2019-01-01

## 2019-01-01 RX ORDER — FLUCONAZOLE 2 MG/ML
6 INJECTION, SOLUTION INTRAVENOUS
Status: DISCONTINUED | OUTPATIENT
Start: 2019-01-01 | End: 2019-01-01

## 2019-01-01 RX ORDER — GLYCOPYRROLATE 0.2 MG/ML
INJECTION INTRAMUSCULAR; INTRAVENOUS
Status: DISCONTINUED | OUTPATIENT
Start: 2019-01-01 | End: 2019-01-01

## 2019-01-01 RX ORDER — NYSTATIN 100000 [USP'U]/G
POWDER TOPICAL 2 TIMES DAILY
Refills: 1 | COMMUNITY
Start: 2019-01-01

## 2019-01-01 RX ORDER — METOCLOPRAMIDE HYDROCHLORIDE 5 MG/ML
10 INJECTION INTRAMUSCULAR; INTRAVENOUS EVERY 10 MIN PRN
Status: DISCONTINUED | OUTPATIENT
Start: 2019-01-01 | End: 2019-01-01 | Stop reason: HOSPADM

## 2019-01-01 RX ORDER — KETAMINE HYDROCHLORIDE 10 MG/ML
INJECTION, SOLUTION INTRAMUSCULAR; INTRAVENOUS
Status: DISCONTINUED | OUTPATIENT
Start: 2019-01-01 | End: 2019-01-01

## 2019-01-01 RX ORDER — METRONIDAZOLE 500 MG/100ML
500 INJECTION, SOLUTION INTRAVENOUS
Status: COMPLETED | OUTPATIENT
Start: 2019-01-01 | End: 2019-01-01

## 2019-01-01 RX ORDER — MIDAZOLAM HYDROCHLORIDE 1 MG/ML
INJECTION INTRAMUSCULAR; INTRAVENOUS CODE/TRAUMA/SEDATION MEDICATION
Status: COMPLETED | OUTPATIENT
Start: 2019-01-01 | End: 2019-01-01

## 2019-01-01 RX ORDER — CEFEPIME HYDROCHLORIDE 2 G/1
2 INJECTION, POWDER, FOR SOLUTION INTRAVENOUS EVERY 24 HOURS
Status: DISCONTINUED | OUTPATIENT
Start: 2019-01-01 | End: 2019-01-01

## 2019-01-01 RX ORDER — LACTULOSE 10 G/15ML
30 SOLUTION ORAL EVERY 6 HOURS
Status: DISCONTINUED | OUTPATIENT
Start: 2019-01-01 | End: 2019-03-18 | Stop reason: HOSPADM

## 2019-01-01 RX ORDER — ONDANSETRON 8 MG/1
8 TABLET, ORALLY DISINTEGRATING ORAL EVERY 8 HOURS PRN
Status: DISCONTINUED | OUTPATIENT
Start: 2019-01-01 | End: 2019-03-18 | Stop reason: HOSPADM

## 2019-01-01 RX ORDER — ONDANSETRON 2 MG/ML
4 INJECTION INTRAMUSCULAR; INTRAVENOUS DAILY PRN
Status: DISCONTINUED | OUTPATIENT
Start: 2019-01-01 | End: 2019-01-01 | Stop reason: HOSPADM

## 2019-01-01 RX ORDER — SODIUM CHLORIDE 9 MG/ML
500 INJECTION, SOLUTION INTRAVENOUS ONCE
Status: COMPLETED | OUTPATIENT
Start: 2019-01-01 | End: 2019-01-01

## 2019-01-01 RX ORDER — HYDRALAZINE HYDROCHLORIDE 50 MG/1
100 TABLET, FILM COATED ORAL 3 TIMES DAILY
Status: DISCONTINUED | OUTPATIENT
Start: 2019-01-01 | End: 2019-01-01

## 2019-01-01 RX ORDER — LANOLIN ALCOHOL/MO/W.PET/CERES
CREAM (GRAM) TOPICAL
Refills: 11 | COMMUNITY
Start: 2019-01-01

## 2019-01-01 RX ORDER — HYDROXYZINE HYDROCHLORIDE 25 MG/1
TABLET, FILM COATED ORAL
Qty: 90 TABLET | Refills: 2 | Status: SHIPPED | OUTPATIENT
Start: 2019-01-01

## 2019-01-01 RX ORDER — GLUCAGON 1 MG
1 KIT INJECTION
Status: DISCONTINUED | OUTPATIENT
Start: 2019-01-01 | End: 2019-01-01

## 2019-01-01 RX ORDER — SODIUM CHLORIDE 9 MG/ML
INJECTION, SOLUTION INTRAVENOUS
Status: DISCONTINUED | OUTPATIENT
Start: 2019-01-01 | End: 2019-03-18 | Stop reason: HOSPADM

## 2019-01-01 RX ORDER — LACTULOSE 10 G/15ML
200 SOLUTION ORAL; RECTAL 3 TIMES DAILY
Status: DISCONTINUED | OUTPATIENT
Start: 2019-01-01 | End: 2019-01-01

## 2019-01-01 RX ORDER — INSULIN ASPART 100 [IU]/ML
0-5 INJECTION, SOLUTION INTRAVENOUS; SUBCUTANEOUS EVERY 6 HOURS PRN
Status: DISCONTINUED | OUTPATIENT
Start: 2019-01-01 | End: 2019-01-01

## 2019-01-01 RX ORDER — LIDOCAINE HYDROCHLORIDE 10 MG/ML
10 INJECTION INFILTRATION; PERINEURAL ONCE
Status: COMPLETED | OUTPATIENT
Start: 2019-01-01 | End: 2019-01-01

## 2019-01-01 RX ORDER — DOXAZOSIN 2 MG/1
8 TABLET ORAL NIGHTLY
Status: DISCONTINUED | OUTPATIENT
Start: 2019-01-01 | End: 2019-01-01

## 2019-01-01 RX ORDER — ETOMIDATE 2 MG/ML
INJECTION INTRAVENOUS
Status: DISPENSED
Start: 2019-01-01 | End: 2019-01-01

## 2019-01-01 RX ORDER — LORAZEPAM 2 MG/ML
0.25 INJECTION INTRAMUSCULAR ONCE AS NEEDED
Status: DISCONTINUED | OUTPATIENT
Start: 2019-01-01 | End: 2019-01-01 | Stop reason: HOSPADM

## 2019-01-01 RX ORDER — FLUTICASONE PROPIONATE 50 MCG
SPRAY, SUSPENSION (ML) NASAL
Qty: 16 ML | Refills: 0 | Status: SHIPPED | OUTPATIENT
Start: 2019-01-01

## 2019-01-01 RX ORDER — CEFEPIME HYDROCHLORIDE 2 G/1
2 INJECTION, POWDER, FOR SOLUTION INTRAVENOUS
Status: DISCONTINUED | OUTPATIENT
Start: 2019-01-01 | End: 2019-01-01

## 2019-01-01 RX ORDER — SODIUM CHLORIDE 9 MG/ML
INJECTION, SOLUTION INTRAVENOUS CONTINUOUS
Status: DISCONTINUED | OUTPATIENT
Start: 2019-01-01 | End: 2019-01-01 | Stop reason: HOSPADM

## 2019-01-01 RX ORDER — HYDROCORTISONE 25 MG/G
CREAM TOPICAL 2 TIMES DAILY PRN
Refills: 1 | COMMUNITY
Start: 2019-01-01

## 2019-01-01 RX ORDER — CEFEPIME HYDROCHLORIDE 1 G/1
1 INJECTION, POWDER, FOR SOLUTION INTRAMUSCULAR; INTRAVENOUS
Status: DISCONTINUED | OUTPATIENT
Start: 2019-01-01 | End: 2019-01-01

## 2019-01-01 RX ORDER — FLUCONAZOLE 2 MG/ML
400 INJECTION, SOLUTION INTRAVENOUS
Status: DISCONTINUED | OUTPATIENT
Start: 2019-01-01 | End: 2019-01-01

## 2019-01-01 RX ORDER — IBUPROFEN 200 MG
24 TABLET ORAL
Status: DISCONTINUED | OUTPATIENT
Start: 2019-01-01 | End: 2019-01-01

## 2019-01-01 RX ORDER — FENTANYL CITRATE 50 UG/ML
25 INJECTION, SOLUTION INTRAMUSCULAR; INTRAVENOUS ONCE
Status: COMPLETED | OUTPATIENT
Start: 2019-01-01 | End: 2019-01-01

## 2019-01-01 RX ADMIN — SODIUM CHLORIDE 250 ML: 0.9 INJECTION, SOLUTION INTRAVENOUS at 10:03

## 2019-01-01 RX ADMIN — LACTULOSE 30 G: 20 SOLUTION ORAL at 12:03

## 2019-01-01 RX ADMIN — FAMOTIDINE 20 MG: 20 TABLET ORAL at 09:03

## 2019-01-01 RX ADMIN — HEPARIN SODIUM 5000 UNITS: 5000 INJECTION, SOLUTION INTRAVENOUS; SUBCUTANEOUS at 06:03

## 2019-01-01 RX ADMIN — LACTULOSE 30 G: 20 SOLUTION ORAL at 11:03

## 2019-01-01 RX ADMIN — ONDANSETRON 4 MG: 2 INJECTION INTRAMUSCULAR; INTRAVENOUS at 03:03

## 2019-01-01 RX ADMIN — Medication 0.02 MCG/KG/MIN: at 09:03

## 2019-01-01 RX ADMIN — FENTANYL CITRATE 25 MCG: 50 INJECTION INTRAMUSCULAR; INTRAVENOUS at 11:03

## 2019-01-01 RX ADMIN — PIPERACILLIN AND TAZOBACTAM 4.5 G: 4; .5 INJECTION, POWDER, LYOPHILIZED, FOR SOLUTION INTRAVENOUS; PARENTERAL at 04:03

## 2019-01-01 RX ADMIN — HEPARIN SODIUM 5000 UNITS: 5000 INJECTION, SOLUTION INTRAVENOUS; SUBCUTANEOUS at 09:03

## 2019-01-01 RX ADMIN — LACTULOSE 30 G: 20 SOLUTION ORAL at 05:03

## 2019-01-01 RX ADMIN — Medication 0.5 MG: at 05:03

## 2019-01-01 RX ADMIN — LACTULOSE 30 G: 20 SOLUTION ORAL at 01:03

## 2019-01-01 RX ADMIN — CEFTRIAXONE 2 G: 2 INJECTION, SOLUTION INTRAVENOUS at 02:03

## 2019-01-01 RX ADMIN — INSULIN ASPART 2 UNITS: 100 INJECTION, SOLUTION INTRAVENOUS; SUBCUTANEOUS at 02:03

## 2019-01-01 RX ADMIN — LIDOCAINE HYDROCHLORIDE 10 ML: 10 INJECTION, SOLUTION EPIDURAL; INFILTRATION; INTRACAUDAL; PERINEURAL at 05:03

## 2019-01-01 RX ADMIN — IOHEXOL 110 ML: 300 INJECTION, SOLUTION INTRAVENOUS at 02:01

## 2019-01-01 RX ADMIN — CHLORHEXIDINE GLUCONATE 0.12% ORAL RINSE 15 ML: 1.2 LIQUID ORAL at 08:03

## 2019-01-01 RX ADMIN — INSULIN ASPART 4 UNITS: 100 INJECTION, SOLUTION INTRAVENOUS; SUBCUTANEOUS at 11:03

## 2019-01-01 RX ADMIN — FAMOTIDINE 20 MG: 20 TABLET ORAL at 08:03

## 2019-01-01 RX ADMIN — INSULIN ASPART 1 UNITS: 100 INJECTION, SOLUTION INTRAVENOUS; SUBCUTANEOUS at 09:03

## 2019-01-01 RX ADMIN — INSULIN ASPART 3 UNITS: 100 INJECTION, SOLUTION INTRAVENOUS; SUBCUTANEOUS at 02:03

## 2019-01-01 RX ADMIN — VASOPRESSIN 1 UNITS: 20 INJECTION INTRAVENOUS at 11:03

## 2019-01-01 RX ADMIN — IOHEXOL 100 ML: 350 INJECTION, SOLUTION INTRAVENOUS at 10:03

## 2019-01-01 RX ADMIN — INSULIN ASPART 3 UNITS: 100 INJECTION, SOLUTION INTRAVENOUS; SUBCUTANEOUS at 10:03

## 2019-01-01 RX ADMIN — INSULIN ASPART 3 UNITS: 100 INJECTION, SOLUTION INTRAVENOUS; SUBCUTANEOUS at 06:03

## 2019-01-01 RX ADMIN — ACETAMINOPHEN 650 MG: 325 TABLET ORAL at 08:03

## 2019-01-01 RX ADMIN — ALLOPURINOL 100 MG: 100 TABLET ORAL at 08:03

## 2019-01-01 RX ADMIN — ALLOPURINOL 100 MG: 100 TABLET ORAL at 09:03

## 2019-01-01 RX ADMIN — Medication 0.5 MG: at 08:03

## 2019-01-01 RX ADMIN — SODIUM CHLORIDE, SODIUM LACTATE, POTASSIUM CHLORIDE, AND CALCIUM CHLORIDE 500 ML: .6; .31; .03; .02 INJECTION, SOLUTION INTRAVENOUS at 04:03

## 2019-01-01 RX ADMIN — LACTULOSE 30 G: 20 SOLUTION ORAL at 04:03

## 2019-01-01 RX ADMIN — RIFAXIMIN 550 MG: 550 TABLET ORAL at 09:03

## 2019-01-01 RX ADMIN — PHENYLEPHRINE HYDROCHLORIDE 0.5 MCG/KG/MIN: 10 INJECTION INTRAVENOUS at 12:03

## 2019-01-01 RX ADMIN — CHLORHEXIDINE GLUCONATE 0.12% ORAL RINSE 15 ML: 1.2 LIQUID ORAL at 09:03

## 2019-01-01 RX ADMIN — INSULIN ASPART 6 UNITS: 100 INJECTION, SOLUTION INTRAVENOUS; SUBCUTANEOUS at 09:03

## 2019-01-01 RX ADMIN — MIDAZOLAM HYDROCHLORIDE 2 MG: 1 INJECTION, SOLUTION INTRAMUSCULAR; INTRAVENOUS at 10:03

## 2019-01-01 RX ADMIN — CEFEPIME 2 G: 2 INJECTION, POWDER, FOR SOLUTION INTRAVENOUS at 09:03

## 2019-01-01 RX ADMIN — Medication 1 MG: at 03:03

## 2019-01-01 RX ADMIN — ALBUMIN HUMAN 12.5 G: 0.25 SOLUTION INTRAVENOUS at 02:03

## 2019-01-01 RX ADMIN — Medication 0.5 MG: at 09:03

## 2019-01-01 RX ADMIN — RIFAXIMIN 550 MG: 550 TABLET ORAL at 08:03

## 2019-01-01 RX ADMIN — HEPARIN SODIUM 5000 UNITS: 5000 INJECTION, SOLUTION INTRAVENOUS; SUBCUTANEOUS at 02:03

## 2019-01-01 RX ADMIN — NALOXONE HYDROCHLORIDE 0.4 MG: 0.4 INJECTION, SOLUTION INTRAMUSCULAR; INTRAVENOUS; SUBCUTANEOUS at 11:03

## 2019-01-01 RX ADMIN — FENTANYL CITRATE 25 MCG: 50 INJECTION INTRAMUSCULAR; INTRAVENOUS at 02:03

## 2019-01-01 RX ADMIN — LACTULOSE 30 G: 20 SOLUTION ORAL at 08:03

## 2019-01-01 RX ADMIN — EPHEDRINE SULFATE 10 MG: 50 INJECTION, SOLUTION INTRAMUSCULAR; INTRAVENOUS; SUBCUTANEOUS at 11:03

## 2019-01-01 RX ADMIN — HEPARIN SODIUM 5000 UNITS: 5000 INJECTION, SOLUTION INTRAVENOUS; SUBCUTANEOUS at 05:03

## 2019-01-01 RX ADMIN — SODIUM CHLORIDE 250 ML: 0.9 INJECTION, SOLUTION INTRAVENOUS at 03:03

## 2019-01-01 RX ADMIN — INSULIN ASPART 3 UNITS: 100 INJECTION, SOLUTION INTRAVENOUS; SUBCUTANEOUS at 01:03

## 2019-01-01 RX ADMIN — INSULIN ASPART 10 UNITS: 100 INJECTION, SOLUTION INTRAVENOUS; SUBCUTANEOUS at 09:03

## 2019-01-01 RX ADMIN — KETAMINE HYDROCHLORIDE 10 MG: 10 INJECTION, SOLUTION INTRAMUSCULAR; INTRAVENOUS at 11:03

## 2019-01-01 RX ADMIN — ROCURONIUM BROMIDE 94 MG: 10 INJECTION, SOLUTION INTRAVENOUS at 05:03

## 2019-01-01 RX ADMIN — INSULIN ASPART 6 UNITS: 100 INJECTION, SOLUTION INTRAVENOUS; SUBCUTANEOUS at 02:03

## 2019-01-01 RX ADMIN — FENTANYL CITRATE 50 MCG: 50 INJECTION INTRAMUSCULAR; INTRAVENOUS at 11:03

## 2019-01-01 RX ADMIN — PHENYLEPHRINE HYDROCHLORIDE 200 MCG: 10 INJECTION INTRAVENOUS at 11:03

## 2019-01-01 RX ADMIN — NOREPINEPHRINE BITARTRATE 1 MCG/KG/MIN: 1 INJECTION, SOLUTION, CONCENTRATE INTRAVENOUS at 05:03

## 2019-01-01 RX ADMIN — CARVEDILOL 6.25 MG: 6.25 TABLET, FILM COATED ORAL at 09:03

## 2019-01-01 RX ADMIN — HEPARIN SODIUM 5000 UNITS: 5000 INJECTION, SOLUTION INTRAVENOUS; SUBCUTANEOUS at 01:03

## 2019-01-01 RX ADMIN — VANCOMYCIN HYDROCHLORIDE 750 MG: 750 INJECTION, POWDER, LYOPHILIZED, FOR SOLUTION INTRAVENOUS at 04:03

## 2019-01-01 RX ADMIN — INSULIN ASPART 3 UNITS: 100 INJECTION, SOLUTION INTRAVENOUS; SUBCUTANEOUS at 09:03

## 2019-01-01 RX ADMIN — FLUCONAZOLE 200 MG: 2 INJECTION, SOLUTION INTRAVENOUS at 12:03

## 2019-01-01 RX ADMIN — VASOPRESSIN 2 UNITS: 20 INJECTION INTRAVENOUS at 11:03

## 2019-01-01 RX ADMIN — PHENYLEPHRINE HYDROCHLORIDE 200 MCG: 10 INJECTION INTRAVENOUS at 12:03

## 2019-01-01 RX ADMIN — INSULIN ASPART 4 UNITS: 100 INJECTION, SOLUTION INTRAVENOUS; SUBCUTANEOUS at 06:03

## 2019-01-01 RX ADMIN — CEFEPIME 2 G: 2 INJECTION, POWDER, FOR SOLUTION INTRAVENOUS at 11:03

## 2019-01-01 RX ADMIN — INSULIN ASPART 4 UNITS: 100 INJECTION, SOLUTION INTRAVENOUS; SUBCUTANEOUS at 05:03

## 2019-01-01 RX ADMIN — CISATRACURIUM BESYLATE 10 MG: 2 INJECTION INTRAVENOUS at 10:03

## 2019-01-01 RX ADMIN — FENTANYL CITRATE 50 MCG: 50 INJECTION, SOLUTION INTRAMUSCULAR; INTRAVENOUS at 01:01

## 2019-01-01 RX ADMIN — PROPOFOL 10 MCG/KG/MIN: 10 INJECTION, EMULSION INTRAVENOUS at 05:03

## 2019-01-01 RX ADMIN — HEPARIN SODIUM 5000 UNITS: 5000 INJECTION, SOLUTION INTRAVENOUS; SUBCUTANEOUS at 10:03

## 2019-01-01 RX ADMIN — INSULIN ASPART 2 UNITS: 100 INJECTION, SOLUTION INTRAVENOUS; SUBCUTANEOUS at 10:03

## 2019-01-01 RX ADMIN — PIPERACILLIN AND TAZOBACTAM 4.5 G: 4; .5 INJECTION, POWDER, LYOPHILIZED, FOR SOLUTION INTRAVENOUS; PARENTERAL at 12:03

## 2019-01-01 RX ADMIN — INSULIN ASPART 2 UNITS: 100 INJECTION, SOLUTION INTRAVENOUS; SUBCUTANEOUS at 01:03

## 2019-01-01 RX ADMIN — INSULIN ASPART 6 UNITS: 100 INJECTION, SOLUTION INTRAVENOUS; SUBCUTANEOUS at 01:03

## 2019-01-01 RX ADMIN — Medication 0.5 MG: at 07:03

## 2019-01-01 RX ADMIN — LIDOCAINE HYDROCHLORIDE 75 MG: 20 INJECTION, SOLUTION INTRAVENOUS at 10:03

## 2019-01-01 RX ADMIN — INSULIN ASPART 1 UNITS: 100 INJECTION, SOLUTION INTRAVENOUS; SUBCUTANEOUS at 06:03

## 2019-01-01 RX ADMIN — LACTULOSE 30 G: 20 SOLUTION ORAL at 06:03

## 2019-01-01 RX ADMIN — INSULIN ASPART 3 UNITS: 100 INJECTION, SOLUTION INTRAVENOUS; SUBCUTANEOUS at 05:03

## 2019-01-01 RX ADMIN — CISATRACURIUM BESYLATE 4 MG: 2 INJECTION INTRAVENOUS at 12:03

## 2019-01-01 RX ADMIN — KETAMINE HYDROCHLORIDE 10 MG: 10 INJECTION, SOLUTION INTRAMUSCULAR; INTRAVENOUS at 12:03

## 2019-01-01 RX ADMIN — MIDAZOLAM HYDROCHLORIDE 1.5 MG: 1 INJECTION, SOLUTION INTRAMUSCULAR; INTRAVENOUS at 01:01

## 2019-01-01 RX ADMIN — SODIUM CHLORIDE: 0.9 INJECTION, SOLUTION INTRAVENOUS at 09:03

## 2019-01-01 RX ADMIN — ETOMIDATE 28 MG: 2 INJECTION INTRAVENOUS at 05:03

## 2019-01-01 RX ADMIN — FAMOTIDINE 20 MG: 10 INJECTION, SOLUTION INTRAVENOUS at 09:03

## 2019-01-01 RX ADMIN — INSULIN ASPART 5 UNITS: 100 INJECTION, SOLUTION INTRAVENOUS; SUBCUTANEOUS at 05:03

## 2019-01-01 RX ADMIN — ACETAMINOPHEN 650 MG: 325 TABLET ORAL at 05:03

## 2019-01-01 RX ADMIN — INSULIN ASPART 1 UNITS: 100 INJECTION, SOLUTION INTRAVENOUS; SUBCUTANEOUS at 01:03

## 2019-01-01 RX ADMIN — ACETAMINOPHEN 650 MG: 325 TABLET ORAL at 01:03

## 2019-01-01 RX ADMIN — PIPERACILLIN AND TAZOBACTAM 4.5 G: 4; .5 INJECTION, POWDER, LYOPHILIZED, FOR SOLUTION INTRAVENOUS; PARENTERAL at 08:03

## 2019-01-01 RX ADMIN — LACTULOSE 30 G: 20 SOLUTION ORAL at 02:03

## 2019-01-01 RX ADMIN — PIPERACILLIN AND TAZOBACTAM 4.5 G: 4; .5 INJECTION, POWDER, LYOPHILIZED, FOR SOLUTION INTRAVENOUS; PARENTERAL at 01:03

## 2019-01-01 RX ADMIN — NOREPINEPHRINE BITARTRATE 4000 MCG: 1 INJECTION, SOLUTION, CONCENTRATE INTRAVENOUS at 04:03

## 2019-01-01 RX ADMIN — EPHEDRINE SULFATE 15 MG: 50 INJECTION, SOLUTION INTRAMUSCULAR; INTRAVENOUS; SUBCUTANEOUS at 11:03

## 2019-01-01 RX ADMIN — GLYCOPYRROLATE 0.6 MG: 0.2 INJECTION, SOLUTION INTRAMUSCULAR; INTRAVENOUS at 12:03

## 2019-01-01 RX ADMIN — KETOROLAC TROMETHAMINE 15 MG: 30 INJECTION, SOLUTION INTRAMUSCULAR; INTRAVENOUS at 05:01

## 2019-01-01 RX ADMIN — LACTULOSE 30 G: 20 SOLUTION ORAL at 09:03

## 2019-01-01 RX ADMIN — METRONIDAZOLE 500 MG: 500 SOLUTION INTRAVENOUS at 10:03

## 2019-01-01 RX ADMIN — CEFEPIME 2 G: 2 INJECTION, POWDER, FOR SOLUTION INTRAVENOUS at 08:03

## 2019-01-01 RX ADMIN — PROPOFOL 200 MG: 10 INJECTION, EMULSION INTRAVENOUS at 10:03

## 2019-01-01 RX ADMIN — VANCOMYCIN HYDROCHLORIDE 2000 MG: 1 INJECTION, POWDER, LYOPHILIZED, FOR SOLUTION INTRAVENOUS at 12:03

## 2019-01-01 RX ADMIN — LACTULOSE 200 G: 10 SOLUTION ORAL at 12:03

## 2019-01-01 RX ADMIN — NOREPINEPHRINE BITARTRATE 0.3 MCG/KG/MIN: 1 INJECTION, SOLUTION, CONCENTRATE INTRAVENOUS at 03:03

## 2019-01-01 RX ADMIN — Medication 4.5 G: at 11:03

## 2019-01-01 RX ADMIN — NEOSTIGMINE METHYLSULFATE 5 MG: 1 INJECTION INTRAVENOUS at 12:03

## 2019-01-01 RX ADMIN — INSULIN ASPART 4 UNITS: 100 INJECTION, SOLUTION INTRAVENOUS; SUBCUTANEOUS at 03:03

## 2019-01-01 RX ADMIN — INSULIN ASPART 6 UNITS: 100 INJECTION, SOLUTION INTRAVENOUS; SUBCUTANEOUS at 10:03

## 2019-01-01 RX ADMIN — CIPROFLOXACIN 400 MG: 2 INJECTION, SOLUTION INTRAVENOUS at 10:03

## 2019-01-01 RX ADMIN — AMPICILLIN SODIUM AND SULBACTAM SODIUM 3 G: 2; 1 INJECTION, POWDER, FOR SOLUTION INTRAMUSCULAR; INTRAVENOUS at 01:01

## 2019-01-01 RX ADMIN — ACETAMINOPHEN 650 MG: 325 TABLET ORAL at 09:03

## 2019-01-01 RX ADMIN — INSULIN ASPART 8 UNITS: 100 INJECTION, SOLUTION INTRAVENOUS; SUBCUTANEOUS at 01:03

## 2019-01-14 NOTE — TELEPHONE ENCOUNTER
----- Message from Danay Nath MD sent at 1/12/2019  6:02 PM CST -----  Inform patient's wife, tumor marker is mildly elevated. Hopefully next treatment will bring it down to normal.

## 2019-01-16 NOTE — LETTER
[facility] Interventional Rad  [address]  [facility] [address]  Phone: [phone] PRE-PROCEDURE INSTRUCTIONS    Your procedure with Interventional Radiology is scheduled for January 29, 2019. Please arrive by 7:00am.    You must check-in and receive a wristband before going to your procedure. Your check-in location is [facility] then day of surgery center.    DO NOT take aspirin for 5 days before your procedure.    ONLY TAKE blood pressure medications the morning of your procedure with a sip of water.    **Do not eat or drink anything between midnight and the time of your procedure. This includes gum, mints, and dandy lemon drops.    **Do not smoke or drink alcoholic beverages 24 hours prior to your procedure.    **If you wear contact lenses, dentures, hearing aids, or glasses, bring a container to put them in during the procedure and give them to a family member for safekeeping.    **If you have been diagnosed with sleep apnea please bring your CPAP machine.    **If your doctor has scheduled you for an overnight stay, bring a small overnight bag with any personal items that you may need.    **Make arrangements in advance for transportation home by a responsible adult. It is not safe to drive a vehicle during the 24 hours following the procedure.    **All [facility] facilities and properties are tobacco free. Smoking is NOT allowed.    PLEASE NOTE: The procedure schedule has many variables which affect the time of your procedure. Family members should be available if your surgery time changes.    If you have any questions about these instructions call Interventional Radiology at [phone] Monday - Friday between 8:00am and 4:00pm or [phone] for after hours.

## 2019-01-16 NOTE — LETTER
January 16, 2019      Danay Nath MD  [address] [facility] [address]           [facility] Hematology Oncology  [address]  [facility] [address]  Phone: [phone]          Patient: Philip Mathis III   MR Number: [id number]   YOB: 1945   Date of Visit: 1/16/2019       Dear Dr. Danay Nath:    Thank you for referring Philip Mathis to me for evaluation. Attached you will find relevant portions of my assessment and plan of care.    If you have questions, please do not hesitate to call me. I look forward to following Philip Mathis along with you.    Sincerely,    Bushra Perez MD    Enclosure  CC:  No Recipients    If you would like to receive this communication electronically, please contact [email or web address] or [phone] to request more information on [employer] access.    For providers and/or their staff who would like to refer a patient to [facility], please contact us through our one-stop-shop provider referral line, [facility] , at [phone].    If you feel you have received this communication in error or would no longer like to receive these types of communications, please e-mail [email or web address]

## 2019-01-16 NOTE — PROGRESS NOTES
Subjective:       Patient ID: Philip Mathis III is a 73 y.o. male.    Chief Complaint: Hepatocellular Carcinoma    Patient here to discuss treatment of hepatocellular carcinoma. He was first diagnosed in 2017 with a biopsy of his liver lesion. He was treated with microwave ablation on 2018, and follow up imaging showed no residual tumor. However, during routine surveillance with CT scan on 2018 recurrence was noted as well as multiple new lesions throughout his liver. He has complaints of fatigue and abdominal distention. He denies any abdominal pain. He is accompanied by his wife. He was reviewed in liver conference. He receives hemodialysis MWF.      Review of Systems   Constitutional: Positive for activity change (due to fatigue) and fatigue. Negative for appetite change, chills and fever.   Respiratory: Negative for cough, shortness of breath, wheezing and stridor.    Cardiovascular: Negative for chest pain, palpitations and leg swelling.   Gastrointestinal: Negative for abdominal distention, abdominal pain, constipation, diarrhea, nausea and vomiting.       Objective:      Physical Exam   Constitutional: He is oriented to person, place, and time. He appears well-developed and well-nourished. No distress.   Cardiovascular: Normal rate and regular rhythm. Exam reveals no gallop and no friction rub.   Murmur heard.  Pulmonary/Chest: Effort normal and breath sounds normal. No stridor. No respiratory distress. He has no wheezes. He has no rales.   Abdominal: Soft. Bowel sounds are normal. He exhibits distension. He exhibits no mass. There is no tenderness. There is no guarding.   Neurological: He is alert and oriented to person, place, and time. Gait normal.   Skin: Skin is warm and dry. He is not diaphoretic.   Psychiatric: He has a normal mood and affect.   Vitals reviewed.      ECO  MELD-Na score: 22 at 2019  7:07 AM  MELD score: 22 at 2019  7:07 AM  Calculated from:  Serum Creatinine:  5.3 mg/dL (Rounded to 4 mg/dL) at 1/8/2019  7:07 AM  Serum Sodium: 140 mmol/L (Rounded to 137 mmol/L) at 1/8/2019  7:07 AM  Total Bilirubin: 0.6 mg/dL (Rounded to 1 mg/dL) at 1/8/2019  7:07 AM  INR(ratio): 1.2 at 1/8/2019  7:07 AM  Age: 73 years  Transplant Status: not a candidate     Imaging: CT scan 12/19/2018  Impression       Enlarging heterogeneous lesion in hepatic segment VIII at the site of prior microwave ablation with smaller satellite lesions concerning for recurrent disease.  Consider MRI with DWI or direct tissue sampling for further evaluation.    Chronic portal vein thrombosis with sequela of portal hypertension such as splenomegaly, trace scattered ascites, and enlarged venous collateral vessels.    Mild cardiomegaly with moderate-sized pericardial effusion.    Bilateral chronic medical renal disease.    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     Assessment:       1. Hepatocellular carcinoma        Plan:         Reviewed CT scan findings with patient. Explained recommendation is to treat with radioembolization. Yttrium 90 Radioembolization discussed in detail with the patient including risks, benefits, potential complications, usual pre and post procedure course.  Discussed the need for initial Angiogram mapping study prior to scheduling the actual Radioembolization procedure. Utilized images from the internet and illustrations to help explain procedure. Patient and wife verbalize understanding and agreement. Patient scheduled for mapping on 1/29/2019. Pre-procedure handout with clinic phone number provided. Consent signed.     Patient tells me he has another appointment on 1/29. He asks if I can reschedule that appointment. I reassure patient I will send message to Dr. Palencia's office to see if they can reschedule his appointment there.

## 2019-01-16 NOTE — PROGRESS NOTES
CC: hepatocellular carcinoma, initial visit      HPI:  is a 74y/o male with HCC, here for oncology consultation. He has multiple medcial problems: h/o orthotopic liver transplant in 2001, ESRD on hemodialysis, type 2 diabetes mellitus , on long term insulin, h/o hepatitis C.  He had CT in Nov 2017 which showed a liver mass. It was biopsied and it showed HCC.  He was initially treated with RFA on 2/27/18. CTs in April and June 2018 showed response to RFA, but a possible new lesion.   CT done in December 2018 showed enlarging liver lesion, now measuring 4.5 cm compared to previous 1.9 cm.    He has lost about 20 lbs in the past 3 months. His appetite is poor. He has noted increasing abdominal girth in the last 3-4 months. He has no pain at this time.      Review of Systems   Constitutional: Positive for malaise/fatigue and weight loss. Negative for chills and fever.   HENT: Negative for ear discharge, nosebleeds and sinus pain.    Eyes: Negative for blurred vision, double vision and pain.   Respiratory: Negative for cough, hemoptysis and sputum production.    Cardiovascular: Negative for chest pain, palpitations, orthopnea and claudication.   Gastrointestinal: Negative for abdominal pain, constipation, diarrhea, heartburn and vomiting.   Genitourinary: Negative for dysuria and hematuria.   Musculoskeletal: Negative for myalgias and neck pain.   Skin: Negative for rash.   Neurological: Positive for weakness. Negative for dizziness, tingling, tremors, sensory change, speech change and headaches.   Endo/Heme/Allergies: Does not bruise/bleed easily.   Psychiatric/Behavioral: Negative for depression.                      Past Medical History:   Diagnosis Date    Anemia     Arthritis     Back pain     Bishop's esophagus     BPH (benign prostatic hypertrophy)     Chronic kidney disease     Cirrhosis     Diabetes mellitus     Diabetes mellitus, type 2     Diabetes with neurologic complications      Diabetic retinopathy     Elevated PSA     Heart failure     Hemolytic anemia     Hepatitis C     successfully treated with Harvoni    Hepatocellular carcinoma 1/2/2018    Hyperlipidemia     Hypertension     Hypertension     Immune disorder     Liver transplanted     Peripheral neuropathy     Sleep apnea     Trouble in sleeping     Type 2 diabetes mellitus with ophthalmic manifestations     Type 2 diabetes with peripheral circulatory disorder, controlled     Type II or unspecified type diabetes mellitus with renal manifestations, uncontrolled(250.42)            Past Surgical History:   Procedure Laterality Date    1) Creation of L arm basilic vein transposition (brachial artery-to-basilic vein AVF)  2) Ligation of L brachial AVF (which was 1st stage creation previously)    Left 6/15/2017    Performed by Silvio [facility] at [facility] OR 2ND FLR    ABLATION, RADIOFREQUENCY N/A 2/27/2018    Performed by Esther Surgeon at [facility] ESTHER    [id number] N/A 12/21/2017    Performed by [facility] at [facility] OR 2ND FLR    broken nose      CATARACT EXTRACTION Bilateral     colonoscopy N/A 7/23/2014    Performed by Moises St MD at [facility] ENDO (4TH FLR)    [id number] POSSIBLE AV GRAFT Left 11/8/2016    Performed by Silvio William MD at [facility] OR 2ND FLR    DEBRIDEMENT-WOUND Left 4/12/2015    Performed by Jimmy Light MD at [facility] OR 2ND FLR    DEBRIDEMENT-WOUND Left 4/11/2015    Performed by Jimmy Light MD at [facility] OR 2ND FLR    DEBRIDEMENT-WOUND Left 4/10/2015    Performed by Jimmy Light MD at [facility] OR 2ND FLR    DEBRIDEMENT-WOUND Left 4/9/2015    Performed by Jimmy Ligth MD at [facility] OR 1ST FLR    DEBRIDEMENT-WOUND Left 4/6/2015    Performed by Jimmy Light MD at [facility] OR 2ND FLR    DEBRIDEMENT-WOUND Left 4/4/2015    Performed by Jimmy Light MD at [facility] OR 2ND FLR    DEBRIDEMENT-WOUND Left 4/2/2015    Performed by Jimmy Light MD at [facility]  OR 1ST FLR    DRESSING CHANGE- Left 4/10/2015    Performed by [facility] MD at [facility] OR 2ND FLR    DRESSING CHANGE-s/p burn debridement Left 4/15/2015    Performed by Jimmy Light MD at [facility] OR 2ND FLR    DRESSING CHANGE-s/p burn debridement Left 2015    Performed by Jimmy Light MD at [facility] OR 2ND FLR    DRESSING CHANGE-s/p wound debridement Left 2015    Performed by Jimmy Light MD at [facility] OR 1ST FLR    DRESSING CHANGE-wound vac Left 2015    Performed by Jimmy Light MD at [facility] OR 2ND FLR    EYE SURGERY      cataracts    FEMUR SURGERY      FRACTURE SURGERY      right femur fracture     INSERTION-CATHETER-PERM-A-CATH Right 2017    Performed by Silvio William MD at [facility] OR 2ND FLR    LIVER TRANSPLANT      Open Biome Fecal Transplant N/A 2015    Performed by Elmo Gan MD at [facility] ENDO (4TH FLR)    PLACEMENT-WOUND VAC  4/15/2015    Performed by Jimmy Light MD at [facility] OR 2ND FLR    PLACEMENT-WOUND VAC Left 2015    Performed by Jimmy Light MD at [facility] OR 2ND FLR    PORTACATH PLACEMENT Left     SKIN GRAFT      graft from right thigh , applied to the left back and left arm.    SPLIT THICKNESS SKIN GRAFT - left trunk, abd and axilla N/A 4/15/2015    Performed by Jimmy Light MD at [facility] OR 2ND FLR         Social History     Socioeconomic History    Marital status:      Spouse name: Not on file    Number of children: Not on file    Years of education: Not on file    Highest education level: Not on file   Social Needs    Financial resource strain: Not on file    Food insecurity - worry: Not on file    Food insecurity - inability: Not on file    Transportation needs - medical: Not on file    Transportation needs - non-medical: Not on file   Occupational History    Not on file   Tobacco Use    Smoking status: Former Smoker     Last attempt to quit: 1998     Years since quittin.4    Smokeless tobacco:  Never Used    Tobacco comment: pt reports quitting in 1998   Substance and Sexual Activity    Alcohol use: No     Comment: pt reports hx of alcholism and reports quit in 1998    Drug use: No    Sexual activity: Yes     Partners: Female   Other Topics Concern    Not on file   Social History Narrative    Not on file       Family History   Problem Relation Age of Onset    Cancer Mother         cancer 55 years ago    Coronary artery disease Father     Hypertension Father     Diabetes Father     Heart disease Father     Cancer Sister         breast CA    Colon cancer Neg Hx          Review of patient's allergies indicates:   Allergen Reactions    Corticosteroids (glucocorticoids) Other (See Comments)     Leg swelling    Hydrocortisone      Leg swelling    Neuromuscular blockers, steroidal      Leg swelling    Ribavirin      Intolerance, arthralgias         Current Outpatient Medications   Medication Sig    ACCU-CHEK JOANN PLUS TEST STRP Strp CHECK BLOOD SUGAR THREE TIMES DAILY    allopurinol (ZYLOPRIM) 100 MG tablet TAKE 1 TABLET EVERY DAY    ammonium lactate (LAC-HYDRIN) 12 % lotion Apply topically as needed for Dry Skin.    aspirin 81 MG Chew Take 1 tablet (81 mg total) by mouth once daily.    blood-glucose meter (ACCU-CHEK JOANN PLUS METER) Misc Use as directed    carvedilol (COREG) 6.25 MG tablet TK 1 T PO  BID    cetirizine (ZYRTEC) 10 MG tablet Take 1 tablet (10 mg total) by mouth once daily.    clonazePAM (KLONOPIN) 0.5 MG tablet Take 1 tablet (0.5 mg total) by mouth daily as needed.    diclofenac sodium (VOLTAREN) 1 % Gel Apply 2 g topically 3 (three) times daily. To the right knee.    doxazosin (CARDURA) 8 MG Tab TAKE 1 TABLET ONCE DAILY.    econazole nitrate 1 % cream Apply topically 2 (two) times daily.    fluticasone (FLONASE) 50 mcg/actuation nasal spray SHAKE LIQUID AND USE 2 SPRAYS(100 MCG) IN EACH NOSTRIL EVERY DAY    furosemide (LASIX) 20 MG tablet Take 2 tablets (40 mg  "total) by mouth once daily. Only take lasix 40mg on non-dialysis days (Tuesday, Thursday, Saturday, and Sunday) by mouth.    GENERLAC 10 gram/15 mL solution TAKE 30 ML 'S BY MOUTH THREE TIMES DAILY    hydrALAZINE (APRESOLINE) 100 MG tablet Take 1 tablet (100 mg total) by mouth 3 (three) times daily.    hydrOXYzine HCl (ATARAX) 25 MG tablet TAKE 1 TABLET(25 MG) BY MOUTH THREE TIMES DAILY AS NEEDED FOR ITCHING    hydrOXYzine HCl (ATARAX) 25 MG tablet TAKE 1 TABLET(25 MG) BY MOUTH THREE TIMES DAILY AS NEEDED FOR ITCHING    ibuprofen (ADVIL,MOTRIN) 800 MG tablet Take 1 tablet (800 mg total) by mouth every 12 (twelve) hours as needed for Pain. Take with food.    insulin aspart (NOVOLOG) 100 unit/mL InPn pen Inject 6 units w/ breakfast, 4 units w/ lunch and dinner plus scale 180-230 +1, 231-280 +2, 281-330 +3, 331-380 +4, >380 +5. 90 day supply (Patient taking differently: Inject 8 units w/ breakfast, 6 units w/ lunch and dinner plus scale 180-230 +1, 231-280 +2, 281-330 +3, 331-380 +4, >380 +5. 90 day supply)    insulin glargine (LANTUS SOLOSTAR) 100 unit/mL (3 mL) InPn pen Inject 8 Units into the skin every evening. (Patient taking differently: Inject 10 Units into the skin every evening. )    insulin needles, disposable, (NOVOFINE 32) 32 x 1/4 " Ndle 1 each by Misc.(Non-Drug; Combo Route) route 3 (three) times daily.    lancets (ACCU-CHEK SOFTCLIX LANCETS) Misc 1 lancet by Misc.(Non-Drug; Combo Route) route 4 (four) times daily.    methocarbamol (ROBAXIN) 500 MG Tab Take 1 tablet by mouth daily (every 24 hours) as needed for muscle spasm.    minoxidil (LONITEN) 2.5 MG tablet Take 2 tablets (5 mg total) by mouth 2 (two) times daily.    multivitamin capsule Take 1 capsule by mouth once daily.    ondansetron (ZOFRAN-ODT) 4 MG TbDL DISSOLVE 1 TABLET(4 MG) ON THE TONGUE EVERY 12 HOURS AS NEEDED    oxyCODONE (ROXICODONE) 5 MG immediate release tablet Take 1 tablet (5 mg total) by mouth every 6 (six) hours as " needed for Pain.    pantoprazole (PROTONIX) 40 MG tablet TAKE 1 TABLET EVERY DAY    rifAXIMin (XIFAXAN) 550 mg Tab Take 1 tablet (550 mg total) by mouth 2 (two) times daily.    sevelamer carbonate (RENVELA) 800 mg Tab Take 1 tablet (800 mg total) by mouth 3 (three) times daily with meals.    silver nitrate applicators 75-25 % applicator Apply topically 3 (three) times a week. Apply to bleeder on warty formation.    simethicone (MYLICON) 80 MG chewable tablet Take 1 tablet (80 mg total) by mouth every 6 (six) hours as needed for Flatulence (and bloating).    tacrolimus (PROGRAF) 0.5 MG Cap TAKE 1 CAPSULE EVERY DAY    tiZANidine (ZANAFLEX) 4 MG tablet TAKE 1 TABLET BY MOUTH EVERY 6 HOURS AS NEEDED    tiZANidine (ZANAFLEX) 4 MG tablet TAKE 1 TABLET BY MOUTH EVERY 6 HOURS AS NEEDED    UNABLE TO FIND 1 tablet once daily. Pro Renal Plus D Oral Tabs    urea (CARMOL) 40 % Crea Apply topically once daily.     No current facility-administered medications for this visit.          Vitals:    01/16/19 1510   BP: (!) 138/50   Pulse: 73   Resp: 18   Temp: 98.1 °F (36.7 °C)       Pain  0    PS: ECOG1    Physical Exam   Constitutional: He is oriented to person, place, and time. He appears well-developed. No distress.   HENT:   Head: Normocephalic and atraumatic.   Mouth/Throat: No oropharyngeal exudate.   Eyes: Pupils are equal, round, and reactive to light.   Neck: Normal range of motion.   Cardiovascular: Normal rate and regular rhythm.   Murmur heard.  Pulmonary/Chest: Effort normal and breath sounds normal. No respiratory distress. He has no wheezes.   Abdominal: Bowel sounds are normal. He exhibits distension. There is tenderness.   H ehas RUQ tenderness, but no guarding   Musculoskeletal: He exhibits edema.   Lymphadenopathy:     He has no cervical adenopathy.   Neurological: He is alert and oriented to person, place, and time. No cranial nerve deficit.   Psychiatric: He has a normal mood and affect.       12/21/17  FINAL PATHOLOGIC DIAGNOSIS  POSITIVE FOR CARCINOMA CONSISTENT WITH HEPATOCELLULAR CARCINOMA    12/19/18 CT abdomen      COMPARISON:  CT abdomen without contrast 12/14/2018; CT abdomen/pelvis with contrast 10/25/2018; posttransplant liver ultrasound 10/02/2018    FINDINGS:  ABDOMEN/LOWER THORAX:    - Visualized heart: Mild cardiomegaly with moderate-sized pericardial effusion.  There is aortic valve calcification.  There is coronary artery atherosclerotic calcification.    - Lung bases/pleura: Trace bilateral pleural effusions (right greater than left) with mild associated compressive and dependent atelectasis.  No mass or pneumothorax.    - Liver: Postoperative change orthotopic liver transplant and prior microwave ablation in the posteromedial hepatic segment VIII.  There has been increase in size of a heterogeneously hypoattenuating lesion in the region of the ablation measuring 4.5 cm (previously 1.9 cm).  There are multiple smaller satellite lesions in hepatic segments VII and VIII.  There is a subtle stable appearing 1.1 cm focus of enhancement without definite washout in hepatic segment V. 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  There is splenorenal shunting and enlarged upper abdominal collateral veins.    - Gallbladder: Surgically absent.    - Bile Ducts: No evidence of intra or extra hepatic biliary ductal dilation.    - Spleen: Enlarged measuring 14.1 cm.  No focal lesion.    - Kidneys: Bilateral renal atrophy consistent with chronic medical renal disease.  There are multiple small focal hypodensities bilaterally some of which are consistent with cysts and others are too small to definitively characterize.  Ureters are normal.  Limited bladder evaluation secondary to nondistention.    - Adrenals: Nonspecific mild left adrenal thickening.  Right adrenal gland is normal.    - Pancreas: No mass or peripancreatic fat stranding.    - Retroperitoneum:  Stable 1.0 cm right periaortic lymph node.   Additional multiple normal sized lymph nodes.    - Vascular: No abdominal aortic aneurysm.  Infrarenal abdominal aorta measures at the upper limits of normal.  The bilateral iliac arteries are tortuous.  There is ectasia of the right common iliac artery and mild fusiform aneurysmal dilatation of the left common iliac artery measuring up to 2.0 cm.    - Abdominal wall: Mild bilateral gynecomastia.    PELVIS:    There is a small amount of dependent pelvic free fluid.  No pelvic mass, or adenopathy.    GI/MESENTERY/PERITONEUM:    Mild gastric wall thickening which may relate to nondistention or portal gastropathy.  There is no evidence of bowel obstruction or inflammation.  Trace scattered abdominal free fluid.  No free air.  There are multiple normal sized mesenteric lymph nodes.    BONES: No acute fracture or aggressive osseous lesions. There is grade 1 anterolisthesis of L5 on S1 with associated bilateral pars interarticularis defects.  There is degenerative joint disease.      Impression       Enlarging heterogeneous lesion in hepatic segment VIII at the site of prior microwave ablation with smaller satellite lesions concerning for recurrent disease.  Consider MRI with DWI or direct tissue sampling for further evaluation.    Chronic portal vein thrombosis with sequela of portal hypertension such as splenomegaly, trace scattered ascites, and enlarged venous collateral vessels.    Mild cardiomegaly with moderate-sized pericardial effusion.    Bilateral chronic medical renal disease.    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    Additional findings as above.     Component      Latest Ref Rng & Units 1/8/2019   WBC      3.90 - 12.70 K/uL 6.82   RBC      4.60 - 6.20 M/uL 3.48 (L)   Hemoglobin      14.0 - 18.0 g/dL 10.1 (L)   Hematocrit      40.0 - 54.0 % 32.2 (L)   MCV      82 - 98 fL 93   MCH      27.0 - 31.0 pg 29.0   MCHC       32.0 - 36.0 g/dL 31.4 (L)   RDW      11.5 - 14.5 % 14.3   Platelets      150 - 350 K/uL 142 (L)   MPV      9.2 - 12.9 fL 11.2   Immature Granulocytes      0.0 - 0.5 % 0.1   Gran # (ANC)      1.8 - 7.7 K/uL 4.4   Immature Grans (Abs)      0.00 - 0.04 K/uL 0.01   Lymph #      1.0 - 4.8 K/uL 0.9 (L)   Mono #      0.3 - 1.0 K/uL 0.9   Eos #      0.0 - 0.5 K/uL 0.5   Baso #      0.00 - 0.20 K/uL 0.11   nRBC      0 /100 WBC 0   Gran%      38.0 - 73.0 % 64.9   Lymph%      18.0 - 48.0 % 13.5 (L)   Mono%      4.0 - 15.0 % 13.0   Eosinophil%      0.0 - 8.0 % 6.9   Basophil%      0.0 - 1.9 % 1.6   Differential Method       Automated   Sodium      136 - 145 mmol/L 140   Potassium      3.5 - 5.1 mmol/L 4.6   Chloride      95 - 110 mmol/L 104   CO2      23 - 29 mmol/L 25   Glucose      70 - 110 mg/dL 62 (L)   BUN, Bld      8 - 23 mg/dL 36 (H)   Creatinine      0.5 - 1.4 mg/dL 5.3 (H)   Calcium      8.7 - 10.5 mg/dL 8.2 (L)   Total Protein      6.0 - 8.4 g/dL 8.0   Albumin      3.5 - 5.2 g/dL 3.5   Total Bilirubin      0.1 - 1.0 mg/dL 0.6   Alkaline Phosphatase      55 - 135 U/L 109   AST      10 - 40 U/L 29   ALT      10 - 44 U/L 22   Anion Gap      8 - 16 mmol/L 11   eGFR if African American      >60 mL/min/1.73 m:2 11.4 (A)   eGFR if non African American      >60 mL/min/1.73 m:2 9.8 (A)   Protime      9.0 - 12.5 sec 11.8   Coumadin Monitoring INR      0.8 - 1.2 1.2   Bilirubin, Direct      0.1 - 0.3 mg/dL 0.3   AFP      0.0 - 8.4 ng/mL 12 (H)    Child class A ( score 6)    Assessment:    1. Hepatocellular carcinoma  2. H/o hepatitis C, treated with Harvoni  3. S/p orthotopic liver transplant  4. type 2 diabetes mellitus on long term insulin  5. ESRD on hemodialysis      Plan:    1. He has been treated with RFA in Feb 2018. Now he has enlarging lesion at the  Previously treated site. He has been evaluated by IR today for possible Y 90 therapy and will be mapped. It is appropriate in the absence of metastases., as his bilirubin (  and rest of liver funtion) is well preserved.  He will have PET CT to r/o metastases    2. He has been treated with Harvoni    3. Followed by liver transplant team here. On prograf     4. He uses insulin daily    5. He is on HD three times weekly          Distress Screening Results: Psychosocial Distress screening score of Distress Score: 1 noted and reviewed. No intervention indicated.

## 2019-01-17 NOTE — TELEPHONE ENCOUNTER
----- Message from Yazmin Linares, SABRINA sent at 1/17/2019  9:57 AM CST -----  Good Morning,     I saw Mr. Mathis yesterday for follow up of hepatocellular carcinoma. He requires treatment. We have him tentatively scheduled on 1/29 for his treatment. He wanted to know if your appointment on 1/29 could be rescheduled to another day so that he can keep the 1/29 appointment with us. Please let us know.    Thank you,  Yazmin Linares, APRN, FNP  Interventional Radiology  [phone] [employer]

## 2019-01-19 NOTE — PATIENT INSTRUCTIONS
Mupirocin skin cream or ointment  What is this medicine?  MUPIROCIN (myoo PEER oh sin) is an antibiotic. It is used on the skin to treat skin infections.  How should I use this medicine?  This medicine is for external use only. Follow the directions on the prescription label. Wash your hands before and after use. Before applying, wash the affected area with mild soap and water and pat dry. Apply a small amount to the affected area and rub gently. You can cover the area with a gauze dressing. Do not get this medicine in your eyes. If you do, rinse out with plenty of cool tap water. Do not use your medicine more often than directed. Finish the full course of medicine prescribed by your doctor or health care professional even if you think your condition is better. Do not use over large areas of burnt skin.  Talk to your pediatrician regarding the use of this medicine in children. Special care may be needed.  What side effects may I notice from receiving this medicine?  Side effects that you should report to your doctor or health care professional as soon as possible:  · skin rash, redness, continued swelling, burning, itching, stinging, or pain  Side effects that usually do not require medical attention (report to your doctor or health care professional if they continue or are bothersome):  · dry skin, itching  What may interact with this medicine?  Interactions are not expected. Do not use any other skin products on the affected area without telling your doctor or health care professional.  What if I miss a dose?  If you miss a dose, take it as soon as you can. If it is almost time for your next dose, take only that dose. Do not take double or extra doses.  Where should I keep my medicine?  Keep out of the reach of children.  Store at room temperature between 20 and 25 degrees C (68 and 77 degrees F). Throw away any unused medicine after the expiration date.  What should I tell my health care provider before I take  this medicine?  They need to know if you have any of these conditions:  · an unusual or allergic reaction to mupirocin, polyethylene glycol (PEG), or other topical antibiotic medicine  · pregnant or trying to get pregnant  · breast-feeding  What should I watch for while using this medicine?  Tell your doctor or health care professional if your skin condition does not begin to improve within 3 to 5 days.  NOTE:This sheet is a summary. It may not cover all possible information. If you have questions about this medicine, talk to your doctor, pharmacist, or health care provider. Copyright© 2017 [employer]

## 2019-01-19 NOTE — PROGRESS NOTES
Subjective:       Patient ID: Philip Mathis III is a 73 y.o. male.    Chief Complaint: Burn (on the back side /1 mths )    Chief Complaint   Patient presents with    Burn     on the back side /1 mths        HPI  Philip Mathis III is a 73 y.o. male with multiple medical diagnoses as listed in the medical history and problem list that presents for open sores near tailbone from sitting in the dialysis chair.  This patient is new to me.      Patient got 2 small 2-3mm sores on his tailbone/buttocks from the heating feature on the dialysis chairs, the two areas have not healed over the last 4 weeks, they have tried mupirocin ointment once a day and only seeing mild improvement. Small scabbing from frequent removal of bandaids.          PAST MEDICAL HISTORY:  Past Medical History:   Diagnosis Date    Anemia     Arthritis     Back pain     Bishop's esophagus     BPH (benign prostatic hypertrophy)     Chronic kidney disease     Cirrhosis     Diabetes mellitus     Diabetes mellitus, type 2     Diabetes with neurologic complications     Diabetic retinopathy     Elevated PSA     Heart failure     Hemolytic anemia     Hepatitis C     successfully treated with Harvoni    Hepatocellular carcinoma 1/2/2018    Hyperlipidemia     Hypertension     Hypertension     Immune disorder     Liver transplanted     Peripheral neuropathy     Sleep apnea     Trouble in sleeping     Type 2 diabetes mellitus with ophthalmic manifestations     Type 2 diabetes with peripheral circulatory disorder, controlled     Type II or unspecified type diabetes mellitus with renal manifestations, uncontrolled(250.42)        PAST SURGICAL HISTORY:  Past Surgical History:   Procedure Laterality Date    1) Creation of L arm basilic vein transposition (brachial artery-to-basilic vein AVF)  2) Ligation of L brachial AVF (which was 1st stage creation previously)    Left 6/15/2017    [facility]     ABLATION, RADIOFREQUENCY N/A 2/27/2018    Performed by Esther Surgeon at [facility]    BULFEF-RDMNEZ-NO N/A 12/21/2017    [facility]    broken nose      CATARACT EXTRACTION Bilateral     colonoscopy N/A 7/23/2014    Performed by Moises St MD at [facility] END[facility] (4TH FLR)    NVAZRPDC-YUNZKCZ-KB POSSIBLE AV GRAFT Left 11/8/2016    Performed by [facility] at [facility] OR 2[facility]    DEBRIDEMENT-WOUND Left 4/12/2015    Performed by Jimmy Light [facility] at [facility] OR 2ND [facility]    DEBRIDEMENT-WOUND Left 4/11/2015    Performed by [facility] at [facility]    DEBRIDEMENT-WOUND Left 4/10/2015    Performed by [facility] at [facility]    DEBRIDEMENT-WOUND Left 4/9/2015    Performed by [facility] at [facility] 1ST [facility]LR    DEBRIDEMENT-WOUND Left 4/6/2015    Performed by [facility] at [facility]    DEBRIDEMENT-WOUND Left 4/4/2015    Performed by [facility] at [facility]    DEBRIDEMENT-WOUND Left 4/2/2015    Performed by Jimmy [facility] at [facility] OR [facility]ST [facility]    DRESSING CHANGE- Left 4/10/2015    Performed by [facility] at [facility] OR [facility]    DRESSING CHANGE-s/p burn debridement Left 4/15/2015    Performed by [facility] at [facility] OR [facility]    DRESSING CHANGE-s/p burn debridement Left 4/14/2015    Performed by [facility] at [facility] OR [facility]    DRESSING CHANGE-s/p wound debridement Left 4/13/2015    Performed by [facility] at [facility]LR    DRESSING CHANGE-wound vac Left 4/20/2015    [facility]    EYE SURGERY      cataracts    FEMUR SURGERY      FRACTURE SURGERY      right femur fracture     INSERTION-CATHETER-PERM-A-CATH Right 7/13/2017    [facility]    LIVER TRANSPLANT  2001    Open Biome Fecal Transplant N/A 8/5/2015    [facility] at [facility] ([facility]    PLACEMENT-WOUND VAC  4/15/2015    [facility]    PLACEMENT-WOUND VAC Left 2015    [facility] at [facility]    PORTACATH PLACEMENT Left     SKIN GRAFT      graft from right thigh , applied to the left back and left arm.    SPLIT THICKNESS SKIN GRAFT - left trunk, abd and axilla N/A 4/15/2015    Performed by [facility] at [facility] 2ND [facility]       SOCIAL HISTORY:  Social History     Socioeconomic History    Marital status:      Spouse name: Not on file    Number of children: Not on file    Years of education: Not on file    Highest education level: Not on file   Social Needs    Financial resource strain: Not on file    Food insecurity - worry: Not on file    Food insecurity - inability: Not on file    Transportation needs - medical: Not on file    Transportation needs - non-medical: Not on file   Occupational History    Not on file   Tobacco Use    Smoking status: Former Smoker     Last attempt to quit: 1998     Years since quittin.4    Smokeless tobacco: Never Used    Tobacco comment: pt reports quitting in    Substance and Sexual Activity    Alcohol use: No     Comment: pt reports hx of alcholism and reports quit in     Drug use: No    Sexual activity: Yes     Partners: Female   Other Topics Concern    Not on file   Social History Narrative    Not on file       FAMILY HISTORY:  Family History   Problem Relation Age of Onset    Cancer Mother         cancer 55 years ago    Coronary artery disease Father     Hypertension Father     Diabetes Father     Heart disease Father     Cancer Sister         breast CA    Colon cancer Neg Hx        ALLERGIES AND MEDICATIONS: updated and reviewed.  Review of patient's allergies indicates:   Allergen Reactions    Corticosteroids (glucocorticoids) Other (See Comments)     Leg swelling    Hydrocortisone      Leg swelling    Neuromuscular  blockers, steroidal      Leg swelling    Ribavirin      Intolerance, arthralgias     Current Outpatient Medications   Medication Sig Dispense Refill    ACCU-CHEK JOANN PLUS TEST STRP Strp CHECK BLOOD SUGAR THREE TIMES DAILY 300 strip 11    allopurinol (ZYLOPRIM) 100 MG tablet TAKE 1 TABLET EVERY DAY 90 tablet 3    ammonium lactate (LAC-HYDRIN) 12 % lotion Apply topically as needed for Dry Skin. 225 g 6    blood-glucose meter (ACCU-CHEK JOANN PLUS METER) Misc Use as directed 1 each 0    carvedilol (COREG) 6.25 MG tablet TK 1 T PO  BID  3    cetirizine (ZYRTEC) 10 MG tablet Take 1 tablet (10 mg total) by mouth once daily. 90 tablet 3    clonazePAM (KLONOPIN) 0.5 MG tablet Take 1 tablet (0.5 mg total) by mouth daily as needed. 90 tablet 3    diclofenac sodium (VOLTAREN) 1 % Gel Apply 2 g topically 3 (three) times daily. To the right knee. 1 Tube 0    doxazosin (CARDURA) 8 MG Tab TAKE 1 TABLET ONCE DAILY. 90 tablet 4    fluticasone (FLONASE) 50 mcg/actuation nasal spray SHAKE LIQUID AND USE 2 SPRAYS(100 MCG) IN EACH NOSTRIL EVERY DAY 16 mL 0    furosemide (LASIX) 20 MG tablet Take 2 tablets (40 mg total) by mouth once daily. Only take lasix 40mg on non-dialysis days (Tuesday, Thursday, Saturday, and Sunday) by mouth. 16 tablet 11    GENERLAC 10 gram/15 mL solution TAKE 30 ML 'S BY MOUTH THREE TIMES DAILY 2700 mL 5    hydrALAZINE (APRESOLINE) 100 MG tablet Take 1 tablet (100 mg total) by mouth 3 (three) times daily. 270 tablet 3    hydrOXYzine HCl (ATARAX) 25 MG tablet TAKE 1 TABLET(25 MG) BY MOUTH THREE TIMES DAILY AS NEEDED FOR ITCHING 90 tablet 0    hydrOXYzine HCl (ATARAX) 25 MG tablet TAKE 1 TABLET(25 MG) BY MOUTH THREE TIMES DAILY AS NEEDED FOR ITCHING 90 tablet 2    ibuprofen (ADVIL,MOTRIN) 800 MG tablet Take 1 tablet (800 mg total) by mouth every 12 (twelve) hours as needed for Pain. Take with food. 30 tablet 0    insulin aspart (NOVOLOG) 100 unit/mL InPn pen Inject 6 units w/ breakfast, 4 units  "w/ lunch and dinner plus scale 180-230 +1, 231-280 +2, 281-330 +3, 331-380 +4, >380 +5. 90 day supply (Patient taking differently: Inject 8 units w/ breakfast, 6 units w/ lunch and dinner plus scale 180-230 +1, 231-280 +2, 281-330 +3, 331-380 +4, >380 +5. 90 day supply) 3 Box 3    insulin glargine (LANTUS SOLOSTAR) 100 unit/mL (3 mL) InPn pen Inject 8 Units into the skin every evening. (Patient taking differently: Inject 10 Units into the skin every evening. ) 2 Box 6    insulin needles, disposable, (NOVOFINE 32) 32 x 1/4 " Ndle 1 each by Misc.(Non-Drug; Combo Route) route 3 (three) times daily. 100 each 5    lancets (ACCU-CHEK SOFTCLIX LANCETS) Misc 1 lancet by Misc.(Non-Drug; Combo Route) route 4 (four) times daily. 360 each 3    methocarbamol (ROBAXIN) 500 MG Tab Take 1 tablet by mouth daily (every 24 hours) as needed for muscle spasm. 5 tablet 0    minoxidil (LONITEN) 2.5 MG tablet Take 2 tablets (5 mg total) by mouth 2 (two) times daily. 360 tablet 3    multivitamin capsule Take 1 capsule by mouth once daily.      ondansetron (ZOFRAN-ODT) 4 MG TbDL DISSOLVE 1 TABLET(4 MG) ON THE TONGUE EVERY 12 HOURS AS NEEDED 60 tablet 0    oxyCODONE (ROXICODONE) 5 MG immediate release tablet Take 1 tablet (5 mg total) by mouth every 6 (six) hours as needed for Pain. 28 tablet 0    pantoprazole (PROTONIX) 40 MG tablet TAKE 1 TABLET EVERY DAY 90 tablet 3    rifAXIMin (XIFAXAN) 550 mg Tab Take 1 tablet (550 mg total) by mouth 2 (two) times daily. 60 tablet 11    silver nitrate applicators 75-25 % applicator Apply topically 3 (three) times a week. Apply to bleeder on warty formation. 100 each 0    simethicone (MYLICON) 80 MG chewable tablet Take 1 tablet (80 mg total) by mouth every 6 (six) hours as needed for Flatulence (and bloating). 60 tablet 3    tacrolimus (PROGRAF) 0.5 MG Cap TAKE 1 CAPSULE EVERY DAY 90 capsule 11    tiZANidine (ZANAFLEX) 4 MG tablet TAKE 1 TABLET BY MOUTH EVERY 6 HOURS AS NEEDED 90 tablet 0 "    tiZANidine (ZANAFLEX) 4 MG tablet TAKE 1 TABLET BY MOUTH EVERY 6 HOURS AS NEEDED 90 tablet 12    UNABLE TO FIND 1 tablet once daily. Pro Renal Plus D Oral Tabs      urea (CARMOL) 40 % Crea Apply topically once daily. 85 g 5    aspirin 81 MG Chew Take 1 tablet (81 mg total) by mouth once daily. 90 tablet 3    econazole nitrate 1 % cream Apply topically 2 (two) times daily. 85 g 1    mupirocin (BACTROBAN) 2 % ointment Apply topically 3 (three) times daily. 30 g 2    sevelamer carbonate (RENVELA) 800 mg Tab Take 1 tablet (800 mg total) by mouth 3 (three) times daily with meals. 90 tablet 11     No current facility-administered medications for this visit.          ROS  Review of Systems   Constitutional: Negative for chills, diaphoresis and fever.   HENT: Negative for congestion and rhinorrhea.    Respiratory: Negative for cough and shortness of breath.    Cardiovascular: Negative for chest pain.   Gastrointestinal: Negative for abdominal pain, constipation, diarrhea and nausea.   Genitourinary: Negative for difficulty urinating, dysuria and enuresis.   Musculoskeletal: Negative for arthralgias and joint swelling.   Skin: Positive for wound (bilateral buttocks). Negative for rash.   Neurological: Negative for dizziness and headaches.   Psychiatric/Behavioral: Negative for dysphoric mood. The patient is not nervous/anxious.            Objective:     Physical Exam  Vitals:    01/18/19 1759   BP: 130/60   BP Location: Right arm   Patient Position: Sitting   BP Method: Large (Manual)   Pulse: 67   Resp: 16   Temp: 99.8 °F (37.7 °C)   TempSrc: Oral   SpO2: (!) 92%   Weight: 96.1 kg (211 lb 12 oz)   Height: 6' (1.829 m)    Body mass index is 28.72 kg/m².  Weight: 96.1 kg (211 lb 12 oz)   Height: 6' (182.9 cm)   Physical Exam   Constitutional: He appears well-developed and well-nourished.   HENT:   Head: Normocephalic and atraumatic.   Eyes: Conjunctivae and EOM are normal.   Neck: Normal range of motion. Neck  supple.   Cardiovascular: Normal rate.   Pulmonary/Chest: Effort normal.   Musculoskeletal: He exhibits no edema.   Lymphadenopathy:     He has no cervical adenopathy.   Neurological: He is alert. No cranial nerve deficit.   Skin: Skin is warm and dry. Burn (bilateral buttocks) noted. No rash noted.        Vitals reviewed.          Assessment:     1. Open wound      Plan:     Philip was seen today for burn.    Diagnoses and all orders for this visit:    Open wound  -     mupirocin (BACTROBAN) 2 % ointment; Apply topically 3 (three) times daily.  -     Continue mupirocin ointment with nonadherent bandages during the day, leave to air at night  -     Can try donut if that's more comfortable for removing pressure while sitting, try to remove pressure as much as possible  -     If no improvement, consider referral to wound care clinic          Health Maintenance       Date Due Completion Date    Pneumococcal Vaccine (65+ High/Highest Risk) (2 of 2 - PPSV23) 02/05/2014 12/11/2013    Lipid Panel 05/10/2018 5/10/2017    Influenza Vaccine 08/01/2018 10/25/2016    Hemoglobin A1c 12/07/2018 6/7/2018    Foot Exam 10/23/2019 10/23/2018 (Done)    Override on 10/23/2018: Done    Override on 10/23/2018: Done    Override on 5/8/2018: Done    Override on 5/22/2017: Done    Override on 2/22/2017: Done    Override on 9/2/2015: Done    Eye Exam 12/26/2019 12/26/2018    Override on 12/26/2018: Done    Override on 8/14/2017: Done    TETANUS VACCINE 12/10/2022 12/10/2012    Colonoscopy 08/05/2025 8/5/2015              Follow-up: Follow-up if symptoms worsen or fail to improve.    The patient expressed understanding and no barriers to adherence were identified.     1. The patient indicates understanding of these issues and agrees with the plan. Brief care plan is updated and reviewed with the patient as applicable.     2. The patient is given an After Visit Summary that lists all medications with directions, allergies, orders placed during  this encounter and follow-up instructions.     3. I have reviewed the patient's medical information including past medical, family, and social history sections including the medications and allergies.     4. We discussed the patient's current medications. I reconciled the patient's medication list and prepared and supplied needed refills.     Jessica Cardoso, DNP, APRN, FNP-c  Family Medicine    My collaborating physicians are Dr. Sameer Borden and Dr. Megan Bruner

## 2019-01-24 NOTE — TELEPHONE ENCOUNTER
----- Message from Jailyn Jones sent at 1/24/2019  9:59 AM CST -----      ----- Message -----  From: Yazmin Linares NP  Sent: 1/17/2019   9:56 AM  To: [facility]

## 2019-01-28 PROBLEM — M25.512 ACUTE PAIN OF LEFT SHOULDER: Status: ACTIVE | Noted: 2019-01-01

## 2019-01-29 PROBLEM — C22.0 HCC (HEPATOCELLULAR CARCINOMA): Status: ACTIVE | Noted: 2019-01-01

## 2019-01-29 NOTE — H&P
Radiology History & Physical      SUBJECTIVE:     Chief Complaint: HCC    History of Present Illness:  Philip Mathis III is a 73 y.o. male who presents for Y90 mapping study.    Past Medical History:   Diagnosis Date    Anemia     Arthritis     Back pain     Bishop's esophagus     BPH (benign prostatic hypertrophy)     Chronic kidney disease     Cirrhosis     Diabetes mellitus     Diabetes mellitus, type 2     Diabetes with neurologic complications     Diabetic retinopathy     Elevated PSA     Heart failure     Hemolytic anemia     Hepatitis C     successfully treated with Harvoni    Hepatocellular carcinoma 1/2/2018    Hyperlipidemia     Hypertension     Hypertension     Immune disorder     Liver transplanted     Peripheral neuropathy     Sleep apnea     Trouble in sleeping     Type 2 diabetes mellitus with ophthalmic manifestations     Type 2 diabetes with peripheral circulatory disorder, controlled     Type II or unspecified type diabetes mellitus with renal manifestations, uncontrolled(250.42)      Past Surgical History:   Procedure Laterality Date    1) Creation of L arm basilic vein transposition (brachial artery-to-basilic vein AVF)  2) Ligation of L brachial AVF (which was 1st stage creation previously)    Left 6/15/2017    Performed by Silvio William MD at [facility] OR 2ND FLR    ABLATION, RADIOFREQUENCY N/A 2/27/2018    Performed by Esther Surgeon at [facility] ESTHER    GLILDQ-VIAPTC-UB N/A 12/21/2017    Performed by [facility] at [facility] OR 2ND FLR    broken nose      CATARACT EXTRACTION Bilateral     colonoscopy N/A 7/23/2014    Performed by Moises St MD at [facility] ENDO (4TH FLR)    [id number]-FW POSSIBLE AV GRAFT Left 11/8/2016    Performed by Silvio William MD at [facility] OR 2ND FLR    DEBRIDEMENT-WOUND Left 4/12/2015    Performed by Jimmy Light MD at [facility] OR 2ND FLR    DEBRIDEMENT-WOUND Left 4/11/2015    Performed by Jimmy Light MD at [facility] OR  2ND FLR    DEBRIDEMENT-WOUND Left 4/10/2015    Performed by [facility] at [facility] OR 2ND FLR    DEBRIDEMENT-WOUND Left 4/9/2015    Performed by Jimmy Light MD at [facility] OR 1ST FLR    DEBRIDEMENT-WOUND Left 4/6/2015    Performed by Jimmy Light MD at [facility] OR 2ND FLR    DEBRIDEMENT-WOUND Left 4/4/2015    Performed by Jimmy Light MD at [facility] OR 2ND FLR    DEBRIDEMENT-WOUND Left 4/2/2015    Performed by Jimmy Light MD at [facility] OR 1ST FLR    DRESSING CHANGE- Left 4/10/2015    Performed by Jimmy Light MD at [facility] OR 2ND FLR    DRESSING CHANGE-s/p burn debridement Left 4/15/2015    Performed by Jimmy Light MD at [facility] OR 2ND FLR    DRESSING CHANGE-s/p burn debridement Left 4/14/2015    Performed by Jimmy Light MD at [facility] OR 2ND FLR    DRESSING CHANGE-s/p wound debridement Left 4/13/2015    Performed by Jimmy Light MD at [facility] OR 1ST FLR    DRESSING CHANGE-wound vac Left 4/20/2015    Performed by Jimmy Light MD at [facility] OR 2ND FLR    EYE SURGERY      cataracts    FEMUR SURGERY      FRACTURE SURGERY      right femur fracture     INSERTION-CATHETER-PERM-A-CATH Right 7/13/2017    Performed by Silvio William MD at [facility] OR 2ND FLR    LIVER TRANSPLANT  2001    Open Biome Fecal Transplant N/A 8/5/2015    Performed by Elmo Gan MD at [facility] ENDO (4TH FLR)    PLACEMENT-WOUND VAC  4/15/2015    Performed by Jimmy Light MD at [facility] OR 2ND FLR    PLACEMENT-WOUND VAC Left 4/4/2015    Performed by Jimmy Light MD at [facility] OR 2ND FLR    PORTACATH PLACEMENT Left     SKIN GRAFT      graft from right thigh , applied to the left back and left arm.    SPLIT THICKNESS SKIN GRAFT - left trunk, abd and axilla N/A 4/15/2015    Performed by Jimmy Light MD at [facility] OR 2ND FLR       Home Meds:   Prior to Admission medications    Medication Sig Start Date End Date Taking? Authorizing Provider   allopurinol (ZYLOPRIM) 100 MG tablet TAKE 1 TABLET EVERY  DAY 7/17/18  Yes Donnie Owens NP   carvedilol (COREG) 6.25 MG tablet TK 1 T PO  BID 6/8/18  Yes Darlyn Campos MD   clonazePAM (KLONOPIN) 0.5 MG tablet Take 1 tablet (0.5 mg total) by mouth daily as needed. 2/19/18  Yes Danay Nath MD   diclofenac sodium (VOLTAREN) 1 % Gel Apply 2 g topically 3 (three) times daily. To the right knee. 4/16/18  Yes Philip Duron III, PA-C   doxazosin (CARDURA) 8 MG Tab TAKE 1 TABLET ONCE DAILY. 11/8/18  Yes Donnie Owens NP   fluticasone (FLONASE) 50 mcg/actuation nasal spray SHAKE LIQUID AND USE 2 SPRAYS(100 MCG) IN EACH NOSTRIL EVERY DAY 1/17/19  Yes Donnie Owens NP   furosemide (LASIX) 20 MG tablet Take 2 tablets (40 mg total) by mouth once daily. Only take lasix 40mg on non-dialysis days (Tuesday, Thursday, Saturday, and Sunday) by mouth. 12/14/18 12/14/19 Yes Danay Nath MD   GENERLAC 10 gram/15 mL solution TAKE 30 ML 'S BY MOUTH THREE TIMES DAILY 6/2/18  Yes Danay Nath MD   hydrALAZINE (APRESOLINE) 100 MG tablet Take 1 tablet (100 mg total) by mouth 3 (three) times daily. 5/30/18  Yes Jabari Elizabeth MD   hydrOXYzine HCl (ATARAX) 25 MG tablet TAKE 1 TABLET(25 MG) BY MOUTH THREE TIMES DAILY AS NEEDED FOR ITCHING 12/17/18  Yes Donnie Owens NP   insulin aspart (NOVOLOG) 100 unit/mL InPn pen Inject 6 units w/ breakfast, 4 units w/ lunch and dinner plus scale 180-230 +1, 231-280 +2, 281-330 +3, 331-380 +4, >380 +5. 90 day supply  Patient taking differently: Inject 8 units w/ breakfast, 6 units w/ lunch and dinner plus scale 180-230 +1, 231-280 +2, 281-330 +3, 331-380 +4, >380 +5. 90 day supply 5/17/17  Yes MILTON Álvarez FNP   insulin glargine (LANTUS SOLOSTAR) 100 unit/mL (3 mL) InPn pen Inject 8 Units into the skin every evening.  Patient taking differently: Inject 10 Units into the skin every evening.  5/17/17  Yes MILTON Álvarez FNP   methocarbamol (ROBAXIN) 500 MG Tab Take 1 tablet by mouth daily (every 24  hours) as needed for muscle spasm. 11/5/18  Yes Philip Duron III, PA-C   minoxidil (LONITEN) 2.5 MG tablet Take 2 tablets (5 mg total) by mouth 2 (two) times daily. 8/14/18 8/14/19 Yes Jabari Elizabeth MD   multivitamin capsule Take 1 capsule by mouth once daily.   Yes Historical Provider, MD   ondansetron (ZOFRAN-ODT) 4 MG TbDL DISSOLVE 1 TABLET(4 MG) ON THE TONGUE EVERY 12 HOURS AS NEEDED 12/31/18  Yes Donnie Owens NP   rifAXIMin (XIFAXAN) 550 mg Tab Take 1 tablet (550 mg total) by mouth 2 (two) times daily. 1/3/19  Yes Danay Nath MD   sevelamer carbonate (RENVELA) 800 mg Tab Take 1 tablet (800 mg total) by mouth 3 (three) times daily with meals. 9/28/17 1/29/19 Yes JAMIE Hightower   tacrolimus (PROGRAF) 0.5 MG Cap TAKE 1 CAPSULE EVERY DAY 2/18/18  Yes Danay Nath MD   UNABLE TO FIND 1 tablet once daily. Pro Renal Plus D Oral Tabs   Yes Historical Provider, MD   ACCU-CHEK JOANN PLUS TEST STRP Strp CHECK BLOOD SUGAR THREE TIMES DAILY 7/17/18   Donnie Owens NP   ammonium lactate (LAC-HYDRIN) 12 % lotion Apply topically as needed for Dry Skin. 10/4/17   Faye Larson DPM   aspirin 81 MG Chew Take 1 tablet (81 mg total) by mouth once daily. 7/20/17 12/14/18  Jodie Shearer MD   blood-glucose meter (ACCU-CHEK JOANN PLUS METER) Misc Use as directed 1/11/16   MILTON Carrillo,AMALIAP-C   cetirizine (ZYRTEC) 10 MG tablet Take 1 tablet (10 mg total) by mouth once daily. 8/15/18 8/15/19  Donnie Owens NP   econazole nitrate 1 % cream Apply topically 2 (two) times daily. 10/23/18 12/14/18  Claudette O. Slime, DPM   hydrOXYzine HCl (ATARAX) 25 MG tablet TAKE 1 TABLET(25 MG) BY MOUTH THREE TIMES DAILY AS NEEDED FOR ITCHING 1/3/19   Donnie Owens, SABRINA   ibuprofen (ADVIL,MOTRIN) 800 MG tablet Take 1 tablet (800 mg total) by mouth every 12 (twelve) hours as needed for Pain. Take with food. 10/29/18   Philip Duron III, PA-C   insulin needles, disposable, (NOVOFINE 32) 32 x 1/4  "" Ndle 1 each by Misc.(Non-Drug; Combo Route) route 3 (three) times daily. 1/6/15   Hu Grant II, MD   lancets (ACCU-CHEK SOFTCLIX LANCETS) Misc 1 lancet by Misc.(Non-Drug; Combo Route) route 4 (four) times daily. 1/11/16   MILTON Carrillo,FNP-C   mupirocin (BACTROBAN) 2 % ointment Apply topically 3 (three) times daily. 1/18/19   Jessica Cardoso DNP   oxyCODONE (ROXICODONE) 5 MG immediate release tablet Take 1 tablet (5 mg total) by mouth every 6 (six) hours as needed for Pain. 2/27/18   Anthony St MD   pantoprazole (PROTONIX) 40 MG tablet TAKE 1 TABLET EVERY DAY 5/12/18   Danay Nath MD   simethicone (MYLICON) 80 MG chewable tablet Take 1 tablet (80 mg total) by mouth every 6 (six) hours as needed for Flatulence (and bloating). 6/7/18   Donnie Owens NP   tiZANidine (ZANAFLEX) 4 MG tablet TAKE 1 TABLET BY MOUTH EVERY 6 HOURS AS NEEDED 11/21/18   Reji Castillo MD   tiZANidine (ZANAFLEX) 4 MG tablet TAKE 1 TABLET BY MOUTH EVERY 6 HOURS AS NEEDED 12/12/18   Reji Castillo MD   urea (CARMOL) 40 % Crea Apply topically once daily. 11/16/16   Faye Larson DPM   silver nitrate applicators 75-25 % applicator Apply topically 3 (three) times a week. Apply to bleeder on warty formation. 8/17/18 1/29/19  Donnie Owens NP     Anticoagulants/Antiplatelets: no anticoagulation    Allergies:   Review of patient's allergies indicates:   Allergen Reactions    Corticosteroids (glucocorticoids) Other (See Comments)     Leg swelling    Hydrocortisone      Leg swelling    Neuromuscular blockers, steroidal      Leg swelling    Ribavirin      Intolerance, arthralgias     Sedation History:  no adverse reactions    Review of Systems:   Hematological: no known coagulopathies  Respiratory: no shortness of breath  Cardiovascular: no chest pain  Gastrointestinal: no abdominal pain  Genito-Urinary: no dysuria  Musculoskeletal: negative  Neurological: no TIA or stroke symptoms       " "  OBJECTIVE:     Vital Signs (Most Recent)  Temp: 98.5 °F (36.9 °C) (01/29/19 1140)  Pulse: 66 (01/29/19 1140)  Resp: 18 (01/29/19 1140)  BP: (!) 152/70 (01/29/19 1140)  SpO2: 96 % (01/29/19 1140)    Physical Exam:  ASA: 2  Mallampati: 2    General: no acute distress  Mental Status: alert and oriented to person, place and time  HEENT: normocephalic, atraumatic  Chest: unlabored breathing  Heart: regular heart rate  Abdomen: nondistended  Extremity: moves all extremities    Laboratory  Lab Results   Component Value Date    INR 1.2 01/29/2019       Lab Results   Component Value Date    WBC 7.95 01/29/2019    HGB 10.4 (L) 01/29/2019    HCT 33.3 (L) 01/29/2019    MCV 93 01/29/2019     01/29/2019      Lab Results   Component Value Date     (H) 01/29/2019     01/29/2019    K 5.5 (H) 01/29/2019     01/29/2019    CO2 26 01/29/2019    BUN 34 (H) 01/29/2019    CREATININE 5.2 (H) 01/29/2019    CALCIUM 8.7 01/29/2019    MG 2.2 12/18/2018    ALT 13 01/29/2019    AST 22 01/29/2019    ALBUMIN 3.2 (L) 01/29/2019    BILITOT 0.6 01/29/2019    BILIDIR 0.4 (H) 01/29/2019       ASSESSMENT/PLAN:     Sedation Plan: Moderate  Patient will undergo Y90 mapping study.    Joni Hopkins MD ([place])  Radiology PGY-5  [phone]      "

## 2019-01-29 NOTE — DISCHARGE INSTRUCTIONS
For scheduling: Call Barbara at [phone]    For questions or concerns call: KEISHA MON-FRI 8 AM- 5PM [phone]. Radiology resident on call [phone].    For immediate concerns that are not emergent, you may call our radiology clinic at: [phone]

## 2019-01-29 NOTE — PROGRESS NOTES
Pt back from nuclear medicine.  NAD noted.  Groin site CDI. Wife at bedside.  Will continue to monitor.

## 2019-01-29 NOTE — PROGRESS NOTES
[facility] pre-yttrium.  NAD noted.  Report received from CHRISSIE Vang.  DSG to right groin CDI.  Will continue to monitor.

## 2019-01-30 NOTE — PROGRESS NOTES
Ok to discharge per Dr Marc. Discharge instructions reviewed with patient and family. Understanding verbalized. Dressing CDI. VSS. IV discontinued with cannula intact, dressing applied. Patient waiting on transport to [facility].

## 2019-01-31 NOTE — TELEPHONE ENCOUNTER
----- Message from Florinda Santos sent at 1/31/2019 12:31 PM CST -----  Contact: pt  Pt would like to be called back regarding his appt that he needs to r/s    Pt can be reached at [phone]

## 2019-02-05 NOTE — PROGRESS NOTES
Subjective:      Patient ID: Philip Mathis III is a 73 y.o. male.    Chief Complaint: Diabetes Mellitus (left foot swollen / fungus between 4th and 5tth toe PCp Dr. Castillo  seen Roman BENNETT 8/15/18); Diabetic Foot Exam; and Nail Care    Philip is a 73 y.o. male who presents to the clinic for evaluation and treatment of diabetic feet. Philip has a past medical history of Anemia, Arthritis, Back pain, Bishop's esophagus, BPH (benign prostatic hypertrophy), Chronic kidney disease, Cirrhosis, Diabetes mellitus, Diabetes mellitus, type 2, Diabetes with neurologic complications, Diabetic retinopathy, Elevated PSA, Heart failure, Hemolytic anemia, Hepatitis C, Hepatocellular carcinoma (1/2/2018), Hyperlipidemia, Hypertension, Hypertension, Immune disorder, Liver transplanted, Peripheral neuropathy, Sleep apnea, Trouble in sleeping, Type 2 diabetes mellitus with ophthalmic manifestations, Type 2 diabetes with peripheral circulatory disorder, controlled, and Type II or unspecified type diabetes mellitus with renal manifestations, uncontrolled(250.42). Patients chief complaint is left foot smelling with associated skin changes. He also complains of long thick toenails that are difficult for him to trim. Also has callusing to toes and routine trimming helps.      PCP: Reji Castillo MD    Date Last Seen by PCP:   Chief Complaint   Patient presents with    Diabetes Mellitus     left foot swollen / fungus between 4th and 5tth toe PCp Dr. Castillo  seen Roman BENNETT 8/15/18    Diabetic Foot Exam    Nail Care         Current shoe gear: Extra depth shoes with custome accommodative insoles    Hemoglobin A1C   Date Value Ref Range Status   11/20/2017 5.8 (H) 4.0 - 5.6 % Final     Comment:     According to ADA guidelines, hemoglobin A1c <7.0% represents  optimal control in non-pregnant diabetic patients. Different  metrics may apply to specific patient populations.   Standards of Medical Care in Diabetes-2016.  For the purpose  of screening for the presence of diabetes:  <5.7%     Consistent with the absence of diabetes  5.7-6.4%  Consistent with increasing risk for diabetes   (prediabetes)  >or=6.5%  Consistent with diabetes  Currently, no consensus exists for use of hemoglobin A1c  for diagnosis of diabetes for children.  This Hemoglobin A1c assay has significant interference with fetal   hemoglobin   (HbF). The results are invalid for patients with abnormal amounts of   HbF,   including those with known Hereditary Persistence   of Fetal Hemoglobin. Heterozygous hemoglobin variants (HbAS, HbAC,   HbAD, HbAE, HbA2) do not significantly interfere with this assay;   however, presence of multiple variants in a sample may impact the %   interference.     10/14/2017 5.7 (H) 4.0 - 5.6 % Final     Comment:     According to ADA guidelines, hemoglobin A1c <7.0% represents  optimal control in non-pregnant diabetic patients. Different  metrics may apply to specific patient populations.   Standards of Medical Care in Diabetes-2016.  For the purpose of screening for the presence of diabetes:  <5.7%     Consistent with the absence of diabetes  5.7-6.4%  Consistent with increasing risk for diabetes   (prediabetes)  >or=6.5%  Consistent with diabetes  Currently, no consensus exists for use of hemoglobin A1c  for diagnosis of diabetes for children.  This Hemoglobin A1c assay has significant interference with fetal   hemoglobin   (HbF). The results are invalid for patients with abnormal amounts of   HbF,   including those with known Hereditary Persistence   of Fetal Hemoglobin. Heterozygous hemoglobin variants (HbAS, HbAC,   HbAD, HbAE, HbA2) do not significantly interfere with this assay;   however, presence of multiple variants in a sample may impact the %   interference.     07/15/2017 5.3 4.0 - 5.6 % Final     Comment:     According to ADA guidelines, hemoglobin A1c <7.0% represents  optimal control in non-pregnant diabetic patients. Different  metrics  "may apply to specific patient populations.   Standards of Medical Care in Diabetes-2016.  For the purpose of screening for the presence of diabetes:  <5.7%     Consistent with the absence of diabetes  5.7-6.4%  Consistent with increasing risk for diabetes   (prediabetes)  >or=6.5%  Consistent with diabetes  Currently, no consensus exists for use of hemoglobin A1c  for diagnosis of diabetes for children.  This Hemoglobin A1c assay has significant interference with fetal   hemoglobin   (HbF). The results are invalid for patients with abnormal amounts of   HbF,   including those with known Hereditary Persistence   of Fetal Hemoglobin. Heterozygous hemoglobin variants (HbAS, HbAC,   HbAD, HbAE, HbA2) do not significantly interfere with this assay;   however, presence of multiple variants in a sample may impact the %   interference.             Review of Systems   Constitution: Negative for chills and fever.   Cardiovascular: Positive for leg swelling. Negative for chest pain and claudication.   Respiratory: Negative for cough and shortness of breath.    Skin: Positive for dry skin and nail changes.   Musculoskeletal: Positive for arthritis and stiffness.   Gastrointestinal: Negative for nausea and vomiting.   Neurological: Positive for paresthesias. Negative for numbness.   Psychiatric/Behavioral: Negative for altered mental status.           Objective:       Vitals:    02/04/19 1604   BP: (!) 140/50   Weight: 96.6 kg (213 lb)   Height: 6' 1" (1.854 m)   PainSc: 0-No pain       Physical Exam   Constitutional: He is oriented to person, place, and time. He appears well-developed and well-nourished.   HENT:   Head: Normocephalic.   Cardiovascular: Intact distal pulses.   Pulses:       Dorsalis pedis pulses are 1+ on the right side, and 1+ on the left side.        Posterior tibial pulses are 1+ on the right side, and 1+ on the left side.   1+ pitting edema b/l LEs with varicosities  present. The skin of both feet and ankles " is thin, shiny, atrophic and cool to touch.    Pulmonary/Chest: No respiratory distress.   Musculoskeletal:   Gastrocnemius equinus noted to b/l ankles with decreased DF noted on exam. MMT 5/5 in DF/PF/Inv/Ev resistance with no reproduction of pain in any direction. Passive range of motion of ankle and pedal joints is painless. Adequate pedal joint ROM.     Prominent midfoot left plantar aspect at TN joint with palpable bone. Rocker bottom foot type left. Semi-reducible hammertoe contractures noted to toes 1-4 b/l-asymptomatic. HAV, mild, non trackbound noted b/l with mild medial bony prominence at 1st met head--asymptomatic.      Neurological: He is alert and oriented to person, place, and time. He has normal strength. A sensory deficit is present.   Light touch, proprioception, and sharp/dull sensation are all intact bilaterally. Protective threshold with the Phoenix-Wienstein monofilament is decreased bilaterally. Vibratory sensation diminished b/l distal foot.    Skin: Skin is warm, dry and intact. Rash noted. No bruising, no ecchymosis and no lesion noted. He is not diaphoretic. There is erythema. No pallor.   Nails are thickened, elongated, discolored yellow/brown with subungual debris and brittleness to R 1-5 and L 1-5. Interdigital spaces clean, dry and intact b/l. Pedal hair absent.     Skin of forefoot is cool to touch with proximal warmth.   Moderate hyperkeratosis noted to plantar medial midfoot at bony prominence, left.     Mild hyperpigmentation to skin of both ankles consistent with hemosiderin deposits.     Mild maceration to skin between toes 4/5 bilateral. No drainage or open wounds. No SOI.     Scaling dryness in a moccasin distribution is noted to the bilateral lower extremities with associated erythema.       Psychiatric: He has a normal mood and affect. His behavior is normal. Judgment and thought content normal.   Vitals reviewed.          Assessment:       Encounter Diagnoses   Name Primary?     Type II diabetes mellitus with neurological manifestations Yes    Bilateral lower extremity edema     Onychomycosis due to dermatophyte     Maceration of skin - Right Foot     Corn or callus     Hammer toes of both feet     Pes planus of both feet     Hallux malleus of left foot     Hallux malleus of right foot     Rocker bottom foot, acquired, left          Plan:       Philip was seen today for diabetes mellitus, diabetic foot exam and nail care.    Diagnoses and all orders for this visit:    Type II diabetes mellitus with neurological manifestations    Bilateral lower extremity edema    Onychomycosis due to dermatophyte    Maceration of skin - Right Foot    Corn or callus    Hammer toes of both feet    Pes planus of both feet    Hallux malleus of left foot    Hallux malleus of right foot    Rocker bottom foot, acquired, left      I counseled the patient on his conditions, their implications and medical management.      - Shoe inspection. Diabetic Foot Education. Patient reminded of the importance of good nutrition and blood sugar control to help prevent podiatric complications of diabetes. Patient instructed on proper foot hygeine. We discussed wearing proper shoe gear, daily foot inspections, never walking without protective shoe gear.      With patient's permission, nails were aggressively reduced and debrided x 10 to their soft tissue attachment mechanically and with electric , removing all offending nail and debris. Patient relates relief following the procedure.     The affected area was cleansed with an alcohol prep pad. Next, utilizing a No.15 scalpel, the hyperkeratotic tissues were trimmed from plantar medial midfoot left, down to appropriate level of skin. Care was taken to remove any nucleated core from the center of the lesion. No pinpoint bleeding was encountered. The patient tolerated relief following this procedure. (1 lesion total).     Long discussion with patient regarding  appropriate, supportive and comfortable shoes. Recommended DM shoes with adequate arch supports to alleviate abnormal pressure and improve stability of foot while walking. Avoid flat shoes and barefoot walking as these will exacerbate or worsen symptoms. Continue with Diabetic shoes with custom inserts.     Discussed continued use of regular and routine moisturizer to skin of both feet to help improve dry skin. Advised to apply twice daily until resolution of symptoms. Avoid between toes. Gentian violet applied interdigitally    Instructed patient on the importance of keeping feet dry. Patient instructed to use absorbent cotton socks and change them if they become sweaty; or wear an open-toe shoe or sandal. Wash the feet at least once a day with soap and water. Apply the antifungal gel as prescribed. Instructed patient that it takes time for symptoms to completely dissipate. Patient instructed to use lysol or over-the-counter antifungal powders or sprays to shoes daily and allow them to air dry, switching shoes from every other day would be optimal. Patient is to avoid barefoot walking in  high-risk environments (public showers, gyms and locker rooms) may prevent future infections.     Patient to RTC if:  Increasing redness or swelling of the foot   Pus draining from cracks in the skin   Fever of 100.4ºF (38ºC) or higher    He will continue to monitor the areas daily, inspect his feet, wear protective shoe gear when ambulatory, moisturizer to maintain skin integrity and follow in this office in approximately 2-3 months, sooner p.r.n.

## 2019-02-07 NOTE — LETTER
February 7, 2019    Del Mathis  [address] [place] [id number]          Dear Del Mathis:  MRN: [id number]    This is a follow up to your recent labs, your lab results were stable.  There are no medicine changes.  Please have your labs drawn again on 3/4/19.      If you cannot have your labs drawn on the scheduled date, it is your responsibility to call the transplant department to reschedule.  To reschedule or make an appointment, please as to speak to or leave a message for my assistant, Estefany Yang, at [phone].  When leaving a message for Celia Allison, or myself, we ask that you leave a brief message regarding your request.    Sincerely,      Michell Rowe, RN, BSN, CCTC  [facility] Coordinator    [facility]  [address] [place] [id number]  [phone]

## 2019-02-07 NOTE — TELEPHONE ENCOUNTER
----- Message from Danay Nath MD sent at 2/7/2019  5:43 AM CST -----  Please inform patient results are OK. Continue routine labs.

## 2019-02-11 NOTE — PROGRESS NOTES
CC: hepatocellular carcinoma, followup        HPI:  is a 74y/o male with HCC, here for follow up visit . He has multiple medcial problems: h/o orthotopic liver transplant in 2001, ESRD on hemodialysis, type 2 diabetes mellitus , on long term insulin, h/o hepatitis C.  He had CT in Nov 2017 which showed a liver mass. It was biopsied and it showed HCC.  He was initially treated with RFA on 2/27/18. CTs in April and June 2018 showed response to RFA, but a possible new lesion.   CT done in December 2018 showed enlarging liver lesion, now measuring 4.5 cm compared to previous 1.9 cm.    He has lost about 20 lbs in the past 3 months. His appetite is poor. He has noted increasing abdominal girth in the last 3-4 months. He has no pain at this time.    Interval history: He had PET CT on 1/26/19.  There was a large, abnormal region of uptake involving the left and right lobes of the liver concerning for primary tumor. There was also a focus of uptake adjacent to the left humeral head.  No bony abnormality was visualized on CT. He then had MRI left shoulder. No clear evidence of metastases was identified. He had mappijng done on 1/29/19 for planned Y 90 therapy.       Review of Systems   Constitutional: Positive for malaise/fatigue. Negative for chills, fever and weight loss.   HENT: Negative for ear discharge, hearing loss and tinnitus.    Eyes: Negative for blurred vision, double vision and photophobia.   Respiratory: Negative for cough, hemoptysis and sputum production.    Cardiovascular: Negative for chest pain, palpitations, orthopnea and claudication.   Gastrointestinal: Negative for abdominal pain, heartburn, nausea and vomiting.   Genitourinary: Negative for dysuria, frequency and urgency.   Musculoskeletal: Negative for myalgias and neck pain.   Skin: Negative for itching and rash.   Neurological: Negative for dizziness, tingling, tremors, sensory change and headaches.   Endo/Heme/Allergies: Does not  bruise/bleed easily.   Psychiatric/Behavioral: Negative for depression.       Current Outpatient Medications   Medication Sig    ACCU-CHEK JOANN PLUS TEST STRP Strp CHECK BLOOD SUGAR THREE TIMES DAILY    allopurinol (ZYLOPRIM) 100 MG tablet TAKE 1 TABLET EVERY DAY    ammonium lactate (LAC-HYDRIN) 12 % lotion Apply topically as needed for Dry Skin.    aspirin 81 MG Chew Take 1 tablet (81 mg total) by mouth once daily.    blood-glucose meter (ACCU-CHEK JOANN PLUS METER) Misc Use as directed    carvedilol (COREG) 6.25 MG tablet TK 1 T PO  BID    cetirizine (ZYRTEC) 10 MG tablet Take 1 tablet (10 mg total) by mouth once daily.    clonazePAM (KLONOPIN) 0.5 MG tablet Take 1 tablet (0.5 mg total) by mouth daily as needed.    diclofenac sodium (VOLTAREN) 1 % Gel Apply 2 g topically 3 (three) times daily. To the right knee.    doxazosin (CARDURA) 8 MG Tab TAKE 1 TABLET ONCE DAILY.    econazole nitrate 1 % cream Apply topically 2 (two) times daily.    fluticasone (FLONASE) 50 mcg/actuation nasal spray SHAKE LIQUID AND USE 2 SPRAYS(100 MCG) IN EACH NOSTRIL EVERY DAY    furosemide (LASIX) 20 MG tablet Take 2 tablets (40 mg total) by mouth once daily. Only take lasix 40mg on non-dialysis days (Tuesday, Thursday, Saturday, and Sunday) by mouth.    GENERLAC 10 gram/15 mL solution TAKE 30 ML 'S BY MOUTH THREE TIMES DAILY    hydrALAZINE (APRESOLINE) 100 MG tablet Take 1 tablet (100 mg total) by mouth 3 (three) times daily.    hydrOXYzine HCl (ATARAX) 25 MG tablet TAKE 1 TABLET(25 MG) BY MOUTH THREE TIMES DAILY AS NEEDED FOR ITCHING    hydrOXYzine HCl (ATARAX) 25 MG tablet TAKE 1 TABLET(25 MG) BY MOUTH THREE TIMES DAILY AS NEEDED FOR ITCHING    ibuprofen (ADVIL,MOTRIN) 800 MG tablet Take 1 tablet (800 mg total) by mouth every 12 (twelve) hours as needed for Pain. Take with food.    insulin aspart (NOVOLOG) 100 unit/mL InPn pen Inject 6 units w/ breakfast, 4 units w/ lunch and dinner plus scale 180-230 +1, 231-280  "+2, 281-330 +3, 331-380 +4, >380 +5. 90 day supply (Patient taking differently: Inject 8 units w/ breakfast, 6 units w/ lunch and dinner plus scale 180-230 +1, 231-280 +2, 281-330 +3, 331-380 +4, >380 +5. 90 day supply)    insulin glargine (LANTUS SOLOSTAR) 100 unit/mL (3 mL) InPn pen Inject 8 Units into the skin every evening. (Patient taking differently: Inject 10 Units into the skin every evening. )    insulin needles, disposable, (NOVOFINE 32) 32 x 1/4 " Ndle 1 each by Misc.(Non-Drug; Combo Route) route 3 (three) times daily.    lancets (ACCU-CHEK SOFTCLIX LANCETS) Misc 1 lancet by Misc.(Non-Drug; Combo Route) route 4 (four) times daily.    methocarbamol (ROBAXIN) 500 MG Tab Take 1 tablet by mouth daily (every 24 hours) as needed for muscle spasm.    minoxidil (LONITEN) 2.5 MG tablet Take 2 tablets (5 mg total) by mouth 2 (two) times daily.    multivitamin capsule Take 1 capsule by mouth once daily.    mupirocin (BACTROBAN) 2 % ointment Apply topically 3 (three) times daily.    ondansetron (ZOFRAN-ODT) 4 MG TbDL DISSOLVE 1 TABLET(4 MG) ON THE TONGUE EVERY 12 HOURS AS NEEDED    oxyCODONE (ROXICODONE) 5 MG immediate release tablet Take 1 tablet (5 mg total) by mouth every 6 (six) hours as needed for Pain.    pantoprazole (PROTONIX) 40 MG tablet TAKE 1 TABLET EVERY DAY    rifAXIMin (XIFAXAN) 550 mg Tab Take 1 tablet (550 mg total) by mouth 2 (two) times daily.    sevelamer carbonate (RENVELA) 800 mg Tab Take 1 tablet (800 mg total) by mouth 3 (three) times daily with meals.    simethicone (MYLICON) 80 MG chewable tablet Take 1 tablet (80 mg total) by mouth every 6 (six) hours as needed for Flatulence (and bloating).    tacrolimus (PROGRAF) 0.5 MG Cap TAKE 1 CAPSULE EVERY DAY    tiZANidine (ZANAFLEX) 4 MG tablet TAKE 1 TABLET BY MOUTH EVERY 6 HOURS AS NEEDED    tiZANidine (ZANAFLEX) 4 MG tablet TAKE 1 TABLET BY MOUTH EVERY 6 HOURS AS NEEDED    UNABLE TO FIND 1 tablet once daily. Pro Renal Plus D " Oral Tabs    urea (CARMOL) 40 % Crea Apply topically once daily.     No current facility-administered medications for this visit.      Vitals:    02/11/19 1414   BP: 138/63   Pulse: 75   Resp: 18   Temp: 100 °F (37.8 °C)       Physical Exam   Constitutional: He is oriented to person, place, and time. He appears well-developed.   HENT:   Head: Normocephalic and atraumatic.   Mouth/Throat: No oropharyngeal exudate.   Eyes: Pupils are equal, round, and reactive to light. No scleral icterus.   Neck: Normal range of motion.   Cardiovascular: Normal rate and regular rhythm.   No murmur heard.  Pulmonary/Chest: Effort normal and breath sounds normal. No respiratory distress. He has no wheezes. He has no rales.   Abdominal: Soft. He exhibits no distension. There is no tenderness.   Musculoskeletal: He exhibits no edema.   Lymphadenopathy:     He has no cervical adenopathy.   Neurological: He is alert and oriented to person, place, and time. No cranial nerve deficit.   Skin: Skin is warm.   Psychiatric: He has a normal mood and affect.       12/21/17 FINAL PATHOLOGIC DIAGNOSIS  POSITIVE FOR CARCINOMA CONSISTENT WITH HEPATOCELLULAR CARCINOMA     12/19/18 CT abdomen             COMPARISON:  CT abdomen without contrast 12/14/2018; CT abdomen/pelvis with contrast 10/25/2018; posttransplant liver ultrasound 10/02/2018    FINDINGS:  ABDOMEN/LOWER THORAX:    - Visualized heart: Mild cardiomegaly with moderate-sized pericardial effusion.  There is aortic valve calcification.  There is coronary artery atherosclerotic calcification.    - Lung bases/pleura: Trace bilateral pleural effusions (right greater than left) with mild associated compressive and dependent atelectasis.  No mass or pneumothorax.    - Liver: Postoperative change orthotopic liver transplant and prior microwave ablation in the posteromedial hepatic segment VIII.  There has been increase in size of a heterogeneously hypoattenuating lesion in the region of the  ablation measuring 4.5 cm (previously 1.9 cm).  There are multiple smaller satellite lesions in hepatic segments VII and VIII.  There is a subtle stable appearing 1.1 cm focus of enhancement without definite washout in hepatic segment V. 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  There is splenorenal shunting and enlarged upper abdominal collateral veins.    - Gallbladder: Surgically absent.    - Bile Ducts: No evidence of intra or extra hepatic biliary ductal dilation.    - Spleen: Enlarged measuring 14.1 cm.  No focal lesion.    - Kidneys: Bilateral renal atrophy consistent with chronic medical renal disease.  There are multiple small focal hypodensities bilaterally some of which are consistent with cysts and others are too small to definitively characterize.  Ureters are normal.  Limited bladder evaluation secondary to nondistention.    - Adrenals: Nonspecific mild left adrenal thickening.  Right adrenal gland is normal.    - Pancreas: No mass or peripancreatic fat stranding.    - Retroperitoneum:  Stable 1.0 cm right periaortic lymph node.  Additional multiple normal sized lymph nodes.    - Vascular: No abdominal aortic aneurysm.  Infrarenal abdominal aorta measures at the upper limits of normal.  The bilateral iliac arteries are tortuous.  There is ectasia of the right common iliac artery and mild fusiform aneurysmal dilatation of the left common iliac artery measuring up to 2.0 cm.    - Abdominal wall: Mild bilateral gynecomastia.    PELVIS:    There is a small amount of dependent pelvic free fluid.  No pelvic mass, or adenopathy.    GI/MESENTERY/PERITONEUM:    Mild gastric wall thickening which may relate to nondistention or portal gastropathy.  There is no evidence of bowel obstruction or inflammation.  Trace scattered abdominal free fluid.  No free air.  There are multiple normal sized mesenteric lymph nodes.    BONES: No acute fracture or aggressive osseous lesions. There is grade 1  anterolisthesis of L5 on S1 with associated bilateral pars interarticularis defects.  There is degenerative joint disease.       Impression         Enlarging heterogeneous lesion in hepatic segment VIII at the site of prior microwave ablation with smaller satellite lesions concerning for recurrent disease.  Consider MRI with DWI or direct tissue sampling for further evaluation.    Chronic portal vein thrombosis with sequela of portal hypertension such as splenomegaly, trace scattered ascites, and enlarged venous collateral vessels.    Mild cardiomegaly with moderate-sized pericardial effusion.    Bilateral chronic medical renal disease.    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    Additional findings as above.      Component      Latest Ref Rng & Units 2/5/2019   WBC      3.90 - 12.70 K/uL 8.86   RBC      4.60 - 6.20 M/uL 3.54 (L)   Hemoglobin      14.0 - 18.0 g/dL 10.0 (L)   Hematocrit      40.0 - 54.0 % 32.2 (L)   MCV      82 - 98 fL 91   MCH      27.0 - 31.0 pg 28.2   MCHC      32.0 - 36.0 g/dL 31.1 (L)   RDW      11.5 - 14.5 % 15.3 (H)   Platelets      150 - 350 K/uL 177   MPV      9.2 - 12.9 fL 11.5   Immature Granulocytes      0.0 - 0.5 % 0.3   Gran # (ANC)      1.8 - 7.7 K/uL 6.2   Immature Grans (Abs)      0.00 - 0.04 K/uL 0.03   Lymph #      1.0 - 4.8 K/uL 0.8 (L)   Mono #      0.3 - 1.0 K/uL 1.1 (H)   Eos #      0.0 - 0.5 K/uL 0.6 (H)   Baso #      0.00 - 0.20 K/uL 0.11   nRBC      0 /100 WBC 0   Gran%      38.0 - 73.0 % 70.3   Lymph%      18.0 - 48.0 % 9.0 (L)   Mono%      4.0 - 15.0 % 12.8   Eosinophil%      0.0 - 8.0 % 6.4   Basophil%      0.0 - 1.9 % 1.2   Differential Method       Automated   Sodium      136 - 145 mmol/L 137   Potassium      3.5 - 5.1 mmol/L 4.6   Chloride      95 - 110 mmol/L 104   CO2      23 - 29 mmol/L 25   Glucose      70 - 110 mg/dL 67 (L)   BUN, Bld      8 - 23 mg/dL 26 (H)   Creatinine       0.5 - 1.4 mg/dL 4.9 (H)   Calcium      8.7 - 10.5 mg/dL 8.5 (L)   Total Protein      6.0 - 8.4 g/dL 7.7   Albumin      3.5 - 5.2 g/dL 3.0 (L)   Total Bilirubin      0.1 - 1.0 mg/dL 0.8   Alkaline Phosphatase      55 - 135 U/L 118   AST      10 - 40 U/L 25   ALT      10 - 44 U/L 14   Anion Gap      8 - 16 mmol/L 8   eGFR if African American      >60 mL/min/1.73 m:2 12.5 (A)   eGFR if non African American      >60 mL/min/1.73 m:2 10.8 (A)   Bilirubin, Direct      0.1 - 0.3 mg/dL 0.4 (H)   Magnesium      1.6 - 2.6 mg/dL 2.2     Child class B (score 7)    1/26/19 PET CT    FINDINGS:  Quality of the study: Adequate.    There is a lobulated, large region of abnormal uptake seen within segments 5, 6, 7, 8 and 4 of the liver with max SUV of 11.9.    There is a focus of abnormal uptake adjacent to the left humeral head showing a max SUV of 5.5.  Transmission CT images do not show any evidence of bony destruction or abnormality in this region.    No other abnormal regions of uptake are identified.    There is cardiomegaly.  There is a small amount of abdominal ascites.      Impression       In this patient with a history of liver cancer there is a large, abnormal region of uptake involving the left and right lobes of the liver concerning for primary tumor.    There is a focus of uptake adjacent to the left humeral head.  No bony abnormality is visualized on CT however this focus is more focal and intense than what would be expected for normal muscular activity.  MRI of the region may be beneficial for further evaluation.       1/31/19 MRI left shoulder    COMPARISON:  CT-PET 01/26/2019    FINDINGS:  The biceps tendon is intact.  The fluid in the biceps tendon sheath.    The supraspinatus, infraspinatus, subscapularis, and teres minor are intact.  There is mild muscle volume loss.  No fatty atrophy.  There is mild increased signal/thickening at the anterior fibers of the supraspinatus tendon, suggestive of  tendinosis..    There is generalized cartilage thinning.  There is irregularity with increased signal in the posterior inferior labrum, consistent with labral tear, noting limited evaluation without intra-articular contrast.  There is development of paralabral cysts, largest measuring 2.5 cm extending inferiorly.    There is moderate AC joint arthropathy with mild undersurface spurring.  No fracture.  No marrow replacement process.      Impression       No clear etiology for focus of activity by the left humeral head noted on the 01/26/2019 CT PET exam.    Prominent fluid in the biceps tendon sheath, probable tenosynovitis.    Torn labrum with development of paralabral cysts (largest 2.5 cm).           Assessment:     1. Hepatocellular carcinoma  2. H/o hepatitis C, treated with Harvoni  3. S/p orthotopic liver transplant  4. type 2 diabetes mellitus on long term insulin  5. ESRD on hemodialysis  6. Normocytic anemia  7. Absolute monocytosis        Plan:     1. He has been treated with RFA in Feb 2018. Now he has enlarging lesion at the previously treated site. He had PET CT on 1/26/19.  There was a large, abnormal region of uptake involving the left and right lobes of the liver concerning for primary tumor. There was also a focus of uptake adjacent to the left humeral head.  No bony abnormality was visualized on CT. He then had MRI left shoulder. No clear evidence of metastases was identified. He had mappijng done on 1/29/19 for planned Y 90 therapy.   It is unclear if reduction in  Immunosuppression has any established role in management of HCC in post transplant setting.   Role of concurrent systemic therapy with liver directed therapy is under investigation and is NOT a current standard.   He will follow here after repeat imaging post Y90 therapy.         2. He has been treated with Harvoni     3. Followed by liver transplant team here. On prograf      4. He uses insulin daily     5. He is on HD three times  weekly    6. Secondary to ESRD and/or anemia in chronic disease ( HCC)     7. Mild,asymptomatic           Distress Screening Results: Psychosocial Distress screening score of Distress Score: 0 noted and reviewed. No intervention indicated.

## 2019-02-18 NOTE — PROGRESS NOTES
Patient Name: Philip Mathis III    : 1945  MRN: [id number]    Subjective:  Philip is a 73 y.o. male who presents today for:    Chief Complaint   Patient presents with    discuss walker    Fatigue    Flu Vaccine       HPI  Patient has multiple medical diagnoses as listed below in the history. He complains of chronic fatigue due to his ongoing dialysis therapy. He reports the fatigue has caused him to have several falls over the last few months. He reports being very active with his green house hobby and spends a lot time outside working in his yard despite the fatigue. He often climbs up make shift platforms to reach plants, lifts and bends. He says that he occasionally loses his balance. He reports baseline fatigue, but says it has increased over the months. He also reports that his nephrologist is treating his fatigue with iron supplementation and other supplementations that he was unsure. He is here today requesting a rollater walker for use during and after dialysis sessions. This is a request from the dialysis clinic. His ambulation is slow and slightly unsteady today, he uses the walls and rails for support. He came to the visit directly after dialysis. He denies any acute issues today. He denies dyspnea, chest, LE edema, dizziness, lightheadedness or syncope.     Past Medical History  Past Medical History:   Diagnosis Date    Anemia     Arthritis     Back pain     Bishop's esophagus     BPH (benign prostatic hypertrophy)     Chronic kidney disease     Cirrhosis     Diabetes mellitus     Diabetes mellitus, type 2     Diabetes with neurologic complications     Diabetic retinopathy     Elevated PSA     Heart failure     Hemolytic anemia     Hepatitis C     successfully treated with Harvoni    Hepatocellular carcinoma 2018    Hyperlipidemia     Hypertension     Hypertension     Immune disorder     Liver transplanted     Peripheral neuropathy     Sleep apnea     Trouble in  sleeping     Type 2 diabetes mellitus with ophthalmic manifestations     Type 2 diabetes with peripheral circulatory disorder, controlled     Type II or unspecified type diabetes mellitus with renal manifestations, uncontrolled(250.42)        Past Surgical History  Past Surgical History:   Procedure Laterality Date    1) Creation of L arm basilic vein transposition (brachial artery-to-basilic vein AVF)  2) Ligation of L brachial AVF (which was 1st stage creation previously)    Left 6/15/2017    Performed by Silvio William MD at [facility] OR 2ND FLR    ABLATION, RADIOFREQUENCY N/A 2/27/2018    Performed by Esther Surgeon at [facility] ESTHER    [id number] N/A 12/21/2017    Performed by [facility] at [facility] OR 2ND [facility]    broken nose      CATARACT EXTRACTION Bilateral     colonoscopy N/A 7/23/2014    Performed by Moises St MD at [facility] ENDO (4TH FLR)    [id number] POSSIBLE AV GRAFT Left 11/8/2016    Performed by Silvio William MD at [facility] OR 2ND FLR    DEBRIDEMENT-WOUND Left 4/12/2015    Performed by Jimmy Light MD at [facility] OR 2ND FLR    DEBRIDEMENT-WOUND Left 4/11/2015    Performed by Jimmy Light MD at [facility] OR 2ND FLR    DEBRIDEMENT-WOUND Left 4/10/2015    Performed by Jimmy Light MD at [facility] OR 2ND FLR    DEBRIDEMENT-WOUND Left 4/9/2015    Performed by Jimmy Light MD at [facility] OR 1ST FLR    DEBRIDEMENT-WOUND Left 4/6/2015    Performed by Jimmy iLght MD at [facility] OR 2ND FLR    DEBRIDEMENT-WOUND Left 4/4/2015    Performed by Jimmy iLght MD at [facility] OR 2ND FLR    DEBRIDEMENT-WOUND Left 4/2/2015    Performed by Jimmy Light MD at [facility] OR 1ST FLR    DRESSING CHANGE- Left 4/10/2015    Performed by Jimmy Light MD at [facility] OR 2ND FLR    DRESSING CHANGE-s/p burn debridement Left 4/15/2015    Performed by Jimmy Light MD at [facility] OR 2ND FLR    DRESSING CHANGE-s/p burn debridement Left 4/14/2015    Performed by Jimmy Light MD at  [facility] OR 2ND FLR    DRESSING CHANGE-s/p wound debridement Left 4/13/2015    Performed by Jimmy Light MD at [facility] OR 1ST FLR    DRESSING CHANGE-wound vac Left 4/20/2015    Performed by Jimmy Light MD at [facility] OR 2ND FLR    EMBOLIZATION, YTTRIUM THERAPY N/A 1/29/2019    Performed by [facility] Diagnostic Provider at [facility] OR 2ND [facility]    EYE SURGERY      cataracts    FEMUR SURGERY      FRACTURE SURGERY      right femur fracture     INSERTION-CATHETER-PERM-A-CATH Right 7/13/2017    Performed by Silvio William MD at [facility] OR 2ND FLR    LIVER TRANSPLANT  2001    Open Biome Fecal Transplant N/A 8/5/2015    Performed by Elmo Gan MD at [facility] ENDO (4TH FLR)    PLACEMENT-WOUND VAC  4/15/2015    Performed by Jimmy Light MD at [facility] OR 2ND FLR    PLACEMENT-WOUND VAC Left 4/4/2015    Performed by Jimmy Light MD at [facility] OR 2ND FLR    PORTACATH PLACEMENT Left     SKIN GRAFT      graft from right thigh , applied to the left back and left arm.    SPLIT THICKNESS SKIN GRAFT - left trunk, abd and axilla N/A 4/15/2015    Performed by Jimmy Light MD at [facility] OR 2ND FLR       Family History  Family History   Problem Relation Age of Onset    Cancer Mother         cancer 55 years ago    Coronary artery disease Father     Hypertension Father     Diabetes Father     Heart disease Father     Cancer Sister         breast CA    Colon cancer Neg Hx        Social History  Social History     Socioeconomic History    Marital status:      Spouse name: Not on file    Number of children: Not on file    Years of education: Not on file    Highest education level: Not on file   Social Needs    Financial resource strain: Not on file    Food insecurity - worry: Not on file    Food insecurity - inability: Not on file    Transportation needs - medical: Not on file    Transportation needs - non-medical: Not on file   Occupational History    Not on file   Tobacco Use    Smoking status: Former Smoker      Last attempt to quit: 1998     Years since quittin.5    Smokeless tobacco: Never Used    Tobacco comment: pt reports quitting in    Substance and Sexual Activity    Alcohol use: No     Comment: pt reports hx of alcholism and reports quit in     Drug use: Yes     Frequency: 2.0 times per week     Types: Marijuana    Sexual activity: Yes     Partners: Female   Other Topics Concern    Not on file   Social History Narrative    Not on file       Current Medications  Current Outpatient Medications on File Prior to Visit   Medication Sig Dispense Refill    ACCU-CHEK JOANN PLUS TEST STRP Strp CHECK BLOOD SUGAR THREE TIMES DAILY 300 strip 11    allopurinol (ZYLOPRIM) 100 MG tablet TAKE 1 TABLET EVERY DAY 90 tablet 3    ammonium lactate (LAC-HYDRIN) 12 % lotion Apply topically as needed for Dry Skin. 225 g 6    aspirin 81 MG Chew Take 1 tablet (81 mg total) by mouth once daily. 90 tablet 3    blood-glucose meter (ACCU-CHEK JOANN PLUS METER) Misc Use as directed 1 each 0    carvedilol (COREG) 6.25 MG tablet TK 1 T PO  BID  3    cetirizine (ZYRTEC) 10 MG tablet Take 1 tablet (10 mg total) by mouth once daily. 90 tablet 3    clonazePAM (KLONOPIN) 0.5 MG tablet Take 1 tablet (0.5 mg total) by mouth daily as needed. 90 tablet 3    diclofenac sodium (VOLTAREN) 1 % Gel Apply 2 g topically 3 (three) times daily. To the right knee. 1 Tube 0    doxazosin (CARDURA) 8 MG Tab TAKE 1 TABLET ONCE DAILY. 90 tablet 4    econazole nitrate 1 % cream Apply topically 2 (two) times daily. 85 g 1    fluticasone (FLONASE) 50 mcg/actuation nasal spray SHAKE LIQUID AND USE 2 SPRAYS(100 MCG) IN EACH NOSTRIL EVERY DAY 16 mL 0    furosemide (LASIX) 20 MG tablet Take 2 tablets (40 mg total) by mouth once daily. Only take lasix 40mg on non-dialysis days (Tuesday, Thursday, Saturday, and ) by mouth. 16 tablet 11    GENERLAC 10 gram/15 mL solution TAKE 30 ML 'S BY MOUTH THREE TIMES DAILY 2700 mL 5     "hydrALAZINE (APRESOLINE) 100 MG tablet Take 1 tablet (100 mg total) by mouth 3 (three) times daily. 270 tablet 3    hydrocortisone 2.5 % cream   1    hydrOXYzine HCl (ATARAX) 25 MG tablet TAKE 1 TABLET(25 MG) BY MOUTH THREE TIMES DAILY AS NEEDED FOR ITCHING 90 tablet 0    hydrOXYzine HCl (ATARAX) 25 MG tablet TAKE 1 TABLET(25 MG) BY MOUTH THREE TIMES DAILY AS NEEDED FOR ITCHING 90 tablet 2    ibuprofen (ADVIL,MOTRIN) 800 MG tablet Take 1 tablet (800 mg total) by mouth every 12 (twelve) hours as needed for Pain. Take with food. 30 tablet 0    insulin aspart (NOVOLOG) 100 unit/mL InPn pen Inject 6 units w/ breakfast, 4 units w/ lunch and dinner plus scale 180-230 +1, 231-280 +2, 281-330 +3, 331-380 +4, >380 +5. 90 day supply (Patient taking differently: Inject 8 units w/ breakfast, 6 units w/ lunch and dinner plus scale 180-230 +1, 231-280 +2, 281-330 +3, 331-380 +4, >380 +5. 90 day supply) 3 Box 3    insulin glargine (LANTUS SOLOSTAR) 100 unit/mL (3 mL) InPn pen Inject 8 Units into the skin every evening. (Patient taking differently: Inject 10 Units into the skin every evening. ) 2 Box 6    insulin needles, disposable, (NOVOFINE 32) 32 x 1/4 " Ndle 1 each by Misc.(Non-Drug; Combo Route) route 3 (three) times daily. 100 each 5    ketoconazole (NIZORAL) 2 % cream APPLY TOPICALLY BID. MIX WITH HYDROCORTISONE CREAM  1    lancets (ACCU-CHEK SOFTCLIX LANCETS) Misc 1 lancet by Misc.(Non-Drug; Combo Route) route 4 (four) times daily. 360 each 3    magnesium oxide (MAG-OX) 400 mg (241.3 mg magnesium) tablet TK 1 TABLET PO QD  11    methocarbamol (ROBAXIN) 500 MG Tab Take 1 tablet by mouth daily (every 24 hours) as needed for muscle spasm. 5 tablet 0    minoxidil (LONITEN) 2.5 MG tablet Take 2 tablets (5 mg total) by mouth 2 (two) times daily. 360 tablet 3    multivitamin capsule Take 1 capsule by mouth once daily.      mupirocin (BACTROBAN) 2 % ointment Apply topically 3 (three) times daily. 30 g 2    NYSTOP " powder   1    ondansetron (ZOFRAN-ODT) 4 MG TbDL DISSOLVE 1 TABLET(4 MG) ON THE TONGUE EVERY 12 HOURS AS NEEDED 60 tablet 0    oxyCODONE (ROXICODONE) 5 MG immediate release tablet Take 1 tablet (5 mg total) by mouth every 6 (six) hours as needed for Pain. 28 tablet 0    pantoprazole (PROTONIX) 40 MG tablet TAKE 1 TABLET EVERY DAY 90 tablet 3    rifAXIMin (XIFAXAN) 550 mg Tab Take 1 tablet (550 mg total) by mouth 2 (two) times daily. 60 tablet 11    sevelamer carbonate (RENVELA) 800 mg Tab Take 1 tablet (800 mg total) by mouth 3 (three) times daily with meals. 90 tablet 11    simethicone (MYLICON) 80 MG chewable tablet Take 1 tablet (80 mg total) by mouth every 6 (six) hours as needed for Flatulence (and bloating). 60 tablet 3    tacrolimus (PROGRAF) 0.5 MG Cap TAKE 1 CAPSULE EVERY DAY 90 capsule 11    tiZANidine (ZANAFLEX) 4 MG tablet TAKE 1 TABLET BY MOUTH EVERY 6 HOURS AS NEEDED 90 tablet 0    tiZANidine (ZANAFLEX) 4 MG tablet TAKE 1 TABLET BY MOUTH EVERY 6 HOURS AS NEEDED 90 tablet 12    UNABLE TO FIND 1 tablet once daily. Pro Renal Plus D Oral Tabs      urea (CARMOL) 40 % Crea Apply topically once daily. 85 g 5     No current facility-administered medications on file prior to visit.        Allergies   Review of patient's allergies indicates:   Allergen Reactions    Corticosteroids (glucocorticoids) Other (See Comments)     Leg swelling    Hydrocortisone      Leg swelling    Neuromuscular blockers, steroidal      Leg swelling    Ribavirin      Intolerance, arthralgias         ROS  Review of Systems   Constitutional: Positive for fatigue. Negative for chills and fever.   Respiratory: Negative for cough and shortness of breath.    Cardiovascular: Negative for chest pain and palpitations.   Gastrointestinal: Negative for abdominal pain and nausea.   Genitourinary: Negative for dysuria and flank pain.   Musculoskeletal: Negative for arthralgias and myalgias.   Skin: Negative for rash.   Neurological:  "Positive for weakness. Negative for light-headedness and headaches.   Hematological: Negative for adenopathy.   Psychiatric/Behavioral: Negative for sleep disturbance. The patient is not nervous/anxious.          Objective:    BP (!) 136/50   Pulse 66   Temp 99.4 °F (37.4 °C) (Oral)   Ht 6' 1" (1.854 m)   Wt 94.3 kg (207 lb 14.3 oz)   SpO2 95%   BMI 27.43 kg/m²     Physical Exam   Constitutional: He is oriented to person, place, and time. He appears well-developed.   HENT:   Head: Normocephalic.   Eyes: Conjunctivae, EOM and lids are normal. Pupils are equal, round, and reactive to light.   Neck: Normal range of motion. Neck supple. Carotid bruit is not present. No thyroid mass present.   Cardiovascular: Regular rhythm, S2 normal and normal heart sounds. Exam reveals no gallop and no friction rub.   No murmur heard.  Pulses:       Radial pulses are 2+ on the right side, and 2+ on the left side.        Dorsalis pedis pulses are 2+ on the right side, and 2+ on the left side.   Pulmonary/Chest: Effort normal and breath sounds normal. He has no wheezes. He has no rhonchi. He has no rales.   Abdominal: Normal appearance and bowel sounds are normal. There is no tenderness.   Lymphadenopathy:     He has no cervical adenopathy.        Right: No supraclavicular adenopathy present.        Left: No supraclavicular adenopathy present.   Neurological: He is alert and oriented to person, place, and time. He has normal strength. No cranial nerve deficit or sensory deficit.   Reflex Scores:       Patellar reflexes are 2+ on the right side and 2+ on the left side.       Achilles reflexes are 2+ on the right side and 2+ on the left side.  Patient with slow ambulation and a slight shuffling of the feet. Needed some assistance when transferring to exam table   Skin: Skin is warm, dry and intact. No rash noted.   Psychiatric: He has a normal mood and affect. Judgment normal.       Assessment/Plan:  Philip Mathis III is a 73 y.o. " male who presents today for :    Philip was seen today for discuss walker, fatigue and flu vaccine.    Diagnoses and all orders for this visit:    Chronic fatigue, patient is under current treatment plan at dialysis clinic with iron infusions due to chronic fatigue. Continue current treatment plan as advised.  No further management at this time.  -     WALKER FOR HOME USE  Ordered rollater for patient as per request of the dialysis clinic and patient's recent unsteady gait.    Need for pneumococcal vaccination  -     Pneumococcal Polysaccharide Vaccine (23 Valent) (SQ/IM)    Need for influenza vaccination  -     Influenza - High Dose (65+) (PF) (IM)    ESRD (end stage renal disease) on dialysis, followed by nephrology, continue current treatment plan as advised  -     WALKER FOR HOME USE        Problem list issues addresses during this visit    Secondary hyperparathyroidism  Stable, followed by endocrinology, continue as advised    Moderate protein malnutrition   Stable as reviewed in record, Followed by PCP  Continue current treatment plan      Aortic atherosclerosis  Stable, CT 2015, continue present plan and medications as advised by PCP.     Essential hypertension  chronic and stable as reviewed in record, controlled with medication   followed by PCP  Continue current treatment plan      Diabetic nephropathy associated with type 2 diabetes mellitus  chronic and stable as reviewed in record, controlled with medication   followed by PCP  Continue current treatment plan      Other acquired hemolytic anemias  chronic and stable as reviewed in record  followed by PCP  Continue current treatment plan      Acute on chronic combined systolic and diastolic heart failure  chronic and stable as reviewed in record, controlled with medication   followed by PCP  Continue current treatment plan      ESRD (end stage renal disease) on dialysis  chronic and stable as reviewed in record, on dialysis   followed by  nephrology  Continue current treatment plan        Health maintenance reviewed and disussed, addressed as per orders, patient denied A1c at this time as he says it is done at dialysis clinic and his sugar has been stable.       Encouraged to call/return to clinic if symptoms persist or worsen    Bia Laws PA-C  [facility] Family Med/ Internal Med/ Peds

## 2019-02-18 NOTE — ASSESSMENT & PLAN NOTE
chronic and stable as reviewed in record, on dialysis   followed by nephrology  Continue current treatment plan

## 2019-02-28 NOTE — TELEPHONE ENCOUNTER
Please check with patient's nephrologist or transplant pharmacist, if he should be off protonix and on H2 antagonist due to renal failure. Thanks

## 2019-03-01 NOTE — TELEPHONE ENCOUNTER
Spoke with pt wife Veronika and she states she spoke with [employer] and she says they will only cover a walker every five years. States it has been four since he received his walker. States a friend brought over a walker to use until he is due for another walker next year

## 2019-03-01 NOTE — TELEPHONE ENCOUNTER
Returned phone call from Ms. Banda. She asks why did they change from Y90 to ablation. Explained lung shunt fraction too high to proceed. If Y90 done too much radiation would go to lungs and cause damage. Dr. Marc recommends ablation instead. She verbalized understanding and tells me she will explain everything to patient.

## 2019-03-01 NOTE — TELEPHONE ENCOUNTER
----- Message from Barbie Lawson sent at 2/22/2019  1:32 PM CST -----  Contact: Wife Veronika: [phone]  Patients wife called in regards to orders for a walker with a seat. She stated she spoke to [employer] to check to see if orders have been placed and they told her no. Please call to advise, Thank you

## 2019-03-06 NOTE — PRE-PROCEDURE INSTRUCTIONS
"  Spoke with Patient's Wife - Merlene.  NPO, medication, and pre-op instructions reviewed.  Denies previous problems with Anesthesia.  Has had persistent nausea and "lives on Zofran."  Sent In Basket message to Dr. Nath's Office to ask if his Zofran dose can be increased and if someone would call Mrs. Mathis to answer some general questions and to talk about his persistent nausea.  Goes to Dialysis on Mondays, Wednesdays, and Fridays.      Stated that his morning glucose readings have been "pretty good" with the last reading 128.  Aspirin is on Hold.  Wife verbalized understanding of instructions.    "

## 2019-03-07 PROBLEM — K76.9 LIVER LESION: Status: ACTIVE | Noted: 2019-01-01

## 2019-03-07 NOTE — PLAN OF CARE
Discharge instructions reviewed w/ pt and wife, verbalized understanding. Pt in NADN. No complaints at this time. Tolerated PO w/ no issues. To be d/c'd home w/ wife.

## 2019-03-07 NOTE — PROGRESS NOTES
Dr. Joyce notified of patient's chest pain. 5/10 [facility]. Increases c deep breathes. Dr. Joyce said he believes this is related to his ablation today. No orders received.

## 2019-03-07 NOTE — PROGRESS NOTES
Per Dr. Sam, okay to resume all home meds, including aspirin. Shower tomorrow. No soaking for 2 days.  Follow-up with wound care outpatient.

## 2019-03-07 NOTE — TELEPHONE ENCOUNTER
"----- Message from Lazara Wilkins MA sent at 3/6/2019  5:13 PM CST -----      ----- Message -----  From: Malaika Moyer RN  Sent: 3/6/2019  10:43 AM  To: Danay Nath MD, [facility] Staff    Good morning,    I just spoke to Mrs. Mathis to review information in preparation for Mr. Mathis's Liver Ablation 3-7-19.  Mrs Mathis stated that he has persistent nausea and "lives on Zofran."  She is asking if this is to be expected because of his Liver???  Asking if someone would be able to call her.  Can his Zofran dose be increased??                  Thanks, Malaika RENEE RN  Pre-Op Center  "

## 2019-03-07 NOTE — ANESTHESIA RELEASE NOTE
"Return to preoperative room air saturations of 88%, improved with coughing. Current VS are BP (!) 111/54   Pulse 68   Temp 37.2 °C (99 °F) (Temporal)   Resp 18   Ht 6' 1" (1.854 m)   Wt 93 kg (205 lb 0.4 oz)   SpO2 (!) 90%   BMI 27.05 kg/m²  and are stable.  "

## 2019-03-07 NOTE — TRANSFER OF CARE
"Anesthesia Transfer of Care Note    Patient: Philip Mathis III    Procedure(s) Performed: Procedure(s) (LRB):  Radiofrequency Ablation (N/A)    Patient location: PACU    Anesthesia Type: general    Transport from OR: Transported from OR on 6-10 L/min O2 by face mask with adequate spontaneous ventilation    Post pain: adequate analgesia    Post assessment: no apparent anesthetic complications    Post vital signs: stable    Level of consciousness: responds to stimulation and sedated    Nausea/Vomiting: no nausea/vomiting    Complications: none    Transfer of care protocol was followed      Last vitals:   Visit Vitals  BP (!) 120/58   Pulse 65   Temp 37.1 °C (98.8 °F) (Oral)   Resp 16   Ht 6' 1" (1.854 m)   Wt 93 kg (205 lb 0.4 oz)   SpO2 (!) 93%   BMI 27.05 kg/m²     "

## 2019-03-07 NOTE — ANESTHESIA PREPROCEDURE EVALUATION
"                                                                                                             03/07/2019  Philip Mathis III is a 73 y.o., male with a pre-operative diagnosis of HCC (hepatocellular carcinoma) [C22.0] who is scheduled for Procedure(s) (LRB):  Radiofrequency Ablation (N/A).     Requested anesthesia type: General  Surgeon: Esther Surgeon  Allergies:   Review of patient's allergies indicates:   Allergen Reactions    Adhesive Dermatitis and Other (See Comments)     "Burned his tail"  Please use Paper Tape    Corticosteroids (glucocorticoids) Other (See Comments)     Leg swelling after an injection    Hydrocortisone Swelling     Leg swelling after an injection    Neuromuscular blockers, steroidal Swelling     Leg swelling after an injection    Ribavirin Other (See Comments)     Arthralgias     Vital Sign Range:    Chronic Medications:   Medications Prior to Admission   Medication Sig Dispense Refill Last Dose    B,C/ferrous fum/FA/D3/zinc ox (PRORENAL ORAL) Take 1 tablet by mouth. Receives in Dialysis on Mondays, Wednesdays, and Fridays       ACCU-CHEK JOANN PLUS TEST STRP Strp CHECK BLOOD SUGAR THREE TIMES DAILY 300 strip 11 Taking    allopurinol (ZYLOPRIM) 100 MG tablet TAKE 1 TABLET EVERY DAY (Patient taking differently: TAKE 1 TABLET EVERY DAY, bedtime) 90 tablet 3 Taking    ammonium lactate (LAC-HYDRIN) 12 % lotion Apply topically as needed for Dry Skin. 225 g 6 Taking    aspirin 81 MG Chew Take 1 tablet (81 mg total) by mouth once daily. 90 tablet 3 1/15/2019    blood-glucose meter (ACCU-CHEK JOANN PLUS METER) Misc Use as directed 1 each 0 Taking    carvedilol (COREG) 6.25 MG tablet TK 1 T PO  BID  3 Taking    cetirizine (ZYRTEC) 10 MG tablet Take 1 tablet (10 mg total) by mouth once daily. (Patient taking differently: Take 10 mg by mouth daily as needed. ) 90 tablet 3 Taking    clonazePAM (KLONOPIN) 0.5 MG tablet Take 1 tablet (0.5 mg total) by mouth daily as needed. 90 " tablet 3 Taking    diclofenac sodium (VOLTAREN) 1 % Gel Apply 2 g topically 3 (three) times daily. To the right knee. 1 Tube 0 Taking    doxazosin (CARDURA) 8 MG Tab TAKE 1 TABLET ONCE DAILY. (Patient taking differently: TAKE 1 TABLET ONCE DAILY, at bedtime) 90 tablet 4 Taking    fluticasone (FLONASE) 50 mcg/actuation nasal spray SHAKE LIQUID AND USE 2 SPRAYS(100 MCG) IN EACH NOSTRIL EVERY DAY (Patient taking differently: SHAKE LIQUID AND USE 2 SPRAYS(100 MCG) IN EACH NOSTRIL EVERY DAY, prn) 16 mL 0 Taking    furosemide (LASIX) 20 MG tablet Take 2 tablets (40 mg total) by mouth once daily. Only take lasix 40mg on non-dialysis days (Tuesday, Thursday, Saturday, and Sunday) by mouth. (Patient taking differently: Take 20 mg by mouth 2 (two) times daily. Only take lasix 20mg on non-dialysis days (Tuesday, Thursday, Saturday, and Sunday) by mouth twice a day) 16 tablet 11 Taking    GENERLAC 10 gram/15 mL solution TAKE 30 ML 'S BY MOUTH THREE TIMES DAILY (Patient taking differently: TAKE 30 ML 'S BY MOUTH at bedtime) 2700 mL 5 Taking    hydrALAZINE (APRESOLINE) 100 MG tablet Take 1 tablet (100 mg total) by mouth 3 (three) times daily. 270 tablet 3 Taking    hydrocortisone 2.5 % cream Apply topically 2 (two) times daily as needed.   1 Taking    hydrOXYzine HCl (ATARAX) 25 MG tablet TAKE 1 TABLET(25 MG) BY MOUTH THREE TIMES DAILY AS NEEDED FOR ITCHING 90 tablet 0 Taking    hydrOXYzine HCl (ATARAX) 25 MG tablet TAKE 1 TABLET(25 MG) BY MOUTH THREE TIMES DAILY AS NEEDED FOR ITCHING 90 tablet 2 Taking    insulin aspart (NOVOLOG) 100 unit/mL InPn pen Inject 6 units w/ breakfast, 4 units w/ lunch and dinner plus scale 180-230 +1, 231-280 +2, 281-330 +3, 331-380 +4, >380 +5. 90 day supply (Patient taking differently: Inject 8 units w/ breakfast, 6 units w/ lunch and dinner plus scale 180-230 +1, 231-280 +2, 281-330 +3, 331-380 +4, >380 +5. 90 day supply) 3 Box 3 Taking    insulin glargine (LANTUS SOLOSTAR) 100 unit/mL  "(3 mL) InPn pen Inject 8 Units into the skin every evening. (Patient taking differently: Inject 10 Units into the skin every evening. ) 2 Box 6 Taking    insulin needles, disposable, (NOVOFINE 32) 32 x 1/4 " Ndle 1 each by Misc.(Non-Drug; Combo Route) route 3 (three) times daily. 100 each 5 Taking    ketoconazole (NIZORAL) 2 % cream APPLY TOPICALLY BID. MIX WITH HYDROCORTISONE CREAM, Uses prn on buttocks  1 Taking    lancets (ACCU-CHEK SOFTCLIX LANCETS) Misc 1 lancet by Misc.(Non-Drug; Combo Route) route 4 (four) times daily. 360 each 3 Taking    magnesium oxide (MAG-OX) 400 mg (241.3 mg magnesium) tablet TK 1 TABLET PO QD, morning  11 Taking    methocarbamol (ROBAXIN) 500 MG Tab Take 1 tablet by mouth daily (every 24 hours) as needed for muscle spasm. 5 tablet 0 Taking    minoxidil (LONITEN) 2.5 MG tablet Take 2 tablets (5 mg total) by mouth 2 (two) times daily. 360 tablet 3 Taking    NYSTOP powder Apply topically 2 (two) times daily.   1 Taking    ondansetron (ZOFRAN-ODT) 4 MG TbDL DISSOLVE 1 TABLET(4 MG) ON THE TONGUE EVERY 12 HOURS AS NEEDED 60 tablet 12     oxyCODONE (ROXICODONE) 5 MG immediate release tablet Take 1 tablet (5 mg total) by mouth every 6 (six) hours as needed for Pain. 28 tablet 0 Taking    pantoprazole (PROTONIX) 40 MG tablet TAKE 1 TABLET EVERY DAY 90 tablet 3 Taking    pantoprazole (PROTONIX) 40 MG tablet Take 1 tablet (40 mg total) by mouth once daily. (Patient taking differently: Take 40 mg by mouth every morning. Sometimes takes twice a day) 90 tablet 3     rifAXIMin (XIFAXAN) 550 mg Tab Take 1 tablet (550 mg total) by mouth 2 (two) times daily. 60 tablet 11 Taking    sevelamer carbonate (RENVELA) 800 mg Tab Take 1 tablet (800 mg total) by mouth 3 (three) times daily with meals. 90 tablet 11 1/28/2019 at 2100    tacrolimus (PROGRAF) 0.5 MG Cap Take 1 capsule (0.5 mg total) by mouth once daily. (Patient taking differently: Take 0.5 mg by mouth every morning. ) 30 capsule 11  "    tiZANidine (ZANAFLEX) 4 MG tablet TAKE 1 TABLET BY MOUTH EVERY 6 HOURS AS NEEDED 90 tablet 12 Taking     Current Medications:   Current Facility-Administered Medications   Medication Dose Route Frequency Provider Last Rate Last Dose    0.9%  NaCl infusion  500 mL Intravenous Once Nydia Mohan DNP        ciprofloxacin (CIPRO)400mg/200ml D5W IVPB 400 mg  400 mg Intravenous Once Pre-Op Nydia Mohan DNP        metronidazole IVPB 500 mg  500 mg Intravenous Once Pre-Op Nydia Mohan DNP         Medical History:   Past Medical History:   Diagnosis Date    Anemia     Aortic atherosclerosis     Arthritis     Back pain     Bishop's esophagus     BPH (benign prostatic hypertrophy)     Chronic kidney disease     Cirrhosis     Diabetes mellitus     Diabetic Polyneuropathy    Diabetes mellitus, type 2     Diabetic Nephropathy    Diabetes with neurologic complications     Diabetic retinopathy     Elevated PSA     ESRD on dialysis     GERD (gastroesophageal reflux disease)     Gout     Heart failure     Hemolytic anemia     Hepatitis C     successfully treated with Harvoni    Hepatocellular carcinoma 1/2/2018    Hyperlipidemia     Hyperphosphatemia     Hypertension     Hypertension     Hypertensive CKD (chronic kidney disease)     Immune disorder     Immunosuppression     Liver transplanted     Metabolic acidosis     KARON (obstructive sleep apnea)     Peripheral neuropathy     Proteinuria     Secondary hyperparathyroidism     Severe pruritus     Sleep apnea     Thrombocytopenia     Trouble in sleeping     Type 2 diabetes mellitus with ophthalmic manifestations     Type 2 diabetes with peripheral circulatory disorder, controlled     Type II or unspecified type diabetes mellitus with renal manifestations, uncontrolled(250.42)     Urticaria      .    Anesthesia Evaluation    I have reviewed the Patient Summary Reports.    I have reviewed the Nursing Notes.   I have  reviewed the Medications.     Review of Systems  Anesthesia Hx:  No problems with previous Anesthesia  Denies Family Hx of Anesthesia complications.   Denies Personal Hx of Anesthesia complications.   Hematology/Oncology:  Hematology Normal   Oncology Normal     EENT/Dental:EENT/Dental Normal   Cardiovascular:   Hypertension, well controlled CHF    Pulmonary:   Sleep Apnea    Renal/:   Chronic Renal Disease    Hepatic/GI:   GERD, well controlled Liver Disease, Hepatitis, C Hepatocellular carcinoma   Musculoskeletal:  Musculoskeletal Normal    Neurological:   Neuromuscular Disease,    Endocrine:   Diabetes    Dermatological:  Skin Normal    Psych:  Psychiatric Normal           Physical Exam  General:  Malnutrition, Well nourished    Airway/Jaw/Neck:  Airway Findings: Mouth Opening: Normal Tongue: Normal  General Airway Assessment: Adult, Good  Mallampati: II  Jaw/Neck Findings:     Neck ROM: Normal ROM     Eyes/Ears/Nose:  Eyes/Ears/Nose Findings:    Dental:  Dental Findings: In tact   Chest/Lungs:  Chest/Lungs Findings: Clear to auscultation, Normal Respiratory Rate     Heart/Vascular:  Heart Findings: Rate: Normal  Rhythm: Regular Rhythm  Sounds: Normal  Heart Murmur  Vascular Findings:  Dialysis Access: Left Forearm Graft     Abdomen:  Abdomen Findings:  Normal, Soft, Nontender       Mental Status:  Mental Status Findings:  Cooperative, Alert and Oriented, Flat Affect         Anesthesia Plan  Type of Anesthesia, risks & benefits discussed:  Anesthesia Type:  general  Patient's Preference: as indicated  Intra-op Monitoring Plan: standard ASA monitors  Intra-op Monitoring Plan Comments:   Post Op Pain Control Plan: per primary service following discharge from PACU  Post Op Pain Control Plan Comments:   Induction:   IV  Beta Blocker:  Patient is not currently on a Beta-Blocker (No further documentation required).       Informed Consent: Patient understands risks and agrees with Anesthesia plan.  Questions answered.  Anesthesia consent signed with patient.  ASA Score: 4     Day of Surgery Review of History & Physical:  There are no significant changes.  H&P update referred to the provider.     Anesthesia Plan Notes: Reassurance given.        Ready For Surgery From Anesthesia Perspective.

## 2019-03-07 NOTE — DISCHARGE SUMMARY
Radiology Discharge Summary      Hospital Course: No complications    Admit Date: 3/7/2019  Discharge Date: 03/07/2019     Instructions Given to Patient: Yes  Diet: Resume prior diet  Activity: activity as tolerated    Description of Condition on Discharge: Stable  Vital Signs (Most Recent): Temp: 99 °F (37.2 °C) (03/07/19 1410)  Pulse: 68 (03/07/19 1600)  Resp: 18 (03/07/19 1600)  BP: (!) 111/54 (03/07/19 1600)  SpO2: (!) 90 % (03/07/19 1600)    Discharge Disposition: home    Discharge Diagnosis: HCC     Follow-up: with IR for repeat mapping    .IJoe M.D. personally reviewed and agree with the above dictated note.

## 2019-03-07 NOTE — PLAN OF CARE
Patient is warm and comfortable as verbalized-denies pain/ponv. Vital signs are stable and within normal limit. Surgical site is clean, dry and intact. Patient is awake, alert and orientedx4.

## 2019-03-07 NOTE — DISCHARGE INSTRUCTIONS
PATIENT INSTRUCTIONS  POST-ANESTHESIA    IMMEDIATELY FOLLOWING SURGERY:  Do not drive or operate machinery for the first twenty four hours after surgery.  Do not make any important decisions for twenty four hours after surgery or while taking narcotic pain medications or sedatives.  If you develop intractable nausea and vomiting or a severe headache please notify your doctor immediately.    FOLLOW-UP:  Please make an appointment with your surgeon as instructed. You do not need to follow up with anesthesia unless specifically instructed to do so.    WOUND CARE INSTRUCTIONS (if applicable):  Keep a dry clean dressing on the anesthesia/puncture wound site if there is drainage.  Once the wound has quit draining you may leave it open to air.  Generally you should leave the bandage intact for twenty four hours unless there is drainage.  If the epidural site drains for more than 36-48 hours please call the anesthesia department.    QUESTIONS?:  Please feel free to call your physician or the hospital  if you have any questions, and they will be happy to assist you.       [facility] Anesthesia Department  [address] [facility]  [phone]      Remove dressing tomorrow. Okay to shower tomorrow. Do not soak in bathtub or submerge incisions underwater for 2 days.        Incision Care  Remember: Follow-up visits allow your healthcare provider to make sure your incision is healing well. Be sure to keep your appointments.     Stitches (sutures), surgical staples, special strips of surgical tape, or surgical skin glue may be used to close incisions. They also help stop bleeding and speed healing. To help your incision heal, follow the tips on this handout.  Home care  Tips for home care include the following:  · Always wash your hands before touching your incision.  · Keep your incision clean and dry.  · Avoid doing things that could cause dirt or sweat to get on your incision.  · Dont pick at scabs. They  help protect the wound.  · Keep your incision out of water.  · Take a sponge bath to avoid getting your incision wet, unless your healthcare provider tells you otherwise.  · Ask your provider when can you take a shower or bathe.  · Ask your provider about the best way to keep your incision dry when bathing or showering.  · Pat stitches dry if they get wet. Dont rub.  · Leave the bandage (dressing) in place until you are told to remove it or change it. Change it only as directed, using clean hands.  · After the first 12 hours, change your dressing every 24 hours, or as directed by your healthcare provider.  · Change your dressing if it gets wet or soiled.  Care for specific closures  Follow these guidelines unless your healthcare provider tells you otherwise:  · Stitches or staples. Once you no longer need to keep these dry, clean the wound daily. First remove the bandage using clean hands. Then wash the area gently with soap and warm water. Use a wet cotton swab to loosen and remove any blood or crust that forms. After cleaning, put a thin layer of antibiotic ointment on. Then put on a new bandage.  · Skin glue. Dont put liquid, ointment, or cream on your wound while the glue is in place. Avoid activities that cause heavy sweating. Protect the wound from sunlight. Do not scratch, rub, or pick at the glue. Do not put tape directly over the glue. The glue should peel off within 5 to 10 days.  · Surgical tape. Keep the area dry. If it gets wet, blot the area dry with a clean towel. Surgical tape usually falls off within 7 to 10 days. If it has not fallen off after 10 days, contact your healthcare provider before taking it off yourself. If you are told to remove the tape, put mineral oil or petroleum jelly on a cotton ball. Gently rub the tape until it is removed.  Changing your dressing  Leave the dressing (bandage) in place until you are told to remove it or change it. Follow the instructions below unless told  otherwise by your healthcare provider:  · Always wash your hands before changing your dressing.  · After the first 48 hours, the incision wound usually will have closed. At this point, leave the incision uncovered and open to the air. If the incision has not closed keep it covered.  · Cover your incision only if your clothing is rubbing it or causing irritation.  · Change your dressing if it gets wet or soiled.  Follow-up care  Follow up with your healthcare provider to ask how long sutures or staples should be left in place. Be sure to return for stitch or staple removal as directed. If dissolving stitches were used in your mouth, these will not need to be removed. They should fall out or dissolve on their own.  If tape closures were used, remove them yourself when your provider recommends if they have not fallen off on their own. If skin glue was used, the glue will wear off by itself.  When to seek medical care  Call your healthcare provider if you have any of the following:  · More pain, redness, swelling, bleeding, or foul-smelling discharge around the incision area  · Fever of 100.4°F (38ºC) or higher, or as directed by your healthcare provider  · Shaking chills  · Vomiting or nausea that doesn't go away  · Numbness, coldness, or tingling around the incision area, or changes in skin color  · Opening of the sutures or wound  · Stitches or staples come apart or fall out or surgical tape falls off before 7 days or as directed by your healthcare provider   Date Last Reviewed: 12/1/2016  © 3101-0653 [employer]. [address] [place] [address], [place] [address]. All rights reserved. This information is not intended as a substitute for professional medical care. Always follow your healthcare professional's instructions.

## 2019-03-07 NOTE — PROCEDURES
Radiology Post Procedure Note:     Procedure:  Microwave ablation    (s): Monico    Blood Loss: Minimal    Specimen: None    Findings:   Patient came to IR and under imaging guidance had the right hepatic lesions accessed with 2 x PR15 probes and ablated for 10 minutes at [address] removed while ablating the tract.     Joe PEOPLES M.D. personally reviewed and agree with the above dictated note.

## 2019-03-07 NOTE — H&P
Radiology History & Physical      SUBJECTIVE:     Chief Complaint: HCC    History of Present Illness:  Philip Mathis III is a 73 y.o. male who presents for radiofrequency ablation.    Past Medical History:   Diagnosis Date    Anemia     Aortic atherosclerosis     Arthritis     Back pain     Bishop's esophagus     BPH (benign prostatic hypertrophy)     Chronic kidney disease     Cirrhosis     Diabetes mellitus     Diabetic Polyneuropathy    Diabetes mellitus, type 2     Diabetic Nephropathy    Diabetes with neurologic complications     Diabetic retinopathy     Elevated PSA     ESRD on dialysis     GERD (gastroesophageal reflux disease)     Gout     Heart failure     Hemolytic anemia     Hepatitis C     successfully treated with Harvoni    Hepatocellular carcinoma 1/2/2018    Hyperlipidemia     Hyperphosphatemia     Hypertension     Hypertension     Hypertensive CKD (chronic kidney disease)     Immune disorder     Immunosuppression     Liver transplanted     Metabolic acidosis     KARON (obstructive sleep apnea)     Peripheral neuropathy     Proteinuria     Secondary hyperparathyroidism     Severe pruritus     Sleep apnea     Thrombocytopenia     Trouble in sleeping     Type 2 diabetes mellitus with ophthalmic manifestations     Type 2 diabetes with peripheral circulatory disorder, controlled     Type II or unspecified type diabetes mellitus with renal manifestations, uncontrolled(250.42)     Urticaria      Past Surgical History:   Procedure Laterality Date    1) Creation of L arm basilic vein transposition (brachial artery-to-basilic vein AVF)  2) Ligation of L brachial AVF (which was 1st stage creation previously)    Left 6/15/2017    Performed by Silvio William MD at [facility] OR 2ND FLR    ABLATION, RADIOFREQUENCY N/A 2/27/2018    Performed by Esther Surgeon at [facility] ESTHER    CYMBER-YSVNFF-IJ N/A 12/21/2017    Performed by [facility] Diagnostic Provider at [facility] OR 2ND FLR    broken  nose      CATARACT EXTRACTION Bilateral     colonoscopy N/A 7/23/2014    Performed by Moises St MD at [facility] ENDO (4TH FLR)    LXABFTSB-[id number] POSSIBLE AV GRAFT Left 11/8/2016    Performed by Silvio William MD at [facility] OR 2ND FLR    DEBRIDEMENT-WOUND Left 4/12/2015    Performed by Jimmy Light MD at [facility] OR 2ND FLR    DEBRIDEMENT-WOUND Left 4/11/2015    Performed by Jimmy Light MD at [facility] OR 2ND FLR    DEBRIDEMENT-WOUND Left 4/10/2015    Performed by Jimmy Light MD at [facility] OR 2ND FLR    DEBRIDEMENT-WOUND Left 4/9/2015    Performed by Jimmy Light MD at [facility] OR 1ST FLR    DEBRIDEMENT-WOUND Left 4/6/2015    Performed by Jimmy Light MD at [facility] OR 2ND FLR    DEBRIDEMENT-WOUND Left 4/4/2015    Performed by Jimmy Light MD at [facility] OR 2ND FLR    DEBRIDEMENT-WOUND Left 4/2/2015    Performed by Jimmy Light MD at [facility] OR 1ST FLR    DRESSING CHANGE- Left 4/10/2015    Performed by Jimmy Light MD at [facility] OR 2ND FLR    DRESSING CHANGE-s/p burn debridement Left 4/15/2015    Performed by Jimmy Light MD at [facility] OR 2ND FLR    DRESSING CHANGE-s/p burn debridement Left 4/14/2015    Performed by Jimmy Light MD at [facility] OR 2ND FLR    DRESSING CHANGE-s/p wound debridement Left 4/13/2015    Performed by Jimmy Light MD at [facility] OR 1ST FLR    DRESSING CHANGE-wound vac Left 4/20/2015    Performed by Jimmy Light MD at [facility] OR 2ND FLR    EMBOLIZATION, YTTRIUM THERAPY N/A 1/29/2019    Performed by [facility] Provider at [facility] OR 2ND [facility]    EYE SURGERY      cataracts    FEMUR SURGERY      FRACTURE SURGERY      right femur fracture     INSERTION-CATHETER-PERM-A-CATH Right 7/13/2017    Performed by Silvio iWlliam MD at [facility] OR 2ND FLR    LIVER TRANSPLANT  2001    Open Biome Fecal Transplant N/A 8/5/2015    Performed by Elmo Gan MD at [facility] ENDO (4TH FLR)    PLACEMENT-WOUND VAC  4/15/2015    Performed by [facility] MD at  [facility] OR 2ND FLR    PLACEMENT-WOUND VAC Left 4/4/2015    Performed by Jimmy Light MD at [facility] OR 2ND FLR    PORTACATH PLACEMENT Left     SKIN GRAFT      graft from right thigh , applied to the left back and left arm.    SPLIT THICKNESS SKIN GRAFT - left trunk, abd and axilla N/A 4/15/2015    Performed by Jimmy Light MD at [facility] OR 2ND FLR       Home Meds:   Prior to Admission medications    Medication Sig Start Date End Date Taking? Authorizing Provider   B,C/ferrous fum/FA/D3/zinc ox (PRORENAL ORAL) Take 1 tablet by mouth. Receives in Dialysis on Mondays, Wednesdays, and Fridays   Yes Historical Provider, MD   ACCU-CHEK JOANN PLUS TEST STRP Strp CHECK BLOOD SUGAR THREE TIMES DAILY 7/17/18   Donnie Owens NP   allopurinol (ZYLOPRIM) 100 MG tablet TAKE 1 TABLET EVERY DAY  Patient taking differently: TAKE 1 TABLET EVERY DAY, bedtime 7/17/18   Donnie Owens NP   ammonium lactate (LAC-HYDRIN) 12 % lotion Apply topically as needed for Dry Skin. 10/4/17   Faye Larson DPM   aspirin 81 MG Chew Take 1 tablet (81 mg total) by mouth once daily. 7/20/17 12/14/18  Jodie Shearer MD   blood-glucose meter (ACCU-CHEK JOANN PLUS METER) Misc Use as directed 1/11/16   MILTON Carrillo,FNP-C   carvedilol (COREG) 6.25 MG tablet TK 1 T PO  BID 6/8/18   Historical Provider, MD   cetirizine (ZYRTEC) 10 MG tablet Take 1 tablet (10 mg total) by mouth once daily.  Patient taking differently: Take 10 mg by mouth daily as needed.  8/15/18 8/15/19  Donnie Owens NP   clonazePAM (KLONOPIN) 0.5 MG tablet Take 1 tablet (0.5 mg total) by mouth daily as needed. 2/19/18   Danay Nath MD   diclofenac sodium (VOLTAREN) 1 % Gel Apply 2 g topically 3 (three) times daily. To the right knee. 4/16/18   Philip Duron III, PA-C   doxazosin (CARDURA) 8 MG Tab TAKE 1 TABLET ONCE DAILY.  Patient taking differently: TAKE 1 TABLET ONCE DAILY, at bedtime 11/8/18   Donnie Owens, NP   fluticasone (FLONASE) 50  mcg/actuation nasal spray SHAKE LIQUID AND USE 2 SPRAYS(100 MCG) IN EACH NOSTRIL EVERY DAY  Patient taking differently: SHAKE LIQUID AND USE 2 SPRAYS(100 MCG) IN EACH NOSTRIL EVERY DAY, prn 1/17/19   Donnie Owens NP   furosemide (LASIX) 20 MG tablet Take 2 tablets (40 mg total) by mouth once daily. Only take lasix 40mg on non-dialysis days (Tuesday, Thursday, Saturday, and Sunday) by mouth.  Patient taking differently: Take 20 mg by mouth 2 (two) times daily. Only take lasix 20mg on non-dialysis days (Tuesday, Thursday, Saturday, and Sunday) by mouth twice a day 12/14/18 12/14/19  Danay Nath MD   GENERLAC 10 gram/15 mL solution TAKE 30 ML 'S BY MOUTH THREE TIMES DAILY  Patient taking differently: TAKE 30 ML 'S BY MOUTH at bedtime 6/2/18   Danay Nath MD   hydrALAZINE (APRESOLINE) 100 MG tablet Take 1 tablet (100 mg total) by mouth 3 (three) times daily. 5/30/18   Jabari Elizabeth MD   hydrocortisone 2.5 % cream Apply topically 2 (two) times daily as needed.  2/5/19   Darlyn Campos MD   hydrOXYzine HCl (ATARAX) 25 MG tablet TAKE 1 TABLET(25 MG) BY MOUTH THREE TIMES DAILY AS NEEDED FOR ITCHING 12/17/18   Donnie Owens NP   hydrOXYzine HCl (ATARAX) 25 MG tablet TAKE 1 TABLET(25 MG) BY MOUTH THREE TIMES DAILY AS NEEDED FOR ITCHING 1/3/19   Donnie Owens NP   insulin aspart (NOVOLOG) 100 unit/mL InPn pen Inject 6 units w/ breakfast, 4 units w/ lunch and dinner plus scale 180-230 +1, 231-280 +2, 281-330 +3, 331-380 +4, >380 +5. 90 day supply  Patient taking differently: Inject 8 units w/ breakfast, 6 units w/ lunch and dinner plus scale 180-230 +1, 231-280 +2, 281-330 +3, 331-380 +4, >380 +5. 90 day supply 5/17/17   MILTON Álvarez, JAMIE   insulin glargine (LANTUS SOLOSTAR) 100 unit/mL (3 mL) InPn pen Inject 8 Units into the skin every evening.  Patient taking differently: Inject 10 Units into the skin every evening.  5/17/17   MILTON Álvarez, JAMIE   insulin needles,  "disposable, (NOVOFINE 32) 32 x 1/4 " Ndle 1 each by Misc.(Non-Drug; Combo Route) route 3 (three) times daily. 1/6/15   Hu Grant II, MD   ketoconazole (NIZORAL) 2 % cream APPLY TOPICALLY BID. MIX WITH HYDROCORTISONE CREAM, Uses prn on buttocks 2/5/19   Historical Provider, MD   lancets (ACCU-CHEK SOFTCLIX LANCETS) Misc 1 lancet by Misc.(Non-Drug; Combo Route) route 4 (four) times daily. 1/11/16   Kathleen Lopez APRN,FNP-C   magnesium oxide (MAG-OX) 400 mg (241.3 mg magnesium) tablet TK 1 TABLET PO QD, morning 1/27/19   Historical Provider, MD   methocarbamol (ROBAXIN) 500 MG Tab Take 1 tablet by mouth daily (every 24 hours) as needed for muscle spasm. 11/5/18   Philip Duron III, PA-C   minoxidil (LONITEN) 2.5 MG tablet Take 2 tablets (5 mg total) by mouth 2 (two) times daily. 8/14/18 8/14/19  Jabari Elizabeth MD   NYSTOP powder Apply topically 2 (two) times daily.  2/5/19   Historical Provider, MD   ondansetron (ZOFRAN-ODT) 4 MG TbDL DISSOLVE 1 TABLET(4 MG) ON THE TONGUE EVERY 12 HOURS AS NEEDED 2/24/19   Reji Castlilo MD   oxyCODONE (ROXICODONE) 5 MG immediate release tablet Take 1 tablet (5 mg total) by mouth every 6 (six) hours as needed for Pain. 2/27/18   Anthony St MD   pantoprazole (PROTONIX) 40 MG tablet TAKE 1 TABLET EVERY DAY 5/12/18   Danay Nath MD   pantoprazole (PROTONIX) 40 MG tablet Take 1 tablet (40 mg total) by mouth once daily.  Patient taking differently: Take 40 mg by mouth every morning. Sometimes takes twice a day 3/5/19   Danay Nath MD   rifAXIMin (XIFAXAN) 550 mg Tab Take 1 tablet (550 mg total) by mouth 2 (two) times daily. 1/3/19   Danay Nath MD   sevelamer carbonate (RENVELA) 800 mg Tab Take 1 tablet (800 mg total) by mouth 3 (three) times daily with meals. 9/28/17 1/29/19  JAMIE Hightower   tacrolimus (PROGRAF) 0.5 MG Cap Take 1 capsule (0.5 mg total) by mouth once daily.  Patient taking differently: Take 0.5 mg by mouth every " "morning.  3/5/19   Danay Nath MD   tiZANidine (ZANAFLEX) 4 MG tablet TAKE 1 TABLET BY MOUTH EVERY 6 HOURS AS NEEDED 12/12/18   Reji Castillo MD     Anticoagulants/Antiplatelets: aspirin, last dose > 5 days ago    Allergies:   Review of patient's allergies indicates:   Allergen Reactions    Adhesive Dermatitis and Other (See Comments)     "Burned his tail"  Please use Paper Tape    Corticosteroids (glucocorticoids) Other (See Comments)     Leg swelling after an injection    Hydrocortisone Swelling     Leg swelling after an injection    Neuromuscular blockers, steroidal Swelling     Leg swelling after an injection    Ribavirin Other (See Comments)     Arthralgias     Sedation History:  no adverse reactions    Review of Systems:   Hematological: no known coagulopathies  Respiratory: no shortness of breath  Cardiovascular: no chest pain  Gastrointestinal: no abdominal pain  Genito-Urinary: no dysuria  Musculoskeletal: negative  Neurological: no TIA or stroke symptoms         OBJECTIVE:     Vital Signs (Most Recent)       Physical Exam:  ASA: 2  Mallampati: As per anesthesia    General: no acute distress  Mental Status: alert and oriented to person, place and time  HEENT: normocephalic, atraumatic  Chest: unlabored breathing  Heart: regular heart rate  Abdomen: nondistended  Extremity: moves all extremities    Laboratory  Lab Results   Component Value Date    INR 1.4 (H) 03/07/2019       Lab Results   Component Value Date    WBC 10.61 03/07/2019    HGB 10.1 (L) 03/07/2019    HCT 31.3 (L) 03/07/2019    MCV 86 03/07/2019     03/07/2019      Lab Results   Component Value Date     (H) 03/07/2019     03/07/2019    K 4.0 03/07/2019    CL 99 03/07/2019    CO2 31 (H) 03/07/2019    BUN 26 (H) 03/07/2019    CREATININE 4.5 (H) 03/07/2019    CALCIUM 8.1 (L) 03/07/2019    MG 2.2 02/05/2019    ALT 13 03/07/2019    AST 37 03/07/2019    ALBUMIN 2.4 (L) 03/07/2019    BILITOT 1.3 (H) 03/07/2019    " BILIDIR 0.8 (H) 03/07/2019       ASSESSMENT/PLAN:     Sedation Plan: As per anesthesia    Patient will undergo image guided RFA.    Kash Sam MD  Radiology, PGY - III  [phone]
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
## 2019-03-07 NOTE — PLAN OF CARE
Pt calm but tearful over health status. Wife at his side and supportive. All questions answered. Will continue to monitor.

## 2019-03-07 NOTE — ANESTHESIA POSTPROCEDURE EVALUATION
"Anesthesia Post Evaluation    Patient: Philip Mathis III    Procedure(s) Performed: Procedure(s) (LRB):  Radiofrequency Ablation (N/A)    Final Anesthesia Type: general  Patient location during evaluation: [facility]  Patient participation: Yes- Able to Participate  Level of consciousness: awake and alert and oriented  Post-procedure vital signs: reviewed and stable  Pain management: adequate  Airway patency: patent  PONV status at discharge: No PONV  Anesthetic complications: no      Cardiovascular status: stable  Respiratory status: unassisted, spontaneous ventilation and room air  Hydration status: euvolemic  Follow-up not needed.        Visit Vitals  BP (!) 102/52   Pulse 70   Temp 36.7 °C (98.1 °F) (Skin)   Resp 18   Ht 6' 1" (1.854 m)   Wt 93 kg (205 lb 0.4 oz)   SpO2 (!) 92%   BMI 27.05 kg/m²       Pain/Tom Score: Tom Score: 10 (3/7/2019  4:40 PM)      Stable and improved SpO2 (preop by me was 88% in DOSC), resting comfortably and following commands. Reassurance given.  "

## 2019-03-08 NOTE — TELEPHONE ENCOUNTER
----- Message from Mya Hughes sent at 3/8/2019  1:55 PM CST -----  Contact: pt wife  Please call pt wife at [phone]    Patient is having nausea for a month, abdominal extension over a month and extreme fatigue    Thank you

## 2019-03-08 NOTE — ADDENDUM NOTE
Addendum  created 03/08/19 1756 by Jialyn Junior, CRNA    Intraprocedure Meds edited, Orders acknowledged in Narrator

## 2019-03-08 NOTE — TELEPHONE ENCOUNTER
Spoke to pt's wife who explains symptoms he has been experiencing for the past month of nausea, abdominal distention, and fatigue. She states it is not too severe that pt needs to go to ER. Offered NP transplant clinic appt Monday 3/11 but pt's wife states his history and condition are very complicated and would prefer to only see Dr. Nath. Will send message to Dr. Nath and transplant coordinator Michell Rowe to see if she can schedule a visit next week.

## 2019-03-08 NOTE — ADDENDUM NOTE
Addendum  created 03/08/19 1757 by Jailyn Junior, CRNA    Intraprocedure Meds edited, Orders acknowledged in Narrator
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
## 2019-03-09 PROBLEM — T31.0 BURNS INVOLVING LESS THAN 10% OF BODY SURFACE: Status: ACTIVE | Noted: 2019-01-01

## 2019-03-09 PROBLEM — E87.20 METABOLIC ACIDOSIS: Status: RESOLVED | Noted: 2017-05-24 | Resolved: 2019-01-01

## 2019-03-09 PROBLEM — B37.2 CANDIDIASIS, CUTANEOUS: Status: ACTIVE | Noted: 2019-01-01

## 2019-03-09 NOTE — PATIENT INSTRUCTIONS
You may shower using a mild soap such as Dove.  Irrigate the wounds with lukewarm water for 5 minutes and dry thoroughly.  Apply triamcinolone cream and nystatin cream to the buttocks twice daily. Apply medihoney gel to the wounds, cover with cotton gauze and secure with paper tape.  Change dressing daily and as needed.  Report any signs of infection.      You may purchase your supplies including the medihoney gel from [employer].  They have 3 locations:  [address] [place] [address] [phone] [address] [place] [address] [phone] [address] [place] [address] [phone]    Drink 3 glucerna daily in between meals and eat protein at every meal.
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
## 2019-03-11 PROBLEM — I50.42 CHRONIC COMBINED SYSTOLIC AND DIASTOLIC HEART FAILURE: Status: ACTIVE | Noted: 2017-04-26

## 2019-03-11 PROBLEM — E11.21 DIABETIC NEPHROPATHY ASSOCIATED WITH TYPE 2 DIABETES MELLITUS: Status: RESOLVED | Noted: 2017-05-24 | Resolved: 2019-01-01

## 2019-03-11 PROBLEM — R60.9 EDEMA: Status: RESOLVED | Noted: 2017-05-24 | Resolved: 2019-01-01

## 2019-03-11 PROBLEM — G93.40 ACUTE ENCEPHALOPATHY: Status: ACTIVE | Noted: 2019-01-01

## 2019-03-11 NOTE — ED TRIAGE NOTES
Pt arrived to ED via EMS after unwittinessed fall occurring this morning. On EMS arrival pt was confused with decreased LOC. Pt is awake but not responding to questions. Pts friend reports pts baseline is AAO x 4.

## 2019-03-11 NOTE — PROVIDER PROGRESS NOTES - EMERGENCY DEPT.
Encounter Date: 3/11/2019    ED Physician Progress Notes        Physician Note:   Lab call to inform us that they could not run the rest of the drug screen due to color interference of the urine.

## 2019-03-11 NOTE — ED PROVIDER NOTES
Encounter Date: 3/11/2019    SCRIBE #1 NOTE: I, Anna Marie Wayne, am scribing for, and in the presence of,  Tyrel Valiente MD. I have scribed the following portions of the note - Other sections scribed: HPI, ROS, PE .       History     Chief Complaint   Patient presents with    Altered Mental Status     EMS called for fall.  Upon thier arrival found pt confused w/ constricted pupils.  EMS gave Narcan with little results.  Pt had liver ablation x 4 days ago.  Spouse states abd been distended since procedure.  and normally talks in full sentences.     CC: Altered Mental Status       73 y.o. Male with an extensive PMHx presents to ED for emergent evaluation of acute onset altered mental status that began this morning. Wife notes finding the pt on the floor this morning. She states the pt was not incoherent last night. He has exhibited similar symptoms in the past with elevated ammonia levels. EMS gave the pt x2 Narcan with minimal results. Pt received a liver ablation x4 days ago because of a tumor on the liver. Hx limited by mental status of patient.       He has a past medical history of Anemia, Aortic atherosclerosis, Arthritis, Back pain, Bishop's esophagus, BPH (benign prostatic hypertrophy), Chronic kidney disease, Cirrhosis, Diabetes mellitus, Diabetes mellitus, type 2, Diabetes with neurologic complications, Diabetic retinopathy, Elevated PSA, ESRD on dialysis, GERD , Gout, Heart failure, Hemolytic anemia, Hepatitis C, Hepatocellular carcinoma (1/2/2018), Hyperlipidemia, Hyperphosphatemia, Hypertension, Hypertension, Hypertensive CKD (chronic kidney disease), Immune disorder, Immunosuppression, Liver transplanted, Metabolic acidosis, KARON (obstructive sleep apnea), Peripheral neuropathy, Proteinuria, Secondary hyperparathyroidism, Severe pruritus, Sleep apnea, Thrombocytopenia, Trouble in sleeping, Type 2 diabetes mellitus with ophthalmic manifestations, Type 2 diabetes with peripheral circulatory disorder,  "controlled, Type II or unspecified type diabetes mellitus with renal manifestations, uncontrolled(250.42), and Urticaria.          The history is provided by a relative and the spouse. No  was used.     Review of patient's allergies indicates:   Allergen Reactions    Adhesive Dermatitis and Other (See Comments)     "Burned his tail"  Please use Paper Tape    Corticosteroids (glucocorticoids) Other (See Comments)     Leg swelling after an injection    Hydrocortisone Swelling     Leg swelling after an injection    Neuromuscular blockers, steroidal Swelling     Leg swelling after an injection    Ribavirin Other (See Comments)     Arthralgias    Propofol analogues Other (See Comments)     Drops SVR and due to current systemic shunt state, results in difficulty in oscillometry to obtain blood pressure until return of vascular tone (other VS are stable throughout) and drop in venous return due to abdominal ascites; titrate slower in future or select alternate agent. Smooth emergence from general anesthesia without difficulty.     Past Medical History:   Diagnosis Date    Anemia     Aortic atherosclerosis     Arthritis     Back pain     Bishop's esophagus     BPH (benign prostatic hypertrophy)     Chronic kidney disease     Cirrhosis     Diabetes mellitus     Diabetic Polyneuropathy    Diabetes mellitus, type 2     Diabetic Nephropathy    Diabetes with neurologic complications     Diabetic retinopathy     Elevated PSA     ESRD on dialysis     GERD (gastroesophageal reflux disease)     Gout     Heart failure     Hemolytic anemia     Hepatitis C     successfully treated with Harvoni    Hepatocellular carcinoma 1/2/2018    Hyperlipidemia     Hyperphosphatemia     Hypertension     Hypertension     Hypertensive CKD (chronic kidney disease)     Immune disorder     Immunosuppression     Liver transplanted     Metabolic acidosis     KARON (obstructive sleep apnea)     " Peripheral neuropathy     Proteinuria     Secondary hyperparathyroidism     Severe pruritus     Sleep apnea     Thrombocytopenia     Trouble in sleeping     Type 2 diabetes mellitus with ophthalmic manifestations     Type 2 diabetes with peripheral circulatory disorder, controlled     Type II or unspecified type diabetes mellitus with renal manifestations, uncontrolled(250.42)     Urticaria      Past Surgical History:   Procedure Laterality Date    1) Creation of L arm basilic vein transposition (brachial artery-to-basilic vein AVF)  2) Ligation of L brachial AVF (which was 1st stage creation previously)    Left 6/15/2017    Performed by Silvio William MD at [facility] OR 2ND FLR    ABLATION, RADIOFREQUENCY N/A 2/27/2018    Performed by Esther Surgeon at [facility] ESTHER    QWHDCO-[id number] N/A 12/21/2017    Performed by [facility] at [facility] OR 2ND [facility]    broken nose      CATARACT EXTRACTION Bilateral     colonoscopy N/A 7/23/2014    Performed by Moises St MD at [facility] ENDO (4TH FLR)    XOTXTKUC-[id number] POSSIBLE AV GRAFT Left 11/8/2016    Performed by Silvio William MD at [facility] OR 2ND FLR    DEBRIDEMENT-WOUND Left 4/12/2015    Performed by Jimmy Light MD at [facility] OR 2ND FLR    DEBRIDEMENT-WOUND Left 4/11/2015    Performed by Jimmy Light MD at [facility] OR 2ND FLR    DEBRIDEMENT-WOUND Left 4/10/2015    Performed by Jimmy Light MD at [facility] OR 2ND FLR    DEBRIDEMENT-WOUND Left 4/9/2015    Performed by Jimmy Light MD at [facility] OR 1ST FLR    DEBRIDEMENT-WOUND Left 4/6/2015    Performed by Jimmy Light MD at [facility] OR 2ND FLR    DEBRIDEMENT-WOUND Left 4/4/2015    Performed by Jimmy Light MD at [facility] OR 2ND FLR    DEBRIDEMENT-WOUND Left 4/2/2015    Performed by Jimmy Light MD at [facility] OR 1ST FLR    DRESSING CHANGE- Left 4/10/2015    Performed by Jimmy Light MD at [facility] OR 2ND FLR    DRESSING CHANGE-s/p burn debridement Left 4/15/2015    Performed  by [facility] at [facility] OR 2ND [facility]    DRESSING CHANGE-s/p burn debridement Left 2015    Performed by Jimmy Light MD at [facility] OR 2ND [facility]    DRESSING CHANGE-s/p wound debridement Left 2015    Performed by Jimmy Light MD at [facility] OR 1ST FLR    DRESSING CHANGE-wound vac Left 2015    Performed by Jimmy Light MD at [facility] OR 2ND [facility]    EMBOLIZATION, YTTRIUM THERAPY N/A 2019    Performed by [facility] Provider at [facility] OR [address] [place] [address]    EYE SURGERY      cataracts    FEMUR SURGERY      FRACTURE SURGERY      right femur fracture     INSERTION-CATHETER-PERM-A-CATH Right 2017    Performed by Silvio William MD at [facility] OR [address] [place] [address]    LIVER TRANSPLANT      Open Biome Fecal Transplant N/A 2015    Performed by Elmo Gan MD at [facility] ENDO (4TH FLR)    PLACEMENT-WOUND VAC  4/15/2015    Performed by Jimmy Light MD at [facility] OR 2ND FLR    PLACEMENT-WOUND VAC Left 2015    Performed by Jimmy Light MD at [facility] OR 2ND FLR    PORTACATH PLACEMENT Left     Radiofrequency Ablation N/A 3/7/2019    Performed by Esther Surgeon at [facility] ESTHER    SKIN GRAFT      graft from right thigh , applied to the left back and left arm.    SPLIT THICKNESS SKIN GRAFT - left trunk, abd and axilla N/A 4/15/2015    Performed by Jimmy Light MD at [facility] OR 2ND [facility]     Family History   Problem Relation Age of Onset    Cancer Mother         cancer 55 years ago    Coronary artery disease Father     Hypertension Father     Diabetes Father     Heart disease Father     Cancer Sister         breast CA    Colon cancer Neg Hx      Social History     Tobacco Use    Smoking status: Former Smoker     Last attempt to quit: 1998     Years since quittin.6    Smokeless tobacco: Never Used    Tobacco comment: pt reports quitting in    Substance Use Topics    Alcohol use: No     Comment: pt reports hx of alcholism and reports quit in     Drug use: Yes      Frequency: 2.0 times per week     Types: Marijuana     Review of Systems   Unable to perform ROS: Mental status change       Physical Exam     Initial Vitals [03/11/19 0733]   BP Pulse Resp Temp SpO2   (!) 152/72 88 18 98.3 °F (36.8 °C) 96 %      MAP       --         Physical Exam    Nursing note and vitals reviewed.  Constitutional: He is not diaphoretic. No distress.   To shell   HENT:   Head: Normocephalic and atraumatic.   Mouth/Throat: Oropharynx is clear and moist.   Eyes: Conjunctivae and EOM are normal. Pupils are equal, round, and reactive to light. No scleral icterus.   Neck: Normal range of motion. Neck supple. No JVD present.   Cardiovascular: Normal rate, regular rhythm and intact distal pulses.   Pulmonary/Chest: Breath sounds normal. No stridor. No respiratory distress.   Abdominal: Soft. Bowel sounds are normal. He exhibits no distension. There is no tenderness.   Old bruises on abdomen    Musculoskeletal: Normal range of motion. He exhibits no edema or tenderness.   No obvious deformity of extremities   Neurological: He has normal strength. No cranial nerve deficit.   Confused but moving all extremities.  Will state his name but otherwise will not answer any other questions   Skin: Skin is warm and dry. No rash noted.   Psychiatric:   Pt is confused          ED Course   Procedures  Labs Reviewed   APTT - Abnormal; Notable for the following components:       Result Value    aPTT 37.4 (*)     All other components within normal limits   CBC W/ AUTO DIFFERENTIAL - Abnormal; Notable for the following components:    RBC 3.51 (*)     Hemoglobin 9.7 (*)     Hematocrit 30.9 (*)     MCHC 31.4 (*)     RDW 18.7 (*)     Gran # (ANC) 10.4 (*)     Lymph # 0.4 (*)     Mono # 1.2 (*)     Gran% 84.4 (*)     Lymph% 3.5 (*)     All other components within normal limits   COMPREHENSIVE METABOLIC PANEL - Abnormal; Notable for the following components:    CO2 22 (*)     Glucose 160 (*)     BUN, Bld 40 (*)     Creatinine  6.5 (*)     Calcium 8.2 (*)     Albumin 2.3 (*)     Total Bilirubin 2.0 (*)     Alkaline Phosphatase 142 (*)     AST 60 (*)     eGFR if  9 (*)     eGFR if non  8 (*)     All other components within normal limits   PROTIME-INR - Abnormal; Notable for the following components:    Prothrombin Time 16.1 (*)     INR 1.5 (*)     All other components within normal limits   TROPONIN I - Abnormal; Notable for the following components:    Troponin I 0.392 (*)     All other components within normal limits   SALICYLATE LEVEL - Abnormal; Notable for the following components:    Salicylate Lvl <5.0 (*)     All other components within normal limits   ACETAMINOPHEN LEVEL - Abnormal; Notable for the following components:    Acetaminophen (Tylenol), Serum <3.0 (*)     All other components within normal limits   POCT GLUCOSE - Abnormal; Notable for the following components:    POCT Glucose 161 (*)     All other components within normal limits   CULTURE, BLOOD   CULTURE, BLOOD   DRUG SCREEN PANEL, URINE EMERGENCY    Narrative:     Preferred Collection Type->Urine, Clean Catch   LIPASE   MAGNESIUM   TSH   URINALYSIS, REFLEX TO URINE CULTURE    Narrative:     Preferred Collection Type->Urine, Clean Catch   ALCOHOL,MEDICAL (ETHANOL)   AMMONIA   LACTIC ACID, PLASMA   TROPONIN I   TACROLIMUS LEVEL     EKG Readings: (Independently Interpreted)   EKG done at 8:21 a.m. a.m. on March 11, 2019 and compared to previous EKG back in 2017.  Normal sinus rhythm first-degree AV block.  Flipped T-waves laterally a and and lead 3 and AVF.  No ST elevation.  QTC barely prolonged at 5:06 a.m..  Presses 90.  Normal axis.  Abnormal EKG.  New flipped T-waves this discussed above.     ECG Results          EKG 12-lead (In process)  Result time 03/11/19 11:11:51    In process by Interface, Lab In [facility] (03/11/19 11:11:51)                 Narrative:    Test Reason : R41.82,    Vent. Rate : 085 BPM     Atrial Rate : 085 BPM      P-R Int : 222 ms          QRS Dur : 090 ms      QT Int : 426 ms       P-R-T Axes : 029 015 -54 degrees     QTc Int : 506 ms    Sinus rhythm with 1st degree A-V block with Premature atrial complexes  T wave abnormality, consider inferior ischemia  T wave abnormality, consider anterolateral ischemia  Prolonged QT  Abnormal ECG  When compared with ECG of 14-OCT-2017 13:54,  Significant changes have occurred    Referred By: [email or web address]   SELF           Confirmed By:                   In process by Interface, Lab In [facility] (03/11/19 11:05:48)                 Narrative:    Test Reason : R41.82,    Vent. Rate : 085 BPM     Atrial Rate : 085 BPM     P-R Int : 222 ms          QRS Dur : 090 ms      QT Int : 426 ms       P-R-T Axes : 029 015 -54 degrees     QTc Int : 506 ms    Sinus rhythm with 1st degree A-V block with Premature atrial complexes  T wave abnormality, consider inferior ischemia  T wave abnormality, consider anterolateral ischemia  Prolonged QT  Abnormal ECG  When compared with ECG of 14-OCT-2017 13:54,  Significant changes have occurred    Referred By: [email or web address]   SELF           Confirmed By:                             Imaging Results          CTA Chest Non-Coronary - PE Study (Final result)     Abnormal  Result time 03/11/19 12:23:31    Final result by Clement Crooks MD (03/11/19 12:23:31)                 Impression:      1. No definite PE identified, noting limited evaluation to the central pulmonary arteries.  2. Development of bilateral pleural effusions, larger on the right.  3. Partial collapse of the middle lobe, lingula, and bilateral lower lobes likely representing atelectasis.  4. Cardiomegaly and coronary atherosclerosis.  5. Status post liver transplant.  Hepatomegaly with large tumor in the right lobe and significant size increase compared to CT abdomen 12/19/2018.  Low-attenuation focus in segment 7 likely representing necrosis and/or hemorrhage status post IR embolization.  6. 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  7. Moderate volume ascites  8. Chronic medical renal disease.  No hydronephrosis.  9. Body wall edema.  10. Other findings as above.  11.  This report was flagged in Epic as abnormal.      Electronically signed by: Clement Crooks MD  Date:    03/11/2019  Time:    12:23             Narrative:    EXAMINATION:  CT ABDOMEN PELVIS WITH CONTRAST; CTA CHEST NON CORONARY    CLINICAL HISTORY:  recent procedure;; low O2 sat, elevated troponin, ams;    TECHNIQUE:  Low dose axial images, sagittal and coronal reformations were obtained from the lung apices to the pubic symphysis following the IV administration of 100 mL of Omnipaque 350 .  No oral contrast given.  CT chest was the formed with the pulmonary embolus protocol.  Multiplanar reconstructions including MIP images were post processed for vessel analysis.  This was followed by routine abdomen and pelvis.    COMPARISON:  PET CT exam 01/26/2019, CT abdomen pelvis 12/19/2018.    FINDINGS:  Visualized structures in the lower neck and both axillary areas are unremarkable.  Mediastinal and hilar areas show upper normal sized lymph nodes at the azygos and subcarinal positions, not hypermetabolic on most recent prior PET CT.  Thoracic aorta is upper normal size with left arch .  Moderate atherosclerosis calcifications are seen along the aorta and coronary arteries.  Heart size is mildly enlarged the with mild, chronic pericardial effusion around.    Evaluation of the pulmonary arteries for pulmonary thromboembolism is limited due to respiratory motion artifact.  No definite filling defects are identified to the level of the proximal segmental pulmonary arteries to indicate acute PE.  More distal pulmonary arteries are not adequately evaluated.    Trachea and central bronchi are patent.  Bilateral pleural effusions have developed, small on the left and moderate on the right; these appears simple by imaging with  the homogeneous attenuation and posterior layering. the upper lobes on both sides are expanded and relatively clear with note of mild consolidation/atelectasis at the edge of the lingula.  Right middle and lower lobes are mostly collapsed.  Left lower lobe has some expansion with note of dependent consolidation, likely atelectasis.  Collapsed portions of lungs could obscure other pathology.    Spleen is mildly enlarged without focal lesion.  Extensive atherosclerosis is noted along the splenic artery.  Patient is status post liver transplant.  Hepatomegaly is present with the approximate 21 cm vertical span of the liver.  Correlating with the most recent PET CT exam, large heterogeneous mass is present in the liver involving hepatic segments 4, 5, 6, 7 and 8; there is relative sparing of hepatic segments 2 and 3.  More focal area of abnormality within the right lobe measures up to 18.5 cm (series 3, image 43 ) and represents a significant size increase compared to the CT 12/19/2018.  The elsewhere, the hepatic parenchyma is heterogeneous.  Within hepatic segment 7 is a more confluent low-attenuation abnormality approximately 7 cm likely represent focus of necrosis and/or hemorrhage status post IR embolization.  Bile ducts are not significantly dilated.  Hepatic artery is patent.  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    Adrenal glands are normal.  Both kidneys again demonstrate mild parenchymal loss consistent with chronic medical renal disease.  No stone or hydronephrosis is detected.  A few small cysts are present.  Ureters are not dilated.  Wall of the urinary bladder is smooth, noting limited assessment due to partial collapse.  Prostate is unremarkable.    Abdominal aorta shows atherosclerosis but no aneurysm.  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  Mild retroperitoneal lymph node  enlargement is seen in several areas.    A moderate volume of ascites is present throughout the abdomen and pelvis, increased in volume from comparison exams.  No free air is detected.  Intestinal tract shows no obstruction or acute inflammatory process.  Some fluid is present in the gastric body.  Small bowel is normal caliber.  Diverticulosis is present in the left colon without acute inflammation.    Skeletal structures are intact without acute fracture or lytic/blastic lesion.  Hardware is partially demonstrated in the right femur.  Degenerative changes are evident throughout the thoracolumbar spine, and note is also made of pars defect at L5 with grade 1 spondylolisthesis.  Mild, diffuse body wall edema is present.                               CT Abdomen Pelvis With Contrast (Final result)     Abnormal  Result time 03/11/19 12:23:31    Final result by Clement Crooks MD (03/11/19 12:23:31)                 Impression:      1. No definite PE identified, noting limited evaluation to the central pulmonary arteries.  2. Development of bilateral pleural effusions, larger on the right.  3. Partial collapse of the middle lobe, lingula, and bilateral lower lobes likely representing atelectasis.  4. Cardiomegaly and coronary atherosclerosis.  5. Status post liver transplant.  Hepatomegaly with large tumor in the right lobe and significant size increase compared to CT abdomen 12/19/2018.  Low-attenuation focus in segment 7 likely representing necrosis and/or hemorrhage status post IR embolization.  6. 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  7. Moderate volume ascites  8. Chronic medical renal disease.  No hydronephrosis.  9. Body wall edema.  10. Other findings as above.  11.  This report was flagged in Epic as abnormal.      Electronically signed by: Clement Crooks MD  Date:    03/11/2019  Time:    12:23             Narrative:    EXAMINATION:  CT ABDOMEN PELVIS WITH CONTRAST;  CTA CHEST NON CORONARY    CLINICAL HISTORY:  recent procedure;; low O2 sat, elevated troponin, ams;    TECHNIQUE:  Low dose axial images, sagittal and coronal reformations were obtained from the lung apices to the pubic symphysis following the IV administration of 100 mL of Omnipaque 350 .  No oral contrast given.  CT chest was the formed with the pulmonary embolus protocol.  Multiplanar reconstructions including MIP images were post processed for vessel analysis.  This was followed by routine abdomen and pelvis.    COMPARISON:  PET CT exam 01/26/2019, CT abdomen pelvis 12/19/2018.    FINDINGS:  Visualized structures in the lower neck and both axillary areas are unremarkable.  Mediastinal and hilar areas show upper normal sized lymph nodes at the azygos and subcarinal positions, not hypermetabolic on most recent prior PET CT.  Thoracic aorta is upper normal size with left arch .  Moderate atherosclerosis calcifications are seen along the aorta and coronary arteries.  Heart size is mildly enlarged the with mild, chronic pericardial effusion around.    Evaluation of the pulmonary arteries for pulmonary thromboembolism is limited due to respiratory motion artifact.  No definite filling defects are identified to the level of the proximal segmental pulmonary arteries to indicate acute PE.  More distal pulmonary arteries are not adequately evaluated.    Trachea and central bronchi are patent.  Bilateral pleural effusions have developed, small on the left and moderate on the right; these appears simple by imaging with the homogeneous attenuation and posterior layering. the upper lobes on both sides are expanded and relatively clear with note of mild consolidation/atelectasis at the edge of the lingula.  Right middle and lower lobes are mostly collapsed.  Left lower lobe has some expansion with note of dependent consolidation, likely atelectasis.  Collapsed portions of lungs could obscure other pathology.    Spleen is  mildly enlarged without focal lesion.  Extensive atherosclerosis is noted along the splenic artery.  Patient is status post liver transplant.  Hepatomegaly is present with the approximate 21 cm vertical span of the liver.  Correlating with the most recent PET CT exam, large heterogeneous mass is present in the liver involving hepatic segments 4, 5, 6, 7 and 8; there is relative sparing of hepatic segments 2 and 3.  More focal area of abnormality within the right lobe measures up to 18.5 cm (series 3, image 43 ) and represents a significant size increase compared to the CT 12/19/2018.  The elsewhere, the hepatic parenchyma is heterogeneous.  Within hepatic segment 7 is a more confluent low-attenuation abnormality approximately 7 cm likely represent focus of necrosis and/or hemorrhage status post IR embolization.  Bile ducts are not significantly dilated.  Hepatic artery is patent.  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    Adrenal glands are normal.  Both kidneys again demonstrate mild parenchymal loss consistent with chronic medical renal disease.  No stone or hydronephrosis is detected.  A few small cysts are present.  Ureters are not dilated.  Wall of the urinary bladder is smooth, noting limited assessment due to partial collapse.  Prostate is unremarkable.    Abdominal aorta shows atherosclerosis but no aneurysm.  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  Mild retroperitoneal lymph node enlargement is seen in several areas.    A moderate volume of ascites is present throughout the abdomen and pelvis, increased in volume from comparison exams.  No free air is detected.  Intestinal tract shows no obstruction or acute inflammatory process.  Some fluid is present in the gastric body.  Small bowel is normal caliber.  Diverticulosis is present in the left colon without acute inflammation.    Skeletal  structures are intact without acute fracture or lytic/blastic lesion.  Hardware is partially demonstrated in the right femur.  Degenerative changes are evident throughout the thoracolumbar spine, and note is also made of pars defect at L5 with grade 1 spondylolisthesis.  Mild, diffuse body wall edema is present.                               CT Head Without Contrast (Final result)  Result time 03/11/19 09:44:55    Final result by Holden Willson MD (03/11/19 09:44:55)                 Impression:      No acute intracranial abnormalities are identified.  There is no evidence for intracranial hemorrhage.    Atrophy and periventricular white matter ischemic changes commensurate with the patient's chronological age.    Paranasal sinus mucosal disease.    Remote inferior wall blowout fracture of the right orbit.      Electronically signed by: Holden Willson MD  Date:    03/11/2019  Time:    09:44             Narrative:    EXAMINATION:  CT HEAD WITHOUT CONTRAST    CLINICAL HISTORY:  Confusion/delirium, altered LOC, unexplained;    TECHNIQUE:  Low dose axial images were obtained through the head.  Coronal and sagittal reformations were also performed. Contrast was not administered.    COMPARISON:  None.    FINDINGS:  The ventricles are enlarged and the sulci are prominent consistent with generalized atrophy commensurate with the patient's chronological age.  There is decreased density within the periventricular white matter likely reflecting ischemia/infarction secondary to microvascular disease and this is also commensurate with the patient's age.  There is no evidence for abnormal masses, mass effect, or midline shift.  No abnormal intra or extra-axial fluid collections are identified, specifically there is no evidence for intracranial hemorrhage.  Atherosclerotic calcification is identified within the intracranial internal carotid arteries.  Mastoid air cells are clear.  There appear to be chronic changes related to  inferior wall blowout fracture of the right orbit.  There is minimal mucosal thickening within the left maxillary antrum and prominent mucosal thickening is identified within the sphenoid sinuses.  Bony calvarium appears intact.                               CT Cervical Spine Without Contrast (Final result)  Result time 03/11/19 09:00:15    Final result by Clement Crooks MD (03/11/19 09:00:15)                 Impression:      1. No cervical spine fracture identified.  2. Overall mild degenerative spine changes, detailed above.  3. Paranasal sinus disease, carotid atherosclerosis, and other findings as above.      Electronically signed by: Clement Crooks MD  Date:    03/11/2019  Time:    09:00             Narrative:    EXAMINATION:  CT CERVICAL SPINE WITHOUT CONTRAST    CLINICAL HISTORY:  fall, confusion, can't give hx;    TECHNIQUE:  Low dose axial images, sagittal and coronal reformations were performed though the cervical spine.  Contrast was not administered.    COMPARISON:  MRI cervical spine 06/23/2005, PET CT 01/26/2019    FINDINGS:  For the intracranial findings see the concurrent CT head exam reported separately.    Mild motion blurring is present, more notably at images around the skull base.    The cervical vertebra are intact without acute fracture or traumatic malalignment.  Vertebra are aligned on the sagittal images without subluxation.  On coronal images, mild tilt of upper cervical spine to the right is likely positional.    Visualized soft tissues of the neck show no mass or paraspinal hematoma.  Both carotid arteries show atherosclerosis calcifications.  Airway is patent, and lung apices are clear.  Paranasal sinus disease is evident with the prominent mucosal thickening in the sphenoid sinus.  Mastoid air cells appear clear.    Some degenerative changes are evident in the cervical spine.  Anterior osteophytes are present at the multiple levels.  C1-2 articulation shows narrowing and  sclerosis between the odontoid and anterior arch of C1.  Tip of the odontoid has small erosive or cystic change with adjacent small calcifications suggesting crystal deposition disease.  Approximate 1 cm cyst or geode in the body of C2 next to the odontoid base is stable, PET negative.  Disc spaces show no significant narrowing.  Facet degenerative change is present at multiple levels.  C2-3 has moderate narrowing of the left neural foramen from spurs.  C4-5 has a small central disc protrusion.  Other levels show overall mild degenerative change.  No significant stenosis of the central canal is detected.                               X-Ray Chest AP Portable (Final result)  Result time 03/11/19 08:12:15    Final result by Clement Crooks MD (03/11/19 08:12:15)                 Impression:      Chronic cardiomegaly with possible mild CHF.    Consolidation right lung base.  Clinical correlation and follow-up recommended.      Electronically signed by: Clement Crooks MD  Date:    03/11/2019  Time:    08:12             Narrative:    EXAMINATION:  XR CHEST AP PORTABLE    CLINICAL HISTORY:  altered mental status;    TECHNIQUE:  Single frontal view of the chest was performed.    COMPARISON:  10/14/2017    FINDINGS:  The heart size remains mildly enlarged.  Mediastinal structures are midline with note of aortic atherosclerosis.  Lungs are expanded with mildly increased pulmonary vascularity.  Right lower lung zone shows patchy consolidation; this could indicate atelectasis, edema, pneumonia, etc.  Lungs are clear elsewhere.  Small pleural effusions may be present.  Skeletal structures are intact without acute finding.                                 Medical Decision Making:   Initial Assessment:   Patient arrived via EMS with AMS. physical exam remarkable for confusion and GCS 12    Given patient has baseline O2, hypoxia less likely.  BgL is 160, making hypoglycemia, DKA, HHS less likely    DDX still includes, but is  not limited to: EtOH, electrolyte abnormality (low/high Na, low/high Mg, hypocalcemia, hypophosphatemia), abnormal thyroid function, infection/sepsis, drug overdose, hypothermia, uremia, trauma, encephalopathy/encephalitis, CVA, SAH or other ICH, postictal state 2/2 seizure, psych.    Labs  Urine  ECG  CXR  Head CT    Chief CT head and C-spine showed nothing acute.  Labs are notable for an ammonia level of 45 which is around the patient's baseline but that was in 2017.  He has been taking medications to help decrease this so it may be elevated from his medically treated baseline.  No elevation of white count.  Lactic acid is reassuring.  Elevated creatinine on the patient but potassium was normal. TSH is reassuring.  Troponin is elevated concerning for nstemi.  Holding off on any anticoagulation until I get CT chest abdomen pelvis on the patient.  Patient had a recent embolization and may be having hemorrhage.    CT chest abdomen pelvis notable for increase in size of the liver mass and also an area of necrosis MR hemorrhage. Holding off on anticoagulation due to the potential hemorrhage. Unsure exactly the etiology of his altered mental status.  Wide spectrum antibiotics and also ceftriaxone ordered for patient to cover.  He is not septic.    Head discussions between the hospitalist here and also at [facility] was decided the best interest of the patient will be to transfer to [facility] hospital.  Dr. Criselda Stewart accepted patient. Family updated.     Please put in 35 minutes of critical care due to patient having a high risk of neurological/cardiac failure.   Separate from teaching and exclusive of procedure and ekg time  Includes:  Time at bedside  Time reviewing test results  Time discussing case with staff  Time documenting the medical record  Time spent with family members  Time spent with consults  Management    Clinical Tests:   Lab Tests: Ordered and Reviewed  Radiological Study: Reviewed and  Ordered  Medical Tests: Ordered and Reviewed            Scribe Attestation:   Scribe #1: I performed the above scribed service and the documentation accurately describes the services I performed. I attest to the accuracy of the note.    Attending Attestation:           Physician Attestation for Scribe:  Physician Attestation Statement for Scribe #1: I, Tyrel Valiente MD, reviewed documentation, as scribed by Anna Marie Wayne in my presence, and it is both accurate and complete.                    Clinical Impression:       ICD-10-CM ICD-9-CM   1. HCC (hepatocellular carcinoma) C22.0 155.0   2. Altered mental state R41.82 780.97   3. Fall, initial encounter W19.XXXA E888.9   4. Elevated troponin R74.8 790.6                                Tyrel Valiente MD  03/11/19 0852

## 2019-03-11 NOTE — PLAN OF CARE
[facility] admissions ONLY: Please call extension [phone] upon patient arrival to floor for Hospital Medicine admit team assignment and for additional admit orders for the patient. Do not page the attending, staff physician or Advanced Practice Provider with the patient on arrival (may not be in-house at the time of arrival). Call back or wait to leave beeper number when prompted.    Outside Transfer Acceptance Note       Patients name/MRN: Philip Mathis/MRN [id number]     Referring Physician or Mid-Level provider giving report: Dr. Tyrel Valiente (Emergency Medicine)  Contact number: [phone]    Referral Facility: [facility]    Date/Time of Acceptance: 3/11/2019 1:48 PM    Accepting Physician for admission to hospital: Criselda Fisher MD ()    Accepting facility: [facility] Med/Surg Telemetry    Consulting Physicians from [facility] involved in case:  Dr. Jarad Verdin ([facility]- Hepatology)     Reason for transfer request: Needs hospital admit for evaluation of acute encephalopathy in patient with prior liver transplant in 2001 and receiving radiofrequency ablation via IR for HCC with last treatment on 3/7     Report from Physician/Mid-Level Provider/HPI: 72y/o male with hepatocellular carcinoma undergoing radiofrequency ablation by IR to treat, orthotopic liver transplant on 6/10/2001 on Prograf, ESRD on hemodialysis (MWF), Type 2 diabetes mellitus with ESRD on long term insulin, essential HTN, chronic combined systolic and diastolic heart failure, KARON and history of hepatitis C (treated) who presented to [facility] ED this am with acute confusion. According to ED physician, wife reports she heard patient getting out of bed and went into bedroom and patient was on the ground and very confused and not recognizing her. EMS called, and patient brought to ED. Patient has undergone extensive evaluation with CTA of chest negative for PE as patient was mildly hypoxic on arrival  and showed bilateral pleural effusions but nothing else significant. CT scan of abdomen showed moderate ascites and large hepatic tumor with necrosis. CT scan of head and C-spine were unremarkable. Ammonia was 45 and Lactic acid was 1.2. WBC was 12,200 but patient afebrile and not tachycardic. U/A negative for infection. Patient due for HD today and did not get HD and BUN/Cr was 40/6.5. In ED, patient remains acutely confused and not responding to questions appropriately. Patient has not focal neurologic deficits. Prograf level drawn and pending. Dr. Verdin at [facility]atology called and stated patient could be admitted to [facility] but [facility] medicine did not fell comfortable admitting patient for encephalopathy work-up so patient to be transferred and admitted to [facility]. Patient empirically given broad spectrum abx at [facility] including Vancomycin. Zosyn and Ceftriaxone.     VS: /64  Pulse 85  Temp 97.6 °F (36.4 °C) (Oral)  Resp 20  Wt 93 kg (205 lb)  SpO2 96% on room air  BMI 27.05 kg/m²    Past Medical/Surgical History: See EPIC    Meds: See EPIC    Labs: See EPIC    Imaging: See EPIC    To Do List upon arrival:     Evaluation and work-up of acute encephalopathy   Consult Hepatology as patient is liver transplant patient   Consult Nephrology as patient is chronic dialysis patient    Criselda Fisher MD  Senior Staff Physician  Department of Hospital Medicine  Patient Flow Center/   [phone]

## 2019-03-12 PROBLEM — R14.0 ABDOMINAL DISTENTION: Status: ACTIVE | Noted: 2019-01-01

## 2019-03-12 PROBLEM — J96.01 ACUTE HYPOXEMIC RESPIRATORY FAILURE: Status: ACTIVE | Noted: 2019-01-01

## 2019-03-12 PROBLEM — R79.89 TROPONIN LEVEL ELEVATED: Status: ACTIVE | Noted: 2019-01-01

## 2019-03-12 NOTE — ASSESSMENT & PLAN NOTE
74 year old male with a history of Hep C cirrhosis s/p transplant (2001) complicated by HCC/PVT s/p ablation on 3/7/19 and ESRD on who Hepatology is being consulted for decompensated cirrhosis. Although patient is unable to give any history we wonder if current decompensation is secondary to an infectious process vs a cardiac event vs progress of his HCC. From our perspective would continue antibiotics with appropriate gram negative coverage while following up his cultures and obtaining a diagnostic/therapeutic paracentesis. We also think it would be useful to obtain an ECHO to evaluate EF and wall motion abnormality. Overall guarded prognosis with low threshold to transfer to ICU if mention not improving/patient not able to protect his airway.    Recommendations:  --Daily CMP, CBC, INR  --Daily Tacrolimus level  --Hold tacrolimus dose today  --Continue antibiotics (ceftriaxone and/or zosyn)  --Continue lactulose (can be given rectally) and rifaximin  --Obtain diagnostic and therapeutic paracentesis  --Obtain ECHO  --Nephrology consult   --Low threshold to transfer to ICU

## 2019-03-12 NOTE — CARE UPDATE
Rapid Response Follow-up Note    Pt to be transferred to [facility] 6095 under MICU service.   Reviewed plan of care with primary RN, Yessi, [id number].   Instructed to give report to MICU RN.   Please call Rapid Response RN with any questions or concerns at  [phone].

## 2019-03-12 NOTE — EICU
Patient intubated for respiratory failure by Dr. ERICA Sims/Dr. ASPEN Carr. Patient was given Etomidate and Rocuronium prior to intubation. 8.0 ET tube inserted without complication. Placement confirmed by positive color change to ETCO2 detector and bilateral equal breath sounds on auscultation, pending chest x-ray. Vital signs remained stable throughout procedure.

## 2019-03-12 NOTE — CARE UPDATE
5:20 PM    Patient seen and examined during morning rounds. Patient obtunded at bedside. Non-verbal. Withdraws/grimaces to pain. Respirations labored. Abdomen distended.     ABG, lactic acid ordered, PCT, IR paracentesis ordered. ABG resulted with worsening acidemia. PCT elevated.     Critical care consulted; patient accepted and transferred to ICU.       Flores Mason MD  [phone]

## 2019-03-12 NOTE — ASSESSMENT & PLAN NOTE
- new acute onset, etiology unclear, could be hepatic encephalopathy, infectious, metabolic, medication or neurologic  - at this time patient is lethargic, arousable to pain stimuli only, per wife this has been like this all day, per ED note he answered his name  - so far CT head showed no obvious issue  - electrolytes are stable, glucose are stable   - discuss with wife again, she did not give him any new medications or sedating meds, UTox done was negative as well, 1x dose of narcan did not improved his mentals status  - neuro check Q4hr, NPO status due to concern for aspiration  - will treat infectious given distended abd per wife for SBP as well as PNA. Continue Zosyn  - will treat hepatic encephalopathy, start Lactulose Enema tonight, TID for now  - pleural effusion noted, now requiring 3L NC, CTA chest was negative for PE, will order ABG tonight  - will need nephrology for HD as he missed HD to day to help with the metabolic component  - NPO status given mental status change, high risk for aspiration  - consider neurology consult in AM if not improvement    Low threshold to consult MICU if further changes to his mental status or airway compromised

## 2019-03-12 NOTE — ASSESSMENT & PLAN NOTE
- on multiple medications at home  - unable to give oral medications given AMS  - IV hydralazine Q6hr PRN for now  - restart PO meds when mental status is better

## 2019-03-12 NOTE — HPI
"74 year old male with a history of Hep C cirrhosis s/p transplant (2001) complicated by HCC/PVT s/p ablation on 3/7/19 and ESRD on who Hepatology is being consulted for decompensated cirrhosis.     Per HPI:  "Per wife, this AM patient did not wake up to his alarm to go to HD, she then later heard patient getting out of bed and heard a thump. She found the patient on the ground and very confused and not recognizing her. She called her neighbor to come and help her. EMS was later called, and patient brought to ED. Pt is very lethargic since that time, per wife, he has moan all day, unable to talk to her. This is unusual for him as pt normally drives himself to HD regularly. She manages all his medications and report that she has not given him any sedating meds (no Oxycodone, Robaxin, Klonopin, Zanaflex). She report that over the last few days, pt report abdominal swelling and pain to her. In fact they contacted liver transplant coordinator, who was setting up an US of the abdomen. She report that he might have skip a few dose of lactulose due to diarrhea the last few days. She report that he has been in confused state like this in the past due to Hepatic Encephalopathy. Pt also recently had HCC treatment by IR last week, on Cipro for. At this time 11:53 PM, on evaluation, pt is unarousable, appear to be in sleep like state, per wife, he has been like this all day. 1x dose of narcan given with no improvement. Pt would moan to pain. Glucose check is 170.  At  ED, patient has undergone extensive evaluation with CTA of chest negative for PE as patient was mildly hypoxic on arrival and showed bilateral pleural effusions but nothing else significant. CT scan of abdomen showed moderate ascites and large hepatic tumor with necrosis. CT scan of head and C-spine were unremarkable. Ammonia was 45 and Lactic acid was 1.2. WBC was 12,200 but patient afebrile and not tachycardic. U/A negative for infection. Patient due for HD " "today and did not get HD and BUN/Cr was 40/6.5. In ED, patient remains acutely confused and not responding to questions appropriately. Patient has not focal neurologic deficits. Dr. Verdin at [facility] called and stated patient could be admitted to [facility] but [facility] medicine did not fell comfortable admitting patient for encephalopathy work-up so patient to be transferred and admitted to [facility]. Patient empirically given broad spectrum abx at [facility] including Vancomycin. Zosyn and Ceftriaxone."     Interval history:  Patient seen twice this morning and during both instance remained completely altered with no family at bedside.  "
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
## 2019-03-12 NOTE — HPI
This is a 72y/o male with hepatocellular carcinoma undergoing radiofrequency ablation by IR last treatment was 3/7, orthotopic liver transplant on 6/10/2001 on Prograf, ESRD on hemodialysis (MWF), Type 2 diabetes mellitus on long term insulin, essential HTN, chronic combined systolic and diastolic heart failure, KARON and history of hepatitis C (treated) who is being transfer to hospital medicine for acute encephalopathy. He presented initially to [facility] ED this Am with acute confusion. Wife not at bedside on transfer, she was reached via telephone for collateral tonight. Per wife, this AM patient did not wake up to his alarm to go to HD, she then later heard patient getting out of bed and heard a thump. She found the patient on the ground and very confused and not recognizing her. She called her neighbor to come and help her. EMS was later called, and patient brought to ED. Pt is very lethargic since that time, per wife, he has moan all day, unable to talk to her. This is unusual for him as pt normally drives himself to HD regularly. She manages all his medications and report that she has not given him any sedating meds (no Oxycodone, Robaxin, Klonopin, Zanaflex). She report that over the last few days, pt report abdominal swelling and pain to her. In fact they contacted liver transplant coordinator, who was setting up an US of the abdomen. She report that he might have skip a few dose of lactulose due to diarrhea the last few days. She report that he has been in confused state like this in the past due to Hepatic Encephalopathy. Pt also recently had HCC treatment by IR last week, on Cipro for. At this time 11:53 PM, on evaluation, pt is unarousable, appear to be in sleep like state, per wife, he has been like this all day. 1x dose of narcan given with no improvement. Pt would moan to pain. Glucose check is 170.         At  ED, patient has undergone extensive evaluation with CTA of chest negative for PE  as patient was mildly hypoxic on arrival and showed bilateral pleural effusions but nothing else significant. CT scan of abdomen showed moderate ascites and large hepatic tumor with necrosis. CT scan of head and C-spine were unremarkable. Ammonia was 45 and Lactic acid was 1.2. WBC was 12,200 but patient afebrile and not tachycardic. U/A negative for infection. Patient due for HD today and did not get HD and BUN/Cr was 40/6.5. In ED, patient remains acutely confused and not responding to questions appropriately. Patient has not focal neurologic deficits. Dr. Verdin at [facility] Hepatology called and stated patient could be admitted to [facility] but [facility] medicine did not fell comfortable admitting patient for encephalopathy work-up so patient to be transferred and admitted to [facility] Vincenzo MORALES. Patient empirically given broad spectrum abx at [facility] including Vancomycin. Zosyn and Ceftriaxone.     Patient transferred to ICU due to acute metabolic encephalopathy.
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
## 2019-03-12 NOTE — PLAN OF CARE
Problem: Physical Therapy Goal  Goal: Physical Therapy Goal  Goals to be met by: 3/26/2019     Patient will increase functional independence with mobility by performin. Supine to sit with MInimal Assistance  2. Sit to supine with MInimal Assistance  3. Sit to stand transfer with Minimal Assistance  4. Bed to chair transfer with Minimal Assistance with/without AD  5. Gait  x 25 feet with Minimal Assistance with/without AD.   6. Lower extremity exercise program x15 reps per handout, with assistance as needed    Outcome: Ongoing (interventions implemented as appropriate)  Goals set

## 2019-03-12 NOTE — PT/OT/SLP EVAL
Physical Therapy Evaluation    Patient Name:  Philip Mathis III   MRN:  [id number]    Recommendations:     Discharge Recommendations:  (TBD)   Discharge Equipment Recommendations: (TBD)   Barriers to discharge: Pt. currently requires total A for mobility    Assessment:     Philip Mathis III is a 73 y.o. male admitted with a medical diagnosis of Acute encephalopathy.  He presents with the following impairments/functional limitations:  weakness, impaired endurance, impaired self care skills, impaired functional mobilty, gait instability, impaired balance, impaired cognition, decreased upper extremity function, decreased lower extremity function, decreased safety awareness, decreased coordination Pt. tolerated treatment with NAD, but not following commands, and non-verbal with eyes open. Pt. needs to be more alert to get more accurate functional assessment.    Rehab Prognosis: Fair; patient would benefit from acute skilled PT services to address these deficits and reach maximum level of function.    Recent Surgery: * No surgery found *      Plan:     During this hospitalization, patient to be seen 3 x/week to address the identified rehab impairments via therapeutic activities, gait training, therapeutic exercises, neuromuscular re-education and progress toward the following goals:    · Plan of Care Expires:  04/11/19    Subjective     Chief Complaint: pt. appeared to be sleeping with eyes open. Pt. non-verbal except for apparent snoring.  Patient/Family Comments/goals: TBD  Pain/Comfort:  · Pain Rating 1: (NAD)    Patients cultural, spiritual, Rastafari conflicts given the current situation: no    Living Environment:  Pt. Lives with spouse.  Prior to admission, patients level of function needs to be verified.  Equipment used at home: (need to verify).  Upon discharge, patient will have assistance from spouse.    Objective:     Communicated with nursing prior to session.  Patient found supine in bed peripheral IV, bed  alarm, telemetry, oxygen  upon PT entry to room.    General Precautions: Standard, fall   Orthopedic Precautions:N/A   Braces:       Exams:  · RUE ROM: WFL  · RUE Strength: unable to accurately test  · LUE ROM: WFL  · LUE Strength: unable to accurately test  · RLE ROM: WFL  · RLE Strength: unable to accurately test  · LLE ROM: WFL  · LLE Strength: unable to accurately test    Functional Mobility:  · Bed Mobility:     · Rolling Left:  total assistance  · Scooting: total assistance  · Supine to Sit: total assistance  · Sit to Supine: total assistance      Therapeutic Activities and Exercises:   Assisted pt. to sitting on EOB with Total A, but did not improve pt.'s alertness/activity.    AM-PAC 6 CLICK MOBILITY  Total Score:6     Patient left supine with all lines intact, call button in reach, bed alarm on and nursing notified.    GOALS:   Multidisciplinary Problems     Physical Therapy Goals        Problem: Physical Therapy Goal    Goal Priority Disciplines Outcome Goal Variances Interventions   Physical Therapy Goal     PT, PT/OT Ongoing (interventions implemented as appropriate)     Description:  Goals to be met by: 3/26/2019     Patient will increase functional independence with mobility by performin. Supine to sit with MInimal Assistance  2. Sit to supine with MInimal Assistance  3. Sit to stand transfer with Minimal Assistance  4. Bed to chair transfer with Minimal Assistance with/without AD  5. Gait  x 25 feet with Minimal Assistance with/without AD.   6. Lower extremity exercise program x15 reps per handout, with assistance as needed                      History:     Past Medical History:   Diagnosis Date    Acute hypoxemic respiratory failure 3/12/2019    Anemia     Aortic atherosclerosis     Arthritis     Back pain     Bishop's esophagus     BPH (benign prostatic hypertrophy)     Chronic combined systolic and diastolic heart failure 2017    Controlled type 2 diabetes mellitus with  chronic kidney disease on chronic dialysis, with long-term current use of insulin 4/14/2015    Elevated PSA     ESRD on dialysis     ESRD on hemodialysis     Essential hypertension     GERD (gastroesophageal reflux disease)     Gout     Hemolytic anemia     Hepatitis C     successfully treated with Harvoni    Hepatocellular carcinoma 1/2/2018    Hyperlipidemia     Hyperphosphatemia     Hypertensive CKD (chronic kidney disease)     Immunosuppression     Liver transplanted 06/10/2001    MGUS (monoclonal gammopathy of unknown significance)     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 macular edema associated with type 2 diabetes mellitus     KARON (obstructive sleep apnea)     Secondary hyperparathyroidism     Severe pruritus     Thrombocytopenia     Type 2 diabetes mellitus with diabetic polyneuropathy, with long-term current use of insulin 7/5/2012    Urticaria        Past Surgical History:   Procedure Laterality Date    1) Creation of L arm basilic vein transposition (brachial artery-to-basilic vein AVF)  2) Ligation of L brachial AVF (which was 1st stage creation previously)    Left 6/15/2017    Performed by Silvio William MD at [facility] OR 2ND FLR    ABLATION, RADIOFREQUENCY N/A 2/27/2018    Performed by Esther Surgeon at [facility] ESTHER    [id number]-[id number]-MX N/A 12/21/2017    Performed by [facility] Diagnostic Provider at [facility] OR 2ND FLR    broken nose      CATARACT EXTRACTION Bilateral     colonoscopy N/A 7/23/2014    Performed by Moises St MD at [facility] ENDO (4TH FLR)    YWMCLYCH-[id number]-NQ POSSIBLE AV GRAFT Left 11/8/2016    Performed by Silvio William MD at [facility] OR 2ND FLR    DEBRIDEMENT-WOUND Left 4/12/2015    Performed by Jimmy Light MD at [facility] OR 2ND FLR    DEBRIDEMENT-WOUND Left 4/11/2015    Performed by Jimmy Light MD at [facility] OR 2ND FLR    DEBRIDEMENT-WOUND Left 4/10/2015    Performed by Jimmy Light MD at [facility] OR 2ND FLR    DEBRIDEMENT-WOUND  Left 4/9/2015    Performed by Jimmy Light MD at [facility] OR 1ST FLR    DEBRIDEMENT-WOUND Left 4/6/2015    Performed by Jimmy Light MD at [facility] OR 2ND FLR    DEBRIDEMENT-WOUND Left 4/4/2015    Performed by Jimmy Light MD at [facility] OR 2ND FLR    DEBRIDEMENT-WOUND Left 4/2/2015    Performed by Jimmy Light MD at [facility] OR 1ST FLR    DRESSING CHANGE- Left 4/10/2015    Performed by Jimmy Light MD at [facility] OR 2ND FLR    DRESSING CHANGE-s/p burn debridement Left 4/15/2015    Performed by Jimmy Light MD at [facility] OR 2ND FLR    DRESSING CHANGE-s/p burn debridement Left 4/14/2015    Performed by Jimmy Light MD at [facility] OR 2ND FLR    DRESSING CHANGE-s/p wound debridement Left 4/13/2015    Performed by Jimmy Light MD at [facility] OR 1ST FLR    DRESSING CHANGE-wound vac Left 4/20/2015    Performed by Jimmy Light MD at [facility] OR 2ND FLR    EMBOLIZATION, YTTRIUM THERAPY N/A 1/29/2019    Performed by [facility] Diagnostic Provider at [facility] OR 2ND [place]    EYE SURGERY      cataracts    FEMUR SURGERY      FRACTURE SURGERY      right femur fracture     INSERTION-CATHETER-PERM-A-CATH Right 7/13/2017    Performed by Silvio William MD at [facility] OR 2ND [place]    LIVER TRANSPLANT  2001    Open Biome Fecal Transplant N/A 8/5/2015    Performed by Elmo Gan MD at [facility] ENDO (4TH FLR)    PLACEMENT-WOUND VAC  4/15/2015    Performed by Jimmy Light MD at [facility] OR 2ND FLR    PLACEMENT-WOUND VAC Left 4/4/2015    Performed by Jimmy Light MD at [facility] OR 2ND FLR    PORTACATH PLACEMENT Left     Radiofrequency Ablation N/A 3/7/2019    Performed by Esther Surgeon at [facility] ESTHER    SKIN GRAFT      graft from right thigh , applied to the left back and left arm.    SPLIT THICKNESS SKIN GRAFT - left trunk, abd and axilla N/A 4/15/2015    Performed by Jimmy Light MD at [facility] OR 2ND [place]       Time Tracking:     PT Received On: 03/12/19  PT Start Time: 0922     PT Stop Time: 0933  PT Total Time  (min): 11 min     Billable Minutes: Evaluation 11      Lazaro Morrison, PT  03/12/2019

## 2019-03-12 NOTE — HPI
"This is a 72y/o male with hepatocellular carcinoma undergoing radiofrequency ablation by IR last treatment was 3/7, orthotopic liver transplant on 6/10/2001 on Prograf, ESRD on hemodialysis (MWF), Type 2 diabetes mellitus on long term insulin, essential HTN, chronic combined systolic and diastolic heart failure, KARON and history of hepatitis C (treated) who was transfered to ICU at [facility] for acute encephalopathy. He presented initially to [facility] ED on the morning of 3/11 for acute confusion.  Information was obtained from wife and chart review, as patient remains disoriented on my examination.  Per wife, he was last seen normal on Sunday.  During the day, he was c/o abdominal pains and "just feeling tired", in which she thought was normal as he recently had an ablation on 03/07 for treatment of his HCC.  On the morning of 3/11, she tried to wake him for for his dialysis appointment (in which he normally is responsive and wakes himself up). She then later heard patient getting out of bed and heard a thump. She found the patient on the ground and very confused and not recognizing her. She called her neighbor to come and help her. EMS was later called, and patient brought to ED. Pt is very lethargic since that time, per wife, he has moan all day, unable to talk to her. This is unusual for him as pt normally drives himself to HD regularly. She manages all his medications and report that she has not given him any sedating meds (no Oxycodone, Robaxin, Klonopin, Zanaflex). She report that over the last few days, pt report abdominal swelling and pain to her. In fact they contacted liver transplant coordinator, who was setting up an US of the abdomen. She report that he might have skip a few dose of lactulose due to diarrhea the last few days. She report that he has been in confused state like this in the past due to Hepatic Encephalopathy. He received 1x dose of narcan given with no improvement. Pt would moan to " pain.      At  ED, patient has undergone extensive evaluation with CTA of chest negative for PE as patient was mildly hypoxic on arrival and showed bilateral pleural effusions but nothing else significant. CT scan of abdomen showed moderate ascites and large hepatic tumor with necrosis. CT scan of head and C-spine were unremarkable. Ammonia was 45 and Lactic acid was 1.2. WBC was 12,200 but patient afebrile and not tachycardic. U/A negative for infection. Patient due for HD today and did not get HD and BUN/Cr was 40/6.5. In ED, patient remains acutely confused and not responding to questions appropriately. Patient has not focal neurologic deficits. Dr. Verdin at [facility] called and stated patient could be admitted to [facility] but Hospital medicine did not fell comfortable admitting patient for encephalopathy work-up so patient to be transferred and admitted to [facility]. Patient empirically given broad spectrum abx at [facility] including Vancomycin. Zosyn and Ceftriaxone.      Wife reports that patient has had worsening abdominal distension for the past several months, and nobody has really explained why this is happening.  ICU attempted a bedside paracentesis on admission, but no safe pocket was identified.  His last ECHO performed in October of 2017 revealed HFpEF with pHTN and enlarged IVC.  She reports compliance with his dialysis treatments, being dialyzed at [facility] under the care of Dr. Elizabeth.  No further information is available at this time.  He has a ORLIN AVF with residual renal function, usually taking lasix 80 mg BID on his off dialysis days.

## 2019-03-12 NOTE — ASSESSMENT & PLAN NOTE
Home Lantus 10U QHS and Novolog 6U with breakfast, 4U with lunch and dinner  -- Patient intubated  -- POCT Glucose Checks Q6

## 2019-03-12 NOTE — CARE UPDATE
Rapid Response Follow-up Note    MICU team at bedside, decision made to transfer pt to ICU.  Intubation watch. Need for bedside paracentesis.   House Supervisor notified of pt needing bed @ 1133.   Awaiting bed assignment.   Reviewed plan of care with primary RN, Yessi.   Please call Rapid Response RN with any questions or concerns at  [phone].

## 2019-03-12 NOTE — ASSESSMENT & PLAN NOTE
- Now on 3L NC  - CTA: No definite PE identified, noting limited evaluation to the central pulmonary arteries. Development of bilateral pleural effusions, larger on the right. Partial collapse of the middle lobe, lingula, and bilateral lower lobes likely representing atelectasis.  - Continue Zosyn  - will need HD

## 2019-03-12 NOTE — NURSING
Received report from MAVIS Gallo at [facility] ED. Pt to be transported via ambulance to room 8091 when appropriate.

## 2019-03-12 NOTE — CARE UPDATE
RN Proactive Rounding Note  Time of Visit: 1055    Admit Date: 3/11/2019  LOS: 1  Code Status: Full Code   Date of Visit: 2019  : 1945  Age: 73 y.o.  Sex: male  Race: White  Bed: 8091/8091 A:   MRN: [id number]  Was the patient discharged from an ICU this admission? no   Was the patient discharged from a PACU within last 24 hours?  no  Did the patient receive conscious sedation/general anesthesia in last 24 hours?  no  Was the patient in the ED within the past 24 hours?  yes  Was the patient started on NIPPV within the past 24 hours?  no  Attending Physician: Flores Mason*  Primary Service: [facility]      ASSESSMENT:     Abnormal Vital Signs:  Clinical Issues: Neuro     INTERVENTIONS/ RECOMMENDATIONS:     Called to evaluate AMS. Upon assessment, pt lethargic. Withdraws to pain x4. Opens eyes to pain. Pupils 3 mm brisk & equal. Respirations shallow, accessory muscle use noted. Pt w/ weak cough, secretions needing to be suctioned frequently. Abdomen distended. Wife at bedside states ascites has worsened over the last 3 months. Per primary RN, pt received lactulose enema and had 4 BM's afterwards. /74. HR 72. SpO2 98% on 3 LNC. RR 19. Primary team notified of neuro status and abnormal ABG results. Order placed for MICU consult. MICU team at bedside, decision made to transfer pt to ICU for intubation watch. MICU team performing bedside abdominal US and possible paracentesis.     Discussed plan of care with RNYessi, [id number].    PHYSICIAN ESCALATION:     Yes/No  yes    Orders received and case discussed with Dr. Mason and Levi .    Disposition: Tx in ICU bed awaiting available ICU bed. .    FOLLOW-UP/CONTINGENCY:     Call back the Rapid Response Nurse at x [phone] for additional questions or concerns.

## 2019-03-12 NOTE — CARE UPDATE
Rapid Response Follow-up Note    Attempted to page IM-R to notify MD of ABG results and pt's mental status.   Call went to voicemail, but unable to leave message.  Will re attempt.    Please call Rapid Response RN with any questions or concerns at  [phone].

## 2019-03-12 NOTE — ASSESSMENT & PLAN NOTE
- trending down from 0.39 to 0.37  - EKG reviewed at , no ST changes  - reorder troponin and EKG tonight

## 2019-03-12 NOTE — ED NOTES
Dimitry RN notified patient in [phone]). Patient wife Veronika notified that patient in route.    [Breast Self Exam] : breast self exam [Nutrition] : nutrition [Exercise] : exercise [Vitamins/Supplements] : vitamins/supplements [STD (testing, results, tx)] : STD (testing, results, tx) [Safe Sexual Practices] : safe sexual practices

## 2019-03-12 NOTE — CONSULTS
[facility]  Critical Care Medicine  Consult Note    Patient Name: Philip Mathis III  MRN: [id number]  Admission Date: 3/11/2019  Hospital Length of Stay: 1 days  Code Status: Full Code  Attending Physician: Cliff Luna MD   Primary Care Provider: Reji Castillo MD   Principal Problem: Acute encephalopathy    Consults  Subjective:     HPI:  This is a 74y/o male with hepatocellular carcinoma undergoing radiofrequency ablation by IR last treatment was 3/7, orthotopic liver transplant on 6/10/2001 on Prograf, ESRD on hemodialysis (MWF), Type 2 diabetes mellitus on long term insulin, essential HTN, chronic combined systolic and diastolic heart failure, KARON and history of hepatitis C (treated) who is being transfer to hospital medicine for acute encephalopathy. He presented initially to [facility] ED this Am with acute confusion. Wife not at bedside on transfer, she was reached via telephone for collateral tonight. Per wife, this AM patient did not wake up to his alarm to go to HD, she then later heard patient getting out of bed and heard a thump. She found the patient on the ground and very confused and not recognizing her. She called her neighbor to come and help her. EMS was later called, and patient brought to ED. Pt is very lethargic since that time, per wife, he has moan all day, unable to talk to her. This is unusual for him as pt normally drives himself to HD regularly. She manages all his medications and report that she has not given him any sedating meds (no Oxycodone, Robaxin, Klonopin, Zanaflex). She report that over the last few days, pt report abdominal swelling and pain to her. In fact they contacted liver transplant coordinator, who was setting up an US of the abdomen. She report that he might have skip a few dose of lactulose due to diarrhea the last few days. She report that he has been in confused state like this in the past due to Hepatic Encephalopathy. Pt also recently  had HCC treatment by IR last week, on Cipro for. At this time 11:53 PM, on evaluation, pt is unarousable, appear to be in sleep like state, per wife, he has been like this all day. 1x dose of narcan given with no improvement. Pt would moan to pain. Glucose check is 170.         At  ED, patient has undergone extensive evaluation with CTA of chest negative for PE as patient was mildly hypoxic on arrival and showed bilateral pleural effusions but nothing else significant. CT scan of abdomen showed moderate ascites and large hepatic tumor with necrosis. CT scan of head and C-spine were unremarkable. Ammonia was 45 and Lactic acid was 1.2. WBC was 12,200 but patient afebrile and not tachycardic. U/A negative for infection. Patient due for HD today and did not get HD and BUN/Cr was 40/6.5. In ED, patient remains acutely confused and not responding to questions appropriately. Patient has not focal neurologic deficits. Dr. Verdin at [facility] [place] [facility] called and stated patient could be admitted to [facility] but Hospital medicine did not fell comfortable admitting patient for encephalopathy work-up so [facility] Patient empirically given broad spectrum abx at [facility] including Vancomycin. Zosyn and Ceftriaxone.     Patient transferred to ICU due to acute metabolic encephalopathy.     Hospital/ICU Course:  3/12 Transferred to ICU for further workup of Acute metabolic encephalopathy. Pt was intubated. And Paracentesis (diagnostic/therapuetic) completed.     Past Medical History:   Diagnosis Date    Acute hypoxemic respiratory failure 3/12/2019    Anemia     Aortic atherosclerosis     Arthritis     Back pain     Bishop's esophagus     BPH (benign prostatic hypertrophy)     Chronic combined systolic and diastolic heart failure 4/26/2017    Controlled type 2 diabetes mellitus with chronic kidney disease on chronic dialysis, with long-term current use of insulin 4/14/2015     Elevated PSA     ESRD on dialysis     ESRD on hemodialysis     Essential hypertension     GERD (gastroesophageal reflux disease)     Gout     Hemolytic anemia     Hepatitis C     successfully treated with Harvoni    Hepatocellular carcinoma 1/2/2018    Hyperlipidemia     Hyperphosphatemia     Hypertensive CKD (chronic kidney disease)     Immunosuppression     Liver transplanted 06/10/2001    MGUS (monoclonal gammopathy of unknown significance)     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 macular edema associated with type 2 diabetes mellitus     KARON (obstructive sleep apnea)     Secondary hyperparathyroidism     Severe pruritus     Thrombocytopenia     Type 2 diabetes mellitus with diabetic polyneuropathy, with long-term current use of insulin 7/5/2012    Urticaria        Past Surgical History:   Procedure Laterality Date    1) Creation of L arm basilic vein transposition (brachial artery-to-basilic vein AVF)  2) Ligation of L brachial AVF (which was 1st stage creation previously)    Left 6/15/2017    Performed by Silvio William MD at [facility] OR 2ND FLR    ABLATION, RADIOFREQUENCY N/A 2/27/2018    Performed by Esther Surgeon at [facility] ESTHER    BSSWPT-PFTLHI-LC N/A 12/21/2017    Performed by [facility] Diagnostic Provider at [facility] OR 2ND [facility]    broken nose      CATARACT EXTRACTION Bilateral     colonoscopy N/A 7/23/2014    Performed by Moises St MD at [facility] ENDO (4TH FLR)    FAXWTUIK-YBJUJCW-TQ POSSIBLE AV GRAFT Left 11/8/2016    Performed by Silvio William MD at [facility] OR 2ND FLR    DEBRIDEMENT-WOUND Left 4/12/2015    Performed by Jimmy Light MD at [facility] OR 2ND FLR    DEBRIDEMENT-WOUND Left 4/11/2015    Performed by Jimmy Light MD at [facility] OR 2ND FLR    DEBRIDEMENT-WOUND Left 4/10/2015    Performed by Jimmy Light MD at [facility] OR 2ND FLR    DEBRIDEMENT-WOUND Left 4/9/2015    Performed by Jimmy Light MD at [facility] OR 1ST FLR    DEBRIDEMENT-WOUND  "Left 4/6/2015    Performed by Jimmy Light MD at [facility] OR 2ND FLR    DEBRIDEMENT-WOUND Left 4/4/2015    Performed by Jimmy Light MD at [facility] OR 2ND FLR    DEBRIDEMENT-WOUND Left 4/2/2015    Performed by Jimmy Light MD at [facility] OR 1ST FLR    DRESSING CHANGE- Left 4/10/2015    Performed by Jimmy Light MD at [facility] OR 2ND FLR    DRESSING CHANGE-s/p burn debridement Left 4/15/2015    Performed by Jimmy Light MD at [facility] OR 2ND FLR    DRESSING CHANGE-s/p burn debridement Left 4/14/2015    Performed by Jimmy Light MD at [facility] OR 2ND FLR    DRESSING CHANGE-s/p wound debridement Left 4/13/2015    Performed by Jimmy Light MD at [facility] OR 1ST FLR    DRESSING CHANGE-wound vac Left 4/20/2015    Performed by Jimmy Light MD at [facility] OR 2ND [facility]    EMBOLIZATION, YTTRIUM THERAPY N/A 1/29/2019    Performed by [facility] Diagnostic Provider at [facility] OR [address] [place] [address]    EYE SURGERY      cataracts    FEMUR SURGERY      FRACTURE SURGERY      right femur fracture     INSERTION-CATHETER-PERM-A-CATH Right 7/13/2017    Performed by Silvio William MD at [facility] OR [address] [place] [address]    LIVER TRANSPLANT  2001    Open Biome Fecal Transplant N/A 8/5/2015    Performed by Elmo Gan MD at [facility] ENDO (4TH FLR)    PLACEMENT-WOUND VAC  4/15/2015    Performed by Jimmy Light MD at [facility] OR 2ND FLR    PLACEMENT-WOUND VAC Left 4/4/2015    Performed by Jimmy Light MD at [facility] OR 2ND FLR    PORTACATH PLACEMENT Left     Radiofrequency Ablation N/A 3/7/2019    Performed by Esther Surgeon at [facility] ESTHER    SKIN GRAFT      graft from right thigh , applied to the left back and left arm.    SPLIT THICKNESS SKIN GRAFT - left trunk, abd and axilla N/A 4/15/2015    Performed by Jimmy Light MD at [facility] OR 2ND [facility]       Review of patient's allergies indicates:   Allergen Reactions    Adhesive Dermatitis and Other (See Comments)     "Burned his tail"  Please use Paper Tape    Corticosteroids (glucocorticoids) " Other (See Comments)     Leg swelling after an injection    Hydrocortisone Swelling     Leg swelling after an injection    Neuromuscular blockers, steroidal Swelling     Leg swelling after an injection    Ribavirin Other (See Comments)     Arthralgias    Propofol analogues Other (See Comments)     Drops SVR and due to current systemic shunt state, results in difficulty in oscillometry to obtain blood pressure until return of vascular tone (other VS are stable throughout) and drop in venous return due to abdominal ascites; titrate slower in future or select alternate agent. Smooth emergence from general anesthesia without difficulty.       Family History     Problem Relation (Age of Onset)    Cancer Mother, Sister    Coronary artery disease Father    Diabetes Father    Heart disease Father    Hypertension Father        Tobacco Use    Smoking status: Former Smoker     Last attempt to quit: 1998     Years since quittin.6    Smokeless tobacco: Never Used    Tobacco comment: pt reports quitting in    Substance and Sexual Activity    Alcohol use: No     Comment: pt reports hx of alcholism and reports quit in     Drug use: Yes     Frequency: 2.0 times per week     Types: Marijuana    Sexual activity: Yes     Partners: Female      Review of Systems   Unable to perform ROS: Mental status change     Objective:     Vital Signs (Most Recent):  Temp: 97.4 °F (36.3 °C) (19 1233)  Pulse: 76 (19 1743)  Resp: (!) 25 (19 1743)  BP: (!) 145/69 (19 1700)  SpO2: 99 % (19 1743) Vital Signs (24h Range):  Temp:  [96.2 °F (35.7 °C)-98.1 °F (36.7 °C)] 97.4 °F (36.3 °C)  Pulse:  [74-87] 76  Resp:  [16-28] 25  SpO2:  [92 %-100 %] 99 %  BP: (124-178)/(58-81) 145/69   Weight: 93 kg (205 lb)  Body mass index is 27.05 kg/m².      Intake/Output Summary (Last 24 hours) at 3/12/2019 1824  Last data filed at 3/12/2019 1754  Gross per 24 hour   Intake 200 ml   Output 750 ml   Net -550 ml        Physical Exam   Constitutional: He appears well-nourished.   Very somnolent on exam. Minimal response to pain   HENT:   Head: Normocephalic and atraumatic.   Neck: Normal range of motion.   Cardiovascular: Normal rate, regular rhythm, normal heart sounds and intact distal pulses.   Pulmonary/Chest: Effort normal. No respiratory distress. He has no rales.   Intubated on mechanical vent   Abdominal: Bowel sounds are normal. He exhibits distension. There is tenderness. There is no guarding.   Musculoskeletal: Normal range of motion.   Neurological:   Very somnolent.    Skin: Skin is warm and dry.   Psychiatric: He has a normal mood and affect.       Vents:  Vent Mode: A/C (03/12/19 1743)  Ventilator Initiated: Yes (03/12/19 1743)  Set Rate: 18 bmp (03/12/19 1743)  Vt Set: 500 mL (03/12/19 1743)  Pressure Support: 0 cmH20 (03/12/19 1743)  PEEP/CPAP: 5 cmH20 (03/12/19 1743)  Oxygen Concentration (%): 60 (03/12/19 1743)  Peak Airway Pressure: 25 cmH2O (03/12/19 1743)  Plateau Pressure: 0 cmH20 (03/12/19 1743)  Total Ve: 11.1 mL (03/12/19 1743)  F/VT Ratio<105 (RSBI): (!) 57.6 (03/12/19 1743)  Lines/Drains/Airways     Drain                 Hemodialysis AV Fistula 11/08/16 1553 Left forearm 854 days         NG/OG Tube 03/12/19 1520 nasogastric Right nostril less than 1 day          Airway                 Airway - Non-Surgical 03/12/19 1709 Endotracheal Tube less than 1 day          Peripheral Intravenous Line                 Peripheral IV - Single Lumen 03/11/19 0630 Right Forearm 1 day         Peripheral IV Triple Lumen 03/12/19 1330 Anterior;Right Hand less than 1 day              Significant Labs:    CBC/Anemia Profile:  Recent Labs   Lab 03/11/19  0150 03/12/19  0006 03/12/19  0426   WBC 12.26 14.55* 14.66*   HGB 9.7* 10.1* 10.1*   HCT 30.9* 31.7* 32.2*    190 201   MCV 88 86 87   RDW 18.7* 19.2* 19.3*        Chemistries:  Recent Labs   Lab 03/11/19  0150 03/12/19  0006 03/12/19  0426    134* 136   K  4.8 5.1 5.3*    99 101   CO2 22* 25 22*   BUN 40* 49* 52*   CREATININE 6.5* 7.3* 7.5*   CALCIUM 8.2* 8.0* 8.1*   ALBUMIN 2.3* 2.2* 2.5*   PROT 7.3 7.1 7.2   BILITOT 2.0* 1.8* 2.0*   ALKPHOS 142* 132 126   ALT 26 23 22   AST 60* 54* 47*   MG 2.1  --  2.0   PHOS  --   --  5.2*       All pertinent labs within the past 24 hours have been reviewed.    Significant Imaging: I have reviewed all pertinent imaging results/findings within the past 24 hours.      ABG  Recent Labs   Lab 03/12/19  1825   PH 7.385   PO2 94   PCO2 36.7   HCO3 22.0*   BE -3     Assessment/Plan:     Neuro   * Acute encephalopathy    Mr. Mathis presented to [facility] for acute encephalopathy. Underwent Hepatic ablation on last Thursday. On Friday, he was found down on the floor by Wife after hearing a crash in the bathroom. Has PMHx of Liver transplant (2001) with diagnosis of HCC in 2018. Takes Lactulose and Rifaximin at home    -- Patient initially admitted to medicine for further workup of Encephalopathy.    -- 3/12 ICU was consulted and decided it would be best to transfer patient to Unit.  -- CT head negative  -- KUB shows no signs of obstructive gas pattern  -- Electrolytes, glucose WNL    Plan  -- Patient Intubated today due to Low GCS and concern for increasing hypercarbia seen on ABG  -- Paracentesis was completed after intubation. Diagnostic and Therapeutic (750ml taken)  -- Will follow up pleural fluid labs  -- Continue Lactulose and Rifaximin   -- F/u repeat ABG       Cardiac/Vascular   Essential hypertension    -- Continue carvedilol and hydralazine     Renal/   ESRD on hemodialysis    ESRD on HD. EDW of 94kg  -- Neprho following    Plan/Recommendations:  -HD today for metabolic clearance/volume management       Endocrine   Controlled type 2 diabetes mellitus with chronic kidney disease on chronic dialysis, with long-term current use of insulin    Home Lantus 10U QHS and Novolog 6U with breakfast, 4U with lunch and dinner  -- Patient  intubated  -- POCT Glucose Checks Q6     GI   Liver transplanted 6/10/2001    -- Hepatology consult for assistance with immunosuppressed medications         Critical Care Daily Checklist:    A: Awake: RASS Goal/Actual Goal:    Actual: Mayo Agitation Sedation Scale (RASS): Deep sedation   B: Spontaneous Breathing Trial Performed?     C: SAT & SBT Coordinated?  n/a                   D: Delirium: CAM-ICU Overall CAM-ICU: Positive   E: Early Mobility Performed? Yes   F: Feeding Goal:    Status:     Current Diet Order   Procedures    Diet NPO      AS: Analgesia/Sedation propofol   T: Thromboembolic Prophylaxis heparin   H: HOB > 300 Yes   U: Stress Ulcer Prophylaxis (if needed) famotidine   G: Glucose Control n/a   B: Bowel Function Stool Occurrence: 1   I: Indwelling Catheter (Lines & Rodriguez) Necessity n/a   D: De-escalation of Antimicrobials/Pharmacotherapies n/a    Plan for the day/ETD Follow up paracentesis labs    Code Status:  Family/Goals of Care: Full Code         Critical secondary to Acute metabolic encephalopathy      Critical care was time spent personally by me on the following activities: development of treatment plan with patient or surrogate and bedside caregivers, discussions with consultants, evaluation of patient's response to treatment, examination of patient, ordering and performing treatments and interventions, ordering and review of laboratory studies, ordering and review of radiographic studies, pulse oximetry, re-evaluation of patient's condition. This critical care time did not overlap with that of any other provider or involve time for any procedures.         Carlos Avila MD  Critical Care Medicine  [facility]

## 2019-03-12 NOTE — ASSESSMENT & PLAN NOTE
ESRD on HD. EDW of 94kg  -- Neprho following    Plan/Recommendations:  -HD today for metabolic clearance/volume management

## 2019-03-12 NOTE — HPI
This is a 74y/o male with hepatocellular carcinoma undergoing radiofrequency ablation by IR last treatment was 3/7, orthotopic liver transplant on 6/10/2001 on Prograf, ESRD on hemodialysis (MWF), Type 2 diabetes mellitus on long term insulin, essential HTN, chronic combined systolic and diastolic heart failure, KARON and history of hepatitis C (treated) who is being transfer to hospital medicine for acute encephalopathy. He presented initially to [facility] ED this Am with acute confusion. Wife not at bedside on transfer, she was reached via telephone for collateral tonight. Per wife, this AM patient did not wake up to his alarm to go to HD, she then later heard patient getting out of bed and heard a thump. She found the patient on the ground and very confused and not recognizing her. She called her neighbor to come and help her. EMS was later called, and patient brought to ED. Pt is very lethargic since that time, per wife, he has moan all day, unable to talk to her. This is unusual for him as pt normally drives himself to HD regularly. She manages all his medications and report that she has not given him any sedating meds (no Oxycodone, Robaxin, Klonopin, Zanaflex). She report that over the last few days, pt report abdominal swelling and pain to her. In fact they contacted liver transplant coordinator, who was setting up an US of the abdomen. She report that he might have skip a few dose of lactulose due to diarrhea the last few days. She report that he has been in confused state like this in the past due to Hepatic Encephalopathy. Pt also recently had HCC treatment by IR last week, on Cipro for. At this time of transfer, 11:53 PM, on evaluation, pt is unarousable, appear to be in sleep like state, per wife, he has been like this all day. 1x dose of narcan given with no improvement. Pt would moan to pain. Glucose check is 170.        At  ED, patient has undergone extensive evaluation with CTA of chest  negative for PE as patient was mildly hypoxic on arrival and showed bilateral pleural effusions but nothing else significant. CT scan of abdomen showed moderate ascites and large hepatic tumor with necrosis. CT scan of head and C-spine were unremarkable. Ammonia was 45 and Lactic acid was 1.2. WBC was 12,200 but patient afebrile and not tachycardic. U/A negative for infection. Patient due for HD today and did not get HD and BUN/Cr was 40/6.5. In ED, patient remains acutely confused and not responding to questions appropriately. Patient has not focal neurologic deficits. Dr. Verdin at [facility] [place] [place] Hepatology called and stated patient could be admitted to [facility] but [facility] medicine did not fell comfortable admitting patient for encephalopathy work-up so patient to be transferred and admitted to [facility] [place] [place] Vincenzo MORALES. Patient empirically given broad spectrum abx at [facility] including Vancomycin. Zosyn and Ceftriaxone.

## 2019-03-12 NOTE — PT/OT/SLP EVAL
Occupational Therapy   Evaluation and Discharge Note    Name: Philip Mathis III  MRN: [id number]  Admitting Diagnosis:  Acute encephalopathy      Recommendations:     Discharge Recommendations:    Discharge Equipment Recommendations:     Barriers to discharge:       Assessment:     Philip Mathis III is a 73 y.o. male with a medical diagnosis of Acute encephalopathy. Pt t/fd to ICU on same day as evaluation so no goals set and new orders will be needed for Re-evaluation.    Plan:     During this hospitalization, patient does not require further acute OT services.  Please re-consult if situation changes.    · Plan of Care Reviewed with:      Subjective     Pt non-verbal.    Occupational Profile: Pt lives with wife per chart - unable to obtain any other history as pt lethargic/non-verbal.    Patients cultural, spiritual, Restoration conflicts given the current situation:      Objective:     Communicated with: RN prior to session.  Patient found supine with   upon OT entry to room.    General Precautions: Standard, fall   Orthopedic Precautions:    Braces:       Occupational Performance:    Bed Mobility:    · Dependent    Functional Mobility/Transfers:  Dependent for scooting    Activities of Daily Living:  Dependent    Cognitive/Visual Perceptual:  Pt lethargic and not responding to any stimuli except for moaning. No command following.    Physical Exam:  Dependent sitting EOB.    AMPAC 6 Click ADL:  AMPAC Total Score: 6     Patient left supine with all lines intact, call button in reach and bed alarm on    GOALS:   Multidisciplinary Problems     Occupational Therapy Goals     Not on file                History:     Past Medical History:   Diagnosis Date    Acute hypoxemic respiratory failure 3/12/2019    Anemia     Aortic atherosclerosis     Arthritis     Back pain     Bishop's esophagus     BPH (benign prostatic hypertrophy)     Chronic combined systolic and diastolic heart failure 4/26/2017    Controlled type 2  diabetes mellitus with chronic kidney disease on chronic dialysis, with long-term current use of insulin 4/14/2015    Elevated PSA     ESRD on dialysis     ESRD on hemodialysis     Essential hypertension     GERD (gastroesophageal reflux disease)     Gout     Hemolytic anemia     Hepatitis C     successfully treated with Harvoni    Hepatocellular carcinoma 1/2/2018    Hyperlipidemia     Hyperphosphatemia     Hypertensive CKD (chronic kidney disease)     Immunosuppression     Liver transplanted 06/10/2001    MGUS (monoclonal gammopathy of unknown significance)     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 macular edema associated with type 2 diabetes mellitus     KARON (obstructive sleep apnea)     Secondary hyperparathyroidism     Severe pruritus     Thrombocytopenia     Type 2 diabetes mellitus with diabetic polyneuropathy, with long-term current use of insulin 7/5/2012    Urticaria        Past Surgical History:   Procedure Laterality Date    1) Creation of L arm basilic vein transposition (brachial artery-to-basilic vein AVF)  2) Ligation of L brachial AVF (which was 1st stage creation previously)    Left 6/15/2017    Performed by Silvio William [facility] at [facility] 2ND F[facility]    ABLATION, RADIOFREQUENCY N/A 2/27/2018    Performed by Esther Surgeon at [facility]    [id number]-[id number]-FO N/A 12/21/2017    Performed by [facility] Diagnostic Provider at [facility]    broken nose      CATARACT EXTRACTION Bilateral     colonoscopy N/A 7/23/2014    Performed by Moises St MD at [facility] ENDO (4TH FLR)    [id number] POSSIBLE AV GRAFT Left 11/8/2016    Performed by Silvio William MD at [facility] 2ND [facility]    DEBRIDEMENT-WOUND Left 4/12/2015    Performed by Jimmy Light MD at [facility] 2ND [facility]    DEBRIDEMENT-WOUND Left 4/11/2015    Performed by Jimmy Light MD at [facility] 2ND F[facility]    DEBRIDEMENT-WOUND Left 4/10/2015    Performed by Jimmy Light MD at [facility] 2ND  FLR    DEBRIDEMENT-WOUND Left 4/9/2015    Performed by Jimmy Light MD at [facility] OR 1ST FLR    DEBRIDEMENT-WOUND Left 4/6/2015    Performed by Jimmy Light MD at [facility] OR 2ND FLR    DEBRIDEMENT-WOUND Left 4/4/2015    Performed by Jimmy Light MD at [facility] OR 2ND FLR    DEBRIDEMENT-WOUND Left 4/2/2015    Performed by Jimmy Light MD at [facility] OR 1ST FLR    DRESSING CHANGE- Left 4/10/2015    Performed by Jimmy Light MD at [facility] OR 2ND FLR    DRESSING CHANGE-s/p burn debridement Left 4/15/2015    Performed by Jimmy Light MD at [facility] OR 2ND FLR    DRESSING CHANGE-s/p burn debridement Left 4/14/2015    Performed by Jimmy Light MD at [facility] OR 2ND FLR    DRESSING CHANGE-s/p wound debridement Left 4/13/2015    Performed by Jimmy Light MD at [facility] OR 1ST FLR    DRESSING CHANGE-wound vac Left 4/20/2015    Performed by Jimmy Light MD at [facility] OR 2ND FLR    EMBOLIZATION, YTTRIUM THERAPY N/A 1/29/2019    Performed by [facility] Diagnostic Provider at [facility] OR 2ND [facility]    EYE SURGERY      cataracts    FEMUR SURGERY      FRACTURE SURGERY      right femur fracture     INSERTION-CATHETER-PERM-A-CATH Right 7/13/2017    Performed by Silvio William MD at [facility] OR 2ND [facility]    LIVER TRANSPLANT  2001    Open Biome Fecal Transplant N/A 8/5/2015    Performed by Elmo Gan MD at [facility] ENDO (4TH FLR)    PLACEMENT-WOUND VAC  4/15/2015    Performed by Jimmy Light MD at [facility] OR 2ND FLR    PLACEMENT-WOUND VAC Left 4/4/2015    Performed by Jimmy Light MD at [facility] OR 2ND FLR    PORTACATH PLACEMENT Left     Radiofrequency Ablation N/A 3/7/2019    Performed by Esther Surgeon at [facility] ESTHER    SKIN GRAFT      graft from right thigh , applied to the left back and left arm.    SPLIT THICKNESS SKIN GRAFT - left trunk, abd and axilla N/A 4/15/2015    Performed by Jimmy Light MD at [facility] OR 2ND FLR       Time Tracking:     OT Date of Treatment: 03/12/19  OT Start Time: 0922  OT Stop  Time: 0933  OT Total Time (min): 11 min    Billable Minutes:Evaluation 11    SAI Alston  3/12/2019

## 2019-03-12 NOTE — SUBJECTIVE & OBJECTIVE
Review of Systems   Unable to perform ROS: Mental status change       Past Medical History:   Diagnosis Date    Acute hypoxemic respiratory failure 3/12/2019    Anemia     Aortic atherosclerosis     Arthritis     Back pain     Bishop's esophagus     BPH (benign prostatic hypertrophy)     Chronic combined systolic and diastolic heart failure 4/26/2017    Controlled type 2 diabetes mellitus with chronic kidney disease on chronic dialysis, with long-term current use of insulin 4/14/2015    Elevated PSA     ESRD on dialysis     ESRD on hemodialysis     Essential hypertension     GERD (gastroesophageal reflux disease)     Gout     Hemolytic anemia     Hepatitis C     successfully treated with Harvoni    Hepatocellular carcinoma 1/2/2018    Hyperlipidemia     Hyperphosphatemia     Hypertensive CKD (chronic kidney disease)     Immunosuppression     Liver transplanted 06/10/2001    MGUS (monoclonal gammopathy of unknown significance)     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 macular edema associated with type 2 diabetes mellitus     KARON (obstructive sleep apnea)     Secondary hyperparathyroidism     Severe pruritus     Thrombocytopenia     Type 2 diabetes mellitus with diabetic polyneuropathy, with long-term current use of insulin 7/5/2012    Urticaria        Past Surgical History:   Procedure Laterality Date    1) Creation of L arm basilic vein transposition (brachial artery-to-basilic vein AVF)  2) Ligation of L brachial AVF (which was 1st stage creation previously)    Left 6/15/2017    Performed by [facility] MD at [facility] OR 2ND FLR    ABLATION, RADIOFREQUENCY N/A 2/27/2018    Performed by Esther Surgeon at [facility] ESTHER    [id number] N/A 12/21/2017    Performed by [facility] Diagnostic Provider at [facility] OR 2ND FLR    broken nose      CATARACT EXTRACTION Bilateral     colonoscopy N/A 7/23/2014    Performed by Moises St MD at [facility] ENDO (4TH FLR)     [id number] POSSIBLE AV GRAFT Left 11/8/2016    Performed by Sil[facility]io William MD at [facility] OR 2ND FLR    DEBRIDEMENT-WOUND Left 4/12/2015    Performed by Jimmy Light MD at [facility] OR 2ND [facility]    DEBRIDEMENT-WOUND Left 4/11/2015    Performed by Jimmy Light MD at [facility] OR 2ND FLR    DEBRIDEMENT-WOUND Left 4/10/2015    Performed by Jimmy Light MD at [facility] OR 2ND FLR    DEBRIDEMENT-WOUND Left 4/9/2015    Performed by Jimmy Light MD at [facility] OR 1ST FLR    DEBRIDEMENT-WOUND Left 4/6/2015    Performed by Jimmy Light MD at [facility] OR 2ND FLR    DEBRIDEMENT-WOUND Left 4/4/2015    Performed by Jimmy Light MD at [facility] OR 2ND FLR    DEBRIDEMENT-WOUND Left 4/2/2015    Performed by Jimmy Light MD at [facility] OR 1ST FLR    DRESSING CHANGE- Left 4/10/2015    Performed by Jimmy Light MD at [facility] OR 2ND FLR    DRESSING CHANGE-s/p burn debridement Left 4/15/2015    Performed by Jimmy Light MD at [facility] OR 2ND FLR    DRESSING CHANGE-s/p burn debridement Left 4/14/2015    Performed by Jimmy Light MD at [facility] OR 2ND FLR    DRESSING CHANGE-s/p wound debridement Left 4/13/2015    Performed by Jimmy Light MD at [facility] OR 1ST FLR    DRESSING CHANGE-wound vac Left 4/20/2015    Performed by Jimmy Light MD at [facility] OR 2ND FLR    EMBOLIZATION, YTTRIUM THERAPY N/A 1/29/2019    Performed by [facility] Diagnostic Provider at [facility] OR 2ND [facility]    EYE SURGERY      cataracts    FEMUR SURGERY      FRACTURE SURGERY      right femur fracture     INSERTION-CATHETER-PERM-A-CATH Right 7/13/2017    Performed by Silvio William MD at [facility] OR 2ND FLR    LIVER TRANSPLANT  2001    Open Biome Fecal Transplant N/A 8/5/2015    Performed by Elmo Gan MD at [facility] ENDO (4TH FLR)    PLACEMENT-WOUND VAC  4/15/2015    Performed by Jimmy Light MD at [facility] OR 2ND FLR    PLACEMENT-WOUND VAC Left 4/4/2015    Performed by Jimmy Light MD at [facility] OR 2ND FLR    PORTACATH PLACEMENT  "Left     Radiofrequency Ablation N/A 3/7/2019    Performed by Esther Surgeon at [facility] ESTHER    SKIN GRAFT      graft from right thigh , applied to the left back and left arm.    SPLIT THICKNESS SKIN GRAFT - left trunk, abd and axilla N/A 4/15/2015    Performed by Jimmy Light MD at [facility] OR [facility] FLR       Family history of liver disease: No    Review of patient's allergies indicates:   Allergen Reactions    Adhesive Dermatitis and Other (See Comments)     "Burned his tail"  Please use Paper Tape    Corticosteroids (glucocorticoids) Other (See Comments)     Leg swelling after an injection    Hydrocortisone Swelling     Leg swelling after an injection    Neuromuscular blockers, steroidal Swelling     Leg swelling after an injection    Ribavirin Other (See Comments)     Arthralgias    Propofol analogues Other (See Comments)     Drops SVR and due to current systemic shunt state, results in difficulty in oscillometry to obtain blood pressure until return of vascular tone (other VS are stable throughout) and drop in venous return due to abdominal ascites; titrate slower in future or select alternate agent. Smooth emergence from general anesthesia without difficulty.       Tobacco Use    Smoking status: Former Smoker     Last attempt to quit: 1998     Years since quittin.6    Smokeless tobacco: Never Used    Tobacco comment: pt reports quitting in    Substance and Sexual Activity    Alcohol use: No     Comment: pt reports hx of alcholism and reports quit in     Drug use: Yes     Frequency: 2.0 times per week     Types: Marijuana    Sexual activity: Yes     Partners: Female       Medications Prior to Admission   Medication Sig Dispense Refill Last Dose    ACCU-CHEK JOANN PLUS TEST STRP Strp CHECK BLOOD SUGAR THREE TIMES DAILY 300 strip 11 Taking    allopurinol (ZYLOPRIM) 100 MG tablet TAKE 1 TABLET EVERY DAY (Patient taking differently: TAKE 1 TABLET EVERY DAY, bedtime) 90 tablet 3 " 3/6/2019 at 2130    ammonium lactate (LAC-HYDRIN) 12 % lotion Apply topically as needed for Dry Skin. 225 g 6 Past Week at Unknown time    aspirin 81 MG Chew Take 1 tablet (81 mg total) by mouth once daily. 90 tablet 3 1/15/2019    B,C/ferrous fum/FA/D3/zinc ox (PRORENAL ORAL) Take 1 tablet by mouth. Receives in Dialysis on Mondays, Wednesdays, and Fridays   3/6/2019 at 0730    blood-glucose meter (ACCU-CHEK JOANN PLUS METER) Misc Use as directed 1 each 0 Taking    carvedilol (COREG) 6.25 MG tablet TK 1 T PO  BID  3 3/7/2019 at 0700    cetirizine (ZYRTEC) 10 MG tablet Take 1 tablet (10 mg total) by mouth once daily. (Patient taking differently: Take 10 mg by mouth daily as needed. ) 90 tablet 3 Past Month at Unknown time    ciprofloxacin HCl (CIPRO) 500 MG tablet Take 1 tablet (500 mg total) by mouth every 12 (twelve) hours. for 5 days 10 tablet 0     clonazePAM (KLONOPIN) 0.5 MG tablet Take 1 tablet (0.5 mg total) by mouth daily as needed. 90 tablet 3 Past Month at Unknown time    diclofenac sodium (VOLTAREN) 1 % Gel Apply 2 g topically 3 (three) times daily. To the right knee. 1 Tube 0 Past Month at Unknown time    doxazosin (CARDURA) 8 MG Tab TAKE 1 TABLET ONCE DAILY. (Patient taking differently: TAKE 1 TABLET ONCE DAILY, at bedtime) 90 tablet 4 3/6/2019 at 2130    fluticasone (FLONASE) 50 mcg/actuation nasal spray SHAKE LIQUID AND USE 2 SPRAYS(100 MCG) IN EACH NOSTRIL EVERY DAY (Patient taking differently: SHAKE LIQUID AND USE 2 SPRAYS(100 MCG) IN EACH NOSTRIL EVERY DAY, prn) 16 mL 0 Past Week at Unknown time    furosemide (LASIX) 20 MG tablet Take 2 tablets (40 mg total) by mouth once daily. Only take lasix 40mg on non-dialysis days (Tuesday, Thursday, Saturday, and Sunday) by mouth. (Patient taking differently: Take 20 mg by mouth 2 (two) times daily. Only take lasix 20mg on non-dialysis days (Tuesday, Thursday, Saturday, and Sunday) by mouth twice a day) 16 tablet 11 3/6/2019 at 2130    [facility]  "10 gram/15 mL solution TAKE 30 ML 'S BY MOUTH THREE TIMES DAILY (Patient taking differently: TAKE 30 ML 'S BY MOUTH at bedtime) 2700 mL 5 3/5/2019 at 2130    hydrALAZINE (APRESOLINE) 100 MG tablet Take 1 tablet (100 mg total) by mouth 3 (three) times daily. 270 tablet 3 3/7/2019 at 0700    hydrocortisone 2.5 % cream Apply topically 2 (two) times daily as needed.   1 3/6/2019 at Unknown time    hydrOXYzine HCl (ATARAX) 25 MG tablet TAKE 1 TABLET(25 MG) BY MOUTH THREE TIMES DAILY AS NEEDED FOR ITCHING 90 tablet 0 3/7/2019 at 0700    hydrOXYzine HCl (ATARAX) 25 MG tablet TAKE 1 TABLET(25 MG) BY MOUTH THREE TIMES DAILY AS NEEDED FOR ITCHING 90 tablet 2 Taking    insulin aspart (NOVOLOG) 100 unit/mL InPn pen Inject 6 units w/ breakfast, 4 units w/ lunch and dinner plus scale 180-230 +1, 231-280 +2, 281-330 +3, 331-380 +4, >380 +5. 90 day supply (Patient taking differently: Inject 8 units w/ breakfast, 6 units w/ lunch and dinner plus scale 180-230 +1, 231-280 +2, 281-330 +3, 331-380 +4, >380 +5. 90 day supply) 3 Box 3 3/6/2019 at 2100    insulin glargine (LANTUS SOLOSTAR) 100 unit/mL (3 mL) InPn pen Inject 8 Units into the skin every evening. (Patient taking differently: Inject 10 Units into the skin every evening. ) 2 Box 6 3/6/2019 at 2100    insulin needles, disposable, (NOVOFINE 32) 32 x 1/4 " Ndle 1 each by Misc.(Non-Drug; Combo Route) route 3 (three) times daily. 100 each 5 Taking    ketoconazole (NIZORAL) 2 % cream APPLY TOPICALLY BID. MIX WITH HYDROCORTISONE CREAM, Uses prn on buttocks  1 3/6/2019 at Unknown time    lancets (ACCU-CHEK SOFTCLIX LANCETS) Misc 1 lancet by Misc.(Non-Drug; Combo Route) route 4 (four) times daily. 360 each 3 Taking    magnesium oxide (MAG-OX) 400 mg (241.3 mg magnesium) tablet TK 1 TABLET PO QD, morning  11 3/6/2019 at 0700    methocarbamol (ROBAXIN) 500 MG Tab Take 1 tablet by mouth daily (every 24 hours) as needed for muscle spasm. 5 tablet 0 Past Month at Unknown time    " minoxidil (LONITEN) 2.5 MG tablet Take 2 tablets (5 mg total) by mouth 2 (two) times daily. 360 tablet 3 3/7/2019 at 0700    nystatin (MYCOSTATIN) cream Apply topically 2 (two) times daily. 30 g 1     NYSTOP powder Apply topically 2 (two) times daily.   1 3/6/2019 at Unknown time    ondansetron (ZOFRAN-ODT) 4 MG TbDL DISSOLVE 1 TABLET(4 MG) ON THE TONGUE EVERY 12 HOURS AS NEEDED 60 tablet 12 3/7/2019 at 0700    oxyCODONE (ROXICODONE) 5 MG immediate release tablet Take 1 tablet (5 mg total) by mouth every 6 (six) hours as needed for Pain. 18 tablet 0     pantoprazole (PROTONIX) 40 MG tablet TAKE 1 TABLET EVERY DAY 90 tablet 3 3/7/2019 at 0700    pantoprazole (PROTONIX) 40 MG tablet Take 1 tablet (40 mg total) by mouth once daily. (Patient taking differently: Take 40 mg by mouth every morning. Sometimes takes twice a day) 90 tablet 3     rifAXIMin (XIFAXAN) 550 mg Tab Take 1 tablet (550 mg total) by mouth 2 (two) times daily. 60 tablet 11 3/7/2019 at 0700    sevelamer carbonate (RENVELA) 800 mg Tab Take 1 tablet (800 mg total) by mouth 3 (three) times daily with meals. 90 tablet 11 3/6/2019 at 2130    tacrolimus (PROGRAF) 0.5 MG Cap Take 1 capsule (0.5 mg total) by mouth once daily. (Patient taking differently: Take 0.5 mg by mouth every morning. ) 30 capsule 11 3/7/2019 at 0700    tiZANidine (ZANAFLEX) 4 MG tablet TAKE 1 TABLET BY MOUTH EVERY 6 HOURS AS NEEDED 90 tablet 12 Taking    [DISCONTINUED] ondansetron (ZOFRAN) 4 MG tablet Take 1 tablet (4 mg total) by mouth every 6 (six) hours. 12 tablet 0        Objective:     Vital Signs (Most Recent):  Temp: 96.2 °F (35.7 °C) (03/12/19 0757)  Pulse: 74 (03/12/19 1146)  Resp: 16 (03/12/19 0757)  BP: (!) 162/77 (03/12/19 0757)  SpO2: (!) 94 % (03/12/19 3281) Vital Signs (24h Range):  Temp:  [96.2 °F (35.7 °C)-98.1 °F (36.7 °C)] 96.2 °F (35.7 °C)  Pulse:  [74-88] 74  Resp:  [16-28] 16  SpO2:  [92 %-96 %] 94 %  BP: (129-178)/(61-81) 162/77     Weight: 93 kg (205 lb)  (03/11/19 1027)  Body mass index is 27.05 kg/m².    Physical Exam   Constitutional: No distress.   HENT:   Head: Normocephalic and atraumatic.   Eyes: No scleral icterus.   Cardiovascular: Normal rate and regular rhythm.   Pulmonary/Chest: Effort normal and breath sounds normal.   Abdominal: Bowel sounds are normal. He exhibits distension. There is no tenderness.   Musculoskeletal: He exhibits no edema.   Neurological:   Somnolent only opens his eyes   Skin: He is not diaphoretic.       MELD-Na score: 28 at 3/12/2019  4:26 AM  MELD score: 28 at 3/12/2019  4:26 AM  Calculated from:  Serum Creatinine: 7.5 mg/dL (Rounded to 4 mg/dL) at 3/12/2019  4:26 AM  Serum Sodium: 136 mmol/L at 3/12/2019  4:26 AM  Total Bilirubin: 2 mg/dL at 3/12/2019  4:26 AM  INR(ratio): 1.7 at 3/12/2019 12:06 AM  Age: 73 years    Significant Labs:  CBC:   Recent Labs   Lab 03/12/19  0426   WBC 14.66*   RBC 3.69*   HGB 10.1*   HCT 32.2*        CMP:   Recent Labs   Lab 03/12/19 0426   *   CALCIUM 8.1*   ALBUMIN 2.5*   PROT 7.2      K 5.3*   CO2 22*      BUN 52*   CREATININE 7.5*   ALKPHOS 126   ALT 22   AST 47*   BILITOT 2.0*     Coagulation:   Recent Labs   Lab 03/11/19  0150 03/12/19  0006   INR 1.5* 1.7*   APTT 37.4*  --        Significant Imaging:  X-Ray: Reviewed  CT: Reviewed

## 2019-03-12 NOTE — ASSESSMENT & PLAN NOTE
Mr. Mathis presented to [facility] for acute encephalopathy. Underwent Hepatic ablation on last Thursday. On Friday, he was found down on the floor by Wife after hearing a crash in the bathroom. Has PMHx of Liver transplant (2001) with diagnosis of HCC in 2018. Takes Lactulose and Rifaximin at home    -- Patient initially admitted to medicine for further workup of Encephalopathy.    -- 3/12 ICU was consulted and decided it would be best to transfer patient to Unit.  -- CT head negative  -- KUB shows no signs of obstructive gas pattern  -- Electrolytes, glucose WNL    Plan  -- Patient Intubated today due to Low GCS and concern for increasing hypercarbia seen on ABG  -- Paracentesis was completed after intubation. Diagnostic and Therapeutic (750ml taken)  -- Will follow up pleural fluid labs  -- Continue Lactulose and Rifaximin   -- F/u repeat ABG

## 2019-03-12 NOTE — ASSESSMENT & PLAN NOTE
ESRD on iHD  [facility]  4 hours  Dr. Clara ORDAZ AVF  Wife reports an EDW of 94 kg (93 kg on admission)    Plan/Recommendations:  -HD today for metabolic clearance/volume management  -UF 1L as tolerated  -will repeat ECHO.  Most recent ECHO in December showed evidence of volume overload with elevated PA pressures and enlarged IVC.  -will obtain OP dialysis records.

## 2019-03-12 NOTE — ASSESSMENT & PLAN NOTE
- at home on Lantus 10U QHS and Novolog 6U with breakfast, 4U with lunch and dinner  - at this time give mental status, keep patient NPO  - POCT glucose check Q6hr  - start low dose correction scale for now  - restart regiment once he is able to eat

## 2019-03-12 NOTE — PT/OT/SLP EVAL
Speech Language Pathology Evaluation  Bedside Swallow    Patient Name:  Philip Mathis III   MRN:  [id number]  Admitting Diagnosis: Acute encephalopathy    Recommendations:                 General Recommendations:  Dysphagia therapy  Diet recommendations:  NPO, NPO   Aspiration Precautions: Alternate means of nutrition/hydration, Frequent oral care and Strict aspiration precautions   General Precautions: Standard, aspiration, fall, NPO  Communication strategies:  none    History:     Past Medical History:   Diagnosis Date    Acute hypoxemic respiratory failure 3/12/2019    Anemia     Aortic atherosclerosis     Arthritis     Back pain     Bishop's esophagus     BPH (benign prostatic hypertrophy)     Chronic combined systolic and diastolic heart failure 4/26/2017    Controlled type 2 diabetes mellitus with chronic kidney disease on chronic dialysis, with long-term current use of insulin 4/14/2015    Elevated PSA     ESRD on dialysis     ESRD on hemodialysis     Essential hypertension     GERD (gastroesophageal reflux disease)     Gout     Hemolytic anemia     Hepatitis C     successfully treated with Harvoni    Hepatocellular carcinoma 1/2/2018    Hyperlipidemia     Hyperphosphatemia     Hypertensive CKD (chronic kidney disease)     Immunosuppression     Liver transplanted 06/10/2001    MGUS (monoclonal gammopathy of unknown significance)     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 macular edema associated with type 2 diabetes mellitus     KARON (obstructive sleep apnea)     Secondary hyperparathyroidism     Severe pruritus     Thrombocytopenia     Type 2 diabetes mellitus with diabetic polyneuropathy, with long-term current use of insulin 7/5/2012    Urticaria        Past Surgical History:   Procedure Laterality Date    1) Creation of L arm basilic vein transposition (brachial artery-to-basilic vein AVF)  2) Ligation of L brachial AVF (which was 1st stage creation  previously)    Left 6/15/2017    Performed by Silvio William MD at [facility] OR 2ND FLR    ABLATION, RADIOFREQUENCY N/A 2/27/2018    Performed by Esther Surgeon at [facility] ESTHER    WJBFZI-BSBYHO-BO N/A 12/21/2017    Performed by [facility] Diagnostic Provider at [facility] OR 2ND [facility]    broken nose      CATARACT EXTRACTION Bilateral     colonoscopy N/A 7/23/2014    Performed by Moises St MD at [facility] ENDO (4TH FLR)    XUAFQXAU-NSOSXCO-CP POSSIBLE AV GRAFT Left 11/8/2016    Performed by Silvio William MD at [facility] OR 2ND FLR    DEBRIDEMENT-WOUND Left 4/12/2015    Performed by Jimmy Light MD at [facility] OR 2ND FLR    DEBRIDEMENT-WOUND Left 4/11/2015    Performed by Jimmy Light MD at [facility] OR 2ND FLR    DEBRIDEMENT-WOUND Left 4/10/2015    Performed by Jimmy Light MD at [facility] OR 2ND FLR    DEBRIDEMENT-WOUND Left 4/9/2015    Performed by Jimmy Light MD at [facility] OR 1ST FLR    DEBRIDEMENT-WOUND Left 4/6/2015    Performed by Jimmy Light MD at [facility] OR 2ND FLR    DEBRIDEMENT-WOUND Left 4/4/2015    Performed by Jimmy Light MD at [facility] OR 2ND FLR    DEBRIDEMENT-WOUND Left 4/2/2015    Performed by Jimmy Light MD at [facility] OR 1ST FLR    DRESSING CHANGE- Left 4/10/2015    Performed by Jimmy Light MD at [facility] OR 2ND FLR    DRESSING CHANGE-s/p burn debridement Left 4/15/2015    Performed by Jimmy Light MD at [facility] OR 2ND FLR    DRESSING CHANGE-s/p burn debridement Left 4/14/2015    Performed by Jimmy Light MD at [facility] OR 2ND FLR    DRESSING CHANGE-s/p wound debridement Left 4/13/2015    Performed by Jimmy Light MD at [facility] OR 1ST FLR    DRESSING CHANGE-wound vac Left 4/20/2015    Performed by Jimmy Light MD at [facility] OR 2ND FLR    EMBOLIZATION, YTTRIUM THERAPY N/A 1/29/2019    Performed by [facility] Diagnostic Provider at [facility] OR 2ND [facility]    EYE SURGERY      cataracts    FEMUR SURGERY      FRACTURE SURGERY      right femur fracture      INSERTION-CATHETER-PERM-A-CATH Right 7/13/2017    Performed by Silvio William MD at [facility] OR 2ND FLR    LIVER TRANSPLANT  2001    Open Biome Fecal Transplant N/A 8/5/2015    Performed by Elmo Gan MD at [facility] ENDO (4TH FLR)    PLACEMENT-WOUND VAC  4/15/2015    Performed by Jimmy Light MD at [facility] OR 2ND FLR    PLACEMENT-WOUND VAC Left 4/4/2015    Performed by Jimmy Light MD at [facility] OR 2ND FLR    PORTACATH PLACEMENT Left     Radiofrequency Ablation N/A 3/7/2019    Performed by Esther Surgeon at [facility] ESTHER    SKIN GRAFT      graft from right thigh , applied to the left back and left arm.    SPLIT THICKNESS SKIN GRAFT - left trunk, abd and axilla N/A 4/15/2015    Performed by Jimmy Light MD at [facility] OR 2ND FLR     SLP communicated with nurse prior to entering room. Per nurse report, pt has been sleeping and not arousable. Nurse stated MD team planning NG tube placement.     Prior Intubation HX:  N/a for this admission.     Modified Barium Swallow: n/a for this admission    Chest X-Rays: 3/11/2019  FINDINGS:  The heart size remains mildly enlarged.  Mediastinal structures are midline with note of aortic atherosclerosis.  Lungs are expanded with mildly increased pulmonary vascularity.  Right lower lung zone shows patchy consolidation; this could indicate atelectasis, edema, pneumonia, etc.  Lungs are clear elsewhere.  Small pleural effusions may be present.  Skeletal structures are intact without acute finding.    Prior diet: ofe    Subjective     Pt not awake/alert t/o session.   Patient goals: OFE    Pain/Comfort:  · Pain Rating 1: (did not rate/state any pain; ofe 2/2 cognition)    Objective:     Oral Musculature Evaluation  · Oral Musculature: unable to assess due to poor participation/comprehension  · Volitional Cough: ofe  · Volitional Swallow: ofe  · Voice Prior to PO Intake: ofe    Bedside Swallow Eval: Pt's HOB elevated for appropriate positioning for po trials. Pt not appropriate for po  trials at this date 2/2 lethargy. SLP attempted to place ice chips to pt's lips with minimal response. Facial grimace noted X1. Pt with inconsistent eye opening throughout session, yet unable to follow simple commands. ST recommending strict NPO at this time with strict aspiration precautions in place with frequent oral care.     Treatment: SLP communicated recommendations for strict NPO with nurse who verbalized understanding. Pt left in NAD.   Whiteboard updated.     Assessment:     Philip Mathis III is a 73 y.o. male with an SLP diagnosis of Dysphagia.     Goals:   Multidisciplinary Problems     SLP Goals        Problem: SLP Goal    Goal Priority Disciplines Outcome   SLP Goal     SLP Ongoing (interventions implemented as appropriate)   Description:  Speech-Language Pathology Goals  Goals to be met by 3/19:   1) Pt will undergo po trials when deemed appropriate by SLP.                     Plan:     · Patient to be seen:  5 x/week   · Plan of Care expires:  04/10/19  · Plan of Care reviewed with:  patient   · SLP Follow-Up:          Discharge recommendations:  (tbd)       Time Tracking:     SLP Treatment Date:   03/12/19  Speech Start Time:  1009  Speech Stop Time:  1017     Speech Total Time (min):  8 min    Billable Minutes: Eval Swallow and Oral Function 8     Aye Okeefe M.S., CCC-SLP  Speech Language Pathologist  [phone] - pager   3/12/2019      Aye Oekefe, MS CCC-SLP  03/12/2019

## 2019-03-12 NOTE — ED NOTES
Patient wife Veronika contacted([phone]), would like call when patient is in route to [facility].

## 2019-03-12 NOTE — PLAN OF CARE
Problem: SLP Goal  Goal: SLP Goal  Speech-Language Pathology Goals  Goals to be met by 3/19:   1) Pt will undergo po trials when deemed appropriate by SLP.   Outcome: Ongoing (interventions implemented as appropriate)    Swallow evaluation completed. ST recommending strict NPO at this time with strict aspiration precautions in place. ST to follow to determine appropriateness of diet.     Aye Okeefe M.S., CCC-SLP  Speech Language Pathologist  [phone] - pager   3/12/2019

## 2019-03-12 NOTE — H&P
[facility] Hospital Medicine  History & Physical    Patient Name: Philip Mathis III  MRN: [id number]  Admission Date: 3/11/2019  Attending Physician: Susan Denise MD   Primary Care Provider: Reji Castillo MD    [facility] Medicine Team: [facility] [place] [facility] MED R Mt Uribe MD     Patient information was obtained from spouse/SO and ER records.     Subjective:     Principal Problem:Acute encephalopathy    Chief Complaint:   Chief Complaint   Patient presents with    Altered Mental Status     EMS called for fall.  Upon thier arrival found pt confused w/ constricted pupils.  EMS gave Narcan with little results.  Pt had liver ablation x 4 days ago.  Spouse states abd been distended since procedure.  and normally talks in full sentences.        HPI: This is a 74y/o male with hepatocellular carcinoma undergoing radiofrequency ablation by IR last treatment was 3/7, orthotopic liver transplant on 6/10/2001 on Prograf, ESRD on hemodialysis (MWF), Type 2 diabetes mellitus on long term insulin, essential HTN, chronic combined systolic and diastolic heart failure, KARON and history of hepatitis C (treated) who is being transfer to hospital medicine for acute encephalopathy. He presented initially to [facility] ED this Am with acute confusion. Wife not at bedside on transfer, she was reached via telephone for collateral tonight. Per wife, this AM patient did not wake up to his alarm to go to HD, she then later heard patient getting out of bed and heard a thump. She found the patient on the ground and very confused and not recognizing her. She called her neighbor to come and help her. EMS was later called, and patient brought to ED. Pt is very lethargic since that time, per wife, he has moan all day, unable to talk to her. This is unusual for him as pt normally drives himself to HD regularly. She manages all his medications and report that she has not given him any sedating meds (no Oxycodone, Robaxin,  Klonopin, Zanaflex). She report that over the last few days, pt report abdominal swelling and pain to her. In fact they contacted liver transplant coordinator, who was setting up an US of the abdomen. She report that he might have skip a few dose of lactulose due to diarrhea the last few days. She report that he has been in confused state like this in the past due to Hepatic Encephalopathy. Pt also recently had HCC treatment by IR last week, on Cipro for. At this time 11:53 PM, on evaluation, pt is unarousable, appear to be in sleep like state, per wife, he has been like this all day. 1x dose of narcan given with no improvement. Pt would moan to pain. Glucose check is 170.       At  ED, patient has undergone extensive evaluation with CTA of chest negative for PE as patient was mildly hypoxic on arrival and showed bilateral pleural effusions but nothing else significant. CT scan of abdomen showed moderate ascites and large hepatic tumor with necrosis. CT scan of head and C-spine were unremarkable. Ammonia was 45 and Lactic acid was 1.2. WBC was 12,200 but patient afebrile and not tachycardic. U/A negative for infection. Patient due for HD today and did not get HD and BUN/Cr was 40/6.5. In ED, patient remains acutely confused and not responding to questions appropriately. Patient has not focal neurologic deficits. Dr. Verdin at [facility] called and stated patient could be admitted to [facility] but Hospital medicine did not fell comfortable admitting patient for encephalopathy work-up so patient to be transferred and admitted to [facility] Vincenzo M[facility]. Patient empirically given broad spectrum abx at [facility] including Vancomycin. Zosyn and Ceftriaxone.         Past Medical History:   Diagnosis Date    Anemia     Aortic atherosclerosis     Arthritis     Back pain     Bishop's esophagus     BPH (benign prostatic hypertrophy)     Chronic combined systolic and diastolic heart failure 4/26/2017    Controlled type 2  diabetes mellitus with chronic kidney disease on chronic dialysis, with long-term current use of insulin 4/14/2015    Elevated PSA     ESRD on dialysis     ESRD on hemodialysis     Essential hypertension     GERD (gastroesophageal reflux disease)     Gout     Hemolytic anemia     Hepatitis C     successfully treated with Harvoni    Hepatocellular carcinoma 1/2/2018    Hyperlipidemia     Hyperphosphatemia     Hypertensive CKD (chronic kidney disease)     Immunosuppression     Liver transplanted 06/10/2001    MGUS (monoclonal gammopathy of unknown significance)     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 macular edema associated with type 2 diabetes mellitus     KARON (obstructive sleep apnea)     Secondary hyperparathyroidism     Severe pruritus     Thrombocytopenia     Type 2 diabetes mellitus with diabetic polyneuropathy, with long-term current use of insulin 7/5/2012    Urticaria        Past Surgical History:   Procedure Laterality Date    1) Creation of L arm basilic vein transposition (brachial artery-to-basilic vein AVF)  2) Ligation of L brachial AVF (which was 1st stage creation previously)    Left 6/15/2017    Performed by Silvio William MD at [facility] OR 2ND FLR    ABLATION, RADIOFREQUENCY N/A 2/27/2018    Performed by Esther Surgeon at [facility] ESTHER    RECPDM-[id number]-DJ N/A 12/21/2017    Performed by [facility] Diagnostic Provider at [facility] OR 2ND FLR    broken nose      CATARACT EXTRACTION Bilateral     colonoscopy N/A 7/23/2014    Performed by Moises St MD at [facility] ENDO (4TH FLR)    QRBSVZPY-LZRDFUN-WE POSSIBLE AV GRAFT Left 11/8/2016    Performed by Silvio William MD at [facility] OR 2ND FLR    DEBRIDEMENT-WOUND Left 4/12/2015    Performed by Jimmy Light MD at [facility] OR 2ND FLR    DEBRIDEMENT-WOUND Left 4/11/2015    Performed by Jimmy Light MD at [facility] OR 2ND FLR    DEBRIDEMENT-WOUND Left 4/10/2015    Performed by Jimmy Light MD at [facility] OR 2ND  FLR    DEBRIDEMENT-WOUND Left 4/9/2015    Performed by Jimmy Light MD at [facility] OR 1ST FLR    DEBRIDEMENT-WOUND Left 4/6/2015    Performed by Jimmy Light MD at [facility] OR 2ND FLR    DEBRIDEMENT-WOUND Left 4/4/2015    Performed by Jimmy Light MD at [facility] OR 2ND FLR    DEBRIDEMENT-WOUND Left 4/2/2015    Performed by Jimmy Light MD at [facility] OR 1ST FLR    DRESSING CHANGE- Left 4/10/2015    Performed by Jimmy Light MD at [facility] OR 2ND FLR    DRESSING CHANGE-s/p burn debridement Left 4/15/2015    Performed by Jimmy Light MD at [facility] OR 2ND FLR    DRESSING CHANGE-s/p burn debridement Left 4/14/2015    Performed by Jimmy Light MD at [facility] OR 2ND FLR    DRESSING CHANGE-s/p wound debridement Left 4/13/2015    Performed by Jimmy Light MD at [facility] OR 1ST FLR    DRESSING CHANGE-wound vac Left 4/20/2015    Performed by Jimmy Light MD at [facility] OR 2ND [facility]    EMBOLIZATION, YTTRIUM THERAPY N/A 1/29/2019    Performed by [facility] Diagnostic Provider at [facility] OR 2ND [facility]    EYE SURGERY      cataracts    FEMUR SURGERY      FRACTURE SURGERY      right femur fracture     INSERTION-CATHETER-PERM-A-CATH Right 7/13/2017    Performed by Silvio William MD at [facility] OR 2ND [facility]    LIVER TRANSPLANT  2001    Open Biome Fecal Transplant N/A 8/5/2015    Performed by Elmo Gan MD at [facility] ENDO (4TH FLR)    PLACEMENT-WOUND VAC  4/15/2015    Performed by Jimmy Light MD at [facility] OR 2ND FLR    PLACEMENT-WOUND VAC Left 4/4/2015    Performed by Jimmy Light MD at [facility] OR 2ND FLR    PORTACATH PLACEMENT Left     Radiofrequency Ablation N/A 3/7/2019    Performed by Esther Surgeon at [facility] ESTHER    SKIN GRAFT      graft from right thigh , applied to the left back and left arm.    SPLIT THICKNESS SKIN GRAFT - left trunk, abd and axilla N/A 4/15/2015    Performed by Jimmy Light MD at [facility] OR 2ND [facility]       Review of patient's allergies indicates:   Allergen Reactions    Adhesive  "Dermatitis and Other (See Comments)     "Burned his tail"  Please use Paper Tape    Corticosteroids (glucocorticoids) Other (See Comments)     Leg swelling after an injection    Hydrocortisone Swelling     Leg swelling after an injection    Neuromuscular blockers, steroidal Swelling     Leg swelling after an injection    Ribavirin Other (See Comments)     Arthralgias    Propofol analogues Other (See Comments)     Drops SVR and due to current systemic shunt state, results in difficulty in oscillometry to obtain blood pressure until return of vascular tone (other VS are stable throughout) and drop in venous return due to abdominal ascites; titrate slower in future or select alternate agent. Smooth emergence from general anesthesia without difficulty.       No current facility-administered medications on file prior to encounter.      Current Outpatient Medications on File Prior to Encounter   Medication Sig    ACCU-CHEK JOANN PLUS TEST STRP Strp CHECK BLOOD SUGAR THREE TIMES DAILY    allopurinol (ZYLOPRIM) 100 MG tablet TAKE 1 TABLET EVERY DAY (Patient taking differently: TAKE 1 TABLET EVERY DAY, bedtime)    ammonium lactate (LAC-HYDRIN) 12 % lotion Apply topically as needed for Dry Skin.    aspirin 81 MG Chew Take 1 tablet (81 mg total) by mouth once daily.    B,C/ferrous fum/FA/D3/zinc ox (PRORENAL ORAL) Take 1 tablet by mouth. Receives in Dialysis on Mondays, Wednesdays, and Fridays    blood-glucose meter (ACCU-CHEK JOANN PLUS METER) Misc Use as directed    carvedilol (COREG) 6.25 MG tablet TK 1 T PO  BID    cetirizine (ZYRTEC) 10 MG tablet Take 1 tablet (10 mg total) by mouth once daily. (Patient taking differently: Take 10 mg by mouth daily as needed. )    ciprofloxacin HCl (CIPRO) 500 MG tablet Take 1 tablet (500 mg total) by mouth every 12 (twelve) hours. for 5 days    clonazePAM (KLONOPIN) 0.5 MG tablet Take 1 tablet (0.5 mg total) by mouth daily as needed.    diclofenac sodium (VOLTAREN) 1 % " "Gel Apply 2 g topically 3 (three) times daily. To the right knee.    doxazosin (CARDURA) 8 MG Tab TAKE 1 TABLET ONCE DAILY. (Patient taking differently: TAKE 1 TABLET ONCE DAILY, at bedtime)    fluticasone (FLONASE) 50 mcg/actuation nasal spray SHAKE LIQUID AND USE 2 SPRAYS(100 MCG) IN EACH NOSTRIL EVERY DAY (Patient taking differently: SHAKE LIQUID AND USE 2 SPRAYS(100 MCG) IN EACH NOSTRIL EVERY DAY, prn)    furosemide (LASIX) 20 MG tablet Take 2 tablets (40 mg total) by mouth once daily. Only take lasix 40mg on non-dialysis days (Tuesday, Thursday, Saturday, and Sunday) by mouth. (Patient taking differently: Take 20 mg by mouth 2 (two) times daily. Only take lasix 20mg on non-dialysis days (Tuesday, Thursday, Saturday, and Sunday) by mouth twice a day)    GENERLAC 10 gram/15 mL solution TAKE 30 ML 'S BY MOUTH THREE TIMES DAILY (Patient taking differently: TAKE 30 ML 'S BY MOUTH at bedtime)    hydrALAZINE (APRESOLINE) 100 MG tablet Take 1 tablet (100 mg total) by mouth 3 (three) times daily.    hydrocortisone 2.5 % cream Apply topically 2 (two) times daily as needed.     hydrOXYzine HCl (ATARAX) 25 MG tablet TAKE 1 TABLET(25 MG) BY MOUTH THREE TIMES DAILY AS NEEDED FOR ITCHING    hydrOXYzine HCl (ATARAX) 25 MG tablet TAKE 1 TABLET(25 MG) BY MOUTH THREE TIMES DAILY AS NEEDED FOR ITCHING    insulin aspart (NOVOLOG) 100 unit/mL InPn pen Inject 6 units w/ breakfast, 4 units w/ lunch and dinner plus scale 180-230 +1, 231-280 +2, 281-330 +3, 331-380 +4, >380 +5. 90 day supply (Patient taking differently: Inject 8 units w/ breakfast, 6 units w/ lunch and dinner plus scale 180-230 +1, 231-280 +2, 281-330 +3, 331-380 +4, >380 +5. 90 day supply)    insulin glargine (LANTUS SOLOSTAR) 100 unit/mL (3 mL) InPn pen Inject 8 Units into the skin every evening. (Patient taking differently: Inject 10 Units into the skin every evening. )    insulin needles, disposable, (NOVOFINE 32) 32 x 1/4 " Ndle 1 each by Misc.(Non-Drug; " Combo Route) route 3 (three) times daily.    ketoconazole (NIZORAL) 2 % cream APPLY TOPICALLY BID. MIX WITH HYDROCORTISONE CREAM, Uses prn on buttocks    lancets (ACCU-CHEK SOFTCLIX LANCETS) Misc 1 lancet by Misc.(Non-Drug; Combo Route) route 4 (four) times daily.    magnesium oxide (MAG-OX) 400 mg (241.3 mg magnesium) tablet TK 1 TABLET PO QD, morning    methocarbamol (ROBAXIN) 500 MG Tab Take 1 tablet by mouth daily (every 24 hours) as needed for muscle spasm.    minoxidil (LONITEN) 2.5 MG tablet Take 2 tablets (5 mg total) by mouth 2 (two) times daily.    nystatin (MYCOSTATIN) cream Apply topically 2 (two) times daily.    NYSTOP powder Apply topically 2 (two) times daily.     ondansetron (ZOFRAN-ODT) 4 MG TbDL DISSOLVE 1 TABLET(4 MG) ON THE TONGUE EVERY 12 HOURS AS NEEDED    oxyCODONE (ROXICODONE) 5 MG immediate release tablet Take 1 tablet (5 mg total) by mouth every 6 (six) hours as needed for Pain.    pantoprazole (PROTONIX) 40 MG tablet TAKE 1 TABLET EVERY DAY    pantoprazole (PROTONIX) 40 MG tablet Take 1 tablet (40 mg total) by mouth once daily. (Patient taking differently: Take 40 mg by mouth every morning. Sometimes takes twice a day)    rifAXIMin (XIFAXAN) 550 mg Tab Take 1 tablet (550 mg total) by mouth 2 (two) times daily.    sevelamer carbonate (RENVELA) 800 mg Tab Take 1 tablet (800 mg total) by mouth 3 (three) times daily with meals.    tacrolimus (PROGRAF) 0.5 MG Cap Take 1 capsule (0.5 mg total) by mouth once daily. (Patient taking differently: Take 0.5 mg by mouth every morning. )    tiZANidine (ZANAFLEX) 4 MG tablet TAKE 1 TABLET BY MOUTH EVERY 6 HOURS AS NEEDED    [DISCONTINUED] ondansetron (ZOFRAN) 4 MG tablet Take 1 tablet (4 mg total) by mouth every 6 (six) hours.     Family History     Problem Relation (Age of Onset)    Cancer Mother, Sister    Coronary artery disease Father    Diabetes Father    Heart disease Father    Hypertension Father        Tobacco Use    Smoking  status: Former Smoker     Last attempt to quit: 1998     Years since quittin.6    Smokeless tobacco: Never Used    Tobacco comment: pt reports quitting in    Substance and Sexual Activity    Alcohol use: No     Comment: pt reports hx of alcholism and reports quit in     Drug use: Yes     Frequency: 2.0 times per week     Types: Marijuana    Sexual activity: Yes     Partners: Female     Review of Systems   Unable to perform ROS: Mental status change     Objective:     Vital Signs (Most Recent):  Temp: 98.1 °F (36.7 °C) (19)  Pulse: 86 (19)  Resp: (!) 22 (19)  BP: (!) 161/76 (19)  SpO2: (!) 94 % (19) Vital Signs (24h Range):  Temp:  [97.6 °F (36.4 °C)-98.3 °F (36.8 °C)] 98.1 °F (36.7 °C)  Pulse:  [79-88] 86  Resp:  [16-28] 22  SpO2:  [90 %-97 %] 94 %  BP: (129-173)/(61-81) 161/76     Weight: 93 kg (205 lb)  Body mass index is 27.05 kg/m².    Physical Exam    Nursing note and vitals reviewed.  Constitutional: He is not diaphoretic. No distress. Sedated  HENT:   Head: Normocephalic and atraumatic.   Mouth/Throat: Oropharynx is clear and moist.   Eyes: Conjunctivae and EOM are normal. Pupils are equal, round, and reactive to light. No scleral icterus.   Neck: Normal range of motion. Neck supple. No JVD present.   Cardiovascular: Normal rate, regular rhythm and intact distal pulses.   Pulmonary/Chest: Breath sounds normal. No stridor. No respiratory distress.   Abdominal: Soft. Bowel sounds are normal. He exhibits  distension. There is no tenderness.   Old bruises on abdomen    Musculoskeletal: Normal range of motion. He exhibits no edema or tenderness.   No obvious deformity of extremities   Neurological: He has normal strength. No cranial nerve deficit.   Confused but moving all extremities. Responsive to pain, will not answer any other questions   Skin: Skin is warm and dry. No rash noted.   Psychiatric:   Pt is confused       Significant  Labs:   CBC:   Recent Labs   Lab 03/11/19  0150   WBC 12.26   HGB 9.7*   HCT 30.9*        CMP:   Recent Labs   Lab 03/11/19  0150      K 4.8      CO2 22*   *   BUN 40*   CREATININE 6.5*   CALCIUM 8.2*   PROT 7.3   ALBUMIN 2.3*   BILITOT 2.0*   ALKPHOS 142*   AST 60*   ALT 26   ANIONGAP 13   EGFRNONAA 8*     Lactic Acid:   Recent Labs   Lab 03/11/19  0935   LACTATE 1.2     Troponin:   Recent Labs   Lab 03/11/19  0150 03/11/19  1413   TROPONINI 0.392* 0.371*     Urine Studies:   Recent Labs   Lab 03/11/19  0815   COLORU Isabelle   APPEARANCEUA Clear   PHUR 5.0   SPECGRAV 1.020   PROTEINUA Negative   GLUCUA Negative   KETONESU Negative   BILIRUBINUA Negative   OCCULTUA Negative   NITRITE Negative   UROBILINOGEN Negative   LEUKOCYTESUR Negative       Significant Imaging: I have reviewed all pertinent imaging results/findings within the past 24 hours.     Imaging Results          CTA Chest Non-Coronary - PE Study (Final result)     Abnormal  Result time 03/11/19 12:23:31    Final result by Clement Crooks MD (03/11/19 12:23:31)                 Impression:      1. No definite PE identified, noting limited evaluation to the central pulmonary arteries.  2. Development of bilateral pleural effusions, larger on the right.  3. Partial collapse of the middle lobe, lingula, and bilateral lower lobes likely representing atelectasis.  4. Cardiomegaly and coronary atherosclerosis.  5. Status post liver transplant.  Hepatomegaly with large tumor in the right lobe and significant size increase compared to CT abdomen 12/19/2018.  Low-attenuation focus in segment 7 likely representing necrosis and/or hemorrhage status post IR embolization.  6. 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  7. Moderate volume ascites  8. Chronic medical renal disease.  No hydronephrosis.  9. Body wall edema.  10. Other findings as above.  11.  This report was flagged in Epic as  abnormal.      Electronically signed by: Clement Crooks MD  Date:    03/11/2019  Time:    12:23             Narrative:    EXAMINATION:  CT ABDOMEN PELVIS WITH CONTRAST; CTA CHEST NON CORONARY    CLINICAL HISTORY:  recent procedure;; low O2 sat, elevated troponin, ams;    TECHNIQUE:  Low dose axial images, sagittal and coronal reformations were obtained from the lung apices to the pubic symphysis following the IV administration of 100 mL of Omnipaque 350 .  No oral contrast given.  CT chest was the formed with the pulmonary embolus protocol.  Multiplanar reconstructions including MIP images were post processed for vessel analysis.  This was followed by routine abdomen and pelvis.    COMPARISON:  PET CT exam 01/26/2019, CT abdomen pelvis 12/19/2018.    FINDINGS:  Visualized structures in the lower neck and both axillary areas are unremarkable.  Mediastinal and hilar areas show upper normal sized lymph nodes at the azygos and subcarinal positions, not hypermetabolic on most recent prior PET CT.  Thoracic aorta is upper normal size with left arch .  Moderate atherosclerosis calcifications are seen along the aorta and coronary arteries.  Heart size is mildly enlarged the with mild, chronic pericardial effusion around.    Evaluation of the pulmonary arteries for pulmonary thromboembolism is limited due to respiratory motion artifact.  No definite filling defects are identified to the level of the proximal segmental pulmonary arteries to indicate acute PE.  More distal pulmonary arteries are not adequately evaluated.    Trachea and central bronchi are patent.  Bilateral pleural effusions have developed, small on the left and moderate on the right; these appears simple by imaging with the homogeneous attenuation and posterior layering. the upper lobes on both sides are expanded and relatively clear with note of mild consolidation/atelectasis at the edge of the lingula.  Right middle and lower lobes are mostly collapsed.   Left lower lobe has some expansion with note of dependent consolidation, likely atelectasis.  Collapsed portions of lungs could obscure other pathology.    Spleen is mildly enlarged without focal lesion.  Extensive atherosclerosis is noted along the splenic artery.  Patient is status post liver transplant.  Hepatomegaly is present with the approximate 21 cm vertical span of the liver.  Correlating with the most recent PET CT exam, large heterogeneous mass is present in the liver involving hepatic segments 4, 5, 6, 7 and 8; there is relative sparing of hepatic segments 2 and 3.  More focal area of abnormality within the right lobe measures up to 18.5 cm (series 3, image 43 ) and represents a significant size increase compared to the CT 12/19/2018.  The elsewhere, the hepatic parenchyma is heterogeneous.  Within hepatic segment 7 is a more confluent low-attenuation abnormality approximately 7 cm likely represent focus of necrosis and/or hemorrhage status post IR embolization.  Bile ducts are not significantly dilated.  Hepatic artery is patent.  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    Adrenal glands are normal.  Both kidneys again demonstrate mild parenchymal loss consistent with chronic medical renal disease.  No stone or hydronephrosis is detected.  A few small cysts are present.  Ureters are not dilated.  Wall of the urinary bladder is smooth, noting limited assessment due to partial collapse.  Prostate is unremarkable.    Abdominal aorta shows atherosclerosis but no aneurysm.  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  Mild retroperitoneal lymph node enlargement is seen in several areas.    A moderate volume of ascites is present throughout the abdomen and pelvis, increased in volume from comparison exams.  No free air is detected.  Intestinal tract shows no obstruction or acute inflammatory  process.  Some fluid is present in the gastric body.  Small bowel is normal caliber.  Diverticulosis is present in the left colon without acute inflammation.    Skeletal structures are intact without acute fracture or lytic/blastic lesion.  Hardware is partially demonstrated in the right femur.  Degenerative changes are evident throughout the thoracolumbar spine, and note is also made of pars defect at L5 with grade 1 spondylolisthesis.  Mild, diffuse body wall edema is present.                               CT Abdomen Pelvis With Contrast (Final result)     Abnormal  Result time 03/11/19 12:23:31    Final result by Clement Crooks MD (03/11/19 12:23:31)                 Impression:      1. No definite PE identified, noting limited evaluation to the central pulmonary arteries.  2. Development of bilateral pleural effusions, larger on the right.  3. Partial collapse of the middle lobe, lingula, and bilateral lower lobes likely representing atelectasis.  4. Cardiomegaly and coronary atherosclerosis.  5. Status post liver transplant.  Hepatomegaly with large tumor in the right lobe and significant size increase compared to CT abdomen 12/19/2018.  Low-attenuation focus in segment 7 likely representing necrosis and/or hemorrhage status post IR embolization.  6. 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  7. Moderate volume ascites  8. Chronic medical renal disease.  No hydronephrosis.  9. Body wall edema.  10. Other findings as above.  11.  This report was flagged in Epic as abnormal.      Electronically signed by: Clement Crooks MD  Date:    03/11/2019  Time:    12:23             Narrative:    EXAMINATION:  CT ABDOMEN PELVIS WITH CONTRAST; CTA CHEST NON CORONARY    CLINICAL HISTORY:  recent procedure;; low O2 sat, elevated troponin, ams;    TECHNIQUE:  Low dose axial images, sagittal and coronal reformations were obtained from the lung apices to the pubic symphysis following the IV  administration of 100 mL of Omnipaque 350 .  No oral contrast given.  CT chest was the formed with the pulmonary embolus protocol.  Multiplanar reconstructions including MIP images were post processed for vessel analysis.  This was followed by routine abdomen and pelvis.    COMPARISON:  PET CT exam 01/26/2019, CT abdomen pelvis 12/19/2018.    FINDINGS:  Visualized structures in the lower neck and both axillary areas are unremarkable.  Mediastinal and hilar areas show upper normal sized lymph nodes at the azygos and subcarinal positions, not hypermetabolic on most recent prior PET CT.  Thoracic aorta is upper normal size with left arch .  Moderate atherosclerosis calcifications are seen along the aorta and coronary arteries.  Heart size is mildly enlarged the with mild, chronic pericardial effusion around.    Evaluation of the pulmonary arteries for pulmonary thromboembolism is limited due to respiratory motion artifact.  No definite filling defects are identified to the level of the proximal segmental pulmonary arteries to indicate acute PE.  More distal pulmonary arteries are not adequately evaluated.    Trachea and central bronchi are patent.  Bilateral pleural effusions have developed, small on the left and moderate on the right; these appears simple by imaging with the homogeneous attenuation and posterior layering. the upper lobes on both sides are expanded and relatively clear with note of mild consolidation/atelectasis at the edge of the lingula.  Right middle and lower lobes are mostly collapsed.  Left lower lobe has some expansion with note of dependent consolidation, likely atelectasis.  Collapsed portions of lungs could obscure other pathology.    Spleen is mildly enlarged without focal lesion.  Extensive atherosclerosis is noted along the splenic artery.  Patient is status post liver transplant.  Hepatomegaly is present with the approximate 21 cm vertical span of the liver.  Correlating with the most  recent PET CT exam, large heterogeneous mass is present in the liver involving hepatic segments 4, 5, 6, 7 and 8; there is relative sparing of hepatic segments 2 and 3.  More focal area of abnormality within the right lobe measures up to 18.5 cm (series 3, image 43 ) and represents a significant size increase compared to the CT 12/19/2018.  The elsewhere, the hepatic parenchyma is heterogeneous.  Within hepatic segment 7 is a more confluent low-attenuation abnormality approximately 7 cm likely represent focus of necrosis and/or hemorrhage status post IR embolization.  Bile ducts are not significantly dilated.  Hepatic artery is patent.  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    Adrenal glands are normal.  Both kidneys again demonstrate mild parenchymal loss consistent with chronic medical renal disease.  No stone or hydronephrosis is detected.  A few small cysts are present.  Ureters are not dilated.  Wall of the urinary bladder is smooth, noting limited assessment due to partial collapse.  Prostate is unremarkable.    Abdominal aorta shows atherosclerosis but no aneurysm.  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  Mild retroperitoneal lymph node enlargement is seen in several areas.    A moderate volume of ascites is present throughout the abdomen and pelvis, increased in volume from comparison exams.  No free air is detected.  Intestinal tract shows no obstruction or acute inflammatory process.  Some fluid is present in the gastric body.  Small bowel is normal caliber.  Diverticulosis is present in the left colon without acute inflammation.    Skeletal structures are intact without acute fracture or lytic/blastic lesion.  Hardware is partially demonstrated in the right femur.  Degenerative changes are evident throughout the thoracolumbar spine, and note is also made of pars defect at L5 with grade  1 spondylolisthesis.  Mild, diffuse body wall edema is present.                               CT Head Without Contrast (Final result)  Result time 03/11/19 09:44:55    Final result by Holden Willson MD (03/11/19 09:44:55)                 Impression:      No acute intracranial abnormalities are identified.  There is no evidence for intracranial hemorrhage.    Atrophy and periventricular white matter ischemic changes commensurate with the patient's chronological age.    Paranasal sinus mucosal disease.    Remote inferior wall blowout fracture of the right orbit.      Electronically signed by: Holden Willson MD  Date:    03/11/2019  Time:    09:44             Narrative:    EXAMINATION:  CT HEAD WITHOUT CONTRAST    CLINICAL HISTORY:  Confusion/delirium, altered LOC, unexplained;    TECHNIQUE:  Low dose axial images were obtained through the head.  Coronal and sagittal reformations were also performed. Contrast was not administered.    COMPARISON:  None.    FINDINGS:  The ventricles are enlarged and the sulci are prominent consistent with generalized atrophy commensurate with the patient's chronological age.  There is decreased density within the periventricular white matter likely reflecting ischemia/infarction secondary to microvascular disease and this is also commensurate with the patient's age.  There is no evidence for abnormal masses, mass effect, or midline shift.  No abnormal intra or extra-axial fluid collections are identified, specifically there is no evidence for intracranial hemorrhage.  Atherosclerotic calcification is identified within the intracranial internal carotid arteries.  Mastoid air cells are clear.  There appear to be chronic changes related to inferior wall blowout fracture of the right orbit.  There is minimal mucosal thickening within the left maxillary antrum and prominent mucosal thickening is identified within the sphenoid sinuses.  Bony calvarium appears intact.                                CT Cervical Spine Without Contrast (Final result)  Result time 03/11/19 09:00:15    Final result by Clement Crooks MD (03/11/19 09:00:15)                 Impression:      1. No cervical spine fracture identified.  2. Overall mild degenerative spine changes, detailed above.  3. Paranasal sinus disease, carotid atherosclerosis, and other findings as above.      Electronically signed by: Clement Crooks MD  Date:    03/11/2019  Time:    09:00             Narrative:    EXAMINATION:  CT CERVICAL SPINE WITHOUT CONTRAST    CLINICAL HISTORY:  fall, confusion, can't give hx;    TECHNIQUE:  Low dose axial images, sagittal and coronal reformations were performed though the cervical spine.  Contrast was not administered.    COMPARISON:  MRI cervical spine 06/23/2005, PET CT 01/26/2019    FINDINGS:  For the intracranial findings see the concurrent CT head exam reported separately.    Mild motion blurring is present, more notably at images around the skull base.    The cervical vertebra are intact without acute fracture or traumatic malalignment.  Vertebra are aligned on the sagittal images without subluxation.  On coronal images, mild tilt of upper cervical spine to the right is likely positional.    Visualized soft tissues of the neck show no mass or paraspinal hematoma.  Both carotid arteries show atherosclerosis calcifications.  Airway is patent, and lung apices are clear.  Paranasal sinus disease is evident with the prominent mucosal thickening in the sphenoid sinus.  Mastoid air cells appear clear.    Some degenerative changes are evident in the cervical spine.  Anterior osteophytes are present at the multiple levels.  C1-2 articulation shows narrowing and sclerosis between the odontoid and anterior arch of C1.  Tip of the odontoid has small erosive or cystic change with adjacent small calcifications suggesting crystal deposition disease.  Approximate 1 cm cyst or geode in the body of C2 next to the odontoid  base is stable, PET negative.  Disc spaces show no significant narrowing.  Facet degenerative change is present at multiple levels.  C2-3 has moderate narrowing of the left neural foramen from spurs.  C4-5 has a small central disc protrusion.  Other levels show overall mild degenerative change.  No significant stenosis of the central canal is detected.                               X-Ray Chest AP Portable (Final result)  Result time 03/11/19 08:12:15    Final result by Clement Crooks MD (03/11/19 08:12:15)                 Impression:      Chronic cardiomegaly with possible mild CHF.    Consolidation right lung base.  Clinical correlation and follow-up recommended.      Electronically signed by: Clement Crooks MD  Date:    03/11/2019  Time:    08:12             Narrative:    EXAMINATION:  XR CHEST AP PORTABLE    CLINICAL HISTORY:  altered mental status;    TECHNIQUE:  Single frontal view of the chest was performed.    COMPARISON:  10/14/2017    FINDINGS:  The heart size remains mildly enlarged.  Mediastinal structures are midline with note of aortic atherosclerosis.  Lungs are expanded with mildly increased pulmonary vascularity.  Right lower lung zone shows patchy consolidation; this could indicate atelectasis, edema, pneumonia, etc.  Lungs are clear elsewhere.  Small pleural effusions may be present.  Skeletal structures are intact without acute finding.                                  Assessment/Plan:     * Acute encephalopathy    - new acute onset, etiology unclear, could be hepatic encephalopathy, infectious, metabolic, medication or neurologic  - at this time patient is lethargic, arousable to pain stimuli only, per wife this has been like this all day, per ED note he answered his name  - so far CT head showed no obvious issue  - electrolytes are stable, glucose are stable   - discuss with wife again, she did not give him any new medications or sedating meds, UTox done was negative as well, 1x dose of  narcan did not improved his mentals status  - neuro check Q4hr, NPO status due to concern for aspiration  - will treat infectious given distended abd per wife for SBP as well as PNA. Continue Zosyn  - will treat hepatic encephalopathy, start Lactulose Enema tonight, TID for now  - pleural effusion noted, now requiring 3L NC, CTA chest was negative for PE, will order ABG tonight  - will need nephrology for HD as he missed HD to day to help with the metabolic component  - NPO status given mental status change, high risk for aspiration  - consider neurology consult in AM if not improvement    Low threshold to consult MICU if further changes to his mental status or airway compromised     Abdominal distention    - per wife this is new  - ascites noted on CT abd  - will need Paracentesis for SBP r/o  - continue zosyn         Acute hypoxemic respiratory failure    - Now on 3L NC  - CTA: No definite PE identified, noting limited evaluation to the central pulmonary arteries. Development of bilateral pleural effusions, larger on the right. Partial collapse of the middle lobe, lingula, and bilateral lower lobes likely representing atelectasis.  - Continue Zosyn  - will need HD       Troponin level elevated    - trending down from 0.39 to 0.37  - EKG reviewed at , no ST changes  - reorder troponin and EKG tonight       Chronic gout    - when able restart Allopurinol       KARON (obstructive sleep apnea)    - checking ABG        ESRD on hemodialysis    - Nephro consult for HD  - MWF at home, did not get HD today       Controlled type 2 diabetes mellitus with chronic kidney disease on chronic dialysis, with long-term current use of insulin    - at home on Lantus 10U QHS and Novolog 6U with breakfast, 4U with lunch and dinner  - at this time give mental status, keep patient NPO  - POCT glucose check Q6hr  - start low dose correction scale for now  - restart regiment once he is able to eat       Liver transplanted 6/10/2001    -  Hepatology consult for assistance with immunosuppressed medications       Essential hypertension    - on multiple medications at home  - unable to give oral medications given AMS  - IV hydralazine Q6hr PRN for now  - restart PO meds when mental status is better         VTE Risk Mitigation (From admission, onward)        Ordered     heparin (porcine) injection 5,000 Units  Every 8 hours      03/11/19 2301     IP VTE HIGH RISK PATIENT  Once      03/11/19 2301             Mt Uribe MD  Department of Hospital Medicine   [facility]

## 2019-03-12 NOTE — CARE UPDATE
Rapid Response Follow-up Note    Notified Dr. Mason of pH 7.278, CO2 47.5, HCO3 22.2. MD also notified of pt's lethargy and SpO2 92% on 3 LNC. States MICU team will evaluate pt.   No acute issues at this time. Reviewed plan of care with primary RN, [id number].   Please call Rapid Response RN with any questions or concerns at  [phone].

## 2019-03-12 NOTE — NURSING
Rapid response nurse called due to pt's lethargy. Received lactulose enema overnight and had 3-4 BMS but continues to be responsive only to pain. VSS. MD aware. All PO meds held until NGT placement.

## 2019-03-12 NOTE — NURSING
[facility] ([id number]) paged for notification of pt's arrival. New orders to be placed. TM.

## 2019-03-12 NOTE — HOSPITAL COURSE
On 3/12, patient was transferred to ICU for further workup of Acute metabolic encephalopathy. Pt was intubated. And Paracentesis (diagnostic/therapuetic) completed. He was started on scheduled lactulose to treat encephalopathy attributed to hepatic etiology. Infectious workup was negative and Infectious Disease Transplant were consulted for assistance. Hepatology was involved in care of patient. The patient continued to remain encephalopathic despite weaning of sedation. EEG and CT imaging were negative. He was intermittently able to follow some commands on 3/16/2019, however, there was no consistency and ultimately no meaningful improvements. The following day, he was no longer following commands. He became suddenly hypotensive at around 3pm with requirements of levophed. He then developed tachyarrhythmias, but we were unable to decrease his pressors. He was given boluses of fluid. Ultimately, he developed sustained ventricular tachycardia and eventually asystole.

## 2019-03-12 NOTE — SUBJECTIVE & OBJECTIVE
Past Medical History:   Diagnosis Date    Acute hypoxemic respiratory failure 3/12/2019    Anemia     Aortic atherosclerosis     Arthritis     Back pain     Bishop's esophagus     BPH (benign prostatic hypertrophy)     Chronic combined systolic and diastolic heart failure 4/26/2017    Controlled type 2 diabetes mellitus with chronic kidney disease on chronic dialysis, with long-term current use of insulin 4/14/2015    Elevated PSA     ESRD on dialysis     ESRD on hemodialysis     Essential hypertension     GERD (gastroesophageal reflux disease)     Gout     Hemolytic anemia     Hepatitis C     successfully treated with Harvoni    Hepatocellular carcinoma 1/2/2018    Hyperlipidemia     Hyperphosphatemia     Hypertensive CKD (chronic kidney disease)     Immunosuppression     Liver transplanted 06/10/2001    MGUS (monoclonal gammopathy of unknown significance)     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 macular edema associated with type 2 diabetes mellitus     KARON (obstructive sleep apnea)     Secondary hyperparathyroidism     Severe pruritus     Thrombocytopenia     Type 2 diabetes mellitus with diabetic polyneuropathy, with long-term current use of insulin 7/5/2012    Urticaria        Past Surgical History:   Procedure Laterality Date    1) Creation of L arm basilic vein transposition (brachial artery-to-basilic vein AVF)  2) Ligation of L brachial AVF (which was 1st stage creation previously)    Left 6/15/2017    Performed by Silvio William MD at [facility] OR 2ND FLR    ABLATION, RADIOFREQUENCY N/A 2/27/2018    Performed by Esther Surgeon at [facility] ESTHER    [id number] N/A 12/21/2017    Performed by [facility] Diagnostic Provider at [facility] OR 2ND FLR    broken nose      CATARACT EXTRACTION Bilateral     colonoscopy N/A 7/23/2014    Performed by Moises St MD at [facility] ENDO (4TH FLR)    [id number] POSSIBLE AV GRAFT Left 11/8/2016    Performed by  Silvio William MD at [facility] OR 2ND FLR    DEBRIDEMENT-WOUND Left 4/12/2015    Performed by Jimmy Light MD at [facility] OR 2ND FLR    DEBRIDEMENT-WOUND Left 4/11/2015    Performed by Jimmy Light MD at [facility] OR 2ND FLR    DEBRIDEMENT-WOUND Left 4/10/2015    Performed by Jimmy Light MD at [facility] OR 2ND FLR    DEBRIDEMENT-WOUND Left 4/9/2015    Performed by Jimmy Light MD at [facility] OR 1ST FLR    DEBRIDEMENT-WOUND Left 4/6/2015    Performed by Jimmy Light MD at [facility] OR 2ND FLR    DEBRIDEMENT-WOUND Left 4/4/2015    Performed by Jimmy Light MD at [facility] OR 2ND FLR    DEBRIDEMENT-WOUND Left 4/2/2015    Performed by Jimmy Light MD at [facility] OR 1ST FLR    DRESSING CHANGE- Left 4/10/2015    Performed by Jimmy Light MD at [facility] OR 2ND FLR    DRESSING CHANGE-s/p burn debridement Left 4/15/2015    Performed by Jimmy Light MD at [facility] OR 2ND FLR    DRESSING CHANGE-s/p burn debridement Left 4/14/2015    Performed by Jimmy Light MD at [facility] OR 2ND FLR    DRESSING CHANGE-s/p wound debridement Left 4/13/2015    Performed by Jimmy Light MD at [facility] OR 1ST FLR    DRESSING CHANGE-wound vac Left 4/20/2015    Performed by Jimmy Light MD at [facility] OR 2ND FLR    EMBOLIZATION, YTTRIUM THERAPY N/A 1/29/2019    Performed by [facility] Diagnostic Provider at [facility] OR 2ND [facility]    EYE SURGERY      cataracts    FEMUR SURGERY      FRACTURE SURGERY      right femur fracture     INSERTION-CATHETER-PERM-A-CATH Right 7/13/2017    Performed by Silvio William MD at [facility] OR 2ND FLR    LIVER TRANSPLANT  2001    Open Biome Fecal Transplant N/A 8/5/2015    Performed by Elmo Gan MD at [facility] ENDO (4TH FLR)    PLACEMENT-WOUND VAC  4/15/2015    Performed by Jimmy Light MD at [facility] OR 2ND FLR    PLACEMENT-WOUND VAC Left 4/4/2015    Performed by Jimmy Light MD at [facility] OR 2ND FLR    PORTACATH PLACEMENT Left     Radiofrequency Ablation N/A 3/7/2019    Performed by Esther  "Surgeon at [facility] GUILLERMO    SKIN GRAFT      graft from right thigh , applied to the left back and left arm.    SPLIT THICKNESS SKIN GRAFT - left trunk, abd and axilla N/A 4/15/2015    Performed by Jimmy Light MD at [facility] OR [address] [place] [address]       Review of patient's allergies indicates:   Allergen Reactions    Adhesive Dermatitis and Other (See Comments)     "Burned his tail"  Please use Paper Tape    Corticosteroids (glucocorticoids) Other (See Comments)     Leg swelling after an injection    Hydrocortisone Swelling     Leg swelling after an injection    Neuromuscular blockers, steroidal Swelling     Leg swelling after an injection    Ribavirin Other (See Comments)     Arthralgias    Propofol analogues Other (See Comments)     Drops SVR and due to current systemic shunt state, results in difficulty in oscillometry to obtain blood pressure until return of vascular tone (other VS are stable throughout) and drop in venous return due to abdominal ascites; titrate slower in future or select alternate agent. Smooth emergence from general anesthesia without difficulty.       Family History     Problem Relation (Age of Onset)    Cancer Mother, Sister    Coronary artery disease Father    Diabetes Father    Heart disease Father    Hypertension Father        Tobacco Use    Smoking status: Former Smoker     Last attempt to quit: 1998     Years since quittin.6    Smokeless tobacco: Never Used    Tobacco comment: pt reports quitting in    Substance and Sexual Activity    Alcohol use: No     Comment: pt reports hx of alcholism and reports quit in     Drug use: Yes     Frequency: 2.0 times per week     Types: Marijuana    Sexual activity: Yes     Partners: Female      Review of Systems   Unable to perform ROS: Mental status change     Objective:     Vital Signs (Most Recent):  Temp: 97.4 °F (36.3 °C) (19 1233)  Pulse: 76 (19 1743)  Resp: (!) 25 (19 1743)  BP: (!) 145/69 (19 " 1700)  SpO2: 99 % (03/12/19 1743) Vital Signs (24h Range):  Temp:  [96.2 °F (35.7 °C)-98.1 °F (36.7 °C)] 97.4 °F (36.3 °C)  Pulse:  [74-87] 76  Resp:  [16-28] 25  SpO2:  [92 %-100 %] 99 %  BP: (124-178)/(58-81) 145/69   Weight: 93 kg (205 lb)  Body mass index is 27.05 kg/m².      Intake/Output Summary (Last 24 hours) at 3/12/2019 1824  Last data filed at 3/12/2019 1754  Gross per 24 hour   Intake 200 ml   Output 750 ml   Net -550 ml       Physical Exam   Constitutional: He appears well-nourished.   Very somnolent on exam. Minimal response to pain   HENT:   Head: Normocephalic and atraumatic.   Neck: Normal range of motion.   Cardiovascular: Normal rate, regular rhythm, normal heart sounds and intact distal pulses.   Pulmonary/Chest: Effort normal. No respiratory distress. He has no rales.   Intubated on mechanical vent   Abdominal: Bowel sounds are normal. He exhibits distension. There is tenderness. There is no guarding.   Musculoskeletal: Normal range of motion.   Neurological:   Very somnolent.    Skin: Skin is warm and dry.   Psychiatric: He has a normal mood and affect.       Vents:  Vent Mode: A/C (03/12/19 1743)  Ventilator Initiated: Yes (03/12/19 1743)  Set Rate: 18 bmp (03/12/19 1743)  Vt Set: 500 mL (03/12/19 1743)  Pressure Support: 0 cmH20 (03/12/19 1743)  PEEP/CPAP: 5 cmH20 (03/12/19 1743)  Oxygen Concentration (%): 60 (03/12/19 1743)  Peak Airway Pressure: 25 cmH2O (03/12/19 1743)  Plateau Pressure: 0 cmH20 (03/12/19 1743)  Total Ve: 11.1 mL (03/12/19 1743)  F/VT Ratio<105 (RSBI): (!) 57.6 (03/12/19 1743)  Lines/Drains/Airways     Drain                 Hemodialysis AV Fistula 11/08/16 1553 Left forearm 854 days         NG/OG Tube 03/12/19 1520 nasogastric Right nostril less than 1 day          Airway                 Airway - Non-Surgical 03/12/19 1709 Endotracheal Tube less than 1 day          Peripheral Intravenous Line                 Peripheral IV - Single Lumen 03/11/19 0630 Right Forearm 1 day          Peripheral IV Triple Lumen 03/12/19 1330 Anterior;Right Hand less than 1 day              Significant Labs:    CBC/Anemia Profile:  Recent Labs   Lab 03/11/19 0150 03/12/19  0006 03/12/19  0426   WBC 12.26 14.55* 14.66*   HGB 9.7* 10.1* 10.1*   HCT 30.9* 31.7* 32.2*    190 201   MCV 88 86 87   RDW 18.7* 19.2* 19.3*        Chemistries:  Recent Labs   Lab 03/11/19 0150 03/12/19 0006 03/12/19  0426    134* 136   K 4.8 5.1 5.3*    99 101   CO2 22* 25 22*   BUN 40* 49* 52*   CREATININE 6.5* 7.3* 7.5*   CALCIUM 8.2* 8.0* 8.1*   ALBUMIN 2.3* 2.2* 2.5*   PROT 7.3 7.1 7.2   BILITOT 2.0* 1.8* 2.0*   ALKPHOS 142* 132 126   ALT 26 23 22   AST 60* 54* 47*   MG 2.1  --  2.0   PHOS  --   --  5.2*       All pertinent labs within the past 24 hours have been reviewed.    Significant Imaging: I have reviewed all pertinent imaging results/findings within the past 24 hours.

## 2019-03-12 NOTE — NURSING TRANSFER
Nursing Transfer Note      3/12/2019     Transfer to 60    Transfer via bed    Transfer with pt belongings    Transported by Yessi DICKENS and rapid response nruses    Medicines sent: Yes    Chart send with patient: Yes

## 2019-03-12 NOTE — CONSULTS
"[facility]  Nephrology  Consult Note    Patient Name: Philip Mathis III  MRN: [id number]  Admission Date: 3/11/2019  Hospital Length of Stay: 1 days  Attending Provider: Flores Mason*   Primary Care Physician: Reji Castillo MD  Principal Problem:Acute encephalopathy    Inpatient consult to Nephrology  Consult performed by: Giovanni Herrera NP  Consult ordered by: Mt Uribe MD  Reason for consult: ESRD        Subjective:     HPI: This is a 72y/o male with hepatocellular carcinoma undergoing radiofrequency ablation by IR last treatment was 3/7, orthotopic liver transplant on 6/10/2001 on Prograf, ESRD on hemodialysis (MWF), Type 2 diabetes mellitus on long term insulin, essential HTN, chronic combined systolic and diastolic heart failure, KARON and history of hepatitis C (treated) who was transfered to ICU at [facility] for acute encephalopathy. He presented initially to [facility] ED on the morning of 3/11 for acute confusion.  Information was obtained from wife and chart review, as patient remains disoriented on my examination.  Per wife, he was last seen normal on Sunday.  During the day, he was c/o abdominal pains and "just feeling tired", in which she thought was normal as he recently had an ablation on 03/07 for treatment of his HCC.  On the morning of 3/11, she tried to wake him for for his dialysis appointment (in which he normally is responsive and wakes himself up). She then later heard patient getting out of bed and heard a thump. She found the patient on the ground and very confused and not recognizing her. She called her neighbor to come and help her. EMS was later called, and patient brought to ED. Pt is very lethargic since that time, per wife, he has moan all day, unable to talk to her. This is unusual for him as pt normally drives himself to HD regularly. She manages all his medications and report that she has not given him any sedating meds (no Oxycodone, Robaxin, " Klonopin, Zanaflex). She report that over the last few days, pt report abdominal swelling and pain to her. In fact they contacted liver transplant coordinator, who was setting up an US of the abdomen. She report that he might have skip a few dose of lactulose due to diarrhea the last few days. She report that he has been in confused state like this in the past due to Hepatic Encephalopathy. He received 1x dose of narcan given with no improvement. Pt would moan to pain.      At  ED, patient has undergone extensive evaluation with CTA of chest negative for PE as patient was mildly hypoxic on arrival and showed bilateral pleural effusions but nothing else significant. CT scan of abdomen showed moderate ascites and large hepatic tumor with necrosis. CT scan of head and C-spine were unremarkable. Ammonia was 45 and Lactic acid was 1.2. WBC was 12,200 but patient afebrile and not tachycardic. U/A negative for infection. Patient due for HD today and did not get HD and BUN/Cr was 40/6.5. In ED, patient remains acutely confused and not responding to questions appropriately. Patient has not focal neurologic deficits. Dr. Verdin at [facility] called and stated patient could be admitted to [facility] but [facility] medicine did not fell comfortable admitting patient for encephalopathy work-up so patient to be transferred and admitted to [facility]. Patient empirically given broad spectrum abx at [facility] including Vancomycin. Zosyn and Ceftriaxone.      Wife reports that patient has had worsening abdominal distension for the past several months, and nobody has really explained why this is happening.  ICU attempted a bedside paracentesis on admission, but no safe pocket was identified.  His last ECHO performed in December of 2017 revealed HFpEF with pHTN and enlarged IVC.  She reports compliance with his dialysis treatments, being dialyzed at [facility] under the care of Dr. Elizabeth.  No further information is available at this  time.  He has a ORLIN AVF with residual renal function, usually taking lasix 80 mg BID on his off dialysis days.              Past Medical History:   Diagnosis Date    Acute hypoxemic respiratory failure 3/12/2019    Anemia     Aortic atherosclerosis     Arthritis     Back pain     Bishop's esophagus     BPH (benign prostatic hypertrophy)     Chronic combined systolic and diastolic heart failure 4/26/2017    Controlled type 2 diabetes mellitus with chronic kidney disease on chronic dialysis, with long-term current use of insulin 4/14/2015    Elevated PSA     ESRD on dialysis     ESRD on hemodialysis     Essential hypertension     GERD (gastroesophageal reflux disease)     Gout     Hemolytic anemia     Hepatitis C     successfully treated with Harvoni    Hepatocellular carcinoma 1/2/2018    Hyperlipidemia     Hyperphosphatemia     Hypertensive CKD (chronic kidney disease)     Immunosuppression     Liver transplanted 06/10/2001    MGUS (monoclonal gammopathy of unknown significance)     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 macular edema associated with type 2 diabetes mellitus     KARON (obstructive sleep apnea)     Secondary hyperparathyroidism     Severe pruritus     Thrombocytopenia     Type 2 diabetes mellitus with diabetic polyneuropathy, with long-term current use of insulin 7/5/2012    Urticaria        Past Surgical History:   Procedure Laterality Date    1) Creation of L arm basilic vein transposition (brachial artery-to-basilic vein AVF)  2) Ligation of L brachial AVF (which was 1st stage creation previously)    Left 6/15/2017    Performed by Silvio William MD at [facility] OR 2ND FLR    ABLATION, RADIOFREQUENCY N/A 2/27/2018    Performed by Esther Surgeon at [facility] ESTHER    LEFHWR-MXOYSV-CB N/A 12/21/2017    Performed by [facility] Diagnostic Provider at [facility] OR 2ND FLR    broken nose      CATARACT EXTRACTION Bilateral     colonoscopy N/A 7/23/2014    Performed  by Moises St MD at [facility] ENDO (4TH FLR)    [id number]-[id number]-OH POSSIBLE AV GRAFT Left 11/8/2016    Performed by Silvio William MD at [facility] OR 2ND FLR    DEBRIDEMENT-WOUND Left 4/12/2015    Performed by Jimmy Light MD at [facility] OR 2ND FLR    DEBRIDEMENT-WOUND Left 4/11/2015    Performed by Jimmy Light MD at [facility] OR 2ND FLR    DEBRIDEMENT-WOUND Left 4/10/2015    Performed by Jimmy Light MD at [facility] OR 2ND FLR    DEBRIDEMENT-WOUND Left 4/9/2015    Performed by Jimmy Light MD at [facility] OR 1ST FLR    DEBRIDEMENT-WOUND Left 4/6/2015    Performed by Jimmy Light MD at [facility] OR 2ND FLR    DEBRIDEMENT-WOUND Left 4/4/2015    Performed by Jimmy Light MD at [facility] OR 2ND FLR    DEBRIDEMENT-WOUND Left 4/2/2015    Performed by Jimmy Light MD at [facility] OR 1ST FLR    DRESSING CHANGE- Left 4/10/2015    Performed by Jimmy Light MD at [facility] OR 2ND FLR    DRESSING CHANGE-s/p burn debridement Left 4/15/2015    Performed by Jimmy Light MD at [facility] OR 2ND FLR    DRESSING CHANGE-s/p burn debridement Left 4/14/2015    Performed by Jimmy Light MD at [facility] OR 2ND FLR    DRESSING CHANGE-s/p wound debridement Left 4/13/2015    Performed by Jimmy Light MD at [facility] OR 1ST FLR    DRESSING CHANGE-wound vac Left 4/20/2015    Performed by Jimmy Light MD at [facility] OR 2ND FLR    EMBOLIZATION, YTTRIUM THERAPY N/A 1/29/2019    Performed by [facility] Diagnostic Provider at [facility] OR 2ND [facility]    EYE SURGERY      cataracts    FEMUR SURGERY      FRACTURE SURGERY      right femur fracture     INSERTION-CATHETER-PERM-A-CATH Right 7/13/2017    Performed by Silvio William MD at [facility] OR 2ND FLR    LIVER TRANSPLANT  2001    Open Biome Fecal Transplant N/A 8/5/2015    Performed by Elmo Gan MD at [facility] END[facility] (4TH FLR)    PLACEMENT-WOUND VAC  4/15/2015    Performed by Jimmy Light MD at [facility] OR 2ND FLR    PLACEMENT-WOUND VAC Left 4/4/2015    Performed by Jimmy RAO  "MD Nadege at [facility] OR 2ND FLR    PORTACATH PLACEMENT Left     Radiofrequency Ablation N/A 3/7/2019    Performed by Esther Surgeon at [facility] ESTHER    SKIN GRAFT      graft from right thigh , applied to the left back and left arm.    SPLIT THICKNESS SKIN GRAFT - left trunk, abd and axilla N/A 4/15/2015    Performed by Jimmy Light MD at [facility] OR 2ND FLR       Review of patient's allergies indicates:   Allergen Reactions    Adhesive Dermatitis and Other (See Comments)     "Burned his tail"  Please use Paper Tape    Corticosteroids (glucocorticoids) Other (See Comments)     Leg swelling after an injection    Hydrocortisone Swelling     Leg swelling after an injection    Neuromuscular blockers, steroidal Swelling     Leg swelling after an injection    Ribavirin Other (See Comments)     Arthralgias    Propofol analogues Other (See Comments)     Drops SVR and due to current systemic shunt state, results in difficulty in oscillometry to obtain blood pressure until return of vascular tone (other VS are stable throughout) and drop in venous return due to abdominal ascites; titrate slower in future or select alternate agent. Smooth emergence from general anesthesia without difficulty.     Current Facility-Administered Medications   Medication Frequency    acetaminophen tablet 650 mg Q8H PRN    allopurinol tablet 100 mg QHS    carvedilol tablet 6.25 mg BID    dextrose 50% injection 12.5 g PRN    dextrose 50% injection 25 g PRN    [START ON 3/13/2019] doxazosin tablet 8 mg QHS    glucagon (human recombinant) injection 1 mg PRN    glucose chewable tablet 16 g PRN    glucose chewable tablet 24 g PRN    heparin (porcine) injection 5,000 Units Q8H    hydrALAZINE injection 20 mg Q6H PRN    hydrALAZINE tablet 100 mg TID    insulin aspart U-100 pen 0-5 Units Q6H PRN    lactulose 10 gram/15 mL solution (enema) 200 g TID    minoxidil tablet 5 mg BID    ondansetron disintegrating tablet 8 mg Q8H PRN    " pantoprazole EC tablet 40 mg Daily    piperacillin-tazobactam 4.5 g in sodium chloride 0.9% 100 mL IVPB (ready to mix system) Q8H    polyethylene glycol packet 17 g BID PRN    promethazine tablet 25 mg Q6H PRN    ramelteon tablet 8 mg Nightly PRN    rifAXIMin tablet 550 mg BID    sodium chloride 0.9% flush 5 mL PRN     Family History     Problem Relation (Age of Onset)    Cancer Mother, Sister    Coronary artery disease Father    Diabetes Father    Heart disease Father    Hypertension Father        Tobacco Use    Smoking status: Former Smoker     Last attempt to quit: 1998     Years since quittin.6    Smokeless tobacco: Never Used    Tobacco comment: pt reports quitting in    Substance and Sexual Activity    Alcohol use: No     Comment: pt reports hx of alcholism and reports quit in     Drug use: Yes     Frequency: 2.0 times per week     Types: Marijuana    Sexual activity: Yes     Partners: Female     Review of Systems   Unable to perform ROS: Mental status change     Objective:     Vital Signs (Most Recent):  Temp: 97.4 °F (36.3 °C) (19 1233)  Pulse: 82 (19 1455)  Resp: (!) 25 (19 1455)  BP: 134/64 (19 1400)  SpO2: 95 % (19 1455)  O2 Device (Oxygen Therapy): nasal cannula w/ humidification (19 1455) Vital Signs (24h Range):  Temp:  [96.2 °F (35.7 °C)-98.1 °F (36.7 °C)] 97.4 °F (36.3 °C)  Pulse:  [74-88] 82  Resp:  [16-28] 25  SpO2:  [92 %-98 %] 95 %  BP: (129-178)/(61-81) 134/64     Weight: 93 kg (205 lb) (19 1027)  Body mass index is 27.05 kg/m².  Body surface area is 2.19 meters squared.    I/O last 3 completed shifts:  In: 650 [IV Piggyback:650]  Out: -     Physical Exam   Constitutional: He appears well-developed. He appears lethargic. He has a sickly appearance. Nasal cannula in place.   HENT:   Head: Normocephalic and atraumatic.   Right Ear: External ear normal.   Left Ear: External ear normal.   Eyes: Conjunctivae and EOM are normal.  Scleral icterus is present.   Neck: No JVD present.   Cardiovascular: Normal rate and regular rhythm. Exam reveals no gallop and no friction rub.   No murmur heard.  ORLIN TITUS   Pulmonary/Chest: Effort normal. Tachypnea noted. No respiratory distress. He has no wheezes. He has rhonchi. He has no rales.   Abdominal: Soft. He exhibits distension.   Musculoskeletal: He exhibits edema. He exhibits no deformity.   Neurological: He appears lethargic.   Skin: Skin is warm and dry. He is not diaphoretic.       Significant Labs:  ABGs:   Recent Labs   Lab 03/12/19  1443   PH 7.286*   PCO2 47.1*   HCO3 22.5*   POCSATURATED 96   BE -4     CBC:   Recent Labs   Lab 03/12/19  0426   WBC 14.66*   RBC 3.69*   HGB 10.1*   HCT 32.2*      MCV 87   MCH 27.4   MCHC 31.4*     CMP:   Recent Labs   Lab 03/12/19  0426   *   CALCIUM 8.1*   ALBUMIN 2.5*   PROT 7.2      K 5.3*   CO2 22*      BUN 52*   CREATININE 7.5*   ALKPHOS 126   ALT 22   AST 47*   BILITOT 2.0*           Assessment/Plan:     ESRD on hemodialysis    ESRD on iHD  [facility]  4 hours  Dr. Clara TITUS  Wife reports an EDW of 94 kg (93 kg on admission)    Plan/Recommendations:  -HD today for metabolic clearance/volume management  -UF 1L as tolerated  -will repeat ECHO.   -will obtain OP dialysis records.         Giovanni Colorado NP  Nephrology  [facility]

## 2019-03-12 NOTE — CONSULTS
Consult Acknowledged. Full note to follow.    Tentative plan:  Patient accepted/transfered to ICU. Currently Hemodynamically stable. Will continue further work up for Acute Encephalopathy.    Carlos Avila MD  PGY-1, ICU  [facility]

## 2019-03-12 NOTE — PT/OT/SLP PROGRESS
Physical Therapy   Discharge    Philip MORELAND Del III   MRN: [id number]     Pt. being transferred to ICU with decline in medical status with PT eval only completed; therefore, no goals met and acute PT to be discontinued. Will await new PT orders to resume therapy services, when pt. appropriate.    aLzaro Morrison, PT  3/12/2019

## 2019-03-12 NOTE — ASSESSMENT & PLAN NOTE
- per wife this is new  - ascites noted on CT abd  - will need Paracentesis for SBP r/o  - continue zosyn

## 2019-03-12 NOTE — CONSULTS
"[facility]  Hepatology  Consult Note    Patient Name: Philip Mathis III  MRN: [id number]  Admission Date: 3/11/2019  Hospital Length of Stay: 1 days  Attending Provider: Flores Mason*   Primary Care Physician: Reji Castillo MD  Principal Problem:Acute encephalopathy    Inpatient consult to Hepatology  Consult performed by: Winter Sheth MD  Consult ordered by: Mt Uribe MD        Subjective:     HPI:  74 year old male with a history of Hep C cirrhosis s/p transplant (2001) complicated by HCC/PVT s/p ablation on 3/7/19 and ESRD on who Hepatology is being consulted for decompensated cirrhosis.     Per HPI:  "Per wife, this AM patient did not wake up to his alarm to go to HD, she then later heard patient getting out of bed and heard a thump. She found the patient on the ground and very confused and not recognizing her. She called her neighbor to come and help her. EMS was later called, and patient brought to ED. Pt is very lethargic since that time, per wife, he has moan all day, unable to talk to her. This is unusual for him as pt normally drives himself to HD regularly. She manages all his medications and report that she has not given him any sedating meds (no Oxycodone, Robaxin, Klonopin, Zanaflex). She report that over the last few days, pt report abdominal swelling and pain to her. In fact they contacted liver transplant coordinator, who was setting up an US of the abdomen. She report that he might have skip a few dose of lactulose due to diarrhea the last few days. She report that he has been in confused state like this in the past due to Hepatic Encephalopathy. Pt also recently had HCC treatment by IR last week, on Cipro for. At this time 11:53 PM, on evaluation, pt is unarousable, appear to be in sleep like state, per wife, he has been like this all day. 1x dose of narcan given with no improvement. Pt would moan to pain. Glucose check is 170.  At  ED, patient has " "undergone extensive evaluation with CTA of chest negative for PE as patient was mildly hypoxic on arrival and showed bilateral pleural effusions but nothing else significant. CT scan of abdomen showed moderate ascites and large hepatic tumor with necrosis. CT scan of head and C-spine were unremarkable. Ammonia was 45 and Lactic acid was 1.2. WBC was 12,200 but patient afebrile and not tachycardic. U/A negative for infection. Patient due for HD today and did not get HD and BUN/Cr was 40/6.5. In ED, patient remains acutely confused and not responding to questions appropriately. Patient has not focal neurologic deficits. Dr. Verdin at [facility] called and stated patient could be admitted to [facility] but Hospital medicine did not fell comfortable admitting patient for encephalopathy work-up so patient to be transferred and admitted to [facility]. Patient empirically given broad spectrum abx at [facility] including Vancomycin. Zosyn and Ceftriaxone."     Interval history:  Patient seen twice this morning and during both instance remained completely altered with no family at bedside.    Review of Systems   Unable to perform ROS: Mental status change       Past Medical History:   Diagnosis Date    Acute hypoxemic respiratory failure 3/12/2019    Anemia     Aortic atherosclerosis     Arthritis     Back pain     Bishop's esophagus     BPH (benign prostatic hypertrophy)     Chronic combined systolic and diastolic heart failure 4/26/2017    Controlled type 2 diabetes mellitus with chronic kidney disease on chronic dialysis, with long-term current use of insulin 4/14/2015    Elevated PSA     ESRD on dialysis     ESRD on hemodialysis     Essential hypertension     GERD (gastroesophageal reflux disease)     Gout     Hemolytic anemia     Hepatitis C     successfully treated with Harvoni    Hepatocellular carcinoma 1/2/2018    Hyperlipidemia     Hyperphosphatemia     Hypertensive CKD (chronic kidney " disease)     Immunosuppression     Liver transplanted 06/10/2001    MGUS (monoclonal gammopathy of unknown significance)     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 macular edema associated with type 2 diabetes mellitus     KARON (obstructive sleep apnea)     Secondary hyperparathyroidism     Severe pruritus     Thrombocytopenia     Type 2 diabetes mellitus with diabetic polyneuropathy, with long-term current use of insulin 7/5/2012    Urticaria        Past Surgical History:   Procedure Laterality Date    1) Creation of L arm basilic vein transposition (brachial artery-to-basilic vein AVF)  2) Ligation of L brachial AVF (which was 1st stage creation previously)    Left 6/15/2017    Performed by Silvio William MD at [facility] OR 2ND FLR    ABLATION, RADIOFREQUENCY N/A 2/27/2018    Performed by Esther Surgeon at [facility] ESTHER    [id number] N/A 12/21/2017    Performed by [facility] Diagnostic Provider at [facility] OR 2ND FLR    broken nose      CATARACT EXTRACTION Bilateral     colonoscopy N/A 7/23/2014    Performed by Moises St MD at [facility] ENDO (4TH FLR)    [id number] POSSIBLE AV GRAFT Left 11/8/2016    Performed by Silvio William MD at [facility] OR 2ND FLR    DEBRIDEMENT-WOUND Left 4/12/2015    Performed by Jimmy Light MD at [facility] OR 2ND FLR    DEBRIDEMENT-WOUND Left 4/11/2015    Performed by Jimmy Light MD at [facility] OR 2ND FLR    DEBRIDEMENT-WOUND Left 4/10/2015    Performed by Jimmy Light MD at [facility] OR 2ND FLR    DEBRIDEMENT-WOUND Left 4/9/2015    Performed by Jimmy Light MD at [facility] OR 1ST FLR    DEBRIDEMENT-WOUND Left 4/6/2015    Performed by Jimmy Light MD at [facility] OR 2ND FLR    DEBRIDEMENT-WOUND Left 4/4/2015    Performed by Jimmy Light MD at [facility] OR 2ND FLR    DEBRIDEMENT-WOUND Left 4/2/2015    Performed by Jimmy Light MD at [facility] OR 1ST FLR    DRESSING CHANGE- Left 4/10/2015    Performed by Jimmy Light MD at [facility]  "OR 2ND FLR    DRESSING CHANGE-s/p burn debridement Left 4/15/2015    Performed by Jimmy Light MD at [facility] OR 2ND FLR    DRESSING CHANGE-s/p burn debridement Left 4/14/2015    Performed by Jimmy Light MD at [facility] OR 2ND FLR    DRESSING CHANGE-s/p wound debridement Left 4/13/2015    Performed by Jimmy Light MD at [facility] OR 1ST FLR    DRESSING CHANGE-wound vac Left 4/20/2015    Performed by Jimmy Light MD at [facility] OR 2ND FLR    EMBOLIZATION, YTTRIUM THERAPY N/A 1/29/2019    Performed by [facility] Diagnostic Provider at [facility] OR 2ND [facility]    EYE SURGERY      cataracts    FEMUR SURGERY      FRACTURE SURGERY      right femur fracture     INSERTION-CATHETER-PERM-A-CATH Right 7/13/2017    Performed by Silvio William MD at [facility] OR 2ND FLR    LIVER TRANSPLANT  2001    Open Biome Fecal Transplant N/A 8/5/2015    Performed by Elmo Gan MD at [facility] ENDO (4TH FLR)    PLACEMENT-WOUND VAC  4/15/2015    Performed by Jimmy Light MD at [facility] OR 2ND FLR    PLACEMENT-WOUND VAC Left 4/4/2015    Performed by Jimmy Light MD at [facility] OR 2ND FLR    PORTACATH PLACEMENT Left     Radiofrequency Ablation N/A 3/7/2019    Performed by Esther Surgeon at [facility] ETSHER    SKIN GRAFT      graft from right thigh , applied to the left back and left arm.    SPLIT THICKNESS SKIN GRAFT - left trunk, abd and axilla N/A 4/15/2015    Performed by Jimmy Light MD at [facility] OR 2ND [facility]       Family history of liver disease: No    Review of patient's allergies indicates:   Allergen Reactions    Adhesive Dermatitis and Other (See Comments)     "Burned his tail"  Please use Paper Tape    Corticosteroids (glucocorticoids) Other (See Comments)     Leg swelling after an injection    Hydrocortisone Swelling     Leg swelling after an injection    Neuromuscular blockers, steroidal Swelling     Leg swelling after an injection    Ribavirin Other (See Comments)     Arthralgias    Propofol analogues Other (See Comments)     " Drops SVR and due to current systemic shunt state, results in difficulty in oscillometry to obtain blood pressure until return of vascular tone (other VS are stable throughout) and drop in venous return due to abdominal ascites; titrate slower in future or select alternate agent. Smooth emergence from general anesthesia without difficulty.       Tobacco Use    Smoking status: Former Smoker     Last attempt to quit: 1998     Years since quittin.6    Smokeless tobacco: Never Used    Tobacco comment: pt reports quitting in    Substance and Sexual Activity    Alcohol use: No     Comment: pt reports hx of alcholism and reports quit in     Drug use: Yes     Frequency: 2.0 times per week     Types: Marijuana    Sexual activity: Yes     Partners: Female       Medications Prior to Admission   Medication Sig Dispense Refill Last Dose    ACCU-CHEK JOANN PLUS TEST STRP Strp CHECK BLOOD SUGAR THREE TIMES DAILY 300 strip 11 Taking    allopurinol (ZYLOPRIM) 100 MG tablet TAKE 1 TABLET EVERY DAY (Patient taking differently: TAKE 1 TABLET EVERY DAY, bedtime) 90 tablet 3 3/6/2019 at 2130    ammonium lactate (LAC-HYDRIN) 12 % lotion Apply topically as needed for Dry Skin. 225 g 6 Past Week at Unknown time    aspirin 81 MG Chew Take 1 tablet (81 mg total) by mouth once daily. 90 tablet 3 1/15/2019    B,C/ferrous fum/FA/D3/zinc ox (PRORENAL ORAL) Take 1 tablet by mouth. Receives in Dialysis on , , and Fridays   3/6/2019 at 0730    blood-glucose meter (ACCU-CHEK JOANN PLUS METER) Misc Use as directed 1 each 0 Taking    carvedilol (COREG) 6.25 MG tablet TK 1 T PO  BID  3 3/7/2019 at 0700    cetirizine (ZYRTEC) 10 MG tablet Take 1 tablet (10 mg total) by mouth once daily. (Patient taking differently: Take 10 mg by mouth daily as needed. ) 90 tablet 3 Past Month at Unknown time    ciprofloxacin HCl (CIPRO) 500 MG tablet Take 1 tablet (500 mg total) by mouth every 12 (twelve) hours. for 5  days 10 tablet 0     clonazePAM (KLONOPIN) 0.5 MG tablet Take 1 tablet (0.5 mg total) by mouth daily as needed. 90 tablet 3 Past Month at Unknown time    diclofenac sodium (VOLTAREN) 1 % Gel Apply 2 g topically 3 (three) times daily. To the right knee. 1 Tube 0 Past Month at Unknown time    doxazosin (CARDURA) 8 MG Tab TAKE 1 TABLET ONCE DAILY. (Patient taking differently: TAKE 1 TABLET ONCE DAILY, at bedtime) 90 tablet 4 3/6/2019 at 2130    fluticasone (FLONASE) 50 mcg/actuation nasal spray SHAKE LIQUID AND USE 2 SPRAYS(100 MCG) IN EACH NOSTRIL EVERY DAY (Patient taking differently: SHAKE LIQUID AND USE 2 SPRAYS(100 MCG) IN EACH NOSTRIL EVERY DAY, prn) 16 mL 0 Past Week at Unknown time    furosemide (LASIX) 20 MG tablet Take 2 tablets (40 mg total) by mouth once daily. Only take lasix 40mg on non-dialysis days (Tuesday, Thursday, Saturday, and Sunday) by mouth. (Patient taking differently: Take 20 mg by mouth 2 (two) times daily. Only take lasix 20mg on non-dialysis days (Tuesday, Thursday, Saturday, and Sunday) by mouth twice a day) 16 tablet 11 3/6/2019 at 2130    GENERLAC 10 gram/15 mL solution TAKE 30 ML 'S BY MOUTH THREE TIMES DAILY (Patient taking differently: TAKE 30 ML 'S BY MOUTH at bedtime) 2700 mL 5 3/5/2019 at 2130    hydrALAZINE (APRESOLINE) 100 MG tablet Take 1 tablet (100 mg total) by mouth 3 (three) times daily. 270 tablet 3 3/7/2019 at 0700    hydrocortisone 2.5 % cream Apply topically 2 (two) times daily as needed.   1 3/6/2019 at Unknown time    hydrOXYzine HCl (ATARAX) 25 MG tablet TAKE 1 TABLET(25 MG) BY MOUTH THREE TIMES DAILY AS NEEDED FOR ITCHING 90 tablet 0 3/7/2019 at 0700    hydrOXYzine HCl (ATARAX) 25 MG tablet TAKE 1 TABLET(25 MG) BY MOUTH THREE TIMES DAILY AS NEEDED FOR ITCHING 90 tablet 2 Taking    insulin aspart (NOVOLOG) 100 unit/mL InPn pen Inject 6 units w/ breakfast, 4 units w/ lunch and dinner plus scale 180-230 +1, 231-280 +2, 281-330 +3, 331-380 +4, >380 +5. 90 day  "supply (Patient taking differently: Inject 8 units w/ breakfast, 6 units w/ lunch and dinner plus scale 180-230 +1, 231-280 +2, 281-330 +3, 331-380 +4, >380 +5. 90 day supply) 3 Box 3 3/6/2019 at 2100    insulin glargine (LANTUS SOLOSTAR) 100 unit/mL (3 mL) InPn pen Inject 8 Units into the skin every evening. (Patient taking differently: Inject 10 Units into the skin every evening. ) 2 Box 6 3/6/2019 at 2100    insulin needles, disposable, (NOVOFINE 32) 32 x 1/4 " Ndle 1 each by Misc.(Non-Drug; Combo Route) route 3 (three) times daily. 100 each 5 Taking    ketoconazole (NIZORAL) 2 % cream APPLY TOPICALLY BID. MIX WITH HYDROCORTISONE CREAM, Uses prn on buttocks  1 3/6/2019 at Unknown time    lancets (ACCU-CHEK SOFTCLIX LANCETS) Misc 1 lancet by Misc.(Non-Drug; Combo Route) route 4 (four) times daily. 360 each 3 Taking    magnesium oxide (MAG-OX) 400 mg (241.3 mg magnesium) tablet TK 1 TABLET PO QD, morning  11 3/6/2019 at 0700    methocarbamol (ROBAXIN) 500 MG Tab Take 1 tablet by mouth daily (every 24 hours) as needed for muscle spasm. 5 tablet 0 Past Month at Unknown time    minoxidil (LONITEN) 2.5 MG tablet Take 2 tablets (5 mg total) by mouth 2 (two) times daily. 360 tablet 3 3/7/2019 at 0700    nystatin (MYCOSTATIN) cream Apply topically 2 (two) times daily. 30 g 1     NYSTOP powder Apply topically 2 (two) times daily.   1 3/6/2019 at Unknown time    ondansetron (ZOFRAN-ODT) 4 MG TbDL DISSOLVE 1 TABLET(4 MG) ON THE TONGUE EVERY 12 HOURS AS NEEDED 60 tablet 12 3/7/2019 at 0700    oxyCODONE (ROXICODONE) 5 MG immediate release tablet Take 1 tablet (5 mg total) by mouth every 6 (six) hours as needed for Pain. 18 tablet 0     pantoprazole (PROTONIX) 40 MG tablet TAKE 1 TABLET EVERY DAY 90 tablet 3 3/7/2019 at 0700    pantoprazole (PROTONIX) 40 MG tablet Take 1 tablet (40 mg total) by mouth once daily. (Patient taking differently: Take 40 mg by mouth every morning. Sometimes takes twice a day) 90 tablet 3 "     rifAXIMin (XIFAXAN) 550 mg Tab Take 1 tablet (550 mg total) by mouth 2 (two) times daily. 60 tablet 11 3/7/2019 at 0700    sevelamer carbonate (RENVELA) 800 mg Tab Take 1 tablet (800 mg total) by mouth 3 (three) times daily with meals. 90 tablet 11 3/6/2019 at 2130    tacrolimus (PROGRAF) 0.5 MG Cap Take 1 capsule (0.5 mg total) by mouth once daily. (Patient taking differently: Take 0.5 mg by mouth every morning. ) 30 capsule 11 3/7/2019 at 0700    tiZANidine (ZANAFLEX) 4 MG tablet TAKE 1 TABLET BY MOUTH EVERY 6 HOURS AS NEEDED 90 tablet 12 Taking    [DISCONTINUED] ondansetron (ZOFRAN) 4 MG tablet Take 1 tablet (4 mg total) by mouth every 6 (six) hours. 12 tablet 0        Objective:     Vital Signs (Most Recent):  Temp: 96.2 °F (35.7 °C) (03/12/19 0757)  Pulse: 74 (03/12/19 1146)  Resp: 16 (03/12/19 0757)  BP: (!) 162/77 (03/12/19 0757)  SpO2: (!) 94 % (03/12/19 0757) Vital Signs (24h Range):  Temp:  [96.2 °F (35.7 °C)-98.1 °F (36.7 °C)] 96.2 °F (35.7 °C)  Pulse:  [74-88] 74  Resp:  [16-28] 16  SpO2:  [92 %-96 %] 94 %  BP: (129-178)/(61-81) 162/77     Weight: 93 kg (205 lb) (03/11/19 1027)  Body mass index is 27.05 kg/m².    Physical Exam   Constitutional: No distress.   HENT:   Head: Normocephalic and atraumatic.   Eyes: No scleral icterus.   Cardiovascular: Normal rate and regular rhythm.   Pulmonary/Chest: Effort normal and breath sounds normal.   Abdominal: Bowel sounds are normal. He exhibits distension. There is no tenderness.   Musculoskeletal: He exhibits no edema.   Neurological:   Somnolent only opens his eyes   Skin: He is not diaphoretic.       MELD-Na score: 28 at 3/12/2019  4:26 AM  MELD score: 28 at 3/12/2019  4:26 AM  Calculated from:  Serum Creatinine: 7.5 mg/dL (Rounded to 4 mg/dL) at 3/12/2019  4:26 AM  Serum Sodium: 136 mmol/L at 3/12/2019  4:26 AM  Total Bilirubin: 2 mg/dL at 3/12/2019  4:26 AM  INR(ratio): 1.7 at 3/12/2019 12:06 AM  Age: 73 years    Significant Labs:  CBC:   Recent  Labs   Lab 03/12/19  0426   WBC 14.66*   RBC 3.69*   HGB 10.1*   HCT 32.2*        CMP:   Recent Labs   Lab 03/12/19  0426   *   CALCIUM 8.1*   ALBUMIN 2.5*   PROT 7.2      K 5.3*   CO2 22*      BUN 52*   CREATININE 7.5*   ALKPHOS 126   ALT 22   AST 47*   BILITOT 2.0*     Coagulation:   Recent Labs   Lab 03/11/19  0150 03/12/19  0006   INR 1.5* 1.7*   APTT 37.4*  --        Significant Imaging:  X-Ray: Reviewed  CT: Reviewed    Assessment/Plan:     Decompensated hepatic cirrhosis    74 year old male with a history of Hep C cirrhosis s/p transplant (2001) complicated by HCC/PVT s/p ablation on 3/7/19 and ESRD on who Hepatology is being consulted for decompensated cirrhosis. Although patient is unable to give any history we wonder if current decompensation is secondary to an infectious process vs a cardiac event vs progress of his HCC. From our perspective would continue antibiotics with appropriate gram negative coverage while following up his cultures and obtaining a diagnostic/therapeutic paracentesis. We also think it would be useful to obtain an ECHO to evaluate EF and wall motion abnormality. Overall guarded prognosis with low threshold to transfer to ICU if mention not improving/patient not able to protect his airway.    Recommendations:  --Daily CMP, CBC, INR  --Daily Tacrolimus level  --Hold tacrolimus dose today  --Continue antibiotics (ceftriaxone and/or zosyn)  --Continue lactulose (can be given rectally) and rifaximin  --Obtain diagnostic and therapeutic paracentesis  --Obtain ECHO  --Nephrology consult   --Low threshold to transfer to ICU           Thank you for your consult. I will follow-up with patient. Please contact us if you have any additional questions.    Winter Sheth M.D.  Gastroenterology Fellow, PGY-V  Pager: [phone]  [facility]

## 2019-03-12 NOTE — CARE UPDATE
Patient is getting Zosyn ordered from  at this time. Will need to schedule Zosyn after HD tomorrow for proper renal dose. S/P Rocephin and Vanc as well at , hold off on those medications at this time.    EKG done tonight: NO ST Changes, similar to EKG at   ABG done: pH 7.331 PCO2: 42.2, PO2 91, sO2 96 on 3L    Nurse to give lactulose enema tonight    Mt Uribe MD  [facility] medicine

## 2019-03-12 NOTE — SUBJECTIVE & OBJECTIVE
Past Medical History:   Diagnosis Date    Anemia     Aortic atherosclerosis     Arthritis     Back pain     Bishop's esophagus     BPH (benign prostatic hypertrophy)     Chronic combined systolic and diastolic heart failure 4/26/2017    Controlled type 2 diabetes mellitus with chronic kidney disease on chronic dialysis, with long-term current use of insulin 4/14/2015    Elevated PSA     ESRD on dialysis     ESRD on hemodialysis     Essential hypertension     GERD (gastroesophageal reflux disease)     Gout     Hemolytic anemia     Hepatitis C     successfully treated with Harvoni    Hepatocellular carcinoma 1/2/2018    Hyperlipidemia     Hyperphosphatemia     Hypertensive CKD (chronic kidney disease)     Immunosuppression     Liver transplanted 06/10/2001    MGUS (monoclonal gammopathy of unknown significance)     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 macular edema associated with type 2 diabetes mellitus     KARON (obstructive sleep apnea)     Secondary hyperparathyroidism     Severe pruritus     Thrombocytopenia     Type 2 diabetes mellitus with diabetic polyneuropathy, with long-term current use of insulin 7/5/2012    Urticaria        Past Surgical History:   Procedure Laterality Date    1) Creation of L arm basilic vein transposition (brachial artery-to-basilic vein AVF)  2) Ligation of L brachial AVF (which was 1st stage creation previously)    Left 6/15/2017    Performed by Silvio William MD at [facility] OR 2ND FLR    ABLATION, RADIOFREQUENCY N/A 2/27/2018    Performed by Esther Surgeon at [facility] ESTHER    ICIVOX-NQMKUG-DA N/A 12/21/2017    Performed by [facility] Diagnostic Provider at [facility] OR 2ND FLR    broken nose      CATARACT EXTRACTION Bilateral     colonoscopy N/A 7/23/2014    Performed by Moises St MD at [facility] ENDO (4TH FLR)    IGALRYAZ-EUTNSZT-AB POSSIBLE AV GRAFT Left 11/8/2016    Performed by Silvio William MD at [facility] OR 2ND FLR     DEBRIDEMENT-WOUND Left 4/12/2015    Performed by Jimmy Light MD at [facility] OR 2ND FLR    DEBRIDEMENT-WOUND Left 4/11/2015    Performed by Jimmy Light MD at [facility] OR 2ND FLR    DEBRIDEMENT-WOUND Left 4/10/2015    Performed by Jimmy Light MD at [facility] OR 2ND FLR    DEBRIDEMENT-WOUND Left 4/9/2015    Performed by Jimmy Light MD at [facility] OR 1ST FLR    DEBRIDEMENT-WOUND Left 4/6/2015    Performed by Jimmy Light MD at [facility] OR 2ND FLR    DEBRIDEMENT-WOUND Left 4/4/2015    Performed by Jimmy Light MD at [facility] OR 2ND FLR    DEBRIDEMENT-WOUND Left 4/2/2015    Performed by Jimmy Light MD at [facility] OR 1ST FLR    DRESSING CHANGE- Left 4/10/2015    Performed by Jimmy Light MD at [facility] OR 2ND FLR    DRESSING CHANGE-s/p burn debridement Left 4/15/2015    Performed by Jimmy Light MD at [facility] OR 2ND FLR    DRESSING CHANGE-s/p burn debridement Left 4/14/2015    Performed by Jimmy Light MD at [facility] OR 2ND FLR    DRESSING CHANGE-s/p wound debridement Left 4/13/2015    Performed by Jimmy Light MD at [facility] OR 1ST FLR    DRESSING CHANGE-wound vac Left 4/20/2015    Performed by Jimmy Light MD at [facility] OR 2ND FLR    EMBOLIZATION, YTTRIUM THERAPY N/A 1/29/2019    Performed by [facility] Diagnostic Provider at [facility] OR 2ND [facility]    EYE SURGERY      cataracts    FEMUR SURGERY      FRACTURE SURGERY      right femur fracture     INSERTION-CATHETER-PERM-A-CATH Right 7/13/2017    Performed by Silvio William MD at [facility] OR 2ND FLR    LIVER TRANSPLANT  2001    Open Biome Fecal Transplant N/A 8/5/2015    Performed by Elmo Gan MD at [facility] ENDO (4TH FLR)    PLACEMENT-WOUND VAC  4/15/2015    Performed by Jimmy Light MD at [facility] OR 2ND FLR    PLACEMENT-WOUND VAC Left 4/4/2015    Performed by Jimmy Light MD at [facility] OR 2ND FLR    PORTACATH PLACEMENT Left     Radiofrequency Ablation N/A 3/7/2019    Performed by Esther Surgeon at [facility]    SKIN GRAFT      graft  "from right thigh , applied to the left back and left arm.    SPLIT THICKNESS SKIN GRAFT - left trunk, abd and axilla N/A 4/15/2015    Performed by Jimmy Light MD at [facility] OR [address] [place] [address]       Review of patient's allergies indicates:   Allergen Reactions    Adhesive Dermatitis and Other (See Comments)     "Burned his tail"  Please use Paper Tape    Corticosteroids (glucocorticoids) Other (See Comments)     Leg swelling after an injection    Hydrocortisone Swelling     Leg swelling after an injection    Neuromuscular blockers, steroidal Swelling     Leg swelling after an injection    Ribavirin Other (See Comments)     Arthralgias    Propofol analogues Other (See Comments)     Drops SVR and due to current systemic shunt state, results in difficulty in oscillometry to obtain blood pressure until return of vascular tone (other VS are stable throughout) and drop in venous return due to abdominal ascites; titrate slower in future or select alternate agent. Smooth emergence from general anesthesia without difficulty.       No current facility-administered medications on file prior to encounter.      Current Outpatient Medications on File Prior to Encounter   Medication Sig    ACCU-CHEK JOANN PLUS TEST STRP Strp CHECK BLOOD SUGAR THREE TIMES DAILY    allopurinol (ZYLOPRIM) 100 MG tablet TAKE 1 TABLET EVERY DAY (Patient taking differently: TAKE 1 TABLET EVERY DAY, bedtime)    ammonium lactate (LAC-HYDRIN) 12 % lotion Apply topically as needed for Dry Skin.    aspirin 81 MG Chew Take 1 tablet (81 mg total) by mouth once daily.    B,C/ferrous fum/FA/D3/zinc ox (PRORENAL ORAL) Take 1 tablet by mouth. Receives in Dialysis on Mondays, Wednesdays, and Fridays    blood-glucose meter (ACCU-CHEK JOANN PLUS METER) Misc Use as directed    carvedilol (COREG) 6.25 MG tablet TK 1 T PO  BID    cetirizine (ZYRTEC) 10 MG tablet Take 1 tablet (10 mg total) by mouth once daily. (Patient taking differently: Take 10 mg by mouth " daily as needed. )    ciprofloxacin HCl (CIPRO) 500 MG tablet Take 1 tablet (500 mg total) by mouth every 12 (twelve) hours. for 5 days    clonazePAM (KLONOPIN) 0.5 MG tablet Take 1 tablet (0.5 mg total) by mouth daily as needed.    diclofenac sodium (VOLTAREN) 1 % Gel Apply 2 g topically 3 (three) times daily. To the right knee.    doxazosin (CARDURA) 8 MG Tab TAKE 1 TABLET ONCE DAILY. (Patient taking differently: TAKE 1 TABLET ONCE DAILY, at bedtime)    fluticasone (FLONASE) 50 mcg/actuation nasal spray SHAKE LIQUID AND USE 2 SPRAYS(100 MCG) IN EACH NOSTRIL EVERY DAY (Patient taking differently: SHAKE LIQUID AND USE 2 SPRAYS(100 MCG) IN EACH NOSTRIL EVERY DAY, prn)    furosemide (LASIX) 20 MG tablet Take 2 tablets (40 mg total) by mouth once daily. Only take lasix 40mg on non-dialysis days (Tuesday, Thursday, Saturday, and Sunday) by mouth. (Patient taking differently: Take 20 mg by mouth 2 (two) times daily. Only take lasix 20mg on non-dialysis days (Tuesday, Thursday, Saturday, and Sunday) by mouth twice a day)    GENERLAC 10 gram/15 mL solution TAKE 30 ML 'S BY MOUTH THREE TIMES DAILY (Patient taking differently: TAKE 30 ML 'S BY MOUTH at bedtime)    hydrALAZINE (APRESOLINE) 100 MG tablet Take 1 tablet (100 mg total) by mouth 3 (three) times daily.    hydrocortisone 2.5 % cream Apply topically 2 (two) times daily as needed.     hydrOXYzine HCl (ATARAX) 25 MG tablet TAKE 1 TABLET(25 MG) BY MOUTH THREE TIMES DAILY AS NEEDED FOR ITCHING    hydrOXYzine HCl (ATARAX) 25 MG tablet TAKE 1 TABLET(25 MG) BY MOUTH THREE TIMES DAILY AS NEEDED FOR ITCHING    insulin aspart (NOVOLOG) 100 unit/mL InPn pen Inject 6 units w/ breakfast, 4 units w/ lunch and dinner plus scale 180-230 +1, 231-280 +2, 281-330 +3, 331-380 +4, >380 +5. 90 day supply (Patient taking differently: Inject 8 units w/ breakfast, 6 units w/ lunch and dinner plus scale 180-230 +1, 231-280 +2, 281-330 +3, 331-380 +4, >380 +5. 90 day supply)     "insulin glargine (LANTUS SOLOSTAR) 100 unit/mL (3 mL) InPn pen Inject 8 Units into the skin every evening. (Patient taking differently: Inject 10 Units into the skin every evening. )    insulin needles, disposable, (NOVOFINE 32) 32 x 1/4 " Ndle 1 each by Misc.(Non-Drug; Combo Route) route 3 (three) times daily.    ketoconazole (NIZORAL) 2 % cream APPLY TOPICALLY BID. MIX WITH HYDROCORTISONE CREAM, Uses prn on buttocks    lancets (ACCU-CHEK SOFTCLIX LANCETS) Misc 1 lancet by Misc.(Non-Drug; Combo Route) route 4 (four) times daily.    magnesium oxide (MAG-OX) 400 mg (241.3 mg magnesium) tablet TK 1 TABLET PO QD, morning    methocarbamol (ROBAXIN) 500 MG Tab Take 1 tablet by mouth daily (every 24 hours) as needed for muscle spasm.    minoxidil (LONITEN) 2.5 MG tablet Take 2 tablets (5 mg total) by mouth 2 (two) times daily.    nystatin (MYCOSTATIN) cream Apply topically 2 (two) times daily.    NYSTOP powder Apply topically 2 (two) times daily.     ondansetron (ZOFRAN-ODT) 4 MG TbDL DISSOLVE 1 TABLET(4 MG) ON THE TONGUE EVERY 12 HOURS AS NEEDED    oxyCODONE (ROXICODONE) 5 MG immediate release tablet Take 1 tablet (5 mg total) by mouth every 6 (six) hours as needed for Pain.    pantoprazole (PROTONIX) 40 MG tablet TAKE 1 TABLET EVERY DAY    pantoprazole (PROTONIX) 40 MG tablet Take 1 tablet (40 mg total) by mouth once daily. (Patient taking differently: Take 40 mg by mouth every morning. Sometimes takes twice a day)    rifAXIMin (XIFAXAN) 550 mg Tab Take 1 tablet (550 mg total) by mouth 2 (two) times daily.    sevelamer carbonate (RENVELA) 800 mg Tab Take 1 tablet (800 mg total) by mouth 3 (three) times daily with meals.    tacrolimus (PROGRAF) 0.5 MG Cap Take 1 capsule (0.5 mg total) by mouth once daily. (Patient taking differently: Take 0.5 mg by mouth every morning. )    tiZANidine (ZANAFLEX) 4 MG tablet TAKE 1 TABLET BY MOUTH EVERY 6 HOURS AS NEEDED    [DISCONTINUED] ondansetron (ZOFRAN) 4 MG tablet " Take 1 tablet (4 mg total) by mouth every 6 (six) hours.     Family History     Problem Relation (Age of Onset)    Cancer Mother, Sister    Coronary artery disease Father    Diabetes Father    Heart disease Father    Hypertension Father        Tobacco Use    Smoking status: Former Smoker     Last attempt to quit: 1998     Years since quittin.6    Smokeless tobacco: Never Used    Tobacco comment: pt reports quitting in    Substance and Sexual Activity    Alcohol use: No     Comment: pt reports hx of alcholism and reports quit in     Drug use: Yes     Frequency: 2.0 times per week     Types: Marijuana    Sexual activity: Yes     Partners: Female     Review of Systems   Unable to perform ROS: Mental status change     Objective:     Vital Signs (Most Recent):  Temp: 98.1 °F (36.7 °C) (19)  Pulse: 86 (19)  Resp: (!) 22 (19)  BP: (!) 161/76 (19)  SpO2: (!) 94 % (19) Vital Signs (24h Range):  Temp:  [97.6 °F (36.4 °C)-98.3 °F (36.8 °C)] 98.1 °F (36.7 °C)  Pulse:  [79-88] 86  Resp:  [16-28] 22  SpO2:  [90 %-97 %] 94 %  BP: (129-173)/(61-81) 161/76     Weight: 93 kg (205 lb)  Body mass index is 27.05 kg/m².    Physical Exam    Nursing note and vitals reviewed.  Constitutional: He is not diaphoretic. No distress. Sedated  HENT:   Head: Normocephalic and atraumatic.   Mouth/Throat: Oropharynx is clear and moist.   Eyes: Conjunctivae and EOM are normal. Pupils are equal, round, and reactive to light. No scleral icterus.   Neck: Normal range of motion. Neck supple. No JVD present.   Cardiovascular: Normal rate, regular rhythm and intact distal pulses.   Pulmonary/Chest: Breath sounds normal. No stridor. No respiratory distress.   Abdominal: Soft. Bowel sounds are normal. He exhibits  distension. There is no tenderness.   Old bruises on abdomen    Musculoskeletal: Normal range of motion. He exhibits no edema or tenderness.   No obvious deformity of  extremities   Neurological: He has normal strength. No cranial nerve deficit.   Confused but moving all extremities. Responsive to pain, will not answer any other questions   Skin: Skin is warm and dry. No rash noted.   Psychiatric:   Pt is confused       Significant Labs:   CBC:   Recent Labs   Lab 03/11/19  0150   WBC 12.26   HGB 9.7*   HCT 30.9*        CMP:   Recent Labs   Lab 03/11/19  0150      K 4.8      CO2 22*   *   BUN 40*   CREATININE 6.5*   CALCIUM 8.2*   PROT 7.3   ALBUMIN 2.3*   BILITOT 2.0*   ALKPHOS 142*   AST 60*   ALT 26   ANIONGAP 13   EGFRNONAA 8*     Lactic Acid:   Recent Labs   Lab 03/11/19  0935   LACTATE 1.2     Troponin:   Recent Labs   Lab 03/11/19  0150 03/11/19  1413   TROPONINI 0.392* 0.371*     Urine Studies:   Recent Labs   Lab 03/11/19  0815   COLORU Isabelle   APPEARANCEUA Clear   PHUR 5.0   SPECGRAV 1.020   PROTEINUA Negative   GLUCUA Negative   KETONESU Negative   BILIRUBINUA Negative   OCCULTUA Negative   NITRITE Negative   UROBILINOGEN Negative   LEUKOCYTESUR Negative       Significant Imaging: I have reviewed all pertinent imaging results/findings within the past 24 hours.     Imaging Results          CTA Chest Non-Coronary - PE Study (Final result)     Abnormal  Result time 03/11/19 12:23:31    Final result by Clement Crooks MD (03/11/19 12:23:31)                 Impression:      1. No definite PE identified, noting limited evaluation to the central pulmonary arteries.  2. Development of bilateral pleural effusions, larger on the right.  3. Partial collapse of the middle lobe, lingula, and bilateral lower lobes likely representing atelectasis.  4. Cardiomegaly and coronary atherosclerosis.  5. Status post liver transplant.  Hepatomegaly with large tumor in the right lobe and significant size increase compared to CT abdomen 12/19/2018.  Low-attenuation focus in segment 7 likely representing necrosis and/or hemorrhage status post IR embolization.  6.  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  7. Moderate volume ascites  8. Chronic medical renal disease.  No hydronephrosis.  9. Body wall edema.  10. Other findings as above.  11.  This report was flagged in Epic as abnormal.      Electronically signed by: Clement Crooks MD  Date:    03/11/2019  Time:    12:23             Narrative:    EXAMINATION:  CT ABDOMEN PELVIS WITH CONTRAST; CTA CHEST NON CORONARY    CLINICAL HISTORY:  recent procedure;; low O2 sat, elevated troponin, ams;    TECHNIQUE:  Low dose axial images, sagittal and coronal reformations were obtained from the lung apices to the pubic symphysis following the IV administration of 100 mL of Omnipaque 350 .  No oral contrast given.  CT chest was the formed with the pulmonary embolus protocol.  Multiplanar reconstructions including MIP images were post processed for vessel analysis.  This was followed by routine abdomen and pelvis.    COMPARISON:  PET CT exam 01/26/2019, CT abdomen pelvis 12/19/2018.    FINDINGS:  Visualized structures in the lower neck and both axillary areas are unremarkable.  Mediastinal and hilar areas show upper normal sized lymph nodes at the azygos and subcarinal positions, not hypermetabolic on most recent prior PET CT.  Thoracic aorta is upper normal size with left arch .  Moderate atherosclerosis calcifications are seen along the aorta and coronary arteries.  Heart size is mildly enlarged the with mild, chronic pericardial effusion around.    Evaluation of the pulmonary arteries for pulmonary thromboembolism is limited due to respiratory motion artifact.  No definite filling defects are identified to the level of the proximal segmental pulmonary arteries to indicate acute PE.  More distal pulmonary arteries are not adequately evaluated.    Trachea and central bronchi are patent.  Bilateral pleural effusions have developed, small on the left and moderate on the right; these appears simple by  imaging with the homogeneous attenuation and posterior layering. the upper lobes on both sides are expanded and relatively clear with note of mild consolidation/atelectasis at the edge of the lingula.  Right middle and lower lobes are mostly collapsed.  Left lower lobe has some expansion with note of dependent consolidation, likely atelectasis.  Collapsed portions of lungs could obscure other pathology.    Spleen is mildly enlarged without focal lesion.  Extensive atherosclerosis is noted along the splenic artery.  Patient is status post liver transplant.  Hepatomegaly is present with the approximate 21 cm vertical span of the liver.  Correlating with the most recent PET CT exam, large heterogeneous mass is present in the liver involving hepatic segments 4, 5, 6, 7 and 8; there is relative sparing of hepatic segments 2 and 3.  More focal area of abnormality within the right lobe measures up to 18.5 cm (series 3, image 43 ) and represents a significant size increase compared to the CT 12/19/2018.  The elsewhere, the hepatic parenchyma is heterogeneous.  Within hepatic segment 7 is a more confluent low-attenuation abnormality approximately 7 cm likely represent focus of necrosis and/or hemorrhage status post IR embolization.  Bile ducts are not significantly dilated.  Hepatic artery is patent.  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    Adrenal glands are normal.  Both kidneys again demonstrate mild parenchymal loss consistent with chronic medical renal disease.  No stone or hydronephrosis is detected.  A few small cysts are present.  Ureters are not dilated.  Wall of the urinary bladder is smooth, noting limited assessment due to partial collapse.  Prostate is unremarkable.    Abdominal aorta shows atherosclerosis but no aneurysm.  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  Mild retroperitoneal lymph  node enlargement is seen in several areas.    A moderate volume of ascites is present throughout the abdomen and pelvis, increased in volume from comparison exams.  No free air is detected.  Intestinal tract shows no obstruction or acute inflammatory process.  Some fluid is present in the gastric body.  Small bowel is normal caliber.  Diverticulosis is present in the left colon without acute inflammation.    Skeletal structures are intact without acute fracture or lytic/blastic lesion.  Hardware is partially demonstrated in the right femur.  Degenerative changes are evident throughout the thoracolumbar spine, and note is also made of pars defect at L5 with grade 1 spondylolisthesis.  Mild, diffuse body wall edema is present.                               CT Abdomen Pelvis With Contrast (Final result)     Abnormal  Result time 03/11/19 12:23:31    Final result by Clement Crooks MD (03/11/19 12:23:31)                 Impression:      1. No definite PE identified, noting limited evaluation to the central pulmonary arteries.  2. Development of bilateral pleural effusions, larger on the right.  3. Partial collapse of the middle lobe, lingula, and bilateral lower lobes likely representing atelectasis.  4. Cardiomegaly and coronary atherosclerosis.  5. Status post liver transplant.  Hepatomegaly with large tumor in the right lobe and significant size increase compared to CT abdomen 12/19/2018.  Low-attenuation focus in segment 7 likely representing necrosis and/or hemorrhage status post IR embolization.  6. 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  7. Moderate volume ascites  8. Chronic medical renal disease.  No hydronephrosis.  9. Body wall edema.  10. Other findings as above.  11.  This report was flagged in Epic as abnormal.      Electronically signed by: Clement Crooks MD  Date:    03/11/2019  Time:    12:23             Narrative:    EXAMINATION:  CT ABDOMEN PELVIS WITH  CONTRAST; CTA CHEST NON CORONARY    CLINICAL HISTORY:  recent procedure;; low O2 sat, elevated troponin, ams;    TECHNIQUE:  Low dose axial images, sagittal and coronal reformations were obtained from the lung apices to the pubic symphysis following the IV administration of 100 mL of Omnipaque 350 .  No oral contrast given.  CT chest was the formed with the pulmonary embolus protocol.  Multiplanar reconstructions including MIP images were post processed for vessel analysis.  This was followed by routine abdomen and pelvis.    COMPARISON:  PET CT exam 01/26/2019, CT abdomen pelvis 12/19/2018.    FINDINGS:  Visualized structures in the lower neck and both axillary areas are unremarkable.  Mediastinal and hilar areas show upper normal sized lymph nodes at the azygos and subcarinal positions, not hypermetabolic on most recent prior PET CT.  Thoracic aorta is upper normal size with left arch .  Moderate atherosclerosis calcifications are seen along the aorta and coronary arteries.  Heart size is mildly enlarged the with mild, chronic pericardial effusion around.    Evaluation of the pulmonary arteries for pulmonary thromboembolism is limited due to respiratory motion artifact.  No definite filling defects are identified to the level of the proximal segmental pulmonary arteries to indicate acute PE.  More distal pulmonary arteries are not adequately evaluated.    Trachea and central bronchi are patent.  Bilateral pleural effusions have developed, small on the left and moderate on the right; these appears simple by imaging with the homogeneous attenuation and posterior layering. the upper lobes on both sides are expanded and relatively clear with note of mild consolidation/atelectasis at the edge of the lingula.  Right middle and lower lobes are mostly collapsed.  Left lower lobe has some expansion with note of dependent consolidation, likely atelectasis.  Collapsed portions of lungs could obscure other  pathology.    Spleen is mildly enlarged without focal lesion.  Extensive atherosclerosis is noted along the splenic artery.  Patient is status post liver transplant.  Hepatomegaly is present with the approximate 21 cm vertical span of the liver.  Correlating with the most recent PET CT exam, large heterogeneous mass is present in the liver involving hepatic segments 4, 5, 6, 7 and 8; there is relative sparing of hepatic segments 2 and 3.  More focal area of abnormality within the right lobe measures up to 18.5 cm (series 3, image 43 ) and represents a significant size increase compared to the CT 12/19/2018.  The elsewhere, the hepatic parenchyma is heterogeneous.  Within hepatic segment 7 is a more confluent low-attenuation abnormality approximately 7 cm likely represent focus of necrosis and/or hemorrhage status post IR embolization.  Bile ducts are not significantly dilated.  Hepatic artery is patent.  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    Adrenal glands are normal.  Both kidneys again demonstrate mild parenchymal loss consistent with chronic medical renal disease.  No stone or hydronephrosis is detected.  A few small cysts are present.  Ureters are not dilated.  Wall of the urinary bladder is smooth, noting limited assessment due to partial collapse.  Prostate is unremarkable.    Abdominal aorta shows atherosclerosis but no aneurysm.  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  Mild retroperitoneal lymph node enlargement is seen in several areas.    A moderate volume of ascites is present throughout the abdomen and pelvis, increased in volume from comparison exams.  No free air is detected.  Intestinal tract shows no obstruction or acute inflammatory process.  Some fluid is present in the gastric body.  Small bowel is normal caliber.  Diverticulosis is present in the left colon without acute  inflammation.    Skeletal structures are intact without acute fracture or lytic/blastic lesion.  Hardware is partially demonstrated in the right femur.  Degenerative changes are evident throughout the thoracolumbar spine, and note is also made of pars defect at L5 with grade 1 spondylolisthesis.  Mild, diffuse body wall edema is present.                               CT Head Without Contrast (Final result)  Result time 03/11/19 09:44:55    Final result by Holden Willson MD (03/11/19 09:44:55)                 Impression:      No acute intracranial abnormalities are identified.  There is no evidence for intracranial hemorrhage.    Atrophy and periventricular white matter ischemic changes commensurate with the patient's chronological age.    Paranasal sinus mucosal disease.    Remote inferior wall blowout fracture of the right orbit.      Electronically signed by: Holden Willson MD  Date:    03/11/2019  Time:    09:44             Narrative:    EXAMINATION:  CT HEAD WITHOUT CONTRAST    CLINICAL HISTORY:  Confusion/delirium, altered LOC, unexplained;    TECHNIQUE:  Low dose axial images were obtained through the head.  Coronal and sagittal reformations were also performed. Contrast was not administered.    COMPARISON:  None.    FINDINGS:  The ventricles are enlarged and the sulci are prominent consistent with generalized atrophy commensurate with the patient's chronological age.  There is decreased density within the periventricular white matter likely reflecting ischemia/infarction secondary to microvascular disease and this is also commensurate with the patient's age.  There is no evidence for abnormal masses, mass effect, or midline shift.  No abnormal intra or extra-axial fluid collections are identified, specifically there is no evidence for intracranial hemorrhage.  Atherosclerotic calcification is identified within the intracranial internal carotid arteries.  Mastoid air cells are clear.  There appear to be  chronic changes related to inferior wall blowout fracture of the right orbit.  There is minimal mucosal thickening within the left maxillary antrum and prominent mucosal thickening is identified within the sphenoid sinuses.  Bony calvarium appears intact.                               CT Cervical Spine Without Contrast (Final result)  Result time 03/11/19 09:00:15    Final result by Clement Crooks MD (03/11/19 09:00:15)                 Impression:      1. No cervical spine fracture identified.  2. Overall mild degenerative spine changes, detailed above.  3. Paranasal sinus disease, carotid atherosclerosis, and other findings as above.      Electronically signed by: Clement Crooks MD  Date:    03/11/2019  Time:    09:00             Narrative:    EXAMINATION:  CT CERVICAL SPINE WITHOUT CONTRAST    CLINICAL HISTORY:  fall, confusion, can't give hx;    TECHNIQUE:  Low dose axial images, sagittal and coronal reformations were performed though the cervical spine.  Contrast was not administered.    COMPARISON:  MRI cervical spine 06/23/2005, PET CT 01/26/2019    FINDINGS:  For the intracranial findings see the concurrent CT head exam reported separately.    Mild motion blurring is present, more notably at images around the skull base.    The cervical vertebra are intact without acute fracture or traumatic malalignment.  Vertebra are aligned on the sagittal images without subluxation.  On coronal images, mild tilt of upper cervical spine to the right is likely positional.    Visualized soft tissues of the neck show no mass or paraspinal hematoma.  Both carotid arteries show atherosclerosis calcifications.  Airway is patent, and lung apices are clear.  Paranasal sinus disease is evident with the prominent mucosal thickening in the sphenoid sinus.  Mastoid air cells appear clear.    Some degenerative changes are evident in the cervical spine.  Anterior osteophytes are present at the multiple levels.  C1-2  articulation shows narrowing and sclerosis between the odontoid and anterior arch of C1.  Tip of the odontoid has small erosive or cystic change with adjacent small calcifications suggesting crystal deposition disease.  Approximate 1 cm cyst or geode in the body of C2 next to the odontoid base is stable, PET negative.  Disc spaces show no significant narrowing.  Facet degenerative change is present at multiple levels.  C2-3 has moderate narrowing of the left neural foramen from spurs.  C4-5 has a small central disc protrusion.  Other levels show overall mild degenerative change.  No significant stenosis of the central canal is detected.                               X-Ray Chest AP Portable (Final result)  Result time 03/11/19 08:12:15    Final result by Clement Crooks MD (03/11/19 08:12:15)                 Impression:      Chronic cardiomegaly with possible mild CHF.    Consolidation right lung base.  Clinical correlation and follow-up recommended.      Electronically signed by: Clement Crooks MD  Date:    03/11/2019  Time:    08:12             Narrative:    EXAMINATION:  XR CHEST AP PORTABLE    CLINICAL HISTORY:  altered mental status;    TECHNIQUE:  Single frontal view of the chest was performed.    COMPARISON:  10/14/2017    FINDINGS:  The heart size remains mildly enlarged.  Mediastinal structures are midline with note of aortic atherosclerosis.  Lungs are expanded with mildly increased pulmonary vascularity.  Right lower lung zone shows patchy consolidation; this could indicate atelectasis, edema, pneumonia, etc.  Lungs are clear elsewhere.  Small pleural effusions may be present.  Skeletal structures are intact without acute finding.

## 2019-03-13 NOTE — PLAN OF CARE
Problem: Adult Inpatient Plan of Care  Goal: Plan of Care Review  Outcome: Ongoing (interventions implemented as appropriate)  Recommendations     1. TF recommendations: Novasource @ 40 mL/hr + 4 packs Beneprotein/day to provide 2020 calories, 111 grams of protein, 688 mL fluid.               - Hold for residuals >500 mL; additional fluid per MD.  2. If able to extubate & advance diet, recommend Renal diet (texture per SLP).   3. RD to monitor & follow-up.

## 2019-03-13 NOTE — ASSESSMENT & PLAN NOTE
Liver transplanted 2001  Immunosuppressed status- on tacrolimus  - Hepatology consulted. Recommend 0.5mg tacrolimus daily  MELD-Na score: 28 at 3/13/2019  4:56 AM  MELD score: 28 at 3/13/2019  4:56 AM  Calculated from:  Serum Creatinine: 5.7 mg/dL (Rounded to 4 mg/dL) at 3/13/2019  4:56 AM  Serum Sodium: 138 mmol/L (Rounded to 137 mmol/L) at 3/13/2019  4:56 AM  Total Bilirubin: 1.7 mg/dL at 3/13/2019  4:56 AM  INR(ratio): 1.7 at 3/12/2019 12:06 AM  Age: 73 years

## 2019-03-13 NOTE — ASSESSMENT & PLAN NOTE
ESRD on iHD  [facility]  4 hours  Dr. Clara ORDAZ AVF  Wife reports an EDW of 94 kg (93 kg on admission)    Plan/Recommendations:  -hold RRT today  -plan for RRT tomorrow  -f/u with ECHO  -will obtain OP dialysis records

## 2019-03-13 NOTE — SUBJECTIVE & OBJECTIVE
"Interval History:   HD completed early this morning with around 1L net fluid removal, although reported to be net negative 1.1L/24h.  Electrolytes stable this morning.  Intubated yesterday for hypercapnia/airway protection and noted to have low blood pressures during dialysis this morning requiring small dose of levo for BP support, weaned off on my assessment.    Review of patient's allergies indicates:   Allergen Reactions    Adhesive Dermatitis and Other (See Comments)     "Burned his tail"  Please use Paper Tape    Corticosteroids (glucocorticoids) Other (See Comments)     Leg swelling after an injection    Hydrocortisone Swelling     Leg swelling after an injection    Neuromuscular blockers, steroidal Swelling     Leg swelling after an injection    Ribavirin Other (See Comments)     Arthralgias    Propofol analogues Other (See Comments)     Drops SVR and due to current systemic shunt state, results in difficulty in oscillometry to obtain blood pressure until return of vascular tone (other VS are stable throughout) and drop in venous return due to abdominal ascites; titrate slower in future or select alternate agent. Smooth emergence from general anesthesia without difficulty.     Current Facility-Administered Medications   Medication Frequency    acetaminophen tablet 650 mg Q8H PRN    allopurinol tablet 100 mg QHS    chlorhexidine 0.12 % solution 15 mL BID    dextrose 50% injection 12.5 g PRN    dextrose 50% injection 25 g PRN    famotidine tablet 20 mg Daily    glucagon (human recombinant) injection 1 mg PRN    glucose chewable tablet 16 g PRN    glucose chewable tablet 24 g PRN    heparin (porcine) injection 5,000 Units Q8H    insulin aspart U-100 pen 0-5 Units Q6H PRN    lactulose 20 gram/30 mL solution Soln 30 g Q4H    ondansetron disintegrating tablet 8 mg Q8H PRN    piperacillin-tazobactam 4.5 g in sodium chloride 0.9% 100 mL IVPB (ready to mix system) Q12H    polyethylene glycol " packet 17 g BID PRN    promethazine tablet 25 mg Q6H PRN    propofol (DIPRIVAN) 10 mg/mL infusion Continuous    ramelteon tablet 8 mg Nightly PRN    rifAXIMin tablet 550 mg BID    sodium chloride 0.9% flush 5 mL PRN       Objective:     Vital Signs (Most Recent):  Temp: 98 °F (36.7 °C) (03/13/19 0700)  Pulse: 77 (03/13/19 0915)  Resp: 18 (03/13/19 0915)  BP: (!) 115/57 (03/13/19 0800)  SpO2: 96 % (03/13/19 0915)  O2 Device (Oxygen Therapy): ventilator (03/13/19 0915) Vital Signs (24h Range):  Temp:  [97.4 °F (36.3 °C)-98.4 °F (36.9 °C)] 98 °F (36.7 °C)  Pulse:  [60-82] 77  Resp:  [16-25] 18  SpO2:  [95 %-100 %] 96 %  BP: ()/(39-70) 115/57     Weight: 93 kg (205 lb) (03/11/19 1027)  Body mass index is 27.05 kg/m².  Body surface area is 2.19 meters squared.    I/O last 3 completed shifts:  In: 1110.8 [I.V.:120.8; Other:500; NG/GT:90; IV Piggyback:400]  Out: 2121 [Other:2121]    Physical Exam   Constitutional: He appears well-developed. He appears listless. He has a sickly appearance. He is intubated.   HENT:   Head: Normocephalic and atraumatic.   Right Ear: External ear normal.   Left Ear: External ear normal.   Eyes: Conjunctivae and EOM are normal. Scleral icterus is present.   Neck: No JVD present.   Cardiovascular: Normal rate and regular rhythm. Exam reveals no gallop and no friction rub.   No murmur heard.  ORLIN AVF   Pulmonary/Chest: Effort normal. Tachypnea noted. He is intubated. No respiratory distress. He has no wheezes. He has rhonchi. He has no rales.   Abdominal: Soft. He exhibits distension.   Musculoskeletal: He exhibits edema. He exhibits no deformity.   Neurological: He appears listless.   Skin: Skin is warm and dry. He is not diaphoretic.       Significant Labs:  ABGs:   Recent Labs   Lab 03/12/19  1825   PH 7.385   PCO2 36.7   HCO3 22.0*   POCSATURATED 97   BE -3     CBC:   Recent Labs   Lab 03/13/19  0456   WBC 12.87*   RBC 3.51*   HGB 9.6*   HCT 29.7*      MCV 85   MCH 27.4    MCHC 32.3     CMP:   Recent Labs   Lab 03/13/19  0456   *   CALCIUM 8.1*   ALBUMIN 2.1*   PROT 6.8      K 4.6   CO2 22*      BUN 45*   CREATININE 5.7*   ALKPHOS 148*   ALT 19   AST 44*   BILITOT 1.7*

## 2019-03-13 NOTE — PROGRESS NOTES
Dialysis initiated via LUE fistula without difficulty. UF goal set to 1L. Will continue to monitor for 3 hrs.

## 2019-03-13 NOTE — PLAN OF CARE
Reji Castillo MD  [address] / [place] [address]    [facility] - [place], [place] - [address] [place] [address] EXPY AT [facility] & [address] EXPY  [place] [address]  Phone: [phone] Fax: [phone]    [facility] [place]  [address] [facility] [place] [address]  Phone: [phone] Fax: [phone]    [facility] - [place], [place] - [address] [facility]  [address] [facility] [address]  Phone: [phone] Fax: [phone]    Payor: [employer] MEDICARE / Plan: [employer] HMO SNP / Product Type: Medicare Advantage /     Future Appointments   Date Time Provider Department Center   3/18/2019  3:30 PM Danay Nath MD [facility] LIVERTX [facility]   3/22/2019 10:30 AM [facility] [id number] [facility] RAD IR [facility]   3/22/2019  1:00 PM [facility] NM2 INFINIA 400LB LIMIT [facility] NUCLEAR [facility]   3/22/2019  2:00 PM [facility] NM2 INFINIA 400LB LIMIT [facility]UC[facility]   4/3/2019  3:40 PM Britt [place] NP [facility] WOUND [facility]   4/9/2019 10:15 AM [facility] OIC-US1 MASTER [facility] ULTR [facility]   5/6/2019  4:30 PM SUGEY [place]     Extended Emergency Contact Information  Primary Emergency Contact: Merlene Mathis  Address: [address] [address] [place]  Mobile Phone: [phone]  Relation: Spouse     03/13/19 1601   Discharge Assessment   Assessment Type Discharge Planning Assessment   Confirmed/corrected address and phone number on facesheet? Yes   Assessment information obtained from? Caregiver   Expected Length of Stay (days) 4   Communicated expected length of stay with patient/caregiver no   Prior to hospitilization cognitive status: Alert/Oriented   Prior to hospitalization functional status: Independent   Current cognitive status: Coma/Sedated/Intubated   Current Functional Status: Completely Dependent   Facility Arrived From: [facility]   Lives With spouse   Able to Return to Prior Arrangements  other (see comments)  (TBD)   Is patient able to care for self after discharge? Unable to determine at this time (comments)   Who are your caregiver(s) and their phone number(s)? Veronika Mathis (wife): [phone]; [phone]   Patient's perception of discharge disposition other (comments)  (TBD)   Readmission Within the Last 30 Days no previous admission in last 30 days   Patient currently being followed by outpatient case management? No   Patient currently receives home health services? No   Patient currently receives any other outside agency services? No   Equipment Currently Used at Home none   Do you have any problems affording any of your prescribed medications? No   Is the patient taking medications as prescribed? yes   Does the patient have transportation home? Yes   Transportation Anticipated family or friend will provide   Does the patient receive services at the [facility]? No   Discharge Plan A Home with family   Discharge Plan B Skilled Nursing Facility   DME Needed Upon Discharge  other (see comments)  (TBD)   Does the patient currently use HME? No   Are there any open cases? No   Patient is established at [facility] for MWF iHD.      Fabiola Schreiber, RN, BSN, CCM  Case Management  [facility]  Ext. [phone]

## 2019-03-13 NOTE — ASSESSMENT & PLAN NOTE
Liver transplanted 2001  Immunosuppressed status- on tacrolimus  - Hepatology consulted. Appreciate further recs re: tacrolimus  - MELD-Na score: 28 at 3/13/2019  4:56 AM  MELD score: 28 at 3/13/2019  4:56 AM  Calculated from:  Serum Creatinine: 5.7 mg/dL (Rounded to 4 mg/dL) at 3/13/2019  4:56 AM  Serum Sodium: 138 mmol/L (Rounded to 137 mmol/L) at 3/13/2019  4:56 AM  Total Bilirubin: 1.7 mg/dL at 3/13/2019  4:56 AM  INR(ratio): 1.7 at 3/12/2019 12:06 AM  Age: 73 years

## 2019-03-13 NOTE — SUBJECTIVE & OBJECTIVE
Interval History/Significant Events: No acute events overnight. Vitals within normal. Overnight, his ET tube and NG tube were adjusted. In addition, he got 3 hours of dialysis.     Review of Systems   Unable to perform ROS: Intubated     Objective:     Vital Signs (Most Recent):  Temp: 98.5 °F (36.9 °C) (03/13/19 1100)  Pulse: 66 (03/13/19 1400)  Resp: 19 (03/13/19 1400)  BP: (!) 112/53 (03/13/19 1400)  SpO2: 96 % (03/13/19 1400) Vital Signs (24h Range):  Temp:  [97.5 °F (36.4 °C)-98.5 °F (36.9 °C)] 98.5 °F (36.9 °C)  Pulse:  [60-82] 66  Resp:  [16-27] 19  SpO2:  [95 %-100 %] 96 %  BP: ()/(39-69) 112/53   Weight: 93 kg (205 lb 0.4 oz)  Body mass index is 27.05 kg/m².      Intake/Output Summary (Last 24 hours) at 3/13/2019 1418  Last data filed at 3/13/2019 1400  Gross per 24 hour   Intake 1370.8 ml   Output 2121 ml   Net -750.2 ml       Physical Exam   Constitutional: He appears well-nourished.   Very somnolent on exam. Minimal response to pain   HENT:   Head: Normocephalic and atraumatic.   Neck: Normal range of motion.   Cardiovascular: Normal rate, regular rhythm, normal heart sounds and intact distal pulses.   Pulmonary/Chest: Effort normal. No respiratory distress. He has no rales.   Intubated on mechanical vent   Abdominal: Bowel sounds are normal. He exhibits distension. There is tenderness. There is no guarding.   Musculoskeletal: Normal range of motion.   Neurological:   Very somnolent.    Skin: Skin is warm and dry.   Psychiatric: He has a normal mood and affect.       Vents:  Vent Mode: A/C (03/13/19 1324)  Ventilator Initiated: Yes (03/12/19 6723)  Set Rate: 18 bmp (03/13/19 1324)  Vt Set: 500 mL (03/13/19 1324)  Pressure Support: 0 cmH20 (03/13/19 1324)  PEEP/CPAP: 5 cmH20 (03/13/19 1324)  Oxygen Concentration (%): 30 (03/13/19 1400)  Peak Airway Pressure: 23 cmH2O (03/13/19 1324)  Plateau Pressure: 18 cmH20 (03/13/19 1324)  Total Ve: 9.59 mL (03/13/19 1324)  F/VT Ratio<105 (RSBI): (!) 36.68  (03/13/19 1324)  Lines/Drains/Airways     Drain                 Hemodialysis AV Fistula 11/08/16 1553 Left forearm 854 days         NG/OG Tube 03/12/19 1520 nasogastric Right nostril less than 1 day          Airway                 Airway - Non-Surgical 03/12/19 1709 Endotracheal Tube less than 1 day          Peripheral Intravenous Line                 Peripheral IV - Single Lumen 03/11/19 0630 Right Forearm 2 days         Peripheral IV Triple Lumen 03/12/19 1330 Anterior;Right Hand 1 day              Significant Labs:    CBC/Anemia Profile:  Recent Labs   Lab 03/12/19  0006 03/12/19  0426 03/13/19  0456   WBC 14.55* 14.66* 12.87*   HGB 10.1* 10.1* 9.6*   HCT 31.7* 32.2* 29.7*    201 190   MCV 86 87 85   RDW 19.2* 19.3* 19.6*        Chemistries:  Recent Labs   Lab 03/12/19  0006 03/12/19 0426 03/13/19  0456   * 136 138   K 5.1 5.3* 4.6   CL 99 101 103   CO2 25 22* 22*   BUN 49* 52* 45*   CREATININE 7.3* 7.5* 5.7*   CALCIUM 8.0* 8.1* 8.1*   ALBUMIN 2.2* 2.5* 2.1*   PROT 7.1 7.2 6.8   BILITOT 1.8* 2.0* 1.7*   ALKPHOS 132 126 148*   ALT 23 22 19   AST 54* 47* 44*   MG  --  2.0 1.9   PHOS  --  5.2* 3.4     Significant Imaging:  I have reviewed and interpreted all pertinent imaging results/findings within the past 24 hours.

## 2019-03-13 NOTE — PT/OT/SLP PROGRESS
Speech Language Pathology  Discharge    Philip MORELAND Del ROSANNA  MRN: [id number]    Patient not seen today secondary to pt t/f to ICU. New orders needed when pt medically appropriate.     Sarahi Freitas CCC-SLP  3/13/2019

## 2019-03-13 NOTE — TREATMENT PLAN
Treatment Plan  03/13/2019  6:27 PM    Chart reviewed, patient seen, and case discussed with attending.  Recommend restarting home dose of Tacrolimus.  We will continue to follow alongside primary team, please call with any additional questions or concerns.    Winter Sheth M.D.  Gastroenterology Fellow, PGY-V  Pager: [phone]  [facility] [address]

## 2019-03-13 NOTE — ASSESSMENT & PLAN NOTE
- first diagnosed in Dec 2017 via biopsy. Underwent RFA on 2/27/18. Follow up imaging on Dec 2019 noted recurrence and multiple new liver lesions throughout liver. He was seen by Dr. Perez ([facility]) in clinic who mentions 20+ lbs weight loss in 3 months, poor appetite. PET CT 1/26/19 suggestive of lesions in right and left lobe as well as a focus of uptake adjacent to humeral head. Recently started on Y 90 therapy. Role of concurrent chemotherapy still under investigation  - Hepatology following along. Appreciate recs.

## 2019-03-13 NOTE — PROGRESS NOTES
"[facility]  Nephrology  Progress Note    Patient Name: Philip Mathis III  MRN: [id number]  Admission Date: 3/11/2019  Hospital Length of Stay: 2 days  Attending Provider: Cliff Luna MD   Primary Care Physician: Reji Castillo MD  Principal Problem:Acute encephalopathy    Subjective:     Interval History:   HD completed early this morning with around 1L net fluid removal, although reported to be net negative 1.1L/24h.  Electrolytes stable this morning.  Intubated yesterday for hypercapnia/airway protection and noted to have low blood pressures during dialysis this morning requiring small dose of levo for BP support, weaned off on my assessment.    Review of patient's allergies indicates:   Allergen Reactions    Adhesive Dermatitis and Other (See Comments)     "Burned his tail"  Please use Paper Tape    Corticosteroids (glucocorticoids) Other (See Comments)     Leg swelling after an injection    Hydrocortisone Swelling     Leg swelling after an injection    Neuromuscular blockers, steroidal Swelling     Leg swelling after an injection    Ribavirin Other (See Comments)     Arthralgias    Propofol analogues Other (See Comments)     Drops SVR and due to current systemic shunt state, results in difficulty in oscillometry to obtain blood pressure until return of vascular tone (other VS are stable throughout) and drop in venous return due to abdominal ascites; titrate slower in future or select alternate agent. Smooth emergence from general anesthesia without difficulty.     Current Facility-Administered Medications   Medication Frequency    acetaminophen tablet 650 mg Q8H PRN    allopurinol tablet 100 mg QHS    chlorhexidine 0.12 % solution 15 mL BID    dextrose 50% injection 12.5 g PRN    dextrose 50% injection 25 g PRN    famotidine tablet 20 mg Daily    glucagon (human recombinant) injection 1 mg PRN    glucose chewable tablet 16 g PRN    glucose chewable tablet 24 g PRN    " heparin (porcine) injection 5,000 Units Q8H    insulin aspart U-100 pen 0-5 Units Q6H PRN    lactulose 20 gram/30 mL solution Soln 30 g Q4H    ondansetron disintegrating tablet 8 mg Q8H PRN    piperacillin-tazobactam 4.5 g in sodium chloride 0.9% 100 mL IVPB (ready to mix system) Q12H    polyethylene glycol packet 17 g BID PRN    promethazine tablet 25 mg Q6H PRN    propofol (DIPRIVAN) 10 mg/mL infusion Continuous    ramelteon tablet 8 mg Nightly PRN    rifAXIMin tablet 550 mg BID    sodium chloride 0.9% flush 5 mL PRN       Objective:     Vital Signs (Most Recent):  Temp: 98 °F (36.7 °C) (03/13/19 0700)  Pulse: 77 (03/13/19 0915)  Resp: 18 (03/13/19 0915)  BP: (!) 115/57 (03/13/19 0800)  SpO2: 96 % (03/13/19 0915)  O2 Device (Oxygen Therapy): ventilator (03/13/19 0915) Vital Signs (24h Range):  Temp:  [97.4 °F (36.3 °C)-98.4 °F (36.9 °C)] 98 °F (36.7 °C)  Pulse:  [60-82] 77  Resp:  [16-25] 18  SpO2:  [95 %-100 %] 96 %  BP: ()/(39-70) 115/57     Weight: 93 kg (205 lb) (03/11/19 1027)  Body mass index is 27.05 kg/m².  Body surface area is 2.19 meters squared.    I/O last 3 completed shifts:  In: 1110.8 [I.V.:120.8; Other:500; NG/GT:90; IV Piggyback:400]  Out: 2121 [Other:2121]    Physical Exam   Constitutional: He appears well-developed. He appears listless. He has a sickly appearance. He is intubated.   HENT:   Head: Normocephalic and atraumatic.   Right Ear: External ear normal.   Left Ear: External ear normal.   Eyes: Conjunctivae and EOM are normal. Scleral icterus is present.   Neck: No JVD present.   Cardiovascular: Normal rate and regular rhythm. Exam reveals no gallop and no friction rub.   No murmur heard.  ORILN AVF   Pulmonary/Chest: Effort normal. Tachypnea noted. He is intubated. No respiratory distress. He has no wheezes. He has rhonchi. He has no rales.   Abdominal: Soft. He exhibits distension.   Musculoskeletal: He exhibits edema. He exhibits no deformity.   Neurological: He appears  listless.   Skin: Skin is warm and dry. He is not diaphoretic.       Significant Labs:  ABGs:   Recent Labs   Lab 03/12/19  1825   PH 7.385   PCO2 36.7   HCO3 22.0*   POCSATURATED 97   BE -3     CBC:   Recent Labs   Lab 03/13/19  0456   WBC 12.87*   RBC 3.51*   HGB 9.6*   HCT 29.7*      MCV 85   MCH 27.4   MCHC 32.3     CMP:   Recent Labs   Lab 03/13/19  0456   *   CALCIUM 8.1*   ALBUMIN 2.1*   PROT 6.8      K 4.6   CO2 22*      BUN 45*   CREATININE 5.7*   ALKPHOS 148*   ALT 19   AST 44*   BILITOT 1.7*            Assessment/Plan:     ESRD on hemodialysis    ESRD on iHD  [facility]  4 hours  Dr. Clara ORDAZ AVF  Wife reports an EDW of 94 kg (93 kg on admission)    Plan/Recommendations:  -hold RRT today  -plan for RRT tomorrow  -f/u with ECHO  -will obtain OP dialysis records           Giovanni Colorado, SABRINA  Nephrology  [facility]

## 2019-03-13 NOTE — PROGRESS NOTES
[facility]  Critical Care Medicine  Progress Note    Patient Name: Philip Mathis III  MRN: [id number]  Admission Date: 3/11/2019  Hospital Length of Stay: 2 days  Code Status: Full Code  Attending Provider: Cliff Luna MD  Primary Care Provider: Reji Castillo MD   Principal Problem: Acute encephalopathy    Subjective:     HPI:  This is a 72y/o male with hepatocellular carcinoma undergoing radiofrequency ablation by IR last treatment was 3/7, orthotopic liver transplant on 6/10/2001 on Prograf, ESRD on hemodialysis (MWF), Type 2 diabetes mellitus on long term insulin, essential HTN, chronic combined systolic and diastolic heart failure, KARON and history of hepatitis C (treated) who is being transfer to hospital medicine for acute encephalopathy. He presented initially to [facility] ED this Am with acute confusion. Wife not at bedside on transfer, she was reached via telephone for collateral tonight. Per wife, this AM patient did not wake up to his alarm to go to HD, she then later heard patient getting out of bed and heard a thump. She found the patient on the ground and very confused and not recognizing her. She called her neighbor to come and help her. EMS was later called, and patient brought to ED. Pt is very lethargic since that time, per wife, he has moan all day, unable to talk to her. This is unusual for him as pt normally drives himself to HD regularly. She manages all his medications and report that she has not given him any sedating meds (no Oxycodone, Robaxin, Klonopin, Zanaflex). She report that over the last few days, pt report abdominal swelling and pain to her. In fact they contacted liver transplant coordinator, who was setting up an US of the abdomen. She report that he might have skip a few dose of lactulose due to diarrhea the last few days. She report that he has been in confused state like this in the past due to Hepatic Encephalopathy. Pt also recently had HCC  treatment by IR last week, on Cipro for. At this time 11:53 PM, on evaluation, pt is unarousable, appear to be in sleep like state, per wife, he has been like this all day. 1x dose of narcan given with no improvement. Pt would moan to pain. Glucose check is 170.         At  ED, patient has undergone extensive evaluation with CTA of chest negative for PE as patient was mildly hypoxic on arrival and showed bilateral pleural effusions but nothing else significant. CT scan of abdomen showed moderate ascites and large hepatic tumor with necrosis. CT scan of head and C-spine were unremarkable. Ammonia was 45 and Lactic acid was 1.2. WBC was 12,200 but patient afebrile and not tachycardic. U/A negative for infection. Patient due for HD today and did not get HD and BUN/Cr was 40/6.5. In ED, patient remains acutely confused and not responding to questions appropriately. Patient has not focal neurologic deficits. Dr. Verdin at [facility] called and stated patient could be admitted [facility] but Hospital medicine did not fell comfortable admitting patient for encephalopathy work-up so patient to be transferred and admitted to [facility]. Patient empirically given broad spectrum abx at [facility] including Vancomycin. Zosyn and Ceftriaxone.     Patient transferred to ICU due to acute metabolic encephalopathy.     Hospital/ICU Course:  On 3/12Transferred to ICU for further workup of Acute metabolic encephalopathy. Pt was intubated. And Paracentesis (diagnostic/therapuetic) completed.     Interval History/Significant Events: No acute events overnight. Vitals within normal. Overnight, his ET tube and NG tube were adjusted. In addition, he got 3 hours of dialysis.     Review of Systems   Unable to perform ROS: Intubated     Objective:     Vital Signs (Most Recent):  Temp: 98.5 °F (36.9 °C) (03/13/19 1100)  Pulse: 66 (03/13/19 1400)  Resp: 19 (03/13/19 1400)  BP: (!) 112/53 (03/13/19 1400)  SpO2: 96 % (03/13/19 1400) Vital  Signs (24h Range):  Temp:  [97.5 °F (36.4 °C)-98.5 °F (36.9 °C)] 98.5 °F (36.9 °C)  Pulse:  [60-82] 66  Resp:  [16-27] 19  SpO2:  [95 %-100 %] 96 %  BP: ()/(39-69) 112/53   Weight: 93 kg (205 lb 0.4 oz)  Body mass index is 27.05 kg/m².      Intake/Output Summary (Last 24 hours) at 3/13/2019 1418  Last data filed at 3/13/2019 1400  Gross per 24 hour   Intake 1370.8 ml   Output 2121 ml   Net -750.2 ml       Physical Exam   Constitutional: He appears well-nourished.   Very somnolent on exam. Minimal response to pain   HENT:   Head: Normocephalic and atraumatic.   Neck: Normal range of motion.   Cardiovascular: Normal rate, regular rhythm, normal heart sounds and intact distal pulses.   Pulmonary/Chest: Effort normal. No respiratory distress. He has no rales.   Intubated on mechanical vent   Abdominal: Bowel sounds are normal. He exhibits distension. There is tenderness. There is no guarding.   Musculoskeletal: Normal range of motion.   Neurological:   Very somnolent.    Skin: Skin is warm and dry.   Psychiatric: He has a normal mood and affect.       Vents:  Vent Mode: A/C (03/13/19 1324)  Ventilator Initiated: Yes (03/12/19 1743)  Set Rate: 18 bmp (03/13/19 1324)  Vt Set: 500 mL (03/13/19 1324)  Pressure Support: 0 cmH20 (03/13/19 1324)  PEEP/CPAP: 5 cmH20 (03/13/19 1324)  Oxygen Concentration (%): 30 (03/13/19 1400)  Peak Airway Pressure: 23 cmH2O (03/13/19 1324)  Plateau Pressure: 18 cmH20 (03/13/19 1324)  Total Ve: 9.59 mL (03/13/19 1324)  F/VT Ratio<105 (RSBI): (!) 36.68 (03/13/19 1324)  Lines/Drains/Airways     Drain                 Hemodialysis AV Fistula 11/08/16 1553 Left forearm 854 days         NG/OG Tube 03/12/19 1520 nasogastric Right nostril less than 1 day          Airway                 Airway - Non-Surgical 03/12/19 1709 Endotracheal Tube less than 1 day          Peripheral Intravenous Line                 Peripheral IV - Single Lumen 03/11/19 0630 Right Forearm 2 days         Peripheral IV  Triple Lumen 03/12/19 1330 Anterior;Right Hand 1 day              Significant Labs:    CBC/Anemia Profile:  Recent Labs   Lab 03/12/19  0006 03/12/19  0426 03/13/19  0456   WBC 14.55* 14.66* 12.87*   HGB 10.1* 10.1* 9.6*   HCT 31.7* 32.2* 29.7*    201 190   MCV 86 87 85   RDW 19.2* 19.3* 19.6*        Chemistries:  Recent Labs   Lab 03/12/19  0006 03/12/19  0426 03/13/19  0456   * 136 138   K 5.1 5.3* 4.6   CL 99 101 103   CO2 25 22* 22*   BUN 49* 52* 45*   CREATININE 7.3* 7.5* 5.7*   CALCIUM 8.0* 8.1* 8.1*   ALBUMIN 2.2* 2.5* 2.1*   PROT 7.1 7.2 6.8   BILITOT 1.8* 2.0* 1.7*   ALKPHOS 132 126 148*   ALT 23 22 19   AST 54* 47* 44*   MG  --  2.0 1.9   PHOS  --  5.2* 3.4     Significant Imaging:  I have reviewed and interpreted all pertinent imaging results/findings within the past 24 hours.      ABG  Recent Labs   Lab 03/12/19  1825   PH 7.385   PO2 94   PCO2 36.7   HCO3 22.0*   BE -3     Assessment/Plan:     Neuro   * Acute encephalopathy    - pt presented with acute encephalopathy - attributed to hepatic etiology. Infection workup largely negative so far. CT Head negative. Electrolytes, glucose, mild hypercarbia on blood gas  - pt intubated 3/12 due to low GCS  - continue lactulose titrated to 3-4 BM/day  - avoid sedative agents  - neuro checks per shift     Pulmonary   Acute hypoxemic respiratory failure    - ABG on 3/12: 7.38/36.7/94 sating 97%  - intubated for acute encephalopathy. Mechanical ventilation set at A/C , RR 18, PEEP 5 FiO2 30%  - will perform SBT once patient is awake and following commands     Cardiac/Vascular   (HFpEF) heart failure with preserved ejection fraction    - ECHO 3/13/19 noted EF 65% with diastolic dysfunction, reduced RV function, moderate RVE - likely due to underlying liver disease  - currently euvolemic; holding diuretics at this time  - will continue to monitor in and out  - daily weights     Essential hypertension    - holding carvedilol and hydralazine at this  time     Renal/   ESRD on hemodialysis    Secondary hyperparathyroidism  Vitamin D deficiency  - Nephrology consulted. Last HD 3/13/19. Appreciate recs      Oncology   HCC (hepatocellular carcinoma)    - first diagnosed in Dec 2017 via biopsy. Underwent RFA on 2/27/18. Follow up imaging on Dec 2019 noted recurrence and multiple new liver lesions throughout liver. He was seen by Dr. Perez ([facility]) in clinic who mentions 20+ lbs weight loss in 3 months, poor appetite. PET CT 1/26/19 suggestive of lesions in right and left lobe as well as a focus of uptake adjacent to humeral head. Recently started on Y 90 therapy. Role of concurrent chemotherapy still under investigation  - Hepatology following along. Appreciate recs.     Endocrine   Controlled type 2 diabetes mellitus with chronic kidney disease on chronic dialysis, with long-term current use of insulin    - A1c 4.8  - insulin LD SSI  - will monitor for hyperglycemia once tube feeds are started.   - in the meantime POCT q6h     GI   Liver transplanted 6/10/2001    -- Hepatology consult for assistance with immunosuppressed medications     Decompensated hepatic cirrhosis    Liver transplanted 2001  Immunosuppressed status- on tacrolimus  - Hepatology consulted. Appreciate further recs re: tacrolimus  - MELD-Na score: 28 at 3/13/2019  4:56 AM  MELD score: 28 at 3/13/2019  4:56 AM  Calculated from:  Serum Creatinine: 5.7 mg/dL (Rounded to 4 mg/dL) at 3/13/2019  4:56 AM  Serum Sodium: 138 mmol/L (Rounded to 137 mmol/L) at 3/13/2019  4:56 AM  Total Bilirubin: 1.7 mg/dL at 3/13/2019  4:56 AM  INR(ratio): 1.7 at 3/12/2019 12:06 AM  Age: 73 years       Orthopedic   Chronic gout    - continue home allopurinol     Other   KARON (obstructive sleep apnea)    -- Patient currently intubated        Critical Care Daily Checklist:    A: Awake: RASS Goal/Actual Goal: RASS Goal: 0-->alert and calm  Actual: Mayo Agitation Sedation Scale (RASS): Moderate sedation   B: Spontaneous  Breathing Trial Performed?     C: SAT & SBT Coordinated?  n/a                      D: Delirium: CAM-ICU Overall CAM-ICU: Positive   E: Early Mobility Performed? yes   F: Feeding Goal: Goals: Meet % EEN, EPN  Status: Nutrition Goal Status: new   Current Diet Order   Procedures    Diet NPO      AS: Analgesia/Sedation None; precedex as needed to keep RAAS 0   T: Thromboembolic Prophylaxis heparin   H: HOB > 300 yes   U: Stress Ulcer Prophylaxis (if needed) famotidine   G: Glucose Control LD SSI   B: Bowel Function Stool Occurrence: 1   I: Indwelling Catheter (Lines & Rodriguez) Necessity 2 peripheral   D: De-escalation of Antimicrobials/Pharmacotherapies D/c zosyn    Plan for the day/ETD 1. Step down  2. Continue lactulose for ?hepatic encephalopathy  3. Follow up hepatology recs (re: tacrolimus - starting at 0.5mg daily)  4. Tube feeds started    Code Status:  Family/Goals of Care: Full Code         Critical secondary to Patient has a condition that poses threat to life and bodily function: Acute encephalopathy      Critical care was time spent personally by me on the following activities: development of treatment plan with patient or surrogate and bedside caregivers, discussions with consultants, evaluation of patient's response to treatment, examination of patient, ordering and performing treatments and interventions, ordering and review of laboratory studies, ordering and review of radiographic studies, pulse oximetry, re-evaluation of patient's condition. This critical care time did not overlap with that of any other provider or involve time for any procedures.     Malathi Collins MD  Critical Care Medicine  [facility]

## 2019-03-13 NOTE — PROGRESS NOTES
3-hr dialysis tx completed with net UF of 871ml. Blood pressure dropped once, systolic of 70's. Levophed drip increased by ICU RN for pressure support. Rinsed back blood.Pulled out needles. Pressure applied and hemostasis achieved. Positive thrill and bruit. Report given to primary RN.

## 2019-03-13 NOTE — ASSESSMENT & PLAN NOTE
Secondary hyperparathyroidism  Vitamin D deficiency  - Nephrology consulted. Last HD 3/13/19. Appreciate recs

## 2019-03-13 NOTE — CONSULTS
"  [facility]  Adult Nutrition  Consult Note    SUMMARY     Recommendations    1. TF recommendations: Novasource @ 40 mL/hr + 4 packs Beneprotein/day to provide 2020 calories, 111 grams of protein, 688 mL fluid.    - Hold for residuals >500 mL; additional fluid per MD.  2. If able to extubate & advance diet, recommend Renal diet (texture per SLP).   3. RD to monitor & follow-up.    Goals: Meet % EEN, EPN  Nutrition Goal Status: new  Communication of RD Recs: reviewed with RN    Reason for Assessment    Reason For Assessment: consult  Diagnosis: other (see comments)(Encephalopathy, HCC)  Relevant Medical History: Liver tx (2001), ESRD on HD, DM  Interdisciplinary Rounds: attended    General Information Comments: Pt intubated, sedated, NGT present. Pt received HD yesterday. Liver ablation - 3/9. Daughter in law at bedside reports pt w/ good appetite & stable wt PTA. NFPE not indicated, pt appears nourished.  Nutrition Discharge Planning: Unable to determine    Nutrition/Diet History    Patient Reported Diet/Restrictions/Preferences: other (see comments)(BHAVANA)  Spiritual, Cultural Beliefs, Mandaen Practices, Values that Affect Care: no  Factors Affecting Nutritional Intake: on mechanical ventilation, NPO    Anthropometrics    Temp: 98 °F (36.7 °C)  Height Method: Estimated  Height: 6' 1" (185.4 cm)  Height (inches): 73 in  Weight Method: Estimated  Weight: 93 kg (205 lb 0.4 oz)  Weight (lb): 205.03 lb  Ideal Body Weight (IBW), Male: 184 lb  % Ideal Body Weight, Male (lb): 111.43 lb  BMI (Calculated): 27.1  BMI Grade: 25 - 29.9 - overweight    Lab/Procedures/Meds    Pertinent Labs Reviewed: reviewed  Pertinent Labs Comments: BUN 45, Creat 5.7, GFR 9, A1C 4.9  Pertinent Medications Reviewed: reviewed  Pertinent Medications Comments: IVF    Estimated/Assessed Needs    Weight Used For Calorie Calculations: 93 kg (205 lb 0.4 oz)     Energy Calorie Requirements (kcal): 1893 kcal/d  Energy Need " Method: [facility]     Protein Requirements: 112-140 g/d (1.2-1.5 g/kg)  Weight Used For Protein Calculations: 93 kg (205 lb 0.4 oz)     Estimated Fluid Requirement Method: other (see comments)(Per MD or 1 mL/kcal)     Nutrition Prescription Ordered    Current Diet Order: NPO  Current Nutrition Support Formula Ordered: Novasource Renal    Evaluation of Received Nutrient/Fluid Intake    IV Fluid (mL): 120    Comments: LBM: 3/12    Nutrition Risk    Level of Risk/Frequency of Follow-up: (2x/week)     Assessment and Plan    Nutrition Problem  Inadequate energy intake    Related to (etiology):   Inability to consume sufficient energy    Signs and Symptoms (as evidenced by):   NPO with no alternate means of nutrition      Nutrition Diagnosis Status:   New     Monitor and Evaluation    Food and Nutrient Intake: energy intake, food and beverage intake, enteral nutrition intake  Food and Nutrient Adminstration: diet order, enteral and parenteral nutrition administration  Physical Activity and Function: nutrition-related ADLs and IADLs  Anthropometric Measurements: weight, weight change  Biochemical Data, Medical Tests and Procedures: lipid profile, inflammatory profile, glucose/endocrine profile, gastrointestinal profile, electrolyte and renal panel  Nutrition-Focused Physical Findings: overall appearance     Nutrition Follow-Up    RD Follow-up?: Yes

## 2019-03-13 NOTE — ASSESSMENT & PLAN NOTE
- ABG on 3/12: 7.38/36.7/94 sating 97%  - intubated for acute encephalopathy. Mechanical ventilation set at A/C , RR 18, PEEP 5 FiO2 30%  - will perform SBT once patient is awake and following commands

## 2019-03-13 NOTE — PLAN OF CARE
Problem: Adult Inpatient Plan of Care  Goal: Plan of Care Review    No acute events throughout day. See vital signs and assessments in flowsheets. See below for updates on today's progress.     Pulmonary: intubated @ 27cm 18/500/30/5. Lungs clear     Cardiovascular: SR 70s pulses palpable. Pt required minimal levo during shift MAP goal >65     Neurological: pupils 2m brisk. Pt does not follow commands; spont moves UE.     Gastrointestinal: lactulose q4hrs. No BM during my shift; BS active.     Genitourinary: anuric; HD perofmed for 3 hrs.     Endocrine: -170s    Integumentary/Other: burn to buttock    Infusions: levo 0.04mcg/kg/min    Patient progressing towards goals as tolerated, plan of care communicated and reviewed with Philip Mathis III and family. All concerns addressed. Will continue to monitor.

## 2019-03-13 NOTE — ASSESSMENT & PLAN NOTE
- ECHO 3/13/19 noted EF 65% with diastolic dysfunction, reduced RV function, moderate RVE - likely due to underlying liver disease  - currently euvolemic; holding diuretics at this time  - will continue to monitor in and out  - daily weights

## 2019-03-13 NOTE — ASSESSMENT & PLAN NOTE
- pt presented with acute encephalopathy - attributed to hepatic etiology. Infection workup largely negative so far. CT Head negative. Electrolytes, glucose, mild hypercarbia on blood gas  - pt intubated 3/12 due to low GCS  - continue lactulose titrated to 3-4 BM/day  - avoid sedative agents  - neuro checks per shift

## 2019-03-13 NOTE — PROCEDURES
Philip Mathis III is a 73 y.o. male patient.    Temp: 98 °F (36.7 °C) (03/12/19 1900)  Pulse: 63 (03/12/19 2130)  Resp: (!) 22 (03/12/19 2130)  BP: 121/61 (03/12/19 2130)  SpO2: 97 % (03/12/19 2130)  Weight: 93 kg (205 lb) (03/11/19 1027)       Intubation  Date/Time: 3/12/2019 5:00 PM  Location procedure was performed: [facility] CRITICAL CARE MEDICINE  Performed by: Monique Sims MD  Authorized by: Monique Sims MD   Assisting provider: Cliff Luna MD  Pre-operative diagnosis: Encephalopathy  Post-operative diagnosis: Encephalopathy  Consent Done: Yes  Consent: Verbal consent obtained.  Risks and benefits: risks, benefits and alternatives were discussed  Consent given by: spouse  Patient identity confirmed: name  Indications: airway protection and  hypercapnia  Intubation method: video-assisted  Patient status: paralyzed (RSI)  Preoxygenation: bag valve mask and nasal cannula  Sedatives: etomidate  Paralytic: rocuronium  Laryngoscope size: Mac 4  Tube size: 8.0 mm  Tube type: cuffed  Number of attempts: 2  Ventilation between attempts: BVM  Cords visualized: yes  Post-procedure assessment: ETCO2 monitor and chest rise  Breath sounds: equal and absent over the epigastrium  Cuff inflated: yes  ETT to lip: 23 cm  Tube secured with: ETT martinez  Complications: No  Comments: 1st attempt - DL MAC 4  2nd attempt - video MAC 4      Monique Sims MD  [employer] PCCM Fellow

## 2019-03-14 PROBLEM — D72.829 LEUKOCYTOSIS: Status: ACTIVE | Noted: 2019-01-01

## 2019-03-14 NOTE — PROGRESS NOTES
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     Per HPI:  "Per wife, this AM patient did not wake up to his alarm to go to HD, she then later heard patient getting out of bed and heard a thump. She found the patient on the ground and very confused and not recognizing her. She called her neighbor to come and help her. EMS was later called, and patient brought to ED. Pt is very lethargic since that time, per wife, he has moan all day, unable to talk to her. This is unusual for him as pt normally drives himself to HD regularly. She manages all his medications and report that she has not given him any sedating meds (no Oxycodone, Robaxin, Klonopin, Zanaflex). She report that over the last few days, pt report abdominal swelling and pain to her. In fact they contacted liver transplant coordinator, who was setting up an US of the abdomen. She report that he might have skip a few dose of lactulose due to diarrhea the last few days. She report that he has been in confused state like this in the past due to Hepatic Encephalopathy. Pt also recently had HCC treatment by IR last week, on Cipro for. At this time 11:53 PM, on evaluation, pt is unarousable, appear to be in sleep like state, per wife, he has been like this all day. 1x dose of narcan given with no improvement. Pt would moan to pain. Glucose check is 170.  At  ED, patient has undergone extensive evaluation with CTA of chest negative for PE as patient was mildly hypoxic on arrival and showed bilateral " "pleural effusions but nothing else significant. CT scan of abdomen showed moderate ascites and large hepatic tumor with necrosis. CT scan of head and C-spine were unremarkable. Ammonia was 45 and Lactic acid was 1.2. WBC was 12,200 but patient afebrile and not tachycardic. U/A negative for infection. Patient due for HD today and did not get HD and BUN/Cr was 40/6.5. In ED, patient remains acutely confused and not responding to questions appropriately. Patient has not focal neurologic deficits. Dr. Verdin at [facility] called and stated patient could be admitted to [facility] but Hospital medicine did not fell comfortable admitting patient for encephalopathy work-up so patient to be transferred and admitted to [facility]. Patient empirically given broad spectrum abx at [facility] including Vancomycin. Zosyn and Ceftriaxone."     Interval history:  Patient seen twice this morning and during both instance remained completely altered with no family at bedside.    Interval History:   Patient still altered and not following commands despite being off sedation.  Febrile this morning therefore re-cultured and antibiotics re-started.    Current Facility-Administered Medications   Medication    acetaminophen tablet 650 mg    allopurinol tablet 100 mg    ceFEPIme injection 2 g    chlorhexidine 0.12 % solution 15 mL    dextrose 50% injection 12.5 g    famotidine tablet 20 mg    glucagon (human recombinant) injection 1 mg    heparin (porcine) injection 5,000 Units    insulin aspart U-100 pen 0-5 Units    lactulose 20 gram/30 mL solution Soln 30 g    ondansetron disintegrating tablet 8 mg    polyethylene glycol packet 17 g    promethazine tablet 25 mg    ramelteon tablet 8 mg    rifAXIMin tablet 550 mg    sodium chloride 0.9% flush 5 mL    tacrolimus (PROGRAF) 1 mg/mL oral syringe       Objective:     Vital Signs (Most Recent):  Temp: 98.8 °F (37.1 °C) (03/14/19 1500)  Pulse: (!) 111 (03/14/19 1745)  Resp: (!) 21 " (03/14/19 1745)  BP: (!) 94/53 (03/14/19 1700)  SpO2: (!) 94 % (03/14/19 1745) Vital Signs (24h Range):  Temp:  [98.3 °F (36.8 °C)-101 °F (38.3 °C)] 98.8 °F (37.1 °C)  Pulse:  [] 111  Resp:  [18-25] 21  SpO2:  [91 %-99 %] 94 %  BP: ()/(47-63) 94/53     Weight: 93 kg (205 lb) (03/13/19 1400)  Body mass index is 27.8 kg/m².    Physical Exam   Constitutional: No distress.   HENT:   Head: Normocephalic and atraumatic.   Eyes: No scleral icterus.   Cardiovascular:   Sinus tachycardia   Pulmonary/Chest:   Decreased breath sounds bilaterally   Abdominal: Bowel sounds are normal. He exhibits distension. There is no tenderness.   Musculoskeletal: He exhibits no edema.   Neurological:   Intubated, off sedation, not following commands   Skin: He is not diaphoretic.   Nursing note and vitals reviewed.      MELD-Na score: 28 at 3/13/2019  4:56 AM  MELD score: 28 at 3/13/2019  4:56 AM  Calculated from:  Serum Creatinine: 5.7 mg/dL (Rounded to 4 mg/dL) at 3/13/2019  4:56 AM  Serum Sodium: 138 mmol/L (Rounded to 137 mmol/L) at 3/13/2019  4:56 AM  Total Bilirubin: 1.7 mg/dL at 3/13/2019  4:56 AM  INR(ratio): 1.7 at 3/12/2019 12:06 AM  Age: 73 years    Significant Labs:  CBC:   Recent Labs   Lab 03/14/19  0450   WBC 14.14*   RBC 3.41*   HGB 9.4*   HCT 29.4*        CMP:   Recent Labs   Lab 03/14/19  0450   *   CALCIUM 7.9*   ALBUMIN 2.0*   PROT 6.8      K 4.4   CO2 19*      BUN 44*   CREATININE 6.8*   ALKPHOS 186*   ALT 28   AST 91*   BILITOT 1.8*     Coagulation:   Recent Labs   Lab 03/11/19  0150 03/12/19  0006   INR 1.5* 1.7*   APTT 37.4*  --        Significant Imaging:  X-Ray: Reviewed  US: Reviewed  CT: Reviewed    Assessment/Plan:     Decompensated hepatic cirrhosis    74 year old male with a history of Hep C cirrhosis s/p transplant (2001) 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 Despite multiple BM  patient remains altered and spiked a fever this morning. ICU team has re-cultured him and started antibiotics. Apart from that would also recommend Transplant ID and Neurology consult due to suspicion for ongoing infection as well as AMS.    Recommendations:  --Daily CMP, CBC, INR  --Daily tacrolimus level  --Increase tacrolimus to 0.5 mg PO BID  --Continue antibiotics  --Continue lactulose and rifaximin  --Nephrology recs appreciated  --Transplant ID and Neurology consulted due to suspicion for ongoing infection as well as AMS           Thank you for your consult. I will follow-up with patient. Please contact us if you have any additional questions.    Winter Sheth M.D.  Gastroenterology Fellow, PGY-V  Pager: [phone]  [facility]

## 2019-03-14 NOTE — SUBJECTIVE & OBJECTIVE
Interval History:   Patient still altered and not following commands despite being off sedation.  Febrile this morning therefore re-cultured and antibiotics re-started.    Current Facility-Administered Medications   Medication    acetaminophen tablet 650 mg    allopurinol tablet 100 mg    ceFEPIme injection 2 g    chlorhexidine 0.12 % solution 15 mL    dextrose 50% injection 12.5 g    famotidine tablet 20 mg    glucagon (human recombinant) injection 1 mg    heparin (porcine) injection 5,000 Units    insulin aspart U-100 pen 0-5 Units    lactulose 20 gram/30 mL solution Soln 30 g    ondansetron disintegrating tablet 8 mg    polyethylene glycol packet 17 g    promethazine tablet 25 mg    ramelteon tablet 8 mg    rifAXIMin tablet 550 mg    sodium chloride 0.9% flush 5 mL    tacrolimus (PROGRAF) 1 mg/mL oral syringe       Objective:     Vital Signs (Most Recent):  Temp: 98.8 °F (37.1 °C) (03/14/19 1500)  Pulse: (!) 111 (03/14/19 1745)  Resp: (!) 21 (03/14/19 1745)  BP: (!) 94/53 (03/14/19 1700)  SpO2: (!) 94 % (03/14/19 1745) Vital Signs (24h Range):  Temp:  [98.3 °F (36.8 °C)-101 °F (38.3 °C)] 98.8 °F (37.1 °C)  Pulse:  [] 111  Resp:  [18-25] 21  SpO2:  [91 %-99 %] 94 %  BP: ()/(47-63) 94/53     Weight: 93 kg (205 lb) (03/13/19 1400)  Body mass index is 27.8 kg/m².    Physical Exam   Constitutional: No distress.   HENT:   Head: Normocephalic and atraumatic.   Eyes: No scleral icterus.   Cardiovascular:   Sinus tachycardia   Pulmonary/Chest:   Decreased breath sounds bilaterally   Abdominal: Bowel sounds are normal. He exhibits distension. There is no tenderness.   Musculoskeletal: He exhibits no edema.   Neurological:   Intubated, off sedation, not following commands   Skin: He is not diaphoretic.   Nursing note and vitals reviewed.      MELD-Na score: 28 at 3/13/2019  4:56 AM  MELD score: 28 at 3/13/2019  4:56 AM  Calculated from:  Serum Creatinine: 5.7 mg/dL (Rounded to 4 mg/dL) at  3/13/2019  4:56 AM  Serum Sodium: 138 mmol/L (Rounded to 137 mmol/L) at 3/13/2019  4:56 AM  Total Bilirubin: 1.7 mg/dL at 3/13/2019  4:56 AM  INR(ratio): 1.7 at 3/12/2019 12:06 AM  Age: 73 years    Significant Labs:  CBC:   Recent Labs   Lab 03/14/19  0450   WBC 14.14*   RBC 3.41*   HGB 9.4*   HCT 29.4*        CMP:   Recent Labs   Lab 03/14/19  0450   *   CALCIUM 7.9*   ALBUMIN 2.0*   PROT 6.8      K 4.4   CO2 19*      BUN 44*   CREATININE 6.8*   ALKPHOS 186*   ALT 28   AST 91*   BILITOT 1.8*     Coagulation:   Recent Labs   Lab 03/11/19  0150 03/12/19  0006   INR 1.5* 1.7*   APTT 37.4*  --        Significant Imaging:  X-Ray: Reviewed  US: Reviewed  CT: Reviewed

## 2019-03-14 NOTE — SUBJECTIVE & OBJECTIVE
Interval History/Significant Events: Pt febrile (Tmax 100.8 and 101) overnight. MAPs remained > 65. He has not required any sedation.    Review of Systems   Unable to perform ROS: Intubated     Objective:     Vital Signs (Most Recent):  Temp: (!) 101 °F (38.3 °C) (03/14/19 0559)  Pulse: 61 (03/14/19 0803)  Resp: (!) 21 (03/14/19 0803)  BP: (!) 108/53 (03/14/19 0803)  SpO2: 99 % (03/14/19 0803) Vital Signs (24h Range):  Temp:  [98.5 °F (36.9 °C)-101 °F (38.3 °C)] 101 °F (38.3 °C)  Pulse:  [61-80] 61  Resp:  [18-27] 21  SpO2:  [94 %-99 %] 99 %  BP: ()/(47-88) 108/53   Weight: 93 kg (205 lb)  Body mass index is 27.8 kg/m².      Intake/Output Summary (Last 24 hours) at 3/14/2019 0821  Last data filed at 3/14/2019 0630  Gross per 24 hour   Intake 2185 ml   Output 3300 ml   Net -1115 ml       Physical Exam   Constitutional: He appears well-nourished.   Very somnolent on exam. Minimal response to pain   HENT:   Head: Normocephalic and atraumatic.   Neck: Normal range of motion.   Cardiovascular: Normal rate, regular rhythm, normal heart sounds and intact distal pulses.   Pulmonary/Chest: Effort normal. No respiratory distress. He has no rales.   Intubated on mechanical vent   Abdominal: Bowel sounds are normal. He exhibits distension. There is tenderness. There is no guarding.   Musculoskeletal: Normal range of motion.   Neurological:   Very somnolent.    Skin: Skin is warm and dry.   Psychiatric: He has a normal mood and affect.     Vents:  Vent Mode: A/C (03/14/19 0803)  Ventilator Initiated: Yes (03/12/19 1743)  Set Rate: 18 bmp (03/14/19 0803)  Vt Set: 500 mL (03/14/19 0803)  Pressure Support: 0 cmH20 (03/14/19 0803)  PEEP/CPAP: 5 cmH20 (03/14/19 0803)  Oxygen Concentration (%): 30 (03/14/19 0803)  Peak Airway Pressure: 21 cmH2O (03/14/19 0803)  Plateau Pressure: 18 cmH20 (03/14/19 0803)  Total Ve: 10.7 mL (03/14/19 0803)  F/VT Ratio<105 (RSBI): (!) 41.26 (03/14/19 0803)  Lines/Drains/Airways     Drain                  Hemodialysis AV Fistula 11/08/16 1553 Left forearm 855 days         NG/OG Tube 03/12/19 1520 nasogastric Right nostril 1 day         Rectal Tube 03/13/19 1300 rectal tube w/ balloon (indicate number of mLs) less than 1 day          Airway                 Airway - Non-Surgical 03/12/19 1709 Endotracheal Tube 1 day          Peripheral Intravenous Line                 Peripheral IV Triple Lumen 03/12/19 1330 Anterior;Right Hand 1 day         Peripheral IV - Single Lumen 03/14/19 0630 Anterior;Right Forearm less than 1 day              Significant Labs:    CBC/Anemia Profile:  Recent Labs   Lab 03/13/19  0456 03/14/19  0450   WBC 12.87* 14.14*   HGB 9.6* 9.4*   HCT 29.7* 29.4*    184   MCV 85 86   RDW 19.6* 20.1*        Chemistries:  Recent Labs   Lab 03/13/19  0456 03/14/19  0450    140   K 4.6 4.4    107   CO2 22* 19*   BUN 45* 44*   CREATININE 5.7* 6.8*   CALCIUM 8.1* 7.9*   ALBUMIN 2.1* 2.0*   PROT 6.8 6.8   BILITOT 1.7* 1.8*   ALKPHOS 148* 186*   ALT 19 28   AST 44* 91*   MG 1.9 2.3   PHOS 3.4 3.3     Significant Imaging:  I have reviewed and interpreted all pertinent imaging results/findings within the past 24 hours.

## 2019-03-14 NOTE — PLAN OF CARE
Problem: Adult Inpatient Plan of Care  Goal: Plan of Care Review  Outcome: Ongoing (interventions implemented as appropriate)    No acute events throughout day. See vital signs and assessments in flowsheets. See below for updates on today's progress.     Pulmonary: Patient remains on mechanical ventilation throughout shift, with no changes to vent settings. Settings as follows: AC 30%/18/500/5+. Thick yellow sputum via oral and ETT suction. Vanc/cefepime added for possible pna.    Cardiovascular: Patient in Afib with HR ranging from 70-120s. BP dropped to 80s/40s during dialysis, but has been WNL since treatment stopped.    Neurological: Patient intermittently awakens to voice, but does not do anything purposefully. Spontaneous movements noted x 4 extremities. EEG in place.    Gastrointestinal: Flexi in place with large output. Lactulose given q6. Tf remain in place at goal rate with minimal residuals.    Genitourinary: Anuric, HD performed today with no fluid removed.    Endocrine: Accu checks q4 with increased glucose, team made aware, insulin orders increased.    Integumentary/Other: Stage 2 to left glute, wound care consulted.    Infusions: none    Patient progressing towards goals as tolerated, plan of care communicated and reviewed with Philip Mathis III and family. All concerns addressed. Will continue to monitor.

## 2019-03-14 NOTE — ASSESSMENT & PLAN NOTE
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 Despite multiple BM patient remains altered and spiked a fever this morning. ICU team has re-cultured him and started antibiotics. Apart from that would also recommend Transplant ID and Neurology consult due to suspicion for ongoing infection as well as AMS.    Recommendations:  --Daily CMP, CBC, INR  --Daily tacrolimus level  --Increase tacrolimus to 0.5 mg PO BID  --Continue antibiotics  --Continue lactulose and rifaximin  --Nephrology recs appreciated  --Transplant ID and Neurology consulted due to suspicion for ongoing infection as well as AMS

## 2019-03-14 NOTE — ASSESSMENT & PLAN NOTE
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 He was started on BSA (vanc/zosyn) and lactulose without significant improvement in AMS to date and ID was consulted for fevers following cessation of empiric antibiotics -- given purulent sputum, abnormal chest imaging, and clinical picture consistent with aspiration, suspect pneumonia (likely aspiration event) as cause. He remains critically ill.    - agree with reinitiation of antibiotics with vanc/cefepime for now  - would culture blood/deep tracheal aspirate  - if no convincing source emerges, may consider C.diff a possibility but no relapses noted to date since FMT and lactulose use currently confounds picture of acute diarrhea  - will send serum CMV quant with morning labs   - await pending cx to tailor therapy with you

## 2019-03-14 NOTE — PROGRESS NOTES
"[facility]  Nephrology  Progress Note    Patient Name: Philip Mathis III  MRN: [id number]  Admission Date: 3/11/2019  Hospital Length of Stay: 3 days  Attending Provider: Cliff Luna MD   Primary Care Physician: Reji Castillo MD  Principal Problem:Acute encephalopathy    Subjective:     Interval History:   HDS off pressor support, net negative 1.1 L/24h due to increase BM from lactulose.  Electrolytes stable.  ECHO revealed EF 65% with PA pressure of 19 and estimated CVP of 3.  Opens eyes to voice, but does not follow commands.  TMAX of 101.    Review of patient's allergies indicates:   Allergen Reactions    Adhesive Dermatitis and Other (See Comments)     "Burned his tail"  Please use Paper Tape    Corticosteroids (glucocorticoids) Other (See Comments)     Leg swelling after an injection    Hydrocortisone Swelling     Leg swelling after an injection    Neuromuscular blockers, steroidal Swelling     Leg swelling after an injection    Ribavirin Other (See Comments)     Arthralgias    Propofol analogues Other (See Comments)     Drops SVR and due to current systemic shunt state, results in difficulty in oscillometry to obtain blood pressure until return of vascular tone (other VS are stable throughout) and drop in venous return due to abdominal ascites; titrate slower in future or select alternate agent. Smooth emergence from general anesthesia without difficulty.     Current Facility-Administered Medications   Medication Frequency    acetaminophen tablet 650 mg Q8H PRN    allopurinol tablet 100 mg QHS    ceFEPIme injection 2 g Daily    chlorhexidine 0.12 % solution 15 mL BID    dextrose 50% injection 12.5 g PRN    famotidine tablet 20 mg Daily    glucagon (human recombinant) injection 1 mg PRN    heparin (porcine) injection 5,000 Units Q8H    insulin aspart U-100 pen 0-5 Units Q4H PRN    lactulose 20 gram/30 mL solution Soln 30 g Q6H    ondansetron disintegrating tablet 8 mg " Q8H PRN    polyethylene glycol packet 17 g BID PRN    promethazine tablet 25 mg Q6H PRN    ramelteon tablet 8 mg Nightly PRN    rifAXIMin tablet 550 mg BID    sodium chloride 0.9% flush 5 mL PRN    tacrolimus (PROGRAF) 1 mg/mL oral syringe Daily       Objective:     Vital Signs (Most Recent):  Temp: 99.4 °F (37.4 °C) (03/14/19 0700)  Pulse: 61 (03/14/19 1215)  Resp: (!) 21 (03/14/19 1027)  BP: (!) 111/53 (03/14/19 1215)  SpO2: (!) 94 % (03/14/19 1027)  O2 Device (Oxygen Therapy): ventilator (03/14/19 1215) Vital Signs (24h Range):  Temp:  [99.4 °F (37.4 °C)-101 °F (38.3 °C)] 99.4 °F (37.4 °C)  Pulse:  [60-79] 61  Resp:  [18-24] 21  SpO2:  [93 %-99 %] 94 %  BP: ()/(47-88) 111/53     Weight: 93 kg (205 lb) (03/13/19 1400)  Body mass index is 27.8 kg/m².  Body surface area is 2.17 meters squared.    I/O last 3 completed shifts:  In: 3102.3 [I.V.:192.3; Other:500; NG/GT:2210; IV Piggyback:200]  Out: 4671 [Other:1371; Stool:3300]    Physical Exam   Constitutional: He appears well-developed. He appears listless. He has a sickly appearance. He is intubated.   HENT:   Head: Normocephalic and atraumatic.   Right Ear: External ear normal.   Left Ear: External ear normal.   Eyes: Conjunctivae and EOM are normal. Scleral icterus is present.   Neck: No JVD present.   Cardiovascular: Normal rate and regular rhythm. Exam reveals no gallop and no friction rub.   No murmur heard.  ORLIN AVF   Pulmonary/Chest: Effort normal. Tachypnea noted. He is intubated. No respiratory distress. He has no wheezes. He has rhonchi. He has no rales.   Abdominal: Soft. He exhibits distension.   Musculoskeletal: He exhibits edema. He exhibits no deformity.   Neurological: He appears listless.   Skin: Skin is warm and dry. He is not diaphoretic.       Significant Labs:  ABGs:   Recent Labs   Lab 03/14/19  1026   PH 7.373   PCO2 40.1   HCO3 23.4*   POCSATURATED 100   BE -2     CBC:   Recent Labs   Lab 03/14/19  0450   WBC 14.14*   RBC 3.41*    HGB 9.4*   HCT 29.4*      MCV 86   MCH 27.6   MCHC 32.0     CMP:   Recent Labs   Lab 03/14/19  0450   *   CALCIUM 7.9*   ALBUMIN 2.0*   PROT 6.8      K 4.4   CO2 19*      BUN 44*   CREATININE 6.8*   ALKPHOS 186*   ALT 28   AST 91*   BILITOT 1.8*            Assessment/Plan:     ESRD on hemodialysis    ESRD on iHD  [facility]  4 hours  Dr. Clara ORDAZ AVF  Wife reports an EDW of 94 kg (93 kg on admission)    Plan/Recommendations:  -HD today for metabolic clearance.  -no net ultrafiltration due to large volume of diarrhea in the past 24 hours.  -Phos WNL, no need for binders  -continue strict I/O's         Giovanni Colorado NP  Nephrology  [facility]

## 2019-03-14 NOTE — PROGRESS NOTES
[facility]  Critical Care Medicine  Progress Note    Patient Name: Philip Mathis III  MRN: [id number]  Admission Date: 3/11/2019  Hospital Length of Stay: 3 days  Code Status: Full Code  Attending Provider: Cliff Luna MD  Primary Care Provider: Reji Castillo MD   Principal Problem: Acute encephalopathy    Subjective:     HPI:  This is a 72y/o male with hepatocellular carcinoma undergoing radiofrequency ablation by IR last treatment was 3/7, orthotopic liver transplant on 6/10/2001 on Prograf, ESRD on hemodialysis (MWF), Type 2 diabetes mellitus on long term insulin, essential HTN, chronic combined systolic and diastolic heart failure, KARON and history of hepatitis C (treated) who is being transfer to hospital medicine for acute encephalopathy. He presented initially to [facility] ED this Am with acute confusion. Wife not at bedside on transfer, she was reached via telephone for collateral tonight. Per wife, this AM patient did not wake up to his alarm to go to HD, she then later heard patient getting out of bed and heard a thump. She found the patient on the ground and very confused and not recognizing her. She called her neighbor to come and help her. EMS was later called, and patient brought to ED. Pt is very lethargic since that time, per wife, he has moan all day, unable to talk to her. This is unusual for him as pt normally drives himself to HD regularly. She manages all his medications and report that she has not given him any sedating meds (no Oxycodone, Robaxin, Klonopin, Zanaflex). She report that over the last few days, pt report abdominal swelling and pain to her. In fact they contacted liver transplant coordinator, who was setting up an US of the abdomen. She report that he might have skip a few dose of lactulose due to diarrhea the last few days. She report that he has been in confused state like this in the past due to Hepatic Encephalopathy. Pt also recently had HCC  treatment by IR last week, on Cipro for. At this time 11:53 PM, on evaluation, pt is unarousable, appear to be in sleep like state, per wife, he has been like this all day. 1x dose of narcan given with no improvement. Pt would moan to pain. Glucose check is 170.         At  ED, patient has undergone extensive evaluation with CTA of chest negative for PE as patient was mildly hypoxic on arrival and showed bilateral pleural effusions but nothing else significant. CT scan of abdomen showed moderate ascites and large hepatic tumor with necrosis. CT scan of head and C-spine were unremarkable. Ammonia was 45 and Lactic acid was 1.2. WBC was 12,200 but patient afebrile and not tachycardic. U/A negative for infection. Patient due for HD today and did not get HD and BUN/Cr was 40/6.5. In ED, patient remains acutely confused and not responding to questions appropriately. Patient has not focal neurologic deficits. Dr. Verdin at [facility] called and stated patient could be admitted to [facility] but Hospital medicine did not fell comfortable admitting patient for encephalopathy work-up so patient to be transferred and admitted to [facility]. Patient empirically given broad spectrum abx at [facility] including Vancomycin. Zosyn and Ceftriaxone.     Patient transferred to ICU due to acute metabolic encephalopathy.     Hospital/ICU Course:  On 3/12, patient was transferred to ICU for further workup of Acute metabolic encephalopathy. Pt was intubated. And Paracentesis (diagnostic/therapuetic) completed. He was started on scheduled lactulose to treat encephalopathy attributed to hepatic etiology. Infectious workup was negative. Hepatology was involved in care of patient.    Interval History/Significant Events: Pt febrile (Tmax 100.8 and 101) overnight. MAPs remained > 65. He has not required any sedation.    Review of Systems   Unable to perform ROS: Intubated     Objective:     Vital Signs (Most Recent):  Temp: (!) 101 °F (38.3  °C) (03/14/19 0559)  Pulse: 61 (03/14/19 0803)  Resp: (!) 21 (03/14/19 0803)  BP: (!) 108/53 (03/14/19 0803)  SpO2: 99 % (03/14/19 0803) Vital Signs (24h Range):  Temp:  [98.5 °F (36.9 °C)-101 °F (38.3 °C)] 101 °F (38.3 °C)  Pulse:  [61-80] 61  Resp:  [18-27] 21  SpO2:  [94 %-99 %] 99 %  BP: ()/(47-88) 108/53   Weight: 93 kg (205 lb)  Body mass index is 27.8 kg/m².      Intake/Output Summary (Last 24 hours) at 3/14/2019 0821  Last data filed at 3/14/2019 0630  Gross per 24 hour   Intake 2185 ml   Output 3300 ml   Net -1115 ml       Physical Exam   Constitutional: He appears well-nourished.   Very somnolent on exam. Minimal response to pain   HENT:   Head: Normocephalic and atraumatic.   Neck: Normal range of motion.   Cardiovascular: Normal rate, regular rhythm, normal heart sounds and intact distal pulses.   Pulmonary/Chest: Effort normal. No respiratory distress. He has no rales.   Intubated on mechanical vent   Abdominal: Bowel sounds are normal. He exhibits distension. There is tenderness. There is no guarding.   Musculoskeletal: Normal range of motion.   Neurological:   Very somnolent.    Skin: Skin is warm and dry.   Psychiatric: He has a normal mood and affect.     Vents:  Vent Mode: A/C (03/14/19 0803)  Ventilator Initiated: Yes (03/12/19 1743)  Set Rate: 18 bmp (03/14/19 0803)  Vt Set: 500 mL (03/14/19 0803)  Pressure Support: 0 cmH20 (03/14/19 0803)  PEEP/CPAP: 5 cmH20 (03/14/19 0803)  Oxygen Concentration (%): 30 (03/14/19 0803)  Peak Airway Pressure: 21 cmH2O (03/14/19 0803)  Plateau Pressure: 18 cmH20 (03/14/19 0803)  Total Ve: 10.7 mL (03/14/19 0803)  F/VT Ratio<105 (RSBI): (!) 41.26 (03/14/19 0803)  Lines/Drains/Airways     Drain                 Hemodialysis AV Fistula 11/08/16 1553 Left forearm 855 days         NG/OG Tube 03/12/19 1520 nasogastric Right nostril 1 day         Rectal Tube 03/13/19 1300 rectal tube w/ balloon (indicate number of mLs) less than 1 day          Airway                  Airway - Non-Surgical 03/12/19 1709 Endotracheal Tube 1 day          Peripheral Intravenous Line                 Peripheral IV Triple Lumen 03/12/19 1330 Anterior;Right Hand 1 day         Peripheral IV - Single Lumen 03/14/19 0630 Anterior;Right Forearm less than 1 day              Significant Labs:    CBC/Anemia Profile:  Recent Labs   Lab 03/13/19  0456 03/14/19  0450   WBC 12.87* 14.14*   HGB 9.6* 9.4*   HCT 29.7* 29.4*    184   MCV 85 86   RDW 19.6* 20.1*        Chemistries:  Recent Labs   Lab 03/13/19  0456 03/14/19  0450    140   K 4.6 4.4    107   CO2 22* 19*   BUN 45* 44*   CREATININE 5.7* 6.8*   CALCIUM 8.1* 7.9*   ALBUMIN 2.1* 2.0*   PROT 6.8 6.8   BILITOT 1.7* 1.8*   ALKPHOS 148* 186*   ALT 19 28   AST 44* 91*   MG 1.9 2.3   PHOS 3.4 3.3     Significant Imaging:  I have reviewed and interpreted all pertinent imaging results/findings within the past 24 hours.      ABG  Recent Labs   Lab 03/12/19  1825   PH 7.385   PO2 94   PCO2 36.7   HCO3 22.0*   BE -3     Assessment/Plan:     Neuro   * Acute encephalopathy    - pt presented with acute encephalopathy - attributed to hepatic etiology. Infection workup largely negative so far. CT Head negative. Electrolytes, glucose, mild hypercarbia on blood gas  - pt intubated 3/12 due to low GCS  - continue lactulose titrated to 3-4 BM/day  - avoid sedative agents  - neuro checks per shift     Pulmonary   Acute hypoxemic respiratory failure    - ABG on 3/12: 7.38/36.7/94 sating 97%  - intubated for acute encephalopathy. Mechanical ventilation set at A/C , RR 18, PEEP 5 FiO2 30%  - will perform SBT once patient is awake and following commands     Cardiac/Vascular   (HFpEF) heart failure with preserved ejection fraction    - ECHO 3/13/19 noted EF 65% with diastolic dysfunction, reduced RV function, moderate RVE - likely due to underlying liver disease  - currently euvolemic; holding diuretics at this time  - will continue to monitor in and out  -  daily weights     Essential hypertension    - holding carvedilol and hydralazine at this time     Renal/   ESRD on hemodialysis    Secondary hyperparathyroidism  Vitamin D deficiency  - Nephrology consulted. Last HD 3/13/19. Appreciate recs      Oncology   Leukocytosis    - concern for septic etiology however workup so far (blood cultures, CXR) negative.   - febrile overnight will re-culture blood, respiratory culture and CXR. Pt is anuric. Concern for pneumonia - will start IV cefepime and vanc     HCC (hepatocellular carcinoma)    - first diagnosed in Dec 2017 via biopsy. Underwent RFA on 2/27/18. Follow up imaging on Dec 2019 noted recurrence and multiple new liver lesions throughout liver. He was seen by Dr. Perez ([facility]) in clinic who mentions 20+ lbs weight loss in 3 months, poor appetite. PET CT 1/26/19 suggestive of lesions in right and left lobe as well as a focus of uptake adjacent to humeral head. Recently started on Y 90 therapy. Role of concurrent chemotherapy still under investigation  - Hepatology following along. Appreciate recs.     Endocrine   Controlled type 2 diabetes mellitus with chronic kidney disease on chronic dialysis, with long-term current use of insulin    - A1c 4.8  - insulin LD SSI  - will monitor for hyperglycemia once tube feeds are started.   - in the meantime POCT q6h     GI   Liver transplanted 6/10/2001    -- Hepatology consult for assistance with immunosuppressed medications     Decompensated hepatic cirrhosis    Liver transplanted 2001  Immunosuppressed status- on tacrolimus  - Hepatology consulted. Recommend 0.5mg tacrolimus daily  MELD-Na score: 28 at 3/13/2019  4:56 AM  MELD score: 28 at 3/13/2019  4:56 AM  Calculated from:  Serum Creatinine: 5.7 mg/dL (Rounded to 4 mg/dL) at 3/13/2019  4:56 AM  Serum Sodium: 138 mmol/L (Rounded to 137 mmol/L) at 3/13/2019  4:56 AM  Total Bilirubin: 1.7 mg/dL at 3/13/2019  4:56 AM  INR(ratio): 1.7 at 3/12/2019 12:06 AM  Age: 73  years       Orthopedic   Chronic gout    - continue home allopurinol     Other   KARON (obstructive sleep apnea)    -- Patient currently intubated        Critical Care Daily Checklist:    A: Awake: RASS Goal/Actual Goal: RASS Goal: 0-->alert and calm  Actual: Mayo Agitation Sedation Scale (RASS): Moderate sedation   B: Spontaneous Breathing Trial Performed? Spon. Breathing Trial Initiated?: (NOT ABLE TO WEAN AT THIS TIME) (03/14/19 0803)   C: SAT & SBT Coordinated?  SAT - off sedation                      D: Delirium: CAM-ICU Overall CAM-ICU: Positive   E: Early Mobility Performed? Pt encephalopathic   F: Feeding Goal: Goals: Meet % EEN, EPN  Status: Nutrition Goal Status: new   Current Diet Order   Procedures    Diet NPO      AS: Analgesia/Sedation No sedation   T: Thromboembolic Prophylaxis Heparin    H: HOB > 300 yes   U: Stress Ulcer Prophylaxis (if needed) famotidine   G: Glucose Control LD SSI   B: Bowel Function Stool Occurrence: 1   I: Indwelling Catheter (Lines & Rodriguez) Necessity Peripheral x2  NG   D: De-escalation of Antimicrobials/Pharmacotherapies Not on antibiotics    Plan for the day/ETD 1. SAT  2. Continue lactulose  3. Infectious workup underway for fevers last night - blood culture, resp culture  4. Started vanc and cefepime for possible pneumonia    Code Status:  Family/Goals of Care: Full Code         Critical secondary to Patient has a condition that poses threat to life and bodily function: Severe Respiratory Distress, Encephalopathy      Critical care was time spent personally by me on the following activities: development of treatment plan with patient or surrogate and bedside caregivers, discussions with consultants, evaluation of patient's response to treatment, examination of patient, ordering and performing treatments and interventions, ordering and review of laboratory studies, ordering and review of radiographic studies, pulse oximetry, re-evaluation of patient's condition.  This critical care time did not overlap with that of any other provider or involve time for any procedures.     Malathi Collins MD  Critical Care Medicine  [facility]
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
## 2019-03-14 NOTE — PROGRESS NOTES
Hemodialysis complete. 2.5 hours complete. Hypotensive during last 15 minutes of tx not improved with saline bolus, no UF. Blood returned without difficulty. Normotensive post rinseback. Needles removed and pressure held x 15 minutes until hemostasis achieved. Gauze pressure dressing applied and secured with tape. Positive bruit/thrill noted. JULIETA Herrera NP notified.

## 2019-03-14 NOTE — CONSULTS
[facility]  Infectious Disease  Consult Note    Patient Name: Philip Mathis III  MRN: [id number]  Admission Date: 3/11/2019  Hospital Length of Stay: 3 days  Attending Physician: Cliff Luna MD  Primary Care Provider: Reji Castillo MD     Isolation Status: No active isolations    Patient information was obtained from patient and past medical records.      Inpatient consult to Infectious Diseases  Consult performed by: Kathia Kim MD  Consult ordered by: Malathi Collins MD  Reason for consult: fever in liver txpt pt        Assessment/Plan:     * Acute encephalopathy    72yo man w/a history of HTN, IDDM, KARON, ESRD (on iHD qMFW), and HCV cirrhosis (s/p DDLT 6/10/2001, CMV D+/R+, on maintenance tacro; c/b graft loss due to post-transplant HCC w/PVT dx 11/2017 s/p RFA with POD in 12/2018 s/p chemoembolization last on 3/7; recurrent CDI s/p FMT 8/2015; GBS septicemia in 10/2017) who was admitted on 3/11/2019 with acute onset encephalopathy (found down at night by wife) with antecedent abdominal pain and loose stools (attributed to lactulose) attributed to HE from an unknown precipitant. He was started on BSA (vanc/zosyn) and lactulose without significant improvement in AMS to date and ID was consulted for fevers following cessation of empiric antibiotics -- given purulent sputum, abnormal chest imaging, and clinical picture consistent with aspiration, suspect pneumonia (likely aspiration event) as cause. He remains critically ill.    - agree with reinitiation of antibiotics with vanc/cefepime for now  - would culture blood/deep tracheal aspirate  - if no convincing source emerges, may consider C.diff a possibility but no relapses noted to date since FMT and lactulose use currently confounds picture of acute diarrhea  - will send serum CMV quant with morning labs   - await pending cx to tailor therapy with you         Thank you for your consult. I will follow-up with patient. Please contact  us if you have any additional questions.     Kathia Kim MD  Transplant ID Attending  [id number]    Kathia Kim MD  Infectious Disease  [facility]    Subjective:     Principal Problem: Acute encephalopathy    HPI: Mr. Mathis is a 72yo man w/a history of HTN, IDDM, KARON, ESRD (on iHD qMFW), and HCV cirrhosis (s/p DDLT 6/10/2001, CMV D+/R+, on maintenance tacro; c/b graft loss due to post-transplant HCC w/PVT dx 11/2017 s/p RFA with POD in 12/2018 s/p chemoembolization last on 3/7; recurrent CDI s/p FMT 8/2015; GBS septicemia in 10/2017) who was admitted on 3/11/2019 with acute onset encephalopathy (found down at night by wife) with antecedent abdominal pain and loose stools (attributed to lactulose) attributed to HE from an unknown precipitant. He was started on BSA (vanc/zosyn) and lactulose without significant improvement in AMS to date. He became febrile after cessation of antibiotics, prompting ID consultation today. Per nursing, deep tracheal aspirate secretions are purulent appearing and he has expected diarrhea with lactulose. Blood cx are NGTD, para is negative for SBP, and CT CAP was notable for possible RML/RLL infiltrate with associated effusion. He was started on empiric vanc/cefepime prior to consultation today.    Past Medical History:   Diagnosis Date    Acute hypoxemic respiratory failure 3/12/2019    Anemia     Aortic atherosclerosis     Arthritis     Back pain     Bishop's esophagus     BPH (benign prostatic hypertrophy)     Chronic combined systolic and diastolic heart failure 4/26/2017    Controlled type 2 diabetes mellitus with chronic kidney disease on chronic dialysis, with long-term current use of insulin 4/14/2015    Elevated PSA     ESRD on dialysis     ESRD on hemodialysis     Essential hypertension     GERD (gastroesophageal reflux disease)     Gout     Hemolytic anemia     Hepatitis C     successfully treated with Harvoni    Hepatocellular  carcinoma 1/2/2018    Hyperlipidemia     Hyperphosphatemia     Hypertensive CKD (chronic kidney disease)     Immunosuppression     Liver transplanted 06/10/2001    MGUS (monoclonal gammopathy of unknown significance)     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 macular edema associated with type 2 diabetes mellitus     KARON (obstructive sleep apnea)     Secondary hyperparathyroidism     Severe pruritus     Thrombocytopenia     Type 2 diabetes mellitus with diabetic polyneuropathy, with long-term current use of insulin 7/5/2012    Urticaria        Past Surgical History:   Procedure Laterality Date    1) Creation of L arm basilic vein transposition (brachial artery-to-basilic vein AVF)  2) Ligation of L brachial AVF (which was 1st stage creation previously)    Left 6/15/2017    Performed by Silvio William MD at [facility] OR 2ND FLR    ABLATION, RADIOFREQUENCY N/A 2/27/2018    Performed by Esther Surgeon at [facility] ESTHER    OOJYYE-HYRPHD-VT N/A 12/21/2017    Performed by [facility] Provider at [facility] OR 2ND FLR    broken nose      CATARACT EXTRACTION Bilateral     colonoscopy N/A 7/23/2014    Performed by Moises St MD at [facility] ENDO (4TH FLR)    HSUSUDEH-PVPYJND-GQ POSSIBLE AV GRAFT Left 11/8/2016    Performed by Silvio William MD at [facility] OR 2ND FLR    DEBRIDEMENT-WOUND Left 4/12/2015    Performed by Jimmy Light MD at [facility] OR 2ND FLR    DEBRIDEMENT-WOUND Left 4/11/2015    Performed by Jimmy Light MD at [facility] OR 2ND FLR    DEBRIDEMENT-WOUND Left 4/10/2015    Performed by Jimmy Light MD at [facility] OR 2ND FLR    DEBRIDEMENT-WOUND Left 4/9/2015    Performed by Jimmy Light MD at [facility] OR 1ST FLR    DEBRIDEMENT-WOUND Left 4/6/2015    Performed by Jimmy Light MD at [facility] OR 2ND FLR    DEBRIDEMENT-WOUND Left 4/4/2015    Performed by Jimmy Light MD at [facility] OR 2ND FLR    DEBRIDEMENT-WOUND Left 4/2/2015    Performed by Jimmy Light MD at  "[facility] OR 1ST FLR    DRESSING CHANGE- Left 4/10/2015    Performed by Jimmy Light MD at [facility] OR 2ND FLR    DRESSING CHANGE-s/p burn debridement Left 4/15/2015    Performed by Jimmy Light MD at [facility] OR 2ND FLR    DRESSING CHANGE-s/p burn debridement Left 4/14/2015    Performed by Jimmy Light MD at [facility] OR 2ND FLR    DRESSING CHANGE-s/p wound debridement Left 4/13/2015    Performed by Jimmy Light MD at [facility] OR 1ST FLR    DRESSING CHANGE-wound vac Left 4/20/2015    Performed by Jimmy Light MD at [facility] OR 2ND [facility]    EMBOLIZATION, YTTRIUM THERAPY N/A 1/29/2019    Performed by [facility] Diagnostic Provider at [facility] OR [address] [place] [address]    EYE SURGERY      cataracts    FEMUR SURGERY      FRACTURE SURGERY      right femur fracture     INSERTION-CATHETER-PERM-A-CATH Right 7/13/2017    Performed by Silvio William MD at [facility] OR [address] [place] [address]    LIVER TRANSPLANT  2001    Open Biome Fecal Transplant N/A 8/5/2015    Performed by Elmo Gan MD at [facility] ENDO (4TH FLR)    PLACEMENT-WOUND VAC  4/15/2015    Performed by Jimmy Light MD at [facility] OR 2ND FLR    PLACEMENT-WOUND VAC Left 4/4/2015    Performed by Jimmy Light MD at [facility] OR 2ND FLR    PORTACATH PLACEMENT Left     Radiofrequency Ablation N/A 3/7/2019    Performed by Esther Surgeon at [facility] ESTHER    SKIN GRAFT      graft from right thigh , applied to the left back and left arm.    SPLIT THICKNESS SKIN GRAFT - left trunk, abd and axilla N/A 4/15/2015    Performed by Jimmy Light MD at [facility] OR 2ND [facility]       Review of patient's allergies indicates:   Allergen Reactions    Adhesive Dermatitis and Other (See Comments)     "Burned his tail"  Please use Paper Tape    Corticosteroids (glucocorticoids) Other (See Comments)     Leg swelling after an injection    Hydrocortisone Swelling     Leg swelling after an injection    Neuromuscular blockers, steroidal Swelling     Leg swelling after an injection    Ribavirin Other (See Comments)     " Arthralgias    Propofol analogues Other (See Comments)     Drops SVR and due to current systemic shunt state, results in difficulty in oscillometry to obtain blood pressure until return of vascular tone (other VS are stable throughout) and drop in venous return due to abdominal ascites; titrate slower in future or select alternate agent. Smooth emergence from general anesthesia without difficulty.       Medications:  Medications Prior to Admission   Medication Sig    ACCU-CHEK JOANN PLUS TEST STRP Strp CHECK BLOOD SUGAR THREE TIMES DAILY    allopurinol (ZYLOPRIM) 100 MG tablet TAKE 1 TABLET EVERY DAY (Patient taking differently: TAKE 1 TABLET EVERY DAY, bedtime)    ammonium lactate (LAC-HYDRIN) 12 % lotion Apply topically as needed for Dry Skin.    aspirin 81 MG Chew Take 1 tablet (81 mg total) by mouth once daily.    B,C/ferrous fum/FA/D3/zinc ox (PRORENAL ORAL) Take 1 tablet by mouth. Receives in Dialysis on , , and     blood-glucose meter (ACCU-CHEK JOANN PLUS METER) Misc Use as directed    carvedilol (COREG) 6.25 MG tablet TK 1 T PO  BID    cetirizine (ZYRTEC) 10 MG tablet Take 1 tablet (10 mg total) by mouth once daily. (Patient taking differently: Take 10 mg by mouth daily as needed. )    [] ciprofloxacin HCl (CIPRO) 500 MG tablet Take 1 tablet (500 mg total) by mouth every 12 (twelve) hours. for 5 days    clonazePAM (KLONOPIN) 0.5 MG tablet Take 1 tablet (0.5 mg total) by mouth daily as needed.    diclofenac sodium (VOLTAREN) 1 % Gel Apply 2 g topically 3 (three) times daily. To the right knee.    doxazosin (CARDURA) 8 MG Tab TAKE 1 TABLET ONCE DAILY. (Patient taking differently: TAKE 1 TABLET ONCE DAILY, at bedtime)    fluticasone (FLONASE) 50 mcg/actuation nasal spray SHAKE LIQUID AND USE 2 SPRAYS(100 MCG) IN EACH NOSTRIL EVERY DAY (Patient taking differently: SHAKE LIQUID AND USE 2 SPRAYS(100 MCG) IN EACH NOSTRIL EVERY DAY, prn)    furosemide (LASIX) 20 MG  "tablet Take 2 tablets (40 mg total) by mouth once daily. Only take lasix 40mg on non-dialysis days (Tuesday, Thursday, Saturday, and Sunday) by mouth. (Patient taking differently: Take 20 mg by mouth 2 (two) times daily. Only take lasix 20mg on non-dialysis days (Tuesday, Thursday, Saturday, and Sunday) by mouth twice a day)    GENERLAC 10 gram/15 mL solution TAKE 30 ML 'S BY MOUTH THREE TIMES DAILY (Patient taking differently: TAKE 30 ML 'S BY MOUTH at bedtime)    hydrALAZINE (APRESOLINE) 100 MG tablet Take 1 tablet (100 mg total) by mouth 3 (three) times daily.    hydrocortisone 2.5 % cream Apply topically 2 (two) times daily as needed.     hydrOXYzine HCl (ATARAX) 25 MG tablet TAKE 1 TABLET(25 MG) BY MOUTH THREE TIMES DAILY AS NEEDED FOR ITCHING    hydrOXYzine HCl (ATARAX) 25 MG tablet TAKE 1 TABLET(25 MG) BY MOUTH THREE TIMES DAILY AS NEEDED FOR ITCHING    insulin aspart (NOVOLOG) 100 unit/mL InPn pen Inject 6 units w/ breakfast, 4 units w/ lunch and dinner plus scale 180-230 +1, 231-280 +2, 281-330 +3, 331-380 +4, >380 +5. 90 day supply (Patient taking differently: Inject 8 units w/ breakfast, 6 units w/ lunch and dinner plus scale 180-230 +1, 231-280 +2, 281-330 +3, 331-380 +4, >380 +5. 90 day supply)    insulin glargine (LANTUS SOLOSTAR) 100 unit/mL (3 mL) InPn pen Inject 8 Units into the skin every evening. (Patient taking differently: Inject 10 Units into the skin every evening. )    insulin needles, disposable, (NOVOFINE 32) 32 x 1/4 " Ndle 1 each by Misc.(Non-Drug; Combo Route) route 3 (three) times daily.    ketoconazole (NIZORAL) 2 % cream APPLY TOPICALLY BID. MIX WITH HYDROCORTISONE CREAM, Uses prn on buttocks    lancets (ACCU-CHEK SOFTCLIX LANCETS) Misc 1 lancet by Misc.(Non-Drug; Combo Route) route 4 (four) times daily.    magnesium oxide (MAG-OX) 400 mg (241.3 mg magnesium) tablet TK 1 TABLET PO QD, morning    methocarbamol (ROBAXIN) 500 MG Tab Take 1 tablet by mouth daily (every 24 " hours) as needed for muscle spasm.    minoxidil (LONITEN) 2.5 MG tablet Take 2 tablets (5 mg total) by mouth 2 (two) times daily.    nystatin (MYCOSTATIN) cream Apply topically 2 (two) times daily.    NYSTOP powder Apply topically 2 (two) times daily.     ondansetron (ZOFRAN-ODT) 4 MG TbDL DISSOLVE 1 TABLET(4 MG) ON THE TONGUE EVERY 12 HOURS AS NEEDED    oxyCODONE (ROXICODONE) 5 MG immediate release tablet Take 1 tablet (5 mg total) by mouth every 6 (six) hours as needed for Pain.    pantoprazole (PROTONIX) 40 MG tablet TAKE 1 TABLET EVERY DAY    pantoprazole (PROTONIX) 40 MG tablet Take 1 tablet (40 mg total) by mouth once daily. (Patient taking differently: Take 40 mg by mouth every morning. Sometimes takes twice a day)    rifAXIMin (XIFAXAN) 550 mg Tab Take 1 tablet (550 mg total) by mouth 2 (two) times daily.    sevelamer carbonate (RENVELA) 800 mg Tab Take 1 tablet (800 mg total) by mouth 3 (three) times daily with meals.    tacrolimus (PROGRAF) 0.5 MG Cap Take 1 capsule (0.5 mg total) by mouth once daily. (Patient taking differently: Take 0.5 mg by mouth every morning. )    tiZANidine (ZANAFLEX) 4 MG tablet TAKE 1 TABLET BY MOUTH EVERY 6 HOURS AS NEEDED    [DISCONTINUED] ondansetron (ZOFRAN) 4 MG tablet Take 1 tablet (4 mg total) by mouth every 6 (six) hours.     Antibiotics (From admission, onward)    Start     Stop Route Frequency Ordered    03/14/19 2100  ceFEPIme injection 2 g      -- IV Every 24 hours (non-standard times) 03/14/19 1253    03/13/19 0900  rifAXIMin tablet 550 mg      -- PER NG TUBE 2 times daily 03/13/19 0821        Antifungals (From admission, onward)    None        Antivirals (From admission, onward)    None           Immunization History   Administered Date(s) Administered    Influenza 11/03/2006, 11/28/2007, 10/07/2008, 09/22/2009, 10/11/2012    Influenza - High Dose 09/29/2010, 09/30/2011, 11/11/2013, 10/15/2014, 10/16/2015, 10/25/2016, 02/18/2019    Influenza Split  10/11/2012    PPD Test 2017    Pneumococcal Conjugate 2013    Pneumococcal Conjugate - 13 Valent 2013    Pneumococcal Polysaccharide - 23 Valent 2007, 2019    Td (ADULT) 12/10/2012       Family History     Problem Relation (Age of Onset)    Cancer Mother, Sister    Coronary artery disease Father    Diabetes Father    Heart disease Father    Hypertension Father        Social History     Socioeconomic History    Marital status:      Spouse name: None    Number of children: None    Years of education: None    Highest education level: None   Social Needs    Financial resource strain: None    Food insecurity - worry: None    Food insecurity - inability: None    Transportation needs - medical: None    Transportation needs - non-medical: None   Occupational History    None   Tobacco Use    Smoking status: Former Smoker     Last attempt to quit: 1998     Years since quittin.6    Smokeless tobacco: Never Used    Tobacco comment: pt reports quitting in    Substance and Sexual Activity    Alcohol use: No     Comment: pt reports hx of alcholism and reports quit in     Drug use: Yes     Frequency: 2.0 times per week     Types: Marijuana    Sexual activity: Yes     Partners: Female   Other Topics Concern    None   Social History Narrative    None     Review of Systems   Unable to perform ROS: Intubated     Objective:     Vital Signs (Most Recent):  Temp: 98.8 °F (37.1 °C) (19 1500)  Pulse: (!) 111 (19 1745)  Resp: (!) 21 (19 1745)  BP: (!) 94/53 (19 1700)  SpO2: (!) 94 % (19 174) Vital Signs (24h Range):  Temp:  [98.3 °F (36.8 °C)-101 °F (38.3 °C)] 98.8 °F (37.1 °C)  Pulse:  [] 111  Resp:  [18-25] 21  SpO2:  [91 %-99 %] 94 %  BP: ()/(47-63) 94/53     Weight: 93 kg (205 lb)  Body mass index is 27.8 kg/m².    Estimated Creatinine Clearance: 10.5 mL/min (A) (based on SCr of 6.8 mg/dL (H)).    Physical Exam    Constitutional: He appears well-nourished.   Very somnolent on exam. Minimal response to pain   HENT:   Head: Normocephalic and atraumatic.   Neck: Normal range of motion.   Cardiovascular: Normal rate, regular rhythm, normal heart sounds and intact distal pulses.   Pulmonary/Chest: Effort normal. No respiratory distress. He has no rales.   Intubated on mechanical vent   Abdominal: Bowel sounds are normal. He exhibits distension. There is no tenderness. There is no guarding.   Musculoskeletal: Normal range of motion.   Neurological:   Very somnolent.    Skin: Skin is warm and dry.   Psychiatric: He has a normal mood and affect.       Significant Labs:   CBC:   Recent Labs   Lab 03/13/19  0456 03/14/19  0450   WBC 12.87* 14.14*   HGB 9.6* 9.4*   HCT 29.7* 29.4*    184     CMP:   Recent Labs   Lab 03/13/19 0456 03/14/19  0450    140   K 4.6 4.4    107   CO2 22* 19*   * 218*   BUN 45* 44*   CREATININE 5.7* 6.8*   CALCIUM 8.1* 7.9*   PROT 6.8 6.8   ALBUMIN 2.1* 2.0*   BILITOT 1.7* 1.8*   ALKPHOS 148* 186*   AST 44* 91*   ALT 19 28   ANIONGAP 13 14   EGFRNONAA 9.0* 7.3*       Significant Imaging: I have reviewed all pertinent imaging results/findings within the past 24 hours.

## 2019-03-14 NOTE — ASSESSMENT & PLAN NOTE
- concern for septic etiology however workup so far (blood cultures, CXR) negative.   - febrile overnight will re-culture blood and CXR. Pt is anuric. Will hold off on further antibiotics at this time.

## 2019-03-14 NOTE — SUBJECTIVE & OBJECTIVE
"Interval History:   HDS off pressor support, net negative 1.1 L/24h due to increase BM from lactulose.  Electrolytes stable.  ECHO revealed EF 65% with PA pressure of 19 and estimated CVP of 3.  Opens eyes to voice, but does not follow commands.  TMAX of 101.    Review of patient's allergies indicates:   Allergen Reactions    Adhesive Dermatitis and Other (See Comments)     "Burned his tail"  Please use Paper Tape    Corticosteroids (glucocorticoids) Other (See Comments)     Leg swelling after an injection    Hydrocortisone Swelling     Leg swelling after an injection    Neuromuscular blockers, steroidal Swelling     Leg swelling after an injection    Ribavirin Other (See Comments)     Arthralgias    Propofol analogues Other (See Comments)     Drops SVR and due to current systemic shunt state, results in difficulty in oscillometry to obtain blood pressure until return of vascular tone (other VS are stable throughout) and drop in venous return due to abdominal ascites; titrate slower in future or select alternate agent. Smooth emergence from general anesthesia without difficulty.     Current Facility-Administered Medications   Medication Frequency    acetaminophen tablet 650 mg Q8H PRN    allopurinol tablet 100 mg QHS    ceFEPIme injection 2 g Daily    chlorhexidine 0.12 % solution 15 mL BID    dextrose 50% injection 12.5 g PRN    famotidine tablet 20 mg Daily    glucagon (human recombinant) injection 1 mg PRN    heparin (porcine) injection 5,000 Units Q8H    insulin aspart U-100 pen 0-5 Units Q4H PRN    lactulose 20 gram/30 mL solution Soln 30 g Q6H    ondansetron disintegrating tablet 8 mg Q8H PRN    polyethylene glycol packet 17 g BID PRN    promethazine tablet 25 mg Q6H PRN    ramelteon tablet 8 mg Nightly PRN    rifAXIMin tablet 550 mg BID    sodium chloride 0.9% flush 5 mL PRN    tacrolimus (PROGRAF) 1 mg/mL oral syringe Daily       Objective:     Vital Signs (Most Recent):  Temp: 99.4 °F " (37.4 °C) (03/14/19 0700)  Pulse: 61 (03/14/19 1215)  Resp: (!) 21 (03/14/19 1027)  BP: (!) 111/53 (03/14/19 1215)  SpO2: (!) 94 % (03/14/19 1027)  O2 Device (Oxygen Therapy): ventilator (03/14/19 1215) Vital Signs (24h Range):  Temp:  [99.4 °F (37.4 °C)-101 °F (38.3 °C)] 99.4 °F (37.4 °C)  Pulse:  [60-79] 61  Resp:  [18-24] 21  SpO2:  [93 %-99 %] 94 %  BP: ()/(47-88) 111/53     Weight: 93 kg (205 lb) (03/13/19 1400)  Body mass index is 27.8 kg/m².  Body surface area is 2.17 meters squared.    I/O last 3 completed shifts:  In: 3102.3 [I.V.:192.3; Other:500; NG/GT:2210; IV Piggyback:200]  Out: 4671 [Other:1371; Stool:3300]    Physical Exam   Constitutional: He appears well-developed. He appears listless. He has a sickly appearance. He is intubated.   HENT:   Head: Normocephalic and atraumatic.   Right Ear: External ear normal.   Left Ear: External ear normal.   Eyes: Conjunctivae and EOM are normal. Scleral icterus is present.   Neck: No JVD present.   Cardiovascular: Normal rate and regular rhythm. Exam reveals no gallop and no friction rub.   No murmur heard.  ORLIN AVF   Pulmonary/Chest: Effort normal. Tachypnea noted. He is intubated. No respiratory distress. He has no wheezes. He has rhonchi. He has no rales.   Abdominal: Soft. He exhibits distension.   Musculoskeletal: He exhibits edema. He exhibits no deformity.   Neurological: He appears listless.   Skin: Skin is warm and dry. He is not diaphoretic.       Significant Labs:  ABGs:   Recent Labs   Lab 03/14/19  1026   PH 7.373   PCO2 40.1   HCO3 23.4*   POCSATURATED 100   BE -2     CBC:   Recent Labs   Lab 03/14/19  0450   WBC 14.14*   RBC 3.41*   HGB 9.4*   HCT 29.4*      MCV 86   MCH 27.6   MCHC 32.0     CMP:   Recent Labs   Lab 03/14/19 0450   *   CALCIUM 7.9*   ALBUMIN 2.0*   PROT 6.8      K 4.4   CO2 19*      BUN 44*   CREATININE 6.8*   ALKPHOS 186*   ALT 28   AST 91*   BILITOT 1.8*
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
## 2019-03-15 PROBLEM — Z51.5 PALLIATIVE CARE ENCOUNTER: Status: ACTIVE | Noted: 2019-01-01

## 2019-03-15 PROBLEM — R23.9 ALTERATION IN SKIN INTEGRITY: Status: ACTIVE | Noted: 2019-01-01

## 2019-03-15 NOTE — CARE UPDATE
"Participated in a family meeting with Palliative Care. Wife (Veronika) was present with many family members, including her daughter, son, daughter-in-law, sister-in-law and niece to name a few. Family was updated on patient's progress. Veronika is having a hard time processing that his cancer has progressed, however, verbalizes that Mr. Mathis wouldn't have wanted to "live like a vegetable". While she isn't ready to withdraw care yet, she agrees to not resuscitate the patient with compressions if needed. In the meantime, will continue current management and see if patient progresses over the weekend. Code status was updated in the chart.    Malathi Collins MD  Internal Medicine, PGY-3  [phone]    "

## 2019-03-15 NOTE — ASSESSMENT & PLAN NOTE
- pt presented with acute encephalopathy - attributed to hepatic etiology. Infection workup largely negative so far. CT Head negative. Electrolytes, glucose, mild hypercarbia on blood gas  - pt intubated 3/12 - present due to low GCS  - EEG ordered 3/14/19: results pending  - continue lactulose titrated to 3-4 BM/day  - avoid sedative agents  - neuro checks per shift

## 2019-03-15 NOTE — PROGRESS NOTES
"[facility]  Nephrology  Progress Note    Patient Name: Philip Mathis III  MRN: [id number]  Admission Date: 3/11/2019  Hospital Length of Stay: 4 days  Attending Provider: Cliff Luna MD   Primary Care Physician: Reji Castillo MD  Principal Problem:Acute encephalopathy    Subjective:     Interval History:   HD completed yesterday x 2.5 hours, had to be discontinued early due to hypotension which resolved after rinseback.  Electrolytes stable this AM.  Net positive 300 ml/24h.    Vent settings stable, EEG in progress.    Review of patient's allergies indicates:   Allergen Reactions    Adhesive Dermatitis and Other (See Comments)     "Burned his tail"  Please use Paper Tape    Corticosteroids (glucocorticoids) Other (See Comments)     Leg swelling after an injection    Hydrocortisone Swelling     Leg swelling after an injection    Neuromuscular blockers, steroidal Swelling     Leg swelling after an injection    Ribavirin Other (See Comments)     Arthralgias    Propofol analogues Other (See Comments)     Drops SVR and due to current systemic shunt state, results in difficulty in oscillometry to obtain blood pressure until return of vascular tone (other VS are stable throughout) and drop in venous return due to abdominal ascites; titrate slower in future or select alternate agent. Smooth emergence from general anesthesia without difficulty.     Current Facility-Administered Medications   Medication Frequency    acetaminophen tablet 650 mg Q8H PRN    allopurinol tablet 100 mg QHS    ceFEPIme injection 2 g Q24H    chlorhexidine 0.12 % solution 15 mL BID    dextrose 50% injection 12.5 g PRN    famotidine tablet 20 mg Daily    glucagon (human recombinant) injection 1 mg PRN    heparin (porcine) injection 5,000 Units Q8H    insulin aspart U-100 pen 1-10 Units Q4H PRN    insulin aspart U-100 pen 3 Units 6 times per day    lactulose 20 gram/30 mL solution Soln 30 g Q6H    ondansetron " disintegrating tablet 8 mg Q8H PRN    polyethylene glycol packet 17 g BID PRN    promethazine tablet 25 mg Q6H PRN    ramelteon tablet 8 mg Nightly PRN    rifAXIMin tablet 550 mg BID    sodium chloride 0.9% flush 5 mL PRN    tacrolimus (PROGRAF) 1 mg/mL oral syringe BID       Objective:     Vital Signs (Most Recent):  Temp: 98.8 °F (37.1 °C) (03/15/19 1100)  Pulse: 67 (03/15/19 1134)  Resp: 18 (03/15/19 1134)  BP: (!) 112/55 (03/15/19 1134)  SpO2: 95 % (03/15/19 1134)  O2 Device (Oxygen Therapy): ventilator (03/15/19 1134) Vital Signs (24h Range):  Temp:  [96.7 °F (35.9 °C)-101.1 °F (38.4 °C)] 98.8 °F (37.1 °C)  Pulse:  [] 67  Resp:  [17-25] 18  SpO2:  [91 %-100 %] 95 %  BP: ()/(49-61) 112/55     Weight: 93 kg (205 lb) (03/13/19 1400)  Body mass index is 27.8 kg/m².  Body surface area is 2.17 meters squared.    I/O last 3 completed shifts:  In: 3905 [I.V.:10; Other:600; NG/GT:3045; IV Piggyback:250]  Out: 5400 [Other:500; Stool:4900]    Physical Exam   Constitutional: He appears well-developed. He appears listless. He has a sickly appearance. He is intubated.   HENT:   Head: Normocephalic and atraumatic.   Right Ear: External ear normal.   Left Ear: External ear normal.   Eyes: Conjunctivae and EOM are normal. Scleral icterus is present.   Neck: No JVD present.   Cardiovascular: Normal rate and regular rhythm. Exam reveals no gallop and no friction rub.   No murmur heard.  ORLIN AVF   Pulmonary/Chest: Effort normal. Tachypnea noted. He is intubated. No respiratory distress. He has no wheezes. He has rhonchi. He has no rales.   Abdominal: Soft. He exhibits distension.   Musculoskeletal: He exhibits edema. He exhibits no deformity.   Neurological: He appears listless.   Non-purposeful movement, does not follow commands   Skin: Skin is warm and dry. He is not diaphoretic.       Significant Labs:  CBC:   Recent Labs   Lab 03/15/19  0258   WBC 14.44*   RBC 3.46*   HGB 9.7*   HCT 30.7*      MCV 89    MCH 28.0   MCHC 31.6*     CMP:   Recent Labs   Lab 03/15/19  0258   *   CALCIUM 8.2*   ALBUMIN 1.7*   PROT 6.6      K 3.9   CO2 23      BUN 39*   CREATININE 5.7*   ALKPHOS 186*   ALT 26   AST 64*   BILITOT 1.7*            Assessment/Plan:     ESRD on hemodialysis    ESRD on iHD  [facility]  4 hours  Dr. Clara ORDAZ AVF  Wife reports an EDW of 94 kg (93 kg on admission)    Plan/Recommendations:  -no need for dialysis today  -continue strict I/O's  -likely will need clearance tomorrow.  Will re-evaluate in AM       Giovanni Colorado NP  Nephrology  [facility]

## 2019-03-15 NOTE — ASSESSMENT & PLAN NOTE
- concern for septic etiology however workup so far (blood cultures, CXR) negative.   - febrile overnight will re-culture blood and CXR. Pt is anuric. Started on vanc and cefepime 3/14/19. Will continue 7 day course treatment for pneumonia  - Transplant ID consulted for recs; CMV PCR ordered with AM labs. appreciate assistance
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
## 2019-03-15 NOTE — PLAN OF CARE
Problem: Adult Inpatient Plan of Care  Goal: Plan of Care Review  Outcome: Ongoing (interventions implemented as appropriate)  No acute events noted within the shift. Pt intermittently opens eyes spontaneously , sometimes to voice and pain. Intermittently move all extremities, but not on command.  Gave 1 dose of Tylenol prn for temp 101.1 now  normothermic.  HR remains Afib 's.  BP MAP above 65.   Remains on ETT to [facility] vent A/C 500-18-30% +5 PF 70.  Still with flexi-seal , total output brownish-greenish. Lactulose given. Blood glucose monitored and covered with insulin sliding scale.  Remains anuric.  Left gluteus wound covered with foam dressing , dry and intact.   Remains on 2-pt restraint.  Informed Dr. Carrillo of K and Phos this am, no new orders.  Plan of care reviewed with pt. MARKUS

## 2019-03-15 NOTE — PROGRESS NOTES
[facility]  Infectious Disease  Progress Note    Patient Name: Philip Mathis III  MRN: [id number]  Admission Date: 3/11/2019  Length of Stay: 4 days  Attending Physician: Cliff Luna MD  Primary Care Provider: Reji Castillo MD    Isolation Status: No active isolations  Assessment/Plan:      * Acute encephalopathy    72yo man w/a history of HTN, IDDM, KARON, ESRD (on iHD qMFW), and HCV cirrhosis (s/p DDLT 6/10/2001, CMV D+/R+, on maintenance tacro; c/b graft loss due to post-transplant HCC w/PVT dx 11/2017 s/p RFA with POD in 12/2018 s/p chemoembolization last on 3/7; recurrent CDI s/p FMT 8/2015; GBS septicemia in 10/2017) who was admitted on 3/11/2019 with acute onset encephalopathy (found down at night by wife) with antecedent abdominal pain and loose stools (attributed to lactulose) attributed to HE from an unknown precipitant. He was started on BSA (vanc/zosyn) and lactulose without significant improvement in AMS to date and ID was consulted for fevers following cessation of empiric antibiotics -- given purulent sputum, abnormal chest imaging, and clinical picture consistent with aspiration, suspect pneumonia (likely aspiration event) as cause. He remains critically ill.    - would continue vanc/cefepime while awaiting blood/deep tracheal aspirate to help tailor therapy  - await pending serum CMV quant  - await pending cx to tailor therapy with you         Anticipated Disposition: pending improvement    Thank you for your consult. I will follow-up with patient. Please contact us if you have any additional questions.     Kathia Kim MD  Transplant ID Attending  [phone]    Kathia Kim MD  Infectious Disease  [facility]    Subjective:     Principal Problem:Acute encephalopathy    HPI: Mr. Mathis is a 72yo man w/a history of HTN, IDDM, KARON, ESRD (on iHD qMFW), and HCV cirrhosis (s/p DDLT 6/10/2001, CMV D+/R+, on maintenance tacro; c/b graft loss due to  post-transplant HCC w/PVT dx 11/2017 s/p RFA with POD in 12/2018 s/p chemoembolization last on 3/7; recurrent CDI s/p FMT 8/2015; GBS septicemia in 10/2017) who was admitted on 3/11/2019 with acute onset encephalopathy (found down at night by wife) with antecedent abdominal pain and loose stools (attributed to lactulose) attributed to HE from an unknown precipitant. He was started on BSA (vanc/zosyn) and lactulose without significant improvement in AMS to date. He became febrile after cessation of antibiotics, prompting ID consultation today. Per nursing, deep tracheal aspirate secretions are purulent appearing and he has expected diarrhea with lactulose. Blood cx are NGTD, para is negative for SBP, and CT CAP was notable for possible RML/RLL infiltrate with associated effusion. He was started on empiric vanc/cefepime prior to consultation today.  Interval History: Waking some with noxious stimuli today. Afebrile now. Cx NGTD but young.    Review of Systems   Unable to perform ROS: Intubated     Objective:     Vital Signs (Most Recent):  Temp: 98.1 °F (36.7 °C) (03/15/19 1500)  Pulse: 74 (03/15/19 1538)  Resp: 19 (03/15/19 1538)  BP: (!) 112/58 (03/15/19 1500)  SpO2: 98 % (03/15/19 1538) Vital Signs (24h Range):  Temp:  [96.7 °F (35.9 °C)-101.1 °F (38.4 °C)] 98.1 °F (36.7 °C)  Pulse:  [] 74  Resp:  [17-25] 19  SpO2:  [93 %-100 %] 98 %  BP: ()/(51-61) 112/58     Weight: 93 kg (205 lb)  Body mass index is 27.8 kg/m².    Estimated Creatinine Clearance: 12.5 mL/min (A) (based on SCr of 5.7 mg/dL (H)).    Physical Exam   Constitutional: He appears well-nourished.   Very somnolent on exam. Minimal response to pain   HENT:   Head: Normocephalic and atraumatic.   Neck: Normal range of motion.   Cardiovascular: Normal rate, regular rhythm, normal heart sounds and intact distal pulses.   Pulmonary/Chest: Effort normal. No respiratory distress. He has no rales.   Intubated on mechanical vent   Abdominal: Bowel  sounds are normal. He exhibits distension. There is no tenderness. There is no guarding.   Musculoskeletal: Normal range of motion.   Neurological:   Very somnolent.    Skin: Skin is warm and dry.   Psychiatric: He has a normal mood and affect.       Significant Labs:   CBC:   Recent Labs   Lab 03/14/19 0450 03/15/19  0258   WBC 14.14* 14.44*   HGB 9.4* 9.7*   HCT 29.4* 30.7*    166     CMP:   Recent Labs   Lab 03/14/19  0450 03/15/19  0258    141   K 4.4 3.9    105   CO2 19* 23   * 312*   BUN 44* 39*   CREATININE 6.8* 5.7*   CALCIUM 7.9* 8.2*   PROT 6.8 6.6   ALBUMIN 2.0* 1.7*   BILITOT 1.8* 1.7*   ALKPHOS 186* 186*   AST 91* 64*   ALT 28 26   ANIONGAP 14 13   EGFRNONAA 7.3* 9.0*       Significant Imaging: I have reviewed all pertinent imaging results/findings within the past 24 hours.

## 2019-03-15 NOTE — SUBJECTIVE & OBJECTIVE
Interval History/Significant Events: No acute events overnight. Vitals within normal.     Review of Systems   Unable to perform ROS: Intubated     Objective:     Vital Signs (Most Recent):  Temp: 98.5 °F (36.9 °C) (03/15/19 0700)  Pulse: 63 (03/15/19 0933)  Resp: 19 (03/15/19 0933)  BP: (!) 114/55 (03/15/19 0933)  SpO2: (!) 94 % (03/15/19 0933) Vital Signs (24h Range):  Temp:  [96.7 °F (35.9 °C)-101.1 °F (38.4 °C)] 98.5 °F (36.9 °C)  Pulse:  [] 63  Resp:  [17-25] 19  SpO2:  [91 %-100 %] 94 %  BP: ()/(49-61) 114/55   Weight: 93 kg (205 lb)  Body mass index is 27.8 kg/m².      Intake/Output Summary (Last 24 hours) at 3/15/2019 1009  Last data filed at 3/15/2019 0900  Gross per 24 hour   Intake 2765 ml   Output 2500 ml   Net 265 ml       Physical Exam   Constitutional: He appears well-nourished.   Very somnolent on exam. Minimal response to pain   HENT:   Head: Normocephalic and atraumatic.   Neck: Normal range of motion.   Cardiovascular: Normal rate, regular rhythm, normal heart sounds and intact distal pulses.   Pulmonary/Chest: Effort normal. No respiratory distress. He has no rales.   Intubated on mechanical vent   Abdominal: Bowel sounds are normal. He exhibits distension. There is tenderness. There is no guarding.   Musculoskeletal: Normal range of motion.   Neurological:   Very somnolent.    Skin: Skin is warm and dry.   Psychiatric: He has a normal mood and affect.   Vitals reviewed.      Vents:  Vent Mode: A/C (03/15/19 0933)  Ventilator Initiated: Yes (03/12/19 1743)  Set Rate: 18 bmp (03/15/19 0933)  Vt Set: 500 mL (03/15/19 0933)  Pressure Support: 0 cmH20 (03/15/19 0933)  PEEP/CPAP: 5 cmH20 (03/15/19 0933)  Oxygen Concentration (%): 30 (03/15/19 0933)  Peak Airway Pressure: 33 cmH2O (03/15/19 0933)  Plateau Pressure: 18 cmH20 (03/15/19 0933)  Total Ve: 9.63 mL (03/15/19 0933)  F/VT Ratio<105 (RSBI): (!) 36.19 (03/15/19 0933)  Lines/Drains/Airways     Drain                 Hemodialysis AV  Fistula 11/08/16 1553 Left forearm 856 days         NG/OG Tube 03/12/19 1520 nasogastric Right nostril 2 days         Rectal Tube 03/13/19 1300 rectal tube w/ balloon (indicate number of mLs) 1 day          Airway                 Airway - Non-Surgical 03/12/19 1709 Endotracheal Tube 2 days          Peripheral Intravenous Line                 Peripheral IV Triple Lumen 03/12/19 1330 Anterior;Right Hand 2 days         Peripheral IV - Single Lumen 03/14/19 0630 Anterior;Right Forearm 1 day              Significant Labs:    CBC/Anemia Profile:  Recent Labs   Lab 03/14/19  0450 03/15/19  0258   WBC 14.14* 14.44*   HGB 9.4* 9.7*   HCT 29.4* 30.7*    166   MCV 86 89   RDW 20.1* 20.5*        Chemistries:  Recent Labs   Lab 03/14/19  0450 03/15/19  0258    141   K 4.4 3.9    105   CO2 19* 23   BUN 44* 39*   CREATININE 6.8* 5.7*   CALCIUM 7.9* 8.2*   ALBUMIN 2.0* 1.7*   PROT 6.8 6.6   BILITOT 1.8* 1.7*   ALKPHOS 186* 186*   ALT 28 26   AST 91* 64*   MG 2.3 2.0   PHOS 3.3 2.0*       Significant Imaging:  I have reviewed and interpreted all pertinent imaging results/findings within the past 24 hours.

## 2019-03-15 NOTE — SUBJECTIVE & OBJECTIVE
Past Medical History:   Diagnosis Date    Acute hypoxemic respiratory failure 3/12/2019    Anemia     Aortic atherosclerosis     Arthritis     Back pain     Bishop's esophagus     BPH (benign prostatic hypertrophy)     Chronic combined systolic and diastolic heart failure 4/26/2017    Controlled type 2 diabetes mellitus with chronic kidney disease on chronic dialysis, with long-term current use of insulin 4/14/2015    Elevated PSA     ESRD on dialysis     ESRD on hemodialysis     Essential hypertension     GERD (gastroesophageal reflux disease)     Gout     Hemolytic anemia     Hepatitis C     successfully treated with Harvoni    Hepatocellular carcinoma 1/2/2018    Hyperlipidemia     Hyperphosphatemia     Hypertensive CKD (chronic kidney disease)     Immunosuppression     Liver transplanted 06/10/2001    MGUS (monoclonal gammopathy of unknown significance)     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 macular edema associated with type 2 diabetes mellitus     KARON (obstructive sleep apnea)     Secondary hyperparathyroidism     Severe pruritus     Thrombocytopenia     Type 2 diabetes mellitus with diabetic polyneuropathy, with long-term current use of insulin 7/5/2012    Urticaria        Past Surgical History:   Procedure Laterality Date    1) Creation of L arm basilic vein transposition (brachial artery-to-basilic vein AVF)  2) Ligation of L brachial AVF (which was 1st stage creation previously)    Left 6/15/2017    Performed by Silvio William MD at [facility] OR 2ND FLR    ABLATION, RADIOFREQUENCY N/A 2/27/2018    Performed by Esther Surgeon at [facility] ESTHER    YZZLQZ-OARDLB-PD N/A 12/21/2017    Performed by [facility] Diagnostic Provider at [facility] OR 2ND FLR    broken nose      CATARACT EXTRACTION Bilateral     colonoscopy N/A 7/23/2014    Performed by Moises St MD at [facility] ENDO (4TH FLR)    KTKMVNLO-JACEVKC-NY POSSIBLE AV GRAFT Left 11/8/2016    Performed by  Silvio William MD at [facility] OR 2ND FLR    DEBRIDEMENT-WOUND Left 4/12/2015    Performed by Jimmy Light MD at [facility] OR 2ND FLR    DEBRIDEMENT-WOUND Left 4/11/2015    Performed by Jimmy Light MD at [facility] OR 2ND FLR    DEBRIDEMENT-WOUND Left 4/10/2015    Performed by Jimmy Light MD at [facility] OR 2ND FLR    DEBRIDEMENT-WOUND Left 4/9/2015    Performed by Jimmy Light MD at [facility] OR 1ST FLR    DEBRIDEMENT-WOUND Left 4/6/2015    Performed by Jimmy Light MD at [facility] OR 2ND FLR    DEBRIDEMENT-WOUND Left 4/4/2015    Performed by Jimmy Light MD at [facility] OR 2ND FLR    DEBRIDEMENT-WOUND Left 4/2/2015    Performed by Jimmy Light MD at [facility] OR 1ST FLR    DRESSING CHANGE- Left 4/10/2015    Performed by Jimmy Light MD at [facility] OR 2ND FLR    DRESSING CHANGE-s/p burn debridement Left 4/15/2015    Performed by Jimmy Light MD at [facility] OR 2ND FLR    DRESSING CHANGE-s/p burn debridement Left 4/14/2015    Performed by Jimmy Light MD at [facility] OR 2ND FLR    DRESSING CHANGE-s/p wound debridement Left 4/13/2015    Performed by Jimmy Light MD at [facility] OR 1ST FLR    DRESSING CHANGE-wound vac Left 4/20/2015    Performed by Jimmy Light MD at [facility] OR 2ND FLR    EMBOLIZATION, YTTRIUM THERAPY N/A 1/29/2019    Performed by [facility] Diagnostic Provider at [facility] OR 2ND [facility]    EYE SURGERY      cataracts    FEMUR SURGERY      FRACTURE SURGERY      right femur fracture     INSERTION-CATHETER-PERM-A-CATH Right 7/13/2017    Performed by Silvio William MD at [facility] OR 2ND FLR    LIVER TRANSPLANT  2001    Open Biome Fecal Transplant N/A 8/5/2015    Performed by Elmo Gan MD at [facility] ENDO (4TH FLR)    PLACEMENT-WOUND VAC  4/15/2015    Performed by Jimmy Light MD at [facility] OR 2ND FLR    PLACEMENT-WOUND VAC Left 4/4/2015    Performed by Jimmy Light MD at [facility] OR 2ND FLR    PORTACATH PLACEMENT Left     Radiofrequency Ablation N/A 3/7/2019    Performed by Esther  "Surgeon at [facility] GUILLERMO    SKIN GRAFT      graft from right thigh , applied to the left back and left arm.    SPLIT THICKNESS SKIN GRAFT - left trunk, abd and axilla N/A 4/15/2015    Performed by Jimmy Light MD at [facility] OR [address] [place] [address]       Review of patient's allergies indicates:   Allergen Reactions    Adhesive Dermatitis and Other (See Comments)     "Burned his tail"  Please use Paper Tape    Corticosteroids (glucocorticoids) Other (See Comments)     Leg swelling after an injection    Hydrocortisone Swelling     Leg swelling after an injection    Neuromuscular blockers, steroidal Swelling     Leg swelling after an injection    Ribavirin Other (See Comments)     Arthralgias    Propofol analogues Other (See Comments)     Drops SVR and due to current systemic shunt state, results in difficulty in oscillometry to obtain blood pressure until return of vascular tone (other VS are stable throughout) and drop in venous return due to abdominal ascites; titrate slower in future or select alternate agent. Smooth emergence from general anesthesia without difficulty.       Medications:  Continuous Infusions:  Scheduled Meds:   allopurinol  100 mg Per NG tube QHS    ceFEPime (MAXIPIME) IVPB  2 g Intravenous Q24H    chlorhexidine  15 mL Mouth/Throat BID    famotidine  20 mg Per NG tube Daily    heparin (porcine)  5,000 Units Subcutaneous Q8H    insulin aspart U-100  3 Units Subcutaneous Q4H    lactulose  30 g Per NG tube Q6H    rifAXIMin  550 mg Per NG tube BID    tacrolimus  0.5 mg Per NG tube BID     PRN Meds:acetaminophen, dextrose 50%, glucagon (human recombinant), insulin aspart U-100, ondansetron, polyethylene glycol, promethazine, ramelteon, sodium chloride 0.9%    Family History     Problem Relation (Age of Onset)    Cancer Mother, Sister    Coronary artery disease Father    Diabetes Father    Heart disease Father    Hypertension Father        Tobacco Use    Smoking status: Former Smoker     Last " attempt to quit: 1998     Years since quittin.6    Smokeless tobacco: Never Used    Tobacco comment: pt reports quitting in    Substance and Sexual Activity    Alcohol use: No     Comment: pt reports hx of alcholism and reports quit in     Drug use: Yes     Frequency: 2.0 times per week     Types: Marijuana    Sexual activity: Yes     Partners: Female       Review of Systems  Objective:     Vital Signs (Most Recent):  Temp: 98.1 °F (36.7 °C) (03/15/19 1500)  Pulse: 71 (03/15/19 1800)  Resp: 20 (03/15/19 1800)  BP: (!) 114/56 (03/15/19 1800)  SpO2: 99 % (03/15/19 1800) Vital Signs (24h Range):  Temp:  [96.7 °F (35.9 °C)-101.1 °F (38.4 °C)] 98.1 °F (36.7 °C)  Pulse:  [63-92] 71  Resp:  [17-24] 20  SpO2:  [93 %-100 %] 99 %  BP: ()/(51-61) 114/56     Weight: 93 kg (205 lb)  Body mass index is 27.8 kg/m².    Review of Symptoms  Symptom Assessment (ESAS 0-10 scale)   ESAS 0 1 2 3 4 5 6 7 8 9 10   Pain              Dyspnea              Anxiety              Nausea              Depression               Anorexia              Fatigue              Insomnia              Restlessness               Agitation              CAM / Delirium __ --  ___+   Constipation     __ --  ___+   Diarrhea           __ --  ___+  Bowel Management Plan (BMP): Yes    Comments: Pt unable to participate in ESAS    Performance Status: 20    ECOG Performance Status Grade: 4 - Completely disabled    Physical Exam    CBC:   Recent Labs   Lab 03/15/19  0258   WBC 14.44*   HGB 9.7*   HCT 30.7*   MCV 89        BMP:  Recent Labs   Lab 03/15/19  0258   *      K 3.9      CO2 23   BUN 39*   CREATININE 5.7*   CALCIUM 8.2*   MG 2.0     LFT:  Lab Results   Component Value Date    AST 64 (H) 03/15/2019    GGT 21 2016    ALKPHOS 186 (H) 03/15/2019    BILITOT 1.7 (H) 03/15/2019     Albumin:   Albumin   Date Value Ref Range Status   03/15/2019 1.7 (L) 3.5 - 5.2 g/dL Final     Protein:   Total Protein   Date  Value Ref Range Status   03/15/2019 6.6 6.0 - 8.4 g/dL Final     Lactic acid:   Lab Results   Component Value Date    LACTATE 1.1 03/12/2019    LACTATE 1.1 03/12/2019     Advance Care Planning   Advanced Directives::  Living Will: No  LaPOST: No  Do Not Resuscitate Status: Yes  Medical Power of : No.  Pts wife is his medical decision maker    Decision-Making Capacity: Patient unable to communicate due to disease severity/cognitive impairment   Living Arrangements: Lives with spouse    Psychosocial/Cultural: with 3 adult children and large supportive extended family    Spiritual:     F- Wanda and Belief:  NON-Restoration    I - Importance: tbd  .  C - Community: tbd    A - Address in Care:  visits

## 2019-03-15 NOTE — ASSESSMENT & PLAN NOTE
ESRD on iHD  [facility]  4 hours  Dr. Clara ORDAZ AVF  Wife reports an EDW of 94 kg (93 kg on admission)    Plan/Recommendations:  -no need for dialysis today  -continue strict I/O's  -likely will need clearance tomorrow.  Will re-evaluate in AM

## 2019-03-15 NOTE — ASSESSMENT & PLAN NOTE
- A1c 4.8  - hyperglycemia in the setting of tube feeds  - POCT q4h -> aspart 3U q4h and MD FREITAS prn  - will continue to monitor and adjust insulin dose

## 2019-03-15 NOTE — SUBJECTIVE & OBJECTIVE
"Interval History:   HD completed yesterday x 2.5 hours, had to be discontinued early due to hypotension which resolved after rinseback.  Electrolytes stable this AM.  Net positive 300 ml/24h.    Vent settings stable, EEG in progress.    Review of patient's allergies indicates:   Allergen Reactions    Adhesive Dermatitis and Other (See Comments)     "Burned his tail"  Please use Paper Tape    Corticosteroids (glucocorticoids) Other (See Comments)     Leg swelling after an injection    Hydrocortisone Swelling     Leg swelling after an injection    Neuromuscular blockers, steroidal Swelling     Leg swelling after an injection    Ribavirin Other (See Comments)     Arthralgias    Propofol analogues Other (See Comments)     Drops SVR and due to current systemic shunt state, results in difficulty in oscillometry to obtain blood pressure until return of vascular tone (other VS are stable throughout) and drop in venous return due to abdominal ascites; titrate slower in future or select alternate agent. Smooth emergence from general anesthesia without difficulty.     Current Facility-Administered Medications   Medication Frequency    acetaminophen tablet 650 mg Q8H PRN    allopurinol tablet 100 mg QHS    ceFEPIme injection 2 g Q24H    chlorhexidine 0.12 % solution 15 mL BID    dextrose 50% injection 12.5 g PRN    famotidine tablet 20 mg Daily    glucagon (human recombinant) injection 1 mg PRN    heparin (porcine) injection 5,000 Units Q8H    insulin aspart U-100 pen 1-10 Units Q4H PRN    insulin aspart U-100 pen 3 Units 6 times per day    lactulose 20 gram/30 mL solution Soln 30 g Q6H    ondansetron disintegrating tablet 8 mg Q8H PRN    polyethylene glycol packet 17 g BID PRN    promethazine tablet 25 mg Q6H PRN    ramelteon tablet 8 mg Nightly PRN    rifAXIMin tablet 550 mg BID    sodium chloride 0.9% flush 5 mL PRN    tacrolimus (PROGRAF) 1 mg/mL oral syringe BID       Objective:     Vital Signs " (Most Recent):  Temp: 98.8 °F (37.1 °C) (03/15/19 1100)  Pulse: 67 (03/15/19 1134)  Resp: 18 (03/15/19 1134)  BP: (!) 112/55 (03/15/19 1134)  SpO2: 95 % (03/15/19 1134)  O2 Device (Oxygen Therapy): ventilator (03/15/19 1134) Vital Signs (24h Range):  Temp:  [96.7 °F (35.9 °C)-101.1 °F (38.4 °C)] 98.8 °F (37.1 °C)  Pulse:  [] 67  Resp:  [17-25] 18  SpO2:  [91 %-100 %] 95 %  BP: ()/(49-61) 112/55     Weight: 93 kg (205 lb) (03/13/19 1400)  Body mass index is 27.8 kg/m².  Body surface area is 2.17 meters squared.    I/O last 3 completed shifts:  In: 3905 [I.V.:10; Other:600; NG/GT:3045; IV Piggyback:250]  Out: 5400 [Other:500; Stool:4900]    Physical Exam   Constitutional: He appears well-developed. He appears listless. He has a sickly appearance. He is intubated.   HENT:   Head: Normocephalic and atraumatic.   Right Ear: External ear normal.   Left Ear: External ear normal.   Eyes: Conjunctivae and EOM are normal. Scleral icterus is present.   Neck: No JVD present.   Cardiovascular: Normal rate and regular rhythm. Exam reveals no gallop and no friction rub.   No murmur heard.  ORLIN AVF   Pulmonary/Chest: Effort normal. Tachypnea noted. He is intubated. No respiratory distress. He has no wheezes. He has rhonchi. He has no rales.   Abdominal: Soft. He exhibits distension.   Musculoskeletal: He exhibits edema. He exhibits no deformity.   Neurological: He appears listless.   Non-purposeful movement, does not follow commands   Skin: Skin is warm and dry. He is not diaphoretic.       Significant Labs:  CBC:   Recent Labs   Lab 03/15/19  0258   WBC 14.44*   RBC 3.46*   HGB 9.7*   HCT 30.7*      MCV 89   MCH 28.0   MCHC 31.6*     CMP:   Recent Labs   Lab 03/15/19  0258   *   CALCIUM 8.2*   ALBUMIN 1.7*   PROT 6.6      K 3.9   CO2 23      BUN 39*   CREATININE 5.7*   ALKPHOS 186*   ALT 26   AST 64*   BILITOT 1.7*

## 2019-03-15 NOTE — SUBJECTIVE & OBJECTIVE
Interval History:   Patient febrile overnight.  On antibiotics, seen by ID, and on EEG.    Current Facility-Administered Medications   Medication    acetaminophen tablet 650 mg    allopurinol tablet 100 mg    ceFEPIme injection 2 g    chlorhexidine 0.12 % solution 15 mL    dextrose 50% injection 12.5 g    famotidine tablet 20 mg    glucagon (human recombinant) injection 1 mg    heparin (porcine) injection 5,000 Units    insulin aspart U-100 pen 1-10 Units    insulin aspart U-100 pen 3 Units    lactulose 20 gram/30 mL solution Soln 30 g    ondansetron disintegrating tablet 8 mg    polyethylene glycol packet 17 g    promethazine tablet 25 mg    ramelteon tablet 8 mg    rifAXIMin tablet 550 mg    sodium chloride 0.9% flush 5 mL    tacrolimus (PROGRAF) 1 mg/mL oral syringe       Objective:     Vital Signs (Most Recent):  Temp: 98.1 °F (36.7 °C) (03/15/19 1500)  Pulse: 70 (03/15/19 1723)  Resp: (!) 21 (03/15/19 1723)  BP: (!) 109/59 (03/15/19 1700)  SpO2: 100 % (03/15/19 1723) Vital Signs (24h Range):  Temp:  [96.7 °F (35.9 °C)-101.1 °F (38.4 °C)] 98.1 °F (36.7 °C)  Pulse:  [] 70  Resp:  [17-24] 21  SpO2:  [93 %-100 %] 100 %  BP: ()/(51-61) 109/59     Weight: 93 kg (205 lb) (03/13/19 1400)  Body mass index is 27.8 kg/m².    Physical Exam   Constitutional: No distress.   HENT:   Head: Normocephalic and atraumatic.   Eyes: No scleral icterus.   Cardiovascular: Normal rate and regular rhythm.   Pulmonary/Chest:   Decreased breath sounds bilaterally   Abdominal: Bowel sounds are normal. He exhibits distension. There is no tenderness.   Musculoskeletal: He exhibits no edema.   Neurological:   Intubated   Skin: He is not diaphoretic.   Nursing note and vitals reviewed.      MELD-Na score: 29 at 3/15/2019  2:58 AM  MELD score: 29 at 3/15/2019  2:58 AM  Calculated from:  Serum Creatinine: 5.7 mg/dL (Rounded to 4 mg/dL) at 3/15/2019  2:58 AM  Serum Sodium: 141 mmol/L (Rounded to 137 mmol/L) at  3/15/2019  2:58 AM  Total Bilirubin: 1.7 mg/dL at 3/15/2019  2:58 AM  INR(ratio): 1.9 at 3/15/2019  2:58 AM  Age: 73 years    Significant Labs:  CBC:   Recent Labs   Lab 03/15/19  0258   WBC 14.44*   RBC 3.46*   HGB 9.7*   HCT 30.7*        CMP:   Recent Labs   Lab 03/15/19  0258   *   CALCIUM 8.2*   ALBUMIN 1.7*   PROT 6.6      K 3.9   CO2 23      BUN 39*   CREATININE 5.7*   ALKPHOS 186*   ALT 26   AST 64*   BILITOT 1.7*     Coagulation:   Recent Labs   Lab 03/11/19  0150  03/15/19  0258   INR 1.5*   < > 1.9*   APTT 37.4*  --   --     < > = values in this interval not displayed.       Significant Imaging:  X-Ray: Reviewed  US: Reviewed  CT: Reviewed

## 2019-03-15 NOTE — ASSESSMENT & PLAN NOTE
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 He was started on BSA (vanc/zosyn) and lactulose without significant improvement in AMS to date and ID was consulted for fevers following cessation of empiric antibiotics -- given purulent sputum, abnormal chest imaging, and clinical picture consistent with aspiration, suspect pneumonia (likely aspiration event) as cause. He remains critically ill.    - would continue vanc/cefepime while awaiting blood/deep tracheal aspirate to help tailor therapy  - await pending serum CMV quant  - await pending cx to tailor therapy with you

## 2019-03-15 NOTE — HPI
74y/o male with hepatocellular carcinoma undergoing radiofrequency ablation by IR last treatment was 3/7, orthotopic liver transplant on 6/10/2001 on Prograf, ESRD on hemodialysis (MWF), Type 2 diabetes mellitus on long term insulin, essential HTN, chronic combined systolic and diastolic heart failure, KARON and history of hepatitis C (treated) who was transferred from [facility] ED with acute encephalopathy,  At  ED, patient has undergone extensive evaluation with CTA of chest negative for PE as patient was mildly hypoxic on arrival and showed bilateral pleural effusions but nothing else significant. CT scan of abdomen showed moderate ascites and large hepatic tumor with necrosis On 3/12 pt was transferred to [facility] ICU for further workup of Acute metabolic encephalopathy. Pt was intubated. And Paracentesis (diagnostic/therapuetic) completed. He was started on scheduled lactulose to treat encephalopathy attributed to hepatic etiology. Infectious workup was negative and Infectious Disease.  Transplant were consulted for assistance. Hepatology was involved in care of patient.  At this point patient is being treated for encephalopathy--HE and mild hypercarbia with acidemia, Hypercarbic Respiratory failure intubated and ventilated, enlarging HCC masses despite therapy,and  ESRD on HD.  Patient remains GSC+ 9, no sedation

## 2019-03-15 NOTE — PROGRESS NOTES
[facility]  Critical Care Medicine  Progress Note    Patient Name: Philip Mathis III  MRN: [id number]  Admission Date: 3/11/2019  Hospital Length of Stay: 4 days  Code Status: Full Code  Attending Provider: Cliff Luna MD  Primary Care Provider: Reji Castillo MD   Principal Problem: Acute encephalopathy    Subjective:     HPI:  This is a 74y/o male with hepatocellular carcinoma undergoing radiofrequency ablation by IR last treatment was 3/7, orthotopic liver transplant on 6/10/2001 on Prograf, ESRD on hemodialysis (MWF), Type 2 diabetes mellitus on long term insulin, essential HTN, chronic combined systolic and diastolic heart failure, KARON and history of hepatitis C (treated) who is being transfer to hospital medicine for acute encephalopathy. He presented initially to [facility] ED this Am with acute confusion. Wife not at bedside on transfer, she was reached via telephone for collateral tonight. Per wife, this AM patient did not wake up to his alarm to go to HD, she then later heard patient getting out of bed and heard a thump. She found the patient on the ground and very confused and not recognizing her. She called her neighbor to come and help her. EMS was later called, and patient brought to ED. Pt is very lethargic since that time, per wife, he has moan all day, unable to talk to her. This is unusual for him as pt normally drives himself to HD regularly. She manages all his medications and report that she has not given him any sedating meds (no Oxycodone, Robaxin, Klonopin, Zanaflex). She report that over the last few days, pt report abdominal swelling and pain to her. In fact they contacted liver transplant coordinator, who was setting up an US of the abdomen. She report that he might have skip a few dose of lactulose due to diarrhea the last few days. She report that he has been in confused state like this in the past due to Hepatic Encephalopathy. Pt also recently had HCC  treatment by IR last week, on Cipro for. At this time 11:53 PM, on evaluation, pt is unarousable, appear to be in sleep like state, per wife, he has been like this all day. 1x dose of narcan given with no improvement. Pt would moan to pain. Glucose check is 170.         At  ED, patient has undergone extensive evaluation with CTA of chest negative for PE as patient was mildly hypoxic on arrival and showed bilateral pleural effusions but nothing else significant. CT scan of abdomen showed moderate ascites and large hepatic tumor with necrosis. CT scan of head and C-spine were unremarkable. Ammonia was 45 and Lactic acid was 1.2. WBC was 12,200 but patient afebrile and not tachycardic. U/A negative for infection. Patient due for HD today and did not get HD and BUN/Cr was 40/6.5. In ED, patient remains acutely confused and not responding to questions appropriately. Patient has not focal neurologic deficits. Dr. Verdin at [facility] called and stated patient could be admitted to [facility] but Hospital medicine did not fell comfortable admitting patient for encephalopathy work-up so patient to be transferred and admitted to [facility]. Patient empirically given broad spectrum abx at [facility] including Vancomycin. Zosyn and Ceftriaxone.     Patient transferred to ICU due to acute metabolic encephalopathy.     Hospital/ICU Course:  On 3/12, patient was transferred to ICU for further workup of Acute metabolic encephalopathy. Pt was intubated. And Paracentesis (diagnostic/therapuetic) completed. He was started on scheduled lactulose to treat encephalopathy attributed to hepatic etiology. Infectious workup was negative and Infectious Disease Transplant were consulted for assistance. Hepatology was involved in care of patient.    Interval History/Significant Events: No acute events overnight. Vitals within normal.     Review of Systems   Unable to perform ROS: Intubated     Objective:     Vital Signs (Most Recent):  Temp:  98.5 °F (36.9 °C) (03/15/19 0700)  Pulse: 63 (03/15/19 0933)  Resp: 19 (03/15/19 0933)  BP: (!) 114/55 (03/15/19 0933)  SpO2: (!) 94 % (03/15/19 0933) Vital Signs (24h Range):  Temp:  [96.7 °F (35.9 °C)-101.1 °F (38.4 °C)] 98.5 °F (36.9 °C)  Pulse:  [] 63  Resp:  [17-25] 19  SpO2:  [91 %-100 %] 94 %  BP: ()/(49-61) 114/55   Weight: 93 kg (205 lb)  Body mass index is 27.8 kg/m².      Intake/Output Summary (Last 24 hours) at 3/15/2019 1009  Last data filed at 3/15/2019 0900  Gross per 24 hour   Intake 2765 ml   Output 2500 ml   Net 265 ml       Physical Exam   Constitutional: He appears well-nourished.   Very somnolent on exam. Minimal response to pain   HENT:   Head: Normocephalic and atraumatic.   Neck: Normal range of motion.   Cardiovascular: Normal rate, regular rhythm, normal heart sounds and intact distal pulses.   Pulmonary/Chest: Effort normal. No respiratory distress. He has no rales.   Intubated on mechanical vent   Abdominal: Bowel sounds are normal. He exhibits distension. There is tenderness. There is no guarding.   Musculoskeletal: Normal range of motion.   Neurological:   Very somnolent.    Skin: Skin is warm and dry.   Psychiatric: He has a normal mood and affect.   Vitals reviewed.      Vents:  Vent Mode: A/C (03/15/19 0933)  Ventilator Initiated: Yes (03/12/19 1743)  Set Rate: 18 bmp (03/15/19 0933)  Vt Set: 500 mL (03/15/19 0933)  Pressure Support: 0 cmH20 (03/15/19 0933)  PEEP/CPAP: 5 cmH20 (03/15/19 0933)  Oxygen Concentration (%): 30 (03/15/19 0933)  Peak Airway Pressure: 33 cmH2O (03/15/19 0933)  Plateau Pressure: 18 cmH20 (03/15/19 0933)  Total Ve: 9.63 mL (03/15/19 0933)  F/VT Ratio<105 (RSBI): (!) 36.19 (03/15/19 0933)  Lines/Drains/Airways     Drain                 Hemodialysis AV Fistula 11/08/16 1553 Left forearm 856 days         NG/OG Tube 03/12/19 1520 nasogastric Right nostril 2 days         Rectal Tube 03/13/19 1300 rectal tube w/ balloon (indicate number of mLs) 1 day           Airway                 Airway - Non-Surgical 03/12/19 1709 Endotracheal Tube 2 days          Peripheral Intravenous Line                 Peripheral IV Triple Lumen 03/12/19 1330 Anterior;Right Hand 2 days         Peripheral IV - Single Lumen 03/14/19 0630 Anterior;Right Forearm 1 day              Significant Labs:    CBC/Anemia Profile:  Recent Labs   Lab 03/14/19  0450 03/15/19  0258   WBC 14.14* 14.44*   HGB 9.4* 9.7*   HCT 29.4* 30.7*    166   MCV 86 89   RDW 20.1* 20.5*        Chemistries:  Recent Labs   Lab 03/14/19  0450 03/15/19  0258    141   K 4.4 3.9    105   CO2 19* 23   BUN 44* 39*   CREATININE 6.8* 5.7*   CALCIUM 7.9* 8.2*   ALBUMIN 2.0* 1.7*   PROT 6.8 6.6   BILITOT 1.8* 1.7*   ALKPHOS 186* 186*   ALT 28 26   AST 91* 64*   MG 2.3 2.0   PHOS 3.3 2.0*       Significant Imaging:  I have reviewed and interpreted all pertinent imaging results/findings within the past 24 hours.      ABG  Recent Labs   Lab 03/15/19  0615   PH 7.396   PO2 83   PCO2 38.7   HCO3 23.8*   BE -1     Assessment/Plan:     Neuro   * Acute encephalopathy    - pt presented with acute encephalopathy - attributed to hepatic etiology. Infection workup largely negative so far. CT Head negative. Electrolytes, glucose, mild hypercarbia on blood gas  - pt intubated 3/12 - present due to low GCS  - EEG ordered 3/14/19: results pending  - continue lactulose titrated to 3-4 BM/day  - avoid sedative agents  - neuro checks per shift     Pulmonary   Acute hypoxemic respiratory failure    - ABG on 3/12: 7.38/36.7/94 sating 97%  - intubated for acute encephalopathy. Mechanical ventilation set at A/C , RR 18, PEEP 5 FiO2 30%  - will perform SBT once patient is awake and following commands     Cardiac/Vascular   (HFpEF) heart failure with preserved ejection fraction    - ECHO 3/13/19 noted EF 65% with diastolic dysfunction, reduced RV function, moderate RVE - likely due to underlying liver disease  - currently euvolemic;  holding diuretics at this time  - will continue to monitor in and out  - daily weights     Essential hypertension    - holding carvedilol and hydralazine at this time     Renal/   ESRD on hemodialysis    Secondary hyperparathyroidism  Vitamin D deficiency  - Nephrology consulted. Last HD 3/13/19. Appreciate recs      Oncology   Leukocytosis    - concern for septic etiology however workup so far (blood cultures, CXR) negative.   - febrile overnight will re-culture blood and CXR. Pt is anuric. Started on vanc and cefepime 3/14/19. Will continue 7 day course treatment for pneumonia  - Transplant ID consulted for recs; CMV PCR ordered with AM labs. appreciate assistance     HCC (hepatocellular carcinoma)    - first diagnosed in Dec 2017 via biopsy. Underwent RFA on 2/27/18. Follow up imaging on Dec 2019 noted recurrence and multiple new liver lesions throughout liver. He was seen by Dr. Perez ([facility]) in clinic who mentions 20+ lbs weight loss in 3 months, poor appetite. PET CT 1/26/19 suggestive of lesions in right and left lobe as well as a focus of uptake adjacent to humeral head. Recently started on Y 90 therapy. Role of concurrent chemotherapy still under investigation  - Hepatology following along. Appreciate recs.     Endocrine   Controlled type 2 diabetes mellitus with chronic kidney disease on chronic dialysis, with long-term current use of insulin    - A1c 4.8  - hyperglycemia in the setting of tube feeds  - POCT q4h -> aspart 3U q4h and MD FREITAS prn  - will continue to monitor and adjust insulin dose     GI   Liver transplanted 6/10/2001    -- Hepatology consult for assistance with immunosuppressed medications     Decompensated hepatic cirrhosis    Liver transplanted 2001  Immunosuppressed status- on tacrolimus  - Hepatology consulted. Recommend 0.5mg tacrolimus daily  MELD-Na score: 28 at 3/13/2019  4:56 AM  MELD score: 28 at 3/13/2019  4:56 AM  Calculated from:  Serum Creatinine: 5.7 mg/dL (Rounded to 4  mg/dL) at 3/13/2019  4:56 AM  Serum Sodium: 138 mmol/L (Rounded to 137 mmol/L) at 3/13/2019  4:56 AM  Total Bilirubin: 1.7 mg/dL at 3/13/2019  4:56 AM  INR(ratio): 1.7 at 3/12/2019 12:06 AM  Age: 73 years       Orthopedic   Chronic gout    - continue home allopurinol     Other   KARON (obstructive sleep apnea)    -- Patient currently intubated        Critical Care Daily Checklist:    A: Awake: RASS Goal/Actual Goal: RASS Goal: 0-->alert and calm  Actual: Mayo Agitation Sedation Scale (RASS): Light sedation   B: Spontaneous Breathing Trial Performed? Spon. Breathing Trial Initiated?: (HEMODYNAICALLY UNSTABLE, NOT ABLE TO WEAN TODAY) (03/14/19 9853)   C: SAT & SBT Coordinated?  Off sedation  Not awake to do SBT                      D: Delirium: CAM-ICU Overall CAM-ICU: Positive   E: Early Mobility Performed? Range of motion exercises encouraged with bedside nurses   F: Feeding Goal: Goals: Meet % EEN, EPN  Status: Nutrition Goal Status: new   Current Diet Order   Procedures    Diet NPO      AS: Analgesia/Sedation none   T: Thromboembolic Prophylaxis heparin   H: HOB > 300 yes   U: Stress Ulcer Prophylaxis (if needed) famotidine   G: Glucose Control aspart 3U + MD SSI   B: Bowel Function Stool Occurrence: 1   I: Indwelling Catheter (Lines & Rodriguez) Necessity Peripheral x2   D: De-escalation of Antimicrobials/Pharmacotherapies     Plan for the day/ETD 1. Will follow up micro cultures (blood, resp)  2. Continue vanc/cefepime for 7 day treatment for pneumonia  3. Will follow up EEG results  4. Family talk - GOC    Code Status:  Family/Goals of Care: Full Code         Critical secondary to Patient has a condition that poses threat to life and bodily function: Severe Respiratory Distress      Critical care was time spent personally by me on the following activities: development of treatment plan with patient or surrogate and bedside caregivers, discussions with consultants, evaluation of patient's response to  treatment, examination of patient, ordering and performing treatments and interventions, ordering and review of laboratory studies, ordering and review of radiographic studies, pulse oximetry, re-evaluation of patient's condition. This critical care time did not overlap with that of any other provider or involve time for any procedures.     Malathi Collins MD  Critical Care Medicine  [facility]

## 2019-03-15 NOTE — SUBJECTIVE & OBJECTIVE
Interval History: Waking some with noxious stimuli today. Afebrile now. Cx NGTD but young.    Review of Systems   Unable to perform ROS: Intubated     Objective:     Vital Signs (Most Recent):  Temp: 98.1 °F (36.7 °C) (03/15/19 1500)  Pulse: 74 (03/15/19 1538)  Resp: 19 (03/15/19 1538)  BP: (!) 112/58 (03/15/19 1500)  SpO2: 98 % (03/15/19 1538) Vital Signs (24h Range):  Temp:  [96.7 °F (35.9 °C)-101.1 °F (38.4 °C)] 98.1 °F (36.7 °C)  Pulse:  [] 74  Resp:  [17-25] 19  SpO2:  [93 %-100 %] 98 %  BP: ()/(51-61) 112/58     Weight: 93 kg (205 lb)  Body mass index is 27.8 kg/m².    Estimated Creatinine Clearance: 12.5 mL/min (A) (based on SCr of 5.7 mg/dL (H)).    Physical Exam   Constitutional: He appears well-nourished.   Very somnolent on exam. Minimal response to pain   HENT:   Head: Normocephalic and atraumatic.   Neck: Normal range of motion.   Cardiovascular: Normal rate, regular rhythm, normal heart sounds and intact distal pulses.   Pulmonary/Chest: Effort normal. No respiratory distress. He has no rales.   Intubated on mechanical vent   Abdominal: Bowel sounds are normal. He exhibits distension. There is no tenderness. There is no guarding.   Musculoskeletal: Normal range of motion.   Neurological:   Very somnolent.    Skin: Skin is warm and dry.   Psychiatric: He has a normal mood and affect.       Significant Labs:   CBC:   Recent Labs   Lab 03/14/19  0450 03/15/19  0258   WBC 14.14* 14.44*   HGB 9.4* 9.7*   HCT 29.4* 30.7*    166     CMP:   Recent Labs   Lab 03/14/19  0450 03/15/19  0258    141   K 4.4 3.9    105   CO2 19* 23   * 312*   BUN 44* 39*   CREATININE 6.8* 5.7*   CALCIUM 7.9* 8.2*   PROT 6.8 6.6   ALBUMIN 2.0* 1.7*   BILITOT 1.8* 1.7*   ALKPHOS 186* 186*   AST 91* 64*   ALT 28 26   ANIONGAP 14 13   EGFRNONAA 7.3* 9.0*       Significant Imaging: I have reviewed all pertinent imaging results/findings within the past 24 hours.

## 2019-03-15 NOTE — PROGRESS NOTES
"[facility]  Hepatology  Progress Note    Patient Name: Philip Mathis III  MRN: [id number]  Admission Date: 3/11/2019  Hospital Length of Stay: 4 days  Attending Provider: Cliff Luna MD   Primary Care Physician: Reji Castillo MD  Principal Problem:Acute encephalopathy    Subjective:   HPI: 74 year old male with a history of Hep C cirrhosis s/p transplant (2001) complicated by HCC/PVT s/p ablation on 3/7/19 and ESRD on who Hepatology is being consulted for decompensated cirrhosis.     Per HPI:  "Per wife, this AM patient did not wake up to his alarm to go to HD, she then later heard patient getting out of bed and heard a thump. She found the patient on the ground and very confused and not recognizing her. She called her neighbor to come and help her. EMS was later called, and patient brought to ED. Pt is very lethargic since that time, per wife, he has moan all day, unable to talk to her. This is unusual for him as pt normally drives himself to HD regularly. She manages all his medications and report that she has not given him any sedating meds (no Oxycodone, Robaxin, Klonopin, Zanaflex). She report that over the last few days, pt report abdominal swelling and pain to her. In fact they contacted liver transplant coordinator, who was setting up an US of the abdomen. She report that he might have skip a few dose of lactulose due to diarrhea the last few days. She report that he has been in confused state like this in the past due to Hepatic Encephalopathy. Pt also recently had HCC treatment by IR last week, on Cipro for. At this time 11:53 PM, on evaluation, pt is unarousable, appear to be in sleep like state, per wife, he has been like this all day. 1x dose of narcan given with no improvement. Pt would moan to pain. Glucose check is 170.  At  ED, patient has undergone extensive evaluation with CTA of chest negative for PE as patient was mildly hypoxic on arrival and showed bilateral " "pleural effusions but nothing else significant. CT scan of abdomen showed moderate ascites and large hepatic tumor with necrosis. CT scan of head and C-spine were unremarkable. Ammonia was 45 and Lactic acid was 1.2. WBC was 12,200 but patient afebrile and not tachycardic. U/A negative for infection. Patient due for HD today and did not get HD and BUN/Cr was 40/6.5. In ED, patient remains acutely confused and not responding to questions appropriately. Patient has not focal neurologic deficits. Dr. Verdin at [facility] called and stated patient could be admitted to [facility] but Hospital medicine did not fell comfortable admitting patient for encephalopathy work-up so patient to be transferred and admitted to [facility]. Patient empirically given broad spectrum abx at [facility] including Vancomycin. Zosyn and Ceftriaxone."     Interval history:  Patient seen twice this morning and during both instance remained completely altered with no family at bedside.    Interval History:   Patient febrile overnight.  On antibiotics, seen by ID, and on EEG.    Current Facility-Administered Medications   Medication    acetaminophen tablet 650 mg    allopurinol tablet 100 mg    ceFEPIme injection 2 g    chlorhexidine 0.12 % solution 15 mL    dextrose 50% injection 12.5 g    famotidine tablet 20 mg    glucagon (human recombinant) injection 1 mg    heparin (porcine) injection 5,000 Units    insulin aspart U-100 pen 1-10 Units    insulin aspart U-100 pen 3 Units    lactulose 20 gram/30 mL solution Soln 30 g    ondansetron disintegrating tablet 8 mg    polyethylene glycol packet 17 g    promethazine tablet 25 mg    ramelteon tablet 8 mg    rifAXIMin tablet 550 mg    sodium chloride 0.9% flush 5 mL    tacrolimus (PROGRAF) 1 mg/mL oral syringe       Objective:     Vital Signs (Most Recent):  Temp: 98.1 °F (36.7 °C) (03/15/19 1500)  Pulse: 70 (03/15/19 1723)  Resp: (!) 21 (03/15/19 1723)  BP: (!) 109/59 (03/15/19 " 1700)  SpO2: 100 % (03/15/19 1723) Vital Signs (24h Range):  Temp:  [96.7 °F (35.9 °C)-101.1 °F (38.4 °C)] 98.1 °F (36.7 °C)  Pulse:  [] 70  Resp:  [17-24] 21  SpO2:  [93 %-100 %] 100 %  BP: ()/(51-61) 109/59     Weight: 93 kg (205 lb) (03/13/19 1400)  Body mass index is 27.8 kg/m².    Physical Exam   Constitutional: No distress.   HENT:   Head: Normocephalic and atraumatic.   Eyes: No scleral icterus.   Cardiovascular: Normal rate and regular rhythm.   Pulmonary/Chest:   Decreased breath sounds bilaterally   Abdominal: Bowel sounds are normal. He exhibits distension. There is no tenderness.   Musculoskeletal: He exhibits no edema.   Neurological:   Intubated   Skin: He is not diaphoretic.   Nursing note and vitals reviewed.      MELD-Na score: 29 at 3/15/2019  2:58 AM  MELD score: 29 at 3/15/2019  2:58 AM  Calculated from:  Serum Creatinine: 5.7 mg/dL (Rounded to 4 mg/dL) at 3/15/2019  2:58 AM  Serum Sodium: 141 mmol/L (Rounded to 137 mmol/L) at 3/15/2019  2:58 AM  Total Bilirubin: 1.7 mg/dL at 3/15/2019  2:58 AM  INR(ratio): 1.9 at 3/15/2019  2:58 AM  Age: 73 years    Significant Labs:  CBC:   Recent Labs   Lab 03/15/19  0258   WBC 14.44*   RBC 3.46*   HGB 9.7*   HCT 30.7*        CMP:   Recent Labs   Lab 03/15/19  0258   *   CALCIUM 8.2*   ALBUMIN 1.7*   PROT 6.6      K 3.9   CO2 23      BUN 39*   CREATININE 5.7*   ALKPHOS 186*   ALT 26   AST 64*   BILITOT 1.7*     Coagulation:   Recent Labs   Lab 03/11/19  0150  03/15/19  0258   INR 1.5*   < > 1.9*   APTT 37.4*  --   --     < > = values in this interval not displayed.       Significant Imaging:  X-Ray: Reviewed  US: Reviewed  CT: Reviewed    Assessment/Plan:     Decompensated hepatic cirrhosis    74 year old male with a history of Hep C cirrhosis s/p transplant (2001) 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 Remains altered and spike  another fever last night. At this time would continue supportive care but if not improving goals of care discussion would seem appropriate. Overall guarded prognosis.    Recommendations:  --Daily CMP, CBC, INR  --Daily tacrolimus level  --Continue current dose of tacrolimus  --Continue antibiotics  --Continue lactulose and rifaximin  --Nephrology, ID, and Neuro recs appreciated            Thank you for your consult. I will follow-up with patient. Please contact us if you have any additional questions.    Winter Sheth M.D.  Gastroenterology Fellow, PGY-V  Pager: [phone]  [facility]

## 2019-03-15 NOTE — ASSESSMENT & PLAN NOTE
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 Remains altered and spike another fever last night. At this time would continue supportive care but if not improving goals of care discussion would seem appropriate. Overall guarded prognosis.    Recommendations:  --Daily CMP, CBC, INR  --Daily tacrolimus level  --Continue current dose of tacrolimus  --Continue antibiotics  --Continue lactulose and rifaximin  --Nephrology, ID, and Neuro recs appreciated

## 2019-03-15 NOTE — PROCEDURES
DATE OF SERVICE:  03/14/2019    EEG NUMBER:  [id number].    LOCATION OF SERVICE:  [facility].    REQUESTING PHYSICIAN:  Dr. Martinez.    ICU EEG/VIDEO MONITORING REPORT    METHODOLOGY:  Electroencephalographic (EEG) is recorded with electrodes placed   according to the International 10-20 placement system.  Thirty two (32) channels   of digital signal (sampling rate of 512/sec), including T1 and T2, were   simultaneously recorded from the scalp and may include EKG, EMG, and/or eye   monitors.  Recording band pass was 0.1 to 512 Hz.  Digital video recording of   the patient is simultaneously recorded with the EEG.  The patient is instructed   to report clinical symptoms which may occur during the recording session.  EEG   and video recording are stored and archived in digital format.  Activation   procedures, which include photic stimulation, hyperventilation and instructing   patients to perform simple tasks, are done in selected patients.    The EEG is displayed on a monitor screen and can be reviewed using different   montages.  Computer-assisted analysis is employed to detect spike and   electrographic seizure activity.  The entire record is submitted for computer   analysis.  The entire recording is visually reviewed, and the times identified   by computer analysis as being spikes or seizures are reviewed again.    Compressed spectral analysis (CSA) is also performed on the activity recorded   from each individual channel.  This is displayed as a power display of   frequencies from 0 to 30 Hz over time.  The CSA is reviewed looking for   asymmetries in power between homologous areas of the scalp, then compared with   the original EEG recording.    [employer] software was also utilized in the review of this study.  This software   suite analyzes the EEG recording in multiple domains.  Coherence and rhythmicity   are computed to identify EEG sections which may contain organized seizures.    Each channel undergoes analysis to  detect the presence of spike and sharp waves   which have special and morphological characteristics of epileptic activity.  The   routine EEG recording is converted from special into frequency domain.  This is   then displayed comparing homologous areas to identify areas of significant   asymmetry.  Algorithm to identify non-cortically generated artifact is used to   separate artifact from the EEG.    RECORDING TIMES:  Start on 03/14/2019 at 14:59.  End on 03/15/2019 at 07:00.  A total of 14 hours and 58 minutes of EEG monitoring was obtained.    EEG FINDINGS:  Recording was obtained at the patient's bedside in the ICU.  The   patient was unresponsive, intubated and on a respirator.  Background initially   consisted of a diffuse mixture of low and mid range beta with occasional 2 Hz   delta intermixed.  This pattern was symmetrical over the 2 hemispheres.  There   were portions in which biphasic and triphasic theta complexes which were   somewhat sharply contoured without any anterior-posterior gradient were noted.    These occurred as single events and later was noted to occur in short runs over   both hemispheres, but more prominent on the left.  This occurred intermittently.    There are also sections in which this resolved for a couple of hours.  Theta   complexes did have the appearance of a lateralized periodic ictal discharge.    However, this is seen only in the initial portions of the recording.    Subsequently, only diffuse slowing of theta and delta components which overall   was symmetrical over the two hemispheres was noted.  Activation procedures were   not carried out.    IMPRESSION:  Markedly abnormal EEG with diffuse slowing indicative of a marked   encephalopathy, which is nonspecific and nonfocal.  They were noted initially,   which had an irritable appearance, but are not noted in the last half of the   recording.  No clear behavioral or electrographic seizures were recorded.      RR/IN  dd:  03/15/2019 09:29:34 (CDT)  td: 03/15/2019 11:06:28 (MERLY)  Doc ID   #[id number]  Job ID #[id number]    CC:

## 2019-03-15 NOTE — PLAN OF CARE
Problem: Adult Inpatient Plan of Care  Goal: Plan of Care Review  Outcome: Ongoing (interventions implemented as appropriate)  No acute changes throughout shift. Palliative care talked with family this afternoon. DNR established.

## 2019-03-16 PROBLEM — R23.9 ALTERATION IN SKIN INTEGRITY: Chronic | Status: ACTIVE | Noted: 2019-01-01

## 2019-03-16 PROBLEM — Z51.5 PALLIATIVE CARE ENCOUNTER: Chronic | Status: ACTIVE | Noted: 2019-01-01

## 2019-03-16 NOTE — PROGRESS NOTES
"[facility]  Nephrology  Progress Note    Patient Name: Philip Mathis III  MRN: [id number]  Admission Date: 3/11/2019  Hospital Length of Stay: 5 days  Attending Provider: Cliff Luna MD   Primary Care Physician: Reji Castillo MD  Principal Problem:Acute encephalopathy    Subjective:     HPI: This is a 74y/o male with hepatocellular carcinoma undergoing radiofrequency ablation by IR last treatment was 3/7, orthotopic liver transplant on 6/10/2001 on Prograf, ESRD on hemodialysis (MWF), Type 2 diabetes mellitus on long term insulin, essential HTN, chronic combined systolic and diastolic heart failure, KARON and history of hepatitis C (treated) who was transfered to ICU at [facility] for acute encephalopathy. He presented initially to [facility] ED on the morning of 3/11 for acute confusion.  Information was obtained from wife and chart review, as patient remains disoriented on my examination.  Per wife, he was last seen normal on Sunday.  During the day, he was c/o abdominal pains and "just feeling tired", in which she thought was normal as he recently had an ablation on 03/07 for treatment of his HCC.  On the morning of 3/11, she tried to wake him for for his dialysis appointment (in which he normally is responsive and wakes himself up). She then later heard patient getting out of bed and heard a thump. She found the patient on the ground and very confused and not recognizing her. She called her neighbor to come and help her. EMS was later called, and patient brought to ED. Pt is very lethargic since that time, per wife, he has moan all day, unable to talk to her. This is unusual for him as pt normally drives himself to HD regularly. She manages all his medications and report that she has not given him any sedating meds (no Oxycodone, Robaxin, Klonopin, Zanaflex). She report that over the last few days, pt report abdominal swelling and pain to her. In fact they contacted liver transplant " coordinator, who was setting up an US of the abdomen. She report that he might have skip a few dose of lactulose due to diarrhea the last few days. She report that he has been in confused state like this in the past due to Hepatic Encephalopathy. He received 1x dose of narcan given with no improvement. Pt would moan to pain.      At  ED, patient has undergone extensive evaluation with CTA of chest negative for PE as patient was mildly hypoxic on arrival and showed bilateral pleural effusions but nothing else significant. CT scan of abdomen showed moderate ascites and large hepatic tumor with necrosis. CT scan of head and C-spine were unremarkable. Ammonia was 45 and Lactic acid was 1.2. WBC was 12,200 but patient afebrile and not tachycardic. U/A negative for infection. Patient due for HD today and did not get HD and BUN/Cr was 40/6.5. In ED, patient remains acutely confused and not responding to questions appropriately. Patient has not focal neurologic deficits. Dr. Verdin at [facility] called and stated patient could be admitted to [facility] but Hospital medicine did not fell comfortable admitting patient for encephalopathy work-up so patient to be transferred and admitted to [facility]. Patient empirically given broad spectrum abx at [facility] including Vancomycin. Zosyn and Ceftriaxone.      Wife reports that patient has had worsening abdominal distension for the past several months, and nobody has really explained why this is happening.  ICU attempted a bedside paracentesis on admission, but no safe pocket was identified.  His last ECHO performed in October of 2017 revealed HFpEF with pHTN and enlarged IVC.  She reports compliance with his dialysis treatments, being dialyzed at [facility] under the care of Dr. Elizabeth.  No further information is available at this time.  He has a ORLIN AVF with residual renal function, usually taking lasix 80 mg BID on his off dialysis days.              Interval History:  "tentatively for HD today    Review of patient's allergies indicates:   Allergen Reactions    Adhesive Dermatitis and Other (See Comments)     "Burned his tail"  Please use Paper Tape    Corticosteroids (glucocorticoids) Other (See Comments)     Leg swelling after an injection    Hydrocortisone Swelling     Leg swelling after an injection    Neuromuscular blockers, steroidal Swelling     Leg swelling after an injection    Ribavirin Other (See Comments)     Arthralgias    Propofol analogues Other (See Comments)     Drops SVR and due to current systemic shunt state, results in difficulty in oscillometry to obtain blood pressure until return of vascular tone (other VS are stable throughout) and drop in venous return due to abdominal ascites; titrate slower in future or select alternate agent. Smooth emergence from general anesthesia without difficulty.     Current Facility-Administered Medications   Medication Frequency    acetaminophen tablet 650 mg Q8H PRN    allopurinol tablet 100 mg QHS    ceFEPIme injection 2 g Q24H    chlorhexidine 0.12 % solution 15 mL BID    dextrose 50% injection 12.5 g PRN    famotidine tablet 20 mg Daily    glucagon (human recombinant) injection 1 mg PRN    heparin (porcine) injection 5,000 Units Q8H    insulin aspart U-100 pen 1-10 Units Q4H PRN    insulin aspart U-100 pen 3 Units Q4H    lactulose 20 gram/30 mL solution Soln 30 g Q6H    ondansetron disintegrating tablet 8 mg Q8H PRN    polyethylene glycol packet 17 g BID PRN    promethazine tablet 25 mg Q6H PRN    ramelteon tablet 8 mg Nightly PRN    rifAXIMin tablet 550 mg BID    sodium chloride 0.9% flush 5 mL PRN    tacrolimus (PROGRAF) 1 mg/mL oral syringe BID       Objective:     Vital Signs (Most Recent):  Temp: 99 °F (37.2 °C) (03/16/19 0701)  Pulse: 95 (03/16/19 1520)  Resp: (!) 23 (03/16/19 1520)  BP: (!) 140/68 (03/16/19 1400)  SpO2: (!) 93 % (03/16/19 1400)  O2 Device (Oxygen Therapy): ventilator (03/16/19 " 1307) Vital Signs (24h Range):  Temp:  [98.5 °F (36.9 °C)-99.6 °F (37.6 °C)] 99 °F (37.2 °C)  Pulse:  [] 95  Resp:  [18-29] 23  SpO2:  [93 %-100 %] 93 %  BP: ()/(52-68) 140/68     Weight: 93 kg (205 lb) (03/13/19 1400)  Body mass index is 27.8 kg/m².  Body surface area is 2.17 meters squared.    I/O last 3 completed shifts:  In: 2165 [NG/GT:2165]  Out: 2700 [Stool:2700]    Physical Exam   HENT:   Head: Atraumatic.   Neck: No JVD present.   Cardiovascular: Normal rate and regular rhythm. Exam reveals no friction rub.   Pulmonary/Chest: Effort normal and breath sounds normal.   Abdominal: Soft. He exhibits no distension.   Musculoskeletal: He exhibits no edema.   Neurological: He is alert.   Skin: Skin is warm.   Psychiatric: He has a normal mood and affect.       Significant Labs:  CBC:   Recent Labs   Lab 03/16/19  0611   WBC 17.02*   RBC 3.57*   HGB 9.9*   HCT 31.5*      MCV 88   MCH 27.7   MCHC 31.4*     CMP:   Recent Labs   Lab 03/16/19  0611   *   CALCIUM 8.7   ALBUMIN 1.8*   PROT 7.2      K 4.2   CO2 24      BUN 61*   CREATININE 7.4*   ALKPHOS 194*   ALT 24   AST 54*   BILITOT 1.8*     All labs within the past 24 hours have been reviewed.       Assessment/Plan:     ESRD on hemodialysis    ESRD on iHD  [facility]  4 hours  Dr. Clara ORDAZ AVF  Wife reports an EDW of 94 kg (93 kg on admission)    Plan/Recommendations:  -HD at bedside today, goal UF 1-2L         Thank you for your consult. I will follow-up with patient. Please contact us if you have any additional questions.    Kyler De La Cruz MD  Nephrology  [facility]

## 2019-03-16 NOTE — SUBJECTIVE & OBJECTIVE
Interval History:   No acute events overnight.  Remains altered, increasing WBC.    Current Facility-Administered Medications   Medication    acetaminophen tablet 650 mg    allopurinol tablet 100 mg    ceFEPIme injection 2 g    chlorhexidine 0.12 % solution 15 mL    dextrose 50% injection 12.5 g    famotidine tablet 20 mg    glucagon (human recombinant) injection 1 mg    heparin (porcine) injection 5,000 Units    insulin aspart U-100 pen 1-10 Units    insulin aspart U-100 pen 3 Units    lactulose 20 gram/30 mL solution Soln 30 g    ondansetron disintegrating tablet 8 mg    polyethylene glycol packet 17 g    promethazine tablet 25 mg    ramelteon tablet 8 mg    rifAXIMin tablet 550 mg    sodium chloride 0.9% flush 5 mL    tacrolimus (PROGRAF) 1 mg/mL oral syringe       Objective:     Vital Signs (Most Recent):  Temp: 99 °F (37.2 °C) (03/16/19 0701)  Pulse: 96 (03/16/19 1330)  Resp: (!) 23 (03/16/19 1330)  BP: (!) 140/66 (03/16/19 1330)  SpO2: (!) 94 % (03/16/19 1330) Vital Signs (24h Range):  Temp:  [98.1 °F (36.7 °C)-99.6 °F (37.6 °C)] 99 °F (37.2 °C)  Pulse:  [65-96] 96  Resp:  [18-29] 23  SpO2:  [93 %-100 %] 94 %  BP: ()/(52-68) 140/66     Weight: 93 kg (205 lb) (03/13/19 1400)  Body mass index is 27.8 kg/m².    Physical Exam   Constitutional: No distress.   HENT:   Head: Normocephalic and atraumatic.   Eyes: No scleral icterus.   Cardiovascular: Normal rate and regular rhythm.   Pulmonary/Chest: Effort normal and breath sounds normal.   Abdominal: Bowel sounds are normal. He exhibits distension. There is no tenderness.   Musculoskeletal: He exhibits no edema.   Neurological:   Intubated   Skin: He is not diaphoretic.   Nursing note and vitals reviewed.      MELD-Na score: 27 at 3/16/2019  6:11 AM  MELD score: 27 at 3/16/2019  6:11 AM  Calculated from:  Serum Creatinine: 7.4 mg/dL (Rounded to 4 mg/dL) at 3/16/2019  6:11 AM  Serum Sodium: 144 mmol/L (Rounded to 137 mmol/L) at 3/16/2019  6:11  AM  Total Bilirubin: 1.8 mg/dL at 3/16/2019  6:11 AM  INR(ratio): 1.6 at 3/16/2019  3:29 AM  Age: 73 years    Significant Labs:  CBC:   Recent Labs   Lab 03/16/19  0611   WBC 17.02*   RBC 3.57*   HGB 9.9*   HCT 31.5*        CMP:   Recent Labs   Lab 03/16/19  0611   *   CALCIUM 8.7   ALBUMIN 1.8*   PROT 7.2      K 4.2   CO2 24      BUN 61*   CREATININE 7.4*   ALKPHOS 194*   ALT 24   AST 54*   BILITOT 1.8*     Coagulation:   Recent Labs   Lab 03/11/19  0150  03/16/19  0329   INR 1.5*   < > 1.6*   APTT 37.4*  --   --     < > = values in this interval not displayed.       Significant Imaging:  X-Ray: Reviewed  US: Reviewed  CT: Reviewed

## 2019-03-16 NOTE — ASSESSMENT & PLAN NOTE
- concern for septic etiology however workup so far (blood cultures, CXR) negative.   - febrile overnight will re-culture blood and CXR. Pt is anuric. Continue cefepime. Plan to deescalate to Augmentin pending tracheal aspirate cultures.   - Transplant ID consulted for recs; CMV PCR pending.

## 2019-03-16 NOTE — SUBJECTIVE & OBJECTIVE
Interval History/Significant Events: NAEON. Exam appears to be able to intermittantly follow some commands.    Review of Systems   Unable to perform ROS: Intubated   Eyes: Negative for redness.   Respiratory: Negative for wheezing.      Objective:     Vital Signs (Most Recent):  Temp: 99 °F (37.2 °C) (03/16/19 0701)  Pulse: 96 (03/16/19 1330)  Resp: (!) 23 (03/16/19 1330)  BP: (!) 140/66 (03/16/19 1330)  SpO2: (!) 94 % (03/16/19 1330) Vital Signs (24h Range):  Temp:  [98.1 °F (36.7 °C)-99.6 °F (37.6 °C)] 99 °F (37.2 °C)  Pulse:  [65-96] 96  Resp:  [18-29] 23  SpO2:  [93 %-100 %] 94 %  BP: ()/(52-68) 140/66   Weight: 93 kg (205 lb)  Body mass index is 27.8 kg/m².      Intake/Output Summary (Last 24 hours) at 3/16/2019 1354  Last data filed at 3/16/2019 1300  Gross per 24 hour   Intake 940 ml   Output 900 ml   Net 40 ml       Physical Exam   Constitutional: He appears well-nourished.   Very somnolent on exam. Minimal response to pain   HENT:   Head: Normocephalic and atraumatic.   Eyes: Pupils are equal, round, and reactive to light.   Neck: Normal range of motion.   Cardiovascular: Normal rate, regular rhythm, normal heart sounds and intact distal pulses.   Pulmonary/Chest: Effort normal. No respiratory distress. He has no rales.   Intubated on mechanical vent   Abdominal: Bowel sounds are normal. He exhibits distension. There is tenderness. There is no guarding.   Musculoskeletal: Normal range of motion.   Neurological: He is alert.   Arousable  Intermittantly follows commands- squeezes hand, wiggles toes     Skin: Skin is warm and dry.   Psychiatric: He has a normal mood and affect.   Vitals reviewed.      Vents:  Vent Mode: Spont (03/16/19 1307)  Ventilator Initiated: Yes (03/12/19 8823)  Set Rate: 0 bmp (03/16/19 1307)  Vt Set: 500 mL (03/16/19 1307)  Pressure Support: 5 cmH20 (03/16/19 1307)  PEEP/CPAP: 5 cmH20 (03/16/19 1307)  Oxygen Concentration (%): 30 (03/16/19 1330)  Peak Airway Pressure: 10 cmH2O  (03/16/19 1307)  Plateau Pressure: 18 cmH20 (03/16/19 1307)  Total Ve: 8.08 mL (03/16/19 1307)  F/VT Ratio<105 (RSBI): (!) 55.56 (03/16/19 1307)  Lines/Drains/Airways     Drain                 Hemodialysis AV Fistula 11/08/16 1553 Left forearm 857 days         NG/OG Tube 03/12/19 1520 nasogastric Right nostril 3 days         Rectal Tube 03/13/19 1300 rectal tube w/ balloon (indicate number of mLs) 3 days          Airway                 Airway - Non-Surgical 03/12/19 1709 Endotracheal Tube 3 days          Peripheral Intravenous Line                 Peripheral IV Triple Lumen 03/12/19 1330 Anterior;Right Hand 4 days         Peripheral IV - Single Lumen 03/14/19 0630 Anterior;Right Forearm 2 days              Significant Labs:    CBC/Anemia Profile:  Recent Labs   Lab 03/15/19  0258 03/16/19  0611   WBC 14.44* 17.02*   HGB 9.7* 9.9*   HCT 30.7* 31.5*    153   MCV 89 88   RDW 20.5* 21.4*        Chemistries:  Recent Labs   Lab 03/15/19  0258 03/16/19  0611    144   K 3.9 4.2    108   CO2 23 24   BUN 39* 61*   CREATININE 5.7* 7.4*   CALCIUM 8.2* 8.7   ALBUMIN 1.7* 1.8*   PROT 6.6 7.2   BILITOT 1.7* 1.8*   ALKPHOS 186* 194*   ALT 26 24   AST 64* 54*   MG 2.0 2.5   PHOS 2.0* 2.1*       Blood Culture: No results for input(s): LABBLOO in the last 48 hours.  CMP:   Recent Labs   Lab 03/15/19  0258 03/16/19  0611    144   K 3.9 4.2    108   CO2 23 24   * 263*   BUN 39* 61*   CREATININE 5.7* 7.4*   CALCIUM 8.2* 8.7   PROT 6.6 7.2   ALBUMIN 1.7* 1.8*   BILITOT 1.7* 1.8*   ALKPHOS 186* 194*   AST 64* 54*   ALT 26 24   ANIONGAP 13 12   EGFRNONAA 9.0* 6.6*       Significant Imaging:  I have reviewed and interpreted all pertinent imaging results/findings within the past 24 hours.

## 2019-03-16 NOTE — ASSESSMENT & PLAN NOTE
Liver transplanted 2001  Immunosuppressed status- on tacrolimus  - Hepatology consulted. Recommend 0.5mg tacrolimus daily  MELD-Na score: 27 at 3/16/2019  6:11 AM  MELD score: 27 at 3/16/2019  6:11 AM  Calculated from:  Serum Creatinine: 7.4 mg/dL (Rounded to 4 mg/dL) at 3/16/2019  6:11 AM  Serum Sodium: 144 mmol/L (Rounded to 137 mmol/L) at 3/16/2019  6:11 AM  Total Bilirubin: 1.8 mg/dL at 3/16/2019  6:11 AM  INR(ratio): 1.6 at 3/16/2019  3:29 AM  Age: 73 years

## 2019-03-16 NOTE — SUBJECTIVE & OBJECTIVE
"Interval History: tentatively for HD today    Review of patient's allergies indicates:   Allergen Reactions    Adhesive Dermatitis and Other (See Comments)     "Burned his tail"  Please use Paper Tape    Corticosteroids (glucocorticoids) Other (See Comments)     Leg swelling after an injection    Hydrocortisone Swelling     Leg swelling after an injection    Neuromuscular blockers, steroidal Swelling     Leg swelling after an injection    Ribavirin Other (See Comments)     Arthralgias    Propofol analogues Other (See Comments)     Drops SVR and due to current systemic shunt state, results in difficulty in oscillometry to obtain blood pressure until return of vascular tone (other VS are stable throughout) and drop in venous return due to abdominal ascites; titrate slower in future or select alternate agent. Smooth emergence from general anesthesia without difficulty.     Current Facility-Administered Medications   Medication Frequency    acetaminophen tablet 650 mg Q8H PRN    allopurinol tablet 100 mg QHS    ceFEPIme injection 2 g Q24H    chlorhexidine 0.12 % solution 15 mL BID    dextrose 50% injection 12.5 g PRN    famotidine tablet 20 mg Daily    glucagon (human recombinant) injection 1 mg PRN    heparin (porcine) injection 5,000 Units Q8H    insulin aspart U-100 pen 1-10 Units Q4H PRN    insulin aspart U-100 pen 3 Units Q4H    lactulose 20 gram/30 mL solution Soln 30 g Q6H    ondansetron disintegrating tablet 8 mg Q8H PRN    polyethylene glycol packet 17 g BID PRN    promethazine tablet 25 mg Q6H PRN    ramelteon tablet 8 mg Nightly PRN    rifAXIMin tablet 550 mg BID    sodium chloride 0.9% flush 5 mL PRN    tacrolimus (PROGRAF) 1 mg/mL oral syringe BID       Objective:     Vital Signs (Most Recent):  Temp: 99 °F (37.2 °C) (03/16/19 0701)  Pulse: 95 (03/16/19 1520)  Resp: (!) 23 (03/16/19 1520)  BP: (!) 140/68 (03/16/19 1400)  SpO2: (!) 93 % (03/16/19 1400)  O2 Device (Oxygen Therapy): " ventilator (03/16/19 1307) Vital Signs (24h Range):  Temp:  [98.5 °F (36.9 °C)-99.6 °F (37.6 °C)] 99 °F (37.2 °C)  Pulse:  [] 95  Resp:  [18-29] 23  SpO2:  [93 %-100 %] 93 %  BP: ()/(52-68) 140/68     Weight: 93 kg (205 lb) (03/13/19 1400)  Body mass index is 27.8 kg/m².  Body surface area is 2.17 meters squared.    I/O last 3 completed shifts:  In: 2165 [NG/GT:2165]  Out: 2700 [Stool:2700]    Physical Exam   HENT:   Head: Atraumatic.   Neck: No JVD present.   Cardiovascular: Normal rate and regular rhythm. Exam reveals no friction rub.   Pulmonary/Chest: Effort normal and breath sounds normal.   Abdominal: Soft. He exhibits no distension.   Musculoskeletal: He exhibits no edema.   Neurological: He is alert.   Skin: Skin is warm.   Psychiatric: He has a normal mood and affect.       Significant Labs:  CBC:   Recent Labs   Lab 03/16/19  0611   WBC 17.02*   RBC 3.57*   HGB 9.9*   HCT 31.5*      MCV 88   MCH 27.7   MCHC 31.4*     CMP:   Recent Labs   Lab 03/16/19  0611   *   CALCIUM 8.7   ALBUMIN 1.8*   PROT 7.2      K 4.2   CO2 24      BUN 61*   CREATININE 7.4*   ALKPHOS 194*   ALT 24   AST 54*   BILITOT 1.8*     All labs within the past 24 hours have been reviewed.

## 2019-03-16 NOTE — ASSESSMENT & PLAN NOTE
Mr Mathis is a 72 yo man with enlarging HCC, decompensated cirrhosis after OLT 2001 on prograf, s/p embolization 3/2019, and normal graft function who presents with loss of consciousness, abdominal swelling, and suspected aspiration event. Ultimately, he remains encephalopathic due to decompensated cirrhosis and advancement of HCC.     Assessment:  Decompensated cirrhosis w/ Hepatic encephalopathy (grade 4)- not improved. CTH-, EEG-  Hepatocellular carcinoma- with enlarging mass, contributing to above. Poor prognosis.   Aspiration pneumonia- suspected source of infection based on RML infiltrate, stable.  Hypercapneic respiratory failure- Intubated, also for airway protection.      Plan:  - Continue aggressive lactulose. Will hold off on C-diff studies at this time.   - Discontinued cefepime, now on Augmentin po (NGT) for aspiration pneumonia  - Will discuss with family on Monday regarding time limited trial for improvement vs palliative extubation.     - pt presented with acute encephalopathy - attributed to hepatic etiology. Infection workup largely negative so far. CT Head negative. Electrolytes, glucose, mild hypercarbia on blood gas  - pt intubated 3/12 - present due to low GCS  - EEG ordered 3/14/19: negative  - continue lactulose titrated to 3-4 BM/day  - avoid sedative agents  - neuro checks per shift

## 2019-03-16 NOTE — PLAN OF CARE
Problem: Adult Inpatient Plan of Care  Goal: Plan of Care Review  No acute events throughout shift, VS and assessment per flow sheet, patient progressing towards goals as tolerated, plan of care reviewed with Philip Mathis III and family, all concerns addressed, will continue to monitor.

## 2019-03-16 NOTE — PROGRESS NOTES
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 He remains critically ill but is improving on empiric broad coverage that can be tailored as he continues to improve based on culture data.    - would continue cefepime while awaiting final blood/deep tracheal aspirate cx (if only normal annabelle tomorrow, can tailor further to augmentin)  - await pending serum CMV quant  - await pending cx to tailor therapy with you         Anticipated Disposition: pending improvement    Thank you for your consult. I will follow-up with patient. Please contact us if you have any additional questions.     Kathia Kim MD  Transplant ID Attending  [id number]    Kathia Kim MD  Infectious Disease  [facility]    Subjective:     Principal Problem:Acute encephalopathy    HPI: No notes on file  Interval History: Waking up more now. Temps only low-grade. Blood cx negative. Sputum with normal annabelle and yeast (likely commensal Candida while on antibiotics and not a pathogen).    Review of Systems   Unable to perform ROS: Intubated     Objective:     Vital Signs (Most Recent):  Temp: 99 °F (37.2 °C) (03/16/19 0701)  Pulse: 91  (03/16/19 0920)  Resp: (!) 22 (03/16/19 0920)  BP: 132/66 (03/16/19 0900)  SpO2: 97 % (03/16/19 0920) Vital Signs (24h Range):  Temp:  [98.1 °F (36.7 °C)-99.6 °F (37.6 °C)] 99 °F (37.2 °C)  Pulse:  [65-91] 91  Resp:  [18-25] 22  SpO2:  [93 %-100 %] 97 %  BP: ()/(52-66) 132/66     Weight: 93 kg (205 lb)  Body mass index is 27.8 kg/m².    Estimated Creatinine Clearance: 9.6 mL/min (A) (based on SCr of 7.4 mg/dL (H)).    Physical Exam   Constitutional: He appears well-nourished.   Responds to stimuli and sponateously on vent.   HENT:   Head: Normocephalic and atraumatic.   Neck: Normal range of motion.   Cardiovascular: Normal rate, regular rhythm, normal heart sounds and intact distal pulses.   Pulmonary/Chest: Effort normal. No respiratory distress. He has no rales.   Intubated on mechanical vent   Abdominal: Bowel sounds are normal. He exhibits distension. There is no tenderness. There is no guarding.   Musculoskeletal: Normal range of motion.   Neurological: He is alert.   Responds to stimuli spontaneously on vent.   Skin: Skin is warm and dry.   Psychiatric: He has a normal mood and affect.       Significant Labs:   CBC:   Recent Labs   Lab 03/15/19  0258 03/16/19  0611   WBC 14.44* 17.02*   HGB 9.7* 9.9*   HCT 30.7* 31.5*    153     CMP:   Recent Labs   Lab 03/15/19  0258 03/16/19  0611    144   K 3.9 4.2    108   CO2 23 24   * 263*   BUN 39* 61*   CREATININE 5.7* 7.4*   CALCIUM 8.2* 8.7   PROT 6.6 7.2   ALBUMIN 1.7* 1.8*   BILITOT 1.7* 1.8*   ALKPHOS 186* 194*   AST 64* 54*   ALT 26 24   ANIONGAP 13 12   EGFRNONAA 9.0* 6.6*       Significant Imaging: I have reviewed all pertinent imaging results/findings within the past 24 hours.

## 2019-03-16 NOTE — ASSESSMENT & PLAN NOTE
Enlarging mass  Poor prognosis     - first diagnosed in Dec 2017 via biopsy. Underwent RFA on 2/27/18. Follow up imaging on Dec 2019 noted recurrence and multiple new liver lesions throughout liver. He was seen by Dr. Perez ([facility]) in clinic who mentions 20+ lbs weight loss in 3 months, poor appetite. PET CT 1/26/19 suggestive of lesions in right and left lobe as well as a focus of uptake adjacent to humeral head. Recently started on Y 90 therapy.   - Hepatology following along. Appreciate recs.

## 2019-03-16 NOTE — ASSESSMENT & PLAN NOTE
74yo man w/a history of HTN, IDDM, KARON, ESRD (on iHD qMFW), and HCV cirrhosis (s/p DDLT 6/10/2001, CMV D+/R+, on maintenance tacro; c/b graft loss due to post-transplant HCC w/PVT dx 11/2017 s/p RFA with POD in 12/2018 s/p chemoembolization last on 3/7; recurrent CDI s/p FMT 8/2015; GBS septicemia in 10/2017) who was admitted on 3/11/2019 with acute onset encephalopathy (found down at night by wife) with antecedent N/V with possible aspiration and abdominal pain due to HE from a likely aspiration pneumonia. He remains critically ill but is improving on empiric broad coverage that can be tailored as he continues to improve based on culture data.    - would continue cefepime while awaiting final blood/deep tracheal aspirate cx (if only normal annabelle tomorrow, can tailor further to augmentin)  - await pending serum CMV quant  - await pending cx to tailor therapy with you

## 2019-03-16 NOTE — PROGRESS NOTES
"[facility]  Hepatology  Progress Note    Patient Name: Philip Mathis III  MRN: [id number]  Admission Date: 3/11/2019  Hospital Length of Stay: 5 days  Attending Provider: Cliff Luna MD   Primary Care Physician: Reji Castillo MD  Principal Problem:Acute encephalopathy    Subjective:     HPI: 74 year old male with a history of Hep C cirrhosis s/p transplant (2001) complicated by HCC/PVT s/p ablation on 3/7/19 and ESRD on who Hepatology is being consulted for decompensated cirrhosis.     Per HPI:  "Per wife, this AM patient did not wake up to his alarm to go to HD, she then later heard patient getting out of bed and heard a thump. She found the patient on the ground and very confused and not recognizing her. She called her neighbor to come and help her. EMS was later called, and patient brought to ED. Pt is very lethargic since that time, per wife, he has moan all day, unable to talk to her. This is unusual for him as pt normally drives himself to HD regularly. She manages all his medications and report that she has not given him any sedating meds (no Oxycodone, Robaxin, Klonopin, Zanaflex). She report that over the last few days, pt report abdominal swelling and pain to her. In fact they contacted liver transplant coordinator, who was setting up an US of the abdomen. She report that he might have skip a few dose of lactulose due to diarrhea the last few days. She report that he has been in confused state like this in the past due to Hepatic Encephalopathy. Pt also recently had HCC treatment by IR last week, on Cipro for. At this time 11:53 PM, on evaluation, pt is unarousable, appear to be in sleep like state, per wife, he has been like this all day. 1x dose of narcan given with no improvement. Pt would moan to pain. Glucose check is 170.  At  ED, patient has undergone extensive evaluation with CTA of chest negative for PE as patient was mildly hypoxic on arrival and showed bilateral " "pleural effusions but nothing else significant. CT scan of abdomen showed moderate ascites and large hepatic tumor with necrosis. CT scan of head and C-spine were unremarkable. Ammonia was 45 and Lactic acid was 1.2. WBC was 12,200 but patient afebrile and not tachycardic. U/A negative for infection. Patient due for HD today and did not get HD and BUN/Cr was 40/6.5. In ED, patient remains acutely confused and not responding to questions appropriately. Patient has not focal neurologic deficits. Dr. Verdin at [facility] called and stated patient could be admitted to [facility] but Hospital medicine did not fell comfortable admitting patient for encephalopathy work-up so patient to be transferred and admitted to [facility]. Patient empirically given broad spectrum abx at [facility] including Vancomycin. Zosyn and Ceftriaxone."     Interval history:  Patient seen twice this morning and during both instance remained completely altered with no family at bedside.    Interval History:   No acute events overnight.  Remains altered, increasing WBC.    Current Facility-Administered Medications   Medication    acetaminophen tablet 650 mg    allopurinol tablet 100 mg    ceFEPIme injection 2 g    chlorhexidine 0.12 % solution 15 mL    dextrose 50% injection 12.5 g    famotidine tablet 20 mg    glucagon (human recombinant) injection 1 mg    heparin (porcine) injection 5,000 Units    insulin aspart U-100 pen 1-10 Units    insulin aspart U-100 pen 3 Units    lactulose 20 gram/30 mL solution Soln 30 g    ondansetron disintegrating tablet 8 mg    polyethylene glycol packet 17 g    promethazine tablet 25 mg    ramelteon tablet 8 mg    rifAXIMin tablet 550 mg    sodium chloride 0.9% flush 5 mL    tacrolimus (PROGRAF) 1 mg/mL oral syringe       Objective:     Vital Signs (Most Recent):  Temp: 99 °F (37.2 °C) (03/16/19 0701)  Pulse: 96 (03/16/19 1330)  Resp: (!) 23 (03/16/19 1330)  BP: (!) 140/66 (03/16/19 1330)  SpO2: " (!) 94 % (03/16/19 1330) Vital Signs (24h Range):  Temp:  [98.1 °F (36.7 °C)-99.6 °F (37.6 °C)] 99 °F (37.2 °C)  Pulse:  [65-96] 96  Resp:  [18-29] 23  SpO2:  [93 %-100 %] 94 %  BP: ()/(52-68) 140/66     Weight: 93 kg (205 lb) (03/13/19 1400)  Body mass index is 27.8 kg/m².    Physical Exam   Constitutional: No distress.   HENT:   Head: Normocephalic and atraumatic.   Eyes: No scleral icterus.   Cardiovascular: Normal rate and regular rhythm.   Pulmonary/Chest: Effort normal and breath sounds normal.   Abdominal: Bowel sounds are normal. He exhibits distension. There is no tenderness.   Musculoskeletal: He exhibits no edema.   Neurological:   Intubated   Skin: He is not diaphoretic.   Nursing note and vitals reviewed.      MELD-Na score: 27 at 3/16/2019  6:11 AM  MELD score: 27 at 3/16/2019  6:11 AM  Calculated from:  Serum Creatinine: 7.4 mg/dL (Rounded to 4 mg/dL) at 3/16/2019  6:11 AM  Serum Sodium: 144 mmol/L (Rounded to 137 mmol/L) at 3/16/2019  6:11 AM  Total Bilirubin: 1.8 mg/dL at 3/16/2019  6:11 AM  INR(ratio): 1.6 at 3/16/2019  3:29 AM  Age: 73 years    Significant Labs:  CBC:   Recent Labs   Lab 03/16/19  0611   WBC 17.02*   RBC 3.57*   HGB 9.9*   HCT 31.5*        CMP:   Recent Labs   Lab 03/16/19  0611   *   CALCIUM 8.7   ALBUMIN 1.8*   PROT 7.2      K 4.2   CO2 24      BUN 61*   CREATININE 7.4*   ALKPHOS 194*   ALT 24   AST 54*   BILITOT 1.8*     Coagulation:   Recent Labs   Lab 03/11/19  0150  03/16/19  0329   INR 1.5*   < > 1.6*   APTT 37.4*  --   --     < > = values in this interval not displayed.       Significant Imaging:  X-Ray: Reviewed  US: Reviewed  CT: Reviewed    Assessment/Plan:     Decompensated hepatic cirrhosis    74 year old male with a history of Hep C cirrhosis s/p transplant (2001) 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 No acute events overnight, remains altered  and with increasing WBC can consider repeat cultures. Overall guarded prognosis.    Recommendations:  --Daily CMP, CBC, INR  --Daily tacrolimus level  --Obtain repeat cultures  --Continue current dose of tacrolimus  --Continue antibiotics  --Continue lactulose and rifaximin  --Nephrology, and ID recs appreciated            Thank you for your consult. I will follow-up with patient. Please contact us if you have any additional questions.    Winter Sheth M.D.  Gastroenterology Fellow, PGY-V  Pager: [phone]  [facility]

## 2019-03-16 NOTE — ASSESSMENT & PLAN NOTE
ESRD on iHD  [facility]  4 hours  Dr. Clara ORDAZ AVF  Wife reports an EDW of 94 kg (93 kg on admission)    Plan/Recommendations:  -HD at bedside today, goal UF 1-2L

## 2019-03-16 NOTE — ASSESSMENT & PLAN NOTE
Intubated for airway protection and hypercapneic respiratory failure.      - ABG on 3/12: 7.38/36.7/94 sating 97%  - intubated for acute encephalopathy. Mechanical ventilation set at A/C , RR 18, PEEP 5 FiO2 30%  - will perform SBT once patient is awake and following commands

## 2019-03-16 NOTE — ASSESSMENT & PLAN NOTE
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 No acute events overnight, remains altered and with increasing WBC can consider repeat cultures. Overall guarded prognosis.    Recommendations:  --Daily CMP, CBC, INR  --Daily tacrolimus level  --Obtain repeat cultures  --Continue current dose of tacrolimus  --Continue antibiotics  --Continue lactulose and rifaximin  --Nephrology, and ID recs appreciated

## 2019-03-16 NOTE — ASSESSMENT & PLAN NOTE
Impression: 72y/o male with hepatocellular carcinoma undergoing radiofrequency ablation by IR last treatment was 3/7, orthotopic liver transplant on 6/10/2001 on Prograf, ESRD on hemodialysis (MWF), Type 2 diabetes mellitus on long term insulin, essential HTN, chronic combined systolic and diastolic heart failure, KARON and history of hepatitis C (treated) who was transferred from [facility] ED with acute encephalopathy. Now in the ICU is being treated for encephalopathy--HE and mild hypercarbia with acidemia, Hypercarbic Respiratory failure intubated and ventilated, enlarging HCC masses despite therapy,and  ESRD on HD.  Patient remains GSC+ 9, no sedation    Goals/Plan/Recommedations  1) DNR status per CCS team  2) Advise setting up follow-up PC/CCS family conference on Monday to revisit goals of care    Goals of Care    Family conference conducted by  and this APRN with pt's wife, daughters, son and large extended family to discuss pt's current clinical status, goals of care, treatment options, code status, long term expected outcomes and poor prognosis. Dr Collins presented a clinical review/ update and discussed the current treatment plan including life supportve measures and patient's prognosis. Family asked numerous questions which were answered to their satisfaction.  The pt's family verbalize excellent understanding and insight pertaining to the pt's clinical status and prognosis.The patient's values and wishes were discussed.  The patients wife stated that the patient would not want CPR.   She stated that she doesn't want to see him suffer and that he does not want to be kept alive on machines. She stated that it is hard to imagine that this is taking place and that she needs more time to accept that he is dying.

## 2019-03-16 NOTE — PLAN OF CARE
Problem: Adult Inpatient Plan of Care  Goal: Plan of Care Review  Outcome: Ongoing (interventions implemented as appropriate)  See vital signs and assessments in flowsheets. See below for updates on today's progress.     Pulmonary: ETT 27@ lip ; Ventilator mode A/C 18; FiO2 30%; tV 500; PEEP 5.0--- SpO2 > 94%. Infrequent suctioning required.    Cardiovascular: NSR 65-75 bpm ; MAP > 65 mmHg ; CRT < 3 seconds    Neurological: no commands; PERRLA. Open his eyes spontaneously at times. Tmax 99.6    Gastrointestinal: flexi in situ--draining brown liquid stool; round distended abdomen with normoactive bowel sounds.    Genitourinary: anuric    Endocrine: BG monitoring--high BG level requiring cover insulin on top of scheduled dose.    Integumentary/Other: generally diaphoretic skin; dressing on sacral area intact    Infusions: none    Patient progressing towards goals as tolerated, plan of care communicated and reviewed with Philip Mathis III and family. All concerns addressed. Will continue to monitor.

## 2019-03-16 NOTE — CONSULTS
[facility]  Palliative Medicine  Consult Note    Patient Name: Philip Mathis III  MRN: [id number]  Admission Date: 3/11/2019  Hospital Length of Stay: 4 days  Code Status: DNR   Attending Provider: Cliff Luna MD  Consulting Provider: MILTON Adler, CNS  Primary Care Physician: Reji Castillo MD  Principal Problem:Acute encephalopathy    Patient information was obtained from spouse/SO, relative(s), past medical records and ER records and CCS team.      Inpatient consult to Palliative Care  Consult performed by: MILTON Ying, CNS  Consult ordered by: Malathi Collins MD        Assessment/Plan:     Palliative care encounter    Impression: 74y/o male with hepatocellular carcinoma undergoing radiofrequency ablation by IR last treatment was 3/7, orthotopic liver transplant on 6/10/2001 on Prograf, ESRD on hemodialysis (MWF), Type 2 diabetes mellitus on long term insulin, essential HTN, chronic combined systolic and diastolic heart failure, KARON and history of hepatitis C (treated) who was transferred from [facility] ED with acute encephalopathy. Now in the ICU is being treated for encephalopathy--HE and mild hypercarbia with acidemia, Hypercarbic Respiratory failure intubated and ventilated, enlarging HCC masses despite therapy,and  ESRD on HD.  Patient remains GSC+ 9, no sedation    Goals/Plan/Recommedations  1) DNR status per CCS team  2) Advise setting up follow-up PC/CCS family conference on Monday to revisit goals of care    Goals of Care    Family conference conducted by  and this APRN with pt's wife, daughters, son and large extended family to discuss pt's current clinical status, goals of care, treatment options, code status, long term expected outcomes and poor prognosis. Dr Collins presented a clinical review/ update and discussed the current treatment plan including life supportve measures and patient's prognosis. Family asked numerous questions which  were answered to their satisfaction.  The pt's family verbalize excellent understanding and insight pertaining to the pt's clinical status and prognosis.The patient's values and wishes were discussed.  The patients wife stated that the patient would not want CPR.   She stated that she doesn't want to see him suffer and that he does not want to be kept alive on machines. She stated that it is hard to imagine that this is taking place and that she needs more time to accept that he is dying.             Thank you for your consult. I will follow-up with patient. Please contact us if you have any additional questions.    Subjective:     HPI:   72y/o male with hepatocellular carcinoma undergoing radiofrequency ablation by IR last treatment was 3/7, orthotopic liver transplant on 6/10/2001 on Prograf, ESRD on hemodialysis (MWF), Type 2 diabetes mellitus on long term insulin, essential HTN, chronic combined systolic and diastolic heart failure, KARON and history of hepatitis C (treated) who was transferred from [facility] ED with acute encephalopathy,  At  ED, patient has undergone extensive evaluation with CTA of chest negative for PE as patient was mildly hypoxic on arrival and showed bilateral pleural effusions but nothing else significant. CT scan of abdomen showed moderate ascites and large hepatic tumor with necrosis On 3/12 pt was transferred to [facility] ICU for further workup of Acute metabolic encephalopathy. Pt was intubated. And Paracentesis (diagnostic/therapuetic) completed. He was started on scheduled lactulose to treat encephalopathy attributed to hepatic etiology. Infectious workup was negative and Infectious Disease.  Transplant were consulted for assistance. Hepatology was involved in care of patient.  At this point patient is being treated for encephalopathy--HE and mild hypercarbia with acidemia, Hypercarbic Respiratory failure intubated and ventilated, enlarging HCC masses despite therapy,and  ESRD on  HD.  Patient remains GSC+ 9, no sedation    Hospital Course:  No notes on file        Past Medical History:   Diagnosis Date    Acute hypoxemic respiratory failure 3/12/2019    Anemia     Aortic atherosclerosis     Arthritis     Back pain     Bishop's esophagus     BPH (benign prostatic hypertrophy)     Chronic combined systolic and diastolic heart failure 4/26/2017    Controlled type 2 diabetes mellitus with chronic kidney disease on chronic dialysis, with long-term current use of insulin 4/14/2015    Elevated PSA     ESRD on dialysis     ESRD on hemodialysis     Essential hypertension     GERD (gastroesophageal reflux disease)     Gout     Hemolytic anemia     Hepatitis C     successfully treated with Harvoni    Hepatocellular carcinoma 1/2/2018    Hyperlipidemia     Hyperphosphatemia     Hypertensive CKD (chronic kidney disease)     Immunosuppression     Liver transplanted 06/10/2001    MGUS (monoclonal gammopathy of unknown significance)     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 macular edema associated with type 2 diabetes mellitus     KARON (obstructive sleep apnea)     Secondary hyperparathyroidism     Severe pruritus     Thrombocytopenia     Type 2 diabetes mellitus with diabetic polyneuropathy, with long-term current use of insulin 7/5/2012    Urticaria        Past Surgical History:   Procedure Laterality Date    1) Creation of L arm basilic vein transposition (brachial artery-to-basilic vein AVF)  2) Ligation of L brachial AVF (which was 1st stage creation previously)    Left 6/15/2017    Performed by Silvio William MD at [facility] OR 2ND FLR    ABLATION, RADIOFREQUENCY N/A 2/27/2018    Performed by Esther Surgeon at [facility] ESTHER    [id number] N/A 12/21/2017    Performed by [facility] Diagnostic Provider at [facility] OR 2ND FLR    broken nose      CATARACT EXTRACTION Bilateral     colonoscopy N/A 7/23/2014    Performed by Moises St MD at [facility] ENDO  (4TH FLR)    [id number] POSSIBLE AV GRAFT Left 11/8/2016    Performed by Sil[facility]io William MD at [facility] OR 2ND FLR    DEBRIDEMENT-WOUND Left 4/12/2015    Performed by Jimmy Light MD at [facility] OR 2ND FLR    DEBRIDEMENT-WOUND Left 4/11/2015    Performed by Jimmy Light MD at [facility] OR 2ND FLR    DEBRIDEMENT-WOUND Left 4/10/2015    Performed by Jimmy Light MD at [facility] OR 2ND FLR    DEBRIDEMENT-WOUND Left 4/9/2015    Performed by Jimmy Light MD at [facility] OR 1ST FLR    DEBRIDEMENT-WOUND Left 4/6/2015    Performed by Jimmy Light MD at [facility] OR 2ND FLR    DEBRIDEMENT-WOUND Left 4/4/2015    Performed by Jimmy Light MD at [facility] OR 2ND FLR    DEBRIDEMENT-WOUND Left 4/2/2015    Performed by Jimmy Light MD at [facility] OR 1ST FLR    DRESSING CHANGE- Left 4/10/2015    Performed by Jimmy Light MD at [facility] OR 2ND FLR    DRESSING CHANGE-s/p burn debridement Left 4/15/2015    Performed by Jimmy Light MD at [facility] OR 2ND FLR    DRESSING CHANGE-s/p burn debridement Left 4/14/2015    Performed by Jimmy Light MD at [facility] OR 2ND FLR    DRESSING CHANGE-s/p wound debridement Left 4/13/2015    Performed by Jimmy Light MD at [facility] OR 1ST FLR    DRESSING CHANGE-wound vac Left 4/20/2015    Performed by Jimmy Light MD at [facility] OR 2ND [facility]    EMBOLIZATION, YTTRIUM THERAPY N/A 1/29/2019    Performed by [facility] Diagnostic Provider at [facility] OR 2ND [facility]    EYE SURGERY      cataracts    FEMUR SURGERY      FRACTURE SURGERY      right femur fracture     INSERTION-CATHETER-PERM-A-CATH Right 7/13/2017    Performed by Silvio William MD at [facility] OR 2ND FLR    LIVER TRANSPLANT  2001    Open Biome Fecal Transplant N/A 8/5/2015    Performed by Elmo Gan MD at [facility] ENDO (4TH FLR)    PLACEMENT-WOUND VAC  4/15/2015    Performed by Jimmy Light MD at [facility] OR 2ND FLR    PLACEMENT-WOUND VAC Left 4/4/2015    Performed by Jimmy Light MD at [facility] OR [address] [place] [address]  "PLACEMENT Left     Radiofrequency Ablation N/A 3/7/2019    Performed by Esther Surgeon at [facility] ESTHER    SKIN GRAFT      graft from right thigh , applied to the left back and left arm.    SPLIT THICKNESS SKIN GRAFT - left trunk, abd and axilla N/A 4/15/2015    Performed by Jimmy Light MD at [facility] OR [address] [place] [address]       Review of patient's allergies indicates:   Allergen Reactions    Adhesive Dermatitis and Other (See Comments)     "Burned his tail"  Please use Paper Tape    Corticosteroids (glucocorticoids) Other (See Comments)     Leg swelling after an injection    Hydrocortisone Swelling     Leg swelling after an injection    Neuromuscular blockers, steroidal Swelling     Leg swelling after an injection    Ribavirin Other (See Comments)     Arthralgias    Propofol analogues Other (See Comments)     Drops SVR and due to current systemic shunt state, results in difficulty in oscillometry to obtain blood pressure until return of vascular tone (other VS are stable throughout) and drop in venous return due to abdominal ascites; titrate slower in future or select alternate agent. Smooth emergence from general anesthesia without difficulty.       Medications:  Continuous Infusions:  Scheduled Meds:   allopurinol  100 mg Per NG tube QHS    ceFEPime (MAXIPIME) IVPB  2 g Intravenous Q24H    chlorhexidine  15 mL Mouth/Throat BID    famotidine  20 mg Per NG tube Daily    heparin (porcine)  5,000 Units Subcutaneous Q8H    insulin aspart U-100  3 Units Subcutaneous Q4H    lactulose  30 g Per NG tube Q6H    rifAXIMin  550 mg Per NG tube BID    tacrolimus  0.5 mg Per NG tube BID     PRN Meds:acetaminophen, dextrose 50%, glucagon (human recombinant), insulin aspart U-100, ondansetron, polyethylene glycol, promethazine, ramelteon, sodium chloride 0.9%    Family History     Problem Relation (Age of Onset)    Cancer Mother, Sister    Coronary artery disease Father    Diabetes Father    Heart disease Father    " Hypertension Father        Tobacco Use    Smoking status: Former Smoker     Last attempt to quit: 1998     Years since quittin.6    Smokeless tobacco: Never Used    Tobacco comment: pt reports quitting in    Substance and Sexual Activity    Alcohol use: No     Comment: pt reports hx of alcholism and reports quit in     Drug use: Yes     Frequency: 2.0 times per week     Types: Marijuana    Sexual activity: Yes     Partners: Female       Review of Systems  Objective:     Vital Signs (Most Recent):  Temp: 98.1 °F (36.7 °C) (03/15/19 1500)  Pulse: 71 (03/15/19 1800)  Resp: 20 (03/15/19 1800)  BP: (!) 114/56 (03/15/19 1800)  SpO2: 99 % (03/15/19 1800) Vital Signs (24h Range):  Temp:  [96.7 °F (35.9 °C)-101.1 °F (38.4 °C)] 98.1 °F (36.7 °C)  Pulse:  [63-92] 71  Resp:  [17-24] 20  SpO2:  [93 %-100 %] 99 %  BP: ()/(51-61) 114/56     Weight: 93 kg (205 lb)  Body mass index is 27.8 kg/m².    Review of Symptoms  Symptom Assessment (ESAS 0-10 scale)   ESAS 0 1 2 3 4 5 6 7 8 9 10   Pain              Dyspnea              Anxiety              Nausea              Depression               Anorexia              Fatigue              Insomnia              Restlessness               Agitation              CAM / Delirium __ --  ___+   Constipation     __ --  ___+   Diarrhea           __ --  ___+  Bowel Management Plan (BMP): Yes    Comments: Pt unable to participate in ESAS    Performance Status: 20    ECOG Performance Status Grade: 4 - Completely disabled    Physical Exam    CBC:   Recent Labs   Lab 03/15/19  0258   WBC 14.44*   HGB 9.7*   HCT 30.7*   MCV 89        BMP:  Recent Labs   Lab 03/15/19  0258   *      K 3.9      CO2 23   BUN 39*   CREATININE 5.7*   CALCIUM 8.2*   MG 2.0     LFT:  Lab Results   Component Value Date    AST 64 (H) 03/15/2019    GGT 21 2016    ALKPHOS 186 (H) 03/15/2019    BILITOT 1.7 (H) 03/15/2019     Albumin:   Albumin   Date Value Ref Range Status    03/15/2019 1.7 (L) 3.5 - 5.2 g/dL Final     Protein:   Total Protein   Date Value Ref Range Status   03/15/2019 6.6 6.0 - 8.4 g/dL Final     Lactic acid:   Lab Results   Component Value Date    LACTATE 1.1 03/12/2019    LACTATE 1.1 03/12/2019     Advance Care Planning   Advanced Directives::  Living Will: No  LaPOST: No  Do Not Resuscitate Status: Yes  Medical Power of : No.  Pts wife is his medical decision maker    Decision-Making Capacity: Patient unable to communicate due to disease severity/cognitive impairment   Living Arrangements: Lives with spouse    Psychosocial/Cultural: with 3 adult children and large supportive extended family    Spiritual:     F- Wanda and Belief:  NON-Episcopalian    I - Importance: tbd  .  C - Community: tbd    A - Address in Care:  visits      > 50% of 90 min visit spent in chart review, face to face discussion of goals of care,  symptom assessment, coordination of care and emotional support.    MILTON Adler, CNS,[facility] HospitalPN  Palliative Medicine  [facility]

## 2019-03-16 NOTE — SUBJECTIVE & OBJECTIVE
Interval History: Waking up more now. Temps only low-grade. Blood cx negative. Sputum with normal annabelle and yeast (likely commensal Candida while on antibiotics and not a pathogen).    Review of Systems   Unable to perform ROS: Intubated     Objective:     Vital Signs (Most Recent):  Temp: 99 °F (37.2 °C) (03/16/19 0701)  Pulse: 91 (03/16/19 0920)  Resp: (!) 22 (03/16/19 0920)  BP: 132/66 (03/16/19 0900)  SpO2: 97 % (03/16/19 0920) Vital Signs (24h Range):  Temp:  [98.1 °F (36.7 °C)-99.6 °F (37.6 °C)] 99 °F (37.2 °C)  Pulse:  [65-91] 91  Resp:  [18-25] 22  SpO2:  [93 %-100 %] 97 %  BP: ()/(52-66) 132/66     Weight: 93 kg (205 lb)  Body mass index is 27.8 kg/m².    Estimated Creatinine Clearance: 9.6 mL/min (A) (based on SCr of 7.4 mg/dL (H)).    Physical Exam   Constitutional: He appears well-nourished.   Responds to stimuli and sponateously on vent.   HENT:   Head: Normocephalic and atraumatic.   Neck: Normal range of motion.   Cardiovascular: Normal rate, regular rhythm, normal heart sounds and intact distal pulses.   Pulmonary/Chest: Effort normal. No respiratory distress. He has no rales.   Intubated on mechanical vent   Abdominal: Bowel sounds are normal. He exhibits distension. There is no tenderness. There is no guarding.   Musculoskeletal: Normal range of motion.   Neurological: He is alert.   Responds to stimuli spontaneously on vent.   Skin: Skin is warm and dry.   Psychiatric: He has a normal mood and affect.       Significant Labs:   CBC:   Recent Labs   Lab 03/15/19  0258 03/16/19  0611   WBC 14.44* 17.02*   HGB 9.7* 9.9*   HCT 30.7* 31.5*    153     CMP:   Recent Labs   Lab 03/15/19  0258 03/16/19  0611    144   K 3.9 4.2    108   CO2 23 24   * 263*   BUN 39* 61*   CREATININE 5.7* 7.4*   CALCIUM 8.2* 8.7   PROT 6.6 7.2   ALBUMIN 1.7* 1.8*   BILITOT 1.7* 1.8*   ALKPHOS 186* 194*   AST 64* 54*   ALT 26 24   ANIONGAP 13 12   EGFRNONAA 9.0* 6.6*       Significant Imaging: I  have reviewed all pertinent imaging results/findings within the past 24 hours.

## 2019-03-16 NOTE — PROGRESS NOTES
[facility]  Critical Care Medicine  Progress Note    Patient Name: Philip Mathis III  MRN: [id number]  Admission Date: 3/11/2019  Hospital Length of Stay: 5 days  Code Status: DNR  Attending Provider: Cliff Luna MD  Primary Care Provider: Reji Castillo MD   Principal Problem: Acute encephalopathy    Subjective:     HPI:  This is a 72y/o male with hepatocellular carcinoma undergoing radiofrequency ablation by IR last treatment was 3/7, orthotopic liver transplant on 6/10/2001 on Prograf, ESRD on hemodialysis (MWF), Type 2 diabetes mellitus on long term insulin, essential HTN, chronic combined systolic and diastolic heart failure, KARON and history of hepatitis C (treated) who is being transfer to hospital medicine for acute encephalopathy. He presented initially to [facility] ED this Am with acute confusion. Wife not at bedside on transfer, she was reached via telephone for collateral tonight. Per wife, this AM patient did not wake up to his alarm to go to HD, she then later heard patient getting out of bed and heard a thump. She found the patient on the ground and very confused and not recognizing her. She called her neighbor to come and help her. EMS was later called, and patient brought to ED. Pt is very lethargic since that time, per wife, he has moan all day, unable to talk to her. This is unusual for him as pt normally drives himself to HD regularly. She manages all his medications and report that she has not given him any sedating meds (no Oxycodone, Robaxin, Klonopin, Zanaflex). She report that over the last few days, pt report abdominal swelling and pain to her. In fact they contacted liver transplant coordinator, who was setting up an US of the abdomen. She report that he might have skip a few dose of lactulose due to diarrhea the last few days. She report that he has been in confused state like this in the past due to Hepatic Encephalopathy. Pt also recently had HCC  treatment by IR last week, on Cipro for. At this time 11:53 PM, on evaluation, pt is unarousable, appear to be in sleep like state, per wife, he has been like this all day. 1x dose of narcan given with no improvement. Pt would moan to pain. Glucose check is 170.         At  ED, patient has undergone extensive evaluation with CTA of chest negative for PE as patient was mildly hypoxic on arrival and showed bilateral pleural effusions but nothing else significant. CT scan of abdomen showed moderate ascites and large hepatic tumor with necrosis. CT scan of head and C-spine were unremarkable. Ammonia was 45 and Lactic acid was 1.2. WBC was 12,200 but patient afebrile and not tachycardic. U/A negative for infection. Patient due for HD today and did not get HD and BUN/Cr was 40/6.5. In ED, patient remains acutely confused and not responding to questions appropriately. Patient has not focal neurologic deficits. Dr. Verdin at [facility] called and stated patient could be admitted to [facility] but Hospital medicine did not fell comfortable admitting patient for encephalopathy work-up so patient to be transferred and admitted to [facility]. Patient empirically given broad spectrum abx at [facility] including Vancomycin. Zosyn and Ceftriaxone.     Patient transferred to ICU due to acute metabolic encephalopathy.     Hospital/ICU Course:  On 3/12, patient was transferred to ICU for further workup of Acute metabolic encephalopathy. Pt was intubated. And Paracentesis (diagnostic/therapuetic) completed. He was started on scheduled lactulose to treat encephalopathy attributed to hepatic etiology. Infectious workup was negative and Infectious Disease Transplant were consulted for assistance. Hepatology was involved in care of patient.    Interval History/Significant Events: NAEON. Exam appears to be able to intermittantly follow some commands.    Review of Systems   Unable to perform ROS: Intubated   Eyes: Negative for redness.    Respiratory: Negative for wheezing.      Objective:     Vital Signs (Most Recent):  Temp: 99 °F (37.2 °C) (03/16/19 0701)  Pulse: 96 (03/16/19 1330)  Resp: (!) 23 (03/16/19 1330)  BP: (!) 140/66 (03/16/19 1330)  SpO2: (!) 94 % (03/16/19 1330) Vital Signs (24h Range):  Temp:  [98.1 °F (36.7 °C)-99.6 °F (37.6 °C)] 99 °F (37.2 °C)  Pulse:  [65-96] 96  Resp:  [18-29] 23  SpO2:  [93 %-100 %] 94 %  BP: ()/(52-68) 140/66   Weight: 93 kg (205 lb)  Body mass index is 27.8 kg/m².      Intake/Output Summary (Last 24 hours) at 3/16/2019 1354  Last data filed at 3/16/2019 1300  Gross per 24 hour   Intake 940 ml   Output 900 ml   Net 40 ml       Physical Exam   Constitutional: He appears well-nourished.   Very somnolent on exam. Minimal response to pain   HENT:   Head: Normocephalic and atraumatic.   Eyes: Pupils are equal, round, and reactive to light.   Neck: Normal range of motion.   Cardiovascular: Normal rate, regular rhythm, normal heart sounds and intact distal pulses.   Pulmonary/Chest: Effort normal. No respiratory distress. He has no rales.   Intubated on mechanical vent   Abdominal: Bowel sounds are normal. He exhibits distension. There is tenderness. There is no guarding.   Musculoskeletal: Normal range of motion.   Neurological: He is alert.   Arousable  Intermittantly follows commands- squeezes hand, wiggles toes     Skin: Skin is warm and dry.   Psychiatric: He has a normal mood and affect.   Vitals reviewed.      Vents:  Vent Mode: Spont (03/16/19 1307)  Ventilator Initiated: Yes (03/12/19 1743)  Set Rate: 0 bmp (03/16/19 1307)  Vt Set: 500 mL (03/16/19 1307)  Pressure Support: 5 cmH20 (03/16/19 1307)  PEEP/CPAP: 5 cmH20 (03/16/19 1307)  Oxygen Concentration (%): 30 (03/16/19 1330)  Peak Airway Pressure: 10 cmH2O (03/16/19 1307)  Plateau Pressure: 18 cmH20 (03/16/19 1307)  Total Ve: 8.08 mL (03/16/19 1307)  F/VT Ratio<105 (RSBI): (!) 55.56 (03/16/19 1307)  Lines/Drains/Airways     Drain                  Hemodialysis AV Fistula 11/08/16 1553 Left forearm 857 days         NG/OG Tube 03/12/19 1520 nasogastric Right nostril 3 days         Rectal Tube 03/13/19 1300 rectal tube w/ balloon (indicate number of mLs) 3 days          Airway                 Airway - Non-Surgical 03/12/19 1709 Endotracheal Tube 3 days          Peripheral Intravenous Line                 Peripheral IV Triple Lumen 03/12/19 1330 Anterior;Right Hand 4 days         Peripheral IV - Single Lumen 03/14/19 0630 Anterior;Right Forearm 2 days              Significant Labs:    CBC/Anemia Profile:  Recent Labs   Lab 03/15/19  0258 03/16/19  0611   WBC 14.44* 17.02*   HGB 9.7* 9.9*   HCT 30.7* 31.5*    153   MCV 89 88   RDW 20.5* 21.4*        Chemistries:  Recent Labs   Lab 03/15/19  0258 03/16/19  0611    144   K 3.9 4.2    108   CO2 23 24   BUN 39* 61*   CREATININE 5.7* 7.4*   CALCIUM 8.2* 8.7   ALBUMIN 1.7* 1.8*   PROT 6.6 7.2   BILITOT 1.7* 1.8*   ALKPHOS 186* 194*   ALT 26 24   AST 64* 54*   MG 2.0 2.5   PHOS 2.0* 2.1*       Blood Culture: No results for input(s): LABBLOO in the last 48 hours.  CMP:   Recent Labs   Lab 03/15/19  0258 03/16/19  0611    144   K 3.9 4.2    108   CO2 23 24   * 263*   BUN 39* 61*   CREATININE 5.7* 7.4*   CALCIUM 8.2* 8.7   PROT 6.6 7.2   ALBUMIN 1.7* 1.8*   BILITOT 1.7* 1.8*   ALKPHOS 186* 194*   AST 64* 54*   ALT 26 24   ANIONGAP 13 12   EGFRNONAA 9.0* 6.6*       Significant Imaging:  I have reviewed and interpreted all pertinent imaging results/findings within the past 24 hours.      ABG  Recent Labs   Lab 03/15/19  0615   PH 7.396   PO2 83   PCO2 38.7   HCO3 23.8*   BE -1     Assessment/Plan:     Neuro   * Acute encephalopathy    Mr Matihs is a 72 yo man with enlarging HCC, decompensated cirrhosis after OLT 2001 on prograf, s/p embolization 3/2019, and normal graft function who presents with loss of consciousness, abdominal swelling, and suspected aspiration event. Ultimately, he  remains encephalopathic due to decompensated cirrhosis and advancement of HCC.     Assessment:  Decompensated cirrhosis w/ Hepatic encephalopathy (grade 4)- not improved. CTH-, EEG-  Hepatocellular carcinoma- with enlarging mass, contributing to above. Poor prognosis.   Aspiration pneumonia- suspected source of infection based on RML infiltrate, stable.  Hypercapneic respiratory failure- Intubated, also for airway protection.      Plan:  - Continue aggressive lactulose. Will hold off on C-diff studies at this time.   - Discontinued cefepime, now on Augmentin po (NGT) for aspiration pneumonia  - Will discuss with family on Monday regarding time limited trial for improvement vs palliative extubation.     - pt presented with acute encephalopathy - attributed to hepatic etiology. Infection workup largely negative so far. CT Head negative. Electrolytes, glucose, mild hypercarbia on blood gas  - pt intubated 3/12 - present due to low GCS  - EEG ordered 3/14/19: negative  - continue lactulose titrated to 3-4 BM/day  - avoid sedative agents  - neuro checks per shift     Pulmonary   Acute hypoxemic respiratory failure    Intubated for airway protection and hypercapneic respiratory failure.      - ABG on 3/12: 7.38/36.7/94 sating 97%  - intubated for acute encephalopathy. Mechanical ventilation set at A/C , RR 18, PEEP 5 FiO2 30%  - will perform SBT once patient is awake and following commands     Cardiac/Vascular   (HFpEF) heart failure with preserved ejection fraction    - ECHO 3/13/19 noted EF 65% with diastolic dysfunction, reduced RV function, moderate RVE - likely due to underlying liver disease  - currently euvolemic; holding diuretics at this time  - will continue to monitor in and out  - daily weights     Essential hypertension    - holding carvedilol and hydralazine at this time     Renal/   ESRD on hemodialysis    Secondary hyperparathyroidism  Vitamin D deficiency  - Nephrology consulted. Last HD 3/13/19.  Appreciate recs      Oncology   Leukocytosis    - concern for septic etiology however workup so far (blood cultures, CXR) negative.   - febrile overnight will re-culture blood and CXR. Pt is anuric. Continue cefepime. Plan to deescalate to Augmentin pending tracheal aspirate cultures.   - Transplant ID consulted for recs; CMV PCR pending.      HCC (hepatocellular carcinoma)    Enlarging mass  Poor prognosis     - first diagnosed in Dec 2017 via biopsy. Underwent RFA on 2/27/18. Follow up imaging on Dec 2019 noted recurrence and multiple new liver lesions throughout liver. He was seen by Dr. Perez ([facility]) in clinic who mentions 20+ lbs weight loss in 3 months, poor appetite. PET CT 1/26/19 suggestive of lesions in right and left lobe as well as a focus of uptake adjacent to humeral head. Recently started on Y 90 therapy.   - Hepatology following along. Appreciate recs.     Endocrine   Secondary hyperparathyroidism    Due ESRD      Controlled type 2 diabetes mellitus with chronic kidney disease on chronic dialysis, with long-term current use of insulin    - A1c 4.8  - hyperglycemia in the setting of tube feeds  - POCT q4h -> aspart 3U q4h and MD FREITAS prn  - will continue to monitor and adjust insulin dose     GI   Liver transplanted 6/10/2001    -- Hepatology consult for assistance with immunosuppressed medications     Abdominal distention    Continue lactulose  Likely related to ascites and HCC mass.      Decompensated hepatic cirrhosis    Liver transplanted 2001  Immunosuppressed status- on tacrolimus  - Hepatology consulted. Recommend 0.5mg tacrolimus daily  MELD-Na score: 27 at 3/16/2019  6:11 AM  MELD score: 27 at 3/16/2019  6:11 AM  Calculated from:  Serum Creatinine: 7.4 mg/dL (Rounded to 4 mg/dL) at 3/16/2019  6:11 AM  Serum Sodium: 144 mmol/L (Rounded to 137 mmol/L) at 3/16/2019  6:11 AM  Total Bilirubin: 1.8 mg/dL at 3/16/2019  6:11 AM  INR(ratio): 1.6 at 3/16/2019  3:29 AM  Age: 73 years       Orthopedic    Chronic gout    - continue home allopurinol     Other   KARON (obstructive sleep apnea)    -- Patient currently intubated        Critical Care Daily Checklist:    A: Awake: RASS Goal/Actual Goal: RASS Goal: 0-->alert and calm  Actual: Mayo Agitation Sedation Scale (RASS): Moderate sedation   B: Spontaneous Breathing Trial Performed? Spon. Breathing Trial Initiated?: (HEMODYNAICALLY UNSTABLE, NOT ABLE TO WEAN TODAY) (03/14/19 9565)   C: SAT & SBT Coordinated?  N/A, encephalopathy not resolved.                         D: Delirium: CAM-ICU Overall CAM-ICU: Positive   E: Early Mobility Performed? Yes   F: Feeding Goal: Goals: Meet % EEN, EPN  Status: Nutrition Goal Status: new   Current Diet Order   Procedures    Diet NPO      AS: Analgesia/Sedation Off   T: Thromboembolic Prophylaxis Yes   H: HOB > 300 Yes   U: Stress Ulcer Prophylaxis (if needed) Yes   G: Glucose Control Continue titration insulin with caution   B: Bowel Function Stool Occurrence: 1   I: Indwelling Catheter (Lines & Rodriguez) Necessity Rodriguez, rectal tube, NG tube, ETT  AVF Left   D: De-escalation of Antimicrobials/Pharmacotherapies Continue cefepime, will descalate with trach aspirate cultures per ID recommendations.     Plan for the day/ETD Continue current plan of care  Plan for GOC on Monday regarding time limited trial versus palliative extubation.     Code Status:  Family/Goals of Care: DNR  TBD on Monday       Critical secondary to Patient has a condition that poses threat to life and bodily function: Encephalopathy, liver failure, renal failure      Critical care was time spent personally by me on the following activities: development of treatment plan with patient or surrogate and bedside caregivers, discussions with consultants, evaluation of patient's response to treatment, examination of patient, ordering and performing treatments and interventions, ordering and review of laboratory studies, ordering and review of radiographic  studies, pulse oximetry, re-evaluation of patient's condition. This critical care time did not overlap with that of any other provider or involve time for any procedures.     Jose A Schulte MD  Critical Care Medicine  [facility]

## 2019-03-17 NOTE — ASSESSMENT & PLAN NOTE
74yo man w/a history of HTN, IDDM, KARON, ESRD (on iHD qMFW), and HCV cirrhosis (s/p DDLT 6/10/2001, CMV D+/R+, on maintenance tacro; c/b graft loss due to post-transplant HCC w/PVT dx 11/2017 s/p RFA with POD in 12/2018 s/p chemoembolization last on 3/7; recurrent CDI s/p FMT 8/2015; GBS septicemia in 10/2017) who was admitted on 3/11/2019 with acute onset encephalopathy (found down at night by wife) with antecedent N/V with possible aspiration and abdominal pain due to HE from a likely aspiration pneumonia. He remains febrile despite empiric vanc/zosyn with slight uptrend in LFT's -- concern that possible evolving biloma/infarct could be driving fevers. He remains critically ill.    - would transition antibiotics to zosyn/fluconazole dosed for CrCl for concern of intrahepatic infection  - would repeat abdominal U/S to assess liver for evolving lesions  - await pending serum CMV quant  - await pending cx to tailor therapy with you

## 2019-03-17 NOTE — SUBJECTIVE & OBJECTIVE
Interval History:   Mentation about the same however increasing WBC and febrile.    Current Facility-Administered Medications   Medication    0.9%  NaCl infusion    acetaminophen tablet 650 mg    allopurinol tablet 100 mg    chlorhexidine 0.12 % solution 15 mL    dextrose 50% injection 12.5 g    famotidine tablet 20 mg    fentaNYL injection 25 mcg    fluconazole (DIFLUCAN) IVPB 200 mg 100 mL    glucagon (human recombinant) injection 1 mg    heparin (porcine) injection 5,000 Units    insulin aspart U-100 pen 1-10 Units    insulin aspart U-100 pen 3 Units    lactulose 20 gram/30 mL solution Soln 30 g    ondansetron disintegrating tablet 8 mg    piperacillin-tazobactam 4.5 g in sodium chloride 0.9% 100 mL IVPB (ready to mix system)    polyethylene glycol packet 17 g    promethazine tablet 25 mg    ramelteon tablet 8 mg    rifAXIMin tablet 550 mg    sodium chloride 0.9% flush 5 mL    tacrolimus (PROGRAF) 1 mg/mL oral syringe       Objective:     Vital Signs (Most Recent):  Temp: (!) 103.8 °F (39.9 °C) (03/17/19 1336)  Pulse: 89 (03/17/19 1331)  Resp: (!) 29 (03/17/19 1331)  BP: (!) 112/53 (03/17/19 1300)  SpO2: 98 % (03/17/19 1331) Vital Signs (24h Range):  Temp:  [97.2 °F (36.2 °C)-103.8 °F (39.9 °C)] 103.8 °F (39.9 °C)  Pulse:  [] 89  Resp:  [16-41] 29  SpO2:  [82 %-98 %] 98 %  BP: ()/(53-68) 112/53     Weight: 93 kg (205 lb) (03/13/19 1400)  Body mass index is 27.8 kg/m².    Physical Exam   Constitutional: No distress.   HENT:   Head: Normocephalic and atraumatic.   Eyes: No scleral icterus.   Cardiovascular: Normal rate and regular rhythm.   Pulmonary/Chest: Effort normal and breath sounds normal.   Abdominal: Bowel sounds are normal. He exhibits distension. There is no tenderness.   Musculoskeletal: He exhibits no edema.   Neurological:   Intubated   Skin: He is not diaphoretic.   Nursing note and vitals reviewed.      MELD-Na score: 27 at 3/17/2019 11:32 AM  MELD score: 27 at  3/17/2019 11:32 AM  Calculated from:  Serum Creatinine: 5.2 mg/dL (Rounded to 4 mg/dL) at 3/17/2019 11:32 AM  Serum Sodium: 143 mmol/L (Rounded to 137 mmol/L) at 3/17/2019 11:32 AM  Total Bilirubin: 2.2 mg/dL at 3/17/2019  6:11 AM  INR(ratio): 1.5 at 3/17/2019  2:39 AM  Age: 73 years    Significant Labs:  CBC:   Recent Labs   Lab 03/17/19  0611   WBC 23.06*   RBC 3.69*   HGB 10.4*   HCT 33.2*        CMP:   Recent Labs   Lab 03/17/19  0611 03/17/19  1132   * 252*   CALCIUM 8.3* 7.4*   ALBUMIN 1.9*  --    PROT 8.0  --     143   K 4.4 4.5   CO2 23 18*    113*   BUN 45* 50*   CREATININE 5.3* 5.2*   ALKPHOS 237*  --    ALT 28  --    AST 75*  --    BILITOT 2.2*  --      Coagulation:   Recent Labs   Lab 03/11/19  0150  03/17/19  0239   INR 1.5*   < > 1.5*   APTT 37.4*  --   --     < > = values in this interval not displayed.       Significant Imaging:  X-Ray: Reviewed  US: Reviewed  CT: Reviewed

## 2019-03-17 NOTE — PROGRESS NOTES
3-hour HD complete per Nephrology orders, blood returned. 111ml fluid removed, pt stable, tolerated treatment well, NAD noted. Hemostasis x 25 mins with sterile gauze taped securely to LUAVF. Report given to Primary RN, & no bleeding noted at time of departure. See HD flowsheet for treatment details.

## 2019-03-17 NOTE — ASSESSMENT & PLAN NOTE
- concern for septic etiology however workup so far (blood cultures, CXR) negative.   - febrile overnight will re-culture blood and CXR. Pt is anuric.   - Transplant ID consulted for recs; CMV PCR pending.   - Continue fluconazole and zosyn  - Pending abdominal US

## 2019-03-17 NOTE — ASSESSMENT & PLAN NOTE
Intubated for airway protection and hypercapneic respiratory failure.      - ABG on 3/12: 7.38/36.7/94 sating 97%  - intubated for acute encephalopathy. Mechanical ventilation set at A/C , RR 18, PEEP 5 FiO2 30%  -Tolerated long course of SBT overnight, back on AC today due to gradually worsening hypoxia

## 2019-03-17 NOTE — PROGRESS NOTES
[facility]  Critical Care Medicine  Progress Note    Patient Name: Philip Mathis III  MRN: [id number]  Admission Date: 3/11/2019  Hospital Length of Stay: 6 days  Code Status: DNR  Attending Provider: Cliff Luna MD  Primary Care Provider: Reji Castillo MD   Principal Problem: Acute encephalopathy    Subjective:     HPI:  This is a 74y/o male with hepatocellular carcinoma undergoing radiofrequency ablation by IR last treatment was 3/7, orthotopic liver transplant on 6/10/2001 on Prograf, ESRD on hemodialysis (MWF), Type 2 diabetes mellitus on long term insulin, essential HTN, chronic combined systolic and diastolic heart failure, KARON and history of hepatitis C (treated) who is being transfer to hospital medicine for acute encephalopathy. He presented initially to [facility] ED this Am with acute confusion. Wife not at bedside on transfer, she was reached via telephone for collateral tonight. Per wife, this AM patient did not wake up to his alarm to go to HD, she then later heard patient getting out of bed and heard a thump. She found the patient on the ground and very confused and not recognizing her. She called her neighbor to come and help her. EMS was later called, and patient brought to ED. Pt is very lethargic since that time, per wife, he has moan all day, unable to talk to her. This is unusual for him as pt normally drives himself to HD regularly. She manages all his medications and report that she has not given him any sedating meds (no Oxycodone, Robaxin, Klonopin, Zanaflex). She report that over the last few days, pt report abdominal swelling and pain to her. In fact they contacted liver transplant coordinator, who was setting up an US of the abdomen. She report that he might have skip a few dose of lactulose due to diarrhea the last few days. She report that he has been in confused state like this in the past due to Hepatic Encephalopathy. Pt also recently had HCC  treatment by IR last week, on Cipro for. At this time 11:53 PM, on evaluation, pt is unarousable, appear to be in sleep like state, per wife, he has been like this all day. 1x dose of narcan given with no improvement. Pt would moan to pain. Glucose check is 170.         At  ED, patient has undergone extensive evaluation with CTA of chest negative for PE as patient was mildly hypoxic on arrival and showed bilateral pleural effusions but nothing else significant. CT scan of abdomen showed moderate ascites and large hepatic tumor with necrosis. CT scan of head and C-spine were unremarkable. Ammonia was 45 and Lactic acid was 1.2. WBC was 12,200 but patient afebrile and not tachycardic. U/A negative for infection. Patient due for HD today and did not get HD and BUN/Cr was 40/6.5. In ED, patient remains acutely confused and not responding to questions appropriately. Patient has not focal neurologic deficits. Dr. Verdin at [facility] called and stated patient could be admitted to [facility] but Hospital medicine did not fell comfortable admitting patient for encephalopathy work-up so patient to be transferred and admitted to [facility]. Patient empirically given broad spectrum abx at [facility] including Vancomycin. Zosyn and Ceftriaxone.     Patient transferred to ICU due to acute metabolic encephalopathy.     Hospital/ICU Course:  On 3/12, patient was transferred to ICU for further workup of Acute metabolic encephalopathy. Pt was intubated. And Paracentesis (diagnostic/therapuetic) completed. He was started on scheduled lactulose to treat encephalopathy attributed to hepatic etiology. Infectious workup was negative and Infectious Disease Transplant were consulted for assistance. Hepatology was involved in care of patient.    Interval History/Significant Events: Unchanged from yesterday, not following commands this morning.     Review of Systems   Unable to perform ROS: Intubated   Constitutional: Positive for fever.    Allergic/Immunologic: Positive for immunocompromised state.     Objective:     Vital Signs (Most Recent):  Temp: (!) 103.8 °F (39.9 °C) (03/17/19 1336)  Pulse: 89 (03/17/19 1331)  Resp: (!) 29 (03/17/19 1331)  BP: (!) 112/53 (03/17/19 1300)  SpO2: 98 % (03/17/19 1331) Vital Signs (24h Range):  Temp:  [97.2 °F (36.2 °C)-103.8 °F (39.9 °C)] 103.8 °F (39.9 °C)  Pulse:  [] 89  Resp:  [16-41] 29  SpO2:  [82 %-98 %] 98 %  BP: ()/(53-68) 112/53   Weight: 93 kg (205 lb)  Body mass index is 27.8 kg/m².      Intake/Output Summary (Last 24 hours) at 3/17/2019 1351  Last data filed at 3/17/2019 1100  Gross per 24 hour   Intake 1330 ml   Output 1911 ml   Net -581 ml       Physical Exam   Constitutional: He appears well-nourished.   Very somnolent on exam. Minimal response to pain   HENT:   Head: Normocephalic and atraumatic.   Eyes: Pupils are equal, round, and reactive to light.   Neck: Normal range of motion.   Cardiovascular: Normal rate, regular rhythm, normal heart sounds and intact distal pulses.   Pulmonary/Chest: Effort normal. No respiratory distress. He has no rales.   Intubated on mechanical vent   Abdominal: Bowel sounds are normal. He exhibits distension. There is tenderness. There is no guarding.   Musculoskeletal: Normal range of motion.   Neurological: He is alert.   Arousable  Intermittantly follows commands- squeezes hand, wiggles toes     Skin: Skin is warm and dry.   Psychiatric: He has a normal mood and affect.   Vitals reviewed.      Vents:  Vent Mode: A/C (03/17/19 1331)  Ventilator Initiated: Yes (03/12/19 1743)  Set Rate: 18 bmp (03/17/19 1331)  Vt Set: 450 mL (03/17/19 1331)  Pressure Support: 0 cmH20 (03/17/19 1331)  PEEP/CPAP: 8 cmH20 (03/17/19 1331)  Oxygen Concentration (%): 80 (03/17/19 1331)  Peak Airway Pressure: 23 cmH2O (03/17/19 1331)  Plateau Pressure: 18 cmH20 (03/17/19 1331)  Total Ve: 12.9 mL (03/17/19 1331)  F/VT Ratio<105 (RSBI): (!) 62.1 (03/17/19  1331)  Lines/Drains/Airways     Drain                 Hemodialysis AV Fistula 11/08/16 1553 Left forearm 858 days         NG/OG Tube 03/12/19 1520 nasogastric Right nostril 4 days         Rectal Tube 03/13/19 1300 rectal tube w/ balloon (indicate number of mLs) 4 days          Airway                 Airway - Non-Surgical 03/12/19 1709 Endotracheal Tube 4 days          Peripheral Intravenous Line                 Peripheral IV Triple Lumen 03/12/19 1330 Anterior;Right Hand 5 days         Peripheral IV - Single Lumen 03/14/19 0630 Anterior;Right Forearm 3 days              Significant Labs:    CBC/Anemia Profile:  Recent Labs   Lab 03/16/19  0611 03/17/19  0611   WBC 17.02* 23.06*   HGB 9.9* 10.4*   HCT 31.5* 33.2*    168   MCV 88 90   RDW 21.4* 22.9*        Chemistries:  Recent Labs   Lab 03/16/19  0611 03/17/19  0611 03/17/19  1132    141 143   K 4.2 4.4 4.5    106 113*   CO2 24 23 18*   BUN 61* 45* 50*   CREATININE 7.4* 5.3* 5.2*   CALCIUM 8.7 8.3* 7.4*   ALBUMIN 1.8* 1.9*  --    PROT 7.2 8.0  --    BILITOT 1.8* 2.2*  --    ALKPHOS 194* 237*  --    ALT 24 28  --    AST 54* 75*  --    MG 2.5 2.2 2.0   PHOS 2.1* 1.9*  --        ABGs:   Recent Labs   Lab 03/17/19  1001   PH 7.362   PCO2 34.1*   HCO3 19.3*   POCSATURATED 82*   BE -6     CMP:   Recent Labs   Lab 03/16/19  0611 03/17/19  0611 03/17/19  1132    141 143   K 4.2 4.4 4.5    106 113*   CO2 24 23 18*   * 255* 252*   BUN 61* 45* 50*   CREATININE 7.4* 5.3* 5.2*   CALCIUM 8.7 8.3* 7.4*   PROT 7.2 8.0  --    ALBUMIN 1.8* 1.9*  --    BILITOT 1.8* 2.2*  --    ALKPHOS 194* 237*  --    AST 54* 75*  --    ALT 24 28  --    ANIONGAP 12 12 12   EGFRNONAA 6.6* 9.8* 10.1*     POCT Glucose:   Recent Labs   Lab 03/17/19  0610 03/17/19  0915 03/17/19  1347   POCTGLUCOSE 249* 299* 308*       Significant Imaging:  I have reviewed and interpreted all pertinent imaging results/findings within the past 24 hours.      ABG  Recent Labs   Lab  03/17/19  1356   PH 7.362   PO2 74*   PCO2 33.8*   HCO3 19.2*   BE -6     Assessment/Plan:     Neuro   * Acute encephalopathy    Mr Mathis is a 72 yo man with enlarging HCC, decompensated cirrhosis after OLT 2001 on prograf, s/p embolization 3/2019, and normal graft function who presents with loss of consciousness, abdominal swelling, and suspected aspiration event. Ultimately, he remains encephalopathic due to decompensated cirrhosis and advancement of HCC.     Assessment:  Decompensated cirrhosis w/ Hepatic encephalopathy (grade 4)- not improved. CTH-, EEG-  Hepatocellular carcinoma- with enlarging mass, contributing to above. Poor prognosis.   Aspiration pneumonia vs. Intrahepatic source- suspected source of infection based on RML infiltrate.   Hypercapneic respiratory failure- Intubated, also for airway protection.      Plan:  - Continue aggressive lactulose. Will hold off on C-diff studies at this time.   - Spiking fevers with elevation in WBC count. Cefepime discontinued, and started on zosyn and fluconazole.   - Tylenol prn for fevers. BCx obtained as well, will follow. CXR reveals no acute process. US of abdomen to assess for evolving lesions.   - Wide appearing complexes with normal rate. Likely bundle branch block, less likely to be SVT or VTach. Troponin obtained, not elevated. Will stop trending.     - pt presented with acute encephalopathy - attributed to hepatic etiology. Infection workup largely negative so far. CT Head negative. Electrolytes, glucose, mild hypercarbia on blood gas  - pt intubated 3/12 - present due to low GCS  - EEG ordered 3/14/19: negative  - continue lactulose titrated to 3-4 BM/day  - avoid sedative agents  - neuro checks per shift     Pulmonary   Acute hypoxemic respiratory failure    Intubated for airway protection and hypercapneic respiratory failure.      - ABG on 3/12: 7.38/36.7/94 sating 97%  - intubated for acute encephalopathy. Mechanical ventilation set at A/C , RR  18, PEEP 5 FiO2 30%  -Tolerated long course of SBT overnight, back on AC today due to gradually worsening hypoxia     Cardiac/Vascular   (HFpEF) heart failure with preserved ejection fraction    - ECHO 3/13/19 noted EF 65% with diastolic dysfunction, reduced RV function, moderate RVE - likely due to underlying liver disease  - currently euvolemic; holding diuretics at this time  - will continue to monitor in and out  - daily weights     Essential hypertension    - holding carvedilol and hydralazine at this time     Renal/   ESRD on hemodialysis    Secondary hyperparathyroidism  Vitamin D deficiency  - Nephrology consulted. Last HD 3/13/19. Appreciate recs      Oncology   Leukocytosis    - concern for septic etiology however workup so far (blood cultures, CXR) negative.   - febrile overnight will re-culture blood and CXR. Pt is anuric.   - Transplant ID consulted for recs; CMV PCR pending.   - Continue fluconazole and zosyn  - Pending abdominal US      HCC (hepatocellular carcinoma)    Enlarging mass  Poor prognosis     - first diagnosed in Dec 2017 via biopsy. Underwent RFA on 2/27/18. Follow up imaging on Dec 2019 noted recurrence and multiple new liver lesions throughout liver. He was seen by Dr. Perez ([facility]) in clinic who mentions 20+ lbs weight loss in 3 months, poor appetite. PET CT 1/26/19 suggestive of lesions in right and left lobe as well as a focus of uptake adjacent to humeral head. Recently started on Y 90 therapy.   - Hepatology following along. Appreciate recs.     Endocrine   Secondary hyperparathyroidism    Due ESRD      Controlled type 2 diabetes mellitus with chronic kidney disease on chronic dialysis, with long-term current use of insulin    - A1c 4.8  - hyperglycemia in the setting of tube feeds  - POCT q4h -> aspart 3U q4h and MD FREITAS prn  - will continue to monitor and adjust insulin dose     GI   Liver transplanted 6/10/2001    -- Hepatology consult for assistance with immunosuppressed  medications     Abdominal distention    Continue lactulose  Likely related to ascites and HCC mass.      Decompensated hepatic cirrhosis    Liver transplanted 2001  Immunosuppressed status- on tacrolimus  - Hepatology consulted. Recommend 0.5mg tacrolimus daily  MELD-Na score: 27 at 3/17/2019 11:32 AM  MELD score: 27 at 3/17/2019 11:32 AM  Calculated from:  Serum Creatinine: 5.2 mg/dL (Rounded to 4 mg/dL) at 3/17/2019 11:32 AM  Serum Sodium: 143 mmol/L (Rounded to 137 mmol/L) at 3/17/2019 11:32 AM  Total Bilirubin: 2.2 mg/dL at 3/17/2019  6:11 AM  INR(ratio): 1.5 at 3/17/2019  2:39 AM  Age: 73 years       Orthopedic   Chronic gout    - continue home allopurinol     Other   KARON (obstructive sleep apnea)    -- Patient currently intubated        Critical Care Daily Checklist:    A: Awake: RASS Goal/Actual Goal: RASS Goal: 0-->alert and calm  Actual: Mayo Agitation Sedation Scale (RASS): Alert and calm   B: Spontaneous Breathing Trial Performed? Spon. Breathing Trial Initiated?: (HEMODYNAICALLY UNSTABLE, NOT ABLE TO WEAN TODAY) (03/14/19 0076)   C: SAT & SBT Coordinated?  Yes, back on AC today                      D: Delirium: CAM-ICU Overall CAM-ICU: Positive   E: Early Mobility Performed? Yes   F: Feeding Goal: Goals: Meet % EEN, EPN  Status: Nutrition Goal Status: new   Current Diet Order   Procedures    Diet NPO      AS: Analgesia/Sedation No   T: Thromboembolic Prophylaxis Heparin ppx   H: HOB > 300 Yes   U: Stress Ulcer Prophylaxis (if needed) Famotidine   G: Glucose Control On insulin   B: Bowel Function Stool Occurrence: 1   I: Indwelling Catheter (Lines & Rodriguez) Necessity Rodriguez   D: De-escalation of Antimicrobials/Pharmacotherapies Continue micafungin and zosyn    Plan for the day/ETD Continue current course of management.      Code Status:  Family/Goals of Care: DNR  continue current plan of care.        Critical secondary to Patient has a condition that poses threat to life and bodily function:  malignancy, ESRD, sepsis     Critical care was time spent personally by me on the following activities: development of treatment plan with patient or surrogate and bedside caregivers, discussions with consultants, evaluation of patient's response to treatment, examination of patient, ordering and performing treatments and interventions, ordering and review of laboratory studies, ordering and review of radiographic studies, pulse oximetry, re-evaluation of patient's condition. This critical care time did not overlap with that of any other provider or involve time for any procedures.     Jose A Schulte MD  Critical Care Medicine  [facility]

## 2019-03-17 NOTE — CARE UPDATE
Called to bedside for recurrent hypotension and increased pressor requirements    Continued to have new pressor requirements since 3 pm today. Antibiotics were expanded in the am with infectious work up which was concerning for progression of shock with lactic acidosis, but no identified source. At that time, family advised that he is declining, and should have other family members come see him.     At 5pm he continued to decline, ultimately having HR up to 150-160 while on levophed. He ultimately developed ventricular tachycardia and eventually asystole.    Nursing supervisor notified. Family at bedside.  has been called and is also at bedside.    Patient is not responding to verbal or tactile stimuli. Patient does not have a papillary or corneal reflex. His pupils are fixed and dilated. No heart or breath sounds on auscultation. No respirations. No palpable pulses.     Time of death: 5:42pm    Cause of Death: septic shock    Jose A Schulte MD  PGY2 Medicine

## 2019-03-17 NOTE — PROGRESS NOTES
[facility]  Infectious Disease  Progress Note    Patient Name: Philip Mathis III  MRN: [id number]  Admission Date: 3/11/2019  Length of Stay: 6 days  Attending Physician: Cliff Luna MD  Primary Care Provider: Reji Castillo MD    Isolation Status: No active isolations  Assessment/Plan:      * Acute encephalopathy    72yo man w/a history of HTN, IDDM, KARON, ESRD (on iHD qMFW), and HCV cirrhosis (s/p DDLT 6/10/2001, CMV D+/R+, on maintenance tacro; c/b graft loss due to post-transplant HCC w/PVT dx 11/2017 s/p RFA with POD in 12/2018 s/p chemoembolization last on 3/7; recurrent CDI s/p FMT 8/2015; GBS septicemia in 10/2017) who was admitted on 3/11/2019 with acute onset encephalopathy (found down at night by wife) with antecedent N/V with possible aspiration and abdominal pain due to HE from a likely aspiration pneumonia. He remains febrile despite empiric vanc/zosyn with slight uptrend in LFT's -- concern that possible evolving biloma/infarct could be driving fevers. He remains critically ill.    - would transition antibiotics to zosyn/fluconazole dosed for CrCl for concern of intrahepatic infection  - would repeat abdominal U/S to assess liver for evolving lesions  - await pending serum CMV quant  - await pending cx to tailor therapy with you         Anticipated Disposition: pending improvement    Thank you for your consult. I will follow-up with patient. Please contact us if you have any additional questions.     Kathia Kim MD  Transplant ID Attending  [id number]    Kathia Kim MD  Infectious Disease  [facility]    Subjective:     Principal Problem:Acute encephalopathy    HPI: No notes on file  Interval History: Mental status improving. Febrile again. Cultures unrevealing. Of note, last imaging about a week ago. LFT's subtly rising.    Review of Systems   Unable to perform ROS: Intubated     Objective:     Vital Signs (Most Recent):  Temp: (!) 103.7 °F (39.8  °C) (03/17/19 1100)  Pulse: 100 (03/17/19 1142)  Resp: (!) 28 (03/17/19 1142)  BP: (!) 127/58 (03/17/19 1100)  SpO2: 95 % (03/17/19 1142) Vital Signs (24h Range):  Temp:  [97.2 °F (36.2 °C)-103.7 °F (39.8 °C)] 103.7 °F (39.8 °C)  Pulse:  [] 100  Resp:  [16-41] 28  SpO2:  [82 %-97 %] 95 %  BP: ()/(53-68) 127/58     Weight: 93 kg (205 lb)  Body mass index is 27.8 kg/m².    Estimated Creatinine Clearance: 13.4 mL/min (A) (based on SCr of 5.3 mg/dL (H)).    Physical Exam   Constitutional: He appears well-nourished.   Responds to stimuli and sponateously on vent.   HENT:   Head: Normocephalic and atraumatic.   Neck: Normal range of motion.   Cardiovascular: Normal rate, regular rhythm, normal heart sounds and intact distal pulses.   Pulmonary/Chest: Effort normal. No respiratory distress. He has no rales.   Intubated on mechanical vent   Abdominal: Bowel sounds are normal. He exhibits distension. There is no tenderness. There is no guarding.   Musculoskeletal: Normal range of motion.   Neurological: He is alert.   Responds to stimuli spontaneously on vent.   Skin: Skin is warm and dry.   Psychiatric: He has a normal mood and affect.       Significant Labs:   CBC:   Recent Labs   Lab 03/16/19  0611 03/17/19  0611   WBC 17.02* 23.06*   HGB 9.9* 10.4*   HCT 31.5* 33.2*    168     CMP:   Recent Labs   Lab 03/16/19  0611 03/17/19  0611    141   K 4.2 4.4    106   CO2 24 23   * 255*   BUN 61* 45*   CREATININE 7.4* 5.3*   CALCIUM 8.7 8.3*   PROT 7.2 8.0   ALBUMIN 1.8* 1.9*   BILITOT 1.8* 2.2*   ALKPHOS 194* 237*   AST 54* 75*   ALT 24 28   ANIONGAP 12 12   EGFRNONAA 6.6* 9.8*       Significant Imaging: I have reviewed all pertinent imaging results/findings within the past 24 hours.

## 2019-03-17 NOTE — PROGRESS NOTES
"[facility]  Nephrology  Progress Note    Patient Name: Philip Mathis III  MRN: [id number]  Admission Date: 3/11/2019  Hospital Length of Stay: 6 days  Attending Provider: Cliff Luna MD   Primary Care Physician: Reji Castillo MD  Principal Problem:Acute encephalopathy    Subjective:     HPI: This is a 74y/o male with hepatocellular carcinoma undergoing radiofrequency ablation by IR last treatment was 3/7, orthotopic liver transplant on 6/10/2001 on Prograf, ESRD on hemodialysis (MWF), Type 2 diabetes mellitus on long term insulin, essential HTN, chronic combined systolic and diastolic heart failure, KARON and history of hepatitis C (treated) who was transfered to ICU at [facility] for acute encephalopathy. He presented initially to [facility] ED on the morning of 3/11 for acute confusion.  Information was obtained from wife and chart review, as patient remains disoriented on my examination.  Per wife, he was last seen normal on Sunday.  During the day, he was c/o abdominal pains and "just feeling tired", in which she thought was normal as he recently had an ablation on 03/07 for treatment of his HCC.  On the morning of 3/11, she tried to wake him for for his dialysis appointment (in which he normally is responsive and wakes himself up). She then later heard patient getting out of bed and heard a thump. She found the patient on the ground and very confused and not recognizing her. She called her neighbor to come and help her. EMS was later called, and patient brought to ED. Pt is very lethargic since that time, per wife, he has moan all day, unable to talk to her. This is unusual for him as pt normally drives himself to HD regularly. She manages all his medications and report that she has not given him any sedating meds (no Oxycodone, Robaxin, Klonopin, Zanaflex). She report that over the last few days, pt report abdominal swelling and pain to her. In fact they contacted liver transplant " coordinator, who was setting up an US of the abdomen. She report that he might have skip a few dose of lactulose due to diarrhea the last few days. She report that he has been in confused state like this in the past due to Hepatic Encephalopathy. He received 1x dose of narcan given with no improvement. Pt would moan to pain.      At  ED, patient has undergone extensive evaluation with CTA of chest negative for PE as patient was mildly hypoxic on arrival and showed bilateral pleural effusions but nothing else significant. CT scan of abdomen showed moderate ascites and large hepatic tumor with necrosis. CT scan of head and C-spine were unremarkable. Ammonia was 45 and Lactic acid was 1.2. WBC was 12,200 but patient afebrile and not tachycardic. U/A negative for infection. Patient due for HD today and did not get HD and BUN/Cr was 40/6.5. In ED, patient remains acutely confused and not responding to questions appropriately. Patient has not focal neurologic deficits. [place]. Verdin [place] [facility] [place] and stated [place] could be [place] [facility] but Hospital medicine did not fell comfortable admitting patient for encephalopathy work-up so patient to be transferred and admitted to [facility]. Patient empirically given broad spectrum abx at [facility] including Vancomycin. Zosyn and Ceftriaxone.      Wife reports that patient has had worsening abdominal distension for the past several months, and nobody has really explained why this is happening.  ICU attempted a bedside paracentesis on admission, but no safe pocket was identified.  His last ECHO performed in October of 2017 revealed HFpEF with pHTN and enlarged IVC.  She reports compliance with his dialysis treatments, being dialyzed at [facility] under the care of Dr. Elizabeth.  No further information is available at this time.  He has a ORLIN AVF with residual renal function, usually taking lasix 80 mg BID on his off dialysis days.              Interval History: HD  "yesterday, net negative by ~500ccs    Review of patient's allergies indicates:   Allergen Reactions    Adhesive Dermatitis and Other (See Comments)     "Burned his tail"  Please use Paper Tape    Corticosteroids (glucocorticoids) Other (See Comments)     Leg swelling after an injection    Hydrocortisone Swelling     Leg swelling after an injection    Neuromuscular blockers, steroidal Swelling     Leg swelling after an injection    Ribavirin Other (See Comments)     Arthralgias    Propofol analogues Other (See Comments)     Drops SVR and due to current systemic shunt state, results in difficulty in oscillometry to obtain blood pressure until return of vascular tone (other VS are stable throughout) and drop in venous return due to abdominal ascites; titrate slower in future or select alternate agent. Smooth emergence from general anesthesia without difficulty.     Current Facility-Administered Medications   Medication Frequency    0.9%  NaCl infusion PRN    0.9%  NaCl infusion Once    acetaminophen tablet 650 mg Q8H PRN    allopurinol tablet 100 mg QHS    ceFEPIme injection 2 g Q24H    chlorhexidine 0.12 % solution 15 mL BID    dextrose 50% injection 12.5 g PRN    famotidine tablet 20 mg Daily    fentaNYL injection 25 mcg Q1H PRN    glucagon (human recombinant) injection 1 mg PRN    heparin (porcine) injection 5,000 Units Q8H    insulin aspart U-100 pen 1-10 Units Q4H PRN    insulin aspart U-100 pen 3 Units Q4H    lactulose 20 gram/30 mL solution Soln 30 g Q6H    ondansetron disintegrating tablet 8 mg Q8H PRN    polyethylene glycol packet 17 g BID PRN    promethazine tablet 25 mg Q6H PRN    ramelteon tablet 8 mg Nightly PRN    rifAXIMin tablet 550 mg BID    sodium chloride 0.9% flush 5 mL PRN    tacrolimus (PROGRAF) 1 mg/mL oral syringe BID       Objective:     Vital Signs (Most Recent):  Temp: (!) 102.3 °F (39.1 °C) (03/17/19 0911)  Pulse: 100 (03/17/19 1001)  Resp: (!) 32 (03/17/19 " 1001)  BP: (!) 132/59 (03/17/19 1000)  SpO2: 96 % (03/17/19 1001)  O2 Device (Oxygen Therapy): ventilator (03/17/19 1000) Vital Signs (24h Range):  Temp:  [97.2 °F (36.2 °C)-102.3 °F (39.1 °C)] 102.3 °F (39.1 °C)  Pulse:  [] 100  Resp:  [16-41] 32  SpO2:  [82 %-97 %] 96 %  BP: ()/(53-68) 132/59     Weight: 93 kg (205 lb) (03/13/19 1400)  Body mass index is 27.8 kg/m².  Body surface area is 2.17 meters squared.    I/O last 3 completed shifts:  In: 1910 [Other:350; NG/GT:1560]  Out: 2511 [Other:711; Stool:1800]    Physical Exam   HENT:   Head: Atraumatic.   Neck: No JVD present.   Cardiovascular: Normal rate and regular rhythm. Exam reveals no friction rub.   Pulmonary/Chest: Effort normal and breath sounds normal.   Abdominal: Soft. He exhibits no distension.   Skin: Skin is warm.       Significant Labs:  CBC:   Recent Labs   Lab 03/17/19  0611   WBC 23.06*   RBC 3.69*   HGB 10.4*   HCT 33.2*      MCV 90   MCH 28.2   MCHC 31.3*     CMP:   Recent Labs   Lab 03/17/19  0611   *   CALCIUM 8.3*   ALBUMIN 1.9*   PROT 8.0      K 4.4   CO2 23      BUN 45*   CREATININE 5.3*   ALKPHOS 237*   ALT 28   AST 75*   BILITOT 2.2*     All labs within the past 24 hours have been reviewed.       Assessment/Plan:     ESRD on hemodialysis    ESRD on iHD  [facility]  4 hours  Dr. Clara ORDAZ AVF  Wife reports an EDW of 94 kg (93 kg on admission)    Acid/base, lytes and volume status are ok this morning    Plan/Recommendations:  -hold RRT and reassess in am         Thank you for your consult. I will follow-up with patient. Please contact us if you have any additional questions.    Kyler De La Cruz MD  Nephrology  [facility]

## 2019-03-17 NOTE — PROGRESS NOTES
"[facility]  Hepatology  Progress Note    Patient Name: Philip Mathis III  MRN: [id number]  Admission Date: 3/11/2019  Hospital Length of Stay: 6 days  Attending Provider: Cliff Luna MD   Primary Care Physician: Reji Castillo MD  Principal Problem:Acute encephalopathy    Subjective:     HPI: 74 year old male with a history of Hep C cirrhosis s/p transplant (2001) complicated by HCC/PVT s/p ablation on 3/7/19 and ESRD on who Hepatology is being consulted for decompensated cirrhosis.     Per HPI:  "Per wife, this AM patient did not wake up to his alarm to go to HD, she then later heard patient getting out of bed and heard a thump. She found the patient on the ground and very confused and not recognizing her. She called her neighbor to come and help her. EMS was later called, and patient brought to ED. Pt is very lethargic since that time, per wife, he has moan all day, unable to talk to her. This is unusual for him as pt normally drives himself to HD regularly. She manages all his medications and report that she has not given him any sedating meds (no Oxycodone, Robaxin, Klonopin, Zanaflex). She report that over the last few days, pt report abdominal swelling and pain to her. In fact they contacted liver transplant coordinator, who was setting up an US of the abdomen. She report that he might have skip a few dose of lactulose due to diarrhea the last few days. She report that he has been in confused state like this in the past due to Hepatic Encephalopathy. Pt also recently had HCC treatment by IR last week, on Cipro for. At this time 11:53 PM, on evaluation, pt is unarousable, appear to be in sleep like state, per wife, he has been like this all day. 1x dose of narcan given with no improvement. Pt would moan to pain. Glucose check is 170.  At  ED, patient has undergone extensive evaluation with CTA of chest negative for PE as patient was mildly hypoxic on arrival and showed bilateral " "pleural effusions but nothing else significant. CT scan of abdomen showed moderate ascites and large hepatic tumor with necrosis. CT scan of head and C-spine were unremarkable. Ammonia was 45 and Lactic acid was 1.2. WBC was 12,200 but patient afebrile and not tachycardic. U/A negative for infection. Patient due for HD today and did not get HD and BUN/Cr was 40/6.5. In ED, patient remains acutely confused and not responding to questions appropriately. Patient has not focal neurologic deficits. Dr. Verdin at [facility] called and stated patient could be admitted to [facility] but Hospital medicine did not fell comfortable admitting patient for encephalopathy work-up so patient to be transferred and admitted to [facility]. Patient empirically given broad spectrum abx at [facility] including Vancomycin. Zosyn and Ceftriaxone."     Interval history:  Patient seen twice this morning and during both instance remained completely altered with no family at bedside.    Interval History:   Mentation about the same however increasing WBC and febrile.    Current Facility-Administered Medications   Medication    0.9%  NaCl infusion    acetaminophen tablet 650 mg    allopurinol tablet 100 mg    chlorhexidine 0.12 % solution 15 mL    dextrose 50% injection 12.5 g    famotidine tablet 20 mg    fentaNYL injection 25 mcg    fluconazole (DIFLUCAN) IVPB 200 mg 100 mL    glucagon (human recombinant) injection 1 mg    heparin (porcine) injection 5,000 Units    insulin aspart U-100 pen 1-10 Units    insulin aspart U-100 pen 3 Units    lactulose 20 gram/30 mL solution Soln 30 g    ondansetron disintegrating tablet 8 mg    piperacillin-tazobactam 4.5 g in sodium chloride 0.9% 100 mL IVPB (ready to mix system)    polyethylene glycol packet 17 g    promethazine tablet 25 mg    ramelteon tablet 8 mg    rifAXIMin tablet 550 mg    sodium chloride 0.9% flush 5 mL    tacrolimus (PROGRAF) 1 mg/mL oral syringe       Objective: "     Vital Signs (Most Recent):  Temp: (!) 103.8 °F (39.9 °C) (03/17/19 1336)  Pulse: 89 (03/17/19 1331)  Resp: (!) 29 (03/17/19 1331)  BP: (!) 112/53 (03/17/19 1300)  SpO2: 98 % (03/17/19 1331) Vital Signs (24h Range):  Temp:  [97.2 °F (36.2 °C)-103.8 °F (39.9 °C)] 103.8 °F (39.9 °C)  Pulse:  [] 89  Resp:  [16-41] 29  SpO2:  [82 %-98 %] 98 %  BP: ()/(53-68) 112/53     Weight: 93 kg (205 lb) (03/13/19 1400)  Body mass index is 27.8 kg/m².    Physical Exam   Constitutional: No distress.   HENT:   Head: Normocephalic and atraumatic.   Eyes: No scleral icterus.   Cardiovascular: Normal rate and regular rhythm.   Pulmonary/Chest: Effort normal and breath sounds normal.   Abdominal: Bowel sounds are normal. He exhibits distension. There is no tenderness.   Musculoskeletal: He exhibits no edema.   Neurological:   Intubated   Skin: He is not diaphoretic.   Nursing note and vitals reviewed.      MELD-Na score: 27 at 3/17/2019 11:32 AM  MELD score: 27 at 3/17/2019 11:32 AM  Calculated from:  Serum Creatinine: 5.2 mg/dL (Rounded to 4 mg/dL) at 3/17/2019 11:32 AM  Serum Sodium: 143 mmol/L (Rounded to 137 mmol/L) at 3/17/2019 11:32 AM  Total Bilirubin: 2.2 mg/dL at 3/17/2019  6:11 AM  INR(ratio): 1.5 at 3/17/2019  2:39 AM  Age: 73 years    Significant Labs:  CBC:   Recent Labs   Lab 03/17/19  0611   WBC 23.06*   RBC 3.69*   HGB 10.4*   HCT 33.2*        CMP:   Recent Labs   Lab 03/17/19  0611 03/17/19  1132   * 252*   CALCIUM 8.3* 7.4*   ALBUMIN 1.9*  --    PROT 8.0  --     143   K 4.4 4.5   CO2 23 18*    113*   BUN 45* 50*   CREATININE 5.3* 5.2*   ALKPHOS 237*  --    ALT 28  --    AST 75*  --    BILITOT 2.2*  --      Coagulation:   Recent Labs   Lab 03/11/19  0150  03/17/19  0239   INR 1.5*   < > 1.5*   APTT 37.4*  --   --     < > = values in this interval not displayed.       Significant Imaging:  X-Ray: Reviewed  US: Reviewed  CT: Reviewed    Assessment/Plan:     Decompensated hepatic  cirrhosis    74 year old male with a history of Hep C cirrhosis s/p transplant (2001) complicated by HCC/PVT s/p ablation on 3/7/19 and ESRD who Hepatology is following for for decompensated cirrhosis as well as immunosuppression. Mention about the same with no marked improvement however patient with worsening WBC/febrile. Would recommend additional infectious workup as outlined below, since transplant ID is on the case their input would be greatly appreciated.    Recommendations:  --Daily CMP, CBC, INR  --Daily tacrolimus level  --Continue current dose of tacrolimus  --Continue lactulose and rifaximin    --Escalate antibiotics (possibly consider anti-fungals)  --Re-culture as well as obtain thoracentesis and paracentesis  --Order C diff  --Repeat CT A/P to rule out intra-abdominal path such as abscess associated with recent locoregional therapy for HCC    --Nephrology recs appreciated   -- ID recs appreciated            Thank you for your consult. I will follow-up with patient. Please contact us if you have any additional questions.    Winter Sheth M.D.  Gastroenterology Fellow, PGY-V  Pager: [phone]  [facility]

## 2019-03-17 NOTE — ASSESSMENT & PLAN NOTE
Mr Mathis is a 72 yo man with enlarging HCC, decompensated cirrhosis after OLT 2001 on prograf, s/p embolization 3/2019, and normal graft function who presents with loss of consciousness, abdominal swelling, and suspected aspiration event. Ultimately, he remains encephalopathic due to decompensated cirrhosis and advancement of HCC.     Assessment:  Decompensated cirrhosis w/ Hepatic encephalopathy (grade 4)- not improved. CTH-, EEG-  Hepatocellular carcinoma- with enlarging mass, contributing to above. Poor prognosis.   Aspiration pneumonia vs. Intrahepatic source- suspected source of infection based on RML infiltrate.   Hypercapneic respiratory failure- Intubated, also for airway protection.      Plan:  - Continue aggressive lactulose. Will hold off on C-diff studies at this time.   - Spiking fevers with elevation in WBC count. Cefepime discontinued, and started on zosyn and fluconazole.   - Tylenol prn for fevers. BCx obtained as well, will follow. CXR reveals no acute process. US of abdomen to assess for evolving lesions.   - Wide appearing complexes with normal rate. Likely bundle branch block, less likely to be SVT or VTach. Troponin obtained, not elevated. Will stop trending.     - pt presented with acute encephalopathy - attributed to hepatic etiology. Infection workup largely negative so far. CT Head negative. Electrolytes, glucose, mild hypercarbia on blood gas  - pt intubated 3/12 - present due to low GCS  - EEG ordered 3/14/19: negative  - continue lactulose titrated to 3-4 BM/day  - avoid sedative agents  - neuro checks per shift

## 2019-03-17 NOTE — SIGNIFICANT EVENT
Patient went into Vtach, HR of 180s. Critical Care team called to bedside. After discussion with family, it was agreed to turn off ventilator and vasopressors.

## 2019-03-17 NOTE — ASSESSMENT & PLAN NOTE
ESRD on iHD  [facility]  4 hours  Dr. Clara ARCEF  Wife reports an EDW of 94 kg (93 kg on admission)    Acid/base, lytes and volume status are ok this morning    Plan/Recommendations:  -hold RRT and reassess in am

## 2019-03-17 NOTE — SUBJECTIVE & OBJECTIVE
Interval History/Significant Events: Unchanged from yesterday, not following commands this morning.     Review of Systems   Unable to perform ROS: Intubated   Constitutional: Positive for fever.   Allergic/Immunologic: Positive for immunocompromised state.     Objective:     Vital Signs (Most Recent):  Temp: (!) 103.8 °F (39.9 °C) (03/17/19 1336)  Pulse: 89 (03/17/19 1331)  Resp: (!) 29 (03/17/19 1331)  BP: (!) 112/53 (03/17/19 1300)  SpO2: 98 % (03/17/19 1331) Vital Signs (24h Range):  Temp:  [97.2 °F (36.2 °C)-103.8 °F (39.9 °C)] 103.8 °F (39.9 °C)  Pulse:  [] 89  Resp:  [16-41] 29  SpO2:  [82 %-98 %] 98 %  BP: ()/(53-68) 112/53   Weight: 93 kg (205 lb)  Body mass index is 27.8 kg/m².      Intake/Output Summary (Last 24 hours) at 3/17/2019 1351  Last data filed at 3/17/2019 1100  Gross per 24 hour   Intake 1330 ml   Output 1911 ml   Net -581 ml       Physical Exam   Constitutional: He appears well-nourished.   Very somnolent on exam. Minimal response to pain   HENT:   Head: Normocephalic and atraumatic.   Eyes: Pupils are equal, round, and reactive to light.   Neck: Normal range of motion.   Cardiovascular: Normal rate, regular rhythm, normal heart sounds and intact distal pulses.   Pulmonary/Chest: Effort normal. No respiratory distress. He has no rales.   Intubated on mechanical vent   Abdominal: Bowel sounds are normal. He exhibits distension. There is tenderness. There is no guarding.   Musculoskeletal: Normal range of motion.   Neurological: He is alert.   Arousable  Intermittantly follows commands- squeezes hand, wiggles toes     Skin: Skin is warm and dry.   Psychiatric: He has a normal mood and affect.   Vitals reviewed.      Vents:  Vent Mode: A/C (03/17/19 1331)  Ventilator Initiated: Yes (03/12/19 1743)  Set Rate: 18 bmp (03/17/19 1331)  Vt Set: 450 mL (03/17/19 1331)  Pressure Support: 0 cmH20 (03/17/19 1331)  PEEP/CPAP: 8 cmH20 (03/17/19 1331)  Oxygen Concentration (%): 80 (03/17/19  1331)  Peak Airway Pressure: 23 cmH2O (03/17/19 1331)  Plateau Pressure: 18 cmH20 (03/17/19 1331)  Total Ve: 12.9 mL (03/17/19 1331)  F/VT Ratio<105 (RSBI): (!) 62.1 (03/17/19 1331)  Lines/Drains/Airways     Drain                 Hemodialysis AV Fistula 11/08/16 1553 Left forearm 858 days         NG/OG Tube 03/12/19 1520 nasogastric Right nostril 4 days         Rectal Tube 03/13/19 1300 rectal tube w/ balloon (indicate number of mLs) 4 days          Airway                 Airway - Non-Surgical 03/12/19 1709 Endotracheal Tube 4 days          Peripheral Intravenous Line                 Peripheral IV Triple Lumen 03/12/19 1330 Anterior;Right Hand 5 days         Peripheral IV - Single Lumen 03/14/19 0630 Anterior;Right Forearm 3 days              Significant Labs:    CBC/Anemia Profile:  Recent Labs   Lab 03/16/19  0611 03/17/19  0611   WBC 17.02* 23.06*   HGB 9.9* 10.4*   HCT 31.5* 33.2*    168   MCV 88 90   RDW 21.4* 22.9*        Chemistries:  Recent Labs   Lab 03/16/19  0611 03/17/19  0611 03/17/19  1132    141 143   K 4.2 4.4 4.5    106 113*   CO2 24 23 18*   BUN 61* 45* 50*   CREATININE 7.4* 5.3* 5.2*   CALCIUM 8.7 8.3* 7.4*   ALBUMIN 1.8* 1.9*  --    PROT 7.2 8.0  --    BILITOT 1.8* 2.2*  --    ALKPHOS 194* 237*  --    ALT 24 28  --    AST 54* 75*  --    MG 2.5 2.2 2.0   PHOS 2.1* 1.9*  --        ABGs:   Recent Labs   Lab 03/17/19  1001   PH 7.362   PCO2 34.1*   HCO3 19.3*   POCSATURATED 82*   BE -6     CMP:   Recent Labs   Lab 03/16/19  0611 03/17/19  0611 03/17/19  1132    141 143   K 4.2 4.4 4.5    106 113*   CO2 24 23 18*   * 255* 252*   BUN 61* 45* 50*   CREATININE 7.4* 5.3* 5.2*   CALCIUM 8.7 8.3* 7.4*   PROT 7.2 8.0  --    ALBUMIN 1.8* 1.9*  --    BILITOT 1.8* 2.2*  --    ALKPHOS 194* 237*  --    AST 54* 75*  --    ALT 24 28  --    ANIONGAP 12 12 12   EGFRNONAA 6.6* 9.8* 10.1*     POCT Glucose:   Recent Labs   Lab 03/17/19  0610 03/17/19  0915 03/17/19  1340    POCTGLUCOSE 249* 299* 308*       Significant Imaging:  I have reviewed and interpreted all pertinent imaging results/findings within the past 24 hours.

## 2019-03-17 NOTE — PLAN OF CARE
Problem: Adult Inpatient Plan of Care  Goal: Plan of Care Review  Outcome: Ongoing (interventions implemented as appropriate)   See vital signs and assessments in flowsheets. See below for updates on today's progress.     Pulmonary: ETT 27@ lip; on spontaneous ventilation; PS 5; PEEP 5.0; FiO2 30%; ----SpO2 >95%    Cardiovascular: NSR/sinus tachycardia  bpm; MAP > 70 mmHg ; good peripheral pulses.    Neurological: no commands; spontaneously opening his eyes; PERRLA; apyrexial.    Gastrointestinal: flexiseal draining good amount of liquid stool; OG feed 40 ml/hr    Genitourinary: anuric    Endocrine: BG monitoring--high BG requiring insulin sliding scale on top of scheduled insulin.    Integumentary/Other: diaphoretic skin; some bruising; left fistula dressing intact--no bleeding. Sacral dressing intact.    Infusions: none    Patient progressing towards goals as tolerated, plan of care communicated and reviewed with Philip Mathis III and family. All concerns addressed. Will continue to monitor.

## 2019-03-17 NOTE — PROGRESS NOTES
Arrived to patient BS. Responds to voice. On vent. UF goal 1L. Successful cannulation x 2 needles x 1 attempt to ORLIN AVF.

## 2019-03-17 NOTE — DISCHARGE SUMMARY
[facility]  Critical Care Medicine  Discharge Summary      Patient Name: Philip Mathis III  MRN: [id number]  Admission Date: 3/11/2019  Hospital Length of Stay: 6 days  Discharge Date and Time:  03/17/2019 6:20 PM  Attending Physician: Cliff Luna MD   Discharging Provider: Jose A Schulte MD  Primary Care Provider: Reji Castillo MD  Reason for Admission: acute encephalopathy    HPI:   This is a 72y/o male with hepatocellular carcinoma undergoing radiofrequency ablation by IR last treatment was 3/7, orthotopic liver transplant on 6/10/2001 on Prograf, ESRD on hemodialysis (MWF), Type 2 diabetes mellitus on long term insulin, essential HTN, chronic combined systolic and diastolic heart failure, KARON and history of hepatitis C (treated) who is being transfer to hospital medicine for acute encephalopathy. He presented initially to [facility] ED this Am with acute confusion. Wife not at bedside on transfer, she was reached via telephone for collateral tonight. Per wife, this AM patient did not wake up to his alarm to go to HD, she then later heard patient getting out of bed and heard a thump. She found the patient on the ground and very confused and not recognizing her. She called her neighbor to come and help her. EMS was later called, and patient brought to ED. Pt is very lethargic since that time, per wife, he has moan all day, unable to talk to her. This is unusual for him as pt normally drives himself to HD regularly. She manages all his medications and report that she has not given him any sedating meds (no Oxycodone, Robaxin, Klonopin, Zanaflex). She report that over the last few days, pt report abdominal swelling and pain to her. In fact they contacted liver transplant coordinator, who was setting up an US of the abdomen. She report that he might have skip a few dose of lactulose due to diarrhea the last few days. She report that he has been in confused state like this in the  past due to Hepatic Encephalopathy. Pt also recently had HCC treatment by IR last week, on Cipro for. At this time 11:53 PM, on evaluation, pt is unarousable, appear to be in sleep like state, per wife, he has been like this all day. 1x dose of narcan given with no improvement. Pt would moan to pain. Glucose check is 170.         At  ED, patient has undergone extensive evaluation with CTA of chest negative for PE as patient was mildly hypoxic on arrival and showed bilateral pleural effusions but nothing else significant. CT scan of abdomen showed moderate ascites and large hepatic tumor with necrosis. CT scan of head and C-spine were unremarkable. Ammonia was 45 and Lactic acid was 1.2. WBC was 12,200 but patient afebrile and not tachycardic. U/A negative for infection. Patient due for HD today and did not get HD and BUN/Cr was 40/6.5. In ED, patient remains acutely confused and not responding to questions appropriately. Patient has not focal neurologic deficits. Dr. Verdin at [facility] called and stated patient could be admitted to [facility] but Hospital medicine did not fell comfortable admitting patient for encephalopathy work-up so patient to be transferred and admitted to [facility]. Patient empirically given broad spectrum abx at [facility] including Vancomycin. Zosyn and Ceftriaxone.     Patient transferred to ICU due to acute metabolic encephalopathy.     * No surgery found *    Indwelling Lines/Drains at Time of Discharge:   Lines/Drains/Airways     Drain                 Hemodialysis AV Fistula 11/08/16 1553 Left forearm 859 days         NG/OG Tube 03/12/19 1520 nasogastric Right nostril 5 days         Rectal Tube 03/13/19 1300 rectal tube w/ balloon (indicate number of mLs) 4 days          Airway                 Airway - Non-Surgical 03/12/19 1709 Endotracheal Tube 5 days          Line                 Peripheral IV Triple Lumen 03/12/19 1330 Anterior;Right Hand 5 days              Hospital Course:   On  3/12, patient was transferred to ICU for further workup of Acute metabolic encephalopathy. Pt was intubated. And Paracentesis (diagnostic/therapuetic) completed. He was started on scheduled lactulose to treat encephalopathy attributed to hepatic etiology. Infectious workup was negative and Infectious Disease Transplant were consulted for assistance. Hepatology was involved in care of patient. The patient continued to remain encephalopathic despite weaning of sedation. EEG and CT imaging were negative. He was intermittently able to follow some commands on 3/16/2019, however, there was no consistency and ultimately no meaningful improvements. The following day, he was no longer following commands. He became suddenly hypotensive at around 3pm with requirements of levophed. He then developed tachyarrhythmias, but we were unable to decrease his pressors. He was given boluses of fluid. Ultimately, he developed sustained ventricular tachycardia and eventually asystole.     Consults (From admission, onward)        Status Ordering Provider     Case Management/  Once     Provider:  (Not yet assigned)    Acknowledged HYACINTH GASTELUM     Inpatient consult to Critical Care Medicine  Once     Provider:  (Not yet assigned)    Completed MISBAH NGUYEN     Inpatient consult to Hepatology  Once     Provider:  (Not yet assigned)    Completed HYACINTH GASTELUM     Inpatient consult to Infectious Diseases  Once     Provider:  (Not yet assigned)    Completed NATALIE MANN     Inpatient consult to Nephrology  Once     Provider:  (Not yet assigned)    Completed HYACINTH GASTELUM     Inpatient consult to Palliative Care  Once     Provider:  (Not yet assigned)    Completed NATALIE MANN     Inpatient consult to Registered Dietitian/Nutritionist  Once     Provider:  (Not yet assigned)    Completed PAN LAINEZ        Significant Labs:  All pertinent labs within the past 24 hours have been reviewed.    Significant  Imaging:  I have reviewed and interpreted all pertinent imaging results/findings within the past 24 hours.    Pending Diagnostic Studies:     Procedure Component Value Units Date/Time    CMV DNA, quantitative, PCR [[id number]] Collected:  03/15/19 0258    Order Status:  Sent Lab Status:  In process Updated:  03/15/19 0259    Specimen:  Blood     Drugs of Abuse Screen, Blood [[id number]]     Order Status:  Sent Lab Status:  No result     Specimen:  Blood     EKG 12-lead [[id number]]     Order Status:  Sent Lab Status:  No result         Final Active Diagnoses:    Diagnosis Date Noted POA    PRINCIPAL PROBLEM:  Acute encephalopathy [G93.40] 03/11/2019 Yes    HCC (hepatocellular carcinoma) [C22.0] 01/29/2019 Yes    Leukocytosis [D72.829] 03/14/2019 Yes    Acute hypoxemic respiratory failure [J96.01] 03/12/2019 Yes    Decompensated hepatic cirrhosis [K72.90]  Yes    Abdominal distention [R14.0] 03/12/2019 Yes    Alteration in skin integrity [R23.9] 03/15/2019 Yes     Chronic    Palliative care encounter [Z51.5] 03/15/2019 Not Applicable     Chronic    DNR (do not resuscitate) [Z66]  Unknown    Goals of care, counseling/discussion [Z71.89]  Not Applicable    (HFpEF) heart failure with preserved ejection fraction [I50.30]  Yes     Chronic    Immunosuppressed status [D89.9] 04/26/2017 Yes     Chronic    KARON (obstructive sleep apnea) [G47.33] 04/26/2017 Yes     Chronic    Chronic gout [M1A.9XX0] 04/26/2017 Yes     Chronic    ESRD on hemodialysis [N18.6, Z99.2]  Not Applicable     Chronic    Secondary hyperparathyroidism [N25.81] 10/11/2016 Yes     Chronic    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 macular edema associated with type 2 diabetes mellitus [E11.3293]  Yes     Chronic    Controlled type 2 diabetes mellitus with chronic kidney disease on chronic dialysis, with long-term current use of insulin [E11.22, N18.6, Z79.4, Z99.2] 04/14/2015 Not Applicable     Chronic    Essential  hypertension [I10]  Yes     Chronic    Liver transplanted 6/10/2001 [Z94.4]  Not Applicable     Chronic      Problems Resolved During this Admission:     No new Assessment & Plan notes have been filed under this hospital service since the last note was generated.  Service: Critical Care Medicine    Discharged Condition:     Disposition:       Patient Instructions:   No discharge procedures on file.  Medications:  None (patient  at medical facility)     Jose A Schulte MD  Critical Care Medicine  [facility]

## 2019-03-17 NOTE — ASSESSMENT & PLAN NOTE
Liver transplanted 2001  Immunosuppressed status- on tacrolimus  - Hepatology consulted. Recommend 0.5mg tacrolimus daily  MELD-Na score: 27 at 3/17/2019 11:32 AM  MELD score: 27 at 3/17/2019 11:32 AM  Calculated from:  Serum Creatinine: 5.2 mg/dL (Rounded to 4 mg/dL) at 3/17/2019 11:32 AM  Serum Sodium: 143 mmol/L (Rounded to 137 mmol/L) at 3/17/2019 11:32 AM  Total Bilirubin: 2.2 mg/dL at 3/17/2019  6:11 AM  INR(ratio): 1.5 at 3/17/2019  2:39 AM  Age: 73 years

## 2019-03-17 NOTE — SUBJECTIVE & OBJECTIVE
Interval History: Mental status improving. Febrile again. Cultures unrevealing. Of note, last imaging about a week ago. LFT's subtly rising.    Review of Systems   Unable to perform ROS: Intubated     Objective:     Vital Signs (Most Recent):  Temp: (!) 103.7 °F (39.8 °C) (03/17/19 1100)  Pulse: 100 (03/17/19 1142)  Resp: (!) 28 (03/17/19 1142)  BP: (!) 127/58 (03/17/19 1100)  SpO2: 95 % (03/17/19 1142) Vital Signs (24h Range):  Temp:  [97.2 °F (36.2 °C)-103.7 °F (39.8 °C)] 103.7 °F (39.8 °C)  Pulse:  [] 100  Resp:  [16-41] 28  SpO2:  [82 %-97 %] 95 %  BP: ()/(53-68) 127/58     Weight: 93 kg (205 lb)  Body mass index is 27.8 kg/m².    Estimated Creatinine Clearance: 13.4 mL/min (A) (based on SCr of 5.3 mg/dL (H)).    Physical Exam   Constitutional: He appears well-nourished.   Responds to stimuli and sponateously on vent.   HENT:   Head: Normocephalic and atraumatic.   Neck: Normal range of motion.   Cardiovascular: Normal rate, regular rhythm, normal heart sounds and intact distal pulses.   Pulmonary/Chest: Effort normal. No respiratory distress. He has no rales.   Intubated on mechanical vent   Abdominal: Bowel sounds are normal. He exhibits distension. There is no tenderness. There is no guarding.   Musculoskeletal: Normal range of motion.   Neurological: He is alert.   Responds to stimuli spontaneously on vent.   Skin: Skin is warm and dry.   Psychiatric: He has a normal mood and affect.       Significant Labs:   CBC:   Recent Labs   Lab 03/16/19  0611 03/17/19  0611   WBC 17.02* 23.06*   HGB 9.9* 10.4*   HCT 31.5* 33.2*    168     CMP:   Recent Labs   Lab 03/16/19  0611 03/17/19  0611    141   K 4.2 4.4    106   CO2 24 23   * 255*   BUN 61* 45*   CREATININE 7.4* 5.3*   CALCIUM 8.7 8.3*   PROT 7.2 8.0   ALBUMIN 1.8* 1.9*   BILITOT 1.8* 2.2*   ALKPHOS 194* 237*   AST 54* 75*   ALT 24 28   ANIONGAP 12 12   EGFRNONAA 6.6* 9.8*       Significant Imaging: I have reviewed all  pertinent imaging results/findings within the past 24 hours.

## 2019-03-17 NOTE — SUBJECTIVE & OBJECTIVE
"Interval History: HD yesterday, net negative by ~500ccs    Review of patient's allergies indicates:   Allergen Reactions    Adhesive Dermatitis and Other (See Comments)     "Burned his tail"  Please use Paper Tape    Corticosteroids (glucocorticoids) Other (See Comments)     Leg swelling after an injection    Hydrocortisone Swelling     Leg swelling after an injection    Neuromuscular blockers, steroidal Swelling     Leg swelling after an injection    Ribavirin Other (See Comments)     Arthralgias    Propofol analogues Other (See Comments)     Drops SVR and due to current systemic shunt state, results in difficulty in oscillometry to obtain blood pressure until return of vascular tone (other VS are stable throughout) and drop in venous return due to abdominal ascites; titrate slower in future or select alternate agent. Smooth emergence from general anesthesia without difficulty.     Current Facility-Administered Medications   Medication Frequency    0.9%  NaCl infusion PRN    0.9%  NaCl infusion Once    acetaminophen tablet 650 mg Q8H PRN    allopurinol tablet 100 mg QHS    ceFEPIme injection 2 g Q24H    chlorhexidine 0.12 % solution 15 mL BID    dextrose 50% injection 12.5 g PRN    famotidine tablet 20 mg Daily    fentaNYL injection 25 mcg Q1H PRN    glucagon (human recombinant) injection 1 mg PRN    heparin (porcine) injection 5,000 Units Q8H    insulin aspart U-100 pen 1-10 Units Q4H PRN    insulin aspart U-100 pen 3 Units Q4H    lactulose 20 gram/30 mL solution Soln 30 g Q6H    ondansetron disintegrating tablet 8 mg Q8H PRN    polyethylene glycol packet 17 g BID PRN    promethazine tablet 25 mg Q6H PRN    ramelteon tablet 8 mg Nightly PRN    rifAXIMin tablet 550 mg BID    sodium chloride 0.9% flush 5 mL PRN    tacrolimus (PROGRAF) 1 mg/mL oral syringe BID       Objective:     Vital Signs (Most Recent):  Temp: (!) 102.3 °F (39.1 °C) (03/17/19 0911)  Pulse: 100 (03/17/19 1001)  Resp: (!) " 32 (03/17/19 1001)  BP: (!) 132/59 (03/17/19 1000)  SpO2: 96 % (03/17/19 1001)  O2 Device (Oxygen Therapy): ventilator (03/17/19 1000) Vital Signs (24h Range):  Temp:  [97.2 °F (36.2 °C)-102.3 °F (39.1 °C)] 102.3 °F (39.1 °C)  Pulse:  [] 100  Resp:  [16-41] 32  SpO2:  [82 %-97 %] 96 %  BP: ()/(53-68) 132/59     Weight: 93 kg (205 lb) (03/13/19 1400)  Body mass index is 27.8 kg/m².  Body surface area is 2.17 meters squared.    I/O last 3 completed shifts:  In: 1910 [Other:350; NG/GT:1560]  Out: 2511 [Other:711; Stool:1800]    Physical Exam   HENT:   Head: Atraumatic.   Neck: No JVD present.   Cardiovascular: Normal rate and regular rhythm. Exam reveals no friction rub.   Pulmonary/Chest: Effort normal and breath sounds normal.   Abdominal: Soft. He exhibits no distension.   Skin: Skin is warm.       Significant Labs:  CBC:   Recent Labs   Lab 03/17/19  0611   WBC 23.06*   RBC 3.69*   HGB 10.4*   HCT 33.2*      MCV 90   MCH 28.2   MCHC 31.3*     CMP:   Recent Labs   Lab 03/17/19  0611   *   CALCIUM 8.3*   ALBUMIN 1.9*   PROT 8.0      K 4.4   CO2 23      BUN 45*   CREATININE 5.3*   ALKPHOS 237*   ALT 28   AST 75*   BILITOT 2.2*     All labs within the past 24 hours have been reviewed.

## 2019-03-17 NOTE — ASSESSMENT & PLAN NOTE
74 year old male with a history of Hep C cirrhosis s/p transplant (2001) complicated by HCC/PVT s/p ablation on 3/7/19 and ESRD who Hepatology is following for for decompensated cirrhosis as well as immunosuppression. Mention about the same with no marked improvement however patient with worsening WBC/febrile. Would recommend additional infectious workup as outlined below, since transplant ID is on the case their input would be greatly appreciated.    Recommendations:  --Daily CMP, CBC, INR  --Daily tacrolimus level  --Continue current dose of tacrolimus  --Continue lactulose and rifaximin    --Escalate antibiotics (possibly consider anti-fungals)  --Re-culture as well as obtain thoracentesis and paracentesis  --Order C diff  --Repeat CT A/P to rule out intra-abdominal path such as abscess associated with recent locoregional therapy for HCC    --Nephrology recs appreciated   -- ID recs appreciated

## 2019-03-18 RX ORDER — HYDROXYZINE HYDROCHLORIDE 25 MG/1
TABLET, FILM COATED ORAL
Qty: 90 TABLET | Refills: 0 | OUTPATIENT
Start: 2019-03-18

## 2019-03-18 NOTE — PLAN OF CARE
19 0950   Final Note   Assessment Type Final Discharge Note   Anticipated Discharge Disposition    Discharge planning education complete? No   What phone number can be called within the next 1-3 days to see how you are doing after discharge? [phone]   Hospital Follow Up  Appt(s) scheduled? No   Discharge plans and expectations educations in teach back method with documentation complete? No   Offered [facility] -- Bedside Delivery? n/a   Schedule Hospital follow up within other (comments)  (N/A)   Discharge/Hospital Encounter Summary to (non[facility]) PCP n/a   Referral to Outpatient Case Management complete? n/a   Referral to / orders for Home Health Complete? n/a   30 day supply of medicines given at discharge, if documented non-compliance / non-adherence? n/a   Any social issues identified prior to discharge? n/a   Did you assess the readiness or willingness of the family or caregiver to support self management of care? n/a   Right Care Referral Info   Post Acute Recommendation Other  ( in medical facility)   Fabiola Schreiber, RN, BSN, CCM  Case Management  [facility]  Ext. [phone]

## 2019-03-18 NOTE — NURSING
Pt family left. Pt's body prepared as per protocol to be send to the morgue. All checks and necessary preparation was done. No jewelry noted with the pt.

## 2019-03-19 LAB
BACTERIA BLD CULT: NORMAL
BACTERIA BLD CULT: NORMAL
BACTERIA SPEC ANAEROBE CULT: NORMAL

## 2019-03-19 NOTE — PHYSICIAN QUERY
PT Name: Philip Mathis III  MR #: [id number]     Physician Query Form - Documentation Clarification      CDS/: Marifer Phillip               Contact information: [phone]    This form is a permanent document in the medical record.     Query Date: March 19, 2019    By submitting this query, we are merely seeking further clarification of documentation. Please utilize your independent clinical judgment when addressing the question(s) below.    The Medical record reflects the following:    Supporting Clinical Findings Location in Medical Record   He was started on scheduled lactulose to treat encephalopathy attributed to hepatic etiology.    On 3/12, patient was transferred to ICU for further workup of Acute metabolic encephalopathy. Pt was intubated. And Paracentesis (diagnostic/therapuetic) completed    Infectious workup was negative and Infectious Disease Transplant were consulted for assistance. Hepatology was involved in care of patient. The patient continued to remain encephalopathic despite weaning of sedation      EEG and CT imaging were negative.    He became suddenly hypotensive at around 3pm with requirements of levophed. He then developed tachyarrhythmias, but we were unable to decrease his pressors. He was given boluses of fluid. Ultimately, he developed sustained ventricular tachycardia and eventually asystole.      D/C Summary 3/17 ([facility]) [facility]   Assessment:  Decompensated cirrhosis w/ Hepatic encephalopathy (grade 4)- not improved. CTH-, EEG-  Hepatocellular carcinoma- with enlarging mass, contributing to above. Poor prognosis.   Aspiration pneumonia vs. Intrahepatic source- suspected source of infection based on RML infiltrate.   Hypercapneic respiratory failure- Intubated, also for airway protection.      Plan:  - Continue aggressive lactulose. Will hold off on C-diff studies at this time.   - Spiking fevers with elevation in WBC count. Cefepime discontinued, and started on zosyn and  fluconazole.   - Tylenol prn for fevers. BCx obtained as well, will follow. CXR reveals no acute process. US of abdomen to assess for evolving lesions.   - Wide appearing complexes with normal rate. Likely bundle branch block, less likely to be SVT or VTach. Troponin obtained, not elevated. Will stop trending         Progress note 3/17 ANDRAE (Eris)                                                                            Doctor, Please specify diagnosis or diagnoses associated with above clinical findings.    Provider Use Only    (     )  Acute Hepatic failure    (  X   )  Chronic Hepatic failure    (      ) Acute on Chronic Hepatic Failure    (      )  Hepatic Failure Ruled Out    (      )  Other____________________                                                                                                           [  ] Clinically Undetermined

## 2019-03-20 ENCOUNTER — POST MORTEM DOCUMENTATION (OUTPATIENT)
Dept: TRANSPLANT | Facility: CLINIC | Age: 74
End: 2019-03-20

## 2019-03-20 LAB
BACTERIA SPEC AEROBE CULT: NORMAL
BACTERIA SPEC AEROBE CULT: NORMAL
GRAM STN SPEC: NORMAL
GRAM STN SPEC: NORMAL

## 2019-03-21 LAB
BACTERIA BLD CULT: NORMAL
BACTERIA BLD CULT: NORMAL

## 2019-03-22 LAB
BACTERIA BLD CULT: NORMAL
BACTERIA BLD CULT: NORMAL

## 2019-04-09 ENCOUNTER — TELEPHONE (OUTPATIENT)
Dept: TRANSPLANT | Facility: CLINIC | Age: 74
End: 2019-04-09

## 2019-04-09 NOTE — TELEPHONE ENCOUNTER
----- Message from Brittani Luis Felipe sent at 4/9/2019 12:58 PM CDT -----  Contact: Merlene Mathis  Needs Advice    Reason for call: Wife asks if claudia or Dr. Nath could return her call in regards to an issue with his insurance policy. She wishes to know when did patient have liver biopsy, etc.          Communication Preference: [phone]    Additional Information: n/a

## 2019-04-09 NOTE — TELEPHONE ENCOUNTER
Spoke to pt's wife and informed her liver biopsy was done. She will call back tomorrow with where to send report for pt's cancer policy. Will await return call.

## 2019-04-09 NOTE — PHYSICIAN QUERY
PT Name: Philip Mathis III  MR #: [id number]     Physician Query Form - Documentation Clarification      CDS/: Marifer Phillip               Contact information: [phone]    This form is a permanent document in the medical record.     Query Date: April 9, 2019    By submitting this query, we are merely seeking further clarification of documentation. Please utilize your independent clinical judgment when addressing the question(s) below.    The Medical record reflects the following:    Supporting Clinical Findings Location in Medical Record   will treat infectious given distended abd per wife for SBP as well as PNA. Continue Zosyn    Acute encephalopathy     - new acute onset, etiology unclear, could be hepatic encephalopathy, infectious, metabolic, medication or neurologic  - at this time patient is lethargic, arousable to pain stimuli only, per wife this has been like this all day, per ED note he answered his name  - so far CT head showed no obvious issue  - electrolytes are stable, glucose are stable   - discuss with wife again, she did not give him any new medications or sedating meds, UTox done was negative as well, 1x dose of narcan did not improved his mentals status  - neuro check Q4hr, NPO status due to concern for aspiration  - will treat infectious given distended abd per wife for SBP as well as PNA. Continue Zosyn  - will treat hepatic encephalopathy, start Lactulose Enema tonight, TID for now  - pleural effusion noted, now requiring 3L NC, CTA chest was negative for PE, will order ABG tonight  - will need nephrology for HD as he missed HD to day to help with the metabolic component  - NPO status given mental status change, high risk for aspiration  - consider neurology consult in AM if not improvement     Low threshold to consult MICU if further changes to his mental status or airway compromised     Vital Signs (24h Range):  Temp:  [97.6 °F (36.4 °C)-98.3 °F (36.8 °C)] 98.1 °F (36.7 °C)  Pulse:   [79-88] 86  Resp:  [16-28] 22  SpO2:  [90 %-97 %] 94 %  BP: (129-173)/(61-81) 161/76    Acute hypoxemic respiratory failure     - Now on 3L NC  - CTA: No definite PE identified, noting limited evaluation to the central pulmonary arteries. Development of bilateral pleural effusions, larger on the right. Partial collapse of the middle lobe, lingula, and bilateral lower lobes likely representing atelectasis.  - Continue Zosyn  - will need HD              H/P 3/12 (Uribe)   admitted on 3/11/2019 with acute onset encephalopathy (found down at night by wife) with antecedent N/V with possible aspiration and abdominal pain due to HE from a likely aspiration pneumonia.     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 Pt was intubated. And Paracentesis (diagnostic/therapuetic) completed. He was started on scheduled lactulose to treat encephalopathy attributed to hepatic etiology. Infectious workup was negative and Infectious Disease Transplant were consulted for assistance. Hepatology was involved in care of patient. The patient continued to remain encephalopathic despite weaning of sedation.  EEG and CT imaging were negative. He was intermittently able to follow some commands on 3/16/2019, however, there was no consistency and ultimately no meaningful improvements. The following day, he was no longer following commands. He became suddenly hypotensive at around 3pm with requirements of levophed. He then developed tachyarrhythmias, but we were unable to decrease his pressors. He was given boluses of fluid. Ultimately, he developed sustained ventricular tachycardia and eventually asystole.          Progress note ID (Julio) 3/17                                                D/C Summary 3/17 (Shahab)                                                                            Doctor, Please specify diagnosis or diagnoses associated with above clinical findings.    Provider Use Only    (     )  Aspiration Pneumonia Ruled IN and POA     (   X  )  Aspiration Pneumonia Ruled In Not POA    (      )  Aspiration Pneumonia Ruled Out    (      )  Other__________________________                                                                                                                                   [  ] Clinically Undetermined

## 2019-04-09 NOTE — PHYSICIAN QUERY
PT Name: Philip Mathis III  MR #: [id number]    Physician Query Form -Present on Admission (POA) Diagnosis Clarification     CDS/: Marifer Phillip               Contact information: [phone]    This form is a permanent document in the medical record.     Query Date: April 9, 2019    By submitting this query, we are merely seeking further clarification of documentation. Please utilize your independent clinical judgment when addressing the question(s) below.       The Medical record contains the following:    Diagnosis      Supporting Clinical Information   Location in Medical Record    septic shock           Cause of Death: septic shock    Continued to have new pressor requirements since 3 pm today. Antibiotics were expanded in the am with infectious work up which was concerning for progression of shock with lactic acidosis, but no identified source. At that time, family advised that he is declining, and should have other family members come see him.      At 5pm he continued to decline, ultimately having HR up to 150-160 while on levophed. He ultimately developed ventricular tachycardia and eventually asystole.     He presented initially to [facility] ED this Am with acute confusion. Wife not at bedside on transfer, she was reached via telephone for collateral tonight. Per wife, this AM patient did not wake up to his alarm to go to HD, she then later heard patient getting out of bed and heard a thump    Hospital Course:   On 3/12, patient was transferred to ICU for further workup of Acute metabolic encephalopathy. Pt was intubated. And Paracentesis (diagnostic/therapuetic) completed. He was started on scheduled lactulose to treat encephalopathy attributed to hepatic etiology. Infectious workup was negative and Infectious Disease Transplant were consulted for assistance. Hepatology was involved in care of patient. The patient continued to remain encephalopathic despite weaning of sedation. EEG and CT  imaging were negative. He was intermittently able to follow some commands on 3/16/2019, however, there was no consistency and ultimately no meaningful improvements. The following day, he was no longer following commands. He became suddenly hypotensive at around 3pm with requirements of levophed. He then developed tachyarrhythmias, but we were unable to decrease his pressors. He was given boluses of fluid. Ultimately, he developed sustained ventricular tachycardia and eventually asystole.   Acute encephalopathy     - new acute onset, etiology unclear, could be hepatic encephalopathy, infectious, metabolic, medication or neurologic  - at this time patient is lethargic, arousable to pain stimuli only, per wife this has been like this all day, per ED note he answered his name  - so far CT head showed no obvious issue  - electrolytes are stable, glucose are stable   - discuss with wife again, she did not give him any new medications or sedating meds, UTox done was negative as well, 1x dose of narcan did not improved his mentals status  - neuro check Q4hr, NPO status due to concern for aspiration  - will treat infectious given distended abd per wife for SBP as well as PNA. Continue Zosyn  - will treat hepatic encephalopathy, start Lactulose Enema tonight, TID for now  - pleural effusion noted, now requiring 3L NC, CTA chest was negative for PE, will order ABG tonight  - will need nephrology for HD as he missed HD to day to help with the metabolic component  - NPO status given mental status change, high risk for aspiration  - consider neurology consult in AM if not improvement     Low threshold to consult MICU if further changes to his mental status or airway compromised        Vital Signs (24h Range):  Temp:  [97.6 °F (36.4 °C)-98.3 °F (36.8 °C)] 98.1 °F (36.7 °C)  Pulse:  [79-88] 86  Resp:  [16-28] 22  SpO2:  [90 %-97 %] 94 %  BP: (129-173)/(61-81) 161/76    Admission Date: 3/11/2019           Care Update [facility] 3/17  (Eris)                        D/C Summary 3/17 (endra) [facility]                                        H/P 3/2 (Jojo/Benitez)          Doctor, Please specify Present On Admission (POA) status of ___ septic shock  _____________________.    [  ] Present on Admission    [ X ] Not Present on Admission   [  ] Clinically Undetermined

## 2019-04-16 LAB — FUNGUS SPEC CULT: NORMAL

## 2019-04-17 ENCOUNTER — TELEPHONE (OUTPATIENT)
Dept: TRANSPLANT | Facility: CLINIC | Age: 74
End: 2019-04-17

## 2019-04-17 NOTE — TELEPHONE ENCOUNTER
----- Message from Mya Hughes sent at 4/17/2019  9:37 AM CDT -----  Contact: pt wife/Merlene  Please call pt wife at [phone]    Questions regarding paperwork from [employer] company (no details given)    Thank you

## 2019-04-17 NOTE — TELEPHONE ENCOUNTER
Spoke to pt's wife and assisted her with contacting billing department for documents needed for insurance company. Pt then provided contact info for billing to follow up, she verbalizes understanding.

## 2019-05-12 NOTE — PROCEDURES
DATE OF SERVICE:  03/15/2019    EEG NUMBER:  FH [id number].    REQUESTING PHYSICIAN:  Dr. Luna.    LOCATION OF SERVICE:  -ICU.    ICU EEG/VIDEO MONITORING REPORT     METHODOLOGY:  Electroencephalographic (EEG) is recorded with electrodes placed   according to the International 10-20 placement system.  Thirty two (32) channels   of digital signal (sampling rate of 512/sec), including T1 and T2, were   simultaneously recorded from the scalp and may include EKG, EMG, and/or eye   monitors.  Recording band pass was 0.1 to 512 Hz.  Digital video recording of   the patient is simultaneously recorded with the EEG.  The patient is instructed   to report clinical symptoms which may occur during the recording session.  EEG   and video recording are stored and archived in digital format.  Activation   procedures, which include photic stimulation, hyperventilation and instructing   patients to perform simple tasks, are done in selected patients.    The EEG is displayed on a monitor screen and can be reviewed using different   montages.  Computer-assisted analysis is employed to detect spike and   electrographic seizure activity.   The entire record is submitted for computer   analysis.  The entire recording is visually reviewed, and the times identified   by computer analysis as being spikes or seizures are reviewed again.      Compressed spectral analysis (CSA) is also performed on the activity recorded   from each individual channel.  This is displayed as a power display of   frequencies from 0 to 30 Hz over time.   The CSA is reviewed looking for   asymmetries in power between homologous areas of the scalp, then compared with   the original EEG recording.    [employer] software was also utilized in the review of this study.  This software   suite analyzes the EEG recording in multiple domains.  Coherence and rhythmicity   are computed to identify EEG sections which may contain organized seizures.    Each channel undergoes  analysis to detect the presence of spike and sharp waves   which have special and morphological characteristics of epileptic activity.  The   routine EEG recording is converted from special into frequency domain.  This is   then displayed comparing homologous areas to identify areas of significant   asymmetry.  Algorithm to identify non-cortically generated artifact is used to   separate artifact from the EEG.      RECORDING TIMES:  Start on 03/15/2019 at 07:00 and end at 12:54.  A total of 5 hours and 54 minutes of EEG monitoring was obtained.    EEG FINDINGS:  Recording was obtained at the patient's bedside in the ICU.  The   patient was unresponsive, intubated and on a respirator.  Background was a   diffuse mixture of theta with some intermixed 2 Hz delta.  Occasional sharp   theta transient was noted, but no clear epileptic activity was present.  This   background persisted throughout the recording.  There were no activation   procedures carried out.    IMPRESSION:  Markedly abnormal EEG with generalized slowing indicative of a   marked encephalopathic process without evidence of additional focal changes and   no epileptic activity was recorded.      RR/HN  dd: 05/11/2019 10:45:06 (CDT)  td: 05/11/2019 21:52:54 (CDT)  Doc ID   #[id number]  Job ID #[id number]    CC:

## 2019-05-14 LAB
ACID FAST MOD KINY STN SPEC: NORMAL
MYCOBACTERIUM SPEC QL CULT: NORMAL

## 2019-09-24 NOTE — ASSESSMENT & PLAN NOTE
HX states hep C treatment knocked out his new liver; continue prograf; hepatic enzymes WNL  Prograf level     DISPLAY PLAN FREE TEXT
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
## 2020-04-17 NOTE — TELEPHONE ENCOUNTER
Patient: Philip Mathis III       MRN: [id number]      : 1945     Age: 72 y.o.  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  Has a liver lesion, 1.9 cm, could be cyst, but more likely mass.  AFP has increased from 4.4 to 27, now 32.  Last review fel lesion was indeterminate, hence, biopsy performed.  Confirmed HCC.  Now needs treatment decision, TACE/MWA.    Plt 110, INR 10, T bili 0.5, alb 3.8, creat 4.8 (on HD), AFP 32 (previously 27 on 17).     Imaging to be reviewed: CT 17, U/S 17    HCC Treatment History: none    ABO: O POS    Platelets:   Lab Results   Component Value Date/Time     (L) 12/15/2017 08:04 AM     Creatinine:   Lab Results   Component Value Date/Time    CREATININE 4.8 (H) 12/15/2017 08:04 AM     Bilirubin:   Lab Results   Component Value Date/Time    BILITOT 0.5 12/15/2017 08:04 AM     AFP Last 3 each if available:   Lab Results   Component Value Date/Time    AFP 32 (H) 12/15/2017 08:04 AM    AFP 27 (H) 2017 09:02 AM    AFP 4.4 2017 07:32 AM       MELD: MELD-Na score: 20 at 12/15/2017  8:04 AM  MELD score: 20 at 12/15/2017  8:04 AM  Calculated from:  Serum Creatinine: 4.8 mg/dL (Rounded to 4) at 12/15/2017  8:04 AM  Serum Sodium: 143 mmol/L (Rounded to 137) at 12/15/2017  8:04 AM  Total Bilirubin: 0.5 mg/dL (Rounded to 1) at 12/15/2017  8:04 AM  INR(ratio): 1.0 at 12/15/2017  8:04 AM  Age: 72 years    Plan:     Follow-up Provider:    10

## 2020-07-10 NOTE — PROGRESS NOTES
AAO X3. VSS, Suture X1 removed from LUE Access site without difficulty. Patient tolerated well. No s/s of distress noted. Patient scheduled to return on August 23 rd to see Dr Silvio William for Perma Cath Removal. Instructed to call office if he have any problems dialyzing. Patient verbalized understanding. GSL/LPN      Electronically signed by Keyla Khan LPN at 7/21/2017          20.3

## 2020-10-27 NOTE — PROGRESS NOTES
Hgb 8.7;Hct 28.0    Discussed Hgb with Patient. Dose didn't increase  PER PROTOCOL FORM DROD-188. Due to being at max dose.  Aranesp 300 mcg given and 2 week appt. Set. due to Hgb under 9 .  Dr Amezcua notified.    Gfr 8.9/Stage 4   Home

## 2023-06-08 NOTE — TELEPHONE ENCOUNTER
----- Message from Alissa Syed sent at 2/27/2018  8:58 AM CST -----  Contact: Daniel lima/[facility]  Calling in ref to new rx sent over on 2/19/18 and still have not rec'd repy/pt needs refill on carvedilol (COREG) 6.25 MG tablet    Gave Jamin fax number to Michell Rowe to fax rx request to as well     Daniel contact [phone]   Detail Level: Detailed Quality 130: Documentation Of Current Medications In The Medical Record: Current Medications Documented

## 2023-06-12 NOTE — TELEPHONE ENCOUNTER
Mr. Mathis do not qualify for any programs because he was approved for Low Income Subsidy.  He will only pay $3.20 for generics and $8.40 for brand medications.    Ketoconazole Counseling:   Patient counseled regarding improving absorption with orange juice.  Adverse effects include but are not limited to breast enlargement, headache, diarrhea, nausea, upset stomach, liver function test abnormalities, taste disturbance, and stomach pain.  There is a rare possibility of liver failure that can occur when taking ketoconazole. The patient understands that monitoring of LFTs may be required, especially at baseline. The patient verbalized understanding of the proper use and possible adverse effects of ketoconazole.  All of the patient's questions and concerns were addressed.

## 2024-01-17 NOTE — TELEPHONE ENCOUNTER
Please give the patient an IR appointment to discuss if he wants to pursue Y-90 treatment.    Thanks   Alert-The patient is alert, awake and responds to voice. The patient is oriented to time, place, and person. The triage nurse is able to obtain subjective information.

## (undated) DEVICE — KIT INTRO MICRO NIT VSI 4FR

## (undated) DEVICE — SYR ONLY LUER LOCK 20CC

## (undated) DEVICE — LOOP VESSEL BLUE MAXI

## (undated) DEVICE — CLOSURE SKIN 1X5 STERI-STRIP

## (undated) DEVICE — SWABSTICK BENZOIN 4 IN

## (undated) DEVICE — SUT PROLENE 4-0 RB-1 BL MO

## (undated) DEVICE — SET DECANTER MEDICHOICE

## (undated) DEVICE — SUT VICRYL 3-0 27 SH

## (undated) DEVICE — SEE MEDLINE ITEM 146417

## (undated) DEVICE — CLIPPER BLADE MOD 4406 (CAREF)

## (undated) DEVICE — DRAPE PLASTIC U 60X72

## (undated) DEVICE — DRESSING ANTIMICROBIAL 3/4 IN

## (undated) DEVICE — HEMOSTAT SURGICEL 4X8IN

## (undated) DEVICE — SEE MEDLINE ITEM 152622

## (undated) DEVICE — SEE MEDLINE ITEM 153688

## (undated) DEVICE — CLIP MED TICALL

## (undated) DEVICE — BOOT SUTURE AID

## (undated) DEVICE — SUT SILK 2-0 SH 18IN BLACK

## (undated) DEVICE — DRESSING TRANS 2X2 TEGADERM

## (undated) DEVICE — GAUZE SPONGE 4X4 12PLY

## (undated) DEVICE — SUT 2-0 12-18IN SILK

## (undated) DEVICE — TRAY MINOR GEN SURG

## (undated) DEVICE — SUT 2-0 VICRYL / SH (J417)

## (undated) DEVICE — SUT LIGACLIP SMALL XTRA

## (undated) DEVICE — CLIP SPRING 6MM

## (undated) DEVICE — Device

## (undated) DEVICE — DEVICE PICC SECURE SORBA VIEW

## (undated) DEVICE — TRAY PICC CENTRAL LINE DRSNG

## (undated) DEVICE — DRESSING TELFA STRL 4X3 LF

## (undated) DEVICE — CANNULA VESSEL

## (undated) DEVICE — BLADE SURG CARBON STEEL SZ11

## (undated) DEVICE — ELECTRODE REM PLYHSV RETURN 9

## (undated) DEVICE — COVER LIGHT HANDLE 80/CA

## (undated) DEVICE — DRAPE THYROID WITH ARMBOARD

## (undated) DEVICE — INSERTS STEALTH FIBRA SIZE 2

## (undated) DEVICE — SUT MCRYL PLUS 4-0 PS2 27IN

## (undated) DEVICE — COVERS PROBE NR-48 STERILE

## (undated) DEVICE — DRESSING TRANS 4X4 TEGADERM

## (undated) DEVICE — SUT 4-0 12-18IN SILK BLACK

## (undated) DEVICE — SOL NS 1000CC

## (undated) DEVICE — SUT 0 18IN SILK BLK BRAIDE

## (undated) DEVICE — SPONGE DERMACEA GAUZE 4X4

## (undated) DEVICE — SUT 3-0 12-18IN SILK

## (undated) DEVICE — SEE MEDLINE ITEM 157173